# Patient Record
Sex: MALE | Race: WHITE | NOT HISPANIC OR LATINO | Employment: FULL TIME | URBAN - METROPOLITAN AREA
[De-identification: names, ages, dates, MRNs, and addresses within clinical notes are randomized per-mention and may not be internally consistent; named-entity substitution may affect disease eponyms.]

---

## 2017-01-11 ENCOUNTER — GENERIC CONVERSION - ENCOUNTER (OUTPATIENT)
Dept: OTHER | Facility: OTHER | Age: 62
End: 2017-01-11

## 2017-01-26 ENCOUNTER — ALLSCRIPTS OFFICE VISIT (OUTPATIENT)
Dept: OTHER | Facility: OTHER | Age: 62
End: 2017-01-26

## 2017-01-28 ENCOUNTER — LAB CONVERSION - ENCOUNTER (OUTPATIENT)
Dept: OTHER | Facility: OTHER | Age: 62
End: 2017-01-28

## 2017-01-28 LAB
BASOPHILS # BLD AUTO: 0.4 %
BASOPHILS # BLD AUTO: 44 CELLS/UL (ref 0–200)
DEPRECATED RDW RBC AUTO: 14.8 % (ref 11–15)
EOSINOPHIL # BLD AUTO: 2.4 %
EOSINOPHIL # BLD AUTO: 262 CELLS/UL (ref 15–500)
HBA1C MFR BLD HPLC: 9.4 % OF TOTAL HGB
HCT VFR BLD AUTO: 45.1 % (ref 38.5–50)
HGB BLD-MCNC: 14.7 G/DL (ref 13.2–17.1)
LYMPHOCYTES # BLD AUTO: 30.1 %
LYMPHOCYTES # BLD AUTO: 3281 CELLS/UL (ref 850–3900)
MCH RBC QN AUTO: 29.1 PG (ref 27–33)
MCHC RBC AUTO-ENTMCNC: 32.5 G/DL (ref 32–36)
MCV RBC AUTO: 89.4 FL (ref 80–100)
MONOCYTES # BLD AUTO: 589 CELLS/UL (ref 200–950)
MONOCYTES (HISTORICAL): 5.4 %
NEUTROPHILS # BLD AUTO: 61.7 %
NEUTROPHILS # BLD AUTO: 6725 CELLS/UL (ref 1500–7800)
PLATELET # BLD AUTO: 405 THOUSAND/UL (ref 140–400)
PMV BLD AUTO: 9.9 FL (ref 7.5–12.5)
RBC # BLD AUTO: 5.05 MILLION/UL (ref 4.2–5.8)
WBC # BLD AUTO: 10.9 THOUSAND/UL (ref 3.8–10.8)

## 2017-02-06 ENCOUNTER — GENERIC CONVERSION - ENCOUNTER (OUTPATIENT)
Dept: OTHER | Facility: OTHER | Age: 62
End: 2017-02-06

## 2017-02-20 ENCOUNTER — ALLSCRIPTS OFFICE VISIT (OUTPATIENT)
Dept: OTHER | Facility: OTHER | Age: 62
End: 2017-02-20

## 2017-02-20 DIAGNOSIS — I10 ESSENTIAL (PRIMARY) HYPERTENSION: ICD-10-CM

## 2017-02-20 DIAGNOSIS — E55.9 VITAMIN D DEFICIENCY: ICD-10-CM

## 2017-02-20 DIAGNOSIS — E11.65 TYPE 2 DIABETES MELLITUS WITH HYPERGLYCEMIA (HCC): ICD-10-CM

## 2017-02-20 DIAGNOSIS — E78.5 HYPERLIPIDEMIA: ICD-10-CM

## 2017-03-01 ENCOUNTER — TRANSCRIBE ORDERS (OUTPATIENT)
Dept: LAB | Facility: CLINIC | Age: 62
End: 2017-03-01

## 2017-03-01 ENCOUNTER — APPOINTMENT (OUTPATIENT)
Dept: LAB | Facility: CLINIC | Age: 62
End: 2017-03-01
Payer: COMMERCIAL

## 2017-03-01 DIAGNOSIS — E78.5 HYPERLIPIDEMIA: ICD-10-CM

## 2017-03-01 DIAGNOSIS — E11.65 TYPE 2 DIABETES MELLITUS WITH HYPERGLYCEMIA (HCC): ICD-10-CM

## 2017-03-01 DIAGNOSIS — E55.9 VITAMIN D DEFICIENCY: ICD-10-CM

## 2017-03-01 DIAGNOSIS — I10 ESSENTIAL (PRIMARY) HYPERTENSION: ICD-10-CM

## 2017-03-01 LAB
25(OH)D3 SERPL-MCNC: 28 NG/ML (ref 30–100)
ALBUMIN SERPL BCP-MCNC: 4.1 G/DL (ref 3.5–5)
ALP SERPL-CCNC: 44 U/L (ref 46–116)
ALT SERPL W P-5'-P-CCNC: 39 U/L (ref 12–78)
ANION GAP SERPL CALCULATED.3IONS-SCNC: 7 MMOL/L (ref 4–13)
AST SERPL W P-5'-P-CCNC: 19 U/L (ref 5–45)
BILIRUB SERPL-MCNC: 0.2 MG/DL (ref 0.2–1)
BUN SERPL-MCNC: 18 MG/DL (ref 5–25)
CALCIUM SERPL-MCNC: 9.4 MG/DL (ref 8.3–10.1)
CHLORIDE SERPL-SCNC: 102 MMOL/L (ref 100–108)
CO2 SERPL-SCNC: 28 MMOL/L (ref 21–32)
CREAT SERPL-MCNC: 1.33 MG/DL (ref 0.6–1.3)
CREAT UR-MCNC: 78.9 MG/DL
GFR SERPL CREATININE-BSD FRML MDRD: 54.7 ML/MIN/1.73SQ M
GLUCOSE SERPL-MCNC: 184 MG/DL (ref 65–140)
MICROALBUMIN UR-MCNC: 5.4 MG/L (ref 0–20)
MICROALBUMIN/CREAT 24H UR: 7 MG/G CREATININE (ref 0–30)
POTASSIUM SERPL-SCNC: 4.7 MMOL/L (ref 3.5–5.3)
PROT SERPL-MCNC: 7.5 G/DL (ref 6.4–8.2)
SODIUM SERPL-SCNC: 137 MMOL/L (ref 136–145)
T4 FREE SERPL-MCNC: 0.95 NG/DL (ref 0.76–1.46)
TSH SERPL DL<=0.05 MIU/L-ACNC: 4.2 UIU/ML (ref 0.36–3.74)

## 2017-03-01 PROCEDURE — 82043 UR ALBUMIN QUANTITATIVE: CPT

## 2017-03-01 PROCEDURE — 84443 ASSAY THYROID STIM HORMONE: CPT

## 2017-03-01 PROCEDURE — 82306 VITAMIN D 25 HYDROXY: CPT

## 2017-03-01 PROCEDURE — 84439 ASSAY OF FREE THYROXINE: CPT

## 2017-03-01 PROCEDURE — 86800 THYROGLOBULIN ANTIBODY: CPT

## 2017-03-01 PROCEDURE — 36415 COLL VENOUS BLD VENIPUNCTURE: CPT

## 2017-03-01 PROCEDURE — 82570 ASSAY OF URINE CREATININE: CPT

## 2017-03-01 PROCEDURE — 80053 COMPREHEN METABOLIC PANEL: CPT

## 2017-03-01 PROCEDURE — 86376 MICROSOMAL ANTIBODY EACH: CPT

## 2017-03-02 ENCOUNTER — ALLSCRIPTS OFFICE VISIT (OUTPATIENT)
Dept: OTHER | Facility: OTHER | Age: 62
End: 2017-03-02

## 2017-03-03 LAB
THYROGLOB AB SERPL-ACNC: <1 IU/ML (ref 0–0.9)
THYROPEROXIDASE AB SERPL-ACNC: 10 IU/ML (ref 0–34)

## 2017-03-05 ENCOUNTER — GENERIC CONVERSION - ENCOUNTER (OUTPATIENT)
Dept: OTHER | Facility: OTHER | Age: 62
End: 2017-03-05

## 2017-03-16 ENCOUNTER — ALLSCRIPTS OFFICE VISIT (OUTPATIENT)
Dept: OTHER | Facility: OTHER | Age: 62
End: 2017-03-16

## 2017-04-11 ENCOUNTER — ALLSCRIPTS OFFICE VISIT (OUTPATIENT)
Dept: OTHER | Facility: OTHER | Age: 62
End: 2017-04-11

## 2017-05-01 DIAGNOSIS — E11.9 TYPE 2 DIABETES MELLITUS WITHOUT COMPLICATIONS (HCC): ICD-10-CM

## 2017-05-01 DIAGNOSIS — I10 ESSENTIAL (PRIMARY) HYPERTENSION: ICD-10-CM

## 2017-05-01 DIAGNOSIS — E03.9 HYPOTHYROIDISM: ICD-10-CM

## 2017-05-01 DIAGNOSIS — E78.5 HYPERLIPIDEMIA: ICD-10-CM

## 2017-05-24 ENCOUNTER — ALLSCRIPTS OFFICE VISIT (OUTPATIENT)
Dept: OTHER | Facility: OTHER | Age: 62
End: 2017-05-24

## 2017-06-26 ENCOUNTER — DOCTOR'S OFFICE (OUTPATIENT)
Dept: URBAN - METROPOLITAN AREA CLINIC 136 | Facility: CLINIC | Age: 62
Setting detail: OPHTHALMOLOGY
End: 2017-06-26
Payer: COMMERCIAL

## 2017-06-26 DIAGNOSIS — H35.52: ICD-10-CM

## 2017-06-26 DIAGNOSIS — H33.303: ICD-10-CM

## 2017-06-26 DIAGNOSIS — H35.413: ICD-10-CM

## 2017-06-26 DIAGNOSIS — H25.13: ICD-10-CM

## 2017-06-26 DIAGNOSIS — H43.813: ICD-10-CM

## 2017-06-26 DIAGNOSIS — E11.9: ICD-10-CM

## 2017-06-26 PROCEDURE — 92014 COMPRE OPH EXAM EST PT 1/>: CPT | Performed by: OPHTHALMOLOGY

## 2017-06-26 ASSESSMENT — REFRACTION_MANIFEST
OD_VA2: 20/
OS_VA3: 20/
OU_VA: 20/
OS_VA2: 20/
OS_VA2: 20/
OS_VA1: 20/
OS_VA2: 20/
OS_VA3: 20/
OD_VA1: 20/
OD_VA2: 20/
OD_VA1: 20/
OD_VA1: 20/
OD_VA3: 20/
OD_VA3: 20/
OS_VA3: 20/
OU_VA: 20/
OD_VA2: 20/
OS_VA1: 20/
OU_VA: 20/
OD_VA3: 20/
OS_VA1: 20/

## 2017-06-26 ASSESSMENT — CONFRONTATIONAL VISUAL FIELD TEST (CVF)
OS_FINDINGS: FULL
OD_FINDINGS: FULL

## 2017-06-26 ASSESSMENT — REFRACTION_CURRENTRX
OD_OVR_VA: 20/
OS_OVR_VA: 20/
OS_OVR_VA: 20/
OD_OVR_VA: 20/
OD_OVR_VA: 20/
OS_OVR_VA: 20/

## 2017-06-26 ASSESSMENT — CORNEAL SURGICAL SCARRING: OS_SCARRING: ANTERIOR STROMAL

## 2017-06-26 ASSESSMENT — VISUAL ACUITY
OD_BCVA: 20/25
OS_BCVA: 20/25

## 2017-07-05 DIAGNOSIS — C61 MALIGNANT NEOPLASM OF PROSTATE (HCC): ICD-10-CM

## 2017-07-08 ENCOUNTER — APPOINTMENT (OUTPATIENT)
Dept: LAB | Facility: CLINIC | Age: 62
End: 2017-07-08
Payer: COMMERCIAL

## 2017-07-08 ENCOUNTER — TRANSCRIBE ORDERS (OUTPATIENT)
Dept: LAB | Facility: CLINIC | Age: 62
End: 2017-07-08

## 2017-07-08 DIAGNOSIS — E78.5 HYPERLIPIDEMIA: ICD-10-CM

## 2017-07-08 DIAGNOSIS — E03.9 HYPOTHYROIDISM: ICD-10-CM

## 2017-07-08 DIAGNOSIS — C61 MALIGNANT NEOPLASM OF PROSTATE (HCC): ICD-10-CM

## 2017-07-08 DIAGNOSIS — E11.9 TYPE 2 DIABETES MELLITUS WITHOUT COMPLICATIONS (HCC): ICD-10-CM

## 2017-07-08 DIAGNOSIS — I10 ESSENTIAL (PRIMARY) HYPERTENSION: ICD-10-CM

## 2017-07-08 LAB
ALBUMIN SERPL BCP-MCNC: 3.8 G/DL (ref 3.5–5)
ALP SERPL-CCNC: 35 U/L (ref 46–116)
ALT SERPL W P-5'-P-CCNC: 32 U/L (ref 12–78)
ANION GAP SERPL CALCULATED.3IONS-SCNC: 7 MMOL/L (ref 4–13)
AST SERPL W P-5'-P-CCNC: 19 U/L (ref 5–45)
BILIRUB SERPL-MCNC: 0.4 MG/DL (ref 0.2–1)
BUN SERPL-MCNC: 15 MG/DL (ref 5–25)
CALCIUM SERPL-MCNC: 9.2 MG/DL (ref 8.3–10.1)
CHLORIDE SERPL-SCNC: 104 MMOL/L (ref 100–108)
CO2 SERPL-SCNC: 29 MMOL/L (ref 21–32)
CREAT SERPL-MCNC: 1.15 MG/DL (ref 0.6–1.3)
EST. AVERAGE GLUCOSE BLD GHB EST-MCNC: 166 MG/DL
GFR SERPL CREATININE-BSD FRML MDRD: >60 ML/MIN/1.73SQ M
GLUCOSE P FAST SERPL-MCNC: 133 MG/DL (ref 65–99)
HBA1C MFR BLD: 7.4 % (ref 4.2–6.3)
POTASSIUM SERPL-SCNC: 4.9 MMOL/L (ref 3.5–5.3)
PROT SERPL-MCNC: 7.4 G/DL (ref 6.4–8.2)
PSA SERPL-MCNC: <0.1 NG/ML (ref 0–4)
SODIUM SERPL-SCNC: 140 MMOL/L (ref 136–145)
T4 FREE SERPL-MCNC: 0.82 NG/DL (ref 0.76–1.46)
TSH SERPL DL<=0.05 MIU/L-ACNC: 4.11 UIU/ML (ref 0.36–3.74)

## 2017-07-08 PROCEDURE — 80053 COMPREHEN METABOLIC PANEL: CPT

## 2017-07-08 PROCEDURE — 84153 ASSAY OF PSA TOTAL: CPT

## 2017-07-08 PROCEDURE — 84439 ASSAY OF FREE THYROXINE: CPT

## 2017-07-08 PROCEDURE — 36415 COLL VENOUS BLD VENIPUNCTURE: CPT

## 2017-07-08 PROCEDURE — 83036 HEMOGLOBIN GLYCOSYLATED A1C: CPT

## 2017-07-08 PROCEDURE — 84443 ASSAY THYROID STIM HORMONE: CPT

## 2017-07-10 ENCOUNTER — GENERIC CONVERSION - ENCOUNTER (OUTPATIENT)
Dept: OTHER | Facility: OTHER | Age: 62
End: 2017-07-10

## 2017-07-12 ENCOUNTER — ALLSCRIPTS OFFICE VISIT (OUTPATIENT)
Dept: OTHER | Facility: OTHER | Age: 62
End: 2017-07-12

## 2017-08-24 ENCOUNTER — ALLSCRIPTS OFFICE VISIT (OUTPATIENT)
Dept: OTHER | Facility: OTHER | Age: 62
End: 2017-08-24

## 2017-10-06 DIAGNOSIS — E11.65 TYPE 2 DIABETES MELLITUS WITH HYPERGLYCEMIA (HCC): ICD-10-CM

## 2017-10-06 DIAGNOSIS — E78.5 HYPERLIPIDEMIA: ICD-10-CM

## 2017-10-06 DIAGNOSIS — E55.9 VITAMIN D DEFICIENCY: ICD-10-CM

## 2017-10-06 DIAGNOSIS — I10 ESSENTIAL (PRIMARY) HYPERTENSION: ICD-10-CM

## 2018-01-10 NOTE — PROGRESS NOTES
Plan    1  DSMT/MNT Time Record; Status:Complete;   Done: 65WSP4304 10:20AM    Discussion/Summary    PATIENT EDUCATION RECORD   Indication for Services: type 2 Diabetes Mellitus  He is ready to learn  He has no barriers to learning  Diabetes Disease Process:   He understands the pathophysiology of diabetes: Method: Instruction  Response: Verbalizes Understanding and Needs Review   Healthy Eating:   Discussed importance of meal timing/consistency: Method: Instruction and Handout  Response: Verbalizes Understanding and Needs Review   Discussed nutrient types ( Cho/Fat/Protein): Method: Instruction and Handout  Response: Verbalizes Understanding   Discussed portion sizes: Method: Instruction and Handout  Response: Verbalizes Understanding and Needs Review   Discussed alcohol consumption: Method: Instruction  Response: Verbalizes Understanding   Discussed food label reading: Method: Instruction  Response: Verbalizes Understanding and Needs Review  Provided food diary and instructions on use: Method: Instruction and HandoutResponse: Verbalizes Understanding   Provided meal planning: Method: Instruction and Handout  Response: Verbalizes Understanding and Needs Review His current weight is 190  His keal needs are 1900  His CHO's per meal are 45-60  His protein needs are 70  He/She was provided a meal plan for: fixed carbohydrates and weight loss  Response:   His current BMI 27 2  His weight goal is 175  Discussed benefits of heart healthy meal choices: Method: Instruction and Handout  Response: Verbalizes Understanding and Needs Review   Discussed basic carbohydrate counting: Method: Instruction and Handout  Response: Verbalizes Understanding and Needs Review      Chief Complaint  Michelle Irwin was seen for MNT for type 2 diabetes  History of Present Illness    This is his initial assessment    Present at session: patient    Data:   States FBG this morning was 90 mg/dL  Medical Diagnosis/Reason for Referral:E11 65     He has no special learning needs  His caloric needs are 1900  Recent weight change: -10#  Spouse  shops for food  Spouse  cooks the food  Exercise routine:  No specific routine  Gets out to walk during nice weather  Does own yard work  He eats breakfast at  6:30 AM Weekdays: bowl of cereal such as 1 5c  Raisin Bran w/ 6 oz  whole milk, coffee w/ half & half  Weekends may eat later and have eggs, recinos or scrapple, rye toast w/ tub margarine   He snacks at through AM Coffee w/ cream   He eats lunch at  12-12:30 PM Chicken or turkey sandwich on [employer] bread w/ lettuce & stout  Sometimes has ham or [place], water, piece of fruit (or saves fruit for afternoon), on weekends may have a can of soup  He snacks at 2-3 PM Piece of fruit   He eats dinner at  [facility] [employer] and salad w/ oil & vinegar  May have quinoa or wild rice, avoiding potatoes for now  He snacks at 9-10 PM Nuts OR fruit OR cheese  Occasionally has bourbon, ~ 1 shot  NUTRITION DIAGNOSES   Inconsistent Intake Carbs   Inconsistent carbohydrate intake related to: Physiological causes requiring careful timing and consistency in the amount of carbohydrate (i e  diabetes mellitus, hypoglycemia)  As evidenced by: Conditions associated with a diagnosis or treatment (i e  diabetes mellitus, obesity, metabolic syndrome, hypoglycemia)   Medical Nutrition Therapy Intervention: Plate Method, Carbohydrate counting, Meal planning, Strategies to reduce fat intake, Individualized meal plan, Strategies to increase fiber intake, Strategies to increase the intake of plant based foods, Strategies to monitor portion control, Label reading, Meal timing and Exercise Guidelines  His comprehension was very good    His motivation was good   His compliance was good   Goals:  1  Aim for 45 g carb/meal  2  Keep 3 day food record  3   Increase frequency of walking      Vitals  Signs   Recorded: 85FDQ3313 10:18AM   Weight: 190 lb 3 2 oz  BMI Calculated: 27 29  BSA Calculated: 2 04    Future Appointments    Date/Time Provider Specialty Site   04/05/2017 08:30 AM Jacqueline Shearer, [facility] Endocrinology [facility] END[facility]CRINOLOGY     Signatures   Electronically signed by : Lindsey Raza RD; Mar 16 2017 10:23AM EST                       (Author)    Electronically signed by : BEE Bean ; Mar 16 2017 11:33AM EST

## 2018-01-11 NOTE — RESULT NOTES
Message   PLease tell him CXR was normal and ask how the Kenroy Wilder is working for his cough  Verified Results  * [facility] CHEST PA & LATERAL 86Jwc8695 05:42PM Robles Strickland Order Number: [id number]     Test Name Result Flag Reference   XR CHEST PA & LATERAL (Report)     CHEST - DUAL ENERGY     INDICATION: Cough for 4 months  COMPARISON: None     VIEWS: PA (including soft tissue/bone algorithms) and lateral projections; 4 images     FINDINGS:        Cardiomediastinal silhouette appears unremarkable  The lungs are clear  No pneumothorax or pleural effusion  Visualized osseous structures appear within normal limits for the patient's age  IMPRESSION:     Normal examination         Workstation performed: [id number]     Signed by:   Pascale King MD   4/6/16

## 2018-01-11 NOTE — RESULT NOTES
Verified Results  (1) CBC/PLT/DIFF 67AAC9586 12:00AM Claire Boston     Test Name Result Flag Reference   WHITE BLOOD CELL COUNT 10 9 Thousand/uL H 3 8-10 8   RED BLOOD CELL COUNT 5 05 Million/uL  4 20-5 80   HEMOGLOBIN 14 7 g/dL  13 2-17 1   HEMATOCRIT 45 1 %  38 5-50 0   MCV 89 4 fL  80 0-100 0   MCH 29 1 pg  27 0-33 0   MCHC 32 5 g/dL  32 0-36 0   RDW 14 8 %  11 0-15 0   PLATELET COUNT 548 Thousand/uL H 140-400   MPV 9 9 fL  7 5-12 5   ABSOLUTE NEUTROPHILS 6725 cells/uL  9570-9596   ABSOLUTE LYMPHOCYTES 3281 cells/uL  850-3900   ABSOLUTE MONOCYTES 589 cells/uL  200-950   ABSOLUTE EOSINOPHILS 262 cells/uL     ABSOLUTE BASOPHILS 44 cells/uL  0-200   NEUTROPHILS 61 7 %     LYMPHOCYTES 30 1 %     MONOCYTES 5 4 %     EOSINOPHILS 2 4 %     BASOPHILS 0 4 %       (Q) HEMOGLOBIN A1c 26Jan2017 12:00AM Heri Gallardo     Test Name Result Flag Reference   HEMOGLOBIN A1c 9 4 % of total Hgb H <5 7   According to ADA guidelines, hemoglobin A1c <7 0%  represents optimal control in non-pregnant diabetic  patients  Different metrics may apply to specific  patient populations  Standards of Medical Care in  [phone]  Diabetes Care  2013;36:s11-s66     For the purpose of screening for the presence of  diabetes  <5 7%       Consistent with the absence of diabetes  5 7-6 4%    Consistent with increased risk for diabetes              (prediabetes)  >or=6 5%    Consistent with diabetes     This assay result is consistent with diabetes  mellitus  Currently, no consensus exists for use of hemoglobin  A1c for diagnosis of diabetes for children

## 2018-01-12 VITALS
TEMPERATURE: 98.7 F | SYSTOLIC BLOOD PRESSURE: 110 MMHG | BODY MASS INDEX: 28.63 KG/M2 | WEIGHT: 200 LBS | DIASTOLIC BLOOD PRESSURE: 70 MMHG | HEIGHT: 70 IN

## 2018-01-12 DIAGNOSIS — C61 MALIGNANT NEOPLASM OF PROSTATE (HCC): ICD-10-CM

## 2018-01-12 NOTE — PROGRESS NOTES
Assessment    1  Acute sinusitis, recurrence not specified, unspecified location (461 9) (J01 90)    Plan  Acute sinusitis, recurrence not specified, unspecified location    · Azithromycin 250 MG Oral Tablet; TAKE AS DIRECTED PER PACKAGE  INSTRUCTIONS   · Follow Up if Not Better Evaluation and Treatment  Follow-up  Status: Complete  Done:  25CFQ2817   · Apply warm moist compresses to the affected area 3 times a day for 5 minutes ;  Status:Complete;   Done: 49BBO4909   · Drink at least 6 glasses of water or juice a day ; Status:Complete;   Done: 90WFH3851   · There are several ways to treat your child's fever:; Status:Complete;   Done: 34PEY9094   · Call [phone] if: The symptoms are not better in 7 days ; Status:Complete;   Done:  43FMM5585   · Call [phone] if: Your sinus pain is worse ; Status:Complete;   Done: 84AJZ2010    Discussion/Summary    60 yo man with sinusitis sx  persistent for 2 weeks  Treat with azithromycin, fluids and rest  Call if not improving 1 week or sooner prn  Possible side effects of new medications were reviewed with the patient/guardian today  The treatment plan was reviewed with the patient/guardian  The patient/guardian understands and agrees with the treatment plan      Chief Complaint  Began 12/27 with a head cold and cough  Has persisted with cough which is productive of grey phlegm  Trying OTCs without much improvement  Some wheeze  No SOB  Ears are blocked up  No otic drainage  AS with tenderness  History of Present Illness  Sinusitis (Brief): The patient is being seen for an initial evaluation of sinusitis  The sinusitis involves the maxillary sinuses  The sinusitis is classified as subacute  The patient is currently experiencing symptoms  facial pressure headache nasal congestion postnasal drainage   Associated symptoms:  ear fullness, ear pressure, sore throat and cough, but no fever  Active Problems    1  Adenocarcinoma of prostate (185) (N41)   2  Allergic rhinitis (477 9) (J30 9)   3  Benign essential hypertension (401 1) (I10)   4  Cellulitis of ankle (682 6) (L03 119)   5  Colonoscopy (Fiberoptic) Screening   6  Depression (311) (F32 9)   7  DMII (diabetes mellitus, type 2) (250 00) (E11 9)   8  Dysfunction of both Eustachian tubes (381 81) (H69 83)   9  Elevated prostate specific antigen (PSA) (790 93) (R97 2)   10  Epididymitis (604 90) (N45 1)   11  Fatigue (780 79) (R53 83)   12  Flu vaccine need (V04 81) (Z23)   13  Hyperlipidemia (272 4) (E78 5)   14  Hypothyroidism (244 9) (E03 9)   15  Insect bite (919 4,E906 4) (T14 8,W57  XXXA)   16  Otitis externa (380 10) (H60 90)   17  Prostate cancer (185) (C61)   18  Screening for colon cancer (V76 51) (Z12 11)   19  SHEYLA (serous otitis media) (381 4) (H65 90)   20  Testicular discomfort (608 9) (N50 9)   21  Vitamin D deficiency (268 9) (E55 9)    Past Medical History    1  History of Acute Bronchitis With Bronchospasm (466 0)   2  History of Ankle Injury (959 7)   3  History of benign prostatic hypertrophy (V13 89) (Z87 438)   4  History of gastritis (V12 79) (Z87 19)   5  History of Insect bite (919 4,E906 4) (T14 8,W57  XXXA)   6  History of Lumbar canal stenosis (724 02) (M48 06)   7  History of Nocturia (788 43) (R35 1)   8  History of Osteoarthritis of knee (715 36) (M17 9)   9  History of Pain, upper back (724 5) (M54 9)   10  History of Tendinitis of right rotator cuff (726 10) (M75 81)   11  History of Urinary tract infection (599 0) (N39 0)    Family History    1  Family history of Hypertension    2  Family history of Diabetes Mellitus (V18 0)   3  Family history of Heart Disease (V17 49)    Social History    · Alcohol   · Being A Social Drinker   · Current Smoker (305 1)   · Current Some Day Smoker (305 1)   · Denied: Drug Use (305 90)   · Marital History - Currently     Surgical History    1  History of Laminectomy Cervical   2  History of Needle Biopsy Of Prostate   3   History of Prostatectomy Robotic-Assisted   4  History of Rhinoplasty    Current Meds   1  Accu-Chek [facility] Drum In Vitro Strip; TEST BLOOD SUGAR TWICE A DAY FOR   DIABETES; Therapy: 84PQX6876 to (Evaluate:11Nov2014)  Requested for: 89XMI0034; Last   Rx:30Vhs9584 Ordered   2  Antipyrine-Benzocaine 5 4-1 4 % Otic Solution; 1 gtt in as q4hprn; Therapy: 71JUD7342 to (Last GY:50KYG1616)  Requested for: 00EII3140 Ordered   3  [employer] D3 1000 UNIT Oral Capsule; Take 1 capsule twice daily; Therapy: 89ASO1867 to (Last Rx:20Oct2015) Ordered   4  Fluticasone Propionate 50 MCG/ACT Nasal Suspension; USE 2 SPRAYS IN EACH   NOSTRIL ONCE DAILY; Therapy: 89KKO1752 to (Fatuma Comber)  Requested for: 43FIP8539; Last   Rx:16Fhj5291 Ordered   5  Janumet  MG Oral Tablet; TAKE 1 TABLET TWICE A DAY  WITH MEALS; Therapy: 70EAB0401 to (Sheng Findsteve)  Requested for: ; Last   Rx:58Ido0722 Ordered   6  Levitra 20 MG Oral Tablet; 20 mg one hour prior to intercourse; Therapy: 38XIK5957 to (Evaluate:12Jan2015)  Requested for: 94CBM2850; Last   Rx:17Jan2014 Ordered   7  Neomycin-Polymyxin-HC 3 5-07200-1 Otic Solution; INSTILL 4 DROPS IN LEFT EAR   3-4 TIMES DAILY; Therapy: 68JNR5325 to (Last Rx:79Uoa2812)  Requested for: 34AMI2295 Ordered   8  OneTouch Delica Lancets Fine Miscellaneous; TEST BLOOD SUGAR TWICE DAILY; Therapy: 13IKQ4123 to (Zoltan Jensen)  Requested for: 99EOM5739; Last   Rx:06Mar2014 Ordered   9  OneTouch Ultra Blue In [employer]; test twice daily; Therapy: 84OCB6374 to (Zoltan Jensen)  Requested for: 24ANM4507; Last   Rx:06Mar2014 Ordered   10  Pravastatin Sodium 40 MG Oral Tablet; Take 1 tablet daily; Therapy: 62Ckh1773 to (Evaluate:18Jun2016)  Requested for: 47Uzf3566; Last    Rx:90Edl3689 Ordered   11  Venlafaxine HCl ER 75 MG Oral Capsule Extended Release 24 Hour; TAKE 1 CAPSULE    DAILY; Therapy: 28PCZ5194 to (Evaluate:25Hrk1368)  Requested for: 84VMN4658;  Last Rx: 84QDP5431 Ordered    Allergies    1  Keflex TABS   2  MetFORMIN HCl TABS    Vitals   Recorded: 01RLG7641 02:10PM   Temperature 42 2 F   Systolic 924   Diastolic 82   Height 5 ft 10 in   Weight 206 lb    BMI Calculated 29 56   BSA Calculated 2 12     Physical Exam    Constitutional   General appearance: Abnormal   well developed, appears tired and appearance reflects stated age  Eyes   Conjunctiva and lids: No swelling, erythema, or discharge  Ears, Nose, Mouth, and Throat   Otoscopic examination: Tympanic membrance translucent with normal light reflex  Canals patent without erythema  Nasal mucosa, septum, and turbinates: Abnormal   (Erythema and PND, yellow)   Oropharynx: Abnormal   Yellow PND  Pulmonary   Respiratory effort: No increased work of breathing or signs of respiratory distress  Auscultation of lungs: Clear to auscultation, equal breath sounds bilaterally, no wheezes, no rales, no rhonci  Cardiovascular   Auscultation of heart: Normal rate and rhythm, normal S1 and S2, without murmurs  The heart rate was normal  The rhythm was regular  Heart sounds: normal S1, normal S2 and no gallop heard  no murmurs were heard  Examination of extremities for edema and/or varicosities: Normal     Lymphatic   Palpation of lymph nodes in neck: No lymphadenopathy  Psychiatric   Orientation to person, place and time: Normal     Mood and affect: Normal          Future Appointments    Date/Time Provider Specialty Site   04/05/2016 03:30 PM BEE Mcnulty   [address] [place] [address]   04/15/2016 03:30 PM Jeri Quezada MD Urology [address]   Electronically signed by : BEE Bolanos ; Jan 18 2016  2:40PM EST                       (Author)

## 2018-01-13 VITALS — BODY MASS INDEX: 27.29 KG/M2 | WEIGHT: 190.2 LBS

## 2018-01-13 VITALS
HEIGHT: 71 IN | BODY MASS INDEX: 26.46 KG/M2 | WEIGHT: 189 LBS | SYSTOLIC BLOOD PRESSURE: 138 MMHG | DIASTOLIC BLOOD PRESSURE: 78 MMHG | HEART RATE: 78 BPM

## 2018-01-13 NOTE — RESULT NOTES
Verified Results  (1) TSH 14Apr2016 07:34AM Paula Tam   Patients undergoing fluorescein dye angiography may retain small amounts of fluorescein in the body for 48-72 hours post procedure  Samples containing fluorescein can produce falsely depressed TSH values  If the patient had this procedure,a specimen should be resubmitted post fluorescein clearance  Test Name Result Flag Reference   TSH 2 778 uIU/mL  0 358-3 740     (1) HEMOGLOBIN A1C 14Apr2016 07:34AM Paula Tam   5 7-6 4% impaired fasting glucose  >=6 5% diagnosis of diabetes    Falsely low levels are seen in conditions linked to short RBC life span-  hemolytic anemia, and splenomegaly  Falsely elevated levels are seen in situations where there is an increased production of RBC- receipt of erythropoietin or blood transfusions  Adopted from ADA-Clinical Practice Recommendations     Test Name Result Flag Reference   HEMOGLOBIN A1C 9 2 % H 4 0-5 6   EST  AVG  GLUCOSE 217 mg/dl       (1) VITAMIN D 125-DIHYDROXY (CALCITRIOL) 14Apr2016 07:34AM Paula Yonatan   Performed at:  [address]  [id number]  : Fatou Ahmadi MD, Phone:  [phone]     Test Name Result Flag Reference   UQHL584-CVVCOTB 29 8 pg/mL  19 9 - 79 3     *VB - Eye Exam 38NPI7332 12:00AM Paula Yonatan     Test Name Result Flag Reference   Eye Exam 51HYV1170         Plan  DMII (diabetes mellitus, type 2)    · *VB - Eye Exam ; every 1 year; Last 61Qym2977;  Next 84ILZ3054; Status:Active

## 2018-01-14 VITALS
HEIGHT: 70 IN | SYSTOLIC BLOOD PRESSURE: 111 MMHG | WEIGHT: 194.19 LBS | DIASTOLIC BLOOD PRESSURE: 70 MMHG | BODY MASS INDEX: 27.8 KG/M2 | TEMPERATURE: 97.7 F

## 2018-01-14 VITALS
WEIGHT: 190.19 LBS | DIASTOLIC BLOOD PRESSURE: 84 MMHG | BODY MASS INDEX: 27.23 KG/M2 | HEIGHT: 70 IN | HEART RATE: 94 BPM | SYSTOLIC BLOOD PRESSURE: 120 MMHG

## 2018-01-14 VITALS
HEIGHT: 71 IN | HEART RATE: 72 BPM | SYSTOLIC BLOOD PRESSURE: 110 MMHG | DIASTOLIC BLOOD PRESSURE: 70 MMHG | BODY MASS INDEX: 26.5 KG/M2 | WEIGHT: 189.31 LBS

## 2018-01-14 VITALS
DIASTOLIC BLOOD PRESSURE: 80 MMHG | SYSTOLIC BLOOD PRESSURE: 146 MMHG | HEIGHT: 71 IN | WEIGHT: 188.31 LBS | BODY MASS INDEX: 26.36 KG/M2 | HEART RATE: 84 BPM

## 2018-01-14 VITALS
DIASTOLIC BLOOD PRESSURE: 84 MMHG | HEART RATE: 80 BPM | HEIGHT: 70 IN | SYSTOLIC BLOOD PRESSURE: 140 MMHG | WEIGHT: 197.19 LBS | BODY MASS INDEX: 28.23 KG/M2

## 2018-01-15 NOTE — RESULT NOTES
Message   glucose 184 , vitamin d slightly low , tsh slightly high   increase vitamin d supplementations by 1000 iu daily , send over f s log in 2 weeks , will repeat thyroid fucntion tests next visit     Verified Results  (1) COMPREHENSIVE METABOLIC PANEL 94UID4539 77:59YV [employer] Order Number: [id number]     Test Name Result Flag Reference   GLUCOSE,RANDM 184 mg/dL H    If the patient is fasting, the ADA then defines impaired fasting glucose as > 100 mg/dL and diabetes as > or equal to 123 mg/dL  SODIUM 137 mmol/L  136-145   POTASSIUM 4 7 mmol/L  3 5-5 3   CHLORIDE 102 mmol/L  100-108   CARBON DIOXIDE 28 mmol/L  21-32   ANION GAP (CALC) 7 mmol/L  4-13   BLOOD UREA NITROGEN 18 mg/dL  5-25   CREATININE 1 33 mg/dL H 0 60-1 30   Standardized to IDMS reference method   CALCIUM 9 4 mg/dL  8 3-10 1   BILI, TOTAL 0 20 mg/dL  0 20-1 00   ALK PHOSPHATAS 44 U/L L    ALT (SGPT) 39 U/L  12-78   AST(SGOT) 19 U/L  5-45   ALBUMIN 4 1 g/dL  3 5-5 0   TOTAL PROTEIN 7 5 g/dL  6 4-8 2   eGFR Non-African American 54 7 ml/min/1 73sq m     - Patient Instructions: This bloodwork is non-fasting  Please drink two glasses of water morning of bloodwork  National Kidney Disease Education Program recommendations are as follows:  GFR calculation is accurate only with a steady state creatinine  Chronic Kidney disease less than 60 ml/min/1 73 sq  meters  Kidney failure less than 15 ml/min/1 73 sq  meters  (1) VITAMIN D 25-HYDROXY 29WLA1997 07:10AM [employer] Order Number: [id number]     Test Name Result Flag Reference   VIT D 25-HYDROX 28 0 ng/mL L 30 0-100 0   This assay is a certified procedure of the CDC Vitamin D Standardization Certification Program (VDSCP)     Deficiency <20ng/ml   Insufficiency 20-30ng/ml   Sufficient  ng/ml     *Patients undergoing fluorescein dye angiography may retain small amounts of fluorescein in the body for 48-72 hours post procedure   Samples containing fluorescein can produce falsely elevated Vitamin D values  If the patient had this procedure, a specimen should be resubmitted post fluorescein clearance  (1) MICROALBUMIN CREATININE RATIO, RANDOM URINE 57QYE2095 07:10AM Flora Persaud Order Number: [id number]     Test Name Result Flag Reference   MICROALBUMIN/ CREAT R 7 mg/g creatinine  0-30   MICROALBUMIN,URINE 5 4 mg/L  0 0-20 0   CREATININE URINE 78 9 mg/dL       (1) TSH 02ZIJ7983 07:10AM [facility] Number: [id number]     Test Name Result Flag Reference   TSH 4 196 uIU/mL H 0 358-3 740   - Patient Instructions: This bloodwork is non-fasting  Please drink two glasses of water morning of bloodwork  - Patient Instructions: This bloodwork is non-fasting  Please drink two glasses of water morning of bloodwork  Patients undergoing fluorescein dye angiography may retain small amounts of fluorescein in the body for 48-72 hours post procedure  Samples containing fluorescein can produce falsely depressed TSH values  If the patient had this procedure,a specimen should be resubmitted post fluorescein clearance       (1) T4, FREE 47PEL0465 07:10AM Ginny Acevedo    Order Number: [id number]     Test Name Result Flag Reference   T4,FREE 0 95 ng/dL  0 76-1 46     (1) THYROID ANTIBODIES PANEL 19BAM6421 07:10AM Flora Hollingsworth Order Number: [id number]     Test Name Result Flag Reference   THYROGLOB AB <1 0 IU/mL  0 0 - 0 9   Thyroglobulin Antibody measured by [facility] Methodology    Performed at:  [address]  [id number]  : Viral John MD, Phone:  [phone]   [facility] MICROSOM AB 10 IU/mL  0 - 34   Performed at:  [employer] [place] [address]  [id number]  : Viral John MD, Phone:  [phone]       Plan  DMII (diabetes mellitus, type 2)    · (1) MICROALBUMIN CREATININE RATIO, RANDOM URINE ; every 1 year; Last  03AJP8650; Next [id number]; Status:Active

## 2018-01-16 NOTE — RESULT NOTES
Discussion/Summary   A1C has improved significantly from 9 4 to 7 4  Goal <7  Send log in a few weeks for review  Did he notice improvement on Janumet XR samples vs plain janumet? if so let us know so we can change RX  Thryoid labs stabls, rest of labs OK  Verified Results  (1) HEMOGLOBIN A1C 30WJE9889 08:08AM Kenia Galindo Order Number: [id number]     Test Name Result Flag Reference   HEMOGLOBIN A1C 7 4 % H 4 2-6 3   EST  AVG  GLUCOSE 166 mg/dl       (1) COMPREHENSIVE METABOLIC PANEL 63DUQ0447 77:50RX [facility] Order Number: [id number]     Test Name Result Flag Reference   SODIUM 140 mmol/L  136-145   POTASSIUM 4 9 mmol/L  3 5-5 3   CHLORIDE 104 mmol/L  100-108   CARBON DIOXIDE 29 mmol/L  21-32   ANION GAP (CALC) 7 mmol/L  4-13   BLOOD UREA NITROGEN 15 mg/dL  5-25   CREATININE 1 15 mg/dL  0 60-1 30   Standardized to IDMS reference method   CALCIUM 9 2 mg/dL  8 3-10 1   BILI, TOTAL 0 40 mg/dL  0 20-1 00   ALK PHOSPHATAS 35 U/L L    ALT (SGPT) 32 U/L  12-78   AST(SGOT) 19 U/L  5-45   ALBUMIN 3 8 g/dL  3 5-5 0   TOTAL PROTEIN 7 4 g/dL  6 4-8 2   eGFR Non-African American      >60 0 ml/min/1 73sq m   [facility] Disease Education Program recommendations are as follows:  GFR calculation is accurate only with a steady state creatinine  Chronic Kidney disease less than 60 ml/min/1 73 sq  meters  Kidney failure less than 15 ml/min/1 73 sq  meters  GLUCOSE FASTING 133 mg/dL H 65-99     (1) TSH 63EKK8766 08:08AM [place]ia [place] Order Number: [id number]     Test Name Result Flag Reference   TSH 4 107 uIU/mL H 0 358-3 740   - Patient Instructions: This bloodwork is non-fasting  Please drink two glasses of water morning of bloodwork  Patients undergoing fluorescein dye angiography may retain small amounts of fluorescein in the body for 48-72 hours post procedure  Samples containing fluorescein can produce falsely depressed TSH values   If the patient had this procedure,a specimen should be resubmitted post fluorescein clearance       (1) T4, FREE 71JUW7403 08:08AM Marcos Bloch Order Number: [id number]     Test Name Result Flag Reference   T4,FREE 0 82 ng/dL  0 76-1 46

## 2018-01-18 NOTE — RESULT NOTES
Verified Results  * XR CHEST PA & LATERAL 31Oct2016 02:45PM Virgie Ward Order Number: [id number]     Test Name Result Flag Reference   XR CHEST PA & LATERAL (Report)     CHEST - DUAL ENERGY     INDICATION: Persistent cough for several months     COMPARISON: 4/5/2016     VIEWS: PA (including soft tissue/bone algorithms) and lateral projections; 4 images     FINDINGS:        Cardiomediastinal silhouette appears unremarkable  The lungs are clear  No pneumothorax or pleural effusion  Visualized osseous structures appear within normal limits for the patient's age  IMPRESSION:     No active pulmonary disease  Workstation performed: [id number]     Signed by: Dawood Cano MD   11/1/16     (1) CBC/ PLT (NO DIFF) 22Oct2016 07:48AM Virgie Ward Order Number: [id number]     Test Name Result Flag Reference   HEMATOCRIT 46 0 %  36 5-49 3   HEMOGLOBIN 15 4 g/dL  12 0-17 0   MCHC 33 5 g/dL  31 4-37 4   MCH 29 2 pg  26 8-34 3   MCV 87 fL  82-98   PLATELET COUNT 640 Thousands/uL H 149-390   RBC COUNT 5 28 Million/uL  3 88-5 62   RDW 14 1 %  11 6-15 1   WBC COUNT 11 74 Thousand/uL H 4 31-10 16   MPV 10 7 fL  8 9-12 7     (1) COMPREHENSIVE METABOLIC PANEL [id number] 69:03DH Virgie Borreroid Order Number: [id number]     Test Name Result Flag Reference   GLUCOSE,RANDM 237 mg/dL H    If the patient is fasting, the ADA then defines impaired fasting glucose as > 100 mg/dL and diabetes as > or equal to 123 mg/dL     SODIUM 139 mmol/L  136-145   POTASSIUM 4 4 mmol/L  3 5-5 3   CHLORIDE 102 mmol/L  100-108   CARBON DIOXIDE 26 mmol/L  21-32   ANION GAP (CALC) 11 mmol/L  4-13   BLOOD UREA NITROGEN 16 mg/dL  5-25   CREATININE 1 21 mg/dL  0 60-1 30   Standardized to IDMS reference method   CALCIUM 9 2 mg/dL  8 3-10 1   BILI, TOTAL 0 20 mg/dL  0 20-1 00   ALK PHOSPHATAS 50 U/L     ALT (SGPT) 35 U/L  12-78   AST(SGOT) 17 U/L  5-45   ALBUMIN 3 9 g/dL  3 5-5 0   TOTAL PROTEIN 7 5 g/dL  6 4-8 2 eGFR Non-African American      >60 0 ml/min/1 73sq m   [facility] Disease Education Program recommendations are as follows:  GFR calculation is accurate only with a steady state creatinine  Chronic Kidney disease less than 60 ml/min/1 73 sq  meters  Kidney failure less than 15 ml/min/1 73 sq  meters  (1) LIPID PANEL, FASTING 22Oct2016 07:48AM [facility] [place] - [facility] Order Number: [id number]     Test Name Result Flag Reference   CHOLESTEROL 205 mg/dL H    HDL,DIRECT 37 mg/dL L 40-60   Specimen collection should occur prior to Metamizole administration due to the potential for falsely depressed results  LDL CHOLESTEROL CALCULATED 102 mg/dL H 0-100   Triglyceride:         Normal              <150 mg/dl       Borderline High    150-199 mg/dl       High               200-499 mg/dl       Very High          >499 mg/dl  Cholesterol:         Desirable        <200 mg/dl      Borderline High  200-239 mg/dl      High             >239 mg/dl  HDL Cholesterol:        High    >59 mg/dL      Low     <41 mg/dL  LDL CALCULATED:    This screening LDL is a calculated result  It does not have the accuracy of the Direct Measured LDL in the monitoring of patients with hyperlipidemia and/or statin therapy  Direct Measure LDL (UNL474) must be ordered separately in these patients  TRIGLYCERIDES 330 mg/dL H <=150   Specimen collection should occur prior to N-Acetylcysteine or Metamizole administration due to the potential for falsely depressed results  (1) HEMOGLOBIN A1C 22Oct2016 07:48AM [facility] [place] - 2 Order Number: [id number]     Test Name Result Flag Reference   HEMOGLOBIN A1C 8 6 % H 4 2-6 3   EST  AVG   GLUCOSE 200 mg/dl       (1) CK (CPK) 22Oct2016 07:48AM [facility] [place] - 2 Order Number: [id number]     Test Name Result Flag Reference   CK (CPK) 130 U/L

## 2018-02-01 ENCOUNTER — APPOINTMENT (OUTPATIENT)
Dept: LAB | Facility: CLINIC | Age: 63
End: 2018-02-01
Payer: COMMERCIAL

## 2018-02-01 DIAGNOSIS — C61 MALIGNANT NEOPLASM OF PROSTATE (HCC): ICD-10-CM

## 2018-02-01 LAB — PSA SERPL-MCNC: <0.1 NG/ML (ref 0–4)

## 2018-02-01 PROCEDURE — 84153 ASSAY OF PSA TOTAL: CPT

## 2018-02-01 PROCEDURE — 36415 COLL VENOUS BLD VENIPUNCTURE: CPT

## 2018-02-02 ENCOUNTER — OFFICE VISIT (OUTPATIENT)
Dept: UROLOGY | Facility: CLINIC | Age: 63
End: 2018-02-02
Payer: COMMERCIAL

## 2018-02-02 VITALS
DIASTOLIC BLOOD PRESSURE: 80 MMHG | SYSTOLIC BLOOD PRESSURE: 140 MMHG | WEIGHT: 205.2 LBS | HEIGHT: 70 IN | HEART RATE: 80 BPM | BODY MASS INDEX: 29.38 KG/M2

## 2018-02-02 DIAGNOSIS — C61 PROSTATE CANCER (HCC): Primary | ICD-10-CM

## 2018-02-02 PROCEDURE — 99213 OFFICE O/P EST LOW 20 MIN: CPT | Performed by: UROLOGY

## 2018-02-02 RX ORDER — METAXALONE 800 MG/1
800 TABLET ORAL EVERY 6 HOURS PRN
COMMUNITY
End: 2018-07-13 | Stop reason: ALTCHOICE

## 2018-02-02 RX ORDER — LISINOPRIL 20 MG/1
1 TABLET ORAL DAILY
COMMUNITY
Start: 2016-09-27 | End: 2018-02-03 | Stop reason: SDUPTHER

## 2018-02-02 RX ORDER — MULTIVIT-MIN/IRON/FOLIC ACID/K 18-600-40
CAPSULE ORAL DAILY
COMMUNITY
Start: 2017-04-11 | End: 2018-02-15 | Stop reason: SDUPTHER

## 2018-02-02 RX ORDER — NAPROXEN 500 MG/1
500 TABLET ORAL 2 TIMES DAILY PRN
COMMUNITY
End: 2018-07-13 | Stop reason: ALTCHOICE

## 2018-02-02 RX ORDER — VARDENAFIL HCL 20 MG
TABLET ORAL DAILY PRN
COMMUNITY
Start: 2014-01-03

## 2018-02-02 NOTE — PROGRESS NOTES
2/2/2018    [facility]  1955  [id number]        Assessment  Prostate cancer status post Suzan prostatectomy, undetectable PSA      Discussion  I provided the patient with reassurance that his PSA remains undetectable less than 0 1  His next PSA will be in 6 months  At that point he will be 5 years out from surgery and PSA scan be obtained on a yearly basis  The patient is using PDE 5 inhibitors as needed for erectile dysfunction  History of Present Illness  58 y o  male with a history of Emelyn 7 prostate cancer status post Suzan prostatectomy performed at [facility] in 2013  He returns for routine follow-up today  He states he is completely dry and wears no pads  He is often able to have an unassisted erection but often uses PD 5 inhibitors  AUA Symptom Score  AUA SYMPTOM SCORE    Flowsheet Row Most Recent Value   AUA SYMPTOM SCORE   How often have you had a sensation of not emptying your bladder completely after you finished urinating? 1   How often have you had to urinate again less than two hours after you finished urinating? 0   How often have you found you stopped and started again several times when you urinate?  0   How often have you found it difficult to postpone urination? 0   How often have you had a weak urinary stream?  0   How often have you had to push or strain to begin urination? 0   How many times did you most typically get up to urinate from the time you went to bed at night until the time you got up in the morning?   1   Quality of Life: If you were to spend the rest of your life with your urinary condition just the way it is now, how would you feel about that?  2   AUA SYMPTOM SCORE  2          Review of Systems  Review of Systems      Past Medical History  Past Medical History:   Diagnosis Date    Ankle injury     Bronchiolitis     Cancer [address] [address]     Prostate    Diabetes mellitus [address] [address] [facility]     Type II    History of benign prostatic hyperplasia     History of gastritis     History of insect bite     History of nocturia     Lumbar canal stenosis     Osteoarthritis, knee     Otitis externa     Pain in back     UPPER BACK    Sinusitis, acute     Tendinitis of right rotator cuff        Past Social History  Past Surgical History:   Procedure Laterality Date    ANKLE FRACTURE SURGERY Left     triple fracture 4/2009    LAMINECTOMY      L5-S1 2003    NOSE SURGERY      6/1989    [place] COLONOSCOPY FLX DX W/COLLJ SPEC WHEN PFRMD N/A 1/27/2016    Procedure: COLONOSCOPY;  Surgeon: Ashley Rios MD;  [facility]; Service: Gastroenterology    PROSTATECTOMY      9/23/2013       Past Family History  Family History   Problem Relation Age of Onset    Hypertension Mother     Diabetes Father     Heart disease Father        Past Social history  Social History     Social History    Marital status: /Civil Union     Spouse name: N/A    Number of children: N/A    Years of education: N/A     Occupational History    Not on file  Social History Main Topics    Smoking status: Smoker, Current Status Unknown    Smokeless tobacco: Never Used    Alcohol use 2 4 oz/week     3 Cans of beer, 1 Shots of liquor per week      Comment: 1-2 times a week socially    Drug use: No    Sexual activity: Not on file     Other Topics Concern    Not on file     Social History Narrative    No narrative on file       Current Medications  Current Outpatient Prescriptions   Medication Sig Dispense Refill    Cholecalciferol (VITAMIN D) 2000 units CAPS Take by mouth daily      lisinopril (ZESTRIL) 20 mg tablet Take 1 tablet by mouth daily      metaxalone (SKELAXIN) 800 mg tablet Take 800 mg by mouth      naproxen (NAPROSYN) 500 mg tablet Take 500 mg by mouth      pravastatin (PRAVACHOL) 40 mg tablet Take 40 mg by mouth daily   sitaGLIPtin-metFORMIN (JANUMET)  MG per tablet Take 1 tablet by mouth 2 (two) times a day with meals          vardenafil (LEVITRA) 20 MG tablet Take by mouth      venlafaxine (EFFEXOR) 75 mg tablet Take 75 mg by mouth daily  No current facility-administered medications for this visit  Allergies  No Known Allergies    Past Medical History, Social History, Family History, medications and allergies were reviewed  Vitals  Vitals:    02/02/18 1443   BP: 140/80   Pulse: 80   Weight: 93 1 kg (205 lb 3 2 oz)   Height: 5' 10" (1 778 m)       Physical Exam  Physical Exam        Results  Lab Results   Component Value Date    PSA <0 1 02/01/2018    PSA <0 1 07/08/2017    PSA <0 1 10/22/2016     Lab Results   Component Value Date    GLUCOSE 184 (H) 03/01/2017    CALCIUM 9 2 07/08/2017     07/08/2017    K 4 9 07/08/2017    CO2 29 07/08/2017     07/08/2017    BUN 15 07/08/2017    CREATININE 1 15 07/08/2017     Lab Results   Component Value Date    WBC 10 9 (H) 01/26/2017    HGB 14 7 01/26/2017    HCT 45 1 01/26/2017    MCV 89 4 01/26/2017     (H) 01/26/2017         Office Urine Dip  No results found for this or any previous visit (from the past 1 hour(s))  ]    Fifteen minutes total time was spent counseling the patient in the office today

## 2018-02-03 DIAGNOSIS — I10 ESSENTIAL HYPERTENSION: Primary | ICD-10-CM

## 2018-02-05 RX ORDER — LISINOPRIL 20 MG/1
TABLET ORAL
Qty: 90 TABLET | Refills: 0 | Status: SHIPPED | OUTPATIENT
Start: 2018-02-05 | End: 2018-05-09 | Stop reason: SDUPTHER

## 2018-02-14 ENCOUNTER — LAB (OUTPATIENT)
Dept: LAB | Facility: CLINIC | Age: 63
End: 2018-02-14
Payer: COMMERCIAL

## 2018-02-14 ENCOUNTER — TRANSCRIBE ORDERS (OUTPATIENT)
Dept: LAB | Facility: CLINIC | Age: 63
End: 2018-02-14

## 2018-02-14 DIAGNOSIS — E11.65 TYPE 2 DIABETES MELLITUS WITH HYPERGLYCEMIA (HCC): ICD-10-CM

## 2018-02-14 DIAGNOSIS — E55.9 VITAMIN D DEFICIENCY: ICD-10-CM

## 2018-02-14 DIAGNOSIS — E78.5 HYPERLIPIDEMIA: ICD-10-CM

## 2018-02-14 DIAGNOSIS — I10 ESSENTIAL (PRIMARY) HYPERTENSION: ICD-10-CM

## 2018-02-14 LAB
25(OH)D3 SERPL-MCNC: 25.1 NG/ML (ref 30–100)
ALBUMIN SERPL BCP-MCNC: 4 G/DL (ref 3.5–5)
ALP SERPL-CCNC: 47 U/L (ref 46–116)
ALT SERPL W P-5'-P-CCNC: 41 U/L (ref 12–78)
ANION GAP SERPL CALCULATED.3IONS-SCNC: 9 MMOL/L (ref 4–13)
AST SERPL W P-5'-P-CCNC: 23 U/L (ref 5–45)
BILIRUB SERPL-MCNC: 0.4 MG/DL (ref 0.2–1)
BUN SERPL-MCNC: 16 MG/DL (ref 5–25)
CALCIUM SERPL-MCNC: 9.6 MG/DL (ref 8.3–10.1)
CHLORIDE SERPL-SCNC: 102 MMOL/L (ref 100–108)
CHOLEST SERPL-MCNC: 188 MG/DL (ref 50–200)
CO2 SERPL-SCNC: 27 MMOL/L (ref 21–32)
CREAT SERPL-MCNC: 1.22 MG/DL (ref 0.6–1.3)
CREAT UR-MCNC: 76.5 MG/DL
EST. AVERAGE GLUCOSE BLD GHB EST-MCNC: 197 MG/DL
GFR SERPL CREATININE-BSD FRML MDRD: 63 ML/MIN/1.73SQ M
GLUCOSE P FAST SERPL-MCNC: 165 MG/DL (ref 65–99)
HBA1C MFR BLD: 8.5 % (ref 4.2–6.3)
HDLC SERPL-MCNC: 45 MG/DL (ref 40–60)
LDLC SERPL CALC-MCNC: 111 MG/DL (ref 0–100)
MICROALBUMIN UR-MCNC: 6.2 MG/L (ref 0–20)
MICROALBUMIN/CREAT 24H UR: 8 MG/G CREATININE (ref 0–30)
POTASSIUM SERPL-SCNC: 4.8 MMOL/L (ref 3.5–5.3)
PROT SERPL-MCNC: 7.6 G/DL (ref 6.4–8.2)
SODIUM SERPL-SCNC: 138 MMOL/L (ref 136–145)
T4 FREE SERPL-MCNC: 0.91 NG/DL (ref 0.76–1.46)
TRIGL SERPL-MCNC: 160 MG/DL
TSH SERPL DL<=0.05 MIU/L-ACNC: 3.85 UIU/ML (ref 0.36–3.74)

## 2018-02-14 PROCEDURE — 83036 HEMOGLOBIN GLYCOSYLATED A1C: CPT

## 2018-02-14 PROCEDURE — 80061 LIPID PANEL: CPT

## 2018-02-14 PROCEDURE — 82043 UR ALBUMIN QUANTITATIVE: CPT

## 2018-02-14 PROCEDURE — 82306 VITAMIN D 25 HYDROXY: CPT

## 2018-02-14 PROCEDURE — 36415 COLL VENOUS BLD VENIPUNCTURE: CPT

## 2018-02-14 PROCEDURE — 84439 ASSAY OF FREE THYROXINE: CPT

## 2018-02-14 PROCEDURE — 80053 COMPREHEN METABOLIC PANEL: CPT

## 2018-02-14 PROCEDURE — 3061F NEG MICROALBUMINURIA REV: CPT | Performed by: FAMILY MEDICINE

## 2018-02-14 PROCEDURE — 82570 ASSAY OF URINE CREATININE: CPT

## 2018-02-14 PROCEDURE — 84443 ASSAY THYROID STIM HORMONE: CPT

## 2018-02-15 ENCOUNTER — OFFICE VISIT (OUTPATIENT)
Dept: ENDOCRINOLOGY | Facility: CLINIC | Age: 63
End: 2018-02-15
Payer: COMMERCIAL

## 2018-02-15 VITALS
HEIGHT: 70 IN | WEIGHT: 202 LBS | SYSTOLIC BLOOD PRESSURE: 142 MMHG | DIASTOLIC BLOOD PRESSURE: 80 MMHG | BODY MASS INDEX: 28.92 KG/M2 | HEART RATE: 84 BPM

## 2018-02-15 DIAGNOSIS — IMO0002 UNCONTROLLED TYPE 2 DIABETES MELLITUS WITH COMPLICATION, WITHOUT LONG-TERM CURRENT USE OF INSULIN: Primary | ICD-10-CM

## 2018-02-15 DIAGNOSIS — E03.8 SUBCLINICAL HYPOTHYROIDISM: ICD-10-CM

## 2018-02-15 DIAGNOSIS — I10 BENIGN ESSENTIAL HYPERTENSION: ICD-10-CM

## 2018-02-15 DIAGNOSIS — E55.9 VITAMIN D DEFICIENCY: ICD-10-CM

## 2018-02-15 DIAGNOSIS — E78.5 HYPERLIPIDEMIA, UNSPECIFIED HYPERLIPIDEMIA TYPE: ICD-10-CM

## 2018-02-15 PROBLEM — I25.10 ARTERIOSCLEROSIS OF CORONARY ARTERY: Status: ACTIVE | Noted: 2017-05-24

## 2018-02-15 PROCEDURE — 99214 OFFICE O/P EST MOD 30 MIN: CPT | Performed by: PHYSICIAN ASSISTANT

## 2018-02-15 RX ORDER — MULTIVIT-MIN/IRON/FOLIC ACID/K 18-600-40
2 CAPSULE ORAL DAILY
Refills: 0
Start: 2018-02-15

## 2018-02-15 NOTE — PROGRESS NOTES
Established Patient Progress Note      Chief Complaint   Patient presents with    Diabetes Type 2    Hyperlipidemia    Hypertension    Vitamin D Deficiency          History of Present Illness:     58year old male here for follow up of Uncontrolled Type 2 Diabetes, HTN, Hyperlipidemia, and Vitamin D Deficiency  For the  Diabetes, he is taking Janumet  He has not been checking blood sugars or following diet very well since last visit  Has met with dietician in the past and did a good job with weight loss but has gained back weight since last visit  Home blood glucose readings:   Not checking, out of strips  Last Eye Exam: eye exam 10/2017, no retinopathy  Last Foot Exam: today at visit 2/15/2018      HTN, he is taking Lisinopril  for hyperlipidemia, taking pravastatin  For the Vitamin D Deficiency, he has been taking supplements  Hx abnormal thyroid tests-- reports treated with synthroid in past for a short period of time and it made him warm         Patient Active Problem List   Diagnosis    Pro[facility] cancer ([facility] [address] [facility]    Arteriosclerosis of coronary artery    Benign essential hypertension    Depression    Diabetes mellitus type 2, uncontrolled ([facility] [address] [facility]    Subclinical hypothyroidism    Thrombocytosis ([facility] [address] [facility]    Vitamin D deficiency    Mixed hyperlipidemia    Leukocytosis    Hyperlipidemia      Past Medical History:   Diagnosis Date    Ankle injury     Bronchiolitis     Cancer ([facility] [address] [facility]     Prostate    Diabetes mellitus ([facility] [address] [facility]     Type II    History of benign prostatic hyperplasia     History of gastritis     History of insect bite     History of nocturia     Lumbar canal stenosis     Osteoarthritis, knee     Otitis externa     Pain in back     UPPER BACK    Sinusitis, acute     Tendinitis of right rotator cuff       Past Surgical History:   Procedure Laterality Date    ANKLE FRACTURE SURGERY Left     triple fracture 4/2009    LAMINECTOMY      L5-S1 2003    NOSE SURGERY 6/1989    [place] COLONOSCOPY FLX DX W/COLLJ SPEC WHEN PFRMD N/A 1/27/2016    Procedure: COLONOSCOPY;  Surgeon: Jessica Dallas MD;  [facility]: [facility]; Service: Gastroenterology    PROSTATECTOMY      9/23/2013      Family History   Problem Relation Age of Onset    Hypertension Mother     Diabetes Father     Heart disease Father      Social History   Substance Use Topics    Smoking status: Smoker, Current Status Unknown    Smokeless tobacco: Never Used    Alcohol use 2 4 oz/week     3 Cans of beer, 1 Shots of liquor per week      Comment: 1-2 times a week socially     No Known Allergies      Current Outpatient Prescriptions:     Cholecalciferol (VITAMIN D) 2000 units CAPS, Take by mouth daily, Disp: , Rfl:     lisinopril (ZESTRIL) 20 mg tablet, TAKE 1 TABLET EVERY DAY, Disp: 90 tablet, Rfl: 0    metaxalone (SKELAXIN) 800 mg tablet, Take 800 mg by mouth every 6 (six) hours as needed  , Disp: , Rfl:     naproxen (NAPROSYN) 500 mg tablet, Take 500 mg by mouth 2 (two) times a day as needed  , Disp: , Rfl:     pravastatin (PRAVACHOL) 40 mg tablet, Take 40 mg by mouth daily  , Disp: , Rfl:     sitaGLIPtin-metFORMIN (JANUMET)  MG per tablet, Take 1 tablet by mouth 2 (two) times a day with meals  , Disp: , Rfl:     vardenafil (LEVITRA) 20 MG tablet, Take by mouth daily as needed  , Disp: , Rfl:     venlafaxine (EFFEXOR) 75 mg tablet, Take 75 mg by mouth daily  , Disp: , Rfl:     Empagliflozin 10 MG TABS, Take 1 tablet (10 mg total) by mouth every morning, Disp: 30 tablet, Rfl: 5    glucose blood (ONETOUCH VERIO) test strip, Check BG 2x per day, Disp: 200 each, Rfl: 3    Review of Systems   Constitutional: Negative for activity change, appetite change and fatigue  HENT: Negative for sore throat, trouble swallowing and voice change  Eyes: Negative for visual disturbance  Respiratory: Negative for choking, chest tightness and shortness of breath      Cardiovascular: Negative for chest pain, palpitations and leg swelling  Gastrointestinal: Negative for abdominal pain, constipation and diarrhea  Endocrine: Negative for cold intolerance, heat intolerance, polydipsia, polyphagia and polyuria  Genitourinary: Negative for frequency  Musculoskeletal: Negative for arthralgias and myalgias  Skin: Negative for rash  Neurological: Negative for dizziness and syncope  Hematological: Negative for adenopathy  Psychiatric/Behavioral: Negative for sleep disturbance  All other systems reviewed and are negative  Physical Exam:  Body mass index is 28 98 kg/m²  /80   Pulse 84   Ht 5' 10" (1 778 m)   Wt 91 6 kg (202 lb)   BMI 28 98 kg/m²    Wt Readings from Last 3 Encounters:   02/15/18 91 6 kg (202 lb)   02/02/18 93 1 kg (205 lb 3 2 oz)   08/24/17 85 9 kg (189 lb 4 9 oz)       Physical Exam   Constitutional: He is oriented to person, place, and time  He appears well-developed and well-nourished  No distress  HENT:   Head: Normocephalic and atraumatic  Mouth/Throat: Oropharynx is clear and moist    Eyes: Conjunctivae and EOM are normal  Pupils are equal, round, and reactive to light  Neck: Normal range of motion  Neck supple  No thyromegaly present  Cardiovascular: Normal rate, regular rhythm and normal heart sounds  No murmur heard  Pulses:       Dorsalis pedis pulses are 2+ on the right side, and 2+ on the left side  Posterior tibial pulses are 2+ on the right side, and 2+ on the left side  Pulmonary/Chest: Effort normal and breath sounds normal  No respiratory distress  He has no wheezes  He has no rales  Abdominal: Soft  Bowel sounds are normal  He exhibits no distension  There is no tenderness  Musculoskeletal: Normal range of motion  He exhibits no edema  Feet:   Right Foot:   Skin Integrity: Negative for ulcer, skin breakdown, erythema, warmth, callus or dry skin     Left Foot:   Skin Integrity: Negative for ulcer, skin breakdown, erythema, warmth, callus or dry skin  Lymphadenopathy:     He has no cervical adenopathy  Neurological: He is alert and oriented to person, place, and time  Skin: Skin is warm and dry  Psychiatric: He has a normal mood and affect  Vitals reviewed  Patient's shoes and socks removed  Right Foot/Ankle   Right Foot Inspection  Skin Exam: skin normal and skin intact no dry skin, no warmth, no callus, no erythema, no maceration, no abnormal color, no pre-ulcer, no ulcer and no callus                          Toe Exam: ROM and strength within normal limitsno swelling, no tenderness, erythema and  no right toe deformity  Sensory       Monofilament testing: intact  Vascular  Capillary refills: < 3 seconds  The right DP pulse is 2+  The right PT pulse is 2+  Left Foot/Ankle  Left Foot Inspection  Skin Exam: skin normal and skin intactno dry skin, no warmth, no erythema, no maceration, normal color, no pre-ulcer, no ulcer and no callus                         Toe Exam: ROM and strength within normal limitsno swelling, no tenderness, no erythema and no left toe deformity                   Sensory       Monofilament: intact  Vascular  Capillary refills: < 3 seconds  The left DP pulse is 2+  The left PT pulse is 2+           Labs:     Component      Latest Ref Rng & Units 2/14/2018   Sodium      136 - 145 mmol/L 138   Potassium      3 5 - 5 3 mmol/L 4 8   Chloride      100 - 108 mmol/L 102   CO2      21 - 32 mmol/L 27   Anion Gap      4 - 13 mmol/L 9   BUN      5 - 25 mg/dL 16   Creatinine      0 60 - 1 30 mg/dL 1 22   GLUCOSE FASTING      65 - 99 mg/dL 165 (H)   Calcium      8 3 - 10 1 mg/dL 9 6   AST      5 - 45 U/L 23   ALT      12 - 78 U/L 41   Alkaline Phosphatase      46 - 116 U/L 47   Total Protein      6 4 - 8 2 g/dL 7 6   Albumin      3 5 - 5 0 g/dL 4 0   Total Bilirubin      0 20 - 1 00 mg/dL 0 40   eGFR      ml/min/1 73sq m 63   Cholesterol      50 - 200 mg/dL 188   Triglycerides      <=150 mg/dL 160 (H)   HDL      40 - 60 mg/dL 45 LDL Calculated      0 - 100 mg/dL 111 (H)   Creatinine, Ur      mg/dL 76 5   MICROALBUM ,U,RANDOM      0 0 - 20 0 mg/L 6 2   MICROALBUMIN/CREATININE RATIO      0 - 30 mg/g creatinine 8   Hemoglobin A1C      4 2 - 6 3 % 8 5 (H)   EAG      mg/dl 197   TSH 3RD GENERATON      0 358 - 3 740 uIU/mL 3 846 (H)   Free T4      0 76 - 1 46 ng/dL 0 91   Vit D, 25-Hydroxy      30 0 - 100 0 ng/mL 25 1 (L)     Impression & Plan:    Problem List Items Addressed This Visit     Benign essential hypertension     Continue lisinopril  Work on lifestyle modifications and weight loss  Diabetes mellitus type 2, uncontrolled ([facility] [address] ) - Primary     Diabetes poorly controlled/worsening based on results of recent lab testing  Discussed medication options- will add Jardiance 10mg daily  Check BG 2x per day and send log in two weeks for review  Reviewed med side effects  He was doing a great job with lifestyle modifications/weight loss but stopped due to some recent family problems over past few months  He will return to prior dietary habits and work on losing the weight he has gained back  Relevant Medications    glucose blood (ONETOUCH VERIO) test strip    Empagliflozin 10 MG TABS    Other Relevant Orders    Hemoglobin A1c    Subclinical hypothyroidism     TSH slightly high but stable  He did not feel well on thyroid medication in the past   Continue to monitor  Vitamin D deficiency     Increase supplements to 4,000 units daily  Relevant Orders    Vitamin D 25 hydroxy    Hyperlipidemia     LDL slightly high  He wants to get back on prior diet and will intensify statin at next visit if LDL remains elevated            Relevant Orders    Comprehensive metabolic panel    Lipid Panel with Direct LDL reflex          Orders Placed This Encounter   Procedures    Hemoglobin A1c     Standing Status:   Future     Standing Expiration Date:   2/15/2019    Comprehensive metabolic panel     This is a patient instruction: Patient fasting for 8 hours or longer recommended  Standing Status:   Future     Standing Expiration Date:   2/15/2019    Lipid Panel with Direct LDL reflex     This is a patient instruction: This test requires patient fasting for 10-12 hours or longer  Drinking of black coffee or black tea is acceptable  Standing Status:   Future     Standing Expiration Date:   2/15/2019    Vitamin D 25 hydroxy     Standing Status:   Future     Standing Expiration Date:   2/15/2019       Patient Instructions   Start Jardiance 10mg daily  Check BG 2x per day and send log in 2 weeks  Increase Vitamin D from 2000 to 4000 units daily    Do labs in 3 months        Discussed with the patient and all questioned fully answered  He will call me if any problems arise  Follow-up appointment in 3 months       Counseled patient on diagnostic results, prognosis, risk and benefit of treatment options, instruction for management, importance of treatment compliance, Risk  factor reduction and impressions      Jayden Jessica PA-C

## 2018-02-15 NOTE — ASSESSMENT & PLAN NOTE
TSH slightly high but stable  He did not feel well on thyroid medication in the past   Continue to monitor

## 2018-02-15 NOTE — ASSESSMENT & PLAN NOTE
LDL slightly high  He wants to get back on prior diet and will intensify statin at next visit if LDL remains elevated

## 2018-02-15 NOTE — PATIENT INSTRUCTIONS
Start Jardiance 10mg daily  Check BG 2x per day and send log in 2 weeks      Increase Vitamin D from 2000 to 4000 units daily    Do labs in 3 months

## 2018-02-19 ENCOUNTER — TELEPHONE (OUTPATIENT)
Dept: ENDOCRINOLOGY | Facility: CLINIC | Age: 63
End: 2018-02-19

## 2018-02-19 NOTE — TELEPHONE ENCOUNTER
----- Message from Lisa Gonzales MD sent at 2/18/2018  4:56 PM EST -----  Please call the patient regarding his abnormal result   a1c high 8 5 - send over f s log in 2 weeks

## 2018-03-02 DIAGNOSIS — IMO0002 UNCONTROLLED TYPE 2 DIABETES MELLITUS WITH COMPLICATION, WITHOUT LONG-TERM CURRENT USE OF INSULIN: ICD-10-CM

## 2018-04-14 DIAGNOSIS — F32.A DEPRESSION, UNSPECIFIED DEPRESSION TYPE: Primary | ICD-10-CM

## 2018-04-16 RX ORDER — VENLAFAXINE HYDROCHLORIDE 75 MG/1
CAPSULE, EXTENDED RELEASE ORAL
Qty: 90 CAPSULE | Refills: 0 | Status: SHIPPED | OUTPATIENT
Start: 2018-04-16 | End: 2018-07-13 | Stop reason: SDUPTHER

## 2018-04-30 ENCOUNTER — TELEPHONE (OUTPATIENT)
Dept: ENDOCRINOLOGY | Facility: CLINIC | Age: 63
End: 2018-04-30

## 2018-04-30 DIAGNOSIS — IMO0002 UNCONTROLLED TYPE 2 DIABETES MELLITUS WITH COMPLICATION, WITHOUT LONG-TERM CURRENT USE OF INSULIN: ICD-10-CM

## 2018-05-09 DIAGNOSIS — I10 ESSENTIAL HYPERTENSION: ICD-10-CM

## 2018-05-09 RX ORDER — LISINOPRIL 20 MG/1
TABLET ORAL
Qty: 30 TABLET | Refills: 0 | Status: SHIPPED | OUTPATIENT
Start: 2018-05-09 | End: 2018-07-13 | Stop reason: SDUPTHER

## 2018-05-14 DIAGNOSIS — I10 ESSENTIAL HYPERTENSION: ICD-10-CM

## 2018-05-14 RX ORDER — LISINOPRIL 20 MG/1
TABLET ORAL
Qty: 90 TABLET | Refills: 0 | OUTPATIENT
Start: 2018-05-14

## 2018-05-15 ENCOUNTER — OFFICE VISIT (OUTPATIENT)
Dept: CARDIOLOGY CLINIC | Facility: CLINIC | Age: 63
End: 2018-05-15
Payer: COMMERCIAL

## 2018-05-15 VITALS
DIASTOLIC BLOOD PRESSURE: 76 MMHG | SYSTOLIC BLOOD PRESSURE: 112 MMHG | WEIGHT: 198.9 LBS | OXYGEN SATURATION: 98 % | HEIGHT: 70 IN | BODY MASS INDEX: 28.47 KG/M2 | HEART RATE: 69 BPM

## 2018-05-15 DIAGNOSIS — I25.10 CORONARY ARTERIOSCLEROSIS: ICD-10-CM

## 2018-05-15 DIAGNOSIS — E78.00 PURE HYPERCHOLESTEROLEMIA: ICD-10-CM

## 2018-05-15 DIAGNOSIS — I10 ESSENTIAL (PRIMARY) HYPERTENSION: Primary | ICD-10-CM

## 2018-05-15 PROCEDURE — 93000 ELECTROCARDIOGRAM COMPLETE: CPT | Performed by: INTERNAL MEDICINE

## 2018-05-15 PROCEDURE — 99214 OFFICE O/P EST MOD 30 MIN: CPT | Performed by: INTERNAL MEDICINE

## 2018-05-15 NOTE — PROGRESS NOTES
Cardiology Follow Up    Nedra Ragsdale  1955  [id number]  [address] [facility]  [address] [place] [address] [address]    1  Essential (primary) hypertension     2  Pure hypercholesterolemia     3  Coronary arteriosclerosis         Interval History:  Patient is here for a follow-up visit  He was last seen by me in May of last year  His most recent echocardiogram was done September 2016 and demonstrated preserved LV systolic function with mild LVH  His most recent nuclear exercise stress test was done October 2014 and demonstrated a tiny reversible inferior defect which is been managed medically  His recent cholesterol profile done February 2018 looked adequate  He has been feeling well  He has had no chest pain or significant dyspnea  He does have diabetes and is managed by an endocrinologist   The EKG today looks normal     Patient Active Problem List   Diagnosis    [facility] ([facility]    Arteriosclerosis of coronary artery    Benign essential hypertension    Depression    Diabetes mellitus type 2, uncontrolled ([facility]    Subclinical hypothyroidism    [facility]    Vitamin D deficiency    Mixed hyperlipidemia    Leukocytosis    Hyperlipidemia     Past Medical History:   Diagnosis Date    Ankle injury     Bronchiolitis     Cancer [facility]     Prostate    Diabetes mellitus ([facility]     Type II    History of benign prostatic hyperplasia     History of gastritis     History of insect bite     History of nocturia     Lumbar canal stenosis     Osteoarthritis, knee     Otitis externa     Pain in back     UPPER BACK    Sinusitis, acute     Tendinitis of right rotator cuff      Social History     Social History    Marital status: /Civil Union     Spouse name: N/A    Number of children: N/A    Years of education: N/A     Occupational History    Not on file       Social History Main Topics  Smoking status: Smoker, Current Status Unknown    Smokeless tobacco: Never Used    Alcohol use 2 4 oz/week     3 Cans of beer, 1 Shots of liquor per week      Comment: 1-2 times a week socially    Drug use: No    Sexual activity: Not on file     Other Topics Concern    Not on file     Social History Narrative    No narrative on file      Family History   Problem Relation Age of Onset    Hypertension Mother     Diabetes Father     Heart disease Father      Past Surgical History:   Procedure Laterality Date    ANKLE FRACTURE SURGERY Left     triple fracture 4/2009    LAMINECTOMY      L5-S1 2003    NOSE SURGERY      6/1989    MT COLONOSCOPY FLX DX W/COLLJ SPEC WHEN PFRMD N/A 1/27/2016    Procedure: COLONOSCOPY;  Surgeon: Rachel Irizarry MD;  [facility]; Service: Gastroenterology    PROSTATECTOMY      9/23/2013       Current Outpatient Prescriptions:     Cholecalciferol (VITAMIN D) 2000 units CAPS, Take 2 capsules (4,000 Units total) by mouth daily, Disp: , Rfl: 0    Empagliflozin 10 MG TABS, Take 1 tablet (10 mg total) by mouth every morning, Disp: 90 tablet, Rfl: 1    glucose blood (ONETOUCH VERIO) test strip, Check BG 2x per day, Disp: 200 each, Rfl: 3    lisinopril (ZESTRIL) 20 mg tablet, TAKE 1 TABLET EVERY DAY, Disp: 30 tablet, Rfl: 0    metaxalone (SKELAXIN) 800 mg tablet, Take 800 mg by mouth every 6 (six) hours as needed  , Disp: , Rfl:     naproxen (NAPROSYN) 500 mg tablet, Take 500 mg by mouth 2 (two) times a day as needed  , Disp: , Rfl:     pravastatin (PRAVACHOL) 40 mg tablet, Take 40 mg by mouth daily  , Disp: , Rfl:     sitaGLIPtin-metFORMIN (JANUMET)  MG per tablet, Take 1 tablet by mouth 2 (two) times a day with meals    , Disp: , Rfl:     vardenafil (LEVITRA) 20 MG tablet, Take by mouth daily as needed  , Disp: , Rfl:     venlafaxine (EFFEXOR-XR) 75 mg 24 hr capsule, TAKE 1 CAPSULE DAILY, Disp: 90 capsule, Rfl: 0  No Known Allergies    Labs:not applicable  Imaging: No results found  Review of Systems:  Review of Systems   All other systems reviewed and are negative  Physical Exam:  Physical Exam   Constitutional: He is oriented to person, place, and time  He appears well-developed and well-nourished  HENT:   Head: Normocephalic and atraumatic  Eyes: Conjunctivae are normal  Pupils are equal, round, and reactive to light  Neck: Normal range of motion  Neck supple  Cardiovascular: Normal rate and normal heart sounds  Pulmonary/Chest: Effort normal and breath sounds normal    Neurological: He is alert and oriented to person, place, and time  Skin: Skin is warm and dry  Psychiatric: He has a normal mood and affect  Vitals reviewed  Discussion/Summary:I will continue the patient's present medical regimen  Patient appears well compensated  I have asked the patient to call if there is a problem in the interim otherwise I will see the patient in one years time

## 2018-05-29 DIAGNOSIS — E11.65 TYPE 2 DIABETES MELLITUS WITH HYPERGLYCEMIA, UNSPECIFIED WHETHER LONG TERM INSULIN USE (HCC): Primary | ICD-10-CM

## 2018-05-29 RX ORDER — SITAGLIPTIN AND METFORMIN HYDROCHLORIDE 1000; 50 MG/1; MG/1
TABLET, FILM COATED ORAL
Qty: 180 TABLET | Refills: 1 | Status: SHIPPED | OUTPATIENT
Start: 2018-05-29 | End: 2018-11-25 | Stop reason: SDUPTHER

## 2018-06-06 ENCOUNTER — DOCTOR'S OFFICE (OUTPATIENT)
Dept: URBAN - METROPOLITAN AREA CLINIC 136 | Facility: CLINIC | Age: 63
Setting detail: OPHTHALMOLOGY
End: 2018-06-06
Payer: COMMERCIAL

## 2018-06-06 DIAGNOSIS — H33.303: ICD-10-CM

## 2018-06-06 DIAGNOSIS — H35.52: ICD-10-CM

## 2018-06-06 DIAGNOSIS — E11.9: ICD-10-CM

## 2018-06-06 DIAGNOSIS — H35.413: ICD-10-CM

## 2018-06-06 DIAGNOSIS — H17.9: ICD-10-CM

## 2018-06-06 DIAGNOSIS — H43.813: ICD-10-CM

## 2018-06-06 DIAGNOSIS — H25.13: ICD-10-CM

## 2018-06-06 LAB
LEFT EYE DIABETIC RETINOPATHY: NORMAL
RIGHT EYE DIABETIC RETINOPATHY: NORMAL

## 2018-06-06 PROCEDURE — 92014 COMPRE OPH EXAM EST PT 1/>: CPT | Performed by: OPHTHALMOLOGY

## 2018-06-06 PROCEDURE — 3072F LOW RISK FOR RETINOPATHY: CPT | Performed by: FAMILY MEDICINE

## 2018-06-06 ASSESSMENT — REFRACTION_CURRENTRX
OD_OVR_VA: 20/
OS_OVR_VA: 20/
OD_OVR_VA: 20/
OD_OVR_VA: 20/
OS_OVR_VA: 20/
OS_OVR_VA: 20/

## 2018-06-06 ASSESSMENT — REFRACTION_MANIFEST
OD_VA3: 20/
OS_VA1: 20/
OD_VA3: 20/
OS_VA2: 20/
OS_VA1: 20/
OU_VA: 20/
OS_VA3: 20/
OD_VA1: 20/
OD_VA1: 20/
OS_VA3: 20/
OS_VA3: 20/
OD_VA2: 20/
OS_VA1: 20/
OD_VA2: 20/
OU_VA: 20/
OS_VA2: 20/
OU_VA: 20/
OS_VA2: 20/
OD_VA1: 20/
OD_VA2: 20/
OD_VA3: 20/

## 2018-06-06 ASSESSMENT — VISUAL ACUITY
OD_BCVA: 20/20-2
OS_BCVA: 20/30-2

## 2018-06-06 ASSESSMENT — CONFRONTATIONAL VISUAL FIELD TEST (CVF)
OS_FINDINGS: FULL
OD_FINDINGS: FULL

## 2018-06-06 ASSESSMENT — CORNEAL SURGICAL SCARRING: OS_SCARRING: ANTERIOR STROMAL

## 2018-06-26 DIAGNOSIS — I10 ESSENTIAL HYPERTENSION: ICD-10-CM

## 2018-06-26 RX ORDER — LISINOPRIL 20 MG/1
TABLET ORAL
Qty: 30 TABLET | Refills: 0 | OUTPATIENT
Start: 2018-06-26

## 2018-07-01 DIAGNOSIS — I10 ESSENTIAL HYPERTENSION: ICD-10-CM

## 2018-07-02 RX ORDER — LISINOPRIL 20 MG/1
TABLET ORAL
Qty: 30 TABLET | Refills: 0 | OUTPATIENT
Start: 2018-07-02

## 2018-07-05 DIAGNOSIS — F32.A DEPRESSION, UNSPECIFIED DEPRESSION TYPE: ICD-10-CM

## 2018-07-05 RX ORDER — VENLAFAXINE HYDROCHLORIDE 75 MG/1
CAPSULE, EXTENDED RELEASE ORAL
Qty: 90 CAPSULE | Refills: 0 | OUTPATIENT
Start: 2018-07-05

## 2018-07-13 ENCOUNTER — OFFICE VISIT (OUTPATIENT)
Dept: FAMILY MEDICINE CLINIC | Facility: CLINIC | Age: 63
End: 2018-07-13
Payer: COMMERCIAL

## 2018-07-13 VITALS
DIASTOLIC BLOOD PRESSURE: 80 MMHG | OXYGEN SATURATION: 95 % | TEMPERATURE: 97.7 F | BODY MASS INDEX: 27.77 KG/M2 | HEART RATE: 71 BPM | WEIGHT: 194 LBS | HEIGHT: 70 IN | SYSTOLIC BLOOD PRESSURE: 114 MMHG

## 2018-07-13 DIAGNOSIS — I10 ESSENTIAL HYPERTENSION: ICD-10-CM

## 2018-07-13 DIAGNOSIS — H60.542 DERMATITIS OF LEFT EAR CANAL: ICD-10-CM

## 2018-07-13 DIAGNOSIS — F32.A DEPRESSION, UNSPECIFIED DEPRESSION TYPE: Primary | ICD-10-CM

## 2018-07-13 DIAGNOSIS — E78.5 HYPERLIPIDEMIA, UNSPECIFIED HYPERLIPIDEMIA TYPE: ICD-10-CM

## 2018-07-13 DIAGNOSIS — C61 PROSTATE CANCER (HCC): ICD-10-CM

## 2018-07-13 PROCEDURE — 3008F BODY MASS INDEX DOCD: CPT | Performed by: FAMILY MEDICINE

## 2018-07-13 PROCEDURE — 3074F SYST BP LT 130 MM HG: CPT | Performed by: FAMILY MEDICINE

## 2018-07-13 PROCEDURE — 3079F DIAST BP 80-89 MM HG: CPT | Performed by: FAMILY MEDICINE

## 2018-07-13 PROCEDURE — 99214 OFFICE O/P EST MOD 30 MIN: CPT | Performed by: FAMILY MEDICINE

## 2018-07-13 RX ORDER — LISINOPRIL 20 MG/1
20 TABLET ORAL DAILY
Qty: 90 TABLET | Refills: 1 | Status: SHIPPED | OUTPATIENT
Start: 2018-07-13 | End: 2018-09-07 | Stop reason: SDUPTHER

## 2018-07-13 RX ORDER — VENLAFAXINE HYDROCHLORIDE 75 MG/1
75 CAPSULE, EXTENDED RELEASE ORAL DAILY
Qty: 90 CAPSULE | Refills: 1 | Status: SHIPPED | OUTPATIENT
Start: 2018-07-13 | End: 2018-11-30 | Stop reason: SDUPTHER

## 2018-07-13 RX ORDER — MOMETASONE FUROATE 1 MG/G
CREAM TOPICAL DAILY
Qty: 45 G | Refills: 0 | Status: SHIPPED | OUTPATIENT
Start: 2018-07-13 | End: 2019-02-16

## 2018-07-13 NOTE — ASSESSMENT & PLAN NOTE
Continue pravastatin  Continue with Co Q10  He does use this intermittently due to myalgias which developed with continual use  When he takes a break for a week they seem to resolve for an extended period time  He wants to continue with this pattern  We did discussed potentially trying another statin or other agent but he prefers not to make a switch presently  His lipids have been relatively well controlled

## 2018-07-13 NOTE — PROGRESS NOTES
Assessment/Plan:  Diabetes mellitus type 2, uncontrolled ([facility] )  Lab Results   Component Value Date    HGBA1C 8 5 (H) 02/14/2018     Continued follow-up with Endocrinology  No results for input(s): POCGLU in the last 72 hours  Blood Sugar Average: Last 72 hrs:      Benign essential hypertension  Continue with lisinopril  Blood pressure well controlled  Prostate cancer [facility])  Continue follow-up with Urology  Hyperlipidemia  Continue pravastatin  Continue with Co Q10  He does use this intermittently due to myalgias which developed with continual use  When he takes a break for a week they seem to resolve for an extended period time  He wants to continue with this pattern  We did discussed potentially trying another statin or other agent but he prefers not to make a switch presently  His lipids have been relatively well controlled  Depression  He will continue with Effexor  This is affective for relief of his depression  Dermatitis of left ear canal  Start mometasone cream   Call if symptoms are not resolved in 7-10 days  Diagnoses and all orders for this visit:    Depression, unspecified depression type  -     venlafaxine (EFFEXOR-XR) 75 mg 24 hr capsule; Take 1 capsule (75 mg total) by mouth daily    Essential hypertension  -     lisinopril (ZESTRIL) 20 mg tablet; Take 1 tablet (20 mg total) by mouth daily  -     co-enzyme Q-10 30 MG capsule; Take 1 capsule (30 mg total) by mouth daily    Dermatitis of left ear canal  -     mometasone (ELOCON) 0 1 % cream; Apply topically daily    Prostate cancer (HCC)    Hyperlipidemia, unspecified hyperlipidemia type          Subjective:   Chief Complaint   Patient presents with    Blood Pressure Check     pt here for a med check to get his them renewed  Patient ID: Jose Daniel Jaimes is a 58 y o  male  I feel pretty good  Some LBP with L sided sciatica after a slip and fall on wet stairs in May  Slowly improving   No weakness of LE and no incontinence of B/B  My L ear gets itchy  No drainage but scale  HPI  The patient is a 58-year-old male who presents today for follow-up of multiple problems including major depression, essential hypertension as well as hyperlipidemia  He also has a complaint of recurrent itching of his left ear felt to be due to dermatitis of his ear canal in the past which responded to mometasone  He has run out of this prescription and requests refill of same  He denies any cardiovascular pulmonary complaint  He states that his mood is good and his anxiety level is relatively low  There was a period where his wife was admitted for suicidal ideation over the past year she is doing much better  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  He did have a fall 2 months ago on wet stairs injuring his left lower back  He has had some sciatic radicular symptoms but they are slowly improving  He has no weakness of his lower extremities and no incontinence of bowel or bladder  The following portions of the patient's history were reviewed and updated as appropriate: allergies, current medications, past family history, past medical history, past social history, past surgical history and problem list     Review of Systems   Constitution: Negative for decreased appetite, fever and weight gain  Eyes: Negative for double vision  Cardiovascular: Negative  Respiratory: Negative  Skin: Positive for dry skin and itching  Musculoskeletal: Positive for back pain  Gastrointestinal: Negative for bowel incontinence  Genitourinary: Negative for bladder incontinence  Reports to treatment for his prostate carcinoma with successful and he is thankful for that  Neurological: Negative for headaches  Psychiatric/Behavioral: Positive for depression  Negative for altered mental status, substance abuse and suicidal ideas  The patient does not have insomnia and is not nervous/anxious            Objective:    Physical Exam Constitutional: He is oriented to person, place, and time  He appears well-developed and well-nourished  No distress  Erythema and scale of his distal left ear canal which is relatively mild  HENT:   Left Ear: External ear normal    Mouth/Throat: No oropharyngeal exudate  Eyes: No scleral icterus  Neck: Neck supple  No JVD present  No thyromegaly present  Cardiovascular: Normal rate, regular rhythm and normal heart sounds  Exam reveals no gallop  No murmur heard  Pulmonary/Chest: Effort normal and breath sounds normal  No respiratory distress  He has no wheezes  He has no rales  Musculoskeletal: Normal range of motion  He exhibits no edema  Lymphadenopathy:     He has no cervical adenopathy  Neurological: He is alert and oriented to person, place, and time  Skin: Rash noted  There is erythema  Erythema and scale of distal left ear canal   Psychiatric: He has a normal mood and affect   Thought content normal

## 2018-07-13 NOTE — ASSESSMENT & PLAN NOTE
Lab Results   Component Value Date    HGBA1C 8 5 (H) 02/14/2018     Continued follow-up with Endocrinology  No results for input(s): POCGLU in the last 72 hours      Blood Sugar Average: Last 72 hrs:

## 2018-08-01 ENCOUNTER — APPOINTMENT (OUTPATIENT)
Dept: LAB | Facility: CLINIC | Age: 63
End: 2018-08-01
Payer: COMMERCIAL

## 2018-08-01 DIAGNOSIS — C61 PROSTATE CANCER (HCC): ICD-10-CM

## 2018-08-01 LAB — PSA SERPL-MCNC: <0.1 NG/ML (ref 0–4)

## 2018-08-01 PROCEDURE — 84153 ASSAY OF PSA TOTAL: CPT

## 2018-08-02 NOTE — PROGRESS NOTES
8/3/2018      Chief Complaint   Patient presents with    Prostate Cancer       Assessment and Plan    58 y o  male managed by Dr Joseph Sims    1  [place] prostate cancer s/p Da Suzan Robot prostatectomy performed at [facility] in 2013  - PSA < 0 1 (8/1/18)  - the patient is now 5 years out from surgery so according to current guidelines we can begin annual surveillance of his PSA, the patient is agreeable to this so we will therefore follow up in 1 year with a PSA prior     2  ED  -continue Levitra 20mgas needed      History of Present Illness  Osbaldo Romo is a 58 y o  male here for follow up evaluation of [place] prostate cancer status post prostatectomy performed at [facility] in 2013  The patient's postoperative PSA remains undetectable  He states that he has very mild stress incontinence and is able to achieve erections on occasion  He does take Levitra 20mg as needed  He has no lower urinary tract symptoms or urinary complaints at his visit today  Review of Systems   Constitutional: Negative for activity change, chills and fever  Gastrointestinal: Negative for abdominal distention and abdominal pain  Musculoskeletal: Negative for back pain and gait problem  Psychiatric/Behavioral: Negative for behavioral problems and confusion  Urinary Incontinence Screening      Most Recent Value   Urinary Incontinence   Urinary Incontinence? No   Incomplete emptying? No   Urinary frequency? No   Urinary urgency? No   Urinary hesitancy? No   Dysuria (painful difficult urination)? No   Nocturia (waking up to use the bathroom)? No   Straining (having to push to go)? No   Weak stream?  No   Intermittent stream?  No   Post void dribbling? No        AUA SYMPTOM SCORE      Most Recent Value   AUA SYMPTOM SCORE   How often have you had a sensation of not emptying your bladder completely after you finished urinating?   1   How often have you had to urinate again less than two hours after you finished urinating? 1   How often have you found you stopped and started again several times when you urinate?  0   How often have you found it difficult to postpone urination? 0   How often have you had a weak urinary stream?  0   How often have you had to push or strain to begin urination? 0   How many times did you most typically get up to urinate from the time you went to bed at night until the time you got up in the morning? 1   Quality of Life: If you were to spend the rest of your life with your urinary condition just the way it is now, how would you feel about that?  1   AUA SYMPTOM SCORE  3          Past Medical History  Past Medical History:   Diagnosis Date    Ankle injury     last assessed 7/15/2013    BPH (benign prostatic hyperplasia)     Bronchiolitis     Cancer [facility]     Prostate    Diabetes mellitus ([facility] )     Type II    History of benign prostatic hyperplasia     History of gastritis     History of insect bite     last assessed 10/4/2014    History of nocturia     Lumbar canal stenosis     Osteoarthritis, knee     Otitis externa     Pain in back     UPPER BACK, last assessed 2/25/2013    Sinusitis, acute     last assessed 10/6/2016    Tendinitis of right rotator cuff     last assessed 8/21/2012       Past Social History  Past Surgical History:   Procedure Laterality Date    ANKLE FRACTURE SURGERY Left     triple fracture 4/2009    CERVICAL LAMINECTOMY      partial L4-L5 in 2003    LAMINECTOMY      L5-S1 2003    NOSE SURGERY      6/1989    MD COLONOSCOPY FLX DX W/COLLJ SPEC WHEN PFRMD N/A 1/27/2016    Procedure: COLONOSCOPY;  Surgeon: Alberto Hamilton MD;  [facility]: [facility];   Service: Gastroenterology    PROSTATE BIOPSY  12/08/2012    managed by Joanie Humphrey, needle biopsy    PROSTATECTOMY      9/23/2013    RHINOPLASTY      for deviated septum in 1989     History   Smoking Status    Never Smoker   Smokeless Tobacco    Never Used     Comment: per allscripts 'current smoker, current some day smoker'       Past Family History  Family History   Problem Relation Age of Onset    Hypertension Mother     Diabetes Father     Heart disease Father         MI at 60 yo       Past Social history  Social History     Social History    Marital status: /Civil Union     Spouse name: N/A    Number of children: N/A    Years of education: N/A     Occupational History    Not on file  Social History Main Topics    Smoking status: Never Smoker    Smokeless tobacco: Never Used      Comment: per allscripts 'current smoker, current some day smoker'    Alcohol use 2 4 oz/week     3 Cans of beer, 1 Shots of liquor per week      Comment: 1-2 times a week socially    Drug use: No    Sexual activity: Not on file     Other Topics Concern    Not on file     Social History Narrative    No narrative on file       Current Medications  Current Outpatient Prescriptions   Medication Sig Dispense Refill    Cholecalciferol (VITAMIN D) 2000 units CAPS Take 2 capsules (4,000 Units total) by mouth daily  0    co-enzyme Q-10 30 MG capsule Take 1 capsule (30 mg total) by mouth daily 30 capsule 0    Empagliflozin 10 MG TABS Take 1 tablet (10 mg total) by mouth every morning 90 tablet 1    glucose blood (ONETOUCH VERIO) test strip Check BG 2x per day 200 each 3    JANUMET  MG per tablet TAKE 1 TABLET TWICE DAILY  WITH MEALS 180 tablet 1    lisinopril (ZESTRIL) 20 mg tablet Take 1 tablet (20 mg total) by mouth daily 90 tablet 1    mometasone (ELOCON) 0 1 % cream Apply topically daily 45 g 0    pravastatin (PRAVACHOL) 40 mg tablet Take 40 mg by mouth daily   vardenafil (LEVITRA) 20 MG tablet Take by mouth daily as needed        venlafaxine (EFFEXOR-XR) 75 mg 24 hr capsule Take 1 capsule (75 mg total) by mouth daily 90 capsule 1     No current facility-administered medications for this visit          Allergies  No Known Allergies      The following portions of the patient's history were reviewed and updated as appropriate: allergies, current medications, past medical history, past social history, past surgical history and problem list       Vitals  Vitals:    08/03/18 1347   BP: 130/68   Pulse: 77   Weight: 88 5 kg (195 lb)   Height: 5' 10" (1 778 m)       Physical Exam  Constitutional   General appearance: Patient is seated and in no acute distress, well appearing and well nourished  Head and Face   Head and face: Normal     Eyes   Conjunctiva and lids: No erythema, swelling or discharge  Ears, Nose, Mouth, and Throat   Hearing: Normal     Pulmonary   Respiratory effort: No increased work of breathing or signs of respiratory distress  Cardiovascular   Examination of extremities for edema and/or varicosities: Normal     Abdomen   Abdomen: Non-tender, no masses  Musculoskeletal   Gait and station: Normal    Skin   Skin and subcutaneous tissue: Warm, dry, and intact  No visible lesions or rashes  Psychiatric   Judgment and insight: Normal  Recent and remote memory:  Normal  Mood and affect: Normal      Results  No results found for this or any previous visit (from the past 1 hour(s)) ]  Lab Results   Component Value Date    PSA <0 1 08/01/2018    PSA <0 1 02/01/2018    PSA <0 1 07/08/2017     Lab Results   Component Value Date    GLUCOSE 184 (H) 03/01/2017    CALCIUM 9 6 02/14/2018     02/14/2018    K 4 8 02/14/2018    CO2 27 02/14/2018     02/14/2018    BUN 16 02/14/2018    CREATININE 1 22 02/14/2018     Lab Results   Component Value Date    WBC 10 9 (H) 01/26/2017    HGB 14 7 01/26/2017    HCT 45 1 01/26/2017    MCV 89 4 01/26/2017     (H) 01/26/2017       Orders  Orders Placed This Encounter   Procedures    PSA, Total Screen     This is a patient instruction: This test is non-fasting  Please drink two glasses of water morning of bloodwork          Standing Status:   Future     Standing Expiration Date:   2/3/2020

## 2018-08-03 ENCOUNTER — OFFICE VISIT (OUTPATIENT)
Dept: UROLOGY | Facility: CLINIC | Age: 63
End: 2018-08-03
Payer: COMMERCIAL

## 2018-08-03 VITALS
HEART RATE: 77 BPM | DIASTOLIC BLOOD PRESSURE: 68 MMHG | BODY MASS INDEX: 27.92 KG/M2 | SYSTOLIC BLOOD PRESSURE: 130 MMHG | WEIGHT: 195 LBS | HEIGHT: 70 IN

## 2018-08-03 DIAGNOSIS — C61 PROSTATE CANCER (HCC): Primary | ICD-10-CM

## 2018-08-03 PROCEDURE — 99213 OFFICE O/P EST LOW 20 MIN: CPT | Performed by: PHYSICIAN ASSISTANT

## 2018-09-07 DIAGNOSIS — I10 ESSENTIAL HYPERTENSION: ICD-10-CM

## 2018-09-07 PROCEDURE — 4010F ACE/ARB THERAPY RXD/TAKEN: CPT | Performed by: FAMILY MEDICINE

## 2018-09-07 RX ORDER — LISINOPRIL 20 MG/1
20 TABLET ORAL DAILY
Qty: 90 TABLET | Refills: 0 | Status: SHIPPED | OUTPATIENT
Start: 2018-09-07 | End: 2019-05-14 | Stop reason: SDUPTHER

## 2018-10-16 DIAGNOSIS — IMO0002 UNCONTROLLED TYPE 2 DIABETES MELLITUS WITH COMPLICATION, WITHOUT LONG-TERM CURRENT USE OF INSULIN: ICD-10-CM

## 2018-10-16 RX ORDER — EMPAGLIFLOZIN 10 MG/1
TABLET, FILM COATED ORAL
Qty: 90 TABLET | Refills: 0 | Status: SHIPPED | OUTPATIENT
Start: 2018-10-16 | End: 2019-01-14 | Stop reason: SDUPTHER

## 2018-10-29 ENCOUNTER — TELEPHONE (OUTPATIENT)
Dept: ENDOCRINOLOGY | Facility: CLINIC | Age: 63
End: 2018-10-29

## 2018-10-30 ENCOUNTER — APPOINTMENT (OUTPATIENT)
Dept: LAB | Facility: CLINIC | Age: 63
End: 2018-10-30
Payer: COMMERCIAL

## 2018-10-30 DIAGNOSIS — IMO0002 UNCONTROLLED TYPE 2 DIABETES MELLITUS WITH COMPLICATION, WITHOUT LONG-TERM CURRENT USE OF INSULIN: ICD-10-CM

## 2018-10-30 DIAGNOSIS — E55.9 VITAMIN D DEFICIENCY: ICD-10-CM

## 2018-10-30 DIAGNOSIS — E78.5 HYPERLIPIDEMIA, UNSPECIFIED HYPERLIPIDEMIA TYPE: ICD-10-CM

## 2018-10-30 LAB
25(OH)D3 SERPL-MCNC: 50.2 NG/ML (ref 30–100)
ALBUMIN SERPL BCP-MCNC: 4 G/DL (ref 3.5–5)
ALP SERPL-CCNC: 74 U/L (ref 46–116)
ALT SERPL W P-5'-P-CCNC: 33 U/L (ref 12–78)
ANION GAP SERPL CALCULATED.3IONS-SCNC: 7 MMOL/L (ref 4–13)
AST SERPL W P-5'-P-CCNC: 18 U/L (ref 5–45)
BILIRUB SERPL-MCNC: 0.4 MG/DL (ref 0.2–1)
BUN SERPL-MCNC: 17 MG/DL (ref 5–25)
CALCIUM SERPL-MCNC: 9.5 MG/DL (ref 8.3–10.1)
CHLORIDE SERPL-SCNC: 102 MMOL/L (ref 100–108)
CHOLEST SERPL-MCNC: 193 MG/DL (ref 50–200)
CO2 SERPL-SCNC: 30 MMOL/L (ref 21–32)
CREAT SERPL-MCNC: 1.2 MG/DL (ref 0.6–1.3)
EST. AVERAGE GLUCOSE BLD GHB EST-MCNC: 157 MG/DL
GFR SERPL CREATININE-BSD FRML MDRD: 64 ML/MIN/1.73SQ M
GLUCOSE P FAST SERPL-MCNC: 161 MG/DL (ref 65–99)
HBA1C MFR BLD: 7.1 % (ref 4.2–6.3)
HDLC SERPL-MCNC: 46 MG/DL (ref 40–60)
LDLC SERPL CALC-MCNC: 119 MG/DL (ref 0–100)
POTASSIUM SERPL-SCNC: 4.6 MMOL/L (ref 3.5–5.3)
PROT SERPL-MCNC: 7.5 G/DL (ref 6.4–8.2)
SODIUM SERPL-SCNC: 139 MMOL/L (ref 136–145)
TRIGL SERPL-MCNC: 141 MG/DL

## 2018-10-30 PROCEDURE — 82306 VITAMIN D 25 HYDROXY: CPT

## 2018-10-30 PROCEDURE — 83036 HEMOGLOBIN GLYCOSYLATED A1C: CPT

## 2018-10-30 PROCEDURE — 80061 LIPID PANEL: CPT

## 2018-10-30 PROCEDURE — 36415 COLL VENOUS BLD VENIPUNCTURE: CPT

## 2018-10-30 PROCEDURE — 80053 COMPREHEN METABOLIC PANEL: CPT

## 2018-11-01 ENCOUNTER — OFFICE VISIT (OUTPATIENT)
Dept: ENDOCRINOLOGY | Facility: CLINIC | Age: 63
End: 2018-11-01
Payer: COMMERCIAL

## 2018-11-01 VITALS
HEIGHT: 70 IN | DIASTOLIC BLOOD PRESSURE: 84 MMHG | HEART RATE: 76 BPM | WEIGHT: 196.2 LBS | SYSTOLIC BLOOD PRESSURE: 120 MMHG | BODY MASS INDEX: 28.09 KG/M2

## 2018-11-01 DIAGNOSIS — E78.2 MIXED HYPERLIPIDEMIA: ICD-10-CM

## 2018-11-01 DIAGNOSIS — E55.9 VITAMIN D DEFICIENCY: ICD-10-CM

## 2018-11-01 DIAGNOSIS — E11.65 UNCONTROLLED TYPE 2 DIABETES MELLITUS WITH HYPERGLYCEMIA (HCC): Primary | ICD-10-CM

## 2018-11-01 DIAGNOSIS — E03.8 SUBCLINICAL HYPOTHYROIDISM: ICD-10-CM

## 2018-11-01 DIAGNOSIS — I10 BENIGN ESSENTIAL HYPERTENSION: ICD-10-CM

## 2018-11-01 PROCEDURE — 99213 OFFICE O/P EST LOW 20 MIN: CPT | Performed by: NURSE PRACTITIONER

## 2018-11-01 NOTE — PROGRESS NOTES
Established Patient Progress Note      Chief Complaint   Patient presents with    Diabetes Type 2          History of Present Illness:   Celeste Epley is a 58 y o  male with a history of HTN, HLD, subclinical hypothyroidism, vitamin d deficiency, and type 2 diabetes without long term use of insulin  Reports no complications of diabetes  Last A1C 7 1  BG fairly stable  He reports occasional snacking, mostly chips  Denies recent illness or hospitalizations  Denies recent severe hypoglycemic or severe hyperglycemic episodes  Denies any issues with his current regimen  Home glucose monitoring: are performed regularly    Home blood glucose readings:   Before breakfast: 110-130s  Before lunch: does not check   Before dinner: does not check  Bedtime: does not check    Current regimen: Jardiance 10 mg and Janumet 50-1,000 mg BID  compliant all of the timedenies any side effects from current medications     Hypoglycemic episodes: No never   H/o of hypoglycemia causing hospitalization or Intervention such as glucagon injection or ambulance call No     Last Eye Exam: 6/06/18, no retinopathy   Last Foot Exam: does not see podiatrist     Has hypertension: Taking Lisinopril   Has hyperlipidemia: Taking Pravastatin     Has subclinical hypothyroidism: TSH slightly elevated with normal free T4, denies symptoms of hypo or hyperthyroidism   Has vitamin d deficiency: Taking vitamin d 4,000 units daily     Patient Active Problem List   Diagnosis    Prostate cancer ([facility] [address] )    Arteriosclerosis of coronary artery    Benign essential hypertension    Depression    Diabetes mellitus type 2, uncontrolled ([facility] [address] )    Subclinical hypothyroidism    Thrombocytosis ([facility] [address] )    Vitamin D deficiency    Mixed hyperlipidemia    Leukocytosis    Hyperlipidemia    Dermatitis of left ear canal      Past Medical History:   Diagnosis Date    Ankle injury     last assessed 7/15/2013    BPH (benign prostatic hyperplasia)     Bronchiolitis     Cancer [facility])     Prostate    Diabetes mellitus ([facility] [address] )     Type II    History of benign prostatic hyperplasia     History of gastritis     History of insect bite     last assessed 10/4/2014    History of nocturia     Lumbar canal stenosis     Osteoarthritis, knee     Otitis externa     Pain in back     UPPER BACK, last assessed 2/25/2013    Sinusitis, acute     last assessed 10/6/2016    Tendinitis of right rotator cuff     last assessed 8/21/2012      Past Surgical History:   Procedure Laterality Date    ANKLE FRACTURE SURGERY Left     triple fracture 4/2009    CERVICAL LAMINECTOMY      partial L4-L5 in 2003    LAMINECTOMY      L5-S1 2003    NOSE SURGERY      6/1989    AL COLONOSCOPY FLX DX W/COLLJ SPEC WHEN PFRMD N/A 1/27/2016    Procedure: COLONOSCOPY;  Surgeon: Kuldeep Lino MD;  Location: [facility];   Service: Gastroenterology    PROSTATE BIOPSY  12/08/2012    managed by Kavita Strong, needle biopsy    PROSTATECTOMY      9/23/2013    RHINOPLASTY      for deviated septum in 1989      Family History   Problem Relation Age of Onset    Hypertension Mother     Diabetes Father     Heart disease Father         MI at 62 yo     Social History   Substance Use Topics    Smoking status: Never Smoker    Smokeless tobacco: Never Used      Comment: per allscripts 'current smoker, current some day smoker'    Alcohol use 2 4 oz/week     3 Cans of beer, 1 Shots of liquor per week      Comment: 1-2 times a week socially     No Known Allergies      Current Outpatient Prescriptions:     Cholecalciferol (VITAMIN D) 2000 units CAPS, Take 2 capsules (4,000 Units total) by mouth daily, Disp: , Rfl: 0    co-enzyme Q-10 30 MG capsule, Take 1 capsule (30 mg total) by mouth daily, Disp: 30 capsule, Rfl: 0    glucose blood (ONETOUCH VERIO) test strip, Check BG 2x per day, Disp: 200 each, Rfl: 3    JANUMET  MG per tablet, TAKE 1 TABLET TWICE DAILY  WITH MEALS, Disp: 180 tablet, Rfl: 1    JARDIANCE 10 MG TABS, TAKE 1 TABLET EVERY MORNING, Disp: 90 tablet, Rfl: 0    lisinopril (ZESTRIL) 20 mg tablet, Take 1 tablet (20 mg total) by mouth daily, Disp: 90 tablet, Rfl: 0    mometasone (ELOCON) 0 1 % cream, Apply topically daily, Disp: 45 g, Rfl: 0    pravastatin (PRAVACHOL) 40 mg tablet, Take 40 mg by mouth daily  , Disp: , Rfl:     vardenafil (LEVITRA) 20 MG tablet, Take by mouth daily as needed  , Disp: , Rfl:     venlafaxine (EFFEXOR-XR) 75 mg 24 hr capsule, Take 1 capsule (75 mg total) by mouth daily, Disp: 90 capsule, Rfl: 1    Review of Systems   Constitutional: Negative for activity change, appetite change and fatigue  HENT: Negative for sore throat, trouble swallowing and voice change  Eyes: Negative for visual disturbance  Respiratory: Negative for choking, chest tightness and shortness of breath  Cardiovascular: Negative for chest pain, palpitations and leg swelling  Gastrointestinal: Negative for abdominal pain, constipation and diarrhea  Endocrine: Negative for cold intolerance, heat intolerance, polydipsia, polyphagia and polyuria  Genitourinary: Negative for frequency  Musculoskeletal: Negative for arthralgias and myalgias  Skin: Negative for rash  Neurological: Negative for dizziness and syncope  Hematological: Negative for adenopathy  Psychiatric/Behavioral: Negative for sleep disturbance  All other systems reviewed and are negative  Physical Exam:  Body mass index is 28 15 kg/m²  /84   Pulse 76   Ht 5' 10" (1 778 m)   Wt 89 kg (196 lb 3 2 oz)   BMI 28 15 kg/m²    Wt Readings from Last 3 Encounters:   11/01/18 89 kg (196 lb 3 2 oz)   08/03/18 88 5 kg (195 lb)   07/13/18 88 kg (194 lb)       Physical Exam   Constitutional: He is oriented to person, place, and time  He appears well-developed and well-nourished  No distress  HENT:   Head: Normocephalic and atraumatic     Mouth/Throat: Oropharynx is clear and moist    Eyes: Pupils are equal, round, and reactive to light  Conjunctivae and EOM are normal    Neck: Normal range of motion  Neck supple  No thyromegaly present  Cardiovascular: Normal rate, regular rhythm and normal heart sounds  No murmur heard  Pulmonary/Chest: Effort normal and breath sounds normal  No respiratory distress  He has no wheezes  He has no rales  Abdominal: Soft  Bowel sounds are normal  He exhibits no distension  There is no tenderness  Musculoskeletal: Normal range of motion  He exhibits no edema  Lymphadenopathy:     He has no cervical adenopathy  Neurological: He is alert and oriented to person, place, and time  Skin: Skin is warm and dry  Psychiatric: He has a normal mood and affect  Vitals reviewed  Diabetic Foot Exam: not performed     Labs:     Lab Results   Component Value Date    HGBA1C 7 1 (H) 10/30/2018       Lab Results   Component Value Date     12/17/2015    K 4 6 10/30/2018     10/30/2018    CO2 30 10/30/2018    ANIONGAP 8 12/17/2015    BUN 17 10/30/2018    CREATININE 1 20 10/30/2018    GLUCOSE 211 (H) 12/17/2015    GLUF 161 (H) 10/30/2018    CALCIUM 9 5 10/30/2018    AST 18 10/30/2018    ALT 33 10/30/2018    ALKPHOS 74 10/30/2018    PROT 6 8 12/17/2015    BILITOT 0 47 12/17/2015    EGFR 64 10/30/2018         Lab Results   Component Value Date    CHOL 206 12/17/2015    HDL 46 10/30/2018    TRIG 141 10/30/2018       Lab Results   Component Value Date    ELK6OKGJLDAZ 3 846 (H) 02/14/2018    RJZ1IIHWIAXS 4 107 (H) 07/08/2017    ABO8GBOTWUQI 4 196 (H) 03/01/2017     Lab Results   Component Value Date    FREET4 0 91 02/14/2018       Impression & Plan:    Problem List Items Addressed This Visit     Benign essential hypertension     BP stable, continue current regimen          Diabetes mellitus type 2, uncontrolled ([facility] [address] ) - Primary     A1C close to goal, morning BG within target range  Instructed patient to cut down on evening snacking  Continue current regimen for now   Instructed to check BG 1-2x per day at alternating times of day and send readings into the office in two weeks  Focus on dietary and lifestyle modifications  Relevant Orders    Hemoglobin A1C    Comprehensive metabolic panel    Microalbumin / creatinine urine ratio    Subclinical hypothyroidism     TSH slightly elevated with normal free T4  Will continue to monitor  Has been stable  Repeat TSH and free T4 prior to next visit          Relevant Orders    T4, free    TSH, 3rd generation    Vitamin D deficiency     Continue vitamin d supplementation          Mixed hyperlipidemia     Stable, continue statin          Relevant Orders    Lipid Panel with Direct LDL reflex          Orders Placed This Encounter   Procedures    Hemoglobin A1C     Standing Status:   Future     Standing Expiration Date:   11/1/2019    Comprehensive metabolic panel     This is a patient instruction: Patient fasting for 8 hours or longer recommended  Standing Status:   Future     Standing Expiration Date:   11/1/2019    Lipid Panel with Direct LDL reflex     This is a patient instruction: This test requires patient fasting for 10-12 hours or longer  Drinking of black coffee or black tea is acceptable  Standing Status:   Future     Standing Expiration Date:   11/1/2019    Microalbumin / creatinine urine ratio     Standing Status:   Future     Standing Expiration Date:   11/1/2019    T4, free     Standing Status:   Future     Standing Expiration Date:   2/1/2019    TSH, 3rd generation     This is a patient instruction: This test is non-fasting  Please drink two glasses of water morning of bloodwork  Standing Status:   Future     Standing Expiration Date:   2/1/2019       Discussed with the patient and all questioned fully answered  He will call me if any problems arise  Follow-up appointment in 3 months       Counseled patient on diagnostic results, prognosis, risk and benefit of treatment options, instruction for management, importance of treatment compliance, Risk  factor reduction and impressions      Laurita Bryant [address]

## 2018-11-01 NOTE — ASSESSMENT & PLAN NOTE
A1C close to goal, morning BG within target range  Instructed patient to cut down on evening snacking  Continue current regimen for now  Instructed to check BG 1-2x per day at alternating times of day and send readings into the office in two weeks  Focus on dietary and lifestyle modifications

## 2018-11-01 NOTE — ASSESSMENT & PLAN NOTE
TSH slightly elevated with normal free T4  Will continue to monitor  Has been stable   Repeat TSH and free T4 prior to next visit

## 2018-11-25 DIAGNOSIS — E11.65 TYPE 2 DIABETES MELLITUS WITH HYPERGLYCEMIA, UNSPECIFIED WHETHER LONG TERM INSULIN USE (HCC): ICD-10-CM

## 2018-11-25 RX ORDER — SITAGLIPTIN AND METFORMIN HYDROCHLORIDE 1000; 50 MG/1; MG/1
TABLET, FILM COATED ORAL
Qty: 180 TABLET | Refills: 1 | Status: SHIPPED | OUTPATIENT
Start: 2018-11-25 | End: 2019-05-14 | Stop reason: SDUPTHER

## 2018-11-30 ENCOUNTER — OFFICE VISIT (OUTPATIENT)
Dept: FAMILY MEDICINE CLINIC | Facility: CLINIC | Age: 63
End: 2018-11-30
Payer: COMMERCIAL

## 2018-11-30 VITALS
OXYGEN SATURATION: 97 % | HEART RATE: 82 BPM | SYSTOLIC BLOOD PRESSURE: 118 MMHG | BODY MASS INDEX: 27.98 KG/M2 | WEIGHT: 195 LBS | TEMPERATURE: 98.2 F | DIASTOLIC BLOOD PRESSURE: 74 MMHG

## 2018-11-30 DIAGNOSIS — I10 BENIGN ESSENTIAL HYPERTENSION: Primary | ICD-10-CM

## 2018-11-30 DIAGNOSIS — F32.A DEPRESSION, UNSPECIFIED DEPRESSION TYPE: ICD-10-CM

## 2018-11-30 PROBLEM — E11.9 TYPE 2 DIABETES MELLITUS WITHOUT COMPLICATION, WITHOUT LONG-TERM CURRENT USE OF INSULIN (HCC): Status: ACTIVE | Noted: 2017-08-24

## 2018-11-30 PROCEDURE — 3078F DIAST BP <80 MM HG: CPT | Performed by: FAMILY MEDICINE

## 2018-11-30 PROCEDURE — 3074F SYST BP LT 130 MM HG: CPT | Performed by: FAMILY MEDICINE

## 2018-11-30 PROCEDURE — 99214 OFFICE O/P EST MOD 30 MIN: CPT | Performed by: FAMILY MEDICINE

## 2018-11-30 PROCEDURE — 1036F TOBACCO NON-USER: CPT | Performed by: FAMILY MEDICINE

## 2018-11-30 RX ORDER — VENLAFAXINE HYDROCHLORIDE 75 MG/1
75 CAPSULE, EXTENDED RELEASE ORAL DAILY
Qty: 90 CAPSULE | Refills: 1 | Status: SHIPPED | OUTPATIENT
Start: 2018-11-30 | End: 2019-06-07

## 2018-11-30 NOTE — ASSESSMENT & PLAN NOTE
His depression is well controlled with Effexor 75 mg daily  He will continue with this  We refilled his prescription today

## 2018-11-30 NOTE — ASSESSMENT & PLAN NOTE
Lab Results   Component Value Date    HGBA1C 7 1 (H) 10/30/2018     His blood sugar is showing improved control  His last hemoglobin A1c was 7 1 which she is congratulated for  His diabetic foot exam was normal today  He has strong dorsalis pedis pulses, normal hair growth on his toes  No evidence of neuropathy by monofilament examination  No results for input(s): POCGLU in the last 72 hours      Blood Sugar Average: Last 72 hrs:

## 2018-11-30 NOTE — PROGRESS NOTES
Assessment/Plan:  Depression  His depression is well controlled with Effexor 75 mg daily  He will continue with this  We refilled his prescription today  Type 2 diabetes mellitus without complication, without long-term current use of insulin ([facility])  Lab Results   Component Value Date    HGBA1C 7 1 (H) 10/30/2018     His blood sugar is showing improved control  His last hemoglobin A1c was 7 1 which she is congratulated for  His diabetic foot exam was normal today  He has strong dorsalis pedis pulses, normal hair growth on his toes  No evidence of neuropathy by monofilament examination  No results for input(s): POCGLU in the last 72 hours  Blood Sugar Average: Last 72 hrs:      Benign essential hypertension  Blood pressure remains well controlled today  Diagnoses and all orders for this visit:    Benign essential hypertension    Depression, unspecified depression type  -     venlafaxine (EFFEXOR-XR) 75 mg 24 hr capsule; Take 1 capsule (75 mg total) by mouth daily          Subjective:   Chief Complaint   Patient presents with    Depression     here for a refill today  pt had flu shot at work  he does need a diabetic foot exam today  Patient ID: Garrett Echeverria is a 58 y o  male  HPI  The patient is a 43-year-old male who presents today for follow-up of anxiety depression  He states that overall he feels he has been doing well  He has no issues with sleep other than chronic low back pain  His interest level is relatively good as well as his energy level  He has no issues with concentration  His appetite is good and he is not suicidal   He recently reached his 5 year anniversary of treatment for prostate cancer and has no evidence of disease which she is happy about  He does question whether testosterone replacement could be of value for him  We discussed the did have to discuss this with Urology though I suspect that they would not approve it    He follows with Endocrinology in his last hemoglobin A1c was improved at 7 1  He does need a foot exam today  He has no claudicatory symptoms and has no neuropathic symptoms such as dysesthesias X cetera  The following portions of the patient's history were reviewed and updated as appropriate: allergies, current medications, past family history, past medical history, past social history, past surgical history and problem list     Review of Systems   Constitution: Negative for decreased appetite, malaise/fatigue, weight gain and weight loss  Cardiovascular: Negative for chest pain, leg swelling, orthopnea and paroxysmal nocturnal dyspnea  Respiratory: Negative for cough, shortness of breath and wheezing  Musculoskeletal: Positive for back pain and stiffness  Neurological: Negative for dizziness and headaches  Psychiatric/Behavioral: Positive for depression  Negative for suicidal ideas  The patient has insomnia and is nervous/anxious  Objective:    Physical Exam   Constitutional: He is oriented to person, place, and time  He appears well-developed and well-nourished  No distress  Neck: No JVD present  No thyromegaly present  Cardiovascular: Normal rate, regular rhythm and normal heart sounds  Pulses are no weak pulses  No murmur heard  Pulses:       Dorsalis pedis pulses are 2+ on the right side, and 2+ on the left side  Pulmonary/Chest: Effort normal and breath sounds normal  No respiratory distress  Musculoskeletal: He exhibits no edema or tenderness  Feet:   Right Foot:   Skin Integrity: Negative for ulcer, skin breakdown, erythema, warmth, callus or dry skin  Left Foot:   Skin Integrity: Negative for ulcer, skin breakdown, erythema, warmth, callus or dry skin  Neurological: He is alert and oriented to person, place, and time  Psychiatric: He has a normal mood and affect  Thought content normal    Nursing note and vitals reviewed  Patient's shoes and socks removed  Right Foot/Ankle   Right Foot Inspection  Skin Exam: skin normal and skin intact no dry skin, no warmth, no callus, no erythema, no maceration, no abnormal color, no pre-ulcer, no ulcer and no callus                          Toe Exam: ROM and strength within normal limits  Sensory       Monofilament testing: intact  Vascular  Capillary refills: < 3 seconds  The right DP pulse is 2+  Left Foot/Ankle  Left Foot Inspection  Skin Exam: skin normal and skin intactno dry skin, no warmth, no erythema, no maceration, normal color, no pre-ulcer, no ulcer and no callus                         Toe Exam: ROM and strength within normal limits                   Sensory       Monofilament: intact  Vascular  Capillary refills: < 3 seconds  The left DP pulse is 2+  Assign Risk Category:  No deformity present; No loss of protective sensation;  No weak pulses       Risk: 0

## 2019-01-10 ENCOUNTER — OFFICE VISIT (OUTPATIENT)
Dept: FAMILY MEDICINE CLINIC | Facility: CLINIC | Age: 64
End: 2019-01-10
Payer: COMMERCIAL

## 2019-01-10 VITALS
OXYGEN SATURATION: 96 % | HEIGHT: 70 IN | DIASTOLIC BLOOD PRESSURE: 76 MMHG | SYSTOLIC BLOOD PRESSURE: 120 MMHG | TEMPERATURE: 97.8 F | WEIGHT: 193 LBS | BODY MASS INDEX: 27.63 KG/M2 | HEART RATE: 76 BPM

## 2019-01-10 DIAGNOSIS — R25.1 TREMOR OF LEFT HAND: Primary | ICD-10-CM

## 2019-01-10 DIAGNOSIS — F32.5 MAJOR DEPRESSIVE DISORDER IN FULL REMISSION, UNSPECIFIED WHETHER RECURRENT (HCC): ICD-10-CM

## 2019-01-10 PROCEDURE — 99214 OFFICE O/P EST MOD 30 MIN: CPT | Performed by: FAMILY MEDICINE

## 2019-01-10 NOTE — ASSESSMENT & PLAN NOTE
He has a tremor of his left hand  There is some component of rest as well as intention  Examination and history are not otherwise consistent with parkinsonism presently  We elected to observe and will re-evaluated his 6 month visit  We did offer referral to Neurology currently which she declines  Should his symptomatology progress prior to his next visit he is going to call  He agrees

## 2019-01-10 NOTE — ASSESSMENT & PLAN NOTE
His depression remained significantly in remission on Effexor which he is going to continue  Will see him back in 6 months or sooner as needed

## 2019-01-10 NOTE — PROGRESS NOTES
Assessment/Plan:  Depression  His depression remained significantly in remission on Effexor which he is going to continue  Will see him back in 6 months or sooner as needed  Tremor of left hand  He has a tremor of his left hand  There is some component of rest as well as intention  Examination and history are not otherwise consistent with parkinsonism presently  We elected to observe and will re-evaluated his 6 month visit  We did offer referral to Neurology currently which she declines  Should his symptomatology progress prior to his next visit he is going to call  He agrees  Diagnoses and all orders for this visit:    Tremor of left hand    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, unspecified whether recurrent [facility])          Subjective:   Chief Complaint   Patient presents with    med check     here for 6 month med check  has his flu shot at work  I have a come and go tremor for about 6 months or so  L hand   No falls, rigidity  No change in script size  No masked facies  Mood is good  Effexor effective  Some fatigue  Has BW scheduled for next month  Patient ID: Garrett Echeverria is a 61 y o  male  HPI  Patient is a 79-year-old male who presents today for follow-up of depression  He states he has been compliant with his Effexor  Overall he feels good  He states his mood is good  He does have some mild decreased energy but his sleep is good, appetite is good as well as interest   He can concentrate well and he is not suicidal   Does have some concerns about a tremor of his left hand which has been intermittent for the past 6 months  He does noted at rest as well as intention such as when he tries to drink from a cup coffee  His blood work schedule to Endocrinology for next month  He has had no falls  He has had no paucity of motion  He has had no decrease in size of his script    The following portions of the patient's history were reviewed and updated as appropriate: allergies, current medications, past family history, past medical history, past social history, past surgical history and problem list     Review of Systems   Constitution: Negative for chills, decreased appetite, fever, malaise/fatigue and weight gain  Cardiovascular: Negative for chest pain  Respiratory: Negative for cough and shortness of breath  Endocrine: Negative for polydipsia, polyphagia and polyuria  Hematologic/Lymphatic: Negative for adenopathy and bleeding problem  Skin: Negative for rash  Musculoskeletal: Negative for falls  Gastrointestinal: Negative for bowel incontinence  Genitourinary: Negative for bladder incontinence  Neurological: Positive for tremors  Negative for disturbances in coordination, loss of balance, numbness and paresthesias  Psychiatric/Behavioral: Positive for depression  Negative for substance abuse and suicidal ideas  The patient does not have insomnia and is not nervous/anxious  Objective:    Physical Exam   Constitutional: He is oriented to person, place, and time  He appears well-developed and well-nourished  Cardiovascular: Normal rate and regular rhythm  Exam reveals no gallop  No murmur heard  Pulmonary/Chest: Effort normal and breath sounds normal  No respiratory distress  He has no rales  Musculoskeletal: He exhibits no edema  Neurological: He is alert and oriented to person, place, and time  He exhibits normal muscle tone  He has no evidence of masked face sees, no cogwheel rigidity, no paucity of speech or motion  He does have a 3 per second cycle tremor of his left hand which he can consciously extinguish  Psychiatric:   He does have somewhat dysphoric affect   Nursing note and vitals reviewed

## 2019-01-14 DIAGNOSIS — IMO0002 UNCONTROLLED TYPE 2 DIABETES MELLITUS WITH COMPLICATION, WITHOUT LONG-TERM CURRENT USE OF INSULIN: ICD-10-CM

## 2019-01-14 RX ORDER — EMPAGLIFLOZIN 10 MG/1
TABLET, FILM COATED ORAL
Qty: 90 TABLET | Refills: 0 | Status: SHIPPED | OUTPATIENT
Start: 2019-01-14 | End: 2019-04-14 | Stop reason: SDUPTHER

## 2019-02-16 ENCOUNTER — APPOINTMENT (EMERGENCY)
Dept: CT IMAGING | Facility: HOSPITAL | Age: 64
End: 2019-02-16
Payer: COMMERCIAL

## 2019-02-16 ENCOUNTER — HOSPITAL ENCOUNTER (EMERGENCY)
Facility: HOSPITAL | Age: 64
Discharge: HOME | End: 2019-02-16
Attending: EMERGENCY MEDICINE
Payer: COMMERCIAL

## 2019-02-16 ENCOUNTER — APPOINTMENT (EMERGENCY)
Dept: RADIOLOGY | Facility: HOSPITAL | Age: 64
End: 2019-02-16
Payer: COMMERCIAL

## 2019-02-16 VITALS
RESPIRATION RATE: 18 BRPM | DIASTOLIC BLOOD PRESSURE: 88 MMHG | BODY MASS INDEX: 27.99 KG/M2 | HEART RATE: 68 BPM | TEMPERATURE: 98.4 F | SYSTOLIC BLOOD PRESSURE: 144 MMHG | OXYGEN SATURATION: 96 % | WEIGHT: 195.11 LBS

## 2019-02-16 DIAGNOSIS — R42 DIZZINESS: ICD-10-CM

## 2019-02-16 DIAGNOSIS — H53.2 DIPLOPIA: Primary | ICD-10-CM

## 2019-02-16 LAB
ALBUMIN SERPL BCP-MCNC: 3.9 G/DL (ref 3.5–5)
ALP SERPL-CCNC: 44 U/L (ref 46–116)
ALT SERPL W P-5'-P-CCNC: 37 U/L (ref 12–78)
ANION GAP SERPL CALCULATED.3IONS-SCNC: 9 MMOL/L (ref 4–13)
AST SERPL W P-5'-P-CCNC: 16 U/L (ref 5–45)
BASOPHILS # BLD AUTO: 0.06 THOUSANDS/ΜL (ref 0–0.1)
BASOPHILS NFR BLD AUTO: 1 % (ref 0–1)
BILIRUB SERPL-MCNC: 0.2 MG/DL (ref 0.2–1)
BUN SERPL-MCNC: 19 MG/DL (ref 5–25)
CALCIUM SERPL-MCNC: 9.4 MG/DL (ref 8.3–10.1)
CHLORIDE SERPL-SCNC: 102 MMOL/L (ref 100–108)
CO2 SERPL-SCNC: 27 MMOL/L (ref 21–32)
CREAT SERPL-MCNC: 1.09 MG/DL (ref 0.6–1.3)
EOSINOPHIL # BLD AUTO: 0.3 THOUSAND/ΜL (ref 0–0.61)
EOSINOPHIL NFR BLD AUTO: 3 % (ref 0–6)
ERYTHROCYTE [DISTWIDTH] IN BLOOD BY AUTOMATED COUNT: 13.9 % (ref 11.6–15.1)
GFR SERPL CREATININE-BSD FRML MDRD: 72 ML/MIN/1.73SQ M
GLUCOSE SERPL-MCNC: 124 MG/DL (ref 65–140)
GLUCOSE SERPL-MCNC: 137 MG/DL (ref 65–140)
HCT VFR BLD AUTO: 44.4 % (ref 36.5–49.3)
HGB BLD-MCNC: 14.8 G/DL (ref 12–17)
IMM GRANULOCYTES # BLD AUTO: 0.04 THOUSAND/UL (ref 0–0.2)
IMM GRANULOCYTES NFR BLD AUTO: 0 % (ref 0–2)
LYMPHOCYTES # BLD AUTO: 3.33 THOUSANDS/ΜL (ref 0.6–4.47)
LYMPHOCYTES NFR BLD AUTO: 28 % (ref 14–44)
MCH RBC QN AUTO: 30 PG (ref 26.8–34.3)
MCHC RBC AUTO-ENTMCNC: 33.3 G/DL (ref 31.4–37.4)
MCV RBC AUTO: 90 FL (ref 82–98)
MONOCYTES # BLD AUTO: 1.05 THOUSAND/ΜL (ref 0.17–1.22)
MONOCYTES NFR BLD AUTO: 9 % (ref 4–12)
NEUTROPHILS # BLD AUTO: 7.13 THOUSANDS/ΜL (ref 1.85–7.62)
NEUTS SEG NFR BLD AUTO: 59 % (ref 43–75)
NRBC BLD AUTO-RTO: 0 /100 WBCS
PLATELET # BLD AUTO: 471 THOUSANDS/UL (ref 149–390)
PMV BLD AUTO: 10.2 FL (ref 8.9–12.7)
POTASSIUM SERPL-SCNC: 3.9 MMOL/L (ref 3.5–5.3)
PROT SERPL-MCNC: 7.2 G/DL (ref 6.4–8.2)
RBC # BLD AUTO: 4.93 MILLION/UL (ref 3.88–5.62)
SODIUM SERPL-SCNC: 138 MMOL/L (ref 136–145)
WBC # BLD AUTO: 11.91 THOUSAND/UL (ref 4.31–10.16)

## 2019-02-16 PROCEDURE — 93005 ELECTROCARDIOGRAM TRACING: CPT

## 2019-02-16 PROCEDURE — 36415 COLL VENOUS BLD VENIPUNCTURE: CPT | Performed by: PHYSICIAN ASSISTANT

## 2019-02-16 PROCEDURE — 70450 CT HEAD/BRAIN W/O DYE: CPT

## 2019-02-16 PROCEDURE — 80053 COMPREHEN METABOLIC PANEL: CPT | Performed by: PHYSICIAN ASSISTANT

## 2019-02-16 PROCEDURE — 85025 COMPLETE CBC W/AUTO DIFF WBC: CPT | Performed by: PHYSICIAN ASSISTANT

## 2019-02-16 PROCEDURE — 99285 EMERGENCY DEPT VISIT HI MDM: CPT

## 2019-02-16 PROCEDURE — 82948 REAGENT STRIP/BLOOD GLUCOSE: CPT

## 2019-02-16 PROCEDURE — 71046 X-RAY EXAM CHEST 2 VIEWS: CPT

## 2019-02-16 RX ORDER — MECLIZINE HYDROCHLORIDE 25 MG/1
25 TABLET ORAL 3 TIMES DAILY PRN
Qty: 12 TABLET | Refills: 0 | Status: SHIPPED | OUTPATIENT
Start: 2019-02-16 | End: 2019-03-07

## 2019-02-17 LAB
ATRIAL RATE: 72 BPM
P AXIS: 44 DEGREES
PR INTERVAL: 170 MS
QRS AXIS: 23 DEGREES
QRSD INTERVAL: 80 MS
QT INTERVAL: 368 MS
QTC INTERVAL: 402 MS
T WAVE AXIS: 30 DEGREES
VENTRICULAR RATE: 72 BPM

## 2019-02-17 PROCEDURE — 93010 ELECTROCARDIOGRAM REPORT: CPT | Performed by: INTERNAL MEDICINE

## 2019-02-19 NOTE — ED PROVIDER NOTES
Pt Name: Christa Rivera  MRN: [id number]  YOB: 1955  Age/Sex: 61 y o  male  Date of evaluation: 2/16/2019  PCP: Chantal Salinas MD    [address] [place] [address]    Chief Complaint   Patient presents with    Diplopia     Patient reports having change of vision that lasted approx 10 minutes ago; happened 30 minutes ago  Vision has since returned to normal, but now has a headache  Denies head injury  Patient states "I could see out of each eye good when the other was closed, but when I had both of them open, my vision seemed skewed "          HPI    Sumit Moran presents to the Emergency Department complaining of Visual Disturbance  60 y/o M presents to ED due to Visual Disturbance  Pt reports change in vision approximately 1 hr PTA that lasted approximately 10 minutes  Pt reports vision seemed "rotated   fixed   double", states this was without pain, no loss of vision, no floaters  Pt reports closing eyes improved this issue, states "I could see well out of each eye   but together it was skewed"  Pt reports imbalance during this episode due to vision, though states both resolved on its own, though has had a mild generalized headache  Pt reports he had this issue in the past 4-5 years ago, was evaluated for this issue in the past and 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  Wife present and states that he has been acting appropriately, no facial droop, no speech disturbance  Denies numbness, tingling, weakness, CP, SOB, palpitations, facial droop, speech disturbance, neck pain, neck stiffness, recent illness, tinnitus, ear pain, n/v/d, dehydration, and no other complaints at this time        History provided by:  Significant other and patient   used: No    Eye Problem   Location:  Both eyes  Severity:  Mild  Onset quality:  Sudden  Timing:  Unable to specify  Progression:  Resolved  Chronicity:  Recurrent  Context: not burn, not chemical exposure, not contact lens problem, not direct trauma, not foreign body, not using machinery, not scratch, not smoke exposure and not UV exposure    Associated symptoms: double vision and headaches    Associated symptoms: no blurred vision, no crusting, no decreased vision, no discharge, no facial rash, no inflammation, no itching, no nausea, no numbness, no photophobia, no redness, no scotomas, no swelling, no tearing, no tingling, no vomiting and no weakness    Risk factors: no conjunctival hemorrhage, no exposure to pinkeye, no previous injury to eye, no recent herpes zoster and no recent URI          Past Medical and Surgical History    Past Medical History:   Diagnosis Date    Ankle injury     last assessed 7/15/2013    BPH (benign prostatic hyperplasia)     Bronchiolitis     [facility] )     Prostate    Diabetes mellitus ([facility] )     Type II    History of benign prostatic hyperplasia     History of gastritis     History of insect bite     last assessed 10/4/2014    History of nocturia     Hypertension     Lumbar canal stenosis     Osteoarthritis, knee     Otitis externa     Pain in back     UPPER BACK, last assessed 2/25/2013    Sinusitis, acute     last assessed 10/6/2016    Tendinitis of right rotator cuff     last assessed 8/21/2012       Past Surgical History:   Procedure Laterality Date    ANKLE FRACTURE SURGERY Left     triple fracture 4/2009    CERVICAL LAMINECTOMY      partial L4-L5 in 2003    LAMINECTOMY      L5-S1 2003    NOSE SURGERY      6/1989    IN COLONOSCOPY FLX DX W/COLLJ SPEC WHEN PFRMD N/A 1/27/2016    Procedure: COLONOSCOPY;  Surgeon: Manuel Lovett MD;  Location: [facility];   Service: Gastroenterology    PROSTATE BIOPSY  12/08/2012    managed by Greg Moseley, needle biopsy    PROSTATECTOMY      9/23/2013    RHINOPLASTY      for deviated septum in 1989       Family History   Problem Relation Age of Onset    Hypertension Mother     Diabetes Father     Heart disease Father         MI at 62 yo       Social History     Tobacco Use    Smoking status: Light Tobacco Smoker     Types: Pipe    Smokeless tobacco: Never Used    Tobacco comment: per allscripts 'current smoker, current some day smoker'   Substance Use Topics    Alcohol use: Yes     Alcohol/week: 2 4 oz     Types: 3 Cans of beer, 1 Shots of liquor per week     Comment: occassional     Drug use: No              Allergies    No Known Allergies    Home Medications:    Prior to Admission medications    Medication Sig Start Date End Date Taking? Authorizing Provider   Cholecalciferol (VITAMIN D) 2000 units CAPS Take 2 capsules (4,000 Units total) by mouth daily 2/15/18  Yes Kang Jean Baptiste PA-C   co-enzyme Q-10 30 MG capsule Take 1 capsule (30 mg total) by mouth daily 7/13/18  Yes Marietta Diaz MD   JANUMET  MG per tablet TAKE 1 TABLET TWICE DAILY  WITH MEALS 11/25/18  Yes Edis Burger MD   JARDIANCE 10 MG TABS TAKE 1 TABLET EVERY MORNING 1/14/19  Yes Kang Jean Baptiste PA-C   lisinopril (ZESTRIL) 20 mg tablet Take 1 tablet (20 mg total) by mouth daily 9/7/18  Yes Marietta Diaz MD   pravastatin (PRAVACHOL) 40 mg tablet Take 40 mg by mouth daily  Yes Historical Provider, MD   vardenafil (LEVITRA) 20 MG tablet Take by mouth daily as needed   1/3/14  Yes Historical Provider, MD   venlafaxine (EFFEXOR-XR) 75 mg 24 hr capsule Take 1 capsule (75 mg total) by mouth daily 11/30/18  Yes Marietta Diaz MD   glucose blood (ONETOUCH VERIO) test strip Check BG 2x per day 2/15/18   Kang Jean Baptiste PA-C   meclizine (ANTIVERT) 25 mg tablet Take 1 tablet (25 mg total) by mouth 3 (three) times a day as needed for dizziness 2/16/19   Preethi Ross PA-C           Review of Systems    Review of Systems   Constitutional: Negative for activity change, appetite change, chills, diaphoresis, fatigue and fever     HENT: Negative for congestion, dental problem, ear discharge, ear pain, facial swelling, sinus pressure, sinus pain, sore throat, tinnitus and trouble swallowing  Eyes: Positive for double vision and visual disturbance  Negative for blurred vision, photophobia, pain, discharge, redness and itching  Respiratory: Negative for cough and shortness of breath  Cardiovascular: Negative for chest pain and palpitations  Gastrointestinal: Negative for abdominal distention, abdominal pain, blood in stool, constipation, diarrhea, nausea and vomiting  Genitourinary: Negative for decreased urine volume, difficulty urinating, dysuria and hematuria  Musculoskeletal: Negative for arthralgias, back pain, gait problem, joint swelling, myalgias, neck pain and neck stiffness  Skin: Negative for pallor, rash and wound  Neurological: Positive for headaches  Negative for dizziness, tingling, tremors, seizures, syncope, weakness, light-headedness and numbness  Psychiatric/Behavioral: Negative for confusion  All other systems reviewed and negative  Physical Exam      ED Triage Vitals   Temperature Pulse Respirations Blood Pressure SpO2   02/16/19 1804 02/16/19 1801 02/16/19 1801 02/16/19 1801 02/16/19 1801   98 4 °F (36 9 °C) 83 20 147/70 97 %      Temp Source Heart Rate Source Patient Position - Orthostatic VS BP Location FiO2 (%)   02/16/19 1804 02/16/19 1801 02/16/19 1801 02/16/19 1801 --   Oral Monitor Sitting Left arm       Pain Score       02/16/19 1801       2               Physical Exam   Constitutional: He is oriented to person, place, and time  He appears well-developed and well-nourished  No distress  HENT:   Head: Normocephalic and atraumatic  Right Ear: External ear normal    Left Ear: External ear normal    Nose: Nose normal    Mouth/Throat: Oropharynx is clear and moist  No oropharyngeal exudate  Eyes: Pupils are equal, round, and reactive to light  Conjunctivae and EOM are normal  Right eye exhibits no discharge  Left eye exhibits no discharge  Right conjunctiva is not injected  Right conjunctiva has no hemorrhage   Left conjunctiva is not injected  Left conjunctiva has no hemorrhage  No scleral icterus  Right eye exhibits no nystagmus  Left eye exhibits no nystagmus  Tonometry 17 mmHg bilaterally  VA 20/25 b/l  Neck: Normal range of motion  Neck supple  No JVD present  No tracheal deviation present  No thyromegaly present  Cardiovascular: Normal rate, regular rhythm, normal heart sounds and intact distal pulses  Exam reveals no gallop and no friction rub  No murmur heard  Pulmonary/Chest: Effort normal and breath sounds normal  No stridor  No respiratory distress  He has no wheezes  He has no rales  He exhibits no tenderness  Abdominal: Soft  Bowel sounds are normal  He exhibits no distension and no mass  There is no tenderness  There is no rebound and no guarding  No hernia  Musculoskeletal: Normal range of motion  He exhibits no tenderness  Lymphadenopathy:     He has no cervical adenopathy  Neurological: He is alert and oriented to person, place, and time  He has normal strength  He displays normal reflexes  No cranial nerve deficit or sensory deficit  He displays a negative Romberg sign  Coordination and gait normal  GCS eye subscore is 4  GCS verbal subscore is 5  GCS motor subscore is 6  Skin: Skin is warm  Capillary refill takes less than 2 seconds  Psychiatric: He has a normal mood and affect  His behavior is normal    Nursing note and vitals reviewed  Assessment and Plan    MDM  Number of Diagnoses or Management Options  Diplopia: new, needed workup  Dizziness: new, needed workup  Diagnosis management comments: 62 y/o M presents to ED due to Visual Disturbance  Pt reports change in vision approximately 1 hr PTA that lasted approximately 10 minutes  Pt reports vision seemed "rotated   fixed   double", states this was without pain, no loss of vision, no floaters  Pt reports closing eyes improved this issue, states "I could see well out of each eye   but together it was skewed"    Pt reports imbalance during this episode due to vision, though states both resolved on its own, though has had a mild generalized headache  Pt reports he had this issue in the past 4-5 years ago, was evaluated for this issue in the past and 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  Wife present and states that he has been acting appropriately, no facial droop, no speech disturbance  Denies numbness, tingling, weakness, CP, SOB, palpitations, facial droop, speech disturbance, neck pain, neck stiffness, recent illness, tinnitus, ear pain, n/v/d, dehydration, and no other complaints at this time  VS reviewed and WNL  Exam was without significant findings  Will check labs, CT head  Reassess         Amount and/or Complexity of Data Reviewed  Tests in the radiology section of CPT®: ordered and reviewed  Tests in the medicine section of CPT®: ordered and reviewed  Obtain history from someone other than the patient: yes  Review and summarize past medical records: yes  Independent visualization of images, tracings, or specimens: yes    Risk of Complications, Morbidity, and/or Mortality  Presenting problems: moderate  Diagnostic procedures: moderate  Management options: moderate    Patient Progress  Patient progress: stable      Diagnostic Results      Labs:    Results for orders placed or performed during the hospital encounter of 02/16/19   CBC and differential   Result Value Ref Range    WBC 11 91 (H) 4 31 - 10 16 Thousand/uL    RBC 4 93 3 88 - 5 62 Million/uL    Hemoglobin 14 8 12 0 - 17 0 g/dL    Hematocrit 44 4 36 5 - 49 3 %    MCV 90 82 - 98 fL    MCH 30 0 26 8 - 34 3 pg    MCHC 33 3 31 4 - 37 4 g/dL    RDW 13 9 11 6 - 15 1 %    MPV 10 2 8 9 - 12 7 fL    Platelets 354 (H) 563 - 390 Thousands/uL    nRBC 0 /100 WBCs    Neutrophils Relative 59 43 - 75 %    Immat GRANS % 0 0 - 2 %    Lymphocytes Relative 28 14 - 44 %    Monocytes Relative 9 4 - 12 %    Eosinophils Relative 3 0 - 6 %    Basophils Relative 1 0 - 1 % Neutrophils Absolute 7 13 1 85 - 7 62 Thousands/µL    Immature Grans Absolute 0 04 0 00 - 0 20 Thousand/uL    Lymphocytes Absolute 3 33 0 60 - 4 47 Thousands/µL    Monocytes Absolute 1 05 0 17 - 1 22 Thousand/µL    Eosinophils Absolute 0 30 0 00 - 0 61 Thousand/µL    Basophils Absolute 0 06 0 00 - 0 10 Thousands/µL   Comprehensive metabolic panel   Result Value Ref Range    Sodium 138 136 - 145 mmol/L    Potassium 3 9 3 5 - 5 3 mmol/L    Chloride 102 100 - 108 mmol/L    CO2 27 21 - 32 mmol/L    ANION GAP 9 4 - 13 mmol/L    BUN 19 5 - 25 mg/dL    Creatinine 1 09 0 60 - 1 30 mg/dL    Glucose 124 65 - 140 mg/dL    Calcium 9 4 8 3 - 10 1 mg/dL    AST 16 5 - 45 U/L    ALT 37 12 - 78 U/L    Alkaline Phosphatase 44 (L) 46 - 116 U/L    Total Protein 7 2 6 4 - 8 2 g/dL    Albumin 3 9 3 5 - 5 0 g/dL    Total Bilirubin 0 20 0 20 - 1 00 mg/dL    eGFR 72 ml/min/1 73sq m   ECG 12 lead   Result Value Ref Range    Ventricular Rate 72 BPM    Atrial Rate 72 BPM    MD Interval 170 ms    QRSD Interval 80 ms    QT Interval 368 ms    QTC Interval 402 ms    P Axis 44 degrees    QRS Axis 23 degrees    T Wave Axis 30 degrees   Fingerstick Glucose (POCT)   Result Value Ref Range    POC Glucose 137 65 - 140 mg/dl       All labs reviewed and utilized in the medical decision making process    Radiology:    CT head without contrast   Final Result      No acute intracranial abnormality  Workstation performed: [id number]         XR chest 2 views   ED Interpretation   No acute cardiopulmonary disease      Final Result      No acute cardiopulmonary disease              Workstation performed: [id number]             All radiology studies independently viewed by me and interpreted by the radiologist     Procedure    Procedures    [facility]      ED Course of Care and Re-Assessments         Medications - No data to display        FINAL IMPRESSION    Final diagnoses:   Diplopia   Dizziness         DISPOSITION/PLAN    Time reflects when diagnosis was documented in both MDM as applicable and the Disposition within this note     Time User Action Codes Description Comment    2/16/2019  9:46 PM Kamla Triana [H53 2] Diplopia     2/16/2019  9:46 PM [place] Add [H53 2] Double vision     2/16/2019  9:46 PM [place] Remove [H53 2] Double vision     2/16/2019  9:46 PM [place] Add [R42] Dizziness       ED Disposition     ED Disposition Condition Date/Time Comment    Discharge Stable Sat Feb 16, 2019  9:46 PM Giulia Diop discharge to home/self care              Follow-up Information     Follow up With Specialties Details Why Contact Info Additional Information    Vera Burger MD Family Medicine  As needed [address] [address] [address]  [place]       [address] Emergency Department Emergency Medicine  If symptoms worsen [address] [address]  [phone]  ED, [address], [place], [place], [facility]            PATIENT REFERRED TO:    Vera Burger MD  [address] [place] [address] [address]  [place]      As needed    [address] Emergency Department  [address]  [phone]    If symptoms worsen      DISCHARGE MEDICATIONS:    Discharge Medication List as of 2/16/2019  9:48 PM      START taking these medications    Details   meclizine (ANTIVERT) 25 mg tablet Take 1 tablet (25 mg total) by mouth 3 (three) times a day as needed for dizziness, Starting Sat 2/16/2019, Print         CONTINUE these medications which have NOT CHANGED    Details   Cholecalciferol (VITAMIN D) 2000 units CAPS Take 2 capsules (4,000 Units total) by mouth daily, Starting Thu 2/15/2018, No Print      co-enzyme Q-10 30 MG capsule Take 1 capsule (30 mg total) by mouth daily, Starting Fri 7/13/2018, No Print      JANUMET  MG per tablet TAKE 1 TABLET TWICE DAILY  WITH MEALS, Normal      JARDIANCE 10 MG TABS TAKE 1 TABLET EVERY MORNING, Normal      lisinopril (ZESTRIL) 20 mg tablet Take 1 tablet (20 mg total) by mouth daily, Starting Fri 9/7/2018, Normal      pravastatin (PRAVACHOL) 40 mg tablet Take 40 mg by mouth daily  , Historical Med      vardenafil (LEVITRA) 20 MG tablet Take by mouth daily as needed  , Starting Fri 1/3/2014, Historical Med      venlafaxine (EFFEXOR-XR) 75 mg 24 hr capsule Take 1 capsule (75 mg total) by mouth daily, Starting Fri 11/30/2018, Normal      glucose blood (ONETOUCH VERIO) test strip Check BG 2x per day, Normal             No discharge procedures on file           GRACE Hedrick PA-C  02/19/19 [id number]       Cynthia Willis PA-C  02/19/19 [id number]

## 2019-02-21 ENCOUNTER — VBI (OUTPATIENT)
Dept: ADMINISTRATIVE | Facility: OTHER | Age: 64
End: 2019-02-21

## 2019-02-21 NOTE — TELEPHONE ENCOUNTER
Jojo Him    ED Visit Information     Ed visit date: 2/16/2019  Diagnosis Description: Diplopia; Dizziness  In Network? Yes [address] [facility]  Discharge status: Home  Discharged with meds ? Yes  Number of ED visits to date: 1  ED Severity:n/a     Outreach Information    Outreach successful: Yes 1  Date letter mailed:n/a  Date Finalized:2/21/2019    Care Coordination    Follow up appointment with pcp: no Declined  Transportation issues ? No    [facility] [employer] type:  7 Day Outreach  Emergent necessity warranted by diagnosis:  No  [facility] PCP:  Yes  Transportation:  Self Transport  Called PCP first?:  No  Feels able to call PCP for urgent problems ?:  Yes  Understands what emergencies can be handled by PCP ?:  Yes  Ever any problems getting appointment with PCP for minor emergency/urgency problems?:  No  Practice Contacted Patient ?:  No  Pt had ED follow up with pcp/staff ?:  No    Seen for follow-up out of network ?:  No  Reason Patient went to ED instead of Urgent Care or PCP?:  Perceived Severity of Illness, Proximity  Urgent care Education?:  No    02/21/2019 02:05 PM Phone (Jenny Bell) Alessandra Nguyen (Self) [phone] (H)   Left Message - requesting a call back    Unable to reach patient regarding recent ED visit on 2/16 for Nausea; Diarrhea  Mathew Markham was discharged with medication (meclizine) and was advised to follow up with Pcp, as needed  2nd attempt will be made on 2/22 02/21/2019 02:08 PM Phone (Incoming) Alessandra Nguyen (Self) [phone] (H)  Call complete    Personal communication with patient regarding recent ED visit on 2/16 for Diplopia; Dizziness  Mathew Markham stated that he is feeling better and has not had any reoccurrence of symptoms  He was discharged with medication (meclizine) and was advised to follow up with his Pcp, as needed  He declined to schedule a follow up appt with his Pcp at this time   Mathew Markham does not meet OPCM criteria, denies any transportation issues and is aware of his nearest [address] urgent care facility but declined education

## 2019-02-25 ENCOUNTER — TELEPHONE (OUTPATIENT)
Dept: OBGYN CLINIC | Facility: HOSPITAL | Age: 64
End: 2019-02-25

## 2019-03-07 ENCOUNTER — APPOINTMENT (OUTPATIENT)
Dept: RADIOLOGY | Facility: OTHER | Age: 64
End: 2019-03-07
Payer: COMMERCIAL

## 2019-03-07 ENCOUNTER — OFFICE VISIT (OUTPATIENT)
Dept: OBGYN CLINIC | Facility: OTHER | Age: 64
End: 2019-03-07
Payer: COMMERCIAL

## 2019-03-07 VITALS
HEART RATE: 73 BPM | DIASTOLIC BLOOD PRESSURE: 79 MMHG | SYSTOLIC BLOOD PRESSURE: 129 MMHG | WEIGHT: 200 LBS | BODY MASS INDEX: 28.63 KG/M2 | HEIGHT: 70 IN

## 2019-03-07 DIAGNOSIS — M25.512 LEFT SHOULDER PAIN, UNSPECIFIED CHRONICITY: ICD-10-CM

## 2019-03-07 DIAGNOSIS — M75.42 IMPINGEMENT SYNDROME OF LEFT SHOULDER: Primary | ICD-10-CM

## 2019-03-07 DIAGNOSIS — E11.9 TYPE 2 DIABETES MELLITUS WITHOUT COMPLICATION, WITHOUT LONG-TERM CURRENT USE OF INSULIN (HCC): ICD-10-CM

## 2019-03-07 DIAGNOSIS — M54.6 THORACIC SPINE PAIN: ICD-10-CM

## 2019-03-07 PROCEDURE — 99204 OFFICE O/P NEW MOD 45 MIN: CPT | Performed by: ORTHOPAEDIC SURGERY

## 2019-03-07 PROCEDURE — 73030 X-RAY EXAM OF SHOULDER: CPT

## 2019-03-07 PROCEDURE — 20610 DRAIN/INJ JOINT/BURSA W/O US: CPT | Performed by: ORTHOPAEDIC SURGERY

## 2019-03-07 RX ORDER — BUPIVACAINE HYDROCHLORIDE 2.5 MG/ML
2 INJECTION, SOLUTION INFILTRATION; PERINEURAL
Status: COMPLETED | OUTPATIENT
Start: 2019-03-07 | End: 2019-03-07

## 2019-03-07 RX ORDER — BETAMETHASONE SODIUM PHOSPHATE AND BETAMETHASONE ACETATE 3; 3 MG/ML; MG/ML
6 INJECTION, SUSPENSION INTRA-ARTICULAR; INTRALESIONAL; INTRAMUSCULAR; SOFT TISSUE
Status: COMPLETED | OUTPATIENT
Start: 2019-03-07 | End: 2019-03-07

## 2019-03-07 RX ADMIN — BUPIVACAINE HYDROCHLORIDE 2 ML: 2.5 INJECTION, SOLUTION INFILTRATION; PERINEURAL at 08:36

## 2019-03-07 RX ADMIN — BETAMETHASONE SODIUM PHOSPHATE AND BETAMETHASONE ACETATE 6 MG: 3; 3 INJECTION, SUSPENSION INTRA-ARTICULAR; INTRALESIONAL; INTRAMUSCULAR; SOFT TISSUE at 08:36

## 2019-03-07 NOTE — PATIENT INSTRUCTIONS

## 2019-03-07 NOTE — PROGRESS NOTES
Assessment  Diagnoses and all orders for this visit:    Impingement syndrome of left shoulder  -     Ambulatory referral to Physical Therapy; Future    Type 2 Diabetes     Thoracic spine pain      Discussion and Plan:    Tremayne's physical examination demonstrates signs and symptoms consistent with impingement syndrome of his left shoulder  I explained this diagnosis to him and we spent time discussing treatment options  We did also review his x-rays and I explained that there are no osseous abnormalities on his imaging  I explained that most patients do very well with a corticosteroid injection and formal physical therapy  I did offer him both of these treatments and he was amenable to them  I administered the injection today in the office and he tolerated it well  I also provided him with a prescription for formal physical therapy  I would like him to attend 2-3 times a week for the next 6-8 weeks  If he continues to be symptomatic, he will follow up with me in eight weeks  Otherwise, I will see him back on an as-needed basis  The patient has diabetes so he is aware this may raise his blood sugars temporarily, he does not have any obvious signs or symptoms of adhesive capsulitis but I did recommend the focus on stretching and repeat evaluation will help us understand if there is some component to his symptoms from that as well  He does have good control of his diabetes and is seeing a endocrinologist with his last hemoglobin A1c of 7 1, he understands that glycemic control can be very beneficial in patients with early adhesive capsulitis and we encouraged him to continue with his good blood sugar control      At the end the visit the patient did ask for recommendations about his chronic T4-T5 pain for which he has been treated in the past with injections by nonoperative specialist   I discussed that referral to the [address] [place] [address] is appropriate for this complaint and I have made a referral for him but he wishes to think about it instead, I do recommend Dr Jsesi Zabala as an excellent nonoperative spine specialist to help care for his thoracic symptoms and then referral to 1 of the spine surgeons if they continue to persist for evaluation  Large joint arthrocentesis: L subacromial bursa  Date/Time: 3/7/2019 8:36 AM  Consent given by: patient  Site marked: site marked  Timeout: Immediately prior to procedure a time out was called to verify the correct patient, procedure, equipment, support staff and site/side marked as required   Supporting Documentation  Indications: pain   Procedure Details  Location: shoulder - L subacromial bursa  Preparation: Patient was prepped and draped in the usual sterile fashion  Needle size: 22 G  Ultrasound guidance: no  Approach: lateral  Medications administered: 2 mL bupivacaine 0 25 %; 6 mg betamethasone acetate-betamethasone sodium phosphate 6 (3-3) mg/mL    Patient tolerance: patient tolerated the procedure well with no immediate complications  Dressing:  Sterile dressing applied          Subjective:   Patient ID: Nan Hinojosa" is a 61 y o  male who presents today for evaluation of his left shoulder  He states that his shoulder pain began at the end of December 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  He spent this time with his arms over his head for an extended period of time  He does not recall an acute injury and did not fall during this time but states that his pain began shortly after this  His pain has remained relatively consistent since that time  At today's visit, he describes a constant and mild ache about the anterior aspect of his shoulder that can be more moderate with activity, especially reaching overhead or behind his back  He does no restriction in his range of motion due to pain  He denies any distal paresthesias of the upper extremity        The following portions of the patient's history were reviewed and updated as appropriate: allergies, current medications, past family history, past medical history, past social history, past surgical history and problem list     Review of Systems   Constitutional: Negative for chills, fever and unexpected weight change  HENT: Negative for hearing loss, nosebleeds and sore throat  Eyes: Negative for pain, redness and visual disturbance  Respiratory: Negative for cough, shortness of breath and wheezing  Cardiovascular: Negative for chest pain, palpitations and leg swelling  Gastrointestinal: Negative for abdominal pain, nausea and vomiting  Endocrine: Negative for polyphagia and polyuria  Genitourinary: Negative for dysuria and hematuria  Musculoskeletal: Positive for arthralgias  Negative for joint swelling and myalgias  See HPI   Skin: Negative for rash and wound  Neurological: Negative for dizziness, numbness and headaches  Psychiatric/Behavioral: Negative for decreased concentration and suicidal ideas  The patient is not nervous/anxious  Objective:  Left Shoulder Exam     Tenderness   The patient is experiencing tenderness in the biceps tendon (and greater tuberosity)  Range of Motion   Active abduction: 90 (limited by pain)   Passive abduction: 160   Internal rotation 0 degrees: Sacrum (Painful)     Muscle Strength   Abduction: 4/5   Internal rotation: 4/5   External rotation: 5/5     Tests   Impingement: positive    Other   Erythema: absent  Scars: absent  Sensation: normal  Pulse: present     Comments:  Empty Can (+)  Lateral Jamie (+)  Hornblower's (-)            Physical Exam   Constitutional: He is oriented to person, place, and time  He appears well-developed  HENT:   Head: Normocephalic and atraumatic  Eyes: Conjunctivae are normal    Neck: Neck supple  Cardiovascular: Intact distal pulses  Pulmonary/Chest: Effort normal  No respiratory distress  Neurological: He is alert and oriented to person, place, and time  Skin: Skin is warm and dry  Psychiatric: He has a normal mood and affect  His behavior is normal    Vitals reviewed  I have personally reviewed pertinent films in PACS and my interpretation is as follows  X-ray of the left shoulder obtained on 03/07/2019 shows no acute fracture, dislocation, or other osseous abnormalities      Scribe Attestation    I,:   Lucio Alcantar am acting as a scribe while in the presence of the attending physician :        I,:   Atif Chang MD personally performed the services described in this documentation    as scribed in my presence :

## 2019-03-11 ENCOUNTER — APPOINTMENT (OUTPATIENT)
Dept: LAB | Facility: CLINIC | Age: 64
End: 2019-03-11
Payer: COMMERCIAL

## 2019-03-11 ENCOUNTER — TRANSCRIBE ORDERS (OUTPATIENT)
Dept: LAB | Facility: CLINIC | Age: 64
End: 2019-03-11

## 2019-03-11 DIAGNOSIS — E11.65 UNCONTROLLED TYPE 2 DIABETES MELLITUS WITH HYPERGLYCEMIA (HCC): ICD-10-CM

## 2019-03-11 LAB
ALBUMIN SERPL BCP-MCNC: 3.8 G/DL (ref 3.5–5)
ALP SERPL-CCNC: 39 U/L (ref 46–116)
ALT SERPL W P-5'-P-CCNC: 34 U/L (ref 12–78)
ANION GAP SERPL CALCULATED.3IONS-SCNC: 10 MMOL/L (ref 4–13)
AST SERPL W P-5'-P-CCNC: 20 U/L (ref 5–45)
BILIRUB SERPL-MCNC: 0.3 MG/DL (ref 0.2–1)
BUN SERPL-MCNC: 20 MG/DL (ref 5–25)
CALCIUM SERPL-MCNC: 9.9 MG/DL (ref 8.3–10.1)
CHLORIDE SERPL-SCNC: 103 MMOL/L (ref 100–108)
CO2 SERPL-SCNC: 27 MMOL/L (ref 21–32)
CREAT SERPL-MCNC: 1.07 MG/DL (ref 0.6–1.3)
CREAT UR-MCNC: 57.8 MG/DL
EST. AVERAGE GLUCOSE BLD GHB EST-MCNC: 163 MG/DL
GFR SERPL CREATININE-BSD FRML MDRD: 73 ML/MIN/1.73SQ M
GLUCOSE SERPL-MCNC: 125 MG/DL (ref 65–140)
HBA1C MFR BLD: 7.3 % (ref 4.2–6.3)
MICROALBUMIN UR-MCNC: 6.6 MG/L (ref 0–20)
MICROALBUMIN/CREAT 24H UR: 11 MG/G CREATININE (ref 0–30)
POTASSIUM SERPL-SCNC: 4.8 MMOL/L (ref 3.5–5.3)
PROT SERPL-MCNC: 7.2 G/DL (ref 6.4–8.2)
SODIUM SERPL-SCNC: 140 MMOL/L (ref 136–145)

## 2019-03-11 PROCEDURE — 3061F NEG MICROALBUMINURIA REV: CPT | Performed by: FAMILY MEDICINE

## 2019-03-11 PROCEDURE — 82570 ASSAY OF URINE CREATININE: CPT

## 2019-03-11 PROCEDURE — 36415 COLL VENOUS BLD VENIPUNCTURE: CPT

## 2019-03-11 PROCEDURE — 83036 HEMOGLOBIN GLYCOSYLATED A1C: CPT

## 2019-03-11 PROCEDURE — 80053 COMPREHEN METABOLIC PANEL: CPT

## 2019-03-11 PROCEDURE — 82043 UR ALBUMIN QUANTITATIVE: CPT

## 2019-03-12 ENCOUNTER — OFFICE VISIT (OUTPATIENT)
Dept: ENDOCRINOLOGY | Facility: CLINIC | Age: 64
End: 2019-03-12
Payer: COMMERCIAL

## 2019-03-12 VITALS
DIASTOLIC BLOOD PRESSURE: 98 MMHG | HEART RATE: 80 BPM | HEIGHT: 70 IN | BODY MASS INDEX: 28.2 KG/M2 | SYSTOLIC BLOOD PRESSURE: 140 MMHG | WEIGHT: 197 LBS

## 2019-03-12 DIAGNOSIS — E55.9 VITAMIN D DEFICIENCY: ICD-10-CM

## 2019-03-12 DIAGNOSIS — I10 BENIGN ESSENTIAL HYPERTENSION: ICD-10-CM

## 2019-03-12 DIAGNOSIS — E11.9 TYPE 2 DIABETES MELLITUS WITHOUT COMPLICATION, WITHOUT LONG-TERM CURRENT USE OF INSULIN (HCC): Primary | ICD-10-CM

## 2019-03-12 DIAGNOSIS — E78.2 MIXED HYPERLIPIDEMIA: ICD-10-CM

## 2019-03-12 DIAGNOSIS — E03.8 SUBCLINICAL HYPOTHYROIDISM: ICD-10-CM

## 2019-03-12 PROCEDURE — 99214 OFFICE O/P EST MOD 30 MIN: CPT | Performed by: NURSE PRACTITIONER

## 2019-03-12 NOTE — PROGRESS NOTES
Established Patient Progress Note      Chief Complaint   Patient presents with    Diabetes Type 2          History of Present Illness:   Isabel De La Rosa is a 61 y o  male with a history of HTN, HLD, subclinical hypothyroidism, vitamin d deficiency, and type 2 diabetes without long term use of insulin  Reports no complications of diabetes  Last A1C 7 3  He checks BG occasionally in the morning  Does eat excess carbs, mostly bread and pasta  He walks a couple times a week for about a half hour  Denies recent illness or hospitalizations  Denies recent severe hypoglycemic or severe hyperglycemic episodes  Denies any issues with his current regimen  Home glucose monitoring: are performed sporadically    Home blood glucose readings:   Before breakfast: 100-170s  Before lunch: does not check   Before dinner: does not check  Bedtime: does not check     Current regimen: Jardiance 25 mg and Janumet 50-1,000 mg BID   compliant all of the timedenies any side effects from current medications     Hypoglycemic episodes: No never   H/o of hypoglycemia causing hospitalization or Intervention such as glucagon injection or ambulance call No     Last Eye Exam: 6/06/18, no retinopathy   Last Foot Exam: does not see podiatrist     Has hypertension: Taking Lisinopril   Has hyperlipidemia: Taking Pravastatin           Has subclinical hypothyroidism: was on Synthroid in the past, was taken off as he was becoming anxious, denies symptoms of hypo or hyperthyroidism   Has vitamin d deficiency: Taking vitamin d 4,000 units daily         Patient Active Problem List   Diagnosis    [facility]state cancer [facility] )    Arteriosclerosis of coronary artery    Benign essential hypertension    Depression    Type 2 diabetes mellitus without complication, without long-term current use of insulin ([facility] )    Subclinical hypothyroidism    [facility]rombocytosis ([facility] )    Vitamin D deficiency    Mixed hyperlipidemia    Leukocytosis    Hyperlipidemia    Dermatitis of left ear canal    Tremor of left hand    Impingement syndrome of left shoulder    Thoracic spine pain      Past Medical History:   Diagnosis Date    Ankle injury     last assessed 7/15/2013    BPH (benign prostatic hyperplasia)     Bronchiolitis     Cancer ([facility] )     Prostate    Diabetes mellitus ([facility] )     Type II    History of benign prostatic hyperplasia     History of gastritis     History of insect bite     last assessed 10/4/2014    History of nocturia     Hypertension     Lumbar canal stenosis     Osteoarthritis, knee     Otitis externa     Pain in back     UPPER BACK, last assessed 2/25/2013    Sinusitis, acute     last assessed 10/6/2016    Tendinitis of right rotator cuff     last assessed 8/21/2012      Past Surgical History:   Procedure Laterality Date    ANKLE FRACTURE SURGERY Left     triple fracture 4/2009    CERVICAL LAMINECTOMY      partial L4-L5 in 2003    LAMINECTOMY      L5-S1 2003    NOSE SURGERY      6/1989    RI COLONOSCOPY FLX DX W/COLLJ SPEC WHEN PFRMD N/A 1/27/2016    Procedure: COLONOSCOPY;  Surgeon: Wilder Chase MD;  Location: [facility]; Service: Gastroenterology    PROSTATE BIOPSY  12/08/2012    managed by Chen Grande, needle biopsy    PROSTATECTOMY      9/23/2013    RHINOPLASTY      for deviated septum in 1989      Family History   Problem Relation Age of Onset    Hypertension Mother     Diabetes Father     Heart disease Father         MI at 60 yo     Social History     Tobacco Use    Smoking status: Light Tobacco Smoker     Types: Pipe    Smokeless tobacco: Never Used    Tobacco comment: per allscripts 'current smoker, current some day smoker'   Substance Use Topics    Alcohol use:  Yes     Alcohol/week: 2 4 oz     Types: 3 Cans of beer, 1 Shots of liquor per week     Comment: occassional      No Known Allergies      Current Outpatient Medications:     Cholecalciferol (VITAMIN D) 2000 units CAPS, Take 2 capsules (4,000 Units total) by mouth daily, Disp: , Rfl: 0    co-enzyme Q-10 30 MG capsule, Take 1 capsule (30 mg total) by mouth daily, Disp: 30 capsule, Rfl: 0    glucose blood (ONETOUCH VERIO) test strip, Check BG 2x per day, Disp: 200 each, Rfl: 3    JANUMET  MG per tablet, TAKE 1 TABLET TWICE DAILY  WITH MEALS, Disp: 180 tablet, Rfl: 1    JARDIANCE 10 MG TABS, TAKE 1 TABLET EVERY MORNING, Disp: 90 tablet, Rfl: 0    lisinopril (ZESTRIL) 20 mg tablet, Take 1 tablet (20 mg total) by mouth daily, Disp: 90 tablet, Rfl: 0    pravastatin (PRAVACHOL) 40 mg tablet, Take 40 mg by mouth daily  , Disp: , Rfl:     vardenafil (LEVITRA) 20 MG tablet, Take by mouth daily as needed  , Disp: , Rfl:     venlafaxine (EFFEXOR-XR) 75 mg 24 hr capsule, Take 1 capsule (75 mg total) by mouth daily, Disp: 90 capsule, Rfl: 1    Review of Systems   Constitutional: Negative for activity change, appetite change and fatigue  HENT: Negative for sore throat, trouble swallowing and voice change  Eyes: Negative for visual disturbance  Respiratory: Negative for choking, chest tightness and shortness of breath  Cardiovascular: Negative for chest pain, palpitations and leg swelling  Gastrointestinal: Negative for abdominal pain, constipation and diarrhea  Endocrine: Negative for cold intolerance, heat intolerance, polydipsia, polyphagia and polyuria  Genitourinary: Negative for frequency  Musculoskeletal: Negative for arthralgias and myalgias  Skin: Negative for rash  Neurological: Negative for dizziness and syncope  Hematological: Negative for adenopathy  Psychiatric/Behavioral: Negative for sleep disturbance  All other systems reviewed and are negative  Physical Exam:  Body mass index is 28 27 kg/m²    /98   Pulse 80   Ht 5' 10" (1 778 m)   Wt 89 4 kg (197 lb)   BMI 28 27 kg/m²    Wt Readings from Last 3 Encounters:   03/12/19 89 4 kg (197 lb)   03/07/19 90 7 kg (200 lb)   02/16/19 88 5 kg (195 lb 1 7 oz)       Physical Exam Constitutional: He is oriented to person, place, and time  He appears well-developed and well-nourished  No distress  HENT:   Head: Normocephalic and atraumatic  Mouth/Throat: Oropharynx is clear and moist    Eyes: Pupils are equal, round, and reactive to light  Conjunctivae and EOM are normal    Neck: Normal range of motion  Neck supple  No thyromegaly present  Cardiovascular: Normal rate, regular rhythm and normal heart sounds  Pulses are no weak pulses  No murmur heard  Pulses:       Dorsalis pedis pulses are 2+ on the right side, and 2+ on the left side  Pulmonary/Chest: Effort normal and breath sounds normal  No respiratory distress  He has no wheezes  He has no rales  Abdominal: Soft  Bowel sounds are normal  He exhibits no distension  There is no tenderness  Musculoskeletal: Normal range of motion  He exhibits no edema  Feet:   Right Foot:   Skin Integrity: Positive for dry skin  Negative for ulcer, skin breakdown, erythema, warmth or callus  Left Foot:   Skin Integrity: Positive for dry skin  Negative for ulcer, skin breakdown, erythema, warmth or callus  Lymphadenopathy:     He has no cervical adenopathy  Neurological: He is alert and oriented to person, place, and time  Skin: Skin is warm and dry  Psychiatric: He has a normal mood and affect  Vitals reviewed  Patient's shoes and socks removed  Right Foot/Ankle   Right Foot Inspection  Skin Exam: skin normal and dry skin skin not intact, no warmth, no callus, no erythema, no maceration, no abnormal color, no pre-ulcer, no ulcer and no callus                            Sensory       Monofilament testing: intact  Vascular    The right DP pulse is 2+       Left Foot/Ankle  Left Foot Inspection  Skin Exam: skin normal and dry skinskin not intact, no warmth, no erythema, no maceration, normal color, no pre-ulcer, no ulcer and no callus                                         Sensory       Monofilament: intact  Vascular    The left DP pulse is 2+  Assign Risk Category:  No deformity present; No loss of protective sensation; No weak pulses       Risk: 0      Labs:     Lab Results   Component Value Date    HGBA1C 7 3 (H) 03/11/2019       Lab Results   Component Value Date     12/17/2015    K 4 8 03/11/2019     03/11/2019    CO2 27 03/11/2019    ANIONGAP 8 12/17/2015    AGAP 10 03/11/2019    BUN 20 03/11/2019    CREATININE 1 07 03/11/2019    GLUC 125 03/11/2019    GLUF 161 (H) 10/30/2018    CALCIUM 9 9 03/11/2019    AST 20 03/11/2019    ALT 34 03/11/2019    ALKPHOS 39 (L) 03/11/2019    PROT 6 8 12/17/2015    TP 7 2 03/11/2019    BILITOT 0 47 12/17/2015    TBILI 0 30 03/11/2019    EGFR 73 03/11/2019         Lab Results   Component Value Date    CHOL 206 12/17/2015    HDL 46 10/30/2018    TRIG 141 10/30/2018       Lab Results   Component Value Date    OLK7FRLIJOTL 3 846 (H) 02/14/2018    KZF9ZDDHLVKQ 4 107 (H) 07/08/2017    XUF3VOZVJWBG 4 196 (H) 03/01/2017     Lab Results   Component Value Date    FREET4 0 91 02/14/2018       Impression & Plan:    Problem List Items Addressed This Visit        Endocrine    Type 2 diabetes mellitus without complication, without long-term current use of insulin ([facility] ) - Primary     Above goal, due to excess carb intake  Referral given for MNT  Continue current regimen for now  Check BG 1-2x per day at alternating times of day and send BG log into the office in two weeks  Focus on dietary and lifestyle modifications  Relevant Orders    Ambulatory referral to medical nutrition therapy for diabetes    Hemoglobin A1C    Comprehensive metabolic panel    Subclinical hypothyroidism     Will continue to monitor  Check TSH and free T4         Relevant Orders    TSH, 3rd generation    T4, free       Cardiovascular and Mediastinum    Benign essential hypertension     BP slightly elevated, will continue to monitor   Continue current regimen            Other    Vitamin D deficiency     Continue vitamin d supplementation          Mixed hyperlipidemia     Continue statin, check fasting lipid panel         Relevant Orders    Lipid Panel with Direct LDL reflex          Orders Placed This Encounter   Procedures    Hemoglobin A1C     Standing Status:   Future     Standing Expiration Date:   6/12/2019    Comprehensive metabolic panel     This is a patient instruction: Patient fasting for 8 hours or longer recommended  Standing Status:   Future     Standing Expiration Date:   3/12/2020    Lipid Panel with Direct LDL reflex     This is a patient instruction: This test requires patient fasting for 10-12 hours or longer  Drinking of black coffee or black tea is acceptable  Standing Status:   Future     Standing Expiration Date:   3/12/2020    TSH, 3rd generation     This is a patient instruction: This test is non-fasting  Please drink two glasses of water morning of bloodwork  Standing Status:   Future     Standing Expiration Date:   6/12/2019    T4, free     Standing Status:   Future     Standing Expiration Date:   6/12/2019    Ambulatory referral to medical nutrition therapy for diabetes     Standing Status:   Future     Standing Expiration Date:   9/12/2019     Referral Priority:   Routine     Referral Type:   Consult - AMB     Referral Reason:   Specialty Services Required     Requested Specialty:   Endocrinology     Number of Visits Requested:   1     Expiration Date:   3/12/2020       Discussed with the patient and all questioned fully answered  He will call me if any problems arise  Follow-up appointment in 3 months       Counseled patient on diagnostic results, prognosis, risk and benefit of treatment options, instruction for management, importance of treatment compliance, Risk  factor reduction and impressions      Laurita Bryant [address]

## 2019-03-12 NOTE — ASSESSMENT & PLAN NOTE
Above goal, due to excess carb intake  Referral given for MNT  Continue current regimen for now  Check BG 1-2x per day at alternating times of day and send BG log into the office in two weeks  Focus on dietary and lifestyle modifications

## 2019-03-27 ENCOUNTER — EVALUATION (OUTPATIENT)
Dept: PHYSICAL THERAPY | Facility: CLINIC | Age: 64
End: 2019-03-27
Payer: COMMERCIAL

## 2019-03-27 DIAGNOSIS — M75.42 IMPINGEMENT SYNDROME OF LEFT SHOULDER: ICD-10-CM

## 2019-03-27 PROCEDURE — 97110 THERAPEUTIC EXERCISES: CPT | Performed by: PHYSICAL THERAPIST

## 2019-03-27 PROCEDURE — 97161 PT EVAL LOW COMPLEX 20 MIN: CPT | Performed by: PHYSICAL THERAPIST

## 2019-03-27 NOTE — PROGRESS NOTES
PT Evaluation     Today's date: 3/27/2019  Patient name: Melany Gatica  : 1955  MRN: [id number]  Referring provider: Lindsey Higuera MD  Dx:   Encounter Diagnosis     ICD-10-CM    1  Impingement syndrome of left shoulder M75 [id number] Ambulatory referral to Physical Therapy                  Assessment  Assessment details: Melany Gatica is a 61 y o  male  with diagnosis of Impingement syndrome of left shoulder  He presents with pain, decreased strength, decreased ROM, impaired sensation, and postural  dysfunction  Due to these impairments, patient has difficulty performing a/iadls, recreational activities and engaging in social activities  Patient's clinical presentation is consistent with their referring diagnosis  Patient would benefit from skilled physical therapy to address their aforementioned impairments, improve their level of function and to improve their overall quality of life  Impairments: abnormal or restricted ROM, activity intolerance, impaired physical strength, lacks appropriate home exercise program, pain with function and poor posture     Symptom irritability: moderateUnderstanding of Dx/Px/POC: excellent  Goals  ST Goals - 2-4 weeks  1  Patient will report decreased pain with activity by at least 2 points within 4 weeks  2  Patient will improve ROM 5-10 degrees within 4 weeks  3  Patient will demonstrate ability to actively correct posture without cueing within 4 weeks  4  Patient will perform IADLs without pain in 2 weeks  5  Patient will increase strength by 25% in 4 weeks    LT Goals - Discharge  1  Patient will improve FOTO score to maximum stated or greater by discharge  2  Patient will return to preferred recreational activity without significant pain increase by discharge   3    Patient will return to all work related activities without pain by discharge     Plan  Patient would benefit from: skilled physical therapy  Planned modality interventions: cryotherapy and thermotherapy: hydrocollator packs  Planned therapy interventions: manual therapy, neuromuscular re-education, postural training, strengthening, stretching, therapeutic activities, functional ROM exercises and therapeutic exercise  Frequency: 2x week  Duration in visits: 20  Duration in weeks: 20  Plan of Care beginning date: 3/27/2019  Plan of Care expiration date: 2019  Treatment plan discussed with: patient        Subjective Evaluation    History of Present Illness  Mechanism of injury: Patient reports that in Dec 2018 he was pulling cable o with overhead and felt pain in the L shoulder  He did not seek out treatment until 2019 when the pain did not resolve  He did have a cortisone injection which he feels reduced his pain  He is taking ibuprofen prn  CC:Patient notes pain in the front of the L shoulder  He indicates pain radiates into the biceps and pectoral region  Function:  Patient reports that he has difficulty sleeping on his shoulder  He also has difficulty with dressing or reaching behind or with putting on his jacket  He also has pain with reaching overhead  Patient is currently working  He working in [place], 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  [employer] [facility]    Quality of life: excellent    Pain  Current pain ratin  At best pain ratin  At worst pain ratin  Location: anterior chest wall  Quality: throbbing and cramping  Relieving factors: medications, rest and change in position  Aggravating factors: overhead activity and lifting  Progression: improved    Hand dominance: right      Diagnostic Tests  X-ray: normal  Treatments  Previous treatment: medication and injection treatment  Patient Goals  Patient goals for therapy: decreased pain, independence with ADLs/IADLs and increased motion          Objective     Static Posture     Cervical Spine  Tilted right  Shoulders  Elevated      Scapulae  Right downwardly rotated, left elevated, left protracted and right protracted  Palpation   Left   Tenderness of the pectoralis minor  Tenderness     Left Shoulder   Tenderness in the biceps tendon (proximal) and bicipital groove  Cervical/Thoracic Screen   Cervical range of motion within normal limits    Active Range of Motion   Left Shoulder   Flexion: 110 degrees with pain  Abduction: 96 degrees   External rotation BTH: T3   Internal rotation BTB: sacrum with pain    Right Shoulder   Flexion: 140 degrees   Abduction: 130 degrees   External rotation BTH: T3   Internal rotation BTB: L2     Passive Range of Motion   Left Shoulder   Flexion: 145 degrees   Abduction: 165 degrees   External rotation 45°: 55 degrees   Internal rotation 45°: 58 degrees     Right Shoulder   Normal passive range of motion    Joint Play     Right Shoulder  Joints within functional limits are the anterior capsule  Hypomobile in the posterior capsule and inferior capsule  Strength/Myotome Testing     Left Shoulder     Planes of Motion   Flexion: 5   Abduction: 4+   External rotation at 0°: 4+   Internal rotation at 0°: 5     Right Shoulder   Normal muscle strength    Additional Strength Details  Pain with abd and ER    Tests     Left Shoulder   Positive empty can, Hawkin's and Neer's  Negative Speed's

## 2019-03-27 NOTE — PROGRESS NOTES
Subjective: See IE      Objective: See treatment diary below      Assessment: Tolerated session well         Plan: Patient's main goal is decrease pain    Precautions: NIDDM, hx of prostate CA  Dx:  Impingement syndrome    Daily Treatment Diary       Manuals 3/27         PROM all planes          JMs inf/posterior                                         Exercise Diary          Pulley          Table Slide          Sh Bl Sq                                                                                                                                                                                              Modalities          CP prn

## 2019-04-01 ENCOUNTER — OFFICE VISIT (OUTPATIENT)
Dept: PHYSICAL THERAPY | Facility: CLINIC | Age: 64
End: 2019-04-01
Payer: COMMERCIAL

## 2019-04-01 DIAGNOSIS — F32.A DEPRESSION, UNSPECIFIED DEPRESSION TYPE: ICD-10-CM

## 2019-04-01 DIAGNOSIS — M75.42 IMPINGEMENT SYNDROME OF LEFT SHOULDER: Primary | ICD-10-CM

## 2019-04-01 PROCEDURE — 97140 MANUAL THERAPY 1/> REGIONS: CPT | Performed by: PHYSICAL THERAPIST

## 2019-04-01 PROCEDURE — 97110 THERAPEUTIC EXERCISES: CPT | Performed by: PHYSICAL THERAPIST

## 2019-04-01 RX ORDER — VENLAFAXINE HYDROCHLORIDE 75 MG/1
CAPSULE, EXTENDED RELEASE ORAL
Qty: 90 CAPSULE | Refills: 1 | Status: SHIPPED | OUTPATIENT
Start: 2019-04-01 | End: 2019-10-17 | Stop reason: SDUPTHER

## 2019-04-03 ENCOUNTER — OFFICE VISIT (OUTPATIENT)
Dept: PHYSICAL THERAPY | Facility: CLINIC | Age: 64
End: 2019-04-03
Payer: COMMERCIAL

## 2019-04-03 DIAGNOSIS — M75.42 IMPINGEMENT SYNDROME OF LEFT SHOULDER: Primary | ICD-10-CM

## 2019-04-03 PROCEDURE — 97110 THERAPEUTIC EXERCISES: CPT | Performed by: PHYSICAL THERAPIST

## 2019-04-03 PROCEDURE — 97140 MANUAL THERAPY 1/> REGIONS: CPT | Performed by: PHYSICAL THERAPIST

## 2019-04-08 ENCOUNTER — OFFICE VISIT (OUTPATIENT)
Dept: PHYSICAL THERAPY | Facility: CLINIC | Age: 64
End: 2019-04-08
Payer: COMMERCIAL

## 2019-04-08 DIAGNOSIS — M75.42 IMPINGEMENT SYNDROME OF LEFT SHOULDER: Primary | ICD-10-CM

## 2019-04-08 PROCEDURE — 97140 MANUAL THERAPY 1/> REGIONS: CPT | Performed by: PHYSICAL THERAPIST

## 2019-04-08 PROCEDURE — 97112 NEUROMUSCULAR REEDUCATION: CPT | Performed by: PHYSICAL THERAPIST

## 2019-04-08 PROCEDURE — 97110 THERAPEUTIC EXERCISES: CPT | Performed by: PHYSICAL THERAPIST

## 2019-04-10 ENCOUNTER — OFFICE VISIT (OUTPATIENT)
Dept: PHYSICAL THERAPY | Facility: CLINIC | Age: 64
End: 2019-04-10
Payer: COMMERCIAL

## 2019-04-10 DIAGNOSIS — M75.42 IMPINGEMENT SYNDROME OF LEFT SHOULDER: Primary | ICD-10-CM

## 2019-04-10 PROCEDURE — 97110 THERAPEUTIC EXERCISES: CPT

## 2019-04-10 PROCEDURE — 97140 MANUAL THERAPY 1/> REGIONS: CPT

## 2019-04-10 PROCEDURE — 97112 NEUROMUSCULAR REEDUCATION: CPT

## 2019-04-14 DIAGNOSIS — IMO0002 UNCONTROLLED TYPE 2 DIABETES MELLITUS WITH COMPLICATION, WITHOUT LONG-TERM CURRENT USE OF INSULIN: ICD-10-CM

## 2019-04-14 RX ORDER — EMPAGLIFLOZIN 10 MG/1
TABLET, FILM COATED ORAL
Qty: 90 TABLET | Refills: 0 | Status: SHIPPED | OUTPATIENT
Start: 2019-04-14 | End: 2019-07-13 | Stop reason: SDUPTHER

## 2019-04-15 ENCOUNTER — APPOINTMENT (OUTPATIENT)
Dept: PHYSICAL THERAPY | Facility: CLINIC | Age: 64
End: 2019-04-15
Payer: COMMERCIAL

## 2019-04-17 ENCOUNTER — OFFICE VISIT (OUTPATIENT)
Dept: PHYSICAL THERAPY | Facility: CLINIC | Age: 64
End: 2019-04-17
Payer: COMMERCIAL

## 2019-04-17 DIAGNOSIS — M75.42 IMPINGEMENT SYNDROME OF LEFT SHOULDER: Primary | ICD-10-CM

## 2019-04-17 PROCEDURE — 97112 NEUROMUSCULAR REEDUCATION: CPT

## 2019-04-17 PROCEDURE — 97140 MANUAL THERAPY 1/> REGIONS: CPT

## 2019-04-17 PROCEDURE — 97110 THERAPEUTIC EXERCISES: CPT

## 2019-04-22 ENCOUNTER — TELEPHONE (OUTPATIENT)
Dept: NEUROLOGY | Facility: CLINIC | Age: 64
End: 2019-04-22

## 2019-04-22 ENCOUNTER — OFFICE VISIT (OUTPATIENT)
Dept: PHYSICAL THERAPY | Facility: CLINIC | Age: 64
End: 2019-04-22
Payer: COMMERCIAL

## 2019-04-22 DIAGNOSIS — M75.42 IMPINGEMENT SYNDROME OF LEFT SHOULDER: Primary | ICD-10-CM

## 2019-04-22 PROCEDURE — 97140 MANUAL THERAPY 1/> REGIONS: CPT | Performed by: PHYSICAL THERAPIST

## 2019-04-22 PROCEDURE — 97112 NEUROMUSCULAR REEDUCATION: CPT | Performed by: PHYSICAL THERAPIST

## 2019-04-22 PROCEDURE — 97110 THERAPEUTIC EXERCISES: CPT | Performed by: PHYSICAL THERAPIST

## 2019-04-24 ENCOUNTER — APPOINTMENT (OUTPATIENT)
Dept: PHYSICAL THERAPY | Facility: CLINIC | Age: 64
End: 2019-04-24
Payer: COMMERCIAL

## 2019-04-29 ENCOUNTER — OFFICE VISIT (OUTPATIENT)
Dept: PHYSICAL THERAPY | Facility: CLINIC | Age: 64
End: 2019-04-29
Payer: COMMERCIAL

## 2019-04-29 DIAGNOSIS — M75.42 IMPINGEMENT SYNDROME OF LEFT SHOULDER: Primary | ICD-10-CM

## 2019-04-29 PROCEDURE — 97140 MANUAL THERAPY 1/> REGIONS: CPT | Performed by: PHYSICAL THERAPIST

## 2019-04-29 PROCEDURE — 97112 NEUROMUSCULAR REEDUCATION: CPT | Performed by: PHYSICAL THERAPIST

## 2019-04-29 PROCEDURE — 97110 THERAPEUTIC EXERCISES: CPT | Performed by: PHYSICAL THERAPIST

## 2019-05-01 ENCOUNTER — OFFICE VISIT (OUTPATIENT)
Dept: PHYSICAL THERAPY | Facility: CLINIC | Age: 64
End: 2019-05-01
Payer: COMMERCIAL

## 2019-05-01 DIAGNOSIS — M75.42 IMPINGEMENT SYNDROME OF LEFT SHOULDER: Primary | ICD-10-CM

## 2019-05-01 PROCEDURE — 97140 MANUAL THERAPY 1/> REGIONS: CPT

## 2019-05-01 PROCEDURE — 97110 THERAPEUTIC EXERCISES: CPT

## 2019-05-02 ENCOUNTER — OFFICE VISIT (OUTPATIENT)
Dept: OBGYN CLINIC | Facility: OTHER | Age: 64
End: 2019-05-02
Payer: COMMERCIAL

## 2019-05-02 VITALS
DIASTOLIC BLOOD PRESSURE: 70 MMHG | HEIGHT: 70 IN | HEART RATE: 68 BPM | BODY MASS INDEX: 28 KG/M2 | SYSTOLIC BLOOD PRESSURE: 122 MMHG | WEIGHT: 195.6 LBS

## 2019-05-02 DIAGNOSIS — M75.42 IMPINGEMENT SYNDROME OF LEFT SHOULDER: Primary | ICD-10-CM

## 2019-05-02 PROCEDURE — 99213 OFFICE O/P EST LOW 20 MIN: CPT | Performed by: ORTHOPAEDIC SURGERY

## 2019-05-07 ENCOUNTER — APPOINTMENT (OUTPATIENT)
Dept: PHYSICAL THERAPY | Facility: CLINIC | Age: 64
End: 2019-05-07
Payer: COMMERCIAL

## 2019-05-10 DIAGNOSIS — E78.2 MIXED HYPERLIPIDEMIA: Primary | ICD-10-CM

## 2019-05-10 RX ORDER — PRAVASTATIN SODIUM 40 MG
40 TABLET ORAL DAILY
Qty: 90 TABLET | Refills: 0 | Status: SHIPPED | OUTPATIENT
Start: 2019-05-10 | End: 2019-05-21 | Stop reason: SDUPTHER

## 2019-05-14 DIAGNOSIS — I10 ESSENTIAL HYPERTENSION: ICD-10-CM

## 2019-05-14 DIAGNOSIS — E11.65 TYPE 2 DIABETES MELLITUS WITH HYPERGLYCEMIA, UNSPECIFIED WHETHER LONG TERM INSULIN USE (HCC): ICD-10-CM

## 2019-05-14 PROCEDURE — 4010F ACE/ARB THERAPY RXD/TAKEN: CPT | Performed by: FAMILY MEDICINE

## 2019-05-14 RX ORDER — SITAGLIPTIN AND METFORMIN HYDROCHLORIDE 1000; 50 MG/1; MG/1
TABLET, FILM COATED ORAL
Qty: 180 TABLET | Refills: 1 | Status: SHIPPED | OUTPATIENT
Start: 2019-05-14 | End: 2019-10-23 | Stop reason: SDUPTHER

## 2019-05-14 RX ORDER — LISINOPRIL 20 MG/1
TABLET ORAL
Qty: 90 TABLET | Refills: 0 | Status: SHIPPED | OUTPATIENT
Start: 2019-05-14 | End: 2019-07-22 | Stop reason: SDUPTHER

## 2019-05-15 ENCOUNTER — OFFICE VISIT (OUTPATIENT)
Dept: PHYSICAL THERAPY | Facility: CLINIC | Age: 64
End: 2019-05-15
Payer: COMMERCIAL

## 2019-05-15 DIAGNOSIS — M75.42 IMPINGEMENT SYNDROME OF LEFT SHOULDER: Primary | ICD-10-CM

## 2019-05-15 PROCEDURE — 97112 NEUROMUSCULAR REEDUCATION: CPT | Performed by: PHYSICAL THERAPIST

## 2019-05-15 PROCEDURE — 97110 THERAPEUTIC EXERCISES: CPT | Performed by: PHYSICAL THERAPIST

## 2019-05-15 PROCEDURE — 97140 MANUAL THERAPY 1/> REGIONS: CPT | Performed by: PHYSICAL THERAPIST

## 2019-05-20 ENCOUNTER — APPOINTMENT (OUTPATIENT)
Dept: PHYSICAL THERAPY | Facility: CLINIC | Age: 64
End: 2019-05-20
Payer: COMMERCIAL

## 2019-05-21 DIAGNOSIS — E78.2 MIXED HYPERLIPIDEMIA: ICD-10-CM

## 2019-05-21 RX ORDER — PRAVASTATIN SODIUM 40 MG
40 TABLET ORAL DAILY
Qty: 90 TABLET | Refills: 0 | Status: SHIPPED | OUTPATIENT
Start: 2019-05-21 | End: 2019-10-17 | Stop reason: SDUPTHER

## 2019-05-22 ENCOUNTER — APPOINTMENT (OUTPATIENT)
Dept: PHYSICAL THERAPY | Facility: CLINIC | Age: 64
End: 2019-05-22
Payer: COMMERCIAL

## 2019-05-29 DIAGNOSIS — R25.1 TREMOR OF LEFT HAND: Primary | ICD-10-CM

## 2019-06-07 ENCOUNTER — APPOINTMENT (OUTPATIENT)
Dept: LAB | Facility: CLINIC | Age: 64
End: 2019-06-07
Payer: COMMERCIAL

## 2019-06-07 ENCOUNTER — CONSULT (OUTPATIENT)
Dept: NEUROLOGY | Facility: CLINIC | Age: 64
End: 2019-06-07
Payer: COMMERCIAL

## 2019-06-07 VITALS
WEIGHT: 191 LBS | SYSTOLIC BLOOD PRESSURE: 117 MMHG | DIASTOLIC BLOOD PRESSURE: 70 MMHG | BODY MASS INDEX: 27.35 KG/M2 | HEART RATE: 74 BPM | HEIGHT: 70 IN

## 2019-06-07 DIAGNOSIS — G20 PARKINSON DISEASE (HCC): Primary | ICD-10-CM

## 2019-06-07 DIAGNOSIS — E55.9 VITAMIN D DEFICIENCY: ICD-10-CM

## 2019-06-07 DIAGNOSIS — G20 PARKINSON DISEASE (HCC): ICD-10-CM

## 2019-06-07 DIAGNOSIS — E11.9 TYPE 2 DIABETES MELLITUS WITHOUT COMPLICATION, WITHOUT LONG-TERM CURRENT USE OF INSULIN (HCC): ICD-10-CM

## 2019-06-07 LAB
MAGNESIUM SERPL-MCNC: 2.3 MG/DL (ref 1.6–2.6)
TSH SERPL DL<=0.05 MIU/L-ACNC: 3.24 UIU/ML (ref 0.36–3.74)
VIT B12 SERPL-MCNC: 403 PG/ML (ref 100–900)

## 2019-06-07 PROCEDURE — 99244 OFF/OP CNSLTJ NEW/EST MOD 40: CPT | Performed by: PSYCHIATRY & NEUROLOGY

## 2019-06-07 PROCEDURE — 83735 ASSAY OF MAGNESIUM: CPT

## 2019-06-07 PROCEDURE — 36415 COLL VENOUS BLD VENIPUNCTURE: CPT

## 2019-06-07 PROCEDURE — 82390 ASSAY OF CERULOPLASMIN: CPT

## 2019-06-07 PROCEDURE — 82607 VITAMIN B-12: CPT

## 2019-06-07 PROCEDURE — 82525 ASSAY OF COPPER: CPT

## 2019-06-07 PROCEDURE — 84443 ASSAY THYROID STIM HORMONE: CPT

## 2019-06-08 LAB — CERULOPLASMIN SERPL-MCNC: 20.6 MG/DL (ref 16–31)

## 2019-06-09 LAB — COPPER SERPL-MCNC: 104 UG/DL (ref 72–166)

## 2019-06-10 ENCOUNTER — TELEPHONE (OUTPATIENT)
Dept: OBGYN CLINIC | Facility: HOSPITAL | Age: 64
End: 2019-06-10

## 2019-06-10 ENCOUNTER — TELEPHONE (OUTPATIENT)
Dept: NEUROLOGY | Facility: CLINIC | Age: 64
End: 2019-06-10

## 2019-06-10 DIAGNOSIS — M75.42 IMPINGEMENT SYNDROME OF LEFT SHOULDER: Primary | ICD-10-CM

## 2019-06-14 ENCOUNTER — TELEPHONE (OUTPATIENT)
Dept: NEUROLOGY | Facility: CLINIC | Age: 64
End: 2019-06-14

## 2019-06-25 ENCOUNTER — HOSPITAL ENCOUNTER (OUTPATIENT)
Dept: RADIOLOGY | Facility: HOSPITAL | Age: 64
Discharge: HOME | End: 2019-06-25
Attending: PSYCHIATRY & NEUROLOGY
Payer: COMMERCIAL

## 2019-06-25 DIAGNOSIS — G20 PARKINSON DISEASE (HCC): ICD-10-CM

## 2019-06-25 PROCEDURE — A9585 GADOBUTROL INJECTION: HCPCS | Performed by: PSYCHIATRY & NEUROLOGY

## 2019-06-25 PROCEDURE — 70553 MRI BRAIN STEM W/O & W/DYE: CPT

## 2019-06-25 RX ADMIN — GADOBUTROL 9 ML: 604.72 INJECTION INTRAVENOUS at 20:55

## 2019-06-26 ENCOUNTER — OFFICE VISIT (OUTPATIENT)
Dept: FAMILY MEDICINE CLINIC | Facility: CLINIC | Age: 64
End: 2019-06-26
Payer: COMMERCIAL

## 2019-06-26 VITALS
WEIGHT: 192 LBS | BODY MASS INDEX: 27.49 KG/M2 | HEIGHT: 70 IN | SYSTOLIC BLOOD PRESSURE: 118 MMHG | TEMPERATURE: 98.3 F | DIASTOLIC BLOOD PRESSURE: 80 MMHG

## 2019-06-26 DIAGNOSIS — J02.9 PHARYNGITIS, UNSPECIFIED ETIOLOGY: Primary | ICD-10-CM

## 2019-06-26 PROCEDURE — 99213 OFFICE O/P EST LOW 20 MIN: CPT | Performed by: FAMILY MEDICINE

## 2019-06-26 PROCEDURE — 3008F BODY MASS INDEX DOCD: CPT | Performed by: FAMILY MEDICINE

## 2019-06-26 PROCEDURE — 4004F PT TOBACCO SCREEN RCVD TLK: CPT | Performed by: FAMILY MEDICINE

## 2019-06-30 ENCOUNTER — HOSPITAL ENCOUNTER (OUTPATIENT)
Dept: RADIOLOGY | Facility: HOSPITAL | Age: 64
Discharge: HOME | End: 2019-06-30
Payer: COMMERCIAL

## 2019-06-30 DIAGNOSIS — M75.42 IMPINGEMENT SYNDROME OF LEFT SHOULDER: ICD-10-CM

## 2019-06-30 PROCEDURE — 73221 MRI JOINT UPR EXTREM W/O DYE: CPT

## 2019-07-08 ENCOUNTER — TELEPHONE (OUTPATIENT)
Dept: NEUROLOGY | Facility: CLINIC | Age: 64
End: 2019-07-08

## 2019-07-08 NOTE — TELEPHONE ENCOUNTER
Pt made aware, states that he is possibly going to have shoulder surgery soon so he may have to reschedule his EMG  He will call back if anything changes

## 2019-07-08 NOTE — TELEPHONE ENCOUNTER
----- Message from Nick Curiel MD sent at 7/1/2019  2:00 AM EDT -----  Regarding: MRI results  Reviewed the MRI brain images and report - amongst age-related changes he has a small and a likely chronic Left sided lacunar (small) stroke in the motor control area of brain from high blood pressure / diabetes combination  It would not explain his tremors since it is on the opposite side we expect it to be on  There is also a middle cranial fossa arachnoid cyst on the R side that has been unchanged compared to CT scan in Feb 2019  This is largely incidental and is not expected to explain his tremors or a future cause for concern  If the Sinemet has been helping him, stay on it  Would continue with OT and EMG as arranged       ----- Message -----  From: Interface, Radiology Results In  Sent: 6/28/2019   9:52 AM EDT  To: Nick Curiel MD

## 2019-07-08 NOTE — TELEPHONE ENCOUNTER
I called and patient answered however then the phone got disconnected  Will try him again later today

## 2019-07-11 ENCOUNTER — OFFICE VISIT (OUTPATIENT)
Dept: OBGYN CLINIC | Facility: OTHER | Age: 64
End: 2019-07-11
Payer: COMMERCIAL

## 2019-07-11 VITALS
HEIGHT: 70 IN | SYSTOLIC BLOOD PRESSURE: 142 MMHG | DIASTOLIC BLOOD PRESSURE: 80 MMHG | WEIGHT: 194.2 LBS | HEART RATE: 71 BPM | BODY MASS INDEX: 27.8 KG/M2

## 2019-07-11 DIAGNOSIS — M75.102 TEAR OF LEFT SUPRASPINATUS TENDON: ICD-10-CM

## 2019-07-11 DIAGNOSIS — M25.512 LEFT SHOULDER PAIN, UNSPECIFIED CHRONICITY: Primary | ICD-10-CM

## 2019-07-11 PROCEDURE — 99213 OFFICE O/P EST LOW 20 MIN: CPT | Performed by: ORTHOPAEDIC SURGERY

## 2019-07-11 NOTE — PROGRESS NOTES
Assessment  Diagnoses and all orders for this visit:    Left shoulder pain, unspecified chronicity    Tear of left supraspinatus tendon        Discussion and Plan:    The patient has a MRI showing a partial articular sided supraspinatus tear  I have discussed with the patient the pathophysiology of this diagnosis and reviewed how the examination correlates with this diagnosis  Treatment options were discussed at length and after discussing these treatment options, the patient elected hold off on surgery until November or December  I did review in detail that articular sided tears that are partial-thickness in nature typically do not require surgery but he has persistent symptoms despite appropriate nonoperative care and does have an exam consistent with significant supraspinatus weakness, this partial articular sided tear is therefore acting like a full-thickness tear and is more likely to require surgical intervention  Subjective:   Patient ID: Nedra Ragsdale is a 61 y o  male      HPI  62 y/o male who presents today for a follow up visit for his left shoulder  He was not improving and reached a plateau with conservative treatment for impingement syndrome of his left shoulder with formal physical therapy and received a cortisone injection on 3/7/19  MRI was ordered to evaluate the RTC  The following portions of the patient's history were reviewed and updated as appropriate: allergies, current medications, past family history, past medical history, past social history, past surgical history and problem list     Review of Systems   Constitutional: Negative for chills and fever  HENT: Negative for drooling and hearing loss  Eyes: Negative for visual disturbance  Respiratory: Negative for cough and shortness of breath  Cardiovascular: Negative for chest pain  Gastrointestinal: Negative for abdominal pain  Skin: Negative for rash  Psychiatric/Behavioral: Negative for agitation  Objective:  Left Shoulder Exam      Tenderness   The patient is experiencing no tenderness      Range of Motion   External rotation: 50   Forward flexion: 160   Internal rotation 0 degrees: Lumbar      Muscle Strength   External rotation: 5/5   Supraspinatus: 4/5      Tests   Chan test: positive  Drop arm: negative     Other   Erythema: absent  Sensation: normal  Pulse: present      Physical Exam   Constitutional: He appears well-developed and well-nourished  HENT:   Head: Normocephalic  Eyes: Pupils are equal, round, and reactive to light  Pulmonary/Chest: Effort normal    Skin: Skin is warm and dry  Psychiatric: He has a normal mood and affect  Vitals reviewed  I have personally reviewed pertinent films in PACS and my interpretation is as follows    Left shoulder MRI demonstrates supraspinatus tear    Scribe Attestation    I,:   Nataliya Couch am acting as a scribe while in the presence of the attending physician :        I,:   Rafia Chu MD personally performed the services described in this documentation    as scribed in my presence :

## 2019-07-13 DIAGNOSIS — IMO0002 UNCONTROLLED TYPE 2 DIABETES MELLITUS WITH COMPLICATION, WITHOUT LONG-TERM CURRENT USE OF INSULIN: ICD-10-CM

## 2019-07-15 RX ORDER — EMPAGLIFLOZIN 10 MG/1
TABLET, FILM COATED ORAL
Qty: 90 TABLET | Refills: 0 | Status: SHIPPED | OUTPATIENT
Start: 2019-07-15 | End: 2019-09-26 | Stop reason: SDUPTHER

## 2019-07-22 ENCOUNTER — OFFICE VISIT (OUTPATIENT)
Dept: FAMILY MEDICINE CLINIC | Facility: CLINIC | Age: 64
End: 2019-07-22
Payer: COMMERCIAL

## 2019-07-22 VITALS
OXYGEN SATURATION: 96 % | DIASTOLIC BLOOD PRESSURE: 80 MMHG | HEIGHT: 70 IN | HEART RATE: 78 BPM | BODY MASS INDEX: 27.63 KG/M2 | TEMPERATURE: 98 F | SYSTOLIC BLOOD PRESSURE: 130 MMHG | WEIGHT: 193 LBS

## 2019-07-22 DIAGNOSIS — I10 ESSENTIAL HYPERTENSION: ICD-10-CM

## 2019-07-22 DIAGNOSIS — E11.9 TYPE 2 DIABETES MELLITUS WITHOUT COMPLICATION, WITHOUT LONG-TERM CURRENT USE OF INSULIN (HCC): Primary | ICD-10-CM

## 2019-07-22 DIAGNOSIS — F32.A DEPRESSION, UNSPECIFIED DEPRESSION TYPE: ICD-10-CM

## 2019-07-22 PROBLEM — J02.9 PHARYNGITIS: Status: RESOLVED | Noted: 2019-06-26 | Resolved: 2019-07-22

## 2019-07-22 LAB — SL AMB POCT HEMOGLOBIN AIC: 7.3 (ref ?–6.5)

## 2019-07-22 PROCEDURE — 3075F SYST BP GE 130 - 139MM HG: CPT | Performed by: FAMILY MEDICINE

## 2019-07-22 PROCEDURE — 3079F DIAST BP 80-89 MM HG: CPT | Performed by: FAMILY MEDICINE

## 2019-07-22 PROCEDURE — 83036 HEMOGLOBIN GLYCOSYLATED A1C: CPT | Performed by: FAMILY MEDICINE

## 2019-07-22 PROCEDURE — 99213 OFFICE O/P EST LOW 20 MIN: CPT | Performed by: FAMILY MEDICINE

## 2019-07-22 PROCEDURE — 3008F BODY MASS INDEX DOCD: CPT | Performed by: FAMILY MEDICINE

## 2019-07-22 PROCEDURE — 4010F ACE/ARB THERAPY RXD/TAKEN: CPT | Performed by: FAMILY MEDICINE

## 2019-07-22 RX ORDER — LISINOPRIL 20 MG/1
20 TABLET ORAL DAILY
Qty: 30 TABLET | Refills: 0 | Status: CANCELLED | OUTPATIENT
Start: 2019-07-22

## 2019-07-22 RX ORDER — LISINOPRIL 20 MG/1
20 TABLET ORAL DAILY
Qty: 90 TABLET | Refills: 1 | Status: SHIPPED | OUTPATIENT
Start: 2019-07-22 | End: 2019-12-20 | Stop reason: ALTCHOICE

## 2019-07-22 NOTE — PROGRESS NOTES
Assessment/Plan:  Depression  He is going to continue with Effexor XR 75  He has no significant vegetative symptoms of depression other than sleep disturbance  Will see him back in 6 months or sooner as needed  Type 2 diabetes mellitus without complication, without long-term current use of insulin ([facility])  Lab Results   Component Value Date    HGBA1C 7 3 (A) 07/22/2019    Hemoglobin A1c is study at 7 3  We discussed the importance of trying to reduce this to a level of 7 0 or lower  He is going to work on 10 lb weight loss as well as improved diet exercise regimen  No results for input(s): POCGLU in the last 72 hours  Blood Sugar Average: Last 72 hrs:           Diagnoses and all orders for this visit:    Type 2 diabetes mellitus without complication, without long-term current use of insulin ([facility])  -     POCT hemoglobin A1c    Essential hypertension  -     lisinopril (ZESTRIL) 20 mg tablet; Take 1 tablet (20 mg total) by mouth daily    Depression, unspecified depression type    Other orders  -     Cancel: lisinopril (ZESTRIL) 20 mg tablet; Take 1 tablet (20 mg total) by mouth daily          Subjective:   Chief Complaint   Patient presents with    med check     here for a his 6 mth med check  bmi f/u plan needed   Mood " good"  Sleep not great  Trouble falling asleep and staying asleep, interest good, energy good, concentration good, appetite OK, - suicidal ideation     Patient ID: Nedra Ragsdale is a 61 y o  male  HPI  A 80-year-old male who presents today for follow-up of depression  He also request discussion of his recent MRI/shoulder pain as well as Parkinson's disease and diabetes  He is compliant with his Effexor  He states that his mood is good for the most part though he does not sleep well with difficulty falling asleep and staying asleep  He does note that his interest is good as well as energy  He has no issues with concentration presently and his appetite is good    He has no suicidal ideation  He has a new position at work which includes having to deal with issues related to obsolescent parts  He is not really thrilled with this change  He feels like his tremor has been getting worse  Initially it seemed to improve with the Sinemet but now seems to be worsening  Also shoulder is going to be repaired over the winter  He has a partial supraspinatus tear  Finally he has been trying to lose weight  His hemoglobin A1c is pretty stable at 7 3  The following portions of the patient's history were reviewed and updated as appropriate: allergies, current medications, past medical history, past social history, past surgical history and problem list     ROS    Limited pertinent review of systems as per the HPI  Objective:    Physical Exam   Constitutional: He is oriented to person, place, and time  He appears well-developed and well-nourished  Overweight in no distress   Neck: Neck supple  No JVD present  Cardiovascular: Normal rate and regular rhythm  Pulmonary/Chest: Effort normal and breath sounds normal    Lymphadenopathy:     He has no cervical adenopathy  Neurological: He is alert and oriented to person, place, and time  Psychiatric: He has a normal mood and affect  Thought content normal    Nursing note and vitals reviewed        Wt Readings from Last 10 Encounters:   07/22/19 87 5 kg (193 lb)   07/11/19 88 1 kg (194 lb 3 2 oz)   06/26/19 87 1 kg (192 lb)   06/07/19 86 6 kg (191 lb)   05/02/19 88 7 kg (195 lb 9 6 oz)   03/12/19 89 4 kg (197 lb)   03/07/19 90 7 kg (200 lb)   02/16/19 88 5 kg (195 lb 1 7 oz)   01/10/19 87 5 kg (193 lb)   11/30/18 88 5 kg (195 lb)   ]

## 2019-07-23 NOTE — ASSESSMENT & PLAN NOTE
Lab Results   Component Value Date    HGBA1C 7 3 (A) 07/22/2019    Hemoglobin A1c is study at 7 3  We discussed the importance of trying to reduce this to a level of 7 0 or lower  He is going to work on 10 lb weight loss as well as improved diet exercise regimen  No results for input(s): POCGLU in the last 72 hours      Blood Sugar Average: Last 72 hrs:

## 2019-07-23 NOTE — ASSESSMENT & PLAN NOTE
He is going to continue with Effexor XR 75  He has no significant vegetative symptoms of depression other than sleep disturbance  Will see him back in 6 months or sooner as needed

## 2019-08-01 DIAGNOSIS — R05.9 COUGH: Primary | ICD-10-CM

## 2019-08-01 RX ORDER — AZITHROMYCIN 250 MG/1
TABLET, FILM COATED ORAL
Qty: 6 TABLET | Refills: 0 | Status: SHIPPED | OUTPATIENT
Start: 2019-08-01 | End: 2019-08-05

## 2019-08-05 ENCOUNTER — TELEPHONE (OUTPATIENT)
Dept: NEUROLOGY | Facility: CLINIC | Age: 64
End: 2019-08-05

## 2019-08-05 ENCOUNTER — HOSPITAL ENCOUNTER (OUTPATIENT)
Dept: NEUROLOGY | Facility: CLINIC | Age: 64
Discharge: HOME | End: 2019-08-05
Payer: COMMERCIAL

## 2019-08-05 DIAGNOSIS — G20 PARKINSON DISEASE (HCC): ICD-10-CM

## 2019-08-05 PROCEDURE — 95886 MUSC TEST DONE W/N TEST COMP: CPT | Performed by: PHYSICAL MEDICINE & REHABILITATION

## 2019-08-05 PROCEDURE — 95909 NRV CNDJ TST 5-6 STUDIES: CPT | Performed by: PHYSICAL MEDICINE & REHABILITATION

## 2019-08-05 NOTE — TELEPHONE ENCOUNTER
Pt states that he is not having any problems with his left wrist, states that he continues to have the movements with the left fingers  Reports that the sinemet has not made much of a difference  He currently takes 1 TID  Denies any side effects  Questioning recommendations

## 2019-08-05 NOTE — RESULT ENCOUNTER NOTE
EMG results showed moderate carpal tunnel syndrome of the left wrist  Only the L upper limb was tested  Advise obtaining wrist brace, such as the one from [employer] or [employer], to wear at night and to adjust his hand position while working manually, avoiding overuse and taking breaks  If the Sinemet is helping him then stay on it  Please proceed with rest of the same plan  Thank you

## 2019-08-05 NOTE — TELEPHONE ENCOUNTER
That's good he doesn't have problems with the L wrist  However, given what we've found, we wouldn't want to chance the wrist getting worse though  The brace is to prevent decline  If he were my relative, I'd still advise it  It is up to him of course  About the movements, I see  We can cautiously go up then as follows  Same size tablets  We are accelerating it  At any point if symptoms are controlled then can hold that dose without going up further  If feels any side effects (sedation, lightheadedness, nausea) that are intolerable, then go back down to next lower dose and hold there, then call us  Nausea is common and if she has that she can take it after food  Days 1-3:  Take 1 5 tab in AM / 1 tab in Pm / 1 tab in night  Days 4-6:  Take 1 5 tab / 1 5 tab / 1 tab  Days 7-9: Take 1 5 tabs TID  Days 10-12: Take 2 tab / 1 5 tab / 1 5 tab  Days 11-15: Take 2 tab / 2 tab / 1 5 tab  Days 16-18: Take 2 tabs TID    Updated script sent to [facility] pharmacy

## 2019-08-05 NOTE — TELEPHONE ENCOUNTER
----- Message from Zaniab Baptiste MD sent at 8/5/2019 12:27 PM EDT -----  EMG results showed moderate carpal tunnel syndrome of the left wrist  Only the L upper limb was tested  Advise obtaining wrist brace, such as the one from [employer] or [employer], to wear at night and to adjust his hand position while working manually, avoiding overuse and taking breaks  If the Sinemet is helping him then stay on it  Please proceed with rest of the same plan  Thank you

## 2019-08-06 DIAGNOSIS — C61 PROSTATE CANCER (HCC): Primary | ICD-10-CM

## 2019-08-08 ENCOUNTER — APPOINTMENT (OUTPATIENT)
Dept: LAB | Facility: CLINIC | Age: 64
End: 2019-08-08
Payer: COMMERCIAL

## 2019-08-08 ENCOUNTER — TRANSCRIBE ORDERS (OUTPATIENT)
Dept: LAB | Facility: CLINIC | Age: 64
End: 2019-08-08

## 2019-08-08 DIAGNOSIS — C61 PROSTATE CANCER (HCC): ICD-10-CM

## 2019-08-08 PROBLEM — N52.8 OTHER MALE ERECTILE DYSFUNCTION: Status: ACTIVE | Noted: 2019-08-08

## 2019-08-08 LAB — PSA SERPL-MCNC: <0.1 NG/ML (ref 0–4)

## 2019-08-08 PROCEDURE — 84153 ASSAY OF PSA TOTAL: CPT

## 2019-08-08 NOTE — PROGRESS NOTES
8/9/2019      Chief Complaint   Patient presents with    Follow-up    Prostate Cancer    Erectile Dysfunction       Assessment and Plan    61 y o  male managed by Dr Fraogso Bybee    1  [place] 7 prostate cancer s/p Da Suzan Robot prostatectomy performed at [facility] ER in 2013  - PSA < 0 1 (8/1/18)  - the patient is now 5 years out from surgery so according to current guidelines we can begin annual surveillance of his PSA, the patient is agreeable to this so we will therefore follow up in 1 year with a PSA prior      2  ED  - continue Levitra 20mgas needed    FU 1 year with PSA prior      History of Present Illness  Jewell Manzanares is a 61 y o  male here for follow up evaluation of [place] 7 prostate cancer status post prostatectomy performed at [facility] ER in 2013  The patient's postoperative PSA remains undetectable  He states that he has very mild stress incontinence and is able to achieve erections on occasion  He does take Levitra 20mg as needed  He has no lower urinary tract symptoms or urinary complaints at his visit today  Review of Systems   Constitutional: Negative for activity change, chills and fever  Gastrointestinal: Negative for abdominal distention and abdominal pain  Musculoskeletal: Negative for arthralgias and back pain  Psychiatric/Behavioral: Negative for behavioral problems and confusion  Urinary Incontinence Screening      Most Recent Value   Urinary Incontinence   Urinary Incontinence? No   Incomplete emptying? No   Urinary frequency? No   Urinary urgency? No   Urinary hesitancy? No   Dysuria (painful difficult urination)? No   Nocturia (waking up to use the bathroom)? No [1x]   Straining (having to push to go)?   No   Weak stream?  No   Intermittent stream?  No          Past Medical History  Past Medical History:   Diagnosis Date    Ankle injury     last assessed 7/15/2013    BPH (benign prostatic hyperplasia)     Bronchiolitis     Cancer [facility])     Prostate  Diabetes mellitus ([facility] [address] )     Type II    History of benign prostatic hyperplasia     History of gastritis     History of insect bite     last assessed 10/4/2014    History of nocturia     History of stroke     Hypertension     Lumbar canal stenosis     Osteoarthritis, knee     Otitis externa     Pain in back     UPPER BACK, last assessed 2/25/2013    Retinal and vitreous disorder     Sinusitis, acute     last assessed 10/6/2016    Tendinitis of right rotator cuff     last assessed 8/21/2012       Past Social History  Past Surgical History:   Procedure Laterality Date    ANKLE FRACTURE SURGERY Left     triple fracture 4/2009    LAMINECTOMY      L5-S1 2003    LUMBAR LAMINECTOMY      partial L4-L5 in 2003    NOSE SURGERY      6/1989    WY COLONOSCOPY FLX DX W/COLLJ SPEC WHEN PFRMD N/A 1/27/2016    Procedure: COLONOSCOPY;  Surgeon: Santiago Stanton MD;  Location: [facility];   Service: Gastroenterology    PROSTATE BIOPSY  12/08/2012    managed by Hayder Mario, needle biopsy    PROSTATECTOMY      9/23/2013    RHINOPLASTY      for deviated septum in 1989     Social History     Tobacco Use   Smoking Status Light Tobacco Smoker    Years: 20 00    Types: Pipe   Smokeless Tobacco Never Used   Tobacco Comment    per allscripts 'current smoker, current some day smoker'       Past Family History  Family History   Problem Relation Age of Onset    Hypertension Mother     Retinal detachment Mother     Retinitis pigmentosa Mother     Diabetes Father     Heart disease Father         MI at 60 yo       Past Social history  Social History     Socioeconomic History    Marital status: /Civil Union     Spouse name: Not on file    Number of children: Not on file    Years of education: Not on file    Highest education level: Not on file   Occupational History    Not on file   Social Needs    Financial resource strain: Not on file    Food insecurity:     Worry: Not on file     Inability: Not on file  Transportation needs:     Medical: Not on file     Non-medical: Not on file   Tobacco Use    Smoking status: Light Tobacco Smoker     Years: 20 00     Types: Pipe    Smokeless tobacco: Never Used    Tobacco comment: per allscripts 'current smoker, current some day smoker'   Substance and Sexual Activity    Alcohol use:  Yes     Alcohol/week: 4 0 standard drinks     Types: 3 Cans of beer, 1 Shots of liquor per week     Comment: occassional     Drug use: No    Sexual activity: Not on file   Lifestyle    Physical activity:     Days per week: Not on file     Minutes per session: Not on file    Stress: Not on file   Relationships    Social connections:     Talks on phone: Not on file     Gets together: Not on file     Attends Druze service: Not on file     Active member of club or organization: Not on file     Attends meetings of clubs or organizations: Not on file     Relationship status: Not on file    Intimate partner violence:     Fear of current or ex partner: Not on file     Emotionally abused: Not on file     Physically abused: Not on file     Forced sexual activity: Not on file   Other Topics Concern    Not on file   Social History Narrative    Not on file       Current Medications  Current Outpatient Medications   Medication Sig Dispense Refill    carbidopa-levodopa (SINEMET)  mg per tablet Take 2 tablets by mouth 3 (three) times a day Follow clinic instructions 180 tablet 3    Cholecalciferol (VITAMIN D) 2000 units CAPS Take 2 capsules (4,000 Units total) by mouth daily  0    co-enzyme Q-10 30 MG capsule Take 1 capsule (30 mg total) by mouth daily 30 capsule 0    glucose blood (ONETOUCH VERIO) test strip Check BG 2x per day 200 each 3    JANUMET  MG per tablet TAKE 1 TABLET TWICE DAILY  WITH MEALS 180 tablet 1    JARDIANCE 10 MG TABS TAKE 1 TABLET EVERY MORNING 90 tablet 0    lisinopril (ZESTRIL) 20 mg tablet Take 1 tablet (20 mg total) by mouth daily 90 tablet 1    pravastatin (PRAVACHOL) 40 mg tablet Take 1 tablet (40 mg total) by mouth daily 90 tablet 0    vardenafil (LEVITRA) 20 MG tablet Take by mouth daily as needed       venlafaxine (EFFEXOR-XR) 75 mg 24 hr capsule TAKE 1 CAPSULE DAILY (NO   FURTHER REFILLS WITHOUT    OFFICE VISIT) 90 capsule 1     No current facility-administered medications for this visit  Allergies  No Known Allergies      The following portions of the patient's history were reviewed and updated as appropriate: allergies, current medications, past medical history, past social history, past surgical history and problem list       Vitals  Vitals:    08/09/19 1111   BP: 128/78   BP Location: Left arm   Patient Position: Sitting   Cuff Size: Adult   Pulse: 76   Weight: 88 4 kg (194 lb 12 8 oz)   Height: 5' 10" (1 778 m)         Physical Exam  Constitutional   General appearance: Patient is seated and in no acute distress, well appearing and well nourished  Head and Face   Head and face: Normal     Eyes   Conjunctiva and lids: No erythema, swelling or discharge  Ears, Nose, Mouth, and Throat   Hearing: Normal     Pulmonary   Respiratory effort: No increased work of breathing or signs of respiratory distress  Cardiovascular   Examination of extremities for edema and/or varicosities: Normal     Abdomen   Abdomen: Non-tender, no masses  Musculoskeletal   Gait and station: Normal     Skin   Skin and subcutaneous tissue: Warm, dry, and intact  No visible lesions or rashes    Psychiatric   Judgment and insight: Normal  Recent and remote memory:  Normal  Mood and affect: Normal      Results  No results found for this or any previous visit (from the past 1 hour(s)) ]  Lab Results   Component Value Date    PSA <0 1 08/08/2019    PSA <0 1 08/01/2018    PSA <0 1 02/01/2018     Lab Results   Component Value Date    GLUCOSE 211 (H) 12/17/2015    CALCIUM 9 9 03/11/2019     12/17/2015    K 4 8 03/11/2019    CO2 27 03/11/2019     03/11/2019 BUN 20 03/11/2019    CREATININE 1 07 03/11/2019     Lab Results   Component Value Date    WBC 11 91 (H) 02/16/2019    HGB 14 8 02/16/2019    HCT 44 4 02/16/2019    MCV 90 02/16/2019     (H) 02/16/2019       Orders  Orders Placed This Encounter   Procedures    PSA, Total Screen     This is a patient instruction: This test is non-fasting  Please drink two glasses of water morning of bloodwork          Standing Status:   Future     Standing Expiration Date:   2/9/2021

## 2019-08-09 ENCOUNTER — OFFICE VISIT (OUTPATIENT)
Dept: UROLOGY | Facility: CLINIC | Age: 64
End: 2019-08-09
Payer: COMMERCIAL

## 2019-08-09 VITALS
HEIGHT: 70 IN | WEIGHT: 194.8 LBS | DIASTOLIC BLOOD PRESSURE: 78 MMHG | BODY MASS INDEX: 27.89 KG/M2 | SYSTOLIC BLOOD PRESSURE: 128 MMHG | HEART RATE: 76 BPM

## 2019-08-09 DIAGNOSIS — N52.8 OTHER MALE ERECTILE DYSFUNCTION: ICD-10-CM

## 2019-08-09 DIAGNOSIS — C61 PROSTATE CANCER (HCC): Primary | ICD-10-CM

## 2019-08-09 PROCEDURE — 99213 OFFICE O/P EST LOW 20 MIN: CPT | Performed by: PHYSICIAN ASSISTANT

## 2019-08-13 DIAGNOSIS — I10 ESSENTIAL HYPERTENSION: ICD-10-CM

## 2019-08-13 PROCEDURE — 4010F ACE/ARB THERAPY RXD/TAKEN: CPT | Performed by: FAMILY MEDICINE

## 2019-08-13 RX ORDER — LISINOPRIL 20 MG/1
TABLET ORAL
Qty: 30 TABLET | Refills: 2 | Status: SHIPPED | OUTPATIENT
Start: 2019-08-13 | End: 2019-10-23 | Stop reason: SDUPTHER

## 2019-09-01 NOTE — PROGRESS NOTES
Cardiology Follow Up    Jacky Mathews  1955  [id number]  [address] [facility]  [facility] [address]  [place] [address]  [phone]    1  Essential (primary) hypertension     2  Pure hypercholesterolemia     3  Coronary arteriosclerosis         Interval History:  Patient is here for a follow-up visit  He was last seen by me in May of last year  His most recent echocardiogram was done September 2016 and demonstrated preserved LV systolic function with mild LVH  His most recent nuclear exercise stress test was done October 2014 and demonstrated a tiny reversible inferior defect which is been managed medically  Patient had a lipid profile done October 2018 which demonstrated total cholesterol of 193 with an HDL 46 and a direct LDL of 119  He has had no chest pain or significant dyspnea  He was recently diagnosed with Parkinson's disease because of a tremor in the left hand  He is on medical therapy for it      Patient Active Problem List   Diagnosis    Prostate cancer ([facility] )    Arteriosclerosis of coronary artery    Benign essential hypertension    Depression    Type 2 diabetes mellitus without complication, without long-term current use of insulin (HCC)    Subclinical hypothyroidism    Thrombocytosis (HCC)    Vitamin D deficiency    Mixed hyperlipidemia    Leukocytosis    Hyperlipidemia    Dermatitis of left ear canal    Tremor of left hand    Impingement syndrome of left shoulder    Thoracic spine pain    Tear of left supraspinatus tendon    Left carpal tunnel syndrome    Other male erectile dysfunction     Past Medical History:   Diagnosis Date    Ankle injury     last assessed 7/15/2013    BPH (benign prostatic hyperplasia)     Bronchiolitis     Cancer ([address] )     Prostate    Diabetes mellitus ([facility] [address] )     Type II    History of benign prostatic hyperplasia     History of gastritis     History of insect bite     last assessed 10/4/2014    History of nocturia     History of stroke     Hypertension     Lumbar canal stenosis     Osteoarthritis, knee     Otitis externa     Pain in back     UPPER BACK, last assessed 2/25/2013    Retinal and vitreous disorder     Sinusitis, acute     last assessed 10/6/2016    Tendinitis of right rotator cuff     last assessed 8/21/2012     Social History     Socioeconomic History    Marital status: /Civil Union     Spouse name: Not on file    Number of children: Not on file    Years of education: Not on file    Highest education level: Not on file   Occupational History    Not on file   Social Needs    Financial resource strain: Not on file    Food insecurity:     Worry: Not on file     Inability: Not on file    Transportation needs:     Medical: Not on file     Non-medical: Not on file   Tobacco Use    Smoking status: Light Tobacco Smoker     Years: 20 00     Types: Pipe    Smokeless tobacco: Never Used    Tobacco comment: per allscripts 'current smoker, current some day smoker'   Substance and Sexual Activity    Alcohol use:  Yes     Alcohol/week: 4 0 standard drinks     Types: 3 Cans of beer, 1 Shots of liquor per week     Comment: occassional     Drug use: No    Sexual activity: Not on file   Lifestyle    Physical activity:     Days per week: Not on file     Minutes per session: Not on file    Stress: Not on file   Relationships    Social connections:     Talks on phone: Not on file     Gets together: Not on file     Attends [employer] service: Not on file     Active member of club or organization: Not on file     Attends meetings of clubs or organizations: Not on file     Relationship status: Not on file    Intimate partner violence:     Fear of current or ex partner: Not on file     Emotionally abused: Not on file     Physically abused: Not on file     Forced sexual activity: Not on file   Other Topics Concern    Not on file   Social History Narrative    Not on file      Family History   Problem Relation Age of Onset    Hypertension Mother    Learta January Retinal detachment Mother     Retinitis pigmentosa Mother     Diabetes Father     Heart disease Father         MI at 60 yo     Past Surgical History:   Procedure Laterality Date    ANKLE FRACTURE SURGERY Left     triple fracture 4/2009    LAMINECTOMY      L5-S1 2003    LUMBAR LAMINECTOMY      partial L4-L5 in 2003    NOSE SURGERY      6/1989    OH COLONOSCOPY FLX DX W/COLLJ SPEC WHEN PFRMD N/A 1/27/2016    Procedure: COLONOSCOPY;  Surgeon: Ulises Jarrell MD;  Location: [facility];   Service: Gastroenterology    PROSTATE BIOPSY  12/08/2012    managed by Chai Jackson, needle biopsy    PROSTATECTOMY      9/23/2013    RHINOPLASTY      for deviated septum in 1989       Current Outpatient Medications:     aspirin 325 mg tablet, Take 325 mg by mouth daily, Disp: , Rfl:     carbidopa-levodopa (SINEMET)  mg per tablet, Take 2 tablets by mouth 3 (three) times a day Follow clinic instructions (Patient taking differently: Take 1 tablet by mouth 4 (four) times a day Follow clinic instructions), Disp: 180 tablet, Rfl: 3    Cholecalciferol (VITAMIN D) 2000 units CAPS, Take 2 capsules (4,000 Units total) by mouth daily, Disp: , Rfl: 0    co-enzyme Q-10 30 MG capsule, Take 1 capsule (30 mg total) by mouth daily, Disp: 30 capsule, Rfl: 0    glucose blood (ONETOUCH VERIO) test strip, Check BG 2x per day, Disp: 200 each, Rfl: 3    JANUMET  MG per tablet, TAKE 1 TABLET TWICE DAILY  WITH MEALS, Disp: 180 tablet, Rfl: 1    JARDIANCE 10 MG TABS, TAKE 1 TABLET EVERY MORNING, Disp: 90 tablet, Rfl: 0    lisinopril (ZESTRIL) 20 mg tablet, Take 1 tablet (20 mg total) by mouth daily, Disp: 90 tablet, Rfl: 1    lisinopril (ZESTRIL) 20 mg tablet, TAKE 1 TABLET BY MOUTH EVERY DAY, Disp: 30 tablet, Rfl: 2    pravastatin (PRAVACHOL) 40 mg tablet, Take 1 tablet (40 mg total) by mouth daily, Disp: 90 tablet, Rfl: 0    vardenafil (LEVITRA) 20 MG tablet, Take by mouth daily as needed , Disp: , Rfl:     venlafaxine (EFFEXOR-XR) 75 mg 24 hr capsule, TAKE 1 CAPSULE DAILY (NO   FURTHER REFILLS WITHOUT    OFFICE VISIT), Disp: 90 capsule, Rfl: 1  No Known Allergies    Labs:not applicable  Imaging: No results found  Review of Systems:  Review of Systems   All other systems reviewed and are negative  Physical Exam:  /84 (BP Location: Right arm, Patient Position: Sitting, Cuff Size: Standard)   Pulse 72   Ht 5' 10" (1 778 m)   Wt 87 1 kg (192 lb)   BMI 27 55 kg/m²   Physical Exam   Constitutional: He is oriented to person, place, and time  He appears well-developed and well-nourished  HENT:   Head: Normocephalic and atraumatic  Eyes: Pupils are equal, round, and reactive to light  Conjunctivae are normal    Neck: Normal range of motion  Neck supple  Cardiovascular: Normal rate and normal heart sounds  Pulmonary/Chest: Effort normal and breath sounds normal    Neurological: He is alert and oriented to person, place, and time  Skin: Skin is warm and dry  Psychiatric: He has a normal mood and affect  Vitals reviewed  Discussion/Summary:I will continue the patient's present medical regimen  The patient appears well compensated  I have asked the patient to call if there is a problem in the interim otherwise I will see the patient in one years time  Patient is due for an echocardiogram prior to the next visit to assess LV wall thickness and systolic function

## 2019-09-06 ENCOUNTER — OFFICE VISIT (OUTPATIENT)
Dept: CARDIOLOGY CLINIC | Facility: CLINIC | Age: 64
End: 2019-09-06
Payer: COMMERCIAL

## 2019-09-06 VITALS
SYSTOLIC BLOOD PRESSURE: 124 MMHG | WEIGHT: 192 LBS | DIASTOLIC BLOOD PRESSURE: 84 MMHG | HEIGHT: 70 IN | HEART RATE: 72 BPM | BODY MASS INDEX: 27.49 KG/M2

## 2019-09-06 DIAGNOSIS — I25.10 CORONARY ARTERIOSCLEROSIS: ICD-10-CM

## 2019-09-06 DIAGNOSIS — I10 ESSENTIAL (PRIMARY) HYPERTENSION: Primary | ICD-10-CM

## 2019-09-06 DIAGNOSIS — E78.00 PURE HYPERCHOLESTEROLEMIA: ICD-10-CM

## 2019-09-06 PROCEDURE — 99214 OFFICE O/P EST MOD 30 MIN: CPT | Performed by: INTERNAL MEDICINE

## 2019-09-06 PROCEDURE — 3074F SYST BP LT 130 MM HG: CPT | Performed by: INTERNAL MEDICINE

## 2019-09-06 PROCEDURE — 3079F DIAST BP 80-89 MM HG: CPT | Performed by: INTERNAL MEDICINE

## 2019-09-06 RX ORDER — ASPIRIN 325 MG
325 TABLET ORAL DAILY
COMMUNITY

## 2019-09-26 DIAGNOSIS — IMO0002 UNCONTROLLED TYPE 2 DIABETES MELLITUS WITH COMPLICATION, WITHOUT LONG-TERM CURRENT USE OF INSULIN: ICD-10-CM

## 2019-09-26 RX ORDER — EMPAGLIFLOZIN 10 MG/1
TABLET, FILM COATED ORAL
Qty: 90 TABLET | Refills: 0 | Status: SHIPPED | OUTPATIENT
Start: 2019-09-26 | End: 2019-10-23 | Stop reason: SDUPTHER

## 2019-10-06 NOTE — PROGRESS NOTES
DEPARTMENT OF NEUROLOGICAL SCIENCES  ST [address] [place] [address] and [facility]        RETURN PATIENT NOTE    Patient: [address] Record Number: # [id number]  YOB: 1955  Date of visit: 10/8/2019    Referring provider: No ref  provider found    ASSESSMENT     Diagnoses for this encounter:  1  Parkinson disease ([address] )  pramipexole (MIRAPEX) 0 125 mg tablet     Impression of this 60 yo gentleman with persisting L > R hand resting predominant tremor and bradykinesia, consistent with Parkinson's disease with some levodopa response  PLAN     · Continue with the Sinemet 2 tab TID at the moment  · Given age, will add on dopamine agonist  Sending script for pramipexole 0 125mg tablets to be taken according to the following: At any point if symptoms are controlled then can hold that dose without going up further  If feels any side effects (sedation) that are intolerable, then go back down to next lower dose and hold there, then call us  Week 1:  Start on 1 tab three times a day, last dose 2-3 hrs before bedtime  Week 2:  Take 1 5 tablet three times a day, last dose 2-3 hrs before bedtime    Week 3:  Take 2 tab three times a day, last dose 2-3 hrs before bedtime    Week 4:  Take 2 5 tab three times a day, last dose around 2-3 hrs before bedtime  Week 5:  Tale 3 tab three times a day,   He will continue with exercising at home, suggesting weight training, stretching  We discussed about relevant clinical research ongoing through clinicaltrials  gov  · He will f/u with his orthopedic provider on his Left shoulder repair  · The patient has been instructed to call us about any new neurological problems or medication side effects  · Return to Clinic in 5 months    A total of 40 minutes were spent face-to-face with this patient, of which 25% was spent on counseling and coordination of care   We discussed the natural history of the patient's condition, differential diagnosis, level of diagnostic certainty, treatment alternatives and their side effects and possible complications  HISTORY OF PRESENT ILLNESS:     Mr Laura Joy is a 61 y o  right handed male who [facility] [address] [place] [address] for evaluation of L hand tremor  Last visit 6/7/2019  The patient was not accompanied today  History was obtained from patient    Interim History  He called in 6/14/19 about two ocular migraines which self-resolved  MRI brain showed a small chronic lacunar stroke, ipsilateral to his tremors, and unchanged and likely incidental arachnoid cyst  EMG showed moderate L carpal tunnel syndrome, but not symptomatically bothersome  He has not found Sinemet 1 tab TID to be helpful  Advised trying further titration to 2 tab TID  He rates a 5/10 in tremor control  He mainly says at night his hand does not move around as much when trying to get to sleep  He feels his stiffness is mainly when getting up out of bed in the morning and diffusely rather than any particular side  He takes Sinemet 25/100 2 tab 6:30-7am / 1-2pm / 8pm  He does feel that there may be a kicking in period but he does not pay attention  He is unsure of a wearing off period  He denies any obvious side effects  INITIAL HISTORY  Pt has been under conservative treatment for a L shoulder impingement syndrome according to records, having undergoing Physical Therapy training with improved range of motion and pain level, a home exercise program, and receiving a cortisone injection on 3/7/19  home exercise program  He was offered MRI L shoulder before but he declined due to his slow improvement  Denies numbness and tingling, fevers or chills  Main bothersome neurological symptoms today are:   1  L hand tremor - noted by PCP in Jan 2019 but without stated parkinsonian symptoms at the time  Movements began nearly a year ago but more minor   It occurs more at rest than action, but also noticed hand shakes when he tries to  objects and hold them stationary  Thus far, since he is R handed, it has not affected his writing, eating or fine motor movements  Over time, he noticed that the hand would shake more on its own  He feels any pressure on the wrist tends to amplify it  Year ago he noticed the R hand would, on a certain position when resting on an object pressing against wrist, would shake as well, but not to the extent of the left  He denies noticing particular stiffness or slowness more on any particular side of his body  He does have a L rotator cuff injury since Dec 2018 but he had tremors prior to the injury  He denies prior head or neck injury, denies numbness or tingling in either arm  He has had EMG previously for his legs before for back pain, but not for the arms  Living Situation + ADLs: living with wife  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  Able to to do most tasks at home independently  Drives well  Medications tried in the past with side effects: none    Parkinson's Disease Motor Symptoms:   Dyskinesia:  denies  Motor Fluctuations and Off time:  denies  Rigidity:  denies  Tremor:  see above  Fatigue:  yes occasionally  Freezing of gait:  none  Handwriting: unaffection  Facial Expression: denies noticing a difference     Nonmotor symptoms:   Hallucinations:  He has hx of ocular migraines and will sometimes have see patterns that vanish after the migraine  He has them for last 3 years  Voice volume: "sometimes" more when fatigued  Mood:  denies either anxiety or depression  Cognition: mild forgetfulness  Sleep: "fractured" either due to shoulder pain  Waking up twice a night and trouble falling back asleep     Constipation: none  Urinary:  none  Balance / Falls:  no issues   Orthostatism: slightly but he feels this may be from his blood pressure medications  Drooling: none  Driving: none, no issues  Hyposmia: denies  RBD (REM semi-purposeful body movements): none  Skin Cancer History: none  Exercise Therapy: moderate activity with his hobbies and work outside        Family History: Number of First Degree Relatives With Parkinsonism/Dementia: none known    REVIEW OF PAST MEDICAL, SOCIAL AND FAMILY HISTORY:  This is the list of problems as per our Medical Records:    Patient Active Problem List    Diagnosis Date Noted    Parkinson disease ([facility] ) 10/08/2019    Other male erectile dysfunction 08/08/2019    Left carpal tunnel syndrome     Tear of left supraspinatus tendon 07/11/2019    Impingement syndrome of left shoulder 03/07/2019    Thoracic spine pain 03/07/2019    Tremor of left hand 01/10/2019    Dermatitis of left ear canal 07/13/2018    Prostate cancer ([place] [address] ) 02/02/2018    Type 2 diabetes mellitus without complication, without long-term current use of insulin ([place] [address] ) 08/24/2017    Arteriosclerosis of coronary artery 05/24/2017    Subclinical hypothyroidism 04/11/2017    Thrombocytosis ([place] [address] ) 10/31/2016    Leukocytosis 10/31/2016    Vitamin D deficiency 10/20/2015    Benign essential hypertension 05/15/2015    Hyperlipidemia 08/20/2012    Depression 06/01/2012    Mixed hyperlipidemia 11/10/2010     No Known Allergies     Outpatient Encounter Medications as of 10/8/2019   Medication Sig Dispense Refill    aspirin 325 mg tablet Take 325 mg by mouth daily      carbidopa-levodopa (SINEMET)  mg per tablet Take 2 tablets by mouth 3 (three) times a day Follow clinic instructions 180 tablet 3    Cholecalciferol (VITAMIN D) 2000 units CAPS Take 2 capsules (4,000 Units total) by mouth daily  0    co-enzyme Q-10 30 MG capsule Take 1 capsule (30 mg total) by mouth daily 30 capsule 0    glucose blood (ONETOUCH VERIO) test strip Check BG 2x per day 200 each 3    JANUMET  MG per tablet TAKE 1 TABLET TWICE DAILY  WITH MEALS 180 tablet 1    JARDIANCE 10 MG TABS TAKE 1 TABLET EVERY MORNING 90 tablet 0    lisinopril (ZESTRIL) 20 mg tablet Take 1 tablet (20 mg total) by mouth daily 90 tablet 1    lisinopril (ZESTRIL) 20 mg tablet TAKE 1 TABLET BY MOUTH EVERY DAY 30 tablet 2    pravastatin (PRAVACHOL) 40 mg tablet Take 1 tablet (40 mg total) by mouth daily 90 tablet 0    vardenafil (LEVITRA) 20 MG tablet Take by mouth daily as needed       venlafaxine (EFFEXOR-XR) 75 mg 24 hr capsule TAKE 1 CAPSULE DAILY (NO   FURTHER REFILLS WITHOUT    OFFICE VISIT) 90 capsule 1    pramipexole (MIRAPEX) 0 125 mg tablet Follow clinic instructions for titration up to 3 tablets three times a day 270 tablet 2     No facility-administered encounter medications on file as of 10/8/2019  REVIEW OF SYSTEMS:  The patient has entered data on an intake form regarding present illness, past medical and surgical history, medications, allergies, family and social history, and a full review of 14 systems  I have reviewed this form with the patient, and all the relevant information has been included on this note  The full review of systems was negative except as stated in HPI and below  Constitutional: Negative  Negative for appetite change and fever  HENT: Negative  Negative for hearing loss, tinnitus, trouble swallowing and voice change  Eyes: Negative  Negative for photophobia and pain  Respiratory: Negative  Negative for shortness of breath  Cardiovascular: Negative  Negative for palpitations  Gastrointestinal: Negative  Negative for nausea and vomiting  Endocrine: Negative  Negative for cold intolerance and heat intolerance  Genitourinary: Negative  Negative for dysuria, frequency and urgency  Musculoskeletal: Negative for myalgias and neck pain  Skin: Negative  Negative for rash  Allergic/Immunologic: Negative  Neurological: Positive for tremors  Negative for dizziness, seizures, syncope, facial asymmetry, speech difficulty, weakness, light-headedness, numbness and headaches  Hematological: Negative  Does not bruise/bleed easily  Psychiatric/Behavioral: Negative  Negative for confusion, hallucinations and sleep disturbance  FOCUSED PHYSICAL EXAMINATION:     Vital signs:  /68 (BP Location: Left arm, Patient Position: Standing, Cuff Size: Large)   Pulse 80   Ht 5' 10" (1 778 m)   Wt 87 1 kg (192 lb)   BMI 27 55 kg/m²     General:  +hypomimia and decreased blink rate  Well-appearing, well nourished, pleasant patient in no acute distress  Mood and Fund of Knowledge are appropriate  Head:  Normocephalic, atraumatic  Oropharynx and conjunctiva are clear  Speech  mild hypophonia, no bradylalia  No scanning speech  Language: Comprehension intact  Neck:  Supple, strong 5/5 forward flexion and retroflexion  Extremities: Range of motion is normal       Cognitive and Mental Exam:  The patient is alert, oriented to self, location, date and situation  Memory is normal to provide accurate details of health history     Cranial Nerves:  CN II:  Direct and consensual light reflexes were equally reactive to light symmetrically  No afferent pupillary defect   Visual fields are full to confrontation  CN III / IV / VI: Extraocular movements were full, with normal pursuit and saccades  CN V:   Facial sensation to light touch was intact  CN VII: Face is symmetric with normal strength  CN VIII: Hearing was assessed using the Calibrated Finger Rub Auditory Screening Test (CALFRAST) and was not abnormal (Better than CALFRAST-Strong-70)  CN X:   Palate is up going bilaterally and symmetrically  CN XI:  Neck muscles are strong  CN XII: Tongue protrusion is at midline with normal movements  No dysarthria  Motor:    Dystonia: none  Dyskinesia: none  Myoclonus: none  Chorea: none  Tics: none  Hand tremors both action and resting but predominantly resting for the L hand with finger flexion/ext and wrist flext/ext        UPDRS                Time since last dose:   6/7/19   10/08/19   Speech   3  3   Facial Expression   2  2   Rigidity - Neck   2 2    Rigidity - Upper Extremity (Right)   2  1   Rigidity - Upper Extremity (Left)    3 with mild cog  2   Rigidity - Lower Extremity (Right)   1 0   Rigidity - Lower Extremity (Left)    0 0   Finger Taps (Right)    1  1   Finger Taps (Left)    2 smaller amplitude  2   Hand Movement (Right)   1  2   Hand Movement (Left)    1  2   Pronation/Supination (Right)   1  2   Pronation/Supination (Left)    1  2   Toe Tapping (Right)  1  1   Toe Tapping (Left)  2  2   Leg Agility (Right)   1  0   Leg Agility (Left)    2  1   Arising from Chair    0  0   Gait    0  0   Freezing of Gait  0  0   Postural Stability    0 (two steps)  0 (2 steps)   Posture  0 upright  0   Global spontaneity of movement  1  1   Postural Tremor (Right)  1  0   Postural Tremor (Left)  2  2   Kinetic Tremor (Right)   1  1   Kinetic Tremor (Left)   2  2   Rest tremor amplitude RUE  0  0   Rest tremor amplitude LUE  3  3   Rest tremor amplitude RLE  0 0   Reset tremor amplitude LLE  0  0   Lip/Jaw Tremor   0  0   Consistency of tremor  2 2     -------------------------------------------------------------------------------------    Muscle Strength Right Left  Muscle Strength Right Left   Deltoid 5/5 5/5  Hip Adductors 5/5 5/5   Biceps 5/5 5/5  Hip Abductors 5/5 5/5   Triceps 5/5 5/5  Knee Extensors 5/5 5/5   Wrist Extensors 5/5 5/5  Knee Flexors 5/5 5/5   Wrist Flexors 5/5 5/5  Ankle Extensors 5/5 5/5    5/5 5/5  Ankle Flexors 5/5 5/5   Finger Abductors 5/5 5/5       Hip Flexors 5/5 5/5   Hip Extensors 5/5 5/5     Sensory  Intact to Light Touch, Temperature, and vibration sense in all extremities  Coordination:  Finger-to-nose-finger: normal on both sides  Gait:  Normal rise from chair, narrow based forward walking with good stride length, no decreased arm swing however does have prominent resting tremor of the L hand (fingers flex/ext), and can stand on either foot unsupported for five seconds   Pull test normal 0 (two steps backwards without instability)        Reflexes:    Right Left   Biceps 2/4 2/4   Brachioradialis 2/4 2/4   Triceps 2/4 2/4   Knee 2/4 2/4   Ankle 2/4 2/4

## 2019-10-08 ENCOUNTER — OFFICE VISIT (OUTPATIENT)
Dept: NEUROLOGY | Facility: CLINIC | Age: 64
End: 2019-10-08
Payer: COMMERCIAL

## 2019-10-08 VITALS
HEART RATE: 80 BPM | WEIGHT: 192 LBS | DIASTOLIC BLOOD PRESSURE: 68 MMHG | HEIGHT: 70 IN | BODY MASS INDEX: 27.49 KG/M2 | SYSTOLIC BLOOD PRESSURE: 137 MMHG

## 2019-10-08 DIAGNOSIS — G20 PARKINSON DISEASE (HCC): Primary | ICD-10-CM

## 2019-10-08 PROBLEM — G20.A1 PARKINSON DISEASE: Status: ACTIVE | Noted: 2019-10-08

## 2019-10-08 PROCEDURE — 99215 OFFICE O/P EST HI 40 MIN: CPT | Performed by: PSYCHIATRY & NEUROLOGY

## 2019-10-08 RX ORDER — PRAMIPEXOLE DIHYDROCHLORIDE 0.12 MG/1
TABLET ORAL
Qty: 270 TABLET | Refills: 2 | Status: SHIPPED | OUTPATIENT
Start: 2019-10-08 | End: 2019-10-28

## 2019-10-08 NOTE — LETTER
October 9, 2019     Andre Alcala MD  [address] [address]    Patient: Ambrose Rich   YOB: 1955   Date of Visit: 10/8/2019       Dear Dr Jaja Bernard:    Earlier today I saw our mutual patient Mr Tamica Wynne for evaluation of his Parkinson's disease  Below are my notes for this visit for your records and to keep you updated on his health status  If you have questions, please do not hesitate to call me  I look forward to following your patient along with you  Sincerely,        Spring Jeffery MD        CC: No Recipients  Spring Jeffery MD  10/9/2019  1:13 AM  Sign at close encounter  [address]        RETURN PATIENT NOTE    Patient: [address] Record Number: # [id number]  YOB: 1955  Date of visit: 10/8/2019    Referring provider: No ref  provider found    ASSESSMENT     Diagnoses for this encounter:  1  Parkinson disease ([address] )  pramipexole (MIRAPEX) 0 125 mg tablet     Impression of this 62 yo gentleman with persisting L > R hand resting predominant tremor and bradykinesia, consistent with Parkinson's disease with some levodopa response  PLAN     · Continue with the Sinemet 2 tab TID at the moment  · Given age, will add on dopamine agonist  Sending script for pramipexole 0 125mg tablets to be taken according to the following: At any point if symptoms are controlled then can hold that dose without going up further  If feels any side effects (sedation) that are intolerable, then go back down to next lower dose and hold there, then call us  Week 1:  Start on 1 tab three times a day, last dose 2-3 hrs before bedtime     Week 2:  Take 1 5 tablet three times a day, last dose 2-3 hrs before bedtime    Week 3:  Take 2 tab three times a day, last dose 2-3 hrs before bedtime    Week 4:  Take 2 5 tab three times a day, last dose around 2-3 hrs before bedtime  Week 5:  Tale 3 tab three times a day,   He will continue with exercising at home, suggesting weight training, stretching  We discussed about relevant clinical research ongoing through clinicaltrials  gov  · He will f/u with his orthopedic provider on his Left shoulder repair  · The patient has been instructed to call us about any new neurological problems or medication side effects  · Return to Clinic in 5 months    A total of 40 minutes were spent face-to-face with this patient, of which 25% was spent on counseling and coordination of care  We discussed the natural history of the patient's condition, differential diagnosis, level of diagnostic certainty, treatment alternatives and their side effects and possible complications  HISTORY OF PRESENT ILLNESS:     Mr Leopoldo Kiss is a 61 y o  right handed male who [facility] [address] [place] [address] for evaluation of L hand tremor  Last visit 6/7/2019  The patient was not accompanied today  History was obtained from patient    Interim History  He called in 6/14/19 about two ocular migraines which self-resolved  MRI brain showed a small chronic lacunar stroke, ipsilateral to his tremors, and unchanged and likely incidental arachnoid cyst  EMG showed moderate L carpal tunnel syndrome, but not symptomatically bothersome  He has not found Sinemet 1 tab TID to be helpful  Advised trying further titration to 2 tab TID  He rates a 5/10 in tremor control  He mainly says at night his hand does not move around as much when trying to get to sleep  He feels his stiffness is mainly when getting up out of bed in the morning and diffusely rather than any particular side  He takes Sinemet 25/100 2 tab 6:30-7am / 1-2pm / 8pm  He does feel that there may be a kicking in period but he does not pay attention  He is unsure of a wearing off period  He denies any obvious side effects       INITIAL HISTORY  Pt has been under conservative treatment for a L shoulder impingement syndrome according to records, having undergoing Physical Therapy training with improved range of motion and pain level, a home exercise program, and receiving a cortisone injection on 3/7/19  home exercise program  He was offered MRI L shoulder before but he declined due to his slow improvement  Denies numbness and tingling, fevers or chills  Main bothersome neurological symptoms today are:   1  L hand tremor - noted by PCP in Jan 2019 but without stated parkinsonian symptoms at the time  Movements began nearly a year ago but more minor  It occurs more at rest than action, but also noticed hand shakes when he tries to  objects and hold them stationary  Thus far, since he is R handed, it has not affected his writing, eating or fine motor movements  Over time, he noticed that the hand would shake more on its own  He feels any pressure on the wrist tends to amplify it  Year ago he noticed the R hand would, on a certain position when resting on an object pressing against wrist, would shake as well, but not to the extent of the left  He denies noticing particular stiffness or slowness more on any particular side of his body  He does have a L rotator cuff injury since Dec 2018 but he had tremors prior to the injury  He denies prior head or neck injury, denies numbness or tingling in either arm  He has had EMG previously for his legs before for back pain, but not for the arms  Living Situation + ADLs: living with wife  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  Able to to do most tasks at home independently  Drives well     Medications tried in the past with side effects: none    Parkinson's Disease Motor Symptoms:   Dyskinesia:  denies  Motor Fluctuations and Off time:  denies  Rigidity:  denies  Tremor:  see above  Fatigue:  yes occasionally  Freezing of gait:  none  Handwriting: unaffection  Facial Expression: denies noticing a difference     Nonmotor symptoms: Hallucinations:  He has hx of ocular migraines and will sometimes have see patterns that vanish after the migraine  He has them for last 3 years  Voice volume: "sometimes" more when fatigued  Mood:  denies either anxiety or depression  Cognition: mild forgetfulness  Sleep: "fractured" either due to shoulder pain  Waking up twice a night and trouble falling back asleep  Constipation: none  Urinary:  none  Balance / Falls:  no issues   Orthostatism: slightly but he feels this may be from his blood pressure medications  Drooling: none  Driving: none, no issues  Hyposmia: denies  RBD (REM semi-purposeful body movements): none  Skin Cancer History: none  Exercise Therapy: moderate activity with his hobbies and work outside        Family History: Number of First Degree Relatives With Parkinsonism/Dementia: none known    REVIEW OF PAST MEDICAL, SOCIAL AND FAMILY HISTORY:  This is the list of problems as per our Medical Records:    Patient Active Problem List    Diagnosis Date Noted    Parkinson disease ([facility] ) 10/08/2019    Other male erectile dysfunction 08/08/2019    Left carpal tunnel syndrome     Tear of left supraspinatus tendon 07/11/2019    Impingement syndrome of left shoulder 03/07/2019    Thoracic spine pain 03/07/2019    Tremor of left hand 01/10/2019    Dermatitis of left ear canal 07/13/2018    Prostate cancer ([facility] ) 02/02/2018    Type 2 diabetes mellitus without complication, without long-term current use of insulin ([facility] ) 08/24/2017    Arteriosclerosis of coronary artery 05/24/2017    Subclinical hypothyroidism 04/11/2017    Thrombocytosis ([facility] ) 10/31/2016    Leukocytosis 10/31/2016    Vitamin D deficiency 10/20/2015    Benign essential hypertension 05/15/2015    Hyperlipidemia 08/20/2012    Depression 06/01/2012    Mixed hyperlipidemia 11/10/2010     No Known Allergies     Outpatient Encounter Medications as of 10/8/2019   Medication Sig Dispense Refill    aspirin 325 mg tablet Take 325 mg by mouth daily      carbidopa-levodopa (SINEMET)  mg per tablet Take 2 tablets by mouth 3 (three) times a day Follow clinic instructions 180 tablet 3    Cholecalciferol (VITAMIN D) 2000 units CAPS Take 2 capsules (4,000 Units total) by mouth daily  0    co-enzyme Q-10 30 MG capsule Take 1 capsule (30 mg total) by mouth daily 30 capsule 0    glucose blood (ONETOUCH VERIO) test strip Check BG 2x per day 200 each 3    JANUMET  MG per tablet TAKE 1 TABLET TWICE DAILY  WITH MEALS 180 tablet 1    JARDIANCE 10 MG TABS TAKE 1 TABLET EVERY MORNING 90 tablet 0    lisinopril (ZESTRIL) 20 mg tablet Take 1 tablet (20 mg total) by mouth daily 90 tablet 1    lisinopril (ZESTRIL) 20 mg tablet TAKE 1 TABLET BY MOUTH EVERY DAY 30 tablet 2    pravastatin (PRAVACHOL) 40 mg tablet Take 1 tablet (40 mg total) by mouth daily 90 tablet 0    vardenafil (LEVITRA) 20 MG tablet Take by mouth daily as needed       venlafaxine (EFFEXOR-XR) 75 mg 24 hr capsule TAKE 1 CAPSULE DAILY (NO   FURTHER REFILLS WITHOUT    OFFICE VISIT) 90 capsule 1    pramipexole (MIRAPEX) 0 125 mg tablet Follow clinic instructions for titration up to 3 tablets three times a day 270 tablet 2     No facility-administered encounter medications on file as of 10/8/2019  REVIEW OF SYSTEMS:  The patient has entered data on an intake form regarding present illness, past medical and surgical history, medications, allergies, family and social history, and a full review of 14 systems  I have reviewed this form with the patient, and all the relevant information has been included on this note  The full review of systems was negative except as stated in HPI and below  Constitutional: Negative  Negative for appetite change and fever  HENT: Negative  Negative for hearing loss, tinnitus, trouble swallowing and voice change  Eyes: Negative  Negative for photophobia and pain  Respiratory: Negative  Negative for shortness of breath  Cardiovascular: Negative  Negative for palpitations  Gastrointestinal: Negative  Negative for nausea and vomiting  Endocrine: Negative  Negative for cold intolerance and heat intolerance  Genitourinary: Negative  Negative for dysuria, frequency and urgency  Musculoskeletal: Negative for myalgias and neck pain  Skin: Negative  Negative for rash  Allergic/Immunologic: Negative  Neurological: Positive for tremors  Negative for dizziness, seizures, syncope, facial asymmetry, speech difficulty, weakness, light-headedness, numbness and headaches  Hematological: Negative  Does not bruise/bleed easily  Psychiatric/Behavioral: Negative  Negative for confusion, hallucinations and sleep disturbance  FOCUSED PHYSICAL EXAMINATION:     Vital signs:  /68 (BP Location: Left arm, Patient Position: Standing, Cuff Size: Large)   Pulse 80   Ht 5' 10" (1 778 m)   Wt 87 1 kg (192 lb)   BMI 27 55 kg/m²      General:  +hypomimia and decreased blink rate  Well-appearing, well nourished, pleasant patient in no acute distress  Mood and Fund of Knowledge are appropriate  Head:  Normocephalic, atraumatic  Oropharynx and conjunctiva are clear  Speech  mild hypophonia, no bradylalia  No scanning speech  Language: Comprehension intact  Neck:  Supple, strong 5/5 forward flexion and retroflexion  Extremities: Range of motion is normal       Cognitive and Mental Exam:  The patient is alert, oriented to self, location, date and situation  Memory is normal to provide accurate details of health history     Cranial Nerves:  CN II:  Direct and consensual light reflexes were equally reactive to light symmetrically  No afferent pupillary defect   Visual fields are full to confrontation  CN III / IV / VI: Extraocular movements were full, with normal pursuit and saccades  CN V:   Facial sensation to light touch was intact  CN VII: Face is symmetric with normal strength     CN VIII: Hearing was assessed using the Calibrated Finger Rub Auditory Screening Test (CALFRAST) and was not abnormal (Better than CALFRAST-Strong-70)  CN X:   Palate is up going bilaterally and symmetrically  CN XI:  Neck muscles are strong  CN XII: Tongue protrusion is at midline with normal movements  No dysarthria  Motor:    Dystonia: none  Dyskinesia: none  Myoclonus: none  Chorea: none  Tics: none  Hand tremors both action and resting but predominantly resting for the L hand with finger flexion/ext and wrist flext/ext        UPDRS                Time since last dose:   6/7/19   10/08/19   Speech   3  3   Facial Expression   2  2   Rigidity - Neck   2 2    Rigidity - Upper Extremity (Right)   2  1   Rigidity - Upper Extremity (Left)    3 with mild cog  2   Rigidity - Lower Extremity (Right)   1 0   Rigidity - Lower Extremity (Left)    0 0   Finger Taps (Right)    1  1   Finger Taps (Left)    2 smaller amplitude  2   Hand Movement (Right)   1  2   Hand Movement (Left)    1  2   Pronation/Supination (Right)   1  2   Pronation/Supination (Left)    1  2   Toe Tapping (Right)  1  1   Toe Tapping (Left)  2  2   Leg Agility (Right)   1  0   Leg Agility (Left)    2  1   Arising from Chair    0  0   Gait    0  0   Freezing of Gait  0  0   Postural Stability    0 (two steps)  0 (2 steps)   Posture  0 upright  0   Global spontaneity of movement  1  1   Postural Tremor (Right)  1  0   Postural Tremor (Left)  2  2   Kinetic Tremor (Right)   1  1   Kinetic Tremor (Left)   2  2   Rest tremor amplitude RUE  0  0   Rest tremor amplitude LUE  3  3   Rest tremor amplitude RLE  0 0   Reset tremor amplitude LLE  0  0   Lip/Jaw Tremor   0  0   Consistency of tremor  2 2     -------------------------------------------------------------------------------------    Muscle Strength Right Left  Muscle Strength Right Left   Deltoid 5/5 5/5  Hip Adductors 5/5 5/5   Biceps 5/5 5/5  Hip Abductors 5/5 5/5   Triceps 5/5 5/5  Knee Extensors 5/5 5/5   Wrist Extensors 5/5 5/5  Knee Flexors 5/5 5/5   Wrist Flexors 5/5 5/5  Ankle Extensors 5/5 5/5    5/5 5/5  Ankle Flexors 5/5 5/5   Finger Abductors 5/5 5/5       Hip Flexors 5/5 5/5   Hip Extensors 5/5 5/5     Sensory  Intact to Light Touch, Temperature, and vibration sense in all extremities  Coordination:  Finger-to-nose-finger: normal on both sides  Gait:  Normal rise from chair, narrow based forward walking with good stride length, no decreased arm swing however does have prominent resting tremor of the L hand (fingers flex/ext), and can stand on either foot unsupported for five seconds  Pull test normal 0 (two steps backwards without instability)        Reflexes:    Right Left   Biceps 2/4 2/4   Brachioradialis 2/4 2/4   Triceps 2/4 2/4   Knee 2/4 2/4   Ankle 2/4 2/4

## 2019-10-08 NOTE — PROGRESS NOTES
Review of Systems   Constitutional: Negative  Negative for appetite change and fever  HENT: Negative  Negative for hearing loss, tinnitus, trouble swallowing and voice change  Eyes: Negative  Negative for photophobia and pain  Respiratory: Negative  Negative for shortness of breath  Cardiovascular: Negative  Negative for palpitations  Gastrointestinal: Negative  Negative for nausea and vomiting  Endocrine: Negative  Negative for cold intolerance and heat intolerance  Genitourinary: Negative  Negative for dysuria, frequency and urgency  Musculoskeletal: Negative for myalgias and neck pain  Skin: Negative  Negative for rash  Allergic/Immunologic: Negative  Neurological: Positive for tremors  Negative for dizziness, seizures, syncope, facial asymmetry, speech difficulty, weakness, light-headedness, numbness and headaches  Hematological: Negative  Does not bruise/bleed easily  Psychiatric/Behavioral: Negative  Negative for confusion, hallucinations and sleep disturbance

## 2019-10-08 NOTE — PATIENT INSTRUCTIONS
· Continue with the Sinemet 2 tab TID at the moment  · Given age, will add on dopamine agonist  Sending script for pramipexole 0 125mg tablets to be taken according to the following: At any point if symptoms are controlled then can hold that dose without going up further  If feels any side effects (sedation) that are intolerable, then go back down to next lower dose and hold there, then call us  Week 1:  Start on 1 tab three times a day, last dose 2-3 hrs before bedtime  Week 2:  Take 1 5 tablet three times a day, last dose 2-3 hrs before bedtime    Week 3:  Take 2 tab three times a day, last dose 2-3 hrs before bedtime    Week 4:  Take 2 5 tab three times a day, last dose around 2-3 hrs before bedtime  Week 5:  Tale 3 tab three times a day,   He will continue with exercising at home, suggesting weight training, stretching  We discussed about relevant clinical research ongoing through clinicaltrials  gov  · He will f/u with his orthopedic provider on his Left shoulder repair  · The patient has been instructed to call us about any new neurological problems or medication side effects    · Return to Clinic in 5 months

## 2019-10-17 DIAGNOSIS — E78.2 MIXED HYPERLIPIDEMIA: ICD-10-CM

## 2019-10-17 DIAGNOSIS — F32.A DEPRESSION, UNSPECIFIED DEPRESSION TYPE: ICD-10-CM

## 2019-10-17 RX ORDER — VENLAFAXINE HYDROCHLORIDE 75 MG/1
75 CAPSULE, EXTENDED RELEASE ORAL DAILY
Qty: 90 CAPSULE | Refills: 1 | Status: SHIPPED | OUTPATIENT
Start: 2019-10-17 | End: 2019-12-20 | Stop reason: SDUPTHER

## 2019-10-17 RX ORDER — PRAVASTATIN SODIUM 40 MG
40 TABLET ORAL DAILY
Qty: 90 TABLET | Refills: 1 | Status: SHIPPED | OUTPATIENT
Start: 2019-10-17 | End: 2019-12-20 | Stop reason: SDUPTHER

## 2019-10-21 ENCOUNTER — APPOINTMENT (OUTPATIENT)
Dept: LAB | Facility: CLINIC | Age: 64
End: 2019-10-21
Payer: COMMERCIAL

## 2019-10-21 ENCOUNTER — TRANSCRIBE ORDERS (OUTPATIENT)
Dept: LAB | Facility: CLINIC | Age: 64
End: 2019-10-21

## 2019-10-21 DIAGNOSIS — E78.2 MIXED HYPERLIPIDEMIA: ICD-10-CM

## 2019-10-21 DIAGNOSIS — E11.9 TYPE 2 DIABETES MELLITUS WITHOUT COMPLICATION, WITHOUT LONG-TERM CURRENT USE OF INSULIN (HCC): ICD-10-CM

## 2019-10-21 LAB
ALBUMIN SERPL BCP-MCNC: 3.9 G/DL (ref 3.5–5)
ALP SERPL-CCNC: 43 U/L (ref 46–116)
ALT SERPL W P-5'-P-CCNC: 19 U/L (ref 12–78)
ANION GAP SERPL CALCULATED.3IONS-SCNC: 8 MMOL/L (ref 4–13)
AST SERPL W P-5'-P-CCNC: 20 U/L (ref 5–45)
BILIRUB SERPL-MCNC: 0.5 MG/DL (ref 0.2–1)
BUN SERPL-MCNC: 22 MG/DL (ref 5–25)
CALCIUM SERPL-MCNC: 9.1 MG/DL (ref 8.3–10.1)
CHLORIDE SERPL-SCNC: 102 MMOL/L (ref 100–108)
CHOLEST SERPL-MCNC: 228 MG/DL (ref 50–200)
CO2 SERPL-SCNC: 28 MMOL/L (ref 21–32)
CREAT SERPL-MCNC: 1.19 MG/DL (ref 0.6–1.3)
GFR SERPL CREATININE-BSD FRML MDRD: 65 ML/MIN/1.73SQ M
GLUCOSE P FAST SERPL-MCNC: 125 MG/DL (ref 65–99)
HDLC SERPL-MCNC: 43 MG/DL (ref 40–60)
LDLC SERPL CALC-MCNC: 155 MG/DL (ref 0–100)
POTASSIUM SERPL-SCNC: 5 MMOL/L (ref 3.5–5.3)
PROT SERPL-MCNC: 7.3 G/DL (ref 6.4–8.2)
SODIUM SERPL-SCNC: 138 MMOL/L (ref 136–145)
TRIGL SERPL-MCNC: 149 MG/DL

## 2019-10-21 PROCEDURE — 80061 LIPID PANEL: CPT

## 2019-10-21 PROCEDURE — 80053 COMPREHEN METABOLIC PANEL: CPT

## 2019-10-21 PROCEDURE — 36415 COLL VENOUS BLD VENIPUNCTURE: CPT

## 2019-10-23 ENCOUNTER — OFFICE VISIT (OUTPATIENT)
Dept: ENDOCRINOLOGY | Facility: CLINIC | Age: 64
End: 2019-10-23
Payer: COMMERCIAL

## 2019-10-23 VITALS
HEIGHT: 70 IN | SYSTOLIC BLOOD PRESSURE: 130 MMHG | HEART RATE: 90 BPM | DIASTOLIC BLOOD PRESSURE: 80 MMHG | BODY MASS INDEX: 28.35 KG/M2 | WEIGHT: 198 LBS

## 2019-10-23 DIAGNOSIS — E78.5 HYPERLIPIDEMIA, UNSPECIFIED HYPERLIPIDEMIA TYPE: ICD-10-CM

## 2019-10-23 DIAGNOSIS — IMO0002 UNCONTROLLED TYPE 2 DIABETES MELLITUS WITH COMPLICATION, WITHOUT LONG-TERM CURRENT USE OF INSULIN: ICD-10-CM

## 2019-10-23 DIAGNOSIS — E78.2 MIXED HYPERLIPIDEMIA: Primary | ICD-10-CM

## 2019-10-23 DIAGNOSIS — E03.8 SUBCLINICAL HYPOTHYROIDISM: ICD-10-CM

## 2019-10-23 DIAGNOSIS — E11.65 TYPE 2 DIABETES MELLITUS WITH HYPERGLYCEMIA, UNSPECIFIED WHETHER LONG TERM INSULIN USE (HCC): ICD-10-CM

## 2019-10-23 DIAGNOSIS — E11.9 TYPE 2 DIABETES MELLITUS WITHOUT COMPLICATION, WITHOUT LONG-TERM CURRENT USE OF INSULIN (HCC): ICD-10-CM

## 2019-10-23 LAB — SL AMB POCT HEMOGLOBIN AIC: 6.8 (ref ?–6.5)

## 2019-10-23 PROCEDURE — 83036 HEMOGLOBIN GLYCOSYLATED A1C: CPT | Performed by: PHYSICIAN ASSISTANT

## 2019-10-23 PROCEDURE — 99214 OFFICE O/P EST MOD 30 MIN: CPT | Performed by: PHYSICIAN ASSISTANT

## 2019-10-23 NOTE — ASSESSMENT & PLAN NOTE
LDL not at goal- HE has been off the pravastatin due to running out of medication  Medication was refilled by PCP and should be arriving shortly by mail order  Advised him to repeat lab testing in 6 weeks and if LDL not at goal will consider higher potency statin given DM and new finding of chronic infarct on MRI

## 2019-10-23 NOTE — PROGRESS NOTES
Established Patient Progress Note      Chief Complaint   Patient presents with    Diabetes Type 2    Hypertension    Hyperlipidemia    Thyroid Problem        History of Present Illness:   Montez Ramos is a 61 y o  male with a history of type 2 diabetes without long term use of insulin since about 10 years ago  Reports complications of none  Denies recent illness or hospitalizations  Denies recent severe hypoglycemic or severe hyperglycemic episodes  Denies any issues with his current regimen  home glucose monitoring: are performed regularly in the morning usually around 114  IF snacks at night- 130  Trying to lose weight, wants to lose 15 pounds  Seeing Neurology- dx with parkinsons, Found to have old CVA on MRI    Current regimen: Jardiance and Janumet    Last Eye Exam: UTD  Last Foot Exam: UTD    Has hypertension: Taking lisinopril  Has hyperlipidemia: Taking nothing currently, Has been out of pravastatin, refill on way   Thyroid disorders: hx subclinical hypothyroidism, felt worse on levothyroxine       Patient Active Problem List   Diagnosis    Prostate cancer ([facility] )    Arteriosclerosis of coronary artery    Benign essential hypertension    Depression    Type 2 diabetes mellitus without complication, without long-term current use of insulin (HCC)    Subclinical hypothyroidism    Thrombocytosis (HCC)    Vitamin D deficiency    Mixed hyperlipidemia    Leukocytosis    Hyperlipidemia    Dermatitis of left ear canal    Tremor of left hand    Impingement syndrome of left shoulder    Thoracic spine pain    Tear of left supraspinatus tendon    Left carpal tunnel syndrome    Other male erectile dysfunction    Parkinson disease ([facility] )      Past Medical History:   Diagnosis Date    Ankle injury     last assessed 7/15/2013    BPH (benign prostatic hyperplasia)     Bronchiolitis     Cancer (HCC)     Prostate    Diabetes mellitus ([facility] )     Type II    History of benign prostatic hyperplasia     History of gastritis     History of insect bite     last assessed 10/4/2014    History of nocturia     History of stroke     Hypertension     Lumbar canal stenosis     Osteoarthritis, knee     Otitis externa     Pain in back     UPPER BACK, last assessed 2/25/2013    Retinal and vitreous disorder     Sinusitis, acute     last assessed 10/6/2016    Tendinitis of right rotator cuff     last assessed 8/21/2012      Past Surgical History:   Procedure Laterality Date    ANKLE FRACTURE SURGERY Left     triple fracture 4/2009    LAMINECTOMY      L5-S1 2003    LUMBAR LAMINECTOMY      partial L4-L5 in 2003    NOSE SURGERY      6/1989    UT COLONOSCOPY FLX DX W/COLLJ SPEC WHEN PFRMD N/A 1/27/2016    Procedure: COLONOSCOPY;  Surgeon: Chandler Jc MD;  Location: [facility]; Service: Gastroenterology    PROSTATE BIOPSY  12/08/2012    managed by Jeremy Starr, needle biopsy    PROSTATECTOMY      9/23/2013    RHINOPLASTY      for deviated septum in 1989      Family History   Problem Relation Age of Onset    Hypertension Mother    [place] Sean Retinal detachment Mother     Retinitis pigmentosa Mother     Diabetes Father     Heart disease Father         MI at 60 yo     Social History     Tobacco Use    Smoking status: Light Tobacco Smoker     Years: 20 00     Types: Pipe    Smokeless tobacco: Never Used    Tobacco comment: per allscripts 'current smoker, current some day smoker'   Substance Use Topics    Alcohol use:  Yes     Alcohol/week: 4 0 standard drinks     Types: 3 Cans of beer, 1 Shots of liquor per week     Frequency: 2-3 times a week     Drinks per session: 1 or 2     Comment: occassional      No Known Allergies      Current Outpatient Medications:     aspirin 325 mg tablet, Take 325 mg by mouth daily, Disp: , Rfl:     carbidopa-levodopa (SINEMET)  mg per tablet, Take 2 tablets by mouth 3 (three) times a day Follow clinic instructions, Disp: 180 tablet, Rfl: 3    Cholecalciferol (VITAMIN D) 2000 units CAPS, Take 2 capsules (4,000 Units total) by mouth daily, Disp: , Rfl: 0    co-enzyme Q-10 30 MG capsule, Take 1 capsule (30 mg total) by mouth daily, Disp: 30 capsule, Rfl: 0    Empagliflozin (JARDIANCE) 10 MG TABS, 1 tab daily, Disp: 90 tablet, Rfl: 1    glucose blood (ONETOUCH VERIO) test strip, Check BG 2x per day, Disp: 200 each, Rfl: 3    lisinopril (ZESTRIL) 20 mg tablet, Take 1 tablet (20 mg total) by mouth daily, Disp: 90 tablet, Rfl: 1    pravastatin (PRAVACHOL) 40 mg tablet, Take 1 tablet (40 mg total) by mouth daily, Disp: 90 tablet, Rfl: 1    sitaGLIPtin-metFORMIN (JANUMET)  MG per tablet, Take 1 tablet by mouth 2 (two) times a day with meals, Disp: 180 tablet, Rfl: 1    vardenafil (LEVITRA) 20 MG tablet, Take by mouth daily as needed , Disp: , Rfl:     venlafaxine (EFFEXOR-XR) 75 mg 24 hr capsule, Take 1 capsule (75 mg total) by mouth daily, Disp: 90 capsule, Rfl: 1    pramipexole (MIRAPEX) 0 125 mg tablet, Follow clinic instructions for titration up to 3 tablets three times a day (Patient not taking: Reported on 10/23/2019), Disp: 270 tablet, Rfl: 2    Review of Systems   Constitutional: Negative for activity change, appetite change and fatigue  HENT: Negative for sore throat, trouble swallowing and voice change  Eyes: Negative for visual disturbance  Respiratory: Negative for choking, chest tightness and shortness of breath  Cardiovascular: Negative for chest pain, palpitations and leg swelling  Gastrointestinal: Negative for abdominal pain, constipation and diarrhea  Endocrine: Negative for cold intolerance, heat intolerance, polydipsia, polyphagia and polyuria  Genitourinary: Negative for frequency  Musculoskeletal: Negative for arthralgias and myalgias  Skin: Negative for rash  Neurological: Positive for tremors  Negative for dizziness and syncope  Hematological: Negative for adenopathy     Psychiatric/Behavioral: Negative for sleep disturbance  All other systems reviewed and are negative  Physical Exam:  Body mass index is 28 41 kg/m²  /80   Pulse 90   Ht 5' 10" (1 778 m)   Wt 89 8 kg (198 lb)   BMI 28 41 kg/m²    Wt Readings from Last 3 Encounters:   10/23/19 89 8 kg (198 lb)   10/08/19 87 1 kg (192 lb)   09/06/19 87 1 kg (192 lb)       Physical Exam   Constitutional: He is oriented to person, place, and time  He appears well-developed and well-nourished  No distress  HENT:   Head: Normocephalic and atraumatic  Mouth/Throat: Oropharynx is clear and moist    Eyes: Pupils are equal, round, and reactive to light  Conjunctivae and EOM are normal    Neck: Normal range of motion  Neck supple  No thyromegaly present  Cardiovascular: Normal rate, regular rhythm and normal heart sounds  No murmur heard  Pulmonary/Chest: Effort normal and breath sounds normal  No respiratory distress  He has no wheezes  He has no rales  Abdominal: Soft  Bowel sounds are normal  He exhibits no distension  There is no tenderness  Musculoskeletal: Normal range of motion  He exhibits no edema  Lymphadenopathy:     He has no cervical adenopathy  Neurological: He is alert and oriented to person, place, and time  Skin: Skin is warm and dry  Psychiatric: He has a normal mood and affect  Vitals reviewed        Labs:   Lab Results   Component Value Date    HGBA1C 6 8 (A) 10/23/2019    HGBA1C 7 3 (A) 07/22/2019    HGBA1C 7 3 (H) 03/11/2019     Lab Results   Component Value Date    CREATININE 1 19 10/21/2019    CREATININE 1 07 03/11/2019    CREATININE 1 09 02/16/2019    BUN 22 10/21/2019     12/17/2015    K 5 0 10/21/2019     10/21/2019    CO2 28 10/21/2019     eGFR   Date Value Ref Range Status   10/21/2019 65 ml/min/1 73sq m Final     Lab Results   Component Value Date    CHOL 206 12/17/2015    HDL 43 10/21/2019    TRIG 149 10/21/2019     Lab Results   Component Value Date    ALT 19 10/21/2019    AST 20 10/21/2019    ALKPHOS 43 (L) 10/21/2019    BILITOT 0 47 12/17/2015     Lab Results   Component Value Date    LQO1QHRZNMPE 3 240 06/07/2019    YVC7HDJUKIIU 3 846 (H) 02/14/2018    BWW6IMTXBGFR 4 107 (H) 07/08/2017     Lab Results   Component Value Date    FREET4 0 91 02/14/2018       Impression & Plan:    Problem List Items Addressed This Visit        Endocrine    Type 2 diabetes mellitus without complication, without long-term current use of insulin (HCC)     A1C improved to 6 8  Continue current meds and his goal is to lose about 15 pounds  Relevant Medications    sitaGLIPtin-metFORMIN (JANUMET)  MG per tablet    Empagliflozin (JARDIANCE) 10 MG TABS    Subclinical hypothyroidism     Improved without meds continue to monitor  Relevant Orders    TSH, 3rd generation    T4, free       Other    Mixed hyperlipidemia - Primary    Relevant Orders    Lipid Panel with Direct LDL reflex    Comprehensive metabolic panel    Hyperlipidemia     LDL not at goal- HE has been off the pravastatin due to running out of medication  Medication was refilled by PCP and should be arriving shortly by mail order  Advised him to repeat lab testing in 6 weeks and if LDL not at goal will consider higher potency statin given DM and new finding of chronic infarct on MRI            Relevant Orders    Lipid Panel with Direct LDL reflex    Comprehensive metabolic panel      Other Visit Diagnoses     Type 2 diabetes mellitus with hyperglycemia, unspecified whether long term insulin use (HCC)        Relevant Medications    sitaGLIPtin-metFORMIN (JANUMET)  MG per tablet    Empagliflozin (JARDIANCE) 10 MG TABS    Other Relevant Orders    Ambulatory referral to Optometry    Hemoglobin A1C    Comprehensive metabolic panel    Microalbumin / creatinine urine ratio    POCT hemoglobin A1c (Completed)    Uncontrolled type 2 diabetes mellitus with complication, without long-term current use of insulin (HCC)        Relevant Medications sitaGLIPtin-metFORMIN (JANUMET)  MG per tablet    Empagliflozin (JARDIANCE) 10 MG TABS          Orders Placed This Encounter   Procedures    Lipid Panel with Direct LDL reflex     This is a patient instruction: This test requires patient fasting for 10-12 hours or longer  Drinking of black coffee or black tea is acceptable  Standing Status:   Future     Standing Expiration Date:   10/23/2020    Comprehensive metabolic panel     This is a patient instruction: Patient fasting for 8 hours or longer recommended  Standing Status:   Future     Standing Expiration Date:   10/23/2020    Hemoglobin A1C     Standing Status:   Future     Standing Expiration Date:   10/23/2020    Comprehensive metabolic panel     This is a patient instruction: Patient fasting for 8 hours or longer recommended  Standing Status:   Future     Standing Expiration Date:   10/23/2020    TSH, 3rd generation     This is a patient instruction: This test is non-fasting  Please drink two glasses of water morning of bloodwork  Standing Status:   Future     Standing Expiration Date:   10/23/2020    T4, free     Standing Status:   Future     Standing Expiration Date:   10/23/2020    Microalbumin / creatinine urine ratio     Standing Status:   Future     Standing Expiration Date:   10/23/2020    Ambulatory referral to Optometry     Standing Status:   Future     Standing Expiration Date:   10/23/2020     Referral Priority:   Routine     Referral Type:   Optometry     Referral Reason:   Specialty Services Required     Referred to Provider:   Ping Garcia OD     Requested Specialty:   Optometry     Number of Visits Requested:   1     Expiration Date:   10/23/2020    POCT hemoglobin A1c       There are no Patient Instructions on file for this visit  Discussed with the patient and all questioned fully answered  He will call me if any problems arise  Follow-up appointment in 6 months       Counseled patient on diagnostic results, prognosis, risk and benefit of treatment options, instruction for management, importance of treatment compliance, Risk  factor reduction and impressions    Marleny Pichardo PA-C

## 2019-10-28 ENCOUNTER — TELEPHONE (OUTPATIENT)
Dept: NEUROLOGY | Facility: CLINIC | Age: 64
End: 2019-10-28

## 2019-10-28 DIAGNOSIS — G20 PARKINSON DISEASE (HCC): Primary | ICD-10-CM

## 2019-10-28 RX ORDER — ROPINIROLE 0.25 MG/1
TABLET, FILM COATED ORAL
Qty: 360 TABLET | Refills: 3 | Status: SHIPPED | OUTPATIENT
Start: 2019-10-28 | End: 2020-01-17 | Stop reason: SDUPTHER

## 2019-10-28 NOTE — TELEPHONE ENCOUNTER
pt called and states that he stopped taking pramipexole last week as it was causing upset stomach, it did not matter if he took it with food or not  he states that he also felt that it made his parkinson's symptoms worse  states that since he stopped taking it he is still having problems with his left leg and foot  left foot toes clench and feels stiffness in his left calf  he was taking 0 125mg 1/2 tab due to stomach issues     sinemet 25/100 2 tabs tid  please advise  [phone] to leave a detailed message

## 2019-10-28 NOTE — TELEPHONE ENCOUNTER
[id number] Jose Joyce leave the pramipexole off and keep the Sinemet the same for now  Will add on ropinirole 0 25mg tablets as follows  At any point if symptoms are controlled then can hold that dose without going up further  If feels any side effects (excessive movements, impulsive control problems, hallucinations) that are intolerable, then go back down to last known tolerated dose, and then call us  · Week 1:  1 tablet twice a day  · Week 2:  1 tablet three times a day  · Week 3:  2 tab / 1 tab / 1 tab  · Week 4:  2 tab / 2 tab / 1 tab   · Week 5:  2 tab three times a day  · Week 6:  3 tab three times a day  · Week 7:  4 tab three times a day   Call us if reaches week 8, or sooner if benefit/side effect reached  If ropinirole ultimately does not help with his symptoms, then will give instructions how to stop this and then switch to rasagiline to boost the levodopa he has in a different approach  If his foot and calf remain uncomfortable, we can discuss about muscle relaxant or possible botulinum toxin injections  However his home exercises and stretching will be the mainstay of keeping his range of motion  I can send him for Physical Therapy for this as well if he's interested

## 2019-10-30 LAB
LEFT EYE DIABETIC RETINOPATHY: NORMAL
RIGHT EYE DIABETIC RETINOPATHY: NORMAL

## 2019-11-07 ENCOUNTER — OFFICE VISIT (OUTPATIENT)
Dept: OBGYN CLINIC | Facility: OTHER | Age: 64
End: 2019-11-07
Payer: COMMERCIAL

## 2019-11-07 VITALS
HEIGHT: 70 IN | DIASTOLIC BLOOD PRESSURE: 76 MMHG | BODY MASS INDEX: 28.35 KG/M2 | HEART RATE: 108 BPM | SYSTOLIC BLOOD PRESSURE: 126 MMHG | WEIGHT: 198 LBS

## 2019-11-07 DIAGNOSIS — M75.102 TEAR OF LEFT SUPRASPINATUS TENDON: Primary | ICD-10-CM

## 2019-11-07 DIAGNOSIS — I10 ESSENTIAL HYPERTENSION: ICD-10-CM

## 2019-11-07 PROCEDURE — 99213 OFFICE O/P EST LOW 20 MIN: CPT | Performed by: ORTHOPAEDIC SURGERY

## 2019-11-07 RX ORDER — LISINOPRIL 20 MG/1
TABLET ORAL
Qty: 30 TABLET | Refills: 2 | Status: SHIPPED | OUTPATIENT
Start: 2019-11-07 | End: 2019-12-20 | Stop reason: SDUPTHER

## 2019-11-07 NOTE — PROGRESS NOTES
Assessment  Diagnoses and all orders for this visit:    Tear of left supraspinatus tendon  Comments:  Partial articular sided         Discussion and Plan:    · Patient's previous diagnostic study reveals a partial articular sided supraspinatus tendon tear but the patient noted he does not have any limitations with his daily activities and his pain is tolerable on a daily basis  He wishes to forego any surgical procedure at this time  Again reviewed with the patient that articular sided tears that are partial-thickness in nature typically did not require a surgical intervention and he is demonstrating that today  · Patient was instructed to continue with home exercise program as tolerated  No further treatment is needed at this time  · May perform activities as tolerated  Avoid painful maneuvers  · He will follow up on an as-needed basis  Subjective:   Patient ID: Debra Carias is a 61 y o  male      HPI  78-year-old male presents to the office today for follow-up visit for his left shoulder  Patient has a known partial thickness articular sided supraspinatus tendon tear which he treated conservatively in the springtime with a cortisone injection formal PT with minimal benefit  At that time he was considering a surgical procedure due to the pain and limitations of his daily activities  But today he is noting a decrease in his symptoms since his last visit  He states that he is able to perform his daily activities to his tolerance without limitations due to pain  Patient states that his pain is tolerable and at this time he wishes to forego a surgical procedure  He will continue with home exercise program as tolerated  Denies numbness and tingling, fever, chills        The following portions of the patient's history were reviewed and updated as appropriate: allergies, current medications, past family history, past medical history, past social history, past surgical history and problem list     Review of Systems   Constitutional: Negative for chills, fatigue, fever and unexpected weight change  HENT: Negative for hearing loss, nosebleeds and sore throat  Eyes: Negative for pain, redness and visual disturbance  Respiratory: Negative for cough, shortness of breath and wheezing  Cardiovascular: Negative for chest pain, palpitations and leg swelling  Gastrointestinal: Negative for abdominal pain, nausea and vomiting  Endocrine: Negative for polydipsia and polyuria  Genitourinary: Negative for frequency and urgency  Skin: Negative for color change, rash and wound  Neurological: Negative for dizziness, weakness, numbness and headaches  Psychiatric/Behavioral: Negative for behavioral problems, self-injury and suicidal ideas  Objective:  /76   Pulse (!) 108   Ht 5' 10" (1 778 m)   Wt 89 8 kg (198 lb)   BMI 28 41 kg/m²       Left Shoulder Exam     Range of Motion   External rotation: 70   Forward flexion: 160   Internal rotation 0 degrees: Lumbar     Muscle Strength   Abduction: 5/5   External rotation: 5/5     Tests   Chan test: negative  Drop arm: negative    Other   Erythema: absent  Sensation: normal  Pulse: present             Physical Exam   Constitutional: He is oriented to person, place, and time  He appears well-developed and well-nourished  No distress  Eyes: Pupils are equal, round, and reactive to light  Conjunctivae are normal    Neck: Normal range of motion  Neck supple  Cardiovascular: Normal rate, regular rhythm and intact distal pulses  Pulmonary/Chest: Effort normal and breath sounds normal    Abdominal: Soft  Bowel sounds are normal    Neurological: He is alert and oriented to person, place, and time  He has normal reflexes  Skin: Skin is warm and dry  No rash noted  No erythema  Psychiatric: He has a normal mood and affect   His behavior is normal        Scribe Attestation    I,:   Snehal Pacheco am acting as a scribe while in the presence of the attending physician :        I,:   Jin Estrella MD personally performed the services described in this documentation    as scribed in my presence :

## 2019-11-09 DIAGNOSIS — E11.65 TYPE 2 DIABETES MELLITUS WITH HYPERGLYCEMIA, UNSPECIFIED WHETHER LONG TERM INSULIN USE (HCC): ICD-10-CM

## 2019-11-09 RX ORDER — SITAGLIPTIN AND METFORMIN HYDROCHLORIDE 1000; 50 MG/1; MG/1
TABLET, FILM COATED ORAL
Qty: 180 TABLET | Refills: 1 | Status: SHIPPED | OUTPATIENT
Start: 2019-11-09 | End: 2019-12-20 | Stop reason: ALTCHOICE

## 2019-12-08 DIAGNOSIS — G20 PARKINSON DISEASE (HCC): ICD-10-CM

## 2019-12-20 ENCOUNTER — OFFICE VISIT (OUTPATIENT)
Dept: FAMILY MEDICINE CLINIC | Facility: CLINIC | Age: 64
End: 2019-12-20
Payer: COMMERCIAL

## 2019-12-20 VITALS
HEIGHT: 70 IN | BODY MASS INDEX: 28.06 KG/M2 | SYSTOLIC BLOOD PRESSURE: 118 MMHG | TEMPERATURE: 97.3 F | DIASTOLIC BLOOD PRESSURE: 80 MMHG | WEIGHT: 196 LBS | HEART RATE: 84 BPM | OXYGEN SATURATION: 96 %

## 2019-12-20 DIAGNOSIS — E11.9 TYPE 2 DIABETES MELLITUS WITHOUT COMPLICATION, WITHOUT LONG-TERM CURRENT USE OF INSULIN (HCC): ICD-10-CM

## 2019-12-20 DIAGNOSIS — Z23 ENCOUNTER FOR IMMUNIZATION: ICD-10-CM

## 2019-12-20 DIAGNOSIS — I10 BENIGN ESSENTIAL HYPERTENSION: Primary | ICD-10-CM

## 2019-12-20 DIAGNOSIS — E78.2 MIXED HYPERLIPIDEMIA: ICD-10-CM

## 2019-12-20 DIAGNOSIS — I10 ESSENTIAL HYPERTENSION: ICD-10-CM

## 2019-12-20 DIAGNOSIS — D72.829 LEUKOCYTOSIS, UNSPECIFIED TYPE: ICD-10-CM

## 2019-12-20 DIAGNOSIS — E03.8 SUBCLINICAL HYPOTHYROIDISM: ICD-10-CM

## 2019-12-20 DIAGNOSIS — Z11.59 NEED FOR HEPATITIS C SCREENING TEST: ICD-10-CM

## 2019-12-20 DIAGNOSIS — E78.5 HYPERLIPIDEMIA, UNSPECIFIED HYPERLIPIDEMIA TYPE: ICD-10-CM

## 2019-12-20 DIAGNOSIS — F32.A DEPRESSION, UNSPECIFIED DEPRESSION TYPE: ICD-10-CM

## 2019-12-20 PROCEDURE — 4010F ACE/ARB THERAPY RXD/TAKEN: CPT | Performed by: FAMILY MEDICINE

## 2019-12-20 PROCEDURE — 99214 OFFICE O/P EST MOD 30 MIN: CPT | Performed by: FAMILY MEDICINE

## 2019-12-20 PROCEDURE — 3074F SYST BP LT 130 MM HG: CPT | Performed by: FAMILY MEDICINE

## 2019-12-20 PROCEDURE — 3079F DIAST BP 80-89 MM HG: CPT | Performed by: FAMILY MEDICINE

## 2019-12-20 PROCEDURE — 90732 PPSV23 VACC 2 YRS+ SUBQ/IM: CPT

## 2019-12-20 PROCEDURE — 3008F BODY MASS INDEX DOCD: CPT | Performed by: FAMILY MEDICINE

## 2019-12-20 PROCEDURE — 90471 IMMUNIZATION ADMIN: CPT

## 2019-12-20 PROCEDURE — 4004F PT TOBACCO SCREEN RCVD TLK: CPT | Performed by: FAMILY MEDICINE

## 2019-12-20 RX ORDER — LISINOPRIL 20 MG/1
20 TABLET ORAL DAILY
Qty: 90 TABLET | Refills: 1 | Status: SHIPPED | OUTPATIENT
Start: 2019-12-20 | End: 2020-05-08

## 2019-12-20 RX ORDER — VENLAFAXINE HYDROCHLORIDE 75 MG/1
75 CAPSULE, EXTENDED RELEASE ORAL DAILY
Qty: 90 CAPSULE | Refills: 1 | Status: SHIPPED | OUTPATIENT
Start: 2019-12-20 | End: 2020-05-08

## 2019-12-20 RX ORDER — PRAVASTATIN SODIUM 40 MG
40 TABLET ORAL DAILY
Qty: 90 TABLET | Refills: 1 | Status: SHIPPED | OUTPATIENT
Start: 2019-12-20 | End: 2020-05-08

## 2019-12-20 NOTE — ASSESSMENT & PLAN NOTE
Lab Results   Component Value Date    HGBA1C 6 8 (A) 10/23/2019   Continue follow-up with Endocrinology

## 2019-12-20 NOTE — PROGRESS NOTES
BMI Counseling: Body mass index is 28 12 kg/m²  The BMI is above normal  Nutrition recommendations include decreasing portion sizes, encouraging healthy choices of fruits and vegetables and moderation in carbohydrate intake  Exercise recommendations include moderate physical activity 150 minutes/week and exercising 3-5 times per week  No pharmacotherapy was ordered  Assessment/Plan:  Type 2 diabetes mellitus without complication, without long-term current use of insulin (HCC)    Lab Results   Component Value Date    HGBA1C 6 8 (A) 10/23/2019   Continue follow-up with Endocrinology  Benign essential hypertension  Continue Zestril for hypertension  Blood pressure well controlled  Parkinson disease [facility])  Continue follow-up with Neurology  Depression  Continue Effexor  Mood is stable  Mixed hyperlipidemia  Continue with pravastatin  Diagnoses and all orders for this visit:    Benign essential hypertension    Depression, unspecified depression type  -     venlafaxine (EFFEXOR-XR) 75 mg 24 hr capsule; Take 1 capsule (75 mg total) by mouth daily    Essential hypertension  -     lisinopril (ZESTRIL) 20 mg tablet; Take 1 tablet (20 mg total) by mouth daily    Mixed hyperlipidemia  -     pravastatin (PRAVACHOL) 40 mg tablet; Take 1 tablet (40 mg total) by mouth daily    Hyperlipidemia, unspecified hyperlipidemia type    Type 2 diabetes mellitus without complication, without long-term current use of insulin (HCC)          Subjective:   Chief Complaint   Patient presents with    med check     here for checkup and fbw  My Parkinsons is progressing  The tightness in my lower leg and foot is worsening  The medication makes me nauseated in the am  L arm less strength  No falls  Handwriting getting smaller  No shuffling gait  Patient ID: Kemar Rankin is a 59 y o  male      HPI  The patient is a 54-year-old male with a history of essential hypertension, hyperlipidemia, type 2 diabetes, Parkinson's disease in addition to other who presents today for routine follow-up  He states that he is doing fairly well  He does note that his Parkinson's has been progressing  He has notice some increased tightening in his leg and foot  The medications that he received from his neurologist cause nausea  He has had no falls though these being more careful with stairs  He has noted that his hand writing volume is getting smaller  He does not have a shuffling gait  He does have tremor at rest   He has no cardiovascular pulmonary complaint  No GI or  complaint  No headaches diplopia or focal neurologic symptom  The following portions of the patient's history were reviewed and updated as appropriate: allergies, current medications, past medical history, past social history, past surgical history and problem list     Review of Systems   Constitution: Positive for malaise/fatigue  Negative for decreased appetite  Cardiovascular: Negative for chest pain, irregular heartbeat, leg swelling and palpitations  Respiratory: Negative for cough, shortness of breath and wheezing  Endocrine: Negative for polydipsia, polyphagia and polyuria  Hematologic/Lymphatic: Negative for bleeding problem  Does not bruise/bleed easily  Musculoskeletal: Positive for muscle weakness  Negative for falls and stiffness  Gastrointestinal: Positive for constipation  Negative for diarrhea  Genitourinary: Positive for nocturia  Negative for bladder incontinence and hesitancy  X 1   Neurological: Negative for dizziness and headaches  Psychiatric/Behavioral: Positive for depression  The patient has insomnia  The patient is not nervous/anxious  Objective:    Physical Exam   Constitutional: He is oriented to person, place, and time  He appears well-developed and well-nourished  Neck: No JVD present  No thyromegaly present  Cardiovascular: Normal rate, regular rhythm and normal heart sounds  Pulmonary/Chest: Effort normal and breath sounds normal  No respiratory distress  He has no wheezes  He has no rales  Lymphadenopathy:     He has no cervical adenopathy  Neurological: He is alert and oriented to person, place, and time  Pill rolling tremor of left hand  No cogwheel rigidity  He does have some degree of masked face sees  Psychiatric: He has a normal mood and affect   Thought content normal        Wt Readings from Last 12 Encounters:   12/20/19 88 9 kg (196 lb)   11/07/19 89 8 kg (198 lb)   10/23/19 89 8 kg (198 lb)   10/08/19 87 1 kg (192 lb)   09/06/19 87 1 kg (192 lb)   08/09/19 88 4 kg (194 lb 12 8 oz)   07/22/19 87 5 kg (193 lb)   07/11/19 88 1 kg (194 lb 3 2 oz)   06/26/19 87 1 kg (192 lb)   06/07/19 86 6 kg (191 lb)   05/02/19 88 7 kg (195 lb 9 6 oz)   03/12/19 89 4 kg (197 lb)   ]

## 2019-12-23 LAB
ALBUMIN SERPL-MCNC: 4.2 G/DL (ref 3.6–5.1)
ALBUMIN/GLOB SERPL: 1.7 (CALC) (ref 1–2.5)
ALP SERPL-CCNC: 37 U/L (ref 40–115)
ALT SERPL-CCNC: 12 U/L (ref 9–46)
AST SERPL-CCNC: 17 U/L (ref 10–35)
BILIRUB SERPL-MCNC: 0.5 MG/DL (ref 0.2–1.2)
BUN SERPL-MCNC: 18 MG/DL (ref 7–25)
BUN/CREAT SERPL: ABNORMAL (CALC) (ref 6–22)
CALCIUM SERPL-MCNC: 9.6 MG/DL (ref 8.6–10.3)
CHLORIDE SERPL-SCNC: 102 MMOL/L (ref 98–110)
CHOLEST SERPL-MCNC: 186 MG/DL
CHOLEST/HDLC SERPL: 4.1 (CALC)
CO2 SERPL-SCNC: 23 MMOL/L (ref 20–32)
CREAT SERPL-MCNC: 1.04 MG/DL (ref 0.7–1.25)
ERYTHROCYTE [DISTWIDTH] IN BLOOD BY AUTOMATED COUNT: 13.4 % (ref 11–15)
GLOBULIN SER CALC-MCNC: 2.5 G/DL (CALC) (ref 1.9–3.7)
GLUCOSE SERPL-MCNC: 136 MG/DL (ref 65–99)
HCT VFR BLD AUTO: 45.4 % (ref 38.5–50)
HCV AB S/CO SERPL IA: 0.01
HCV AB SERPL QL IA: NORMAL
HDLC SERPL-MCNC: 45 MG/DL
HGB BLD-MCNC: 15.3 G/DL (ref 13.2–17.1)
LDLC SERPL CALC-MCNC: 109 MG/DL (CALC)
MCH RBC QN AUTO: 30.1 PG (ref 27–33)
MCHC RBC AUTO-ENTMCNC: 33.7 G/DL (ref 32–36)
MCV RBC AUTO: 89.2 FL (ref 80–100)
NONHDLC SERPL-MCNC: 141 MG/DL (CALC)
PLATELET # BLD AUTO: 539 THOUSAND/UL (ref 140–400)
PMV BLD REES-ECKER: 10.5 FL (ref 7.5–12.5)
POTASSIUM SERPL-SCNC: 5.2 MMOL/L (ref 3.5–5.3)
PROT SERPL-MCNC: 6.7 G/DL (ref 6.1–8.1)
RBC # BLD AUTO: 5.09 MILLION/UL (ref 4.2–5.8)
SL AMB EGFR AFRICAN AMERICAN: 88 ML/MIN/1.73M2
SL AMB EGFR NON AFRICAN AMERICAN: 76 ML/MIN/1.73M2
SODIUM SERPL-SCNC: 138 MMOL/L (ref 135–146)
T4 FREE SERPL-MCNC: 1.1 NG/DL (ref 0.8–1.8)
TRIGL SERPL-MCNC: 204 MG/DL
TSH SERPL-ACNC: 2.06 MIU/L (ref 0.4–4.5)
WBC # BLD AUTO: 9.1 THOUSAND/UL (ref 3.8–10.8)

## 2020-01-17 DIAGNOSIS — G20 PARKINSON DISEASE (HCC): ICD-10-CM

## 2020-01-17 RX ORDER — ROPINIROLE 0.25 MG/1
TABLET, FILM COATED ORAL
Qty: 810 TABLET | Refills: 3 | Status: SHIPPED | OUTPATIENT
Start: 2020-01-17 | End: 2020-01-27 | Stop reason: SDUPTHER

## 2020-01-17 NOTE — TELEPHONE ENCOUNTER
Pt called and states that effective date 1/1/2020, his pharmacy changed to [facility] [address]  Requesting rxs be sent, 90 day supply  Rxs entered  Pls review and sign off        Thanks

## 2020-01-27 ENCOUNTER — TELEPHONE (OUTPATIENT)
Dept: NEUROLOGY | Facility: CLINIC | Age: 65
End: 2020-01-27

## 2020-01-27 DIAGNOSIS — G20 PARKINSON DISEASE (HCC): Primary | ICD-10-CM

## 2020-01-27 RX ORDER — ROPINIROLE 0.25 MG/1
TABLET, FILM COATED ORAL
Qty: 90 TABLET | Refills: 0 | Status: SHIPPED | OUTPATIENT
Start: 2020-01-27 | End: 2020-03-09 | Stop reason: SDUPTHER

## 2020-01-27 RX ORDER — ROPINIROLE 0.25 MG/1
TABLET, FILM COATED ORAL
Qty: 810 TABLET | Refills: 3 | Status: SHIPPED | OUTPATIENT
Start: 2020-01-27 | End: 2020-01-27 | Stop reason: SDUPTHER

## 2020-01-27 NOTE — TELEPHONE ENCOUNTER
I called [facility] to verify that they received the patient prescriptions and I was transferred to the non specialty department of [address] [place] [address] where I spoke with the pharmacist Corey who had me verify the 2 prescriptions that Dr Brit Kwok sent into [address] [place] [address]  Emory Jean After I verified the Carbidopa/levodopa and the Requip she informed me they will be sent out within 10 days  She informed me that if the patient needs medication until he receives the mail order we should call in a 10 day supply to the local pharmacy he uses  The information the wife gave to us for the prescription plan was incorrect  The information was for the specialty department when it should have gone to the non specialty department which I changed in his pharmacy profile

## 2020-01-27 NOTE — TELEPHONE ENCOUNTER
Resent both Requip and Sinemet scripts electronically, double checked to [facility], 3 month supply with 3 refills  I'm not sure what happened, but if they would like, could we mail them a copy please?  Thanks

## 2020-01-27 NOTE — TELEPHONE ENCOUNTER
Patient called and left a message on the medication line stating that he called [facility] about his Requip and his Carbidopa and they told him that they did not receive either one of the scripts on 1/17/20  So I called [facility] and they looked in there system and they could not find the script from 1/17/20  I explained to them that our system states Receipt confirmed by pharmacy on 1/17/20 @ 10:08am  But again when they checked they could not find either script

## 2020-01-28 NOTE — TELEPHONE ENCOUNTER
Telephone call to [employer] and spoke with Cherrie HICKS [facility])  She cancelled the 10 day scripts for the patients medications that were sent in

## 2020-01-28 NOTE — TELEPHONE ENCOUNTER
Telephone call to the patient  He has enough medication until his mail away is delivered to his home  I will call and cancel the 10 day supply with Jaun

## 2020-01-28 NOTE — TELEPHONE ENCOUNTER
In the interests of him having enough medication, a 10 day supply of each is sent  However the only other pharmacy besides the mail order in [address] [place] [address] Rd now is [facility] in [place], [place]  If this is correct, I've sent them there

## 2020-01-31 ENCOUNTER — TELEPHONE (OUTPATIENT)
Dept: NEUROLOGY | Facility: CLINIC | Age: 65
End: 2020-01-31

## 2020-01-31 DIAGNOSIS — G20 PARKINSON DISEASE (HCC): Primary | ICD-10-CM

## 2020-02-05 ENCOUNTER — EVALUATION (OUTPATIENT)
Dept: PHYSICAL THERAPY | Facility: CLINIC | Age: 65
End: 2020-02-05
Payer: COMMERCIAL

## 2020-02-05 DIAGNOSIS — G20 PARKINSON DISEASE (HCC): Primary | ICD-10-CM

## 2020-02-05 PROCEDURE — 97112 NEUROMUSCULAR REEDUCATION: CPT | Performed by: PHYSICAL THERAPIST

## 2020-02-05 PROCEDURE — 97163 PT EVAL HIGH COMPLEX 45 MIN: CPT | Performed by: PHYSICAL THERAPIST

## 2020-02-05 NOTE — PROGRESS NOTES
PT Evaluation     Today's date: 2020  Patient name: Bailey Nurse  : 1955  MRN: [id number]  Referring provider: Flaquito Jovel MD  Dx:   Encounter Diagnosis     ICD-10-CM    1  Parkinson disease ([facility] [address] ) G20                   Assessment  Assessment details: Patient is a 59 year old male reporting to skilled PT for Parkinson's disease with interest in exercise program  Patient presents with bilateral LE strength weakness per subjective report  Patient presents as fall risk with noted delayed stepping, decrease in ability to ambulate with dual tasks and veering with dynamic head movement  s  Gait assessment and deficits of decrease stride and veering may be contributory to fall risk status  Patient has a tendency to lose balance in the /lateral/backwards direction  Patient has never had skilled PT for PD  Newer research notes exercise can slow and improve physical movement to reduce health care cost   Patient requires skilled PT to improve their static and dynamic balance, improve their LE strength, improve their ambulation capabilities, and to maximize function to prevent loss of balance and falls associated with Parkinson's Disease  Impairments: abnormal coordination, abnormal gait, abnormal muscle firing, abnormal muscle tone, activity intolerance, impaired balance, impaired physical strength, lacks appropriate home exercise program, pain with function, safety issue, poor posture  and poor body mechanics    Symptom irritability: moderateUnderstanding of Dx/Px/POC: good   Prognosis: good    Goals  Goals:    3  Patient will be able to ambulate on uneven surfaces without loss of balance to increase safety with community mobility  4  Patient will be able to perform a floor transfer without physical assistance to assist with fall recovery at home and in the community    5  Patient will be independent in comprehensive HEP post discharge from program    6  Patient will be able to walk up incline with decreased difficulty and no loss of balance in order to improve functional mobility throughout the community  7  Patient will be able to perform sit to stands from softer surfaces such as a couch or bed in order to improvement function with transfers  8  Patient will have an improvement FOTO score indicating an improvement in function  9  Patient will be consistent with program for at least 1 day per week to assist with management of condition and functional independence of their condition  Plan  Planned therapy interventions: neuromuscular re-education, motor coordination training, patient education, postural training, sensory integrative techniques, stretching, strengthening, therapeutic activities, therapeutic exercise, home exercise program, gait training, flexibility, coordination, balance, activity modification and abdominal trunk stabilization  Frequency: 2x week  Plan of Care beginning date: 2/5/2020  Plan of Care expiration date: 3/5/2020  Treatment plan discussed with: patient and family        Subjective Evaluation    History of Present Illness  Mechanism of injury: Patient is a 59year old male who take his Medication 2 pills 3 times a day at the same time  Notes increased stiffness in the am which last about 30 minutes  Once i'm up and going i'm not bad  1st thing in the am is a little rough  I'm looking for that boxing class and I found out about the class from one of my support groups I go to  Pain  No pain reported  Location: No pain now but can act up at times depending on what i'm doing       Social Support  Steps to enter house: yes  Stairs in house: yes   Lives in: multiple-level home  Lives with: spouse    Hand dominance: right    Treatments  No previous or current treatments  Patient Goals  Patient goals for therapy: increased strength, improved balance, increased motion and return to sport/leisure activities          Objective     Functional Assessment        Comments  Subjective Measure:     Fear of Falling  ABC: 87%    Use if needed:    PD Fatigue Scale: Moderate level of fatigue  Static Balance Testing Measures:   If higher level conduct Dynamic Balance Testing Measures:     Dynamic Balance Testing Measures:     Do either DGI / Mini-Best:  DGI:   Score: 23/24  Ryley et al 2008 Cherrie: Score <19    Verlan Mattyibb al 2011; Laurita Gonzales [address]- 2 9 points         Mini-Bestest:  Score:   Omar 2013: Genita Merry </28  Beatrice marin al 2011: MDC 5 52 points     Timed Up & Go Test:  Regular: 7 32 seconds  Carry: 11 03 seonds  Co 12 seconds   Norbert et al, 2011:   Tug Score Fallers: Medication ON: < 12 21 seconds; Off: < 15 5 seconds   Rick Adamson et al, 2011: MDC: 4 8 seconds     10 Meter Walk Test:  10 meters / 8 seconds = 1 25 M/S   Aravind Harper 2008: MDC:  18 M/S  Age Norm Values Cherrie < 1 0 m/s     Cardio Capacity/ Endurance Testing    6 minute walk test:  Score: 2100 feet  Mikael , 2008: Laurita Heróis [address] 112- 269 feet    Norm Data Healthy Adults:  Haim eduardo, 2002:  60 to 71 years  Male: 572 meter;  Female: 538 meter  70-79 years :    Male: 1 meter,  Female: 471 meter  80-89 years :   Male 1 meter,   Female: 392 meter    Functional Strength Testing LEs:   5x sit to stand:   Scores: 11 5 seconds   Louie 2011: Cherrie,Cut off- > 16 seconds   MDC- 2 3 seconds      Observed Gait Pattern: veering with head turns              Precautions:  has a past medical history of Ankle injury, BPH (benign prostatic hyperplasia), Bronchiolitis, Cancer ([address] ), Diabetes mellitus ([facility] [address] ), History of benign prostatic hyperplasia, History of gastritis, History of insect bite, History of nocturia, History of stroke, Hypertension, Lumbar canal stenosis, Osteoarthritis, knee, Otitis externa, Pain in back, Retinal and vitreous disorder, Sinusitis, acute, and Tendinitis of right rotator cuff  Daily Exercises:  LSVT BIG for PD   Performing at     8/10 exertion level   Daily Exercises:  All 8 reps except 6 and 7 ;10 repsankles   1  Floor to ceiling- 8 reps  2  Side to side- 8 reps bilaterally   3   forward step- 8 reps bilaterally   4  side step- 8 reps bilaterally   5  backward step- 8 reps bilaterally with chair support due to loss of balance  6  rocking- 10 reps bilaterally  7  twist- 10 reps bilaterally     Sit to stand- 10 reps

## 2020-02-06 ENCOUNTER — OFFICE VISIT (OUTPATIENT)
Dept: PHYSICAL THERAPY | Facility: CLINIC | Age: 65
End: 2020-02-06
Payer: COMMERCIAL

## 2020-02-06 DIAGNOSIS — G20 PARKINSON DISEASE (HCC): Primary | ICD-10-CM

## 2020-02-06 PROCEDURE — 97116 GAIT TRAINING THERAPY: CPT

## 2020-02-06 PROCEDURE — 97112 NEUROMUSCULAR REEDUCATION: CPT

## 2020-02-07 NOTE — PROGRESS NOTES
Daily Note     Today's date: 2020  Patient name: Sharlette Habermann  : 1955  MRN: [id number]  Referring provider: Pili Cha MD  Dx:   Encounter Diagnosis     ICD-10-CM    1  Parkinson disease ([facility] [address] ) G20                   Subjective: Patient reports excited to start boxing program and is overall "feeling pretty good" today  Objective: See treatment diary below    Freitas Chinmay     Warm up:  - High Knees w/ opposite elbow to knee  - Walking w/ heel taps  - Sidestepping with big arm movements  - Cl the [employer]     Shadow boxing 20 reps each punch with fwd/bwd ambulation (pt R hand dominant)  - 1 jab  - 2 cross  - 3 non dominant hook  - 4 dominant hook  - 5 nondominant upper cut  - 6 dominant upper cut     Ambulating fwd/bwd/lat with complex boxing combinations     Circuit 1 (3x)  - Agility Ladder with boxing combinations at end  - Medium Hurdles with boxing combinations at end on upright bag  - Box Jumps (10 reps)  - Stepping with hold during SLS and throwing boxing combinations (-1-2, -3-4, -5-6)  - Walking on staggered raised colored dots  - Wall Push Ups (10 reps)  - Squats (10 reps)     Circuit 2 (5 stations - 1 minute each)  - Ta Ropes  - Sled Push  - Crab Walk/Bear Crawl  - Lateral med ball toss into wall (12 lb)  - Med [employer] (16 lb)     Core  - Plank goalie game: in plank position/quardruped, blocking/swatting 1 medium bounce ball from rolling past them ("goal")     Cool down:  Floor to ceiling stretch x 5 reps  Hip flexor stretch in lunge position   Arm across chest stretch  Tricep stretch behind head  Calf Stretch  Toe Stretch    Assessment: Patient able to tolerate treatment session well today with initiation of boxing program  He demonstrated decreased cardiovascular endurance as indicated by decreased tolerance to increased intensity and required frequent rest breaks   He displayed good coordination with simple boxing combinations and SLS activities requiring 50% verbal cues for sequencing and appropriate mechanics  He will continue to benefit from skilled outpatient PT in order to reduce risk for falls, promote improved mobility, and slow progression of PD diagnosis  Plan: Continue per plan of care  Precautions:  has a past medical history of Ankle injury, BPH (benign prostatic hyperplasia), Bronchiolitis, Cancer ([facility] [address] ), [facility] History of benign prostatic hyperplasia, History of gastritis, History of insect bite, History of nocturia, History of stroke, Hypertension, Lumbar canal stenosis, Osteoarthritis, knee, Otitis externa, Pain in back, Retinal and vitreous disorder, Sinusitis, acute, and Tendinitis of right rotator cuff

## 2020-02-10 ENCOUNTER — OFFICE VISIT (OUTPATIENT)
Dept: PHYSICAL THERAPY | Facility: CLINIC | Age: 65
End: 2020-02-10
Payer: COMMERCIAL

## 2020-02-10 DIAGNOSIS — G20 PARKINSON DISEASE (HCC): Primary | ICD-10-CM

## 2020-02-10 PROCEDURE — 97112 NEUROMUSCULAR REEDUCATION: CPT

## 2020-02-11 NOTE — PROGRESS NOTES
Daily Note     Today's date: 2/10/2020  Patient name: Cristopher Loredo  : 1955  MRN: [id number]  Referring provider: Humphrey Perez MD  Dx:   Encounter Diagnosis     ICD-10-CM    1  Parkinson disease ([facility] [address] ) [address]        Start Time:   Stop Time:   Total time in clinic (min): 60 minutes    Subjective: Patient reported to skilled PT with no updates       Objective: See treatment diary below  [address] [place] [address] Program     Warm up:  - High Knees w/ opposite elbow to knee  - Walking w/ heel taps  - Sidestepping with big arm movements  - Cl the [facility]     Shadow boxing 20 reps each punch with fwd/bwd ambulation (pt R hand dominant)  - 1 jab  - 2 cross  - 3 non dominant hook  - 4 dominant hook  - 5 nondominant upper cut  - 6 dominant upper cut     Circuit 1 (3 Stations x 2 - 1 minute stations)  -Weaving through cones, holding 2 lb med balls, 10 jumping jacks at end, repeat cycle  -Weaving through cones, holding 2 lb med balls, overhead squats at end, 10 reps each direction     Circuit 2-  (3 Stations x 2 - 1 minute stations)  -Speed Ladder- two feet each step, call out combo at end  -Speed Ladder- one foot each step, call out combo at the end  -Speed Ladder- lateral stepping, two feet in each, combo at the end     Circuit 3 (1x)  - Cone weave while kicking ball and punching simple boxing combinations  - Squats/prisoner squats/wall push ups  - Resisted Running, black theraband around hips, forward and laterally- 100 feet     Circuit 4 (3x)  - Complex boxing combinations  - Jogging (100 feet x 2) with intermittent cone weave     Circuit 5 (1x)  - Shadow boxing in wall sit until turn for cone weave  - Cone weave (running) and punching simple boxing combinations     Circuit 6 (2x)  - Running while punching simple boxing combinations (100 feet x 2)     CORE- Seated on P Ball with ball passes laterally to partner- 3 minutes     Cool down:  Floor to ceiling stretch x 5 reps  Hip flexor stretch in lunge position   Arm across chest stretch  Tricep stretch behind head  Calf Stretch  Toe Stretch        Assessment: Patient able to tolerate treatment session well today with continued focus on large amplitude movements, speed of movements, and addition of cognitive components  Patient challenged with generation of amplitude with complex tasks and coordination of movement, progressing well with stepping length but decreased step length and shuffling continually noted  Patient will continue to benefit from skilled outpatient PT in order to reduce his risk for falls, maximize his function, and slow progression of PD diagnosis  Plan- Continue PD boxing     Precautions:  has a past medical history of Ankle injury, BPH (benign prostatic hyperplasia), Bronchiolitis, Cancer ([facility] [address] ), Diabetes mellitus ([facility] [address] ), History of benign prostatic hyperplasia, History of gastritis, History of insect bite, History of nocturia, History of stroke, Hypertension, Lumbar canal stenosis, Osteoarthritis, knee, Otitis externa, Pain in back, Retinal and vitreous disorder, Sinusitis, acute, and Tendinitis of right rotator cuff

## 2020-02-13 ENCOUNTER — OFFICE VISIT (OUTPATIENT)
Dept: PHYSICAL THERAPY | Facility: CLINIC | Age: 65
End: 2020-02-13
Payer: COMMERCIAL

## 2020-02-13 DIAGNOSIS — G20 PARKINSON DISEASE (HCC): Primary | ICD-10-CM

## 2020-02-13 PROCEDURE — 97530 THERAPEUTIC ACTIVITIES: CPT | Performed by: PHYSICAL THERAPIST

## 2020-02-13 PROCEDURE — 97112 NEUROMUSCULAR REEDUCATION: CPT | Performed by: PHYSICAL THERAPIST

## 2020-02-14 NOTE — PROGRESS NOTES
Daily Note     Today's date: 2/10/2020  Patient name: Arden Núñez  : 1955  MRN: [id number]  Referring provider: Kavya Spain MD  Dx:   No diagnosis found  Start Time:   Stop Time:   Total time in clinic (min): 59 minutes    Subjective: Patient reported to skilled PT with no updates  Objective: See treatment diary below  [address] [place] [address] Program     Warm up:  - High Knees w/ opposite elbow to knee  - Walking w/ heel taps  - Sidestepping with big arm movements  - Cl the [employer]     Shadow boxing 20 reps each punch with fwd/bwd ambulation (pt R hand dominant)  - 1 jab  - 2 cross  - 3 non dominant hook  - 4 dominant hook  - 5 nondominant upper cut  - 6 dominant upper cut     Wall Sit with PT calling random complex combinations (30 sec x 10)     Superset 1  - Cone sprint back and forth with memory task (cone 1: 5 squats, cone 2: -5-6-5, cone 3: -3-3-2-1, cone 4: -2-3-2)  - Squats (15 reps), wall push ups (15 reps), crunches (15 reps)     Cone Ball Toss/Catch (zig-zag line with tennis ball and small cones): 3 reps down and back     Cone [employer] (in a [place] with medium bounce ball and medium cones): 3 cycles around     Cool down:  Floor to ceiling stretch x 5 reps  Hip flexor stretch in lunge position   Arm across chest stretch  Tricep stretch behind head  Calf Stretch  Toe Stretch     Assessment: Patient was able to tolerate his treatment session well this date with continued challenged with cognitive tasks  He struggled to recall combinations and required frequent reiteration to complete  Patient required intermittent rest breaks during session due to fatigue demonstrating decreased cardiovascular endurance  He however did demonstrate good sequencing of coordination tasks and agility during speed drill  He will continue to benefit from skilled outpatient PT in order to reduce risk for falls, promote improved mobility, and slow progression of PD diagnosis      Plan: Continue PD boxing class per POC  Precautions:  has a past medical history of Ankle injury, BPH (benign prostatic hyperplasia), Bronchiolitis, Cancer ([facility] [address] ), Diabetes mellitus ([facility] [address] ), History of benign prostatic hyperplasia, History of gastritis, History of insect bite, History of nocturia, History of stroke, Hypertension, Lumbar canal stenosis, Osteoarthritis, knee, Otitis externa, Pain in back, Retinal and vitreous disorder, Sinusitis, acute, and Tendinitis of right rotator cuff

## 2020-02-17 ENCOUNTER — OFFICE VISIT (OUTPATIENT)
Dept: PHYSICAL THERAPY | Facility: CLINIC | Age: 65
End: 2020-02-17
Payer: COMMERCIAL

## 2020-02-17 DIAGNOSIS — G20 PARKINSON DISEASE (HCC): Primary | ICD-10-CM

## 2020-02-17 PROCEDURE — 97530 THERAPEUTIC ACTIVITIES: CPT | Performed by: PHYSICAL THERAPIST

## 2020-02-17 PROCEDURE — 97110 THERAPEUTIC EXERCISES: CPT | Performed by: PHYSICAL THERAPIST

## 2020-02-17 PROCEDURE — 97112 NEUROMUSCULAR REEDUCATION: CPT | Performed by: PHYSICAL THERAPIST

## 2020-02-18 NOTE — PROGRESS NOTES
Daily Note     Today's date: 2/10/2020  Patient name: Sharlette Habermann  : 1955  MRN: [id number]  Referring provider: Pili Cha MD  Dx:   Encounter Diagnosis     ICD-10-CM    1  Parkinson disease ([facility] [address] ) G20                   Subjective: Patient reported to skilled PT with no complaints  Objective: See treatment diary below     [employer] [facility]     Warm up:  - High Knees  - Walking w/ heel taps  - Sidestepping with big arm movements  - Touch opposite foot and reach up and across (thoracic spine mobility)      Shadow boxing 20 reps each (pt R hand dominant)  - 1 jab  - 2 cross  - 3 non dominant hook  - 4 dominant hook  - 5 nondominant upper cut  - 6 dominant upper cut     Stations 1 (5x)  - Speed combos on punching bag  - Squats, heel raises, and lunges   - Jogging to cone w/ combos    Stations 2 (5x)  - Box jumps on 6" box, alternating step taps once fatigued from jumps    - Side shuffle with combos at the end      Agility/coordination:  - Collecting cones through obstacle course  - Gera Says w/ race to cone in center of room     Core  - Plank across from teammate w/ alternate hand taps      Cool down:  Floor to ceiling stretch x 5 reps  Hip flexor stretch in lunge position   Arm across chest stretch  Tricep stretch behind head  Calf Stretch  Toe Stretch     Assessment: Patient tolerated treatment session well today  Initially required cueing for odds/evens during shadow boxing, but then demonstrates good carryover t/o session  Does well w/ large amplitude movements and improving his speed  He will continue to benefit from skilled outpatient PT in order to reduce risk for falls, promote improved mobility, and slow progression of PD diagnosis  Plan: Continue PD boxing class per POC         Precautions:  has a past medical history of Ankle injury, BPH (benign prostatic hyperplasia), Bronchiolitis, Cancer ([facility] [address] ), Diabetes mellitus ([facility] [address] ), History of benign prostatic hyperplasia, History of gastritis, History of insect bite, History of nocturia, History of stroke, Hypertension, Lumbar canal stenosis, Osteoarthritis, knee, Otitis externa, Pain in back, Retinal and vitreous disorder, Sinusitis, acute, and Tendinitis of right rotator cuff

## 2020-02-20 ENCOUNTER — OFFICE VISIT (OUTPATIENT)
Dept: PHYSICAL THERAPY | Facility: CLINIC | Age: 65
End: 2020-02-20
Payer: COMMERCIAL

## 2020-02-20 DIAGNOSIS — G20 PARKINSON DISEASE (HCC): Primary | ICD-10-CM

## 2020-02-20 PROCEDURE — 97110 THERAPEUTIC EXERCISES: CPT

## 2020-02-20 PROCEDURE — 97116 GAIT TRAINING THERAPY: CPT

## 2020-02-20 PROCEDURE — 97112 NEUROMUSCULAR REEDUCATION: CPT

## 2020-02-21 NOTE — PROGRESS NOTES
Daily Note     Today's date: 2/10/2020  Patient name: Rayna Justin  : 1955  MRN: [id number]  Referring provider: Johnny Nickerson MD  Dx:   Encounter Diagnosis     ICD-10-CM    1  Parkinson disease ([facility] [address] ) G20                   Subjective: Patient continues to report to PT with no new complaints  He notes he is occasionally sore following the sessions, however overall "I feel pretty good "    Objective: See treatment diary below  [address] [place] [address] Program     Warm up:  - High Knees  - Walking w/ heel taps  - Sidestepping with big arm movements  - Touch opposite foot and reach up and across (thoracic spine mobility)      Shadow boxing 20 reps each (pt R hand dominant)  - 1 jab  - 2 cross  - 3 non dominant hook  - 4 dominant hook  - 5 nondominant upper cut  - 6 dominant upper cut  -1-2  -3-4  -5-6  -1-2-3-4-5-6      Circuit 1 (6x)  - Combinations at upright bag (-1-2-3-4-5-6) with focus on full UE extension and trunk rotation  - Squats  - Broad Jumps over cones with boxing combinations  - Sprint to cone (25 feet) with combination and backpedal back to first cone (2x)  - Successive Box Jumps (On/Off 6", On/Off 12", On/Off 18")    Agility/coordination (4x):  - Gera Says w/ race to cone in center of room  - Med ball slams (12 lb - 5 reps)    Core  - Lateral plank walk passing ball from person to person in zig-zag formation     Cool down:  Floor to ceiling stretch x 5 reps  Hip flexor stretch in lunge position   Arm across chest stretch  Tricep stretch behind head  Calf Stretch  Toe Stretch  Thread the needle     Assessment: Patient able to tolerate treatment session well today  He was challenged with increased height of box jumps, however was able to perform at 18" with PT assist to maintain balance  Required cueing to increase speed and amplitude of movement with good carryover throughout session   He will continue to benefit from skilled outpatient PT in order to reduce risk for falls, promote improved mobility, and slow progression of PD diagnosis  Plan: Continue PD boxing class per POC  Precautions:  has a past medical history of Ankle injury, BPH (benign prostatic hyperplasia), Bronchiolitis, Cancer ([facility] [address] Diabetes mellitus ([facility] [address] ), History of benign prostatic hyperplasia, History of gastritis, History of insect bite, History of nocturia, History of stroke, Hypertension, Lumbar canal stenosis, Osteoarthritis, knee, Otitis externa, Pain in back, Retinal and vitreous disorder, Sinusitis, acute, and Tendinitis of right rotator cuff

## 2020-02-24 ENCOUNTER — OFFICE VISIT (OUTPATIENT)
Dept: PHYSICAL THERAPY | Facility: CLINIC | Age: 65
End: 2020-02-24
Payer: COMMERCIAL

## 2020-02-24 DIAGNOSIS — G20 PARKINSON DISEASE (HCC): Primary | ICD-10-CM

## 2020-02-24 PROCEDURE — 97112 NEUROMUSCULAR REEDUCATION: CPT | Performed by: PHYSICAL THERAPIST

## 2020-02-24 PROCEDURE — 97110 THERAPEUTIC EXERCISES: CPT | Performed by: PHYSICAL THERAPIST

## 2020-02-24 PROCEDURE — 97530 THERAPEUTIC ACTIVITIES: CPT | Performed by: PHYSICAL THERAPIST

## 2020-02-25 NOTE — PROGRESS NOTES
Daily Note     Today's date: 2/10/2020  Patient name: Kaelyn Campbell  : 1955  MRN: [id number]  Referring provider: Darrell Alba MD  Dx:   Encounter Diagnosis     ICD-10-CM    1  Parkinson disease ([facility] [address] ) G20                   Subjective: Patient reports feeling good  Objective: See treatment diary below    [facility]     Warm up:  - High Knees  - Walking w/ heel taps  - Sidestepping with big arm movements  - Heel walking  - Toe walking      Shadow boxing 20 reps each (pt R hand dominant)  - 1 jab  - 2 cross  - 3 non dominant hook  - 4 dominant hook  - 5 nondominant upper cut  - 6 dominant upper cut     Stations:  1  Speed combos on punching bag  2  Mountain climbers against wall  3  Combos seated on physioball  4  Combos w/ doretha the rabbit to cones      Core  - Med ball slams in kneeling (16# ball)  - Sidestepping w/ [employer] (partner holding other side)  - Quadruped alternate hand taps w/ partner  - Quadruped/plank game w/ swatting ball      Cool down:  Floor to ceiling stretch x 5 reps  Hip flexor stretch in lunge position   Arm across chest stretch  Tricep stretch behind head  Calf Stretch  Toe Stretch  Thread the needle     Assessment: Patient able to tolerate treatment session well today  He did c/o calf cramps during doretha the rabbit, so modified with side-stepping  Good carryover of combos, as well as force and amplitude  He will continue to benefit from skilled outpatient PT in order to reduce risk for falls, promote improved mobility, and slow progression of PD diagnosis  Plan: Continue PD boxing class per POC         Precautions:  has a past medical history of Ankle injury, BPH (benign prostatic hyperplasia), Bronchiolitis, Cancer ([facility] [address] ), [facility] History of benign prostatic hyperplasia, History of gastritis, History of insect bite, History of nocturia, History of stroke, Hypertension, Lumbar canal stenosis, Osteoarthritis, knee, Otitis externa, Pain in back, Retinal and vitreous disorder, Sinusitis, acute, and Tendinitis of right rotator cuff

## 2020-02-27 ENCOUNTER — OFFICE VISIT (OUTPATIENT)
Dept: PHYSICAL THERAPY | Facility: CLINIC | Age: 65
End: 2020-02-27
Payer: COMMERCIAL

## 2020-02-27 DIAGNOSIS — G20 PARKINSON DISEASE (HCC): Primary | ICD-10-CM

## 2020-02-27 PROCEDURE — 97112 NEUROMUSCULAR REEDUCATION: CPT | Performed by: PHYSICAL THERAPIST

## 2020-02-27 PROCEDURE — 97110 THERAPEUTIC EXERCISES: CPT | Performed by: PHYSICAL THERAPIST

## 2020-02-27 PROCEDURE — 97116 GAIT TRAINING THERAPY: CPT | Performed by: PHYSICAL THERAPIST

## 2020-02-28 NOTE — PROGRESS NOTES
Daily Note     Today's date: 2/10/2020  Patient name: Bridger Cruz  : 1955  MRN: [id number]  Referring provider: Dragan Mccormick MD  Dx:   Encounter Diagnosis     ICD-10-CM    1  Parkinson disease ([address] ) G20                   Subjective: Patient reports feeling well today    Objective: See treatment diary below    Fortino Moy     Warm up:  - High Knees  - Walking w/ heel taps  - Sidestepping with big arm movements  - Touch opposite foot and reach up and across (thoracic spine mobility)      Shadow boxing 20 reps each (pt R hand dominant)  - 1 jab  - 2 cross  - 3 non dominant hook  - 4 dominant hook  - 5 nondominant upper cut  - 6 dominant upper cut     Fwd/Bwd Ambulation with boxing combinations: 4 cycles     Stations (1 minute - 2 x through)  - Speed Punches on upright bag  - Punches on PBall  - Box Jumps  - Resisted fwd running with TB and weighted vest with boxing combinations  - Resisted sidestepping with TB and boxing combinations        Agility/coordination:  - Med ball slams (12 lb)  - Raised colored dots staggered ambulation  - Agility ladder  - Hurdles (medium/tall)  - Box Jumps  - Wall Push Ups  - Squats  - Land mines     Core  - Plank goalie game: swatting bounce ball in plank/quadruped  - Semi-reclined with knees bent, ball pass with ball between ankles with cognitive component (counting backwards from 11 by 7's)     Cool down:  Floor to ceiling stretch x 5 reps  Hip flexor stretch in lunge position   Arm across chest stretch  Tricep stretch behind head  Calf Stretch  Toe Stretch  Thread the needle     Assessment: Tolerated session well today  Was challenged with resisted walking with weighted vest and needed cues to increased gait speed  He will continue to benefit from skilled outpatient PT in order to reduce risk for falls, promote improved mobility, and slow progression of PD diagnosis  Plan: Continue PD boxing class per POC         Precautions:  has a past medical history of Ankle injury, BPH (benign prostatic hyperplasia), Bronchiolitis, Cancer ([facility] ), Diabetes mellitus ([facility] ), History of benign prostatic hyperplasia, History of gastritis, History of insect bite, History of nocturia, History of stroke, Hypertension, Lumbar canal stenosis, Osteoarthritis, knee, Otitis externa, Pain in back, Retinal and vitreous disorder, Sinusitis, acute, and Tendinitis of right rotator cuff

## 2020-03-02 ENCOUNTER — OFFICE VISIT (OUTPATIENT)
Dept: PHYSICAL THERAPY | Facility: CLINIC | Age: 65
End: 2020-03-02
Payer: COMMERCIAL

## 2020-03-02 DIAGNOSIS — G20 PARKINSON DISEASE (HCC): Primary | ICD-10-CM

## 2020-03-02 PROCEDURE — 97112 NEUROMUSCULAR REEDUCATION: CPT

## 2020-03-03 NOTE — PROGRESS NOTES
Daily Note     Today's date: 2/10/2020  Patient name: Kb Aquino  : 1955  MRN: [id number]  Referring provider: Marita Ramesh MD  Dx:   Encounter Diagnosis     ICD-10-CM    1  Parkinson disease ([address] ) G20                   Subjective: Patient reports no new complaints and is feeling "pretty good" today  Objective: See treatment diary below    Freitas Chinmay     Warm up:  - High Knees  - Walking w/ heel taps  - Sidestepping with big arm movements  - Touch opposite foot and reach up and across (thoracic spine mobility)      Shadow boxing 20 reps each (pt R hand dominant)  - 1 jab  - 2 cross  - 3 non dominant hook  - 4 dominant hook  - 5 nondominant upper cut  - 6 dominant upper cut    Stations 1 (1 minute each)  - Speed Boxing Combinations on Upright Punching Bag  - Complex Boxing Combinations to Mitts  - Broad Jumping Over Low Cones  - Wall Sit with TB Resisted Shadow Boxing    Partner resisted fwd/bwd ambulation with reactive balance and complex boxing combination at end (3 cycles)    Agility/coordination (1 minute each)  - Alternate Ta Ropes  - Sled Push  - Alternate Foot Jump Taps  - Swatting tossed light ping pong balls reaching outside Janes with bilateral UE on foam    Core  - [employer] game pushing 10 lb med ball with alternate UEs    Wall Sit with cognitive task Pitney Alessio with quick addition > sprint to cone)     Cool down:  Floor to ceiling stretch x 5 reps  Hip flexor stretch in lunge position   Arm across chest stretch  Tricep stretch behind head  Calf Stretch  Toe Stretch  Thread the needle     Assessment: Patient able to tolerate treatment session well today  He demonstrated fair balance when reaching outside his Janes, however displayed lowering strategy on foam to improve stability with exercise  He will continue to benefit from skilled outpatient PT in order to reduce risk for falls, promote improved mobility, and slow progression of PD diagnosis        Plan: Continue PD boxing class per POC  Precautions:  has a past medical history of Ankle injury, BPH (benign prostatic hyperplasia), Bronchiolitis, Cancer ([facility] [address] ), Diabetes mellitus ([facility] [address] ), History of benign prostatic hyperplasia, History of gastritis, History of insect bite, History of nocturia, History of stroke, Hypertension, Lumbar canal stenosis, Osteoarthritis, knee, Otitis externa, Pain in back, Retinal and vitreous disorder, Sinusitis, acute, and Tendinitis of right rotator cuff

## 2020-03-03 NOTE — PROGRESS NOTES
Daily Note     Today's date: 3/2/20  Patient name: Bridger Cruz  : 1955  MRN: [id number]  Referring provider: Dragan Mccormick MD  Dx:   Encounter Diagnosis     ICD-10-CM    1  Parkinson disease ([address] ) Delonte Aguilar        Start Time:   Stop Time:   Total time in clinic (min): 60 minutes    Subjective: patient reports no changes since last visit  [facility] [place] [facility]     Warm up:  - High Knees  - Walking w/ heel taps  - Sidestepping with big arm movements  - Touch opposite foot and reach up and across (thoracic spine mobility)      Shadow boxing 20 reps each (pt R hand dominant)  - 1 jab  - 2 cross  - 3 non dominant hook  - 4 dominant hook  - 5 nondominant upper cut  - 6 dominant upper cut     Stations 1 (1 minute each)  - Speed Boxing Combinations on Upright Punching Bag  - Complex Boxing Combinations to Mitts  - Broad Jumping Over Low [place]  - Wall Sit with TB Resisted Shadow Boxing     Partner resisted fwd/bwd ambulation with reactive balance and complex boxing combination at end (3 cycles)     Agility/coordination (1 minute each)  - Alternate Ta Ropes  - Sled Push  - Alternate Foot Jump Taps  - Swatting tossed light ping pong balls reaching outside Janes with bilateral UE on foam     Core  - Plank goalie game pushing 10 lb med ball with alternate UEs     Wall Sit with cognitive task (Rock Paper Scissors with quick addition > sprint to cone)     Cool down:  Floor to ceiling stretch x 5 reps  Hip flexor stretch in lunge position   Arm across chest stretch  Tricep stretch behind head  Calf Stretch  Toe Stretch  Thread the needle     Assessment: Patient able to tolerate treatment session well today  He demonstrated reduced trunk rotation with jabs/crosses requiring PT verbal cues  Maintained good stability with resisted fwd/bkwd ambulation with no loss of balance   He will continue to benefit from skilled outpatient PT in order to reduce her risk for falls, maximize his function, and slow progression of PD diagnosis  Plan: Continue per plan of care  Precautions:  has a past medical history of Ankle injury, BPH (benign prostatic hyperplasia), Bronchiolitis, Cancer ([facility] [address] Diabetes mellitus ([facility] ), History of benign prostatic hyperplasia, History of gastritis, History of insect bite, History of nocturia, History of stroke, Hypertension, Lumbar canal stenosis, Osteoarthritis, knee, Otitis externa, Pain in back, Retinal and vitreous disorder, Sinusitis, acute, and Tendinitis of right rotator cuff

## 2020-03-05 ENCOUNTER — OFFICE VISIT (OUTPATIENT)
Dept: PHYSICAL THERAPY | Facility: CLINIC | Age: 65
End: 2020-03-05
Payer: COMMERCIAL

## 2020-03-05 DIAGNOSIS — G20 PARKINSON DISEASE (HCC): Primary | ICD-10-CM

## 2020-03-05 PROCEDURE — 97116 GAIT TRAINING THERAPY: CPT

## 2020-03-05 PROCEDURE — 97112 NEUROMUSCULAR REEDUCATION: CPT

## 2020-03-05 NOTE — PROGRESS NOTES
[facility]        RETURN PATIENT NOTE    Patient: [address] Record Number: # [id number]  YOB: 1955  Date of visit: 3/9/2020    Referring provider: Kaylan Massey MD    ASSESSMENT     Diagnoses for this encounter:  1  Parkinson disease ([address] )  rOPINIRole (REQUIP) 0 25 mg tablet     Impression of this 58 yo gentleman with L>R hand tremor-predominant idiopathic Parkinson's disease, here for follow-up  He has been doing relatively well, having continued and enjoying the [employer] and regularly taking his medication - now consisting of Sinemet 2 tab TID and ropinirole 0 75mg TID  He feels he would be able to go up on this  PLAN     · Will continue uptitration of ropinirole as follows  · Week 1:  3 5 tab three times a day  · Week 2:  4 tab three times a day   · Week 3:  4 5 tab three times a day  · Week 4:  5 tab three times a day  · Call office to discuss findings  If not yet controlled then consider upgrading tablet size  · Continue with PD Boxing techniques whenever possible and the classes, as this has seen the greatest increase compared to medication changes  · Continue with the Sinemet 2 tab TID at the moment  He will continue with exercising at home, suggesting weight training, stretching  Suggested trying prune juice or eating prunes for fibers, and a low-dose stool softener that he has at home  He will let us know which is most effective for him  We discussed about relevant clinical research ongoing through clinicaltrials  gov    · The patient has been instructed to call us about any new neurological problems or medication side effects  · Return to Clinic in 6 months    A total of 40 minutes were spent face-to-face with this patient, of which 65% was spent on counseling and coordination of care  HISTORY OF PRESENT ILLNESS:     Mr Laura Joy is a 59 y o  right handed male who returns for Parkinson's disease and L hand tremor  Last visit 10/08/19  The patient was not accompanied today  History was obtained from patient    Interim History  Pramipexole caused upset stomach and he felt worse taking it - he discontinued it  It was replaced with ropinirole  As of 12/20/19 he felt his PD was worsening with increase tightening in leg and foot  He told his PCP that the ropinirole caused more nausea  He is taking 0 75mg TID of ropinirole and Sinemet 25/100 2 tab 6:30-7am / 1-2pm / 8pm  He feels minimal wearing off between doses  He feels his mornings are "pretty good"  The nausea is now bearable  He was referred to [employer] twice a week and has followed with PT since  Today he feels his tremors are still "better than before the medication"  With physical stress the tremors do come more often  His most bothersome symptoms are constipation (started within the last month), L side weakness (worst under physical stress)  INITIAL HISTORY  Pt has been under conservative treatment for a L shoulder impingement syndrome according to records, having undergoing Physical Therapy training with improved range of motion and pain level, a home exercise program, and receiving a cortisone injection on 3/7/19  home exercise program  He was offered MRI L shoulder before but he declined due to his slow improvement  Denies numbness and tingling, fevers or chills  Main bothersome neurological symptoms today are:   1  L hand tremor - noted by PCP in Jan 2019 but without stated parkinsonian symptoms at the time  Movements began nearly a year ago but more minor  It occurs more at rest than action, but also noticed hand shakes when he tries to  objects and hold them stationary  Thus far, since he is R handed, it has not affected his writing, eating or fine motor movements  Over time, he noticed that the hand would shake more on its own   He feels any pressure on the wrist tends to amplify it  Year ago he noticed the R hand would, on a certain position when resting on an object pressing against wrist, would shake as well, but not to the extent of the left  He denies noticing particular stiffness or slowness more on any particular side of his body  He does have a L rotator cuff injury since Dec 2018 but he had tremors prior to the injury  He denies prior head or neck injury, denies numbness or tingling in either arm  He has had EMG previously for his legs before for back pain, but not for the arms  Living Situation + ADLs: living with wife  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  Able to to do most tasks at home independently  Drives well  Medications tried in the past with side effects: none    Parkinson's Disease Motor Symptoms:   Dyskinesia:  denies  Motor Fluctuations and Off time:  denies  Rigidity:  denies  Tremor:  see above  Fatigue:  yes occasionally  Freezing of gait:  none  Handwriting: unaffection  Facial Expression: denies noticing a difference     Nonmotor symptoms:   Hallucinations:  He has hx of ocular migraines and will sometimes have see patterns that vanish after the migraine  He has them for last 3 years  Voice volume: "sometimes" more when fatigued  Mood:  denies either anxiety or depression  Cognition: mild forgetfulness  Sleep: "fractured" either due to shoulder pain  Waking up twice a night and trouble falling back asleep  Constipation: none  Urinary:  none  Balance / Falls:  no issues   Orthostatism: slightly but he feels this may be from his blood pressure medications  Drooling: none  Driving: none, no issues  Hyposmia: denies  RBD (REM semi-purposeful body movements): none  Skin Cancer History: none  Exercise Therapy: moderate activity with his hobbies and work outside        Family History: Number of First Degree Relatives With Parkinsonism/Dementia: none known    REVIEW OF PAST MEDICAL, SOCIAL AND FAMILY HISTORY:  This is the list of problems as per our Medical Records:    Patient Active Problem List    Diagnosis Date Noted    Parkinson disease ([facility] ) 10/08/2019    Other male erectile dysfunction 08/08/2019    Left carpal tunnel syndrome     Tear of left supraspinatus tendon 07/11/2019    Impingement syndrome of left shoulder 03/07/2019    Thoracic spine pain 03/07/2019    Tremor of left hand 01/10/2019    Dermatitis of left ear canal 07/13/2018    Prostate cancer ([place] [id number] ) 02/02/2018    Type 2 diabetes mellitus without complication, without long-term current use of insulin ([place] [id number] ) 08/24/2017    Arteriosclerosis of coronary artery 05/24/2017    Subclinical hypothyroidism 04/11/2017    Thrombocytosis ([place] [address] ) 10/31/2016    Leukocytosis 10/31/2016    Vitamin D deficiency 10/20/2015    Benign essential hypertension 05/15/2015    Hyperlipidemia 08/20/2012    Depression 06/01/2012    Mixed hyperlipidemia 11/10/2010     No Known Allergies     Outpatient Encounter Medications as of 3/9/2020   Medication Sig Dispense Refill    aspirin 325 mg tablet Take 325 mg by mouth daily      carbidopa-levodopa (SINEMET)  mg per tablet Take 2 tablets by mouth 3 (three) times a day Follow clinic instructions 540 tablet 3    Cholecalciferol (VITAMIN D) 2000 units CAPS Take 2 capsules (4,000 Units total) by mouth daily  0    co-enzyme Q-10 30 MG capsule Take 1 capsule (30 mg total) by mouth daily 30 capsule 0    Empagliflozin (JARDIANCE) 10 MG TABS 1 tab daily 90 tablet 1    glucose blood (ONETOUCH VERIO) test strip Check BG 2x per day 200 each 3    lisinopril (ZESTRIL) 20 mg tablet Take 1 tablet (20 mg total) by mouth daily 90 tablet 1    pravastatin (PRAVACHOL) 40 mg tablet Take 1 tablet (40 mg total) by mouth daily 90 tablet 1    rOPINIRole (REQUIP) 0 25 mg tablet Follow clinic instructions on taking this up to 5 tablets TID, as tolerated and to level of tremor / weakness benefit   90 tablet 0    sitaGLIPtin-metFORMIN (JANUMET)  MG per tablet Take 1 tablet by mouth 2 (two) times a day with meals 180 tablet 1    vardenafil (LEVITRA) 20 MG tablet Take by mouth daily as needed       venlafaxine (EFFEXOR-XR) 75 mg 24 hr capsule Take 1 capsule (75 mg total) by mouth daily 90 capsule 1    [DISCONTINUED] rOPINIRole (REQUIP) 0 25 mg tablet Take 3 tab TID  This script is temporary 10 day supply until he received main ropinirole from his mail order pharmacy 90 tablet 0    carbidopa-levodopa (SINEMET)  mg per tablet Take 2 tablets by mouth 3 (three) times a day for 10 days This is temporary 10 day supply until he get levodopa from his mail order pharmacy 60 tablet 0     No facility-administered encounter medications on file as of 3/9/2020  REVIEW OF SYSTEMS:  The patient has entered data on an intake form regarding present illness, past medical and surgical history, medications, allergies, family and social history, and a full review of 14 systems  I have reviewed this form with the patient, and all the relevant information has been included on this note  The full review of systems was negative except as stated in HPI and below  Constitutional: Negative  Negative for appetite change and fever  HENT: Positive for tinnitus (both ears )  Negative for hearing loss, trouble swallowing and voice change  Eyes: Negative  Negative for photophobia and pain  Respiratory: Negative  Negative for shortness of breath  Cardiovascular: Negative  Negative for palpitations  Gastrointestinal: Negative  Negative for nausea and vomiting  Endocrine: Negative  Negative for cold intolerance and heat intolerance  Genitourinary: Negative  Negative for dysuria, frequency and urgency  Musculoskeletal: Positive for back pain and neck pain  Negative for myalgias  Skin: Negative  Negative for rash  Neurological: Positive for tremors (left hand) and weakness (left hand)   Negative for dizziness, seizures, syncope, facial asymmetry, speech difficulty, light-headedness, numbness and headaches  Hematological: Negative  Does not bruise/bleed easily  Psychiatric/Behavioral: Negative  Negative for confusion, hallucinations and sleep disturbance  FOCUSED PHYSICAL EXAMINATION:     Vital signs:  /69 (BP Location: Right arm, Patient Position: Standing, Cuff Size: Large)   Pulse 91   Ht 5' 10" (1 778 m)   Wt 91 6 kg (202 lb)   BMI 28 98 kg/m²     General:  +hypomimia and decreased blink rate  Well-appearing, well nourished, pleasant patient in no acute distress  Mood appropriate  Head:  Normocephalic, atraumatic  Oropharynx and conjunctiva are clear  Speech  mild hypophonia, no bradylalia  No scanning speech  Language: Comprehension intact  Neck:  Supple, strong 5/5 forward flexion and retroflexion  Extremities: Range of motion is normal       Cognitive and Mental Exam:  The patient is alert, oriented to self, location, date and situation  Cranial Nerves:  CN II:  Direct and consensual light reflexes were equally reactive to light symmetrically  No afferent pupillary defect   Visual fields are full to confrontation  CN III / IV / VI: Extraocular movements were full, with normal pursuit and saccades  CN V:   Facial sensation to light touch was intact  CN VII: Face is symmetric with normal strength  CN VIII: Hearing was not assessed  CN X:   Palate is up going bilaterally and symmetrically  CN XI:  Neck muscles are strong  CN XII: Tongue protrusion is at midline with normal movements  No dysarthria  Motor:    Dystonia: none  Dyskinesia: none  Myoclonus: none  Chorea: none  Tics: none  Hand tremors both action and resting but predominantly resting for the L hand with finger flexion/ext and wrist flext/ext        UPDRS                Time since last dose:   6/7/19   10/08/19   3/9/20   Speech   3  3 3   Facial Expression   2  2 3   Rigidity - Neck   2 2  2 Rigidity - Upper Extremity (Right)   2  1 0   Rigidity - Upper Extremity (Left)    3 with mild cog  2 2 wrist   Rigidity - Lower Extremity (Right)   1 0 0   Rigidity - Lower Extremity (Left)    0 0 0   Finger Taps (Right)    1  1 1   Finger Taps (Left)    2 smaller amplitude  2 2   Hand Movement (Right)   1  2 1   Hand Movement (Left)    1  2 1   Pronation/Supination (Right)   1  2 1   Pronation/Supination (Left)    1  2 1   Toe Tapping (Right)  1  1 1   Toe Tapping (Left)  2  2 3   Leg Agility (Right)   1  0 1   Leg Agility (Left)    2  1 2   Arising from Chair    0  0 0   Gait    0  0 0   Freezing of Gait  0  0 0   Postural Stability    0 (two steps)  0 (2 steps) -   Posture  0 upright  0 0   Global spontaneity of movement  1  1 1   Postural Tremor (Right)  1  0 0   Postural Tremor (Left)  2  2 2   Kinetic Tremor (Right)   1  1 1   Kinetic Tremor (Left)   2  2 2   Rest tremor amplitude RUE  0  0 2   Rest tremor amplitude LUE  3  3 3 polymini- myoclonus? Rest tremor amplitude RLE  0 0 0   Reset tremor amplitude LLE  0  0 0   Lip/Jaw Tremor   0  0 0   Consistency of tremor  2 2 2     -------------------------------------------------------------------------------------    Muscle Strength Right Left  Muscle Strength Right Left   Deltoid 5/5 5/5  Hip Adductors 5/5 5/5   Biceps 5/5 5/5  Hip Abductors 5/5 5/5   Triceps 5/5 5/5  Knee Extensors 5/5 5/5   Wrist Extensors 5/5 5/5  Knee Flexors 5/5 5/5   Wrist Flexors 5/5 5/5  Ankle Extensors 5/5 5/5    5/5 5/5  Ankle Flexors 5/5 5/5   Finger Abductors 5/5 5/5       Hip Flexors 5/5 5/5   Hip Extensors 5/5 5/5      Gait:  Normal rise from the chair and upright posture, reduced but symmetrical arm swing with ability to do Tandem gait x 10 steps easily, +resting tremor and dystonic posturing of L hand        Reflexes:    Right Left   Biceps 2/4 2/4   Brachioradialis 2/4 2/4   Triceps 2/4 2/4   Knee 2/4 2/4   Ankle 2/4 2/4

## 2020-03-07 NOTE — PROGRESS NOTES
Daily Note/Progress Note     Today's date: 3/2/20  Patient name: Kaelyn Campbell  : 1955  MRN: [id number]  Referring provider: Darrell Alba MD  Dx:   Encounter Diagnosis     ICD-10-CM    1  Parkinson disease ([facility] [address] ) G20                   Subjective: Patient reports he has been feeling "pretty good" since starting Boxing Program     Outcome Measures  Do either DGI / Mini-Best:  DGI:   Score: 23/  Ryley marin al 2008 Cherrie: Score <19    Jillian Carranzaer al 2011; [address]- 2 9 points         Mini-Bestest:  Score:   Omar 2013: Pamelia Jessie <  Beatrice marin al 2011: MDC 5 52 points     Timed Up & Go Test:  Regular: 7 32 seconds  Carry: 11 03 seonds  Co 12 seconds   Norbert et al, 2011:   Tug Score Fallers: Medication ON: < 12 21 seconds;  Off: < 15 5 seconds   Rick Adamson et al, 2011: MDC: 4 8 seconds     10 Meter Walk Test:  10 meters / 8 seconds = 1 25 M/S   Stefan Salazar 2008: [address]ita [address]:  18 M/S  Age Norm Values Cherrie < 1 0 m/s     Cardio Capacity/ Endurance Testing    6 minute walk test:  Score: 2100 feet  Mikael , 2008: [address]ita [address]- 269 feet    Norm Data Healthy Adults:  Oz eduardo, 2002:  60 to 71 years  Male: 572 meter;  Female: 538 meter  70-79 years :    Male: 1 meter,  Female: 471 meter  80-89 years :   Male 1 meter,   Female: 392 meter    Functional Strength Testing LEs:   5x sit to stand:   Scores: 11 5 seconds   Louie 2011: Cherrie,Cut off- > 16 seconds   MDC- 2 3 seconds          1 L2 Program     Warm up:  - High Knees  - Walking w/ heel taps  - Sidestepping with big arm movements  - Touch opposite foot and reach up and across (thoracic spine mobility)      Shadow boxing 20 reps each (pt R hand dominant)  - 1 jab  - 2 cross  - 3 non dominant hook  - 4 dominant hook  - 5 nondominant upper cut  - 6 dominant upper cut     Stations 1 (1x)  - Fwd/Bwd lunges with complex boxing combinations (5 reps each)  - Fwd/Bwd lunges with complex boxing combinations (5 reps each)  - Wall Sit     Circuit 1 (3x)  - Speed punches on upright punching bag - 5 reps (-1-2-3-4, -3-4-5-6, -1-2-3-4-5-6)  - Squats (10 reps)  - "[place] Twist" punching to BOSU on either side (5 reps each)  - Staggered raised dot negotiation with cognitive task (calling out number of cones at each location)  - Hurdles (medium) with complex boxing combination at end  - Bounding with punching combinations in SLS (2# combos, 4# combos, 6# combos)     Agility/coordination (1 minute each)  - Alternate Ta Ropes  - TB "X" (crossed arms near ground and stand up > open arms)  - Resisted Running     Cool down:  Floor to ceiling stretch x 5 reps  Hip flexor stretch in lunge position   Arm across chest stretch  Tricep stretch behind head  Calf Stretch  Toe Stretch  Thread the needle     Assessment: Patient is a 59 y o  male who has been reporting to skilled outpatient PT with Parkinson's Disease and interest in boxing program to improve his LE strength and balance per subjective report  Patient has demonstrated improvements in these areas as indicated by scores associated with DGI, miniBEST, TUG, TUG cognitive, and TUG carry  He continued to display difficulty with ambulation with dual task and veering with head turns indicating further risk for falls  Per research provided by [employer], patient will continue to benefit from skilled outpatient PT services to improve and maximize his function, to reduce risk for falls and potential injuries associated with falls, reduce healthcare costs via hospitalization, and reduce patient and national healthcare costs  In early stages of Parkinson's Disease, research indicates that intensive physical exercise can improve patient's motor control and assist in slowing the disease progression as a neuroprotective agent   Patient will benefit from skilled outpatient PT in order to maximize function in the short-term and long-term with overall improved postural strength with associated stability and mobility  Plan: Continue per plan of care  Goals:  1  Patient will be able to ambulate on uneven surfaces without loss of balance to increase safety with community mobility  - PARTIALLY MET  2  Patient will be able to perform a floor transfer without physical assistance to assist with fall recovery at home and in the community  - MET  3  Patient will be independent in comprehensive HEP post discharge from program   - PARTIALLY MET  4  Patient will be able to walk up incline with decreased difficulty and no loss of balance in order to improve functional mobility throughout the community  - PARTIALLY MET  5  Patient will be able to perform sit to stands from softer surfaces such as a couch or bed in order to improvement function with transfers  - NOT MET  6  Patient will have an improvement FOTO score indicating an improvement in function - NOT MET  7  Patient will be consistent with program for at least 1 day per week to assist with management of condition and functional independence of their condition  - MET     Precautions:  has a past medical history of Ankle injury, BPH (benign prostatic hyperplasia), Bronchiolitis, Cancer ([facility] [address] ), Diabetes mellitus ([facility] [address] ), History of benign prostatic hyperplasia, History of gastritis, History of insect bite, History of nocturia, History of stroke, Hypertension, Lumbar canal stenosis, Osteoarthritis, knee, Otitis externa, Pain in back, Retinal and vitreous disorder, Sinusitis, acute, and Tendinitis of right rotator cuff

## 2020-03-09 ENCOUNTER — OFFICE VISIT (OUTPATIENT)
Dept: NEUROLOGY | Facility: CLINIC | Age: 65
End: 2020-03-09
Payer: COMMERCIAL

## 2020-03-09 VITALS
HEART RATE: 91 BPM | HEIGHT: 70 IN | SYSTOLIC BLOOD PRESSURE: 118 MMHG | WEIGHT: 202 LBS | BODY MASS INDEX: 28.92 KG/M2 | DIASTOLIC BLOOD PRESSURE: 69 MMHG

## 2020-03-09 DIAGNOSIS — G20 PARKINSON DISEASE (HCC): Primary | ICD-10-CM

## 2020-03-09 PROCEDURE — 99215 OFFICE O/P EST HI 40 MIN: CPT | Performed by: PSYCHIATRY & NEUROLOGY

## 2020-03-09 PROCEDURE — 3078F DIAST BP <80 MM HG: CPT | Performed by: PSYCHIATRY & NEUROLOGY

## 2020-03-09 PROCEDURE — 3074F SYST BP LT 130 MM HG: CPT | Performed by: PSYCHIATRY & NEUROLOGY

## 2020-03-09 PROCEDURE — 3008F BODY MASS INDEX DOCD: CPT | Performed by: PSYCHIATRY & NEUROLOGY

## 2020-03-09 RX ORDER — ROPINIROLE 0.25 MG/1
TABLET, FILM COATED ORAL
Qty: 90 TABLET | Refills: 0
Start: 2020-03-09 | End: 2020-11-06

## 2020-03-09 NOTE — PATIENT INSTRUCTIONS
· Will continue uptitration of ropinirole as follows  · Week 1:  3 5 tab three times a day  · Week 2:  4 tab three times a day   · Week 3:  4 5 tab three times a day  · Week 4:  5 tab three times a day  · Call office to discuss findings  If not yet controlled then consider upgrading tablet size  · Continue with PD Boxing techniques whenever possible and the classes, as this has seen the greatest increase compared to medication changes  · Continue with the Sinemet 2 tab TID at the moment  He will continue with exercising at home, suggesting weight training, stretching  Suggested trying prune juice or eating prunes for fibers, and a low-dose stool softener that he has at home  He will let us know which is most effective for him  We discussed about relevant clinical research ongoing through clinicaltrials  gov    · The patient has been instructed to call us about any new neurological problems or medication side effects    · Return to Clinic in 6 months

## 2020-03-09 NOTE — LETTER
March 9, 2020     Deitra Mortimer, MD  [facility]  [address] [place] [address]    Patient: Kaelyn Campbell   YOB: 1955   Date of Visit: 3/9/2020       Dear Dr Espinosa Level:    Earlier today I saw Mr Sonia Armendariz for follow-up of his Parkinson's disease  Below are my notes for this visit for your records and to keep you updated on his health status  If you have questions, please do not hesitate to call me  I look forward to following your patient along with you  Sincerely,        Cari Jones MD        CC: No Recipients  Lynell Sandhoff  3/9/2020 11:29 AM  Sign at close encounter  Review of Systems   Constitutional: Negative  Negative for appetite change and fever  HENT: Positive for tinnitus (both ears )  Negative for hearing loss, trouble swallowing and voice change  Eyes: Negative  Negative for photophobia and pain  Respiratory: Negative  Negative for shortness of breath  Cardiovascular: Negative  Negative for palpitations  Gastrointestinal: Negative  Negative for nausea and vomiting  Endocrine: Negative  Negative for cold intolerance and heat intolerance  Genitourinary: Negative  Negative for dysuria, frequency and urgency  Musculoskeletal: Positive for back pain and neck pain  Negative for myalgias  Skin: Negative  Negative for rash  Neurological: Positive for tremors (left hand) and weakness (left hand)  Negative for dizziness, seizures, syncope, facial asymmetry, speech difficulty, light-headedness, numbness and headaches  Hematological: Negative  Does not bruise/bleed easily  Psychiatric/Behavioral: Negative  Negative for confusion, hallucinations and sleep disturbance           Cari Jones MD  3/9/2020 12:38 PM  Sign at close encounter  [id number]        RETURN PATIENT NOTE    [facility] [facility] Number: # [id number]  Date of Birth: 1955  Date of visit: 3/9/2020    Referring provider: Jose Rodgers MD    ASSESSMENT     Diagnoses for this encounter:  1  Parkinson disease ([address] )  rOPINIRole (REQUIP) 0 25 mg tablet     Impression of this 58 yo gentleman with L>R hand tremor-predominant idiopathic Parkinson's disease, here for follow-up  He has been doing relatively well, having continued and enjoying the [employer] and regularly taking his medication - now consisting of Sinemet 2 tab TID and ropinirole 0 75mg TID  He feels he would be able to go up on this  PLAN     · Will continue uptitration of ropinirole as follows  · Week 1:  3 5 tab three times a day  · Week 2:  4 tab three times a day   · Week 3:  4 5 tab three times a day  · Week 4:  5 tab three times a day  · Call office to discuss findings  If not yet controlled then consider upgrading tablet size  · Continue with PD Boxing techniques whenever possible and the classes, as this has seen the greatest increase compared to medication changes  · Continue with the Sinemet 2 tab TID at the moment  He will continue with exercising at home, suggesting weight training, stretching  Suggested trying prune juice or eating prunes for fibers, and a low-dose stool softener that he has at home  He will let us know which is most effective for him  We discussed about relevant clinical research ongoing through clinicaltrials  gov    · The patient has been instructed to call us about any new neurological problems or medication side effects  · Return to Clinic in 6 months    A total of 40 minutes were spent face-to-face with this patient, of which 65% was spent on counseling and coordination of care  HISTORY OF PRESENT ILLNESS:     Mr Cyrus Mustafa is a 59 y o  right handed male who returns for Parkinson's disease and L hand tremor  Last visit 10/08/19  The patient was not accompanied today   History was obtained from patient    Interim History  Pramipexole caused upset stomach and he felt worse taking it - he discontinued it  It was replaced with ropinirole  As of 12/20/19 he felt his PD was worsening with increase tightening in leg and foot  He told his PCP that the ropinirole caused more nausea  He is taking 0 75mg TID of ropinirole and Sinemet 25/100 2 tab 6:30-7am / 1-2pm / 8pm  He feels minimal wearing off between doses  He feels his mornings are "pretty good"  The nausea is now bearable  He was referred to [employer] twice a week and has followed with PT since  Today he feels his tremors are still "better than before the medication"  With physical stress the tremors do come more often  His most bothersome symptoms are constipation (started within the last month), L side weakness (worst under physical stress)  INITIAL HISTORY  Pt has been under conservative treatment for a L shoulder impingement syndrome according to records, having undergoing Physical Therapy training with improved range of motion and pain level, a home exercise program, and receiving a cortisone injection on 3/7/19  home exercise program  He was offered MRI L shoulder before but he declined due to his slow improvement  Denies numbness and tingling, fevers or chills  Main bothersome neurological symptoms today are:   1  L hand tremor - noted by PCP in Jan 2019 but without stated parkinsonian symptoms at the time  Movements began nearly a year ago but more minor  It occurs more at rest than action, but also noticed hand shakes when he tries to  objects and hold them stationary  Thus far, since he is R handed, it has not affected his writing, eating or fine motor movements  Over time, he noticed that the hand would shake more on its own  He feels any pressure on the wrist tends to amplify it  Year ago he noticed the R hand would, on a certain position when resting on an object pressing against wrist, would shake as well, but not to the extent of the left   He denies noticing particular stiffness or slowness more on any particular side of his body  He does have a L rotator cuff injury since Dec 2018 but he had tremors prior to the injury  He denies prior head or neck injury, denies numbness or tingling in either arm  He has had EMG previously for his legs before for back pain, but not for the arms  Living Situation + ADLs: living with wife  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  Able to to do most tasks at home independently  Drives well  Medications tried in the past with side effects: none    Parkinson's Disease Motor Symptoms:   Dyskinesia:  denies  Motor Fluctuations and Off time:  denies  Rigidity:  denies  Tremor:  see above  Fatigue:  yes occasionally  Freezing of gait:  none  Handwriting: unaffection  Facial Expression: denies noticing a difference     Nonmotor symptoms:   Hallucinations:  He has hx of ocular migraines and will sometimes have see patterns that vanish after the migraine  He has them for last 3 years  Voice volume: "sometimes" more when fatigued  Mood:  denies either anxiety or depression  Cognition: mild forgetfulness  Sleep: "fractured" either due to shoulder pain  Waking up twice a night and trouble falling back asleep  Constipation: none  Urinary:  none  Balance / Falls:  no issues   Orthostatism: slightly but he feels this may be from his blood pressure medications  Drooling: none  Driving: none, no issues  Hyposmia: denies  RBD (REM semi-purposeful body movements): none  Skin Cancer History: none  Exercise Therapy: moderate activity with his hobbies and work outside        Family History: Number of First Degree Relatives With Parkinsonism/Dementia: none known    REVIEW OF PAST MEDICAL, SOCIAL AND FAMILY HISTORY:  This is the list of problems as per our Medical Records:    Patient Active Problem List    Diagnosis Date Noted    Parkinson disease ([facility] [address] ) 10/08/2019    Other male erectile dysfunction 08/08/2019    Left carpal tunnel syndrome     Tear of left supraspinatus tendon 07/11/2019    Impingement syndrome of left shoulder 03/07/2019    Thoracic spine pain 03/07/2019    Tremor of left hand 01/10/2019    Dermatitis of left ear canal 07/13/2018    Prostate cancer ([facility] ) 02/02/2018    Type 2 diabetes mellitus without complication, without long-term current use of insulin ([place] [address] ) 08/24/2017    Arteriosclerosis of coronary artery 05/24/2017    Subclinical hypothyroidism 04/11/2017    Thrombocytosis ([facility] ) 10/31/2016    Leukocytosis 10/31/2016    Vitamin D deficiency 10/20/2015    Benign essential hypertension 05/15/2015    Hyperlipidemia 08/20/2012    Depression 06/01/2012    Mixed hyperlipidemia 11/10/2010     No Known Allergies     Outpatient Encounter Medications as of 3/9/2020   Medication Sig Dispense Refill    aspirin 325 mg tablet Take 325 mg by mouth daily      carbidopa-levodopa (SINEMET)  mg per tablet Take 2 tablets by mouth 3 (three) times a day Follow clinic instructions 540 tablet 3    Cholecalciferol (VITAMIN D) 2000 units CAPS Take 2 capsules (4,000 Units total) by mouth daily  0    co-enzyme Q-10 30 MG capsule Take 1 capsule (30 mg total) by mouth daily 30 capsule 0    Empagliflozin (JARDIANCE) 10 MG TABS 1 tab daily 90 tablet 1    glucose blood (ONETOUCH VERIO) test strip Check BG 2x per day 200 each 3    lisinopril (ZESTRIL) 20 mg tablet Take 1 tablet (20 mg total) by mouth daily 90 tablet 1    pravastatin (PRAVACHOL) 40 mg tablet Take 1 tablet (40 mg total) by mouth daily 90 tablet 1    rOPINIRole (REQUIP) 0 25 mg tablet Follow clinic instructions on taking this up to 5 tablets TID, as tolerated and to level of tremor / weakness benefit   90 tablet 0    sitaGLIPtin-metFORMIN (JANUMET)  MG per tablet Take 1 tablet by mouth 2 (two) times a day with meals 180 tablet 1    vardenafil (LEVITRA) 20 MG tablet Take by mouth daily as needed       venlafaxine (EFFEXOR-XR) 75 mg 24 hr capsule Take 1 capsule (75 mg total) by mouth daily 90 capsule 1    [DISCONTINUED] rOPINIRole (REQUIP) 0 25 mg tablet Take 3 tab TID  This script is temporary 10 day supply until he received main ropinirole from his mail order pharmacy 90 tablet 0    carbidopa-levodopa (SINEMET)  mg per tablet Take 2 tablets by mouth 3 (three) times a day for 10 days This is temporary 10 day supply until he get levodopa from his mail order pharmacy 60 tablet 0     No facility-administered encounter medications on file as of 3/9/2020  REVIEW OF SYSTEMS:  The patient has entered data on an intake form regarding present illness, past medical and surgical history, medications, allergies, family and social history, and a full review of 14 systems  I have reviewed this form with the patient, and all the relevant information has been included on this note  The full review of systems was negative except as stated in HPI and below  Constitutional: Negative  Negative for appetite change and fever  HENT: Positive for tinnitus (both ears )  Negative for hearing loss, trouble swallowing and voice change  Eyes: Negative  Negative for photophobia and pain  Respiratory: Negative  Negative for shortness of breath  Cardiovascular: Negative  Negative for palpitations  Gastrointestinal: Negative  Negative for nausea and vomiting  Endocrine: Negative  Negative for cold intolerance and heat intolerance  Genitourinary: Negative  Negative for dysuria, frequency and urgency  Musculoskeletal: Positive for back pain and neck pain  Negative for myalgias  Skin: Negative  Negative for rash  Neurological: Positive for tremors (left hand) and weakness (left hand)  Negative for dizziness, seizures, syncope, facial asymmetry, speech difficulty, light-headedness, numbness and headaches  Hematological: Negative  Does not bruise/bleed easily  Psychiatric/Behavioral: Negative    Negative for confusion, hallucinations and sleep disturbance  FOCUSED PHYSICAL EXAMINATION:     Vital signs:  /69 (BP Location: Right arm, Patient Position: Standing, Cuff Size: Large)   Pulse 91   Ht 5' 10" (1 778 m)   Wt 91 6 kg (202 lb)   BMI 28 98 kg/m²      General:  +hypomimia and decreased blink rate  Well-appearing, well nourished, pleasant patient in no acute distress  Mood appropriate  Head:  Normocephalic, atraumatic  Oropharynx and conjunctiva are clear  Speech  mild hypophonia, no bradylalia  No scanning speech  Language: Comprehension intact  Neck:  Supple, strong 5/5 forward flexion and retroflexion  Extremities: Range of motion is normal       Cognitive and Mental Exam:  The patient is alert, oriented to self, location, date and situation  Cranial Nerves:  CN II:  Direct and consensual light reflexes were equally reactive to light symmetrically  No afferent pupillary defect   Visual fields are full to confrontation  CN III / IV / VI: Extraocular movements were full, with normal pursuit and saccades  CN V:   Facial sensation to light touch was intact  CN VII: Face is symmetric with normal strength  CN VIII: Hearing was not assessed  CN X:   Palate is up going bilaterally and symmetrically  CN XI:  Neck muscles are strong  CN XII: Tongue protrusion is at midline with normal movements  No dysarthria  Motor:    Dystonia: none  Dyskinesia: none  Myoclonus: none  Chorea: none  Tics: none  Hand tremors both action and resting but predominantly resting for the L hand with finger flexion/ext and wrist flext/ext        UPDRS                Time since last dose:   6/7/19   10/08/19   3/9/20   Speech   3  3 3   Facial Expression   2  2 3   Rigidity - Neck   2 2  2   Rigidity - Upper Extremity (Right)   2  1 0   Rigidity - Upper Extremity (Left)    3 with mild cog  2 2 wrist   Rigidity - Lower Extremity (Right)   1 0 0   Rigidity - Lower Extremity (Left)    0 0 0   Finger Taps (Right)    1  1 1   Finger Taps (Left)    2 smaller amplitude  2 2   Hand Movement (Right)   1  2 1   Hand Movement (Left)    1  2 1   Pronation/Supination (Right)   1  2 1   Pronation/Supination (Left)    1  2 1   Toe Tapping (Right)  1  1 1   Toe Tapping (Left)  2  2 3   Leg Agility (Right)   1  0 1   Leg Agility (Left)    2  1 2   Arising from Chair    0  0 0   Gait    0  0 0   Freezing of Gait  0  0 0   Postural Stability    0 (two steps)  0 (2 steps) -   Posture  0 upright  0 0   Global spontaneity of movement  1  1 1   Postural Tremor (Right)  1  0 0   Postural Tremor (Left)  2  2 2   Kinetic Tremor (Right)   1  1 1   Kinetic Tremor (Left)   2  2 2   Rest tremor amplitude RUE  0  0 2   Rest tremor amplitude LUE  3  3 3 polymini- myoclonus? Rest tremor amplitude RLE  0 0 0   Reset tremor amplitude LLE  0  0 0   Lip/Jaw Tremor   0  0 0   Consistency of tremor  2 2 2     -------------------------------------------------------------------------------------    Muscle Strength Right Left  Muscle Strength Right Left   Deltoid 5/5 5/5  Hip Adductors 5/5 5/5   Biceps 5/5 5/5  Hip Abductors 5/5 5/5   Triceps 5/5 5/5  Knee Extensors 5/5 5/5   Wrist Extensors 5/5 5/5  Knee Flexors 5/5 5/5   Wrist Flexors 5/5 5/5  Ankle Extensors 5/5 5/5    5/5 5/5  Ankle Flexors 5/5 5/5   Finger Abductors 5/5 5/5       Hip Flexors 5/5 5/5   Hip Extensors 5/5 5/5      Gait:  Normal rise from the chair and upright posture, reduced but symmetrical arm swing with ability to do Tandem gait x 10 steps easily, +resting tremor and dystonic posturing of L hand        Reflexes:    Right Left   Biceps 2/4 2/4   Brachioradialis 2/4 2/4   Triceps 2/4 2/4   Knee 2/4 2/4   Ankle 2/4 2/4

## 2020-03-09 NOTE — PROGRESS NOTES
Review of Systems   Constitutional: Negative  Negative for appetite change and fever  HENT: Positive for tinnitus (both ears )  Negative for hearing loss, trouble swallowing and voice change  Eyes: Negative  Negative for photophobia and pain  Respiratory: Negative  Negative for shortness of breath  Cardiovascular: Negative  Negative for palpitations  Gastrointestinal: Negative  Negative for nausea and vomiting  Endocrine: Negative  Negative for cold intolerance and heat intolerance  Genitourinary: Negative  Negative for dysuria, frequency and urgency  Musculoskeletal: Positive for back pain and neck pain  Negative for myalgias  Skin: Negative  Negative for rash  Neurological: Positive for tremors (left hand) and weakness (left hand)  Negative for dizziness, seizures, syncope, facial asymmetry, speech difficulty, light-headedness, numbness and headaches  Hematological: Negative  Does not bruise/bleed easily  Psychiatric/Behavioral: Negative  Negative for confusion, hallucinations and sleep disturbance

## 2020-03-23 ENCOUNTER — TELEPHONE (OUTPATIENT)
Dept: PHYSICAL THERAPY | Facility: CLINIC | Age: 65
End: 2020-03-23

## 2020-03-23 NOTE — TELEPHONE ENCOUNTER
Communicated via email  S: Pt cancelled all future appts due to COVID-19 concerns  O: Emailed pt PDF of home exercise program including core exercises, upper body, lower body, and shadow boxing instructions  Stressed importance of safety during all exercises ex: performing supine exercises on the bed instead of floor if floor transfer is difficult  Also perform standing exercises standing near kitchen counter or chair for support to minimize fall risk  A: Pt verbalized understanding of exercises and will reach out if he has questions/concerns  P: Follow-up with pt via email in 1-2 weeks to progress as able

## 2020-04-04 DIAGNOSIS — E11.65 TYPE 2 DIABETES MELLITUS WITH HYPERGLYCEMIA, UNSPECIFIED WHETHER LONG TERM INSULIN USE (HCC): ICD-10-CM

## 2020-04-04 DIAGNOSIS — IMO0002 UNCONTROLLED TYPE 2 DIABETES MELLITUS WITH COMPLICATION, WITHOUT LONG-TERM CURRENT USE OF INSULIN: ICD-10-CM

## 2020-04-06 RX ORDER — SITAGLIPTIN AND METFORMIN HYDROCHLORIDE 1000; 50 MG/1; MG/1
TABLET, FILM COATED ORAL
Qty: 180 TABLET | Refills: 1 | Status: SHIPPED | OUTPATIENT
Start: 2020-04-06 | End: 2020-09-22

## 2020-04-29 ENCOUNTER — APPOINTMENT (OUTPATIENT)
Dept: LAB | Facility: CLINIC | Age: 65
End: 2020-04-29
Payer: COMMERCIAL

## 2020-04-29 DIAGNOSIS — E11.65 TYPE 2 DIABETES MELLITUS WITH HYPERGLYCEMIA, UNSPECIFIED WHETHER LONG TERM INSULIN USE (HCC): ICD-10-CM

## 2020-04-29 DIAGNOSIS — E03.8 SUBCLINICAL HYPOTHYROIDISM: ICD-10-CM

## 2020-04-29 LAB
ALBUMIN SERPL BCP-MCNC: 3.9 G/DL (ref 3.5–5)
ALP SERPL-CCNC: 46 U/L (ref 46–116)
ALT SERPL W P-5'-P-CCNC: 18 U/L (ref 12–78)
ANION GAP SERPL CALCULATED.3IONS-SCNC: 7 MMOL/L (ref 4–13)
AST SERPL W P-5'-P-CCNC: 19 U/L (ref 5–45)
BILIRUB SERPL-MCNC: 0.22 MG/DL (ref 0.2–1)
BUN SERPL-MCNC: 22 MG/DL (ref 5–25)
CALCIUM SERPL-MCNC: 8.9 MG/DL (ref 8.3–10.1)
CHLORIDE SERPL-SCNC: 102 MMOL/L (ref 100–108)
CO2 SERPL-SCNC: 26 MMOL/L (ref 21–32)
CREAT SERPL-MCNC: 1.17 MG/DL (ref 0.6–1.3)
CREAT UR-MCNC: 49.2 MG/DL
EST. AVERAGE GLUCOSE BLD GHB EST-MCNC: 177 MG/DL
GFR SERPL CREATININE-BSD FRML MDRD: 65 ML/MIN/1.73SQ M
GLUCOSE SERPL-MCNC: 230 MG/DL (ref 65–140)
HBA1C MFR BLD: 7.8 %
MICROALBUMIN UR-MCNC: <5 MG/L (ref 0–20)
MICROALBUMIN/CREAT 24H UR: <10 MG/G CREATININE (ref 0–30)
POTASSIUM SERPL-SCNC: 5 MMOL/L (ref 3.5–5.3)
PROT SERPL-MCNC: 7.4 G/DL (ref 6.4–8.2)
SODIUM SERPL-SCNC: 135 MMOL/L (ref 136–145)
T4 FREE SERPL-MCNC: 0.93 NG/DL (ref 0.76–1.46)
TSH SERPL DL<=0.05 MIU/L-ACNC: 2.7 UIU/ML (ref 0.36–3.74)

## 2020-04-29 PROCEDURE — 84443 ASSAY THYROID STIM HORMONE: CPT

## 2020-04-29 PROCEDURE — 82043 UR ALBUMIN QUANTITATIVE: CPT

## 2020-04-29 PROCEDURE — 80053 COMPREHEN METABOLIC PANEL: CPT

## 2020-04-29 PROCEDURE — 82570 ASSAY OF URINE CREATININE: CPT

## 2020-04-29 PROCEDURE — 84439 ASSAY OF FREE THYROXINE: CPT

## 2020-04-29 PROCEDURE — 36415 COLL VENOUS BLD VENIPUNCTURE: CPT

## 2020-04-29 PROCEDURE — 3051F HG A1C>EQUAL 7.0%<8.0%: CPT | Performed by: NURSE PRACTITIONER

## 2020-04-29 PROCEDURE — 83036 HEMOGLOBIN GLYCOSYLATED A1C: CPT

## 2020-04-30 ENCOUNTER — TELEMEDICINE (OUTPATIENT)
Dept: ENDOCRINOLOGY | Facility: CLINIC | Age: 65
End: 2020-04-30
Payer: COMMERCIAL

## 2020-04-30 DIAGNOSIS — E11.65 TYPE 2 DIABETES MELLITUS WITH HYPERGLYCEMIA, WITHOUT LONG-TERM CURRENT USE OF INSULIN (HCC): Primary | ICD-10-CM

## 2020-04-30 DIAGNOSIS — I10 BENIGN ESSENTIAL HYPERTENSION: ICD-10-CM

## 2020-04-30 DIAGNOSIS — E55.9 VITAMIN D DEFICIENCY: ICD-10-CM

## 2020-04-30 DIAGNOSIS — E03.8 SUBCLINICAL HYPOTHYROIDISM: ICD-10-CM

## 2020-04-30 DIAGNOSIS — E78.5 HYPERLIPIDEMIA, UNSPECIFIED HYPERLIPIDEMIA TYPE: ICD-10-CM

## 2020-04-30 PROCEDURE — 99214 OFFICE O/P EST MOD 30 MIN: CPT | Performed by: INTERNAL MEDICINE

## 2020-05-08 DIAGNOSIS — E78.2 MIXED HYPERLIPIDEMIA: ICD-10-CM

## 2020-05-08 DIAGNOSIS — I10 ESSENTIAL HYPERTENSION: ICD-10-CM

## 2020-05-08 DIAGNOSIS — F32.A DEPRESSION, UNSPECIFIED DEPRESSION TYPE: ICD-10-CM

## 2020-05-08 PROCEDURE — 4010F ACE/ARB THERAPY RXD/TAKEN: CPT | Performed by: NURSE PRACTITIONER

## 2020-05-08 RX ORDER — PRAVASTATIN SODIUM 40 MG
TABLET ORAL
Qty: 90 TABLET | Refills: 1 | Status: SHIPPED | OUTPATIENT
Start: 2020-05-08 | End: 2020-10-20

## 2020-05-08 RX ORDER — VENLAFAXINE HYDROCHLORIDE 75 MG/1
CAPSULE, EXTENDED RELEASE ORAL
Qty: 90 CAPSULE | Refills: 1 | Status: SHIPPED | OUTPATIENT
Start: 2020-05-08 | End: 2020-10-20

## 2020-05-08 RX ORDER — LISINOPRIL 20 MG/1
TABLET ORAL
Qty: 90 TABLET | Refills: 1 | Status: SHIPPED | OUTPATIENT
Start: 2020-05-08 | End: 2020-10-20

## 2020-05-19 ENCOUNTER — TELEPHONE (OUTPATIENT)
Dept: NEUROLOGY | Facility: CLINIC | Age: 65
End: 2020-05-19

## 2020-06-09 ENCOUNTER — TELEPHONE (OUTPATIENT)
Dept: PHYSICAL THERAPY | Facility: CLINIC | Age: 65
End: 2020-06-09

## 2020-06-18 ENCOUNTER — TELEMEDICINE (OUTPATIENT)
Dept: FAMILY MEDICINE CLINIC | Facility: CLINIC | Age: 65
End: 2020-06-18
Payer: COMMERCIAL

## 2020-06-18 VITALS — HEIGHT: 70 IN | BODY MASS INDEX: 28.98 KG/M2

## 2020-06-18 DIAGNOSIS — E11.9 TYPE 2 DIABETES MELLITUS WITHOUT COMPLICATION, WITHOUT LONG-TERM CURRENT USE OF INSULIN (HCC): Primary | ICD-10-CM

## 2020-06-18 DIAGNOSIS — E78.2 MIXED HYPERLIPIDEMIA: ICD-10-CM

## 2020-06-18 DIAGNOSIS — G20 PARKINSON DISEASE (HCC): ICD-10-CM

## 2020-06-18 DIAGNOSIS — I10 BENIGN ESSENTIAL HYPERTENSION: ICD-10-CM

## 2020-06-18 DIAGNOSIS — E03.8 SUBCLINICAL HYPOTHYROIDISM: ICD-10-CM

## 2020-06-18 DIAGNOSIS — F32.5 MAJOR DEPRESSIVE DISORDER IN FULL REMISSION, UNSPECIFIED WHETHER RECURRENT (HCC): ICD-10-CM

## 2020-06-18 PROCEDURE — 99214 OFFICE O/P EST MOD 30 MIN: CPT | Performed by: FAMILY MEDICINE

## 2020-07-10 ENCOUNTER — TELEPHONE (OUTPATIENT)
Dept: NEUROLOGY | Facility: CLINIC | Age: 65
End: 2020-07-10

## 2020-07-10 DIAGNOSIS — G20 PARKINSON DISEASE (HCC): Primary | ICD-10-CM

## 2020-07-10 NOTE — TELEPHONE ENCOUNTER
GS calling back to see if you would be able to add a speech therapy referral for patient in addition to the PT

## 2020-07-10 NOTE — TELEPHONE ENCOUNTER
Pt called and states that Dr Samantha Rodríguez referred him to [facility] program  Due to pandemic, this was put on hold  Pt plans to resume this program bec it was helping him a lot   Requesting script for [employer] program be faxed to [address] [facility] at  [phone] ok to leave detailed message

## 2020-07-20 ENCOUNTER — PATIENT OUTREACH (OUTPATIENT)
Dept: CASE MANAGEMENT | Facility: OTHER | Age: 65
End: 2020-07-20

## 2020-07-20 ENCOUNTER — TELEPHONE (OUTPATIENT)
Dept: NEUROLOGY | Facility: CLINIC | Age: 65
End: 2020-07-20

## 2020-07-20 DIAGNOSIS — Z71.89 COMPLEX CARE COORDINATION: Primary | ICD-10-CM

## 2020-07-20 NOTE — PROGRESS NOTES
[facility] Longitudinal Care Management Call:    Would the patient like to make an appointment? No patient has a call into Neurology today  He makes his own appointments  Does the patient have a smart device? YES      Is the patient a smoker? Yes      Is the patient interested in smoking cessation counseling? NO patient states he only smokes cigars once every six months and feels he does not need smoking cessation  Is the patient Diabetic? YES    What is the patient's A1C? 7 8  (equal or greater than 9 - refer to [address] [place] [address])    Who helps the patient manage their medications? self    Does the patient have any issues in obtaining or affording medications? NO  (Yes = Referral to [place], No, n/a)    Other barriers identified during the call: NO  Summary of Call: The patient is doing well he is very upset because over the past month his parkinsons has gotten worse  Before the Covid shut down he was enrolled in [facility] boxing which helped his Parkinsons but they shut down and now  His tremors in his left hand are worse,has left arm contractions, toe curling, and his balance is off  The patient stated his blood sugars have been good as long as he eats right  I put a referral in for OPCM  The patient was agreeable to additional outreaches from this CHW

## 2020-07-20 NOTE — TELEPHONE ENCOUNTER
Patient calling to say that he thinks his medication is no longer working  Says that after about an hour after taking medication he starts to get his symptoms back  Tremors in left hand and arm  Rigid in left leg and toe curling in left foot with tremors  Starting to feel rigid in right foot      Requip 0 25 m tabs 5 times  Sinemet: 2 tabs TID    [phone]

## 2020-07-21 NOTE — TELEPHONE ENCOUNTER
Called and spoke to patient  He will try the sinemet 2 tabs QID and will call back with an update  Correction on below, he is taking Requip 0 25mg tabs, 5 tabs TID  Additionally, patient has rock steady boxing evaluation this coming Thursday  Patient states he does not think that he needs speech therapy

## 2020-07-21 NOTE — TELEPHONE ENCOUNTER
At the last medication change 5/19/20 I had mentioned making the Sinemet 2 tablets QID @ 7am , 11am , 3pm , 7pm and four hours apart  Not sure why he is taking TID? Did he receive our earlier message? Depending on his answer, we may go with that change first if he never made the change, or else we would start on entacapone as discussed if he did try it and failed  And has he scheduled / resumed the [employer] he requested, or the Speech Therapy?

## 2020-07-22 ENCOUNTER — PATIENT OUTREACH (OUTPATIENT)
Dept: FAMILY MEDICINE CLINIC | Facility: CLINIC | Age: 65
End: 2020-07-22

## 2020-07-22 NOTE — PROGRESS NOTES
Outpatient Care Management Note:    Care manager called and introduced myself to Aj Tena at the request of Argenis Anand and the [facility] plus program  Aj Tena states that he has Parkinson's and was having increased symptoms  He spoke with his neurologist and his sinemet was increased to 4 times per day  He notes that the medications never completely remove his symptoms  We discussed staying physically active  Aj Tena had been working out at [employer] until [employer]  That is restarting and he has an evaluation scheduled for tomorrow  He notes that his neurologist, Dr Ivan Nogueira, is leaving [address] [place] [address] and he will be getting a new neurologist in September  We discussed Tremayne's diabetes  He checks his blood sugars 1-2 times per day and notes they average around 120  He takes his medications as ordered  We discussed diabetic diet  Aj Tena states he knows the foods to avoid  He thinks he did diabetic education classes years ago  I encouraged him to consider doing it again  His wife does the cooking, so she could attend also  Aj Tena will consider this as an option in the future  He is scheduled to see endocrine on 8/6 and is aware that he needs blood work completed prior  I gave Aj Tena my contact information  He will call with any questions or concerns  He does not feel he needs ongoing CM calls at this time, but did agree to shopandsave him in 3 months to follow up

## 2020-07-23 ENCOUNTER — EVALUATION (OUTPATIENT)
Dept: PHYSICAL THERAPY | Facility: CLINIC | Age: 65
End: 2020-07-23
Payer: COMMERCIAL

## 2020-07-23 DIAGNOSIS — G20 PARKINSON DISEASE (HCC): ICD-10-CM

## 2020-07-23 PROCEDURE — 97163 PT EVAL HIGH COMPLEX 45 MIN: CPT | Performed by: PHYSICAL THERAPIST

## 2020-07-23 NOTE — PROGRESS NOTES
PT Evaluation     Today's date: 2020  Patient name: Sammy Tejada  : 1955  MRN: [id number]  Referring provider: Bimal Preston MD  Dx:   Encounter Diagnosis     ICD-10-CM    1  Parkinson disease ([address] ) [address] Ambulatory referral to Physical Therapy                  Assessment  Assessment details: Patient is a 59year old male reporting to skilled PT for Parkinson's disease with interest in exercise program  He was participating in [employer] from February-2020 but stopped due to onset of COVID-19 pandemic and quarantine guidelines  His balance scores remained about the same since March and he is deemed a low fall risk  However per subjective reports, pt has increased UE tremors and LE rigidity/ toe curling which are impacting his ability to perform ADL's, household chores, work related tasks  LLE has increased rigidity compared to RLE  His sinemet dosage has been increased since he was last seen in PT  His endurance significantly decreased since March (per 6MWT and 5x STS scores), likely due to sedentary work environment and quarantining at home  Patient has a tendency to lose balance in the backwards direction  Patient has  a history of progression and regression with skilled PT attendance and time between therapy, so due to research via [facility] patient may benefit from continued therapy services to improve and maximize function to reduce healthcare costs via hospitalization to maximize function and reduce patient and national healthcare costs  [facility] requires [facility] PT to improve their static and dynamic balance, improve their LE strength, improve their ambulation capabilities, and to maximize function to prevent loss of balance and falls associated with Parkinson's Disease       YBARRA Cutoff Scores:  MDC- 5 points  Cut off- 40 22    DGI Cutoff Scores:  Ryley marin al 2008 Cherrie: Score <19    Cortez eduardo 2011; Laurita [address]- 2 9 points    MiniBest Cutoff Scores:  Manohar Chamberlain 2013: Cherrie <19/28  Gustabo 2011: MDC 5 52 points     TUG Cutoff Scores: Norbert marin al, 2011:   Tug Score Fallers: Medication ON: < 12 21 seconds; Off: < 15 5 seconds   Rick Adamson et al, 2011: MDC: 4 8 seconds     10 Meter Walk Test Cutoff Scores:  Lakeisha Marshall 2008: Laurita Her[address]is [address]:  18 M/S  Age Norm Values Cherrie < 1 0 m/s    6 Minute Walk Test Cutoff Scores:  Lakeisha Marshall , 2008: Laurita Her[address]- 269 feet    Norm Data Healthy Adults:  Inge eduardo, 2002:  60 to 71 years  Male: 572 meter;  Female: 538 meter  70-79 years :    Male: 1 meter,  Female: 471 meter  80-89 years :   Male 1 meter,   Female: 392 meter    5xSTS Cutoff Scores:  Aldo Chand 2011: Cherrie,Cut off- > 16 seconds   MDC- 2 3 secondsDGI Cutoff Scores:  Melva 2008 Cherrie: Score <19    Cortez eduardo 2011; Laurita Her[address]- 2 9 points  Impairments: abnormal coordination, abnormal gait, abnormal muscle firing, abnormal muscle tone, activity intolerance, impaired balance, impaired physical strength, lacks appropriate home exercise program, pain with function, safety issue, poor posture  and poor body mechanics    Symptom irritability: moderateUnderstanding of Dx/Px/POC: good   Prognosis: good    Goals  Goals:  STGs (30 days)  1  Patient will improve TUG score to age related norms seconds or less to meet the age norm value for low risk of falls in order to increase safety with functional mobility  2  Patient will improve 5 STS by the SANDRA of 2 3 seconds indicating an improvement in strength and decreased challenge with transfers  3  Patient will improve 6 MWT by the SANDRA 269 ft indicating an improvement in cardiovascular endurance in order to maximize function with functional mobility throughout the community  4  Patient will be independent in basic HEP in order to manage condition at home  LTGs (60 days)  1  Patient will score a low risk for falls 4/4 fall risks measures  2  Patient will score age norm values for 2/2 endurance measures    3  Patient will be able to ambulate on uneven surfaces without loss of balance to increase safety with community mobility  4  Patient will be able to perform a floor transfer without physical assistance to assist with fall recovery at home and in the community  5  Patient will be independent in comprehensive HEP post discharge from program    6  Patient will be able to walk up incline with decreased difficulty and no loss of balance in order to improve functional mobility throughout the community  7  Patient will be able to perform sit to stands from softer surfaces such as a couch or bed in order to improvement function with transfers  8  Patient will have an improvement FOTO score indicating an improvement in function  9  Patient will be consistent with program for at least 2 days per week to assist with management of condition and functional independence of their condition  Plan  Patient would benefit from: skilled physical therapy  Planned therapy interventions: neuromuscular re-education, motor coordination training, patient education, postural training, sensory integrative techniques, stretching, strengthening, therapeutic activities, therapeutic exercise, home exercise program, gait training, flexibility, coordination, balance, activity modification, abdominal trunk stabilization, community reintegration, graded exercise, graded activity, graded motor, therapeutic training and transfer training  Frequency: 2x week  Duration in weeks: 12  Plan of Care beginning date: 7/23/2020  Plan of Care expiration date: 10/15/2020  Treatment plan discussed with: patient and family        Subjective Evaluation    History of Present Illness  Mechanism of injury: Pt is prior [facility]SB patient, last visit on March 5th  He felt okay for about 2 months, and then 1 month ago he noticed his symptoms have worsened  He notices rigidity and toe curling in the left leg, and now migrating to the right leg also   He notices the toe curling when he is driving  He also notes tremors have worsened  He recently increased his medication dosage (sinemet)  He works full time as an , sitting at a computer most of the day  He has not been performing exercises at home, but he goes for daily walks and tries to walk as much as he can throughout the day  Pain  No pain reported  Current pain ratin  At best pain ratin  At worst pain ratin    Social Support  Steps to enter house: yes  Stairs in house: yes   Lives with: spouse    Employment status: working  Treatments  Previous treatment: physical therapy  Patient Goals  Patient goals for therapy: improved balance, increased motion, return to sport/leisure activities, independence with ADLs/IADLs and increased strength          Objective     Strength/Myotome Testing     Left Hip   Planes of Motion   Flexion: 4  Extension: 4  Abduction: 4  Adduction: 4    Right Hip   Planes of Motion   Flexion: 4  Extension: 4  Abduction: 4  Adduction: 4    Left Knee   Flexion: 4  Extension: 4    Right Knee   Flexion: 4  Extension: 4    Left Ankle/Foot   Dorsiflexion: 4  Plantar flexion: 4    Right Ankle/Foot   Dorsiflexion: 4  Plantar flexion: 4    Additional Strength Details  Increased rigidity LLE>RLE               Precautions:   Past Medical History:   Diagnosis Date    Ankle injury     last assessed 7/15/2013    BPH (benign prostatic hyperplasia)     Bronchiolitis     Cancer [facility] )     Prostate    [facility]     Type II    History of benign prostatic hyperplasia     History of gastritis     History of insect bite     last assessed 10/4/2014    History of nocturia     History of stroke     Hypertension     Lumbar canal stenosis     Osteoarthritis, knee     Otitis externa     Pain in back     UPPER BACK, last assessed 2013    Retinal and vitreous disorder     Sinusitis, acute     last assessed 10/6/2016    Tendinitis of right rotator cuff     last assessed 2012           Outcome Measures 3/5/20 7/23/20       ABC Not assessed 80 6%       5x STS 11 5 sec 14 22 sec       TUG 7 32    11 03 carry    12 12   cog 8 42     8 70 carry    9 95 cog       10 meter walk test 1 25 m/s 1 30 m/s       6 minute walk test 2100 ft 1375 ft       DGI 23/24 23/24       MiniBest 25/28 25/28       FGA Not assessed 27/30

## 2020-07-30 ENCOUNTER — OFFICE VISIT (OUTPATIENT)
Dept: PHYSICAL THERAPY | Facility: CLINIC | Age: 65
End: 2020-07-30
Payer: COMMERCIAL

## 2020-07-30 DIAGNOSIS — G20 PARKINSON DISEASE (HCC): Primary | ICD-10-CM

## 2020-07-30 PROCEDURE — 97112 NEUROMUSCULAR REEDUCATION: CPT

## 2020-07-30 PROCEDURE — 97110 THERAPEUTIC EXERCISES: CPT

## 2020-07-31 NOTE — PROGRESS NOTES
Daily Note     Today's date: 2020  Patient name: Rajan Hamilton  : 1955  MRN: [id number]  Referring provider: Flaquito Galindo MD  Dx:   Encounter Diagnosis     ICD-10-CM    1  Parkinson disease ([facility] ) G20                   Subjective: Patient reports to PT excited to start Fortino Moy again  No new changes or falls since last session  Objective: See treatment diary below    Fortino Moy     Warm up:  - High Knees  - Walking w/ heel taps  - Sidestepping with big arm movements  - Touch opposite foot and reach up and across (thoracic spine mobility)      Shadow boxing 20 reps each (pt R hand dominant)  - 1 jab  - 2 cross  - 3 non dominant hook  - 4 dominant hook  - 5 nondominant upper cut  - 6 dominant upper cut     Circuit 1 (2 min each, 2 rounds, 1 min rest b/w rounds)  - STS with combos at top  - 12" Kayden (lat) > duck > 12" kayden (lat) > combos at end  - Speed taps on BOSU with 180 degree jump > combo     *2 min rest b/w circuits*     Circuit 2 (2 min each, 2 rounds, 1 min rest b/w rounds)  - STS > running to PT calling out complex combo > bwd jog to chair  - Split foot line jumps (30 sec) > Burn outs (30 sec)    *2 min rest b/w circuits*    Core Circuit (1 min each, 2 rounds)  - 10# med ball slam in tall kneel on foam  - 10# med ball core rotation on PBall        Cool down:  Floor to ceiling stretch x 5 reps  Hip flexor stretch in lunge position   Arm across chest stretch  Tricep stretch behind head  Calf Stretch  Toe Stretch      Assessment: Patient able to tolerate treatment session well today with initiation of exercise program  Patient with good amplitude for most of session, however as he fatigued he required PT verbal cues to improve with fair carryover  Challenged with coordination and speed of split foot line jumps  Patient demonstrated fatigue by end of session with no reported pain   He will continue to benefit from skilled outpatient PT in order to maximize his function with PD diagnosis and slow disease progression  Plan: Continue per plan of care        Precautions:   Past Medical History:   Diagnosis Date    Ankle injury     last assessed 7/15/2013    BPH (benign prostatic hyperplasia)     Bronchiolitis     Cancer ([facility] )     Prostate    Diabetes mellitus ([facility] )     Type II    History of benign prostatic hyperplasia     History of gastritis     History of insect bite     last assessed 10/4/2014    History of nocturia     History of stroke     Hypertension     Lumbar canal stenosis     Osteoarthritis, knee     Otitis externa     Pain in back     UPPER BACK, last assessed 2/25/2013    Retinal and vitreous disorder     Sinusitis, acute     last assessed 10/6/2016    Tendinitis of right rotator cuff     last assessed 8/21/2012           Outcome Measures 3/5/20 7/23/20       ABC Not assessed 80 6%       5x STS 11 5 sec 14 22 sec       TUG 7 32    11 03 carry    12 12   cog 8 42     8 70 carry    9 95 cog       10 meter walk test 1 25 m/s 1 30 m/s       6 minute walk test 2100 ft 1375 ft       DGI 23/24 23/24       MiniBest 25/28 25/28       FGA Not assessed 27/30

## 2020-08-04 ENCOUNTER — APPOINTMENT (OUTPATIENT)
Dept: PHYSICAL THERAPY | Facility: CLINIC | Age: 65
End: 2020-08-04
Payer: COMMERCIAL

## 2020-08-06 ENCOUNTER — APPOINTMENT (OUTPATIENT)
Dept: PHYSICAL THERAPY | Facility: CLINIC | Age: 65
End: 2020-08-06
Payer: COMMERCIAL

## 2020-08-11 ENCOUNTER — OFFICE VISIT (OUTPATIENT)
Dept: PHYSICAL THERAPY | Facility: CLINIC | Age: 65
End: 2020-08-11
Payer: COMMERCIAL

## 2020-08-11 DIAGNOSIS — G20 PARKINSON DISEASE (HCC): Primary | ICD-10-CM

## 2020-08-11 PROCEDURE — 97530 THERAPEUTIC ACTIVITIES: CPT | Performed by: PHYSICAL THERAPIST

## 2020-08-11 PROCEDURE — 97112 NEUROMUSCULAR REEDUCATION: CPT | Performed by: PHYSICAL THERAPIST

## 2020-08-11 NOTE — PROGRESS NOTES
Daily Note     Today's date: 2020  Patient name: Christa Rivera  : 1955  MRN: [id number]  Referring provider: Valente Tejeda MD  Dx:   Encounter Diagnosis     ICD-10-CM    1  Parkinson disease ([facility] [address] )  [address]        Start Time:   Stop Time: 1830  Total time in clinic (min): 45 minutes    Subjective: Client with reports of L foot discomfort from last session  Objective: See treatment diary below    Fortino Moy     Warm up:  - High Knees  - Alternating UE/LE punch and kicks  - Sidestepping with big arm movements  - 30 cw/ccw shoulder circles    Shadow boxing 20 reps each (pt R hand dominant)  - 1 jab  - 2 cross  - 3 non dominant hook  - 4 dominant hook  - 5 nondominant upper cut  - 6 dominant upper cut     Circuit 1 (2 min each, 2 rounds, 1 min rest b/w rounds)  - In/Out on agility ladder with combos  - Speed punches on BOSU with combos     *2 min rest b/w circuits*     Circuit 2 (2 min each, 2 rounds, 1 min rest b/w rounds)  - fwd/bwd stepping over blue line w/ combos  - multidirectional hurdles w  combos    *2 min rest b/w circuits*    Core Circuit (1 min each, 2 rounds)  - 10# med ball pass to/from this PT  - 10# med ball core rotation on level surface  - Wall sits      Cool down:  Floor to ceiling stretch x 5 reps  Calf Stretch  Toe Stretch      Assessment:  Client with good participation throughout session, despite reports of L foot pain  Activities modified to reduce foot pain  He will continue to benefit from skilled outpatient PT in order to maximize his function with PD diagnosis and slow disease progression  Plan: Continue per plan of care        Precautions:   Past Medical History:   Diagnosis Date    Ankle injury     last assessed 7/15/2013    BPH (benign prostatic hyperplasia)     Bronchiolitis     Cancer ([facility] )     Prostate    Diabetes mellitus ([facility] )     Type II    History of benign prostatic hyperplasia     History of gastritis     History of insect bite last assessed 10/4/2014    History of nocturia     History of stroke     Hypertension     Lumbar canal stenosis     Osteoarthritis, knee     Otitis externa     Pain in back     UPPER BACK, last assessed 2/25/2013    Retinal and vitreous disorder     Sinusitis, acute     last assessed 10/6/2016    Tendinitis of right rotator cuff     last assessed 8/21/2012           Outcome Measures 3/5/20 7/23/20       ABC Not assessed 80 6%       5x STS 11 5 sec 14 22 sec       TUG 7 32    11 03 carry    12 12   cog 8 42     8 70 carry    9 95 cog       10 meter walk test 1 25 m/s 1 30 m/s       6 minute walk test 2100 ft 1375 ft       DGI 23/24 23/24       MiniBest 25/28 25/28       FGA Not assessed 27/30

## 2020-08-13 ENCOUNTER — OFFICE VISIT (OUTPATIENT)
Dept: PHYSICAL THERAPY | Facility: CLINIC | Age: 65
End: 2020-08-13
Payer: COMMERCIAL

## 2020-08-13 DIAGNOSIS — G20 PARKINSON DISEASE (HCC): Primary | ICD-10-CM

## 2020-08-13 PROCEDURE — 97110 THERAPEUTIC EXERCISES: CPT

## 2020-08-13 PROCEDURE — 97112 NEUROMUSCULAR REEDUCATION: CPT

## 2020-08-17 DIAGNOSIS — C61 PROSTATE CANCER (HCC): Primary | ICD-10-CM

## 2020-08-18 ENCOUNTER — OFFICE VISIT (OUTPATIENT)
Dept: PHYSICAL THERAPY | Facility: CLINIC | Age: 65
End: 2020-08-18
Payer: COMMERCIAL

## 2020-08-18 DIAGNOSIS — G20 PARKINSON DISEASE (HCC): Primary | ICD-10-CM

## 2020-08-18 PROCEDURE — 97112 NEUROMUSCULAR REEDUCATION: CPT | Performed by: PHYSICAL THERAPIST

## 2020-08-18 PROCEDURE — 97110 THERAPEUTIC EXERCISES: CPT | Performed by: PHYSICAL THERAPIST

## 2020-08-18 NOTE — PROGRESS NOTES
Daily Note     Today's date: 2020  Patient name: Sammy Arguelles  : 1955  MRN: [id number]  Referring provider: Pamela Miles MD  Dx:   Encounter Diagnosis     ICD-10-CM    1  Parkinson disease ([address] )  G20        Start Time: 64  Stop Time:   Total time in clinic (min): 55 minutes    Subjective: Patient reports to skilled PT with no complaints  He notes he feels his balance is starting to be more challenged as of lately  Objective: See treatment diary below    Fortino Moy     Warm up:  - High Knees  - Walking w/ heel taps  - Sidestepping with big arm movements  - Heel walking  - Toe walking      Shadow boxing 20 reps each (pt R hand dominant)  - 1 jab  - 2 cross  - 3 non dominant hook  - 4 dominant hook  - 5 nondominant upper cut  - 6 dominant upper cut     Circuit 1 (2 min each, 1 round, 1 min rest b/w rounds)  - Step ups fwd/bwd onto BOSU with combos > step off  - Resisted lateral ambulation (yellow band) with alternating lunges x3 > combos  - Quick feet through ladder (30 sec) > Burnouts (30 sec)     *1 min rest b/w circuits*     Circuit 2 (2 min each, 2 rounds, 1 min rest b/w rounds)  - Running to cones in specific called out order (fwd/bwd) > combos  - Mountain climber with UE on BOSU> combo's    Core Circuit  - Quadruped moving hands to colored dots on floor called out by therapist   - Yanna Clemons sit with trunk rotation with 10 lb med ball      Cool down:  Floor to ceiling stretch x 5 reps  Hip flexor stretch in lunge position   Arm across chest stretch  Tricep stretch behind head  Calf Stretch  Toe Stretch    Assessment: Patient able to tolerate treatment session well with decreased need for rest breaks between interventions  He had no complaints of L ankle pain throughout his session today  He was able to move with quick feet through ladder with good agility and sequencing  Improvements noted with recall of combo's   He will continue to benefit from skilled outpatient PT in order to maximize his function with PD diagnosis and slow disease progression  Plan: Continue per plan of care        Precautions:   Past Medical History:   Diagnosis Date    Ankle injury     last assessed 7/15/2013    BPH (benign prostatic hyperplasia)     Bronchiolitis     Cancer ([facility] )     Prostate    Diabetes mellitus ([facility] )     Type II    History of benign prostatic hyperplasia     History of gastritis     History of insect bite     last assessed 10/4/2014    History of nocturia     History of stroke     Hypertension     Lumbar canal stenosis     Osteoarthritis, knee     Otitis externa     Pain in back     UPPER BACK, last assessed 2/25/2013    Retinal and vitreous disorder     Sinusitis, acute     last assessed 10/6/2016    Tendinitis of right rotator cuff     last assessed 8/21/2012           Outcome Measures 3/5/20 7/23/20       ABC Not assessed 80 6%       5x STS 11 5 sec 14 22 sec       TUG 7 32    11 03 carry    12 12   cog 8 42     8 70 carry    9 95 cog       10 meter walk test 1 25 m/s 1 30 m/s       6 minute walk test 2100 ft 1375 ft       DGI 23/24 23/24       MiniBest 25/28 25/28       FGA Not assessed 27/30

## 2020-08-20 ENCOUNTER — OFFICE VISIT (OUTPATIENT)
Dept: PHYSICAL THERAPY | Facility: CLINIC | Age: 65
End: 2020-08-20
Payer: COMMERCIAL

## 2020-08-20 ENCOUNTER — APPOINTMENT (OUTPATIENT)
Dept: LAB | Facility: HOSPITAL | Age: 65
End: 2020-08-20
Attending: UROLOGY
Payer: COMMERCIAL

## 2020-08-20 DIAGNOSIS — G20 PARKINSON DISEASE (HCC): Primary | ICD-10-CM

## 2020-08-20 LAB — PSA SERPL-MCNC: <0.1 NG/ML (ref 0–4)

## 2020-08-20 PROCEDURE — 97112 NEUROMUSCULAR REEDUCATION: CPT | Performed by: PHYSICAL THERAPIST

## 2020-08-20 PROCEDURE — 84153 ASSAY OF PSA TOTAL: CPT

## 2020-08-20 PROCEDURE — 97110 THERAPEUTIC EXERCISES: CPT | Performed by: PHYSICAL THERAPIST

## 2020-08-20 NOTE — PROGRESS NOTES
Daily Note     Today's date: 2020  Patient name: Giulia Diop  : 1955  MRN: [id number]  Referring provider: Iris Arriola MD  Dx:   Encounter Diagnosis     ICD-10-CM    1  Parkinson disease ([address] )  G20                   Subjective: Patient reports to skilled PT with no complaints  He notes he feels his balance is starting to be more challenged as of lately  Objective: See treatment diary below    Fortino Moy     Warm up:  - High Knees  - Sidestepping with big arm movements  - 10 large fwd steps   - 10 large lateral steps  - 10 large bwd steps     Shadow boxing 20 reps each (pt R hand dominant)  - 1 jab  - 2 cross  - 3 non dominant hook  - 4 dominant hook  - 5 nondominant upper cut  - 6 dominant upper cut    Circuit 1 (1 min, 2 rounds)  -           Alt step taps around Bosu 30 sec, 1234 combos 30 sec  -           Burpees 30 sec, 3456 combos 30 sec     Circuit 2 (1 min each, 2 rounds):  -           Large side-stepping over foam beams with combos at the end   -           180 deg jumps on foam beams with sidestepping and with combos      Circuit 3 (1 min each, 1 round):  -           Tandem amb w/ combos at end   -           Braiding with combos at end     Core Circuit (1 min each, 2 rounds)  -           Supermans>boats transition   -           Alternating leg raises (modified due to LBP)   -           Planks     Cooldown   -           Arm across chest   -           Arm overhead   -           Reaching down to floor  -           Calf stretch  -           Toe stretch    Assessment: Patient able to tolerate treatment session well  Able to perform Burpee with jumping both legs out simultaneously  He also did well with 180 deg jumps on foam beam  He was challenged w/ braiding and needed to perform at slower speed to coordinate correctly  Min cues needed for combos     He will continue to benefit from skilled outpatient PT in order to maximize his function with PD diagnosis and slow disease progression  Plan: Continue per plan of care        Precautions:   Past Medical History:   Diagnosis Date    Ankle injury     last assessed 7/15/2013    BPH (benign prostatic hyperplasia)     Bronchiolitis     Cancer [facility] [address] )     Prostate    Diabetes mellitus ([facility] )     Type II    History of benign prostatic hyperplasia     History of gastritis     History of insect bite     last assessed 10/4/2014    History of nocturia     History of stroke     Hypertension     Lumbar canal stenosis     Osteoarthritis, knee     Otitis externa     Pain in back     UPPER BACK, last assessed 2/25/2013    Retinal and vitreous disorder     Sinusitis, acute     last assessed 10/6/2016    Tendinitis of right rotator cuff     last assessed 8/21/2012           Outcome Measures 3/5/20 7/23/20       ABC Not assessed 80 6%       5x STS 11 5 sec 14 22 sec       TUG 7 32    11 03 carry    12 12   cog 8 42     8 70 carry    9 95 cog       10 meter walk test 1 25 m/s 1 30 m/s       6 minute walk test 2100 ft 1375 ft       DGI 23/24 23/24       MiniBest 25/28 25/28       FGA Not assessed 27/30

## 2020-08-21 ENCOUNTER — OFFICE VISIT (OUTPATIENT)
Dept: UROLOGY | Facility: MEDICAL CENTER | Age: 65
End: 2020-08-21
Payer: COMMERCIAL

## 2020-08-21 VITALS
WEIGHT: 195 LBS | DIASTOLIC BLOOD PRESSURE: 78 MMHG | HEART RATE: 74 BPM | BODY MASS INDEX: 27.92 KG/M2 | HEIGHT: 70 IN | TEMPERATURE: 97.7 F | SYSTOLIC BLOOD PRESSURE: 120 MMHG

## 2020-08-21 DIAGNOSIS — C61 PROSTATE CANCER (HCC): Primary | ICD-10-CM

## 2020-08-21 LAB
SL AMB  POCT GLUCOSE, UA: ABNORMAL
SL AMB LEUKOCYTE ESTERASE,UA: ABNORMAL
SL AMB POCT BILIRUBIN,UA: ABNORMAL
SL AMB POCT BLOOD,UA: ABNORMAL
SL AMB POCT CLARITY,UA: CLEAR
SL AMB POCT COLOR,UA: YELLOW
SL AMB POCT KETONES,UA: ABNORMAL
SL AMB POCT NITRITE,UA: ABNORMAL
SL AMB POCT PH,UA: 6.5
SL AMB POCT SPECIFIC GRAVITY,UA: 1.01
SL AMB POCT URINE PROTEIN: ABNORMAL
SL AMB POCT UROBILINOGEN: 0.2

## 2020-08-21 PROCEDURE — 3078F DIAST BP <80 MM HG: CPT | Performed by: NURSE PRACTITIONER

## 2020-08-21 PROCEDURE — 81003 URINALYSIS AUTO W/O SCOPE: CPT | Performed by: NURSE PRACTITIONER

## 2020-08-21 PROCEDURE — 3074F SYST BP LT 130 MM HG: CPT | Performed by: NURSE PRACTITIONER

## 2020-08-21 PROCEDURE — 3008F BODY MASS INDEX DOCD: CPT | Performed by: NURSE PRACTITIONER

## 2020-08-21 PROCEDURE — 99213 OFFICE O/P EST LOW 20 MIN: CPT | Performed by: NURSE PRACTITIONER

## 2020-08-21 PROCEDURE — 3051F HG A1C>EQUAL 7.0%<8.0%: CPT | Performed by: NURSE PRACTITIONER

## 2020-08-21 PROCEDURE — 4004F PT TOBACCO SCREEN RCVD TLK: CPT | Performed by: NURSE PRACTITIONER

## 2020-08-21 PROCEDURE — 3061F NEG MICROALBUMINURIA REV: CPT | Performed by: NURSE PRACTITIONER

## 2020-08-21 NOTE — PROGRESS NOTES
8/21/2020      Chief Complaint   Patient presents with    Prostate Cancer     Assessment and Plan    59 y o  male managed by Dr Naveed Hutchison    1  Prostate cancer  · Status post DaVinci robot prostatectomy performed at [facility] 2013  · PSA performed 08/28/2020 resulted less than 0 1  · Urine dip in office positive for glucose otherwise unremarkable  · Repeat PSA in 1 year  · Follow up in the office in 1 year-  patient wishes to follow-up from this point forward with his PCP with annual PSA evaluations  He reports if PSAs began to rise he will return to our office for further evaluation and discussion of plan of care  · Explained to patient the importance of continued annual PSA evaluations  Patient verbalized understanding and in agreement with plan and still wishes to follow-up solely with PCP for future PSA testing  2  Erectile dysfunction  · Continue Levitra p r n  History of Present Illness  Sidney Terry is a 59 y o  male here for follow up evaluation of  prostate cancer  D Patient is status post DaVinci prostatectomy performed at [facility] in 2013  His most recent PSA performed 08/28/2020 resulted less than 0 1  Patient denies all lower urinary tract symptoms  He does report erectile dysfunction for which he is treated with Levitra with good results  He denies side effects secondary to Levitra  Patient does have a new diagnosis of Parkinson's disease and is currently managed on Ropinirole and Sinemet as well as an exercise regimen that is working well for him  Review of Systems   Constitutional: Negative for chills and fever  Respiratory: Negative for cough and shortness of breath  Cardiovascular: Negative for chest pain  Gastrointestinal: Negative for abdominal distention, abdominal pain, blood in stool, nausea and vomiting  Genitourinary: Negative for difficulty urinating, dysuria, enuresis, flank pain, frequency, hematuria and urgency     Musculoskeletal: Negative for back pain    Skin: Negative for rash  Neurological: Positive for tremors and weakness  Negative for dizziness  AUA SYMPTOM SCORE      Most Recent Value   AUA SYMPTOM SCORE   How often have you had a sensation of not emptying your bladder completely after you finished urinating? 0   How often have you had to urinate again less than two hours after you finished urinating? 0   How often have you found you stopped and started again several times when you urinate?  0   How often have you found it difficult to postpone urination? 0   How often have you had a weak urinary stream?  0   How often have you had to push or strain to begin urination? 0   How many times did you most typically get up to urinate from the time you went to bed at night until the time you got up in the morning?   1   Quality of Life: If you were to spend the rest of your life with your urinary condition just the way it is now, how would you feel about that?  1   AUA SYMPTOM SCORE  1             Past Medical History  Past Medical History:   Diagnosis Date    Ankle injury     last assessed 7/15/2013    BPH (benign prostatic hyperplasia)     Bronchiolitis     Cancer ([facility] )     Prostate    Diabetes mellitus ([facility] )     Type II    History of benign prostatic hyperplasia     History of gastritis     History of insect bite     last assessed 10/4/2014    History of nocturia     History of stroke     Hypertension     Lumbar canal stenosis     Osteoarthritis, knee     Otitis externa     Pain in back     UPPER BACK, last assessed 2/25/2013    Retinal and vitreous disorder     Sinusitis, acute     last assessed 10/6/2016    Tendinitis of right rotator cuff     last assessed 8/21/2012       Past Social History  Past Surgical History:   Procedure Laterality Date    ANKLE FRACTURE SURGERY Left     triple fracture 4/2009    LAMINECTOMY      L5-S1 2003    LUMBAR LAMINECTOMY      partial L4-L5 in 2003    NOSE SURGERY      6/1989    MD COLONOSCOPY FLX DX W/COLLJ SPEC WHEN PFRMD N/A 1/27/2016    Procedure: COLONOSCOPY;  Surgeon: Leisa Pedro MD;  Location: [facility]; Service: Gastroenterology    PROSTATE BIOPSY  12/08/2012    managed by Makeda Stevens, needle biopsy    PROSTATECTOMY      9/23/2013    RHINOPLASTY      for deviated septum in 1989     Social History     Tobacco Use   Smoking Status Light Tobacco Smoker    Years: 20 00    Types: Pipe   Smokeless Tobacco Never Used   Tobacco Comment    per allscripts 'current smoker, current some day smoker'       Past Family History  Family History   Problem Relation Age of Onset    Hypertension Mother     Retinal detachment Mother     Retinitis pigmentosa Mother     Diabetes Father     Heart disease Father         MI at 60 yo       Past Social history  Social History     Socioeconomic History    Marital status: /Civil Union     Spouse name: Not on file    Number of children: Not on file    Years of education: Not on file    Highest education level: Not on file   Occupational History    Not on file   Social Needs    Financial resource strain: Not on file    Food insecurity     Worry: Not on file     Inability: Not on file    Transportation needs     Medical: Not on file     Non-medical: Not on file   Tobacco Use    Smoking status: Light Tobacco Smoker     Years: 20 00     Types: Pipe    Smokeless tobacco: Never Used    Tobacco comment: per allscripts 'current smoker, current some day smoker'   Substance and Sexual Activity    Alcohol use:  Yes     Alcohol/week: 4 0 standard drinks     Types: 3 Cans of beer, 1 Shots of liquor per week     Frequency: 2-3 times a week     Drinks per session: 1 or 2     Comment: occassional     Drug use: No    Sexual activity: Not on file   Lifestyle    Physical activity     Days per week: Not on file     Minutes per session: Not on file    Stress: Not on file   Relationships    Social connections     Talks on phone: Not on file     Gets together: Not on file     Attends Alevism service: Not on file     Active member of club or organization: Not on file     Attends meetings of clubs or organizations: Not on file     Relationship status: Not on file    Intimate partner violence     Fear of current or ex partner: Not on file     Emotionally abused: Not on file     Physically abused: Not on file     Forced sexual activity: Not on file   Other Topics Concern    Not on file   Social History Narrative    Not on file       Current Medications  Current Outpatient Medications   Medication Sig Dispense Refill    aspirin 325 mg tablet Take 325 mg by mouth daily      carbidopa-levodopa (SINEMET)  mg per tablet Take 2 tablets by mouth 3 (three) times a day Follow clinic instructions 540 tablet 3    Cholecalciferol (VITAMIN D) 2000 units CAPS Take 2 capsules (4,000 Units total) by mouth daily  0    co-enzyme Q-10 30 MG capsule Take 1 capsule (30 mg total) by mouth daily 30 capsule 0    Empagliflozin (Jardiance) 10 MG TABS TAKE 1 TABLET BY MOUTH DAILY 90 tablet 1    glucose blood (ONETOUCH VERIO) test strip Check BG 2x per day 200 each 3    JANUMET  MG per tablet TAKE 1 TABLET BY MOUTH TWICE DAILY WITH MEALS 180 tablet 1    lisinopril (ZESTRIL) 20 mg tablet TAKE 1 TABLET BY MOUTH DAILY 90 tablet 1    pravastatin (PRAVACHOL) 40 mg tablet TAKE 1 TABLET BY MOUTH DAILY 90 tablet 1    rOPINIRole (REQUIP) 0 25 mg tablet Follow clinic instructions on taking this up to 5 tablets TID, as tolerated and to level of tremor / weakness benefit  (Patient taking differently: Follow clinic instructions on taking this up to 4 tablets TID, as tolerated and to level of tremor / weakness benefit ) 90 tablet 0    vardenafil (LEVITRA) 20 MG tablet Take by mouth daily as needed       venlafaxine (EFFEXOR-XR) 75 mg 24 hr capsule TAKE ONE CAPSULE BY MOUTH DAILY 90 capsule 1     No current facility-administered medications for this visit          Allergies  No Known Allergies      The following portions of the patient's history were reviewed and updated as appropriate: allergies, current medications, past medical history, past social history, past surgical history and problem list       Vitals  Vitals:    08/21/20 1054   BP: 120/78   Pulse: 74   Temp: 97 7 °F (36 5 °C)   Weight: 88 5 kg (195 lb)   Height: 5' 10" (1 778 m)           Physical Exam  Physical Exam  Vitals signs reviewed  Constitutional:       General: He is not in acute distress  Appearance: He is normal weight  HENT:      Head: Normocephalic  Eyes:      Pupils: Pupils are equal, round, and reactive to light  Pulmonary:      Effort: Pulmonary effort is normal  No respiratory distress  Breath sounds: Normal breath sounds  Musculoskeletal:      Comments: Left upper extremity, left lower extremity weakness, tremor   Skin:     General: Skin is warm and dry  Neurological:      General: No focal deficit present  Mental Status: He is alert     Psychiatric:         Mood and Affect: Mood normal          Behavior: Behavior normal        Results  Recent Results (from the past 1 hour(s))   POCT urine dip auto non-scope    Collection Time: 08/21/20 11:02 AM   Result Value Ref Range     COLOR,UA yellow     CLARITY,UA clear     SPECIFIC GRAVITY,UA 1 015      PH,UA 6 5     LEUKOCYTE ESTERASE,UA neg     NITRITE,UA neg     GLUCOSE, UA >=1000mg/dl     KETONES,UA neg     BILIRUBIN,UA neg     BLOOD,UA neg     POCT URINE PROTEIN neg     SL AMB POCT UROBILINOGEN 0 2    ]  Lab Results   Component Value Date    PSA <0 1 08/20/2020    PSA <0 1 08/08/2019    PSA <0 1 08/01/2018     Lab Results   Component Value Date    GLUCOSE 211 (H) 12/17/2015    CALCIUM 8 9 04/29/2020     12/17/2015    K 5 0 04/29/2020    CO2 26 04/29/2020     04/29/2020    BUN 22 04/29/2020    CREATININE 1 17 04/29/2020     Lab Results   Component Value Date    WBC 9 1 12/20/2019    HGB 15 3 12/20/2019    HCT 45 4 12/20/2019 MCV 89 2 12/20/2019     (H) 12/20/2019     Orders  Orders Placed This Encounter   Procedures    PSA Total, Diagnostic     Standing Status:   Future     Standing Expiration Date:   8/21/2021    POCT urine dip auto non-scope       BENNY Tejada

## 2020-08-25 ENCOUNTER — OFFICE VISIT (OUTPATIENT)
Dept: PHYSICAL THERAPY | Facility: CLINIC | Age: 65
End: 2020-08-25
Payer: COMMERCIAL

## 2020-08-25 DIAGNOSIS — G20 PARKINSON DISEASE (HCC): Primary | ICD-10-CM

## 2020-08-25 PROCEDURE — 97116 GAIT TRAINING THERAPY: CPT | Performed by: PHYSICAL THERAPIST

## 2020-08-25 PROCEDURE — 97112 NEUROMUSCULAR REEDUCATION: CPT | Performed by: PHYSICAL THERAPIST

## 2020-08-25 NOTE — PROGRESS NOTES
Daily Note- Progress Note    Today's date: 2020  Patient name: Sammy Tejada  : 1955  MRN: [id number]  Referring provider: Bimal Preston MD  Dx:   Encounter Diagnosis     ICD-10-CM    1  Parkinson disease ([facility] [address] )  Dorothy Jorgensen        Start Time: 174  Stop Time: 1830  Total time in clinic (min): 45 minutes    Subjective: Client without any complaints    Objective: See treatment diary below    Fortino Moy     Warm up:  - High Knees  - Sidestepping with big arm movements  - Alternating UE and leg kicks     Shadow boxing 20 reps each (pt R hand dominant)  - 1 jab  - 2 cross  - 3 non dominant hook  - 4 dominant hook  - 5 nondominant upper cut  - 6 dominant upper cut    Circuit 1 (1 min, 2 rounds)  -           Alt step jumps on/off Bosu 20x then combo  -           Agility ladder (out-out, in-in) and side stepping w/ combos     Circuit 2 (2 min each, 1 round):  -           Duck under resistance cord, knee/knee, Duck under cord with combos         Core Circuit (1 min each, 2 rounds)  -           bicycle crunch     Cooldown   -           Arm across chest   -           Arm overhead   -           Reaching down to floor  -           Calf stretch  -           Toe stretch    Assessment:  Client able to tolerate treatment session well  Client with improved ability to complete alternating jumping task  Noted difficulty with bicycle crunches today  Min cues needed for combos  He will continue to benefit from skilled outpatient PT in order to maximize his function with PD diagnosis and slow disease progression  Plan: Continue per plan of care        Precautions:   Past Medical History:   Diagnosis Date    Ankle injury     last assessed 7/15/2013    BPH (benign prostatic hyperplasia)     Bronchiolitis     Cancer ([facility] [address] )     Prostate    [facility] )     Type II    History of benign prostatic hyperplasia     History of gastritis     History of insect bite     last assessed 10/4/2014    History of nocturia     History of stroke     Hypertension     Lumbar canal stenosis     Osteoarthritis, knee     Otitis externa     Pain in back     UPPER BACK, last assessed 2/25/2013    Retinal and vitreous disorder     Sinusitis, acute     last assessed 10/6/2016    Tendinitis of right rotator cuff     last assessed 8/21/2012           Outcome Measures 3/5/20 7/23/20       ABC Not assessed 80 6%       5x STS 11 5 sec 14 22 sec       TUG 7 32    11 03 carry    12 12   cog 8 42     8 70 carry    9 95 cog       10 meter walk test 1 25 m/s 1 30 m/s       6 minute walk test 2100 ft 1375 ft       DGI 23/24 23/24       MiniBest 25/28 25/28       FGA Not assessed 27/30

## 2020-08-27 ENCOUNTER — OFFICE VISIT (OUTPATIENT)
Dept: PHYSICAL THERAPY | Facility: CLINIC | Age: 65
End: 2020-08-27
Payer: COMMERCIAL

## 2020-08-27 ENCOUNTER — APPOINTMENT (OUTPATIENT)
Dept: PHYSICAL THERAPY | Facility: CLINIC | Age: 65
End: 2020-08-27
Payer: COMMERCIAL

## 2020-08-27 DIAGNOSIS — G20 PARKINSON DISEASE (HCC): Primary | ICD-10-CM

## 2020-08-27 PROCEDURE — 97110 THERAPEUTIC EXERCISES: CPT | Performed by: PHYSICAL THERAPIST

## 2020-08-27 PROCEDURE — 97112 NEUROMUSCULAR REEDUCATION: CPT | Performed by: PHYSICAL THERAPIST

## 2020-08-27 NOTE — PROGRESS NOTES
Daily Note     Today's date: 2020  Patient name: Candace Wilson  : 1955  MRN: [id number]  Referring provider: Cristian Coleman MD  Dx:   No diagnosis found  Subjective: Patient reports to skilled PT with no complaints  He notes he feels his balance is starting to be more challenged as of lately  Objective: See treatment diary below    Fortino Moy     Warm up:  - High Knees  - Sidestepping with big arm movements  - 10 large fwd steps   - 10 large lateral steps  - 10 large bwd steps     Shadow boxing 20 reps each (pt R hand dominant)  - 1 jab  - 2 cross  - 3 non dominant hook  - 4 dominant hook  - 5 nondominant upper cut  - 6 dominant upper cut    Circuit 1 (1 min, 2 rounds)  -           Alt step taps around Bosu 30 sec, 1234 combos 30 sec  -           Burpees 30 sec, 3456 combos 30 sec     Circuit 2 (1 min each, 2 rounds):  -           Large side-stepping over foam beams with combos at the end   -           180 deg jumps on foam beams with sidestepping and with combos      Circuit 3 (1 min each, 1 round):  -           Tandem amb w/ combos at end   -           Braiding with combos at end     Core Circuit (1 min each, 2 rounds)  -           Supermans>boats transition   -           Alternating leg raises (modified due to LBP)   -           Planks     Cooldown   -           Arm across chest   -           Arm overhead   -           Reaching down to floor  -           Calf stretch  -           Toe stretch    Assessment: Patient able to tolerate treatment session well  Able to perform Burpee with jumping both legs out simultaneously  He also did well with 180 deg jumps on foam beam  He was challenged w/ braiding and needed to perform at slower speed to coordinate correctly  Min cues needed for combos  He will continue to benefit from skilled outpatient PT in order to maximize his function with PD diagnosis and slow disease progression  Plan: Continue per plan of care  Precautions:   Past Medical History:   Diagnosis Date    Ankle injury     last assessed 7/15/2013    BPH (benign prostatic hyperplasia)     Bronchiolitis     [facility] ([facility] [address] )     Prostate    Diabetes mellitus ([facility] )     Type II    History of benign prostatic hyperplasia     History of gastritis     History of insect bite     last assessed 10/4/2014    History of nocturia     History of stroke     Hypertension     Lumbar canal stenosis     Osteoarthritis, knee     Otitis externa     Pain in back     UPPER BACK, last assessed 2/25/2013    Retinal and vitreous disorder     Sinusitis, acute     last assessed 10/6/2016    Tendinitis of right rotator cuff     last assessed 8/21/2012           Outcome Measures 3/5/20 7/23/20       ABC Not assessed 80 6%       5x STS 11 5 sec 14 22 sec       TUG 7 32    11 03 carry    12 12   cog 8 42     8 70 carry    9 95 cog       10 meter walk test 1 25 m/s 1 30 m/s       6 minute walk test 2100 ft 1375 ft       DGI 23/24 23/24       MiniBest 25/28 25/28       FGA Not assessed 27/30

## 2020-08-27 NOTE — PROGRESS NOTES
Daily Note    Today's date: 2020  Patient name: Isabel De La Rosa  : 1955  MRN: [id number]  Referring provider: Em العلي MD  Dx:   Encounter Diagnosis     ICD-10-CM    1  Parkinson disease ([address] )  G20                   Subjective: Pt reports L ankle/foot hurts and low back  Almost cancelled appt today due to pain/soreness  Wants to take it easy today  Objective: See treatment diary below    [employer]:     Warm up:  - High Knees  - Sidestepping with big arm movements  - 10 large fwd steps  - 10 large lateral steps   - 10 large bwd steps       Shadow boxing 20 reps each (pt R hand dominant)  - 1 jab  - 2 cross  - 3 non dominant hook  - 4 dominant hook  - 5 nondominant upper cut  - 6 dominant upper cut    Circuits   1 - 1 min each, 2x, 30 sec rest break b/w  ·           -1-2 > knee, knee  ·           Step taps w/ alternating punches  ·           Floor to stand > combinations at top  ·           Wall sit alt kicking out LEs    [1 min rest b/w circuits]     2 - 1 min each, 1x   ·           Side shuffle to cones with combos   ·           Four square hurdles w/ combos        Core Circuit - 1 min each  - Standing on flat side of Bosu, core rotation 10# med ball  - 1/2 kneel 10# med ball PNF chops      Cooldown   ·           Arm across chest   ·           Arm overhead   ·          Calf stretch against wall  ·          Floor to ceiling        Assessment:  Pt tolerated session well with modifications as needed  Avoided any floor activities today as this increased his LBP and L ankle pain in the past  Initially challenged w/ coordination of opposite punch with step taps, but improved with repetitions  He was challenged with balancing on flat side of Bosu  He does stagger when he stands up from the floor, but able to recover independently  He will continue to benefit from skilled outpatient PT in order to maximize his function with PD diagnosis and slow disease progression        Plan: Continue per plan of care  Hold off on rolling and planks due to increase in LBP following        Precautions:   Past Medical History:   Diagnosis Date    Ankle injury     last assessed 7/15/2013    BPH (benign prostatic hyperplasia)     Bronchiolitis     [facility] )     Prostate    Diabetes mellitus [facility] )     Type II    History of benign prostatic hyperplasia     History of gastritis     History of insect bite     last assessed 10/4/2014    History of nocturia     History of stroke     Hypertension     Lumbar canal stenosis     Osteoarthritis, knee     Otitis externa     Pain in back     UPPER BACK, last assessed 2/25/2013    Retinal and vitreous disorder     Sinusitis, acute     last assessed 10/6/2016    Tendinitis of right rotator cuff     last assessed 8/21/2012           Outcome Measures 3/5/20 7/23/20       ABC Not assessed 80 6%       5x STS 11 5 sec 14 22 sec       TUG 7 32    11 03 carry    12 12   cog 8 42     8 70 carry    9 95 cog       10 meter walk test 1 25 m/s 1 30 m/s       6 minute walk test 2100 ft 1375 ft       DGI 23/24 23/24       MiniBest 25/28 25/28       FGA Not assessed 27/30

## 2020-09-01 ENCOUNTER — OFFICE VISIT (OUTPATIENT)
Dept: PHYSICAL THERAPY | Facility: CLINIC | Age: 65
End: 2020-09-01
Payer: COMMERCIAL

## 2020-09-01 ENCOUNTER — OFFICE VISIT (OUTPATIENT)
Dept: NEUROLOGY | Facility: CLINIC | Age: 65
End: 2020-09-01
Payer: COMMERCIAL

## 2020-09-01 VITALS
DIASTOLIC BLOOD PRESSURE: 78 MMHG | SYSTOLIC BLOOD PRESSURE: 122 MMHG | HEIGHT: 70 IN | TEMPERATURE: 96.9 F | BODY MASS INDEX: 28.07 KG/M2 | WEIGHT: 196.1 LBS

## 2020-09-01 DIAGNOSIS — G24.9 DYSTONIA: Primary | ICD-10-CM

## 2020-09-01 DIAGNOSIS — G20 PARKINSON DISEASE (HCC): ICD-10-CM

## 2020-09-01 DIAGNOSIS — G20 PARKINSON DISEASE (HCC): Primary | ICD-10-CM

## 2020-09-01 DIAGNOSIS — Z86.73 HISTORY OF STROKE: ICD-10-CM

## 2020-09-01 PROCEDURE — 97110 THERAPEUTIC EXERCISES: CPT | Performed by: PHYSICAL THERAPIST

## 2020-09-01 PROCEDURE — 99215 OFFICE O/P EST HI 40 MIN: CPT | Performed by: PSYCHIATRY & NEUROLOGY

## 2020-09-01 PROCEDURE — 97112 NEUROMUSCULAR REEDUCATION: CPT | Performed by: PHYSICAL THERAPIST

## 2020-09-01 NOTE — PROGRESS NOTES
Daily Note    Today's date: 2020  Patient name: Sidney Terry  : 1955  MRN: [id number]  Referring provider: Isabella Pollock MD  Dx:   Encounter Diagnosis     ICD-10-CM    1  Parkinson disease ([facility] [address] )  G20        Start Time:   Stop Time:   Total time in clinic (min): 40 minutes    Subjective: Client reporting feeling better today  Objective: See treatment diary below    Gen[employer]:     Warm up:  - High Knees  - Sidestepping with big arm movements  - Alternating UE and butt kicks       Shadow boxing 20 reps each (pt R hand dominant)  - 1 jab  - 2 cross  - 3 non dominant hook  - 4 dominant hook  - 5 nondominant upper cut  - 6 dominant upper cut    Circuits   1 - 2 min each, 2x, 30 sec rest break b/w  - alternating toe-taps on standard chair w/ combos  - 5x sit to/from stand then running to PT for combos    [1 min rest b/w circuits]     2 - 2 min each, 1x   - Speed punching on knees to bosu  - Four square hurdles w/ combos        Core Circuit - 1 min each x 2  - Standing core rotation with step outs (using black resistance cord)  - Supermans (alternating arms and legs)     Cooldown   ·           Arm across chest   ·           Arm overhead   ·          Calf stretch against wall  ·          Floor to ceiling        Assessment:  Client with good participation throughout session  All activities completed to his tolerance  He was agreeable to complete a modified "superman" as it did not involve any rotation or plank activity  He will continue to benefit from skilled outpatient PT in order to maximize his function with PD diagnosis and slow disease progression  Plan: Continue per plan of care  Hold off on rolling and planks due to increase in LBP following        Precautions:   Past Medical History:   Diagnosis Date    Ankle injury     last assessed 7/15/2013    BPH (benign prostatic hyperplasia)     Bronchiolitis     Cancer ([facility] [address] )     Prostate    Diabetes mellitus ([facility] [address] )     Type II    History of benign prostatic hyperplasia     History of gastritis     History of insect bite     last assessed 10/4/2014    History of nocturia     History of stroke     Hypertension     Lumbar canal stenosis     Osteoarthritis, knee     Otitis externa     Pain in back     UPPER BACK, last assessed 2/25/2013    Retinal and vitreous disorder     Sinusitis, acute     last assessed 10/6/2016    Tendinitis of right rotator cuff     last assessed 8/21/2012           Outcome Measures 3/5/20 7/23/20       ABC Not assessed 80 6%       5x STS 11 5 sec 14 22 sec       TUG 7 32    11 03 carry    12 12   cog 8 42     8 70 carry    9 95 cog       10 meter walk test 1 25 m/s 1 30 m/s       6 minute walk test 2100 ft 1375 ft       DGI 23/24 23/24       MiniBest 25/28 25/28       FGA Not assessed 27/30

## 2020-09-01 NOTE — PATIENT INSTRUCTIONS
Ok to increase your ropinirole up to 8 tablets at a time (2mg)   Increase by one tablet once per week on the ropinirole  Once you are at 7 tablets of the ropinirole try to reduce the sinemet to 2 5 tablets and see how you do  Call me with an update                                            Parkinson's Disease Resources     Helpful web sites   -  www [employer]  org   -  www parkinson  org ([employer])   -  www [employer] ([address],[place] [address] for [employer]) - Order the "Every Victory Counts" hanna under the "resources" tab  Or visit [facility] org/[facility] or call [phone]  - [facility]  org/resources/parkinsons-exercise-essentials   - Contact the [employer] for an "Aware in care kit" @ 3528.710.7639 (this is a kit to take with you if you ever have to go to the hospital)   - www [facility]alliance  org  -  Parkinsons  nate edu/exercise_videos  html  - [address] [facility] Exercise helpline: [phone]  - [facility]

## 2020-09-01 NOTE — ASSESSMENT & PLAN NOTE
59year old man presents in follow-up for Parkinson's disease  His symptoms are currently well controlled with combination Sinemet and ropinirole  Patient is made some changes on his own, increasing his Sinemet to 3 tablets 3 times a day  He was instructed to take 2 tablets 4 times a day however he has no wearing off and finds this dosing regiment to be difficult  We discussed that his Sinemet doses relatively high while his ropinirole dose is relatively low  Given his young age I would like to try to get him on a lower dose carbidopa/levodopa if at all possible  We will try slowly increasing his ropinirole with the goal of reducing his carbidopa/levodopa  Once he find a good dose of ropinirole we will switch him over to either a long-acting formulation her just a larger single tablet dosed 3 times daily (rather than 5-8 tablets of 0 25 mg tabs)  The patient was on board with this plan  We also discussed his left lower extremity dystonia, toe curling  Treatment of choice for this would be Botox and I cautioned him against chasing this symptom too much with the carbidopa/levodopa  We also discussed deep brain stimulation, indications, timing

## 2020-09-01 NOTE — PROGRESS NOTES
Assessment/Plan:    Parkinson disease ([facility] )  59year old man presents in follow-up for Parkinson's disease  His symptoms are currently well controlled with combination Sinemet and ropinirole  Patient is made some changes on his own, increasing his Sinemet to 3 tablets 3 times a day  He was instructed to take 2 tablets 4 times a day however he has no wearing off and finds this dosing regiment to be difficult  We discussed that his Sinemet doses relatively high while his ropinirole dose is relatively low  Given his young age I would like to try to get him on a lower dose carbidopa/levodopa if at all possible  We will try slowly increasing his ropinirole with the goal of reducing his carbidopa/levodopa  Once he find a good dose of ropinirole we will switch him over to either a long-acting formulation her just a larger single tablet dosed 3 times daily (rather than 5-8 tablets of 0 25 mg tabs)  The patient was on board with this plan  We also discussed his left lower extremity dystonia, toe curling  Treatment of choice for this would be Botox and I cautioned him against chasing this symptom too much with the carbidopa/levodopa  We also discussed deep brain stimulation, indications, timing  History of stroke  Continue secondary stroke prophylaxis with aspirin and statin  Risk factor management per PCP  Diagnoses and all orders for this visit:    Dystonia    Parkinson disease ([facility] )  -     Ambulatory referral to Neurology    History of stroke    I have spent 60 minutes with Patient  today in which greater than 50% of this time was spent in counseling/coordination of care regarding Diagnostic results, Prognosis, Risks and benefits of tx options, Intructions for management, Patient and family education, Importance of tx compliance and Impressions  Subjective:     Patient ID: Xenia Adhikari is a 59 y o  male      I had the pleasure of seeing your patient, Xenia Adhikari in the Movement Disorders Clinic at the [facility]  Nadiya Lopez is a 59year-old right-handed  with small prior lacunar stroke presents in follow-up for Parkinson's disease, symptom onset in late 2018 with left hand tremor  He regionally was diagnosed by and followed with Dr Kathy Pedro  He was found to be Sinemet responsive  Course is complicated by some dystonia including bothersome toe curling on the left and subtle left hand posturing  Prior medications:   Pramipexole - GI symptoms  Current medications and timing:  Sinemet 25/100; 3 tabs TID     Ropinirole 0 25; 5 tablets, TID     Interval history:  Rock steady boxing helped a lot  Now doing individual sessions  Work is going ok  Tremor is much better and hand witting improved with increase in sinemet  Regarding motor symptoms:   Tremor: mostly well controlled  Slowness: not significant   Stiffness: mornings  Dystonia: toe curling on the left  There all the time  Worse when off  (starting on the right)   Changes in gait: ok, mild dysbalance        Falls: no        Freezing: no   Trouble with swallowing: no     Regarding non-motor symptoms:   Lightheadedness: if he stands up quickly   Mood: ok "no more than normal"  Sleep: fragmented  Wakes up and has a hard time getting back to sleep  Never on anything   Memory trouble: fog improved with meds   Hallucinations: no     Driving issues: no   Physical activity: rock steady   POA: not filed       Regarding medication complications:  Wearing off: no   Dyskinesias: no   Impulse control issues: no   Daytime drowsiness: depends on the night prior       The following portions of the patient's history were reviewed and updated as appropriate: allergies, current medications, past family history, past medical history, past social history, past surgical history and problem list       Objective:  /78   Temp (!) 96 9 °F (36 1 °C)   Ht 5' 10" (1 778 m)   Wt 89 kg (196 lb 1 6 oz)   BMI 28 14 kg/m²     Physical Exam    Neurological Exam  GENERAL MEDICAL EXAMINATION:  General appearance: alert, in no apparent distress  Appropriately dressed and groomed  Conversing and interacting appropriately  Eyes: Sclera are non-injected  Ears, nose, Mouth, Throat: Mucous membranes are moist    Resp: Breathing comfortably on RA   Musculoskeletal: No evidence of deformities  No contractures  No Edema  Skin: No visible rashes  Warm and well perfused  Psych: normal and appropriate affect     Mental Status:  Alert and oriented to person place and time  Able to relate history without difficulty  Attentive to conversation  Language is fluent and appropriate with normal prosody  There were no paraphasic errors  Speech was not dysarthric  Able to follow both midline and appendicular commands       General Neurology examination:     UPDRS motor:                              Time since last dose:       Speech  0     Facial Expression  +     Rigidity - Neck  0     Rigidity - Upper Extremity (Right)  1     Rigidity - Upper Extremity (Left)   1     Rigidity - Lower Extremity (Right)  0     Rigidity - Lower Extremity (Left)   1     Finger Taps (Right)   1     Finger Taps (Left)   2     Hand Movement (Right)  2     Hand Movement (Left)   2     Pronation/Supination (Right)  1     Pronation/Supination (Left)   1     Toe Tapping (Right) 1     Toe Tapping (Left) 1     Leg Agility (Right)  0     Leg Agility (Left)   1     Arising from Chair   0     Gait   0     Freezing of Gait 0     Postural Stability        Posture 0     Global spontaneity of movement 1     Postural Tremor (Right) 0     Postural Tremor (Left) 1     Kinetic Tremor (Right)  1     Kinetic Tremor (Left)  1     Rest tremor amplitude RUE 0     Rest tremor amplitude LUE 2     Rest tremor amplitude RLE 0     Reset tremor amplitude LLE 0     Lip/Jaw Tremor  0     Consistency of tremor 2     Motor Exam Total:          Dysmetria: none on FNF  Dyskinesia: none  Dystonia: mild in the left hand     Stance: narrow-based and stable   Stride: normal speed, stride length, step height, walks with no assistive device   Arm swing:reduced on the left with slight tremor in the thumb  Turn: stable, 2-3 steps       Review of Systems   Constitutional: Negative  Negative for appetite change and fever  HENT: Negative  Negative for hearing loss, tinnitus, trouble swallowing and voice change  Eyes: Negative  Negative for photophobia and pain  Respiratory: Negative  Negative for shortness of breath  Cardiovascular: Negative  Negative for palpitations  Gastrointestinal: Negative  Negative for nausea and vomiting  Endocrine: Negative  Negative for cold intolerance  Genitourinary: Negative  Negative for dysuria, frequency and urgency  Musculoskeletal: Positive for gait problem  Negative for myalgias and neck pain  Skin: Negative  Negative for rash  Neurological: Positive for tremors  Negative for dizziness, seizures, syncope, facial asymmetry, speech difficulty, weakness, light-headedness, numbness and headaches  Hematological: Negative  Does not bruise/bleed easily  Psychiatric/Behavioral: Positive for sleep disturbance  Negative for confusion and hallucinations  The above ROS was reviewed and updated       Natasha Del Cid MD  Medical Director   [facility]  Movement and Memory Specialist       Current Outpatient Medications on File Prior to Visit   Medication Sig Dispense Refill    aspirin 325 mg tablet Take 325 mg by mouth daily      carbidopa-levodopa (SINEMET)  mg per tablet Take 2 tablets by mouth 3 (three) times a day Follow clinic instructions (Patient taking differently: Take 3 tablets by mouth 3 (three) times a day Follow clinic instructions) 540 tablet 3    Cholecalciferol (VITAMIN D) 2000 units CAPS Take 2 capsules (4,000 Units total) by mouth daily  0    co-enzyme Q-10 30 MG capsule Take 1 capsule (30 mg total) by mouth daily 30 capsule 0    Empagliflozin (Jardiance) 10 MG TABS TAKE 1 TABLET BY MOUTH DAILY 90 tablet 1    glucose blood (ONETOUCH VERIO) test strip Check BG 2x per day 200 each 3    JANUMET  MG per tablet TAKE 1 TABLET BY MOUTH TWICE DAILY WITH MEALS 180 tablet 1    lisinopril (ZESTRIL) 20 mg tablet TAKE 1 TABLET BY MOUTH DAILY 90 tablet 1    pravastatin (PRAVACHOL) 40 mg tablet TAKE 1 TABLET BY MOUTH DAILY 90 tablet 1    rOPINIRole (REQUIP) 0 25 mg tablet Follow clinic instructions on taking this up to 5 tablets TID, as tolerated and to level of tremor / weakness benefit  90 tablet 0    vardenafil (LEVITRA) 20 MG tablet Take by mouth daily as needed       venlafaxine (EFFEXOR-XR) 75 mg 24 hr capsule TAKE ONE CAPSULE BY MOUTH DAILY 90 capsule 1     No current facility-administered medications on file prior to visit

## 2020-09-02 ENCOUNTER — TELEPHONE (OUTPATIENT)
Dept: CARDIOLOGY CLINIC | Facility: CLINIC | Age: 65
End: 2020-09-02

## 2020-09-02 NOTE — TELEPHONE ENCOUNTER
[employer] denied Echo, because pt is asymptomatic  Pt asking if he should continue with annual f/u scheduled for 9/15/2020   stated he feels good

## 2020-09-03 ENCOUNTER — APPOINTMENT (OUTPATIENT)
Dept: PHYSICAL THERAPY | Facility: CLINIC | Age: 65
End: 2020-09-03
Payer: COMMERCIAL

## 2020-09-08 ENCOUNTER — APPOINTMENT (OUTPATIENT)
Dept: PHYSICAL THERAPY | Facility: CLINIC | Age: 65
End: 2020-09-08
Payer: COMMERCIAL

## 2020-09-10 ENCOUNTER — OFFICE VISIT (OUTPATIENT)
Dept: PHYSICAL THERAPY | Facility: CLINIC | Age: 65
End: 2020-09-10
Payer: COMMERCIAL

## 2020-09-10 DIAGNOSIS — G20 PARKINSON DISEASE (HCC): Primary | ICD-10-CM

## 2020-09-10 PROCEDURE — 97530 THERAPEUTIC ACTIVITIES: CPT | Performed by: PHYSICAL THERAPIST

## 2020-09-10 PROCEDURE — 97112 NEUROMUSCULAR REEDUCATION: CPT | Performed by: PHYSICAL THERAPIST

## 2020-09-10 NOTE — PROGRESS NOTES
PT PROGRESS NOTE    Today's date: 9/10/2020  Patient name: Sebastien Roy  : 1955  MRN: [id number]  Referring provider: Scar Diaz MD  Dx:   Encounter Diagnosis     ICD-10-CM    1  Parkinson disease ([facility] [address] )  Mckenzie Mar        Start Time: 174  Stop Time: 1830  Total time in clinic (min): 45 minutes    Assessment  Assessment details: Client is a 59year old male whose been receiving skilled PT for Parkinson's disease with interest in exercise program  He was participating in [employer] from February-2020 but stopped due to onset of COVID-19 pandemic and quarantine guidelines and recently returned for skilled PT services on 2020  His balance scores currently suggest a low fall risk  However when starting PT, he initially reported increased UE tremors and LE rigidity/ toe curling which are impacting his ability to perform ADL's, household chores, work related tasks, which as per client, appear to be improving  He also reported feeling "more like myself " Originally his Sinemet dosage was increased since he re-started PT in July, and is now speaking with his neurologist to try an decrease it again  His endurance improved from assessments back in July but still noticeably decreased since March (per 6MWT and 5x STS scores)  He demonstrated improvements in his Timed Up and Go assessment from 8 42 to 7 53 sec, however is still at risk for falls due to >10% increase in TUG regular and Tug Cog (10 13 sec)    He has a history of progression and regression with skilled PT attendance and time between therapy, so due to research via [facility] patient may benefit from continued therapy services to improve and maximize function to reduce healthcare costs via hospitalization to maximize function and reduce patient and national healthcare costs  Roberto Munoz requires skilled PT to improve their static and dynamic balance, improve their LE strength, improve their ambulation capabilities, and to maximize function to prevent loss of balance and falls associated with Parkinson's Disease  YBARRA Cutoff Scores:  MDC- 5 points  Cut off- 40 22    DGI Cutoff Scores:  Melva 2008 Cherrie: Score <19    Heidi eduardo 2011; Laurita [address]- 2 9 points    MiniBest Cutoff Scores:  Payton Santana 2013: Manfred Lizarraga <19/28  Gustabo 2011: MDC 5 52 points     TUG Cutoff Scores: Anahi, 2011:   Tug Score Fallers: Medication ON: < 12 21 seconds; Off: < 15 5 seconds   Rick Adamson et al, 2011: MDC: 4 8 seconds     10 Meter Walk Test Cutoff Scores:  Ana Gonzalez 2008: Laurita Her[address]:  18 M/S  Age Norm Values Cherrie < 1 0 m/s    6 Minute Walk Test Cutoff Scores:  Ana Gonzalez , 2008: Laurita Her[address]- 269 feet    Norm Data Healthy Adults:  Niko eduardo, 2002:  60 to 71 years  Male: 572 meter;  Female: 538 meter  70-79 years :    Male: 1 meter,  Female: 471 meter  80-89 years :   Male 1 meter,   Female: 392 meter    5xSTS Cutoff Scores:  Cammie Caraballo 2011: Cherrie,Cut off- > 16 seconds   MDC- 2 3 secondsDGI Cutoff Scores:  Melva 2008 Cherrie: Score <19    Heidi eduardo 2011; Laurita Her[address]- 2 9 points    Impairments: abnormal coordination, abnormal gait, abnormal muscle firing, abnormal muscle tone, activity intolerance, impaired balance, impaired physical strength, lacks appropriate home exercise program, pain with function, safety issue, poor posture  and poor body mechanics    Symptom irritability: moderateUnderstanding of Dx/Px/POC: good   Prognosis: good    Goals  Goals:  STGs (30 days)  1  Patient will improve TUG score to age related norms seconds or less to meet the age norm value for low risk of falls in order to increase safety with functional mobility  MET  2  Patient will improve 5 STS by the SANDRA of 2 3 seconds indicating an improvement in strength and decreased challenge with transfers  NOT MET  3   Patient will improve 6 MWT by the SANDRA 269 ft indicating an improvement in cardiovascular endurance in order to maximize function with functional mobility throughout the community  NOT MET  4  Patient will be independent in basic HEP in order to manage condition at home  MET    LTGs (60 days)  1  Patient will score a low risk for falls 4/4 fall risks measures  2  Patient will score age norm values for 2/2 endurance measures  3  Patient will be able to ambulate on uneven surfaces without loss of balance to increase safety with community mobility  4  Patient will be able to perform a floor transfer without physical assistance to assist with fall recovery at home and in the community  5  Patient will be independent in comprehensive HEP post discharge from program    6  Patient will be able to walk up incline with decreased difficulty and no loss of balance in order to improve functional mobility throughout the community  7  Patient will be able to perform sit to stands from softer surfaces such as a couch or bed in order to improvement function with transfers  8  Patient will have an improvement FOTO score indicating an improvement in function  9  Patient will be consistent with program for at least 2 days per week to assist with management of condition and functional independence of their condition           Plan  Patient would benefit from: skilled physical therapy  Planned therapy interventions: neuromuscular re-education, motor coordination training, patient education, postural training, sensory integrative techniques, stretching, strengthening, therapeutic activities, therapeutic exercise, home exercise program, gait training, flexibility, coordination, balance, activity modification, abdominal trunk stabilization, community reintegration, graded exercise, graded activity, graded motor, therapeutic training and transfer training  Frequency: 2x week  Duration in weeks: 12  Plan of Care beginning date: 7/23/2020  Plan of Care expiration date: 10/15/2020  Treatment plan discussed with: patient and family        Subjective Evaluation    History of Present Illness  Mechanism of injury: Pt is prior [facility] patient, last visit on   He felt okay for about 2 months, and then 1 month ago he noticed his symptoms have worsened  He notices rigidity and toe curling in the left leg, and now migrating to the right leg also  He notices the toe curling when he is driving  He also notes tremors have worsened  He recently increased his medication dosage (sinemet)  He works full time as an , sitting at a computer most of the day  He has not been performing exercises at home, but he goes for daily walks and tries to walk as much as he can throughout the day  Pain  No pain reported  Current pain ratin  At best pain ratin  At worst pain ratin    Social Support  Steps to enter house: yes  Stairs in house: yes   Lives with: spouse    Employment status: working  Treatments  Previous treatment: physical therapy  Patient Goals  Patient goals for therapy: improved balance, increased motion, return to sport/leisure activities, independence with ADLs/IADLs and increased strength          Objective     Strength/Myotome Testing     Left Hip   Planes of Motion   Flexion: 4  Extension: 4  Abduction: 4  Adduction: 4    Right Hip   Planes of Motion   Flexion: 4  Extension: 4  Abduction: 4  Adduction: 4    Left Knee   Flexion: 4  Extension: 4    Right Knee   Flexion: 4  Extension: 4    Left Ankle/Foot   Dorsiflexion: 4  Plantar flexion: 4    Right Ankle/Foot   Dorsiflexion: 4  Plantar flexion: 4    Additional Strength Details  Increased rigidity LLE>RLE               Precautions:   Past Medical History:   Diagnosis Date    Ankle injury     last assessed 7/15/2013    BPH (benign prostatic hyperplasia)     Bronchiolitis     Cancer ([facility] [address] )     Prostate    Diabetes mellitus ([facility] )     Type II    History of benign prostatic hyperplasia     History of gastritis     History of insect bite     last assessed 10/4/2014    History of nocturia     History of stroke  Hypertension     Lumbar canal stenosis     Osteoarthritis, knee     Otitis externa     Pain in back     UPPER BACK, last assessed 2/25/2013    Retinal and vitreous disorder     Sinusitis, acute     last assessed 10/6/2016    Tendinitis of right rotator cuff     last assessed 8/21/2012     Treatment on 9/10:  - single knee to chest 30 sec x 5 each leg  - standing twist 2x10 with 5 sec holds  - floor to ceiling stretch 20x with 10 sec holds      Outcome Measures 3/5/20 7/23/20 9/10 PN      ABC Not assessed 80 6%       5x STS 11 5 sec 14 22 sec 12 57 sec      TUG 7 32    11 03 carry    12 12   cog 8 42     8 70 carry    9 95 cog 7 53 sec    8 66 sec   Carry  10 13 sec Cog      10 meter walk test 1 25 m/s 1 30 m/s 7 03 sec 1 4 m/s      6 minute walk test 2100 ft 1375 ft 1445 ft      DGI 23/24 23/24       MiniBest 25/28 25/28       FGA Not assessed 27/30

## 2020-09-10 NOTE — PROGRESS NOTES
Cardiology Follow Up    Shannon Oar  1955  [id number]  [facility] [place]  [address]  [address] [facility] [address] [phone]    1  Essential (primary) hypertension     2  Pure hypercholesterolemia     3  Coronary arteriosclerosis  POCT ECG       Interval History:  Patient is here for a follow-up visit  His most recent echocardiogram was done September 2016 and demonstrated preserved LV systolic function with mild LVH   His most recent nuclear exercise stress test was done October 2014 and demonstrated a tiny reversible inferior defect which has been managed medically  Patient had a lipid profile done 12/2019 which demonstrated total cholesterol of 186 with an HDL 45 and a direct LDL of 109  He was diagnosed with Parkinson's disease because of a tremor in the left hand  He is on medical therapy for it  Patient has been well from a cardiac point of view  His vital signs are stable today  EKG today demonstrates sinus rhythm and is a normal tracing with no significant change compared to a prior tracing done May 16, 2018      Patient Active Problem List   Diagnosis    Prostate cancer ([facility]    Arteriosclerosis of coronary artery    Benign essential hypertension    Depression    Type 2 diabetes mellitus without complication, without long-term current use of insulin (HCC)    Subclinical hypothyroidism    Thrombocytosis (HCC)    Vitamin D deficiency    Mixed hyperlipidemia    Leukocytosis    Hyperlipidemia    Dermatitis of left ear canal    Tremor of left hand    Impingement syndrome of left shoulder    Thoracic spine pain    Tear of left supraspinatus tendon    Left carpal tunnel syndrome    Other male erectile dysfunction    Parkinson disease ([facility]    Type 2 diabetes mellitus with hyperglycemia, without long-term current use of insulin (HCC)    Dystonia    History of stroke     Past Medical History:   Diagnosis Date    Ankle injury     last assessed 7/15/2013    BPH (benign prostatic hyperplasia)     Bronchiolitis     Cancer ([facility] )     Prostate    Diabetes mellitus ([facility] )     Type II    History of benign prostatic hyperplasia     History of gastritis     History of insect bite     last assessed 10/4/2014    History of nocturia     History of stroke     Hypertension     Lumbar canal stenosis     Osteoarthritis, knee     Otitis externa     Pain in back     UPPER BACK, last assessed 2/25/2013    Retinal and vitreous disorder     Sinusitis, acute     last assessed 10/6/2016    Tendinitis of right rotator cuff     last assessed 8/21/2012     Social History     Socioeconomic History    Marital status: /Civil Union     Spouse name: Not on file    Number of children: Not on file    Years of education: Not on file    Highest education level: Not on file   Occupational History    Not on file   Social Needs    Financial resource strain: Not on file    Food insecurity     Worry: Not on file     Inability: Not on file   [email or web address] needs     Medical: Not on file     Non-medical: Not on file   Tobacco Use    Smoking status: Light Tobacco Smoker     Years: 20 00     Types: Pipe    Smokeless tobacco: Never Used    Tobacco comment: per allscripts 'current smoker, current some day smoker'   Substance and Sexual Activity    Alcohol use:  Yes     Alcohol/week: 4 0 standard drinks     Types: 3 Cans of beer, 1 Shots of liquor per week     Frequency: 2-3 times a week     Drinks per session: 1 or 2     Comment: occassional     Drug use: No    Sexual activity: Not Currently   Lifestyle    Physical activity     Days per week: Not on file     Minutes per session: Not on file    Stress: Not on file   Relationships    Social connections     Talks on phone: Not on file     Gets together: Not on file     Attends Rastafari service: Not on file     Active member of club or organization: Not on file     Attends meetings of clubs or organizations: Not on file     Relationship status: Not on file    Intimate partner violence     Fear of current or ex partner: Not on file     Emotionally abused: Not on file     Physically abused: Not on file     Forced sexual activity: Not on file   Other Topics Concern    Not on file   Social History Narrative    Not on file      Family History   Problem Relation Age of Onset    Hypertension Mother    [de-identified] Retinal detachment Mother     Retinitis pigmentosa Mother     Diabetes Father     Heart disease Father         MI at 60 yo     Past Surgical History:   Procedure Laterality Date    ANKLE FRACTURE SURGERY Left     triple fracture 4/2009    LAMINECTOMY      L5-S1 2003    LUMBAR LAMINECTOMY      partial L4-L5 in 2003    NOSE SURGERY      6/1989    CT COLONOSCOPY FLX DX W/COLLJ SPEC WHEN PFRMD N/A 1/27/2016    Procedure: COLONOSCOPY;  Surgeon: Yemi Dwyer MD;  Location: [facility];   Service: Gastroenterology    PROSTATE BIOPSY  12/08/2012    managed by Tyrese Lugo, needle biopsy    PROSTATECTOMY      9/23/2013    RHINOPLASTY      for deviated septum in 1989       Current Outpatient Medications:     aspirin 325 mg tablet, Take 325 mg by mouth daily, Disp: , Rfl:     carbidopa-levodopa (SINEMET)  mg per tablet, Take 2 tablets by mouth 3 (three) times a day Follow clinic instructions (Patient taking differently: Take 3 tablets by mouth 3 (three) times a day Follow clinic instructions), Disp: 540 tablet, Rfl: 3    Cholecalciferol (VITAMIN D) 2000 units CAPS, Take 2 capsules (4,000 Units total) by mouth daily, Disp: , Rfl: 0    co-enzyme Q-10 30 MG capsule, Take 1 capsule (30 mg total) by mouth daily, Disp: 30 capsule, Rfl: 0    Empagliflozin (Jardiance) 10 MG TABS, TAKE 1 TABLET BY MOUTH DAILY, Disp: 90 tablet, Rfl: 1    glucose blood (ONETOUCH VERIO) test strip, Check BG 2x per day, Disp: 200 each, Rfl: 3    JANUMET  MG per tablet, TAKE 1 TABLET BY MOUTH TWICE DAILY WITH MEALS, Disp: 180 tablet, Rfl: 1    lisinopril (ZESTRIL) 20 mg tablet, TAKE 1 TABLET BY MOUTH DAILY, Disp: 90 tablet, Rfl: 1    pravastatin (PRAVACHOL) 40 mg tablet, TAKE 1 TABLET BY MOUTH DAILY, Disp: 90 tablet, Rfl: 1    rOPINIRole (REQUIP) 0 25 mg tablet, Follow clinic instructions on taking this up to 5 tablets TID, as tolerated and to level of tremor / weakness benefit , Disp: 90 tablet, Rfl: 0    vardenafil (LEVITRA) 20 MG tablet, Take by mouth daily as needed , Disp: , Rfl:     venlafaxine (EFFEXOR-XR) 75 mg 24 hr capsule, TAKE ONE CAPSULE BY MOUTH DAILY, Disp: 90 capsule, Rfl: 1  No Known Allergies    Labs:not applicable  Imaging: No results found  Review of Systems:  Review of Systems   All other systems reviewed and are negative  Physical Exam:  /70 (BP Location: Left arm, Patient Position: Sitting, Cuff Size: Large)   Pulse 92   Temp (!) 97 3 °F (36 3 °C)   Ht 5' 10" (1 778 m)   Wt 88 9 kg (195 lb 14 4 oz)   SpO2 98%   BMI 28 11 kg/m²   Physical Exam  Vitals signs reviewed  Constitutional:       Appearance: He is well-developed  HENT:      Head: Normocephalic and atraumatic  Eyes:      Conjunctiva/sclera: Conjunctivae normal       Pupils: Pupils are equal, round, and reactive to light  Neck:      Musculoskeletal: Normal range of motion and neck supple  Cardiovascular:      Rate and Rhythm: Normal rate  Heart sounds: Normal heart sounds  Pulmonary:      Effort: Pulmonary effort is normal       Breath sounds: Normal breath sounds  Skin:     General: Skin is warm and dry  Neurological:      Mental Status: He is alert and oriented to person, place, and time  Discussion/Summary:I will continue the patient's present medical regimen  Patient appears well compensated  I have asked the patient to call if there is a problem in the interim otherwise I will see the patient in one years time    We had ordered an echocardiogram previously but is insurance company would not pay for this as he had no symptoms

## 2020-09-15 ENCOUNTER — OFFICE VISIT (OUTPATIENT)
Dept: CARDIOLOGY CLINIC | Facility: CLINIC | Age: 65
End: 2020-09-15
Payer: COMMERCIAL

## 2020-09-15 VITALS
TEMPERATURE: 97.3 F | HEIGHT: 70 IN | BODY MASS INDEX: 28.05 KG/M2 | WEIGHT: 195.9 LBS | OXYGEN SATURATION: 98 % | HEART RATE: 92 BPM | DIASTOLIC BLOOD PRESSURE: 70 MMHG | SYSTOLIC BLOOD PRESSURE: 120 MMHG

## 2020-09-15 DIAGNOSIS — I10 ESSENTIAL (PRIMARY) HYPERTENSION: Primary | ICD-10-CM

## 2020-09-15 DIAGNOSIS — E78.00 PURE HYPERCHOLESTEROLEMIA: ICD-10-CM

## 2020-09-15 DIAGNOSIS — I25.10 CORONARY ARTERIOSCLEROSIS: ICD-10-CM

## 2020-09-15 PROCEDURE — 99214 OFFICE O/P EST MOD 30 MIN: CPT | Performed by: INTERNAL MEDICINE

## 2020-09-15 PROCEDURE — 93000 ELECTROCARDIOGRAM COMPLETE: CPT | Performed by: INTERNAL MEDICINE

## 2020-09-17 ENCOUNTER — OFFICE VISIT (OUTPATIENT)
Dept: PHYSICAL THERAPY | Facility: CLINIC | Age: 65
End: 2020-09-17
Payer: COMMERCIAL

## 2020-09-17 DIAGNOSIS — G20 PARKINSON DISEASE (HCC): Primary | ICD-10-CM

## 2020-09-17 PROCEDURE — 97110 THERAPEUTIC EXERCISES: CPT

## 2020-09-17 PROCEDURE — 97112 NEUROMUSCULAR REEDUCATION: CPT

## 2020-09-17 NOTE — PROGRESS NOTES
Daily Note    Today's date: 2020  Patient name: Rula Gary  : 1955  MRN: [id number]  Referring provider: Sergey Rushing MD  Dx:   Encounter Diagnosis     ICD-10-CM    1  Parkinson disease ([facility] )  G20        Start Time: 200  Stop Time: 1835  Total time in clinic (min): 42 minutes    Subjective: No new complaints  No falls  Objective: See treatment diary below    [employer]:     Warm up:  - High Knees  - Sidestepping with big arm movements  - 10 large fwd steps  - 10 large lateral steps   - 10 large bwd steps       Shadow boxing 20 reps each (pt R hand dominant)  - 1 jab  - 2 cross  - 3 non dominant hook  - 4 dominant hook  - 5 nondominant upper cut  - 6 dominant upper cut    Circuits   1 - 1 min each, 2x, w/30 sec rest break b/w  ·           -1-2 > knee, knee  ·           Step taps w/ alternating punches  ·           Floor to stand > combinations at top  ·           Wall sit alt kicking out LEs    [1 min rest b/w circuits]     2 - 1 min each, 1x   ·           Side shuffle to cones with combos   ·           NP Four square hurdles w/ combos     Sports Step 6" with combos   Basic no combos   y step L R no combos   Basic step tap no combos    Turn step no combos   Turn step with combos one minute   Straddle step with combos one minute        Core Circuit - 1 min each  - Standing on flat side of Bosu, core rotation 10# med ball  - NP1/2 kneel 10# med ball PNF chops      Cooldown   ·           Arm across chest   ·           Arm overhead   ·          Calf stretch against wall  ·          Floor to ceiling        Assessment:  Pt tolerated session well with modifications as needed  New activity incorporating 6" sport step with good results, pt somewhat challenged with direction changing/switching feet, but good turning on step  He was challenged with balancing on flat side of Bosu  One  stagger getting up from the floor, but able to recover independently   He will continue to benefit from skilled outpatient PT in order to maximize his function with PD diagnosis and slow disease progression  Plan: Continue per plan of care  Hold off on rolling and planks due to increase in LBP following        Precautions:   Past Medical History:   Diagnosis Date    Ankle injury     last assessed 7/15/2013    BPH (benign prostatic hyperplasia)     Bronchiolitis     Cancer ([facility] )     Prostate    Diabetes mellitus ([facility] )     Type II    History of benign prostatic hyperplasia     History of gastritis     History of insect bite     last assessed 10/4/2014    History of nocturia     History of stroke     Hypertension     Lumbar canal stenosis     Osteoarthritis, knee     Otitis externa     Pain in back     UPPER BACK, last assessed 2/25/2013    Retinal and vitreous disorder     Sinusitis, acute     last assessed 10/6/2016    Tendinitis of right rotator cuff     last assessed 8/21/2012           Outcome Measures 3/5/20 7/23/20       ABC Not assessed 80 6%       5x STS 11 5 sec 14 22 sec       TUG 7 32    11 03 carry    12 12   cog 8 42     8 70 carry    9 95 cog       10 meter walk test 1 25 m/s 1 30 m/s       6 minute walk test 2100 ft 1375 ft       DGI 23/24 23/24       MiniBest 25/28 25/28       FGA Not assessed 27/30

## 2020-09-22 ENCOUNTER — OFFICE VISIT (OUTPATIENT)
Dept: PHYSICAL THERAPY | Facility: CLINIC | Age: 65
End: 2020-09-22
Payer: COMMERCIAL

## 2020-09-22 DIAGNOSIS — IMO0002 UNCONTROLLED TYPE 2 DIABETES MELLITUS WITH COMPLICATION, WITHOUT LONG-TERM CURRENT USE OF INSULIN: ICD-10-CM

## 2020-09-22 DIAGNOSIS — E11.65 TYPE 2 DIABETES MELLITUS WITH HYPERGLYCEMIA, UNSPECIFIED WHETHER LONG TERM INSULIN USE (HCC): ICD-10-CM

## 2020-09-22 DIAGNOSIS — G20 PARKINSON DISEASE (HCC): Primary | ICD-10-CM

## 2020-09-22 PROCEDURE — 97112 NEUROMUSCULAR REEDUCATION: CPT | Performed by: PHYSICAL THERAPIST

## 2020-09-22 PROCEDURE — 97116 GAIT TRAINING THERAPY: CPT | Performed by: PHYSICAL THERAPIST

## 2020-09-22 RX ORDER — EMPAGLIFLOZIN 10 MG/1
TABLET, FILM COATED ORAL
Qty: 90 TABLET | Refills: 1 | Status: SHIPPED | OUTPATIENT
Start: 2020-09-22 | End: 2021-03-08 | Stop reason: SDUPTHER

## 2020-09-22 RX ORDER — SITAGLIPTIN AND METFORMIN HYDROCHLORIDE 1000; 50 MG/1; MG/1
TABLET, FILM COATED ORAL
Qty: 180 TABLET | Refills: 1 | Status: SHIPPED | OUTPATIENT
Start: 2020-09-22 | End: 2021-03-08 | Stop reason: SDUPTHER

## 2020-09-22 NOTE — PROGRESS NOTES
Daily Note     Today's date: 2020  Patient name: Sebastien Roy  : 1955  MRN: [id number]  Referring provider: Scar Diaz MD  Dx:   Encounter Diagnosis     ICD-10-CM    1  Parkinson disease ([facility] [address] )  G20        Start Time: [id number]  Stop Time:   Total time in clinic (min): 47 minutes    Subjective: Client reports to skilled PT with no complaints  Objective: See treatment diary below    Fortino Moy     Warm up:  - High Knees with alternating UE punching  - Sidestepping with big arm movements  - Walking fwd/back with arm circles     Shadow boxing 20 reps each (pt R hand dominant)  - 1 jab  - 2 cross  - 3 non dominant hook  - 4 dominant hook  - 5 nondominant upper cut  - 6 dominant upper cut    Circuit 1 (1 min, 2 rounds)  -           In-in-out-out in agility ladder with combos  -           FWD/BWD stepping with side step to next square in agility ladder with combos     Circuit 2 (2 min each, 1 round each):  -           Powerwalking with alternating combos fwd/bwd  -           Side shuffling  with alternating combos     Circuit 3 (1 min each, 1 round):  -           Standing on bosu with speed combos     Core Circuit (1 min each, 2 rounds)  -           Modified burpees  -  Theraband core rotations bilaterally     Cooldown   -           Arm across chest   -           Arm overhead   -           Reaching down to floor  -           Standing twist  -           Quad stretch    Assessment: Client able to tolerate treatment session well  Client with improved tolerance with modified burpees today  Cool down focused on back stretches per his request  Min cues needed for combos  He will continue to benefit from skilled outpatient PT in order to maximize his function with PD diagnosis and slow disease progression  Plan: Continue per plan of care        Precautions:   Past Medical History:   Diagnosis Date    Ankle injury     last assessed 7/15/2013    BPH (benign prostatic hyperplasia)    Mitra Singleton Bronchiolitis     [facility] ([facility] )     Prostate    Diabetes mellitus ([facility] )     Type II    History of benign prostatic hyperplasia     History of gastritis     History of insect bite     last assessed 10/4/2014    History of nocturia     History of stroke     Hypertension     Lumbar canal stenosis     Osteoarthritis, knee     Otitis externa     Pain in back     UPPER BACK, last assessed 2/25/2013    Retinal and vitreous disorder     Sinusitis, acute     last assessed 10/6/2016    Tendinitis of right rotator cuff     last assessed 8/21/2012           Outcome Measures 3/5/20 7/23/20       ABC Not assessed 80 6%       5x STS 11 5 sec 14 22 sec       TUG 7 32    11 03 carry    12 12   cog 8 42     8 70 carry    9 95 cog       10 meter walk test 1 25 m/s 1 30 m/s       6 minute walk test 2100 ft 1375 ft       DGI 23/24 23/24       MiniBest 25/28 25/28       FGA Not assessed 27/30

## 2020-09-24 ENCOUNTER — APPOINTMENT (OUTPATIENT)
Dept: PHYSICAL THERAPY | Facility: CLINIC | Age: 65
End: 2020-09-24
Payer: COMMERCIAL

## 2020-09-28 ENCOUNTER — OFFICE VISIT (OUTPATIENT)
Dept: PHYSICAL THERAPY | Facility: CLINIC | Age: 65
End: 2020-09-28
Payer: COMMERCIAL

## 2020-09-28 DIAGNOSIS — G20 PARKINSON DISEASE (HCC): Primary | ICD-10-CM

## 2020-09-28 PROCEDURE — 97112 NEUROMUSCULAR REEDUCATION: CPT | Performed by: PHYSICAL THERAPIST

## 2020-09-28 NOTE — PROGRESS NOTES
Daily Note     Today's date: 2020  Patient name: Jojo Tineo  : 1955  MRN: [id number]  Referring provider: Radha Lovett MD  Dx:   Encounter Diagnosis     ICD-10-CM    1  Parkinson disease ([facility] )  G20                   Subjective: Client reports to skilled PT with no complaints  Objective: See treatment diary below    Fortino Moy     Warm up:  - High Knees with alternating UE punching  - Sidestepping with big arm movements  - Walking fwd/back with arm circles     Shadow boxing 20 reps each (pt R hand dominant)  - 1 jab  - 2 cross  - 3 non dominant hook  - 4 dominant hook  - 5 nondominant upper cut  - 6 dominant upper cut    Circuit 1 (2 min each, 2 rounds)  -           5xSTS with combos  -           Lateral lunge with punch to trailing foot    Circuit 2 (2 min each, 1 round):  -           High knee kicks with combos  -           Step-up onto bosu x 5 with combos, repeat     Core Circuit (1 min each, 2 rounds)  -           [place] twists w/ 3 lb weight  -  Bird dogs      Cooldown   -           Arm across chest   -           Arm overhead   -           Reaching down to floor  -           Calf stretch  -           Toe stretch    Assessment: Pt tolerated treatment session well  Pt had difficulty with crossing midline for 5's & 6's combos  Pt had difficulty with stepping down from Bosu demonstrated by increased backward stepping strategy  Pt will continue to benefit from skilled PT in order to address gait, trunk mobility, and endurance deficits secondary to PD diagnosis in order to maximize function at home and in the community  Plan: Continue per plan of care        Precautions:   Past Medical History:   Diagnosis Date    Ankle injury     last assessed 7/15/2013    BPH (benign prostatic hyperplasia)     Bronchiolitis     Cancer ([facility] )     Prostate    [facility]     Type II    History of benign prostatic hyperplasia     History of gastritis     History of insect bite     last assessed 10/4/2014    History of nocturia     History of stroke     Hypertension     Lumbar canal stenosis     Osteoarthritis, knee     Otitis externa     Pain in back     UPPER BACK, last assessed 2/25/2013    Retinal and vitreous disorder     Sinusitis, acute     last assessed 10/6/2016    Tendinitis of right rotator cuff     last assessed 8/21/2012           Outcome Measures 3/5/20 7/23/20       ABC Not assessed 80 6%       5x STS 11 5 sec 14 22 sec       TUG 7 32    11 03 carry    12 12   cog 8 42     8 70 carry    9 95 cog       10 meter walk test 1 25 m/s 1 30 m/s       6 minute walk test 2100 ft 1375 ft       DGI 23/24 23/24       MiniBest 25/28 25/28       FGA Not assessed 27/30

## 2020-09-29 ENCOUNTER — TRANSCRIBE ORDERS (OUTPATIENT)
Dept: LAB | Facility: CLINIC | Age: 65
End: 2020-09-29

## 2020-09-29 ENCOUNTER — APPOINTMENT (OUTPATIENT)
Dept: LAB | Facility: CLINIC | Age: 65
End: 2020-09-29
Payer: COMMERCIAL

## 2020-09-29 DIAGNOSIS — E11.65 TYPE 2 DIABETES MELLITUS WITH HYPERGLYCEMIA, WITHOUT LONG-TERM CURRENT USE OF INSULIN (HCC): ICD-10-CM

## 2020-09-29 DIAGNOSIS — E78.5 HYPERLIPIDEMIA, UNSPECIFIED HYPERLIPIDEMIA TYPE: ICD-10-CM

## 2020-09-29 DIAGNOSIS — I10 BENIGN ESSENTIAL HYPERTENSION: ICD-10-CM

## 2020-09-29 DIAGNOSIS — E55.9 VITAMIN D DEFICIENCY: ICD-10-CM

## 2020-09-29 LAB
25(OH)D3 SERPL-MCNC: 35.1 NG/ML (ref 30–100)
ALBUMIN SERPL BCP-MCNC: 2.3 G/DL (ref 3.5–5)
ALP SERPL-CCNC: 41 U/L (ref 46–116)
ALT SERPL W P-5'-P-CCNC: 23 U/L (ref 12–78)
ANION GAP SERPL CALCULATED.3IONS-SCNC: 10 MMOL/L (ref 4–13)
AST SERPL W P-5'-P-CCNC: 23 U/L (ref 5–45)
BILIRUB SERPL-MCNC: 0.52 MG/DL (ref 0.2–1)
BUN SERPL-MCNC: 16 MG/DL (ref 5–25)
CALCIUM ALBUM COR SERPL-MCNC: 10.9 MG/DL (ref 8.3–10.1)
CALCIUM SERPL-MCNC: 9.5 MG/DL (ref 8.3–10.1)
CHLORIDE SERPL-SCNC: 106 MMOL/L (ref 100–108)
CHOLEST SERPL-MCNC: 172 MG/DL (ref 50–200)
CO2 SERPL-SCNC: 25 MMOL/L (ref 21–32)
CREAT SERPL-MCNC: 1.04 MG/DL (ref 0.6–1.3)
EST. AVERAGE GLUCOSE BLD GHB EST-MCNC: 183 MG/DL
GFR SERPL CREATININE-BSD FRML MDRD: 76 ML/MIN/1.73SQ M
GLUCOSE P FAST SERPL-MCNC: 113 MG/DL (ref 65–99)
HBA1C MFR BLD: 8 %
HDLC SERPL-MCNC: 41 MG/DL
LDLC SERPL CALC-MCNC: 96 MG/DL (ref 0–100)
POTASSIUM SERPL-SCNC: 5.7 MMOL/L (ref 3.5–5.3)
PROT SERPL-MCNC: 7.2 G/DL (ref 6.4–8.2)
SODIUM SERPL-SCNC: 141 MMOL/L (ref 136–145)
TRIGL SERPL-MCNC: 177 MG/DL

## 2020-09-29 PROCEDURE — 82306 VITAMIN D 25 HYDROXY: CPT

## 2020-09-29 PROCEDURE — 83036 HEMOGLOBIN GLYCOSYLATED A1C: CPT

## 2020-09-29 PROCEDURE — 3052F HG A1C>EQUAL 8.0%<EQUAL 9.0%: CPT | Performed by: PHYSICIAN ASSISTANT

## 2020-09-29 PROCEDURE — 36415 COLL VENOUS BLD VENIPUNCTURE: CPT

## 2020-09-29 PROCEDURE — 80053 COMPREHEN METABOLIC PANEL: CPT

## 2020-09-29 PROCEDURE — 80061 LIPID PANEL: CPT

## 2020-09-30 ENCOUNTER — APPOINTMENT (OUTPATIENT)
Dept: PHYSICAL THERAPY | Facility: CLINIC | Age: 65
End: 2020-09-30
Payer: COMMERCIAL

## 2020-09-30 ENCOUNTER — OFFICE VISIT (OUTPATIENT)
Dept: ENDOCRINOLOGY | Facility: CLINIC | Age: 65
End: 2020-09-30
Payer: COMMERCIAL

## 2020-09-30 VITALS
SYSTOLIC BLOOD PRESSURE: 130 MMHG | TEMPERATURE: 98.6 F | DIASTOLIC BLOOD PRESSURE: 80 MMHG | HEART RATE: 87 BPM | WEIGHT: 196.2 LBS | HEIGHT: 70 IN | BODY MASS INDEX: 28.09 KG/M2

## 2020-09-30 DIAGNOSIS — E03.8 SUBCLINICAL HYPOTHYROIDISM: ICD-10-CM

## 2020-09-30 DIAGNOSIS — E55.9 VITAMIN D DEFICIENCY: ICD-10-CM

## 2020-09-30 DIAGNOSIS — E11.9 TYPE 2 DIABETES MELLITUS WITHOUT COMPLICATION, WITHOUT LONG-TERM CURRENT USE OF INSULIN (HCC): ICD-10-CM

## 2020-09-30 DIAGNOSIS — D75.839 THROMBOCYTOSIS: ICD-10-CM

## 2020-09-30 DIAGNOSIS — E87.5 HYPERKALEMIA: ICD-10-CM

## 2020-09-30 DIAGNOSIS — E83.52 HYPERCALCEMIA: Primary | ICD-10-CM

## 2020-09-30 PROCEDURE — 4004F PT TOBACCO SCREEN RCVD TLK: CPT | Performed by: PHYSICIAN ASSISTANT

## 2020-09-30 PROCEDURE — 3079F DIAST BP 80-89 MM HG: CPT | Performed by: PHYSICIAN ASSISTANT

## 2020-09-30 PROCEDURE — 99214 OFFICE O/P EST MOD 30 MIN: CPT | Performed by: PHYSICIAN ASSISTANT

## 2020-10-02 PROBLEM — E83.52 HYPERCALCEMIA: Status: ACTIVE | Noted: 2020-10-02

## 2020-10-02 PROBLEM — E87.5 HYPERKALEMIA: Status: ACTIVE | Noted: 2020-10-02

## 2020-10-05 ENCOUNTER — OFFICE VISIT (OUTPATIENT)
Dept: PHYSICAL THERAPY | Facility: CLINIC | Age: 65
End: 2020-10-05
Payer: COMMERCIAL

## 2020-10-05 DIAGNOSIS — G20 PARKINSON DISEASE (HCC): Primary | ICD-10-CM

## 2020-10-05 PROCEDURE — 97112 NEUROMUSCULAR REEDUCATION: CPT

## 2020-10-06 ENCOUNTER — APPOINTMENT (OUTPATIENT)
Dept: PHYSICAL THERAPY | Facility: CLINIC | Age: 65
End: 2020-10-06
Payer: COMMERCIAL

## 2020-10-12 ENCOUNTER — OFFICE VISIT (OUTPATIENT)
Dept: PHYSICAL THERAPY | Facility: CLINIC | Age: 65
End: 2020-10-12
Payer: COMMERCIAL

## 2020-10-12 DIAGNOSIS — G20 PARKINSON DISEASE (HCC): Primary | ICD-10-CM

## 2020-10-12 PROCEDURE — 97530 THERAPEUTIC ACTIVITIES: CPT

## 2020-10-14 ENCOUNTER — OFFICE VISIT (OUTPATIENT)
Dept: PHYSICAL THERAPY | Facility: CLINIC | Age: 65
End: 2020-10-14
Payer: COMMERCIAL

## 2020-10-14 DIAGNOSIS — G20 PARKINSON DISEASE (HCC): Primary | ICD-10-CM

## 2020-10-14 PROCEDURE — 97112 NEUROMUSCULAR REEDUCATION: CPT | Performed by: PHYSICAL THERAPIST

## 2020-10-19 ENCOUNTER — APPOINTMENT (OUTPATIENT)
Dept: PHYSICAL THERAPY | Facility: CLINIC | Age: 65
End: 2020-10-19
Payer: COMMERCIAL

## 2020-10-20 DIAGNOSIS — E78.2 MIXED HYPERLIPIDEMIA: ICD-10-CM

## 2020-10-20 DIAGNOSIS — I10 ESSENTIAL HYPERTENSION: ICD-10-CM

## 2020-10-20 DIAGNOSIS — F32.A DEPRESSION, UNSPECIFIED DEPRESSION TYPE: ICD-10-CM

## 2020-10-20 PROCEDURE — 4010F ACE/ARB THERAPY RXD/TAKEN: CPT | Performed by: PHYSICIAN ASSISTANT

## 2020-10-20 RX ORDER — LISINOPRIL 20 MG/1
TABLET ORAL
Qty: 90 TABLET | Refills: 1 | Status: SHIPPED | OUTPATIENT
Start: 2020-10-20 | End: 2021-04-02

## 2020-10-20 RX ORDER — PRAVASTATIN SODIUM 40 MG
TABLET ORAL
Qty: 90 TABLET | Refills: 1 | Status: SHIPPED | OUTPATIENT
Start: 2020-10-20 | End: 2021-04-02

## 2020-10-20 RX ORDER — VENLAFAXINE HYDROCHLORIDE 75 MG/1
CAPSULE, EXTENDED RELEASE ORAL
Qty: 90 CAPSULE | Refills: 1 | Status: SHIPPED | OUTPATIENT
Start: 2020-10-20 | End: 2021-04-02

## 2020-10-22 ENCOUNTER — OFFICE VISIT (OUTPATIENT)
Dept: PHYSICAL THERAPY | Facility: CLINIC | Age: 65
End: 2020-10-22
Payer: COMMERCIAL

## 2020-10-22 DIAGNOSIS — G20 PARKINSON DISEASE (HCC): Primary | ICD-10-CM

## 2020-10-22 PROCEDURE — 97110 THERAPEUTIC EXERCISES: CPT | Performed by: PHYSICAL THERAPIST

## 2020-10-22 PROCEDURE — 97112 NEUROMUSCULAR REEDUCATION: CPT | Performed by: PHYSICAL THERAPIST

## 2020-10-26 ENCOUNTER — APPOINTMENT (OUTPATIENT)
Dept: PHYSICAL THERAPY | Facility: CLINIC | Age: 65
End: 2020-10-26
Payer: COMMERCIAL

## 2020-10-29 ENCOUNTER — APPOINTMENT (OUTPATIENT)
Dept: PHYSICAL THERAPY | Facility: CLINIC | Age: 65
End: 2020-10-29
Payer: COMMERCIAL

## 2020-11-02 ENCOUNTER — TELEPHONE (OUTPATIENT)
Dept: NEUROLOGY | Facility: CLINIC | Age: 65
End: 2020-11-02

## 2020-11-02 DIAGNOSIS — G20 PARKINSON DISEASE (HCC): ICD-10-CM

## 2020-11-04 ENCOUNTER — OFFICE VISIT (OUTPATIENT)
Dept: PHYSICAL THERAPY | Facility: CLINIC | Age: 65
End: 2020-11-04
Payer: COMMERCIAL

## 2020-11-04 DIAGNOSIS — G20 PARKINSON DISEASE (HCC): Primary | ICD-10-CM

## 2020-11-04 PROCEDURE — 97530 THERAPEUTIC ACTIVITIES: CPT | Performed by: PHYSICAL THERAPIST

## 2020-11-04 PROCEDURE — 97112 NEUROMUSCULAR REEDUCATION: CPT | Performed by: PHYSICAL THERAPIST

## 2020-11-06 DIAGNOSIS — G20 PARKINSON DISEASE (HCC): Primary | ICD-10-CM

## 2020-11-06 RX ORDER — ROPINIROLE 2 MG/1
2 TABLET, FILM COATED ORAL 3 TIMES DAILY
Qty: 270 TABLET | Refills: 3 | Status: SHIPPED | OUTPATIENT
Start: 2020-11-06 | End: 2020-12-30 | Stop reason: ALTCHOICE

## 2020-11-06 RX ORDER — ROPINIROLE 0.5 MG/1
1.5 TABLET, FILM COATED ORAL 3 TIMES DAILY
Qty: 810 TABLET | Refills: 1 | Status: SHIPPED | OUTPATIENT
Start: 2020-11-06 | End: 2020-12-14 | Stop reason: SDUPTHER

## 2020-11-09 ENCOUNTER — OFFICE VISIT (OUTPATIENT)
Dept: PHYSICAL THERAPY | Facility: CLINIC | Age: 65
End: 2020-11-09
Payer: COMMERCIAL

## 2020-11-09 DIAGNOSIS — G20 PARKINSON DISEASE (HCC): Primary | ICD-10-CM

## 2020-11-09 PROCEDURE — 97530 THERAPEUTIC ACTIVITIES: CPT

## 2020-11-10 ENCOUNTER — TRANSCRIBE ORDERS (OUTPATIENT)
Dept: PHYSICAL THERAPY | Facility: CLINIC | Age: 65
End: 2020-11-10

## 2020-11-10 DIAGNOSIS — G20 PARKINSON DISEASE (HCC): Primary | ICD-10-CM

## 2020-11-11 ENCOUNTER — APPOINTMENT (OUTPATIENT)
Dept: PHYSICAL THERAPY | Facility: CLINIC | Age: 65
End: 2020-11-11
Payer: COMMERCIAL

## 2020-11-16 ENCOUNTER — OFFICE VISIT (OUTPATIENT)
Dept: PHYSICAL THERAPY | Facility: CLINIC | Age: 65
End: 2020-11-16
Payer: COMMERCIAL

## 2020-11-16 DIAGNOSIS — G20 PARKINSON DISEASE (HCC): Primary | ICD-10-CM

## 2020-11-16 PROCEDURE — 97112 NEUROMUSCULAR REEDUCATION: CPT

## 2020-11-16 PROCEDURE — 97530 THERAPEUTIC ACTIVITIES: CPT

## 2020-11-18 ENCOUNTER — APPOINTMENT (OUTPATIENT)
Dept: PHYSICAL THERAPY | Facility: CLINIC | Age: 65
End: 2020-11-18
Payer: COMMERCIAL

## 2020-11-20 ENCOUNTER — VBI (OUTPATIENT)
Dept: ADMINISTRATIVE | Facility: OTHER | Age: 65
End: 2020-11-20

## 2020-11-23 ENCOUNTER — OFFICE VISIT (OUTPATIENT)
Dept: PHYSICAL THERAPY | Facility: CLINIC | Age: 65
End: 2020-11-23
Payer: COMMERCIAL

## 2020-11-23 DIAGNOSIS — G20 PARKINSON DISEASE (HCC): Primary | ICD-10-CM

## 2020-11-23 PROCEDURE — 97110 THERAPEUTIC EXERCISES: CPT

## 2020-11-23 PROCEDURE — 97112 NEUROMUSCULAR REEDUCATION: CPT

## 2020-11-23 PROCEDURE — 97530 THERAPEUTIC ACTIVITIES: CPT

## 2020-11-25 ENCOUNTER — OFFICE VISIT (OUTPATIENT)
Dept: PHYSICAL THERAPY | Facility: CLINIC | Age: 65
End: 2020-11-25
Payer: COMMERCIAL

## 2020-11-25 DIAGNOSIS — G20 PARKINSON DISEASE (HCC): Primary | ICD-10-CM

## 2020-11-25 PROCEDURE — 97530 THERAPEUTIC ACTIVITIES: CPT | Performed by: PHYSICAL THERAPIST

## 2020-11-25 PROCEDURE — 97112 NEUROMUSCULAR REEDUCATION: CPT | Performed by: PHYSICAL THERAPIST

## 2020-11-30 ENCOUNTER — OFFICE VISIT (OUTPATIENT)
Dept: PHYSICAL THERAPY | Facility: CLINIC | Age: 65
End: 2020-11-30
Payer: COMMERCIAL

## 2020-11-30 DIAGNOSIS — G20 PARKINSON DISEASE (HCC): Primary | ICD-10-CM

## 2020-11-30 PROCEDURE — 97530 THERAPEUTIC ACTIVITIES: CPT

## 2020-11-30 PROCEDURE — 97112 NEUROMUSCULAR REEDUCATION: CPT

## 2020-12-02 ENCOUNTER — OFFICE VISIT (OUTPATIENT)
Dept: PHYSICAL THERAPY | Facility: CLINIC | Age: 65
End: 2020-12-02
Payer: COMMERCIAL

## 2020-12-02 DIAGNOSIS — G20 PARKINSON DISEASE (HCC): Primary | ICD-10-CM

## 2020-12-02 PROCEDURE — 97112 NEUROMUSCULAR REEDUCATION: CPT | Performed by: PHYSICAL THERAPIST

## 2020-12-02 PROCEDURE — 97530 THERAPEUTIC ACTIVITIES: CPT | Performed by: PHYSICAL THERAPIST

## 2020-12-07 ENCOUNTER — OFFICE VISIT (OUTPATIENT)
Dept: PHYSICAL THERAPY | Facility: CLINIC | Age: 65
End: 2020-12-07
Payer: COMMERCIAL

## 2020-12-07 DIAGNOSIS — G20 PARKINSON DISEASE (HCC): Primary | ICD-10-CM

## 2020-12-07 PROCEDURE — 97112 NEUROMUSCULAR REEDUCATION: CPT | Performed by: PHYSICAL THERAPIST

## 2020-12-09 ENCOUNTER — OFFICE VISIT (OUTPATIENT)
Dept: PHYSICAL THERAPY | Facility: CLINIC | Age: 65
End: 2020-12-09
Payer: COMMERCIAL

## 2020-12-09 DIAGNOSIS — G20 PARKINSON DISEASE (HCC): Primary | ICD-10-CM

## 2020-12-09 PROCEDURE — 97530 THERAPEUTIC ACTIVITIES: CPT

## 2020-12-09 PROCEDURE — 97112 NEUROMUSCULAR REEDUCATION: CPT

## 2020-12-14 ENCOUNTER — OFFICE VISIT (OUTPATIENT)
Dept: PHYSICAL THERAPY | Facility: CLINIC | Age: 65
End: 2020-12-14
Payer: COMMERCIAL

## 2020-12-14 DIAGNOSIS — G20 PARKINSON DISEASE (HCC): ICD-10-CM

## 2020-12-14 DIAGNOSIS — G20 PARKINSON DISEASE (HCC): Primary | ICD-10-CM

## 2020-12-14 PROCEDURE — 97530 THERAPEUTIC ACTIVITIES: CPT

## 2020-12-14 PROCEDURE — 97112 NEUROMUSCULAR REEDUCATION: CPT

## 2020-12-14 RX ORDER — ROPINIROLE 0.5 MG/1
1.5 TABLET, FILM COATED ORAL 3 TIMES DAILY
Qty: 810 TABLET | Refills: 1 | Status: SHIPPED | OUTPATIENT
Start: 2020-12-14 | End: 2021-11-30 | Stop reason: SDUPTHER

## 2020-12-16 ENCOUNTER — APPOINTMENT (OUTPATIENT)
Dept: PHYSICAL THERAPY | Facility: CLINIC | Age: 65
End: 2020-12-16
Payer: COMMERCIAL

## 2020-12-21 ENCOUNTER — OFFICE VISIT (OUTPATIENT)
Dept: PHYSICAL THERAPY | Facility: CLINIC | Age: 65
End: 2020-12-21
Payer: COMMERCIAL

## 2020-12-21 DIAGNOSIS — G20 PARKINSON DISEASE (HCC): Primary | ICD-10-CM

## 2020-12-21 PROCEDURE — 97110 THERAPEUTIC EXERCISES: CPT

## 2020-12-21 PROCEDURE — 97112 NEUROMUSCULAR REEDUCATION: CPT

## 2020-12-21 PROCEDURE — 97530 THERAPEUTIC ACTIVITIES: CPT

## 2020-12-23 ENCOUNTER — OFFICE VISIT (OUTPATIENT)
Dept: PHYSICAL THERAPY | Facility: CLINIC | Age: 65
End: 2020-12-23
Payer: COMMERCIAL

## 2020-12-23 DIAGNOSIS — G20 PARKINSON DISEASE (HCC): Primary | ICD-10-CM

## 2020-12-23 PROCEDURE — 97112 NEUROMUSCULAR REEDUCATION: CPT

## 2020-12-23 PROCEDURE — 97110 THERAPEUTIC EXERCISES: CPT

## 2020-12-23 PROCEDURE — 97530 THERAPEUTIC ACTIVITIES: CPT

## 2020-12-28 ENCOUNTER — APPOINTMENT (OUTPATIENT)
Dept: PHYSICAL THERAPY | Facility: CLINIC | Age: 65
End: 2020-12-28
Payer: COMMERCIAL

## 2020-12-29 ENCOUNTER — LAB (OUTPATIENT)
Dept: LAB | Facility: CLINIC | Age: 65
End: 2020-12-29
Payer: COMMERCIAL

## 2020-12-29 ENCOUNTER — TRANSCRIBE ORDERS (OUTPATIENT)
Dept: LAB | Facility: CLINIC | Age: 65
End: 2020-12-29

## 2020-12-29 DIAGNOSIS — D75.839 THROMBOCYTOSIS: ICD-10-CM

## 2020-12-29 DIAGNOSIS — E11.9 TYPE 2 DIABETES MELLITUS WITHOUT COMPLICATION, WITHOUT LONG-TERM CURRENT USE OF INSULIN (HCC): ICD-10-CM

## 2020-12-29 DIAGNOSIS — E83.52 HYPERCALCEMIA: ICD-10-CM

## 2020-12-29 DIAGNOSIS — E87.5 HYPERKALEMIA: ICD-10-CM

## 2020-12-29 LAB
ALBUMIN SERPL BCP-MCNC: 4.1 G/DL (ref 3.5–5)
ALP SERPL-CCNC: 46 U/L (ref 46–116)
ALT SERPL W P-5'-P-CCNC: 14 U/L (ref 12–78)
ANION GAP SERPL CALCULATED.3IONS-SCNC: 9 MMOL/L (ref 4–13)
AST SERPL W P-5'-P-CCNC: 20 U/L (ref 5–45)
BASOPHILS # BLD AUTO: 0.08 THOUSANDS/ΜL (ref 0–0.1)
BASOPHILS NFR BLD AUTO: 1 % (ref 0–1)
BILIRUB SERPL-MCNC: 0.63 MG/DL (ref 0.2–1)
BUN SERPL-MCNC: 21 MG/DL (ref 5–25)
CALCIUM SERPL-MCNC: 9.6 MG/DL (ref 8.3–10.1)
CHLORIDE SERPL-SCNC: 101 MMOL/L (ref 100–108)
CO2 SERPL-SCNC: 27 MMOL/L (ref 21–32)
CREAT SERPL-MCNC: 1.24 MG/DL (ref 0.6–1.3)
EOSINOPHIL # BLD AUTO: 0.26 THOUSAND/ΜL (ref 0–0.61)
EOSINOPHIL NFR BLD AUTO: 3 % (ref 0–6)
ERYTHROCYTE [DISTWIDTH] IN BLOOD BY AUTOMATED COUNT: 13.9 % (ref 11.6–15.1)
EST. AVERAGE GLUCOSE BLD GHB EST-MCNC: 171 MG/DL
GFR SERPL CREATININE-BSD FRML MDRD: 61 ML/MIN/1.73SQ M
GLUCOSE P FAST SERPL-MCNC: 179 MG/DL (ref 65–99)
HBA1C MFR BLD: 7.6 %
HCT VFR BLD AUTO: 50.6 % (ref 36.5–49.3)
HGB BLD-MCNC: 16.4 G/DL (ref 12–17)
IMM GRANULOCYTES # BLD AUTO: 0.05 THOUSAND/UL (ref 0–0.2)
IMM GRANULOCYTES NFR BLD AUTO: 1 % (ref 0–2)
LYMPHOCYTES # BLD AUTO: 2.61 THOUSANDS/ΜL (ref 0.6–4.47)
LYMPHOCYTES NFR BLD AUTO: 28 % (ref 14–44)
MCH RBC QN AUTO: 29.7 PG (ref 26.8–34.3)
MCHC RBC AUTO-ENTMCNC: 32.4 G/DL (ref 31.4–37.4)
MCV RBC AUTO: 92 FL (ref 82–98)
MONOCYTES # BLD AUTO: 0.68 THOUSAND/ΜL (ref 0.17–1.22)
MONOCYTES NFR BLD AUTO: 7 % (ref 4–12)
NEUTROPHILS # BLD AUTO: 5.72 THOUSANDS/ΜL (ref 1.85–7.62)
NEUTS SEG NFR BLD AUTO: 60 % (ref 43–75)
NRBC BLD AUTO-RTO: 0 /100 WBCS
PHOSPHATE SERPL-MCNC: 3.9 MG/DL (ref 2.3–4.1)
PLATELET # BLD AUTO: 479 THOUSANDS/UL (ref 149–390)
PMV BLD AUTO: 10 FL (ref 8.9–12.7)
POTASSIUM SERPL-SCNC: 4.8 MMOL/L (ref 3.5–5.3)
PROT SERPL-MCNC: 7.6 G/DL (ref 6.4–8.2)
PTH-INTACT SERPL-MCNC: 48 PG/ML (ref 18.4–80.1)
RBC # BLD AUTO: 5.53 MILLION/UL (ref 3.88–5.62)
SODIUM SERPL-SCNC: 137 MMOL/L (ref 136–145)
WBC # BLD AUTO: 9.4 THOUSAND/UL (ref 4.31–10.16)

## 2020-12-29 PROCEDURE — 83036 HEMOGLOBIN GLYCOSYLATED A1C: CPT

## 2020-12-29 PROCEDURE — 3051F HG A1C>EQUAL 7.0%<8.0%: CPT | Performed by: PHYSICIAN ASSISTANT

## 2020-12-29 PROCEDURE — 36415 COLL VENOUS BLD VENIPUNCTURE: CPT

## 2020-12-29 PROCEDURE — 84100 ASSAY OF PHOSPHORUS: CPT

## 2020-12-29 PROCEDURE — 83970 ASSAY OF PARATHORMONE: CPT

## 2020-12-29 PROCEDURE — 80053 COMPREHEN METABOLIC PANEL: CPT

## 2020-12-29 PROCEDURE — 85025 COMPLETE CBC W/AUTO DIFF WBC: CPT

## 2020-12-30 ENCOUNTER — OFFICE VISIT (OUTPATIENT)
Dept: ENDOCRINOLOGY | Facility: CLINIC | Age: 65
End: 2020-12-30
Payer: COMMERCIAL

## 2020-12-30 VITALS
WEIGHT: 196.38 LBS | HEART RATE: 86 BPM | SYSTOLIC BLOOD PRESSURE: 140 MMHG | BODY MASS INDEX: 28.12 KG/M2 | HEIGHT: 70 IN | DIASTOLIC BLOOD PRESSURE: 90 MMHG

## 2020-12-30 DIAGNOSIS — E11.9 TYPE 2 DIABETES MELLITUS WITHOUT COMPLICATION, WITHOUT LONG-TERM CURRENT USE OF INSULIN (HCC): Primary | ICD-10-CM

## 2020-12-30 DIAGNOSIS — E03.8 SUBCLINICAL HYPOTHYROIDISM: ICD-10-CM

## 2020-12-30 DIAGNOSIS — E55.9 VITAMIN D DEFICIENCY: ICD-10-CM

## 2020-12-30 DIAGNOSIS — E78.5 HYPERLIPIDEMIA, UNSPECIFIED HYPERLIPIDEMIA TYPE: ICD-10-CM

## 2020-12-30 DIAGNOSIS — I10 BENIGN ESSENTIAL HYPERTENSION: ICD-10-CM

## 2020-12-30 PROBLEM — E83.52 HYPERCALCEMIA: Status: RESOLVED | Noted: 2020-10-02 | Resolved: 2020-12-30

## 2020-12-30 PROCEDURE — 3077F SYST BP >= 140 MM HG: CPT | Performed by: PHYSICIAN ASSISTANT

## 2020-12-30 PROCEDURE — 99214 OFFICE O/P EST MOD 30 MIN: CPT | Performed by: PHYSICIAN ASSISTANT

## 2020-12-30 PROCEDURE — 3080F DIAST BP >= 90 MM HG: CPT | Performed by: PHYSICIAN ASSISTANT

## 2020-12-30 PROCEDURE — 4004F PT TOBACCO SCREEN RCVD TLK: CPT | Performed by: PHYSICIAN ASSISTANT

## 2020-12-30 PROCEDURE — 3008F BODY MASS INDEX DOCD: CPT | Performed by: PHYSICIAN ASSISTANT

## 2021-01-04 ENCOUNTER — APPOINTMENT (OUTPATIENT)
Dept: PHYSICAL THERAPY | Facility: CLINIC | Age: 66
End: 2021-01-04
Payer: COMMERCIAL

## 2021-01-11 ENCOUNTER — APPOINTMENT (OUTPATIENT)
Dept: PHYSICAL THERAPY | Facility: CLINIC | Age: 66
End: 2021-01-11
Payer: COMMERCIAL

## 2021-01-18 ENCOUNTER — OFFICE VISIT (OUTPATIENT)
Dept: PHYSICAL THERAPY | Facility: CLINIC | Age: 66
End: 2021-01-18
Payer: COMMERCIAL

## 2021-01-18 DIAGNOSIS — G20 PARKINSON DISEASE (HCC): Primary | ICD-10-CM

## 2021-01-18 PROCEDURE — 97110 THERAPEUTIC EXERCISES: CPT

## 2021-01-18 PROCEDURE — 97112 NEUROMUSCULAR REEDUCATION: CPT

## 2021-01-18 NOTE — LETTER
March 3, 2021    Ilya Montgomery MD    Patient: Fernando Forrest   YOB: 1955   Date of Visit: 2021     Encounter Diagnosis     ICD-10-CM    1  Parkinson disease [facility])  G20        Dear Dr Soren Cantrell:    Thank you for your recent referral of Fernando Forrest  Please review the attached evaluation summary from Andres's recent visit  Please verify that you agree with the plan of care by signing the attached order  If you have any questions or concerns, please do not hesitate to call  I sincerely appreciate the opportunity to share in the care of one of your patients and hope to have another opportunity to work with you in the near future  Sincerely,    Raysa Brooks PT      Referring Provider:      I certify that I have read the below Plan of Care and certify the need for these services furnished under this plan of treatment while under my care  Ilya Montgomery MD  Via In Basket          Re-Evaluation/Daily Note     Today's date: 2021  Patient name: Fernando Forrest  : 1955  MRN: [id number]  Referring provider: Stacia Wolf MD  Dx:   Encounter Diagnosis     ICD-10-CM    1  Parkinson disease ([facility] [place] [address] )  G20        Start Time:   Stop Time:   Total time in clinic (min): 46 minutes       Assessment  Assessment details: Mr Eric Stringer is a 71 y/o male who has been reporting to skilled outpatient PT for Parkinson's Disease with continued participation in [employer] exercise program  He continues to demonstrate consistent results with 5xSTS and 6 Minute Walk Test,TUG, TUG carry, TUG cognitive, FGA, and 10 meter walk test  Scores associated with mentioned outcome measures indicate the client is at low risk for falls, however results of 6 Minute Walk Test still fall below age matched normative values which portray decreased functional endurance (1750 feet)  He's continuing to meet short/ long term goals with good progress towards remaining ones   He currently reports notable improvements in his posture, endurance, and generalized strength, evident by reduced fatigue by the end of the work day  He would like to continue to focus on his endurance and flexibility  He also presents with noted LUE tremors, and may benefit from an OT evaluation in the near future to assist with controlling his overall UE function and potential cognitive changes  He has a history of progression and regression with skilled PT attendance and time between therapy, and per research provided by [facility], he will continue to benefit from skilled outpatient PT services to improve and maximize his function, to reduce risk for falls and potential injuries associated with falls, reduce healthcare costs via hospitalization, and reduce patient and national healthcare costs  In early stages of Parkinson's Disease, research indicates that intensive physical exercise can improve patient's motor control and assist in slowing the disease progression as a neuroprotective agent  Slight regression noted due to not being in the clinic in 1 month due to weather and low back pain  He will benefit from moving to a maintenance program to maintain his function due to his improvements and reduction of deficits due to his neurodegenerative status in order to maximize function in the short-term and long-term with overall improved postural strength with associated stability and mobility  Treatment assessment- Pt challenged with program with coordination with braiding  Improving with running and sequencing, notes slight fatigue with deep squatting  Pt will continue to benefit from skilled PT to address reduced mobility as a result of PD diagnosis to maximize function in roles at work, home, and the community        YBARRA Cutoff Scores:  MDC- 5 points  Cut off- 40 22    DGI Cutoff Scores:  Ryley et al 2008 Cherrie: Score <19    Charis Holter al 2011; [address]- 2 9 points    MiniBest Cutoff Scores:  Liz Hanna 2013: Cherrie <19/28  [place]ust[place] 2011: MDC 5 52 points     TUG Cutoff Scores: Anahi, 2011:   Tug Score Fallers: Medication ON: < 12 21 seconds; Off: < 15 5 seconds   Rick Oconnell al, 2011: MDC: 4 8 seconds     10 Meter Walk Test Cutoff Scores:  Micaela Lafleur 2008: [address]:  18 M/S  Age Norm Values Cherrie < 1 0 m/s    6 Minute Walk Test Cutoff Scores:  Micaela Lafleur , 2008: [address]- 269 feet    Norm Data Healthy Adults:  Drew eduardo, 2002:  60 to 71 years  Male: 572 meter;  Female: 538 meter  70-79 years :    Male: 1 meter,  Female: 471 meter  80-89 years :   Male 1 meter,   Female: 392 meter    5xSTS Cutoff Scores:  Natalia Sauceda 2011: Cherrie,Cut off- > 16 seconds   MDC- 2 3 secondsDGI Cutoff Scores:  Melva 2008 Cherrie: Score <19    Carin eduardo 2011; [address]- 2 9 points      Impairments: abnormal coordination, abnormal gait, abnormal muscle firing, abnormal muscle tone, activity intolerance, impaired balance, impaired physical strength, lacks appropriate home exercise program, pain with function, safety issue, poor posture  and poor body mechanics    Symptom irritability: moderateUnderstanding of Dx/Px/POC: good   Prognosis: good   Goals    Goals:  STGs (30 days)  1  Patient will improve TUG score to age related norms seconds or less to meet the age norm value for low risk of falls in order to increase safety with functional mobility  MET  2  Patient will improve 5 STS by the SANDRA of 2 3 seconds to 11 3 sec indicating an improvement in strength and decreased challenge with transfers  NOT MET  3  Patient will improve 6 MWT by the SANDRA 269 ft indicating an improvement in cardiovascular endurance in order to maximize function with functional mobility throughout the community  MET  4  Patient will be independent in basic HEP in order to manage condition at home  MET    LTGs (60 days)  1  Patient will score a low risk for falls 4/4 fall risks measures  - MET  2   Patient will score age norm values for 2/2 endurance measures  - NOT MET  3  Patient will be able to ambulate on uneven surfaces without loss of balance to increase safety with community mobility  - MET  4  Patient will be able to perform a floor transfer without physical assistance to assist with fall recovery at home and in the community  - MET  5  Patient will be independent in comprehensive HEP post discharge from program  - PARTIALLY MET  6  Patient will be able to walk up incline with decreased difficulty and no loss of balance in order to improve functional mobility throughout the community  - PARTIALLY MET  7  Patient will be able to perform sit to stands from softer surfaces such as a couch or bed in order to improvement function with transfers - MET  8  Patient will have an improvement FOTO score indicating an improvement in function - NOT APPLICABLE  9  Patient will be consistent with program for at least 2 days per week to assist with management of condition and functional independence of their condition  - MET    New Goals (1-)  1  Patient will perform a walking while talking test in 8 weeks order to improve and assess his overall function with dual tasking  2  Patient will perform a 4 square step test in 8 weeks in order to improve his overall multi-directional changing of directions  3  Patient will perform a UPDRS in 8 weeks in order to assess his subjective affects of his Parkinson's disease symptoms        Plan  Patient would benefit from: skilled physical therapy  Planned therapy interventions: neuromuscular re-education, motor coordination training, patient education, postural training, sensory integrative techniques, stretching, strengthening, therapeutic activities, therapeutic exercise, home exercise program, gait training, flexibility, coordination, balance, activity modification, abdominal trunk stabilization, community reintegration, graded exercise, graded activity, graded motor, therapeutic training and transfer training  Frequency: 2x week  Duration in weeks: 12  Plan of Care beginning date: 2021  Plan of Care expiration date: 3/29/2021  Treatment plan discussed with: patient         Subjective Evaluation     History of Present Illness  Mechanism of injury: Pt is prior [facility] patient, last visit on   He felt okay for about 2 months, and then 1 month ago he noticed his symptoms have worsened  He notices rigidity and toe curling in the left leg, and now migrating to the right leg also  He notices the toe curling when he is driving  He also notes tremors have worsened  He recently increased his medication dosage (sinemet)  He works full time as an , sitting at a computer most of the day  He has not been performing exercises at home, but he goes for daily walks and tries to walk as much as he can throughout the day  Patient is continuing to improve although has challenges with consistent attendance due to weather and recent onset of back pain  Pain  No pain reported  Current pain ratin  At best pain ratin  At worst pain ratin     Social Support  Steps to enter house: yes  Stairs in house: yes   Lives with: spouse     Employment status: working  Treatments  Previous treatment: physical therapy  Patient Goals  Patient goals for therapy: improved balance, increased motion, return to sport/leisure activities, independence with ADLs/IADLs and increased strength              Objective      Strength/Myotome Testing      Left Hip   Planes of Motion   Flexion: 4  Extension: 4  Abduction: 4  Adduction: 4     Right Hip   Planes of Motion   Flexion: 4  Extension: 4  Abduction: 4  Adduction: 4     Left Knee   Flexion: 4  Extension: 4     Right Knee   Flexion: 4  Extension: 4     Left Ankle/Foot   Dorsiflexion: 4  Plantar flexion: 4     Right Ankle/Foot   Dorsiflexion: 4  Plantar flexion: 4    Additional Strength Details  Increased rigidity LLE>RLE             Precautions:   Medical History Past Medical History:   Diagnosis Date    Ankle injury       last assessed 7/15/2013    BPH (benign prostatic hyperplasia)      Bronchiolitis      Cancer (HCC)       Prostate    Diabetes mellitus ([facility] [address] )       Type II    History of benign prostatic hyperplasia      History of gastritis      History of insect bite       last assessed 10/4/2014    History of nocturia      History of stroke      Hypertension      Lumbar canal stenosis      Osteoarthritis, knee      Otitis externa      Pain in back       UPPER BACK, last assessed 2/25/2013    Retinal and vitreous disorder      Sinusitis, acute       last assessed 10/6/2016    Tendinitis of right rotator cuff       last assessed 8/21/2012             Objective: See treatment diary below- Treatment note from 1/18/2021     Warm Up (all performed standing)  -BIG Step forward, Laterally, backward  -Rock and reach forward and laterally         Circuit 1 (2 min each, 2 rounds,)    - 10 combos on punch bag  - 6 inch step 10 step taps  - 12 inch step tap around  - punch combo case the rabbit      *30 sec rest break between circuits*     Circuit 2 (2:30 min each, 2 round)   Combinations on bosu-   -1-2 on bosu   -3-4 squat with comb   -5-6 180 deg jump firm floor       Agility/strength ( 2 minutes x 2 cycles ) no rest   - braiding over hurdles over and back   - lateral step up and down weight bench 5 reps      Circuit 3 (2:30 min each, 1 round)   Combinations on bosu-   -1-2 split on two bosu  -3-4 lateral stepping between two bosu   -5-6 lunge step on bosu      Agility and strength ( 2:30 x 1 round )   - braiding backwards over 6 inch hurdles   - step taps 10 reps with weight bench   - 10 knees into punch bag       Circuit 4: ( 1 minute x 2 reps each exercise )   1 to 6 with doretha the rabbit 60 sec x 2   Seated p-ball punch combo               On call combos              Alt leg kicks with combos              Alt high knees with combos     Circuit 5: ( 60 seconds 1 rep)   - med slams with 10 lbs,   - deep squat with butter fly with TRX strap   - kettle bell overhead press  - russian twist from bosu ball        Cool down:  -Arm circles  -Neck stretch (lateral flexion)  -Floor to ceiling stretch   -Arm across chest  -Tricep stretch behind head       Plan: Continue per plan of care  Progress treatment as tolerated  Precautions:   Past Medical History:   Diagnosis Date    Ankle injury     last assessed 7/15/2013    BPH (benign prostatic hyperplasia)     Bronchiolitis     Cancer ([address] [facility] )     Prostate    Diabetes mellitus ([address] )     Type II    History of benign prostatic hyperplasia     History of gastritis     History of insect bite     last assessed 10/4/2014    History of nocturia     History of stroke     Hypertension     Lumbar canal stenosis     Osteoarthritis, knee     Otitis externa     Pain in back     UPPER BACK, last assessed 2/25/2013    Retinal and vitreous disorder     Sinusitis, acute     last assessed 10/6/2016    Tendinitis of right rotator cuff     last assessed 8/21/2012           Outcome Measures 3/5/20 7/23/20 9/10 PN 10-12 11/25 PN 12/23/2020 1/18/2021   ABC Not assessed 80 6%  92 5%  92 5% 93%   5x STS 11 5 sec 14 22 sec 12 57 sec 12 66 sec 12 34 sec 10 12 11 02   TUG 7 32    11 03 carry    12 12   cog 8 42     8 70 carry    9 95 cog 7 53 sec    8 66 sec   Carry  10 13 sec Cog 6 10 sec    7 34 sec Carry    6 48 sec Cog 6 32 sec    7 09 sec Carry    8 78 sec 4 59 regular    4 85 Carry    5 89 sec counting down from 100 by  5 23 regular    5 85 Carry    6 89 sec counting down from 100 by    10 meter walk test 1 25 m/s 1 30 m/s 7 03 sec 1 4 m/s 1 8 m/s 1 9 m/s 1 95 1 84 m/s   6 minute walk test 2100 ft 1375 ft 1445 ft 1435 ft 1700 ft 1750 1700   DGI 23/24 23/24 23/24 23/24   MiniBest 25/28 25/28 27/28 27/28 27/28   FGA Not assessed 27/30 29/30 29/30 29/30

## 2021-01-19 NOTE — PROGRESS NOTES
Re-Evaluation/Daily Note     Today's date: 2021  Patient name: Cristopher Loredo  : 1955  MRN: [id number]  Referring provider: Humphrey Perez MD  Dx:   Encounter Diagnosis     ICD-10-CM    1  Parkinson disease ([facility] [address] )  G20        Start Time:   Stop Time:   Total time in clinic (min): 46 minutes       Assessment  Assessment details: Mr Shiraz Aquino is a 71 y/o male who has been reporting to skilled outpatient PT for Parkinson's Disease with continued participation in [employer] exercise program  He continues to demonstrate consistent results with 5xSTS and 6 Minute Walk Test,TUG, TUG carry, TUG cognitive, FGA, and 10 meter walk test  Scores associated with mentioned outcome measures indicate the client is at low risk for falls, however results of 6 Minute Walk Test still fall below age matched normative values which portray decreased functional endurance (1750 feet)  He's continuing to meet short/ long term goals with good progress towards remaining ones  He currently reports notable improvements in his posture, endurance, and generalized strength, evident by reduced fatigue by the end of the work day  He would like to continue to focus on his endurance and flexibility  He also presents with noted LUE tremors, and may benefit from an OT evaluation in the near future to assist with controlling his overall UE function and potential cognitive changes  He has a history of progression and regression with skilled PT attendance and time between therapy, and per research provided by [employer], he will continue to benefit from skilled outpatient PT services to improve and maximize his function, to reduce risk for falls and potential injuries associated with falls, reduce healthcare costs via hospitalization, and reduce patient and national healthcare costs   In early stages of Parkinson's Disease, research indicates that intensive physical exercise can improve patient's motor control and assist in slowing the disease progression as a neuroprotective agent  Slight regression noted due to not being in the clinic in 1 month due to weather and low back pain  He will benefit from moving to a maintenance program to maintain his function due to his improvements and reduction of deficits due to his neurodegenerative status in order to maximize function in the short-term and long-term with overall improved postural strength with associated stability and mobility  Treatment assessment- Pt challenged with program with coordination with braiding  Improving with running and sequencing, notes slight fatigue with deep squatting  Pt will continue to benefit from skilled PT to address reduced mobility as a result of PD diagnosis to maximize function in roles at work, home, and the community  YBARRA Cutoff Scores:  MDC- 5 points  Cut off- 40 22    DGI Cutoff Scores:  Melva 2008 Cherrie: Score <19    [place] eduardo 2011; [address]- 2 9 points    MiniBest Cutoff Scores:  Thompson Klinefelter and Auyeung 2013: Mile Griffin <19/28  [place] 2011: MDC 5 52 points     TUG Cutoff Scores: Anahi, 2011:   Tug Score Fallers: Medication ON: < 12 21 seconds;  Off: < 15 5 seconds   Rick Adamson et al, 2011: MDC: 4 8 seconds     10 Meter Walk Test Cutoff Scores:  Sary Paniagua 2008: [address]:  18 M/S  Age Norm Values Cherrie < 1 0 m/s    6 Minute Walk Test Cutoff Scores:  Sary Paniagua , 2008: [address]- 269 feet    Norm Data Healthy Adults:  Bhavesh eduardo, 2002:  60 to 71 years  Male: 572 meter;  Female: 538 meter  70-79 years :    Male: 1 meter,  Female: 471 meter  80-89 years :   Male 1 meter,   Female: 392 meter    5xSTS Cutoff Scores:  Radha Staton 2011: Cherrie,Cut off- > 16 seconds   MDC- 2 3 secondsDGI Cutoff Scores:  Melva 2008 Cherrie: Score <19    [place] eduardo 2011; Laurita [address]- 2 9 points      Impairments: abnormal coordination, abnormal gait, abnormal muscle firing, abnormal muscle tone, activity intolerance, impaired balance, impaired physical strength, lacks appropriate home exercise program, pain with function, safety issue, poor posture  and poor body mechanics    Symptom irritability: moderateUnderstanding of Dx/Px/POC: good   Prognosis: good   Goals    Goals:  STGs (30 days)  1  Patient will improve TUG score to age related norms seconds or less to meet the age norm value for low risk of falls in order to increase safety with functional mobility  MET  2  Patient will improve 5 STS by the SANDRA of 2 3 seconds to 11 3 sec indicating an improvement in strength and decreased challenge with transfers  NOT MET  3  Patient will improve 6 MWT by the SANDRA 269 ft indicating an improvement in cardiovascular endurance in order to maximize function with functional mobility throughout the community  MET  4  Patient will be independent in basic HEP in order to manage condition at home  MET    LTGs (60 days)  1  Patient will score a low risk for falls 4/4 fall risks measures  - MET  2  Patient will score age norm values for 2/2 endurance measures  - NOT MET  3  Patient will be able to ambulate on uneven surfaces without loss of balance to increase safety with community mobility  - MET  4  Patient will be able to perform a floor transfer without physical assistance to assist with fall recovery at home and in the community  - MET  5  Patient will be independent in comprehensive HEP post discharge from program  - PARTIALLY MET  6  Patient will be able to walk up incline with decreased difficulty and no loss of balance in order to improve functional mobility throughout the community  - PARTIALLY MET  7  Patient will be able to perform sit to stands from softer surfaces such as a couch or bed in order to improvement function with transfers - MET  8  Patient will have an improvement FOTO score indicating an improvement in function - NOT APPLICABLE  9   Patient will be consistent with program for at least 2 days per week to assist with management of condition and functional independence of their condition  - MET    New Goals (1-)  1  Patient will perform a walking while talking test in 8 weeks order to improve and assess his overall function with dual tasking  2  Patient will perform a 4 square step test in 8 weeks in order to improve his overall multi-directional changing of directions  3  Patient will perform a UPDRS in 8 weeks in order to assess his subjective affects of his Parkinson's disease symptoms  Plan  Patient would benefit from: skilled physical therapy  Planned therapy interventions: neuromuscular re-education, motor coordination training, patient education, postural training, sensory integrative techniques, stretching, strengthening, therapeutic activities, therapeutic exercise, home exercise program, gait training, flexibility, coordination, balance, activity modification, abdominal trunk stabilization, community reintegration, graded exercise, graded activity, graded motor, therapeutic training and transfer training  Frequency: 2x week  Duration in weeks: 12  Plan of Care beginning date: 1/4/2021  Plan of Care expiration date: 3/29/2021  Treatment plan discussed with: patient         Subjective Evaluation     History of Present Illness  Mechanism of injury: Pt is prior [facility] patient, last visit on March 5th  He felt okay for about 2 months, and then 1 month ago he noticed his symptoms have worsened  He notices rigidity and toe curling in the left leg, and now migrating to the right leg also  He notices the toe curling when he is driving  He also notes tremors have worsened  He recently increased his medication dosage (sinemet)  He works full time as an , sitting at a computer most of the day  He has not been performing exercises at home, but he goes for daily walks and tries to walk as much as he can throughout the day       Patient is continuing to improve although has challenges with consistent attendance due to weather and recent onset of back pain  Pain  No pain reported  Current pain ratin  At best pain ratin  At worst pain ratin     Social Support  Steps to enter house: yes  Stairs in house: yes   Lives with: spouse     Employment status: working  Treatments  Previous treatment: physical therapy  Patient Goals  Patient goals for therapy: improved balance, increased motion, return to sport/leisure activities, independence with ADLs/IADLs and increased strength              Objective      Strength/Myotome Testing      Left Hip   Planes of Motion   Flexion: 4  Extension: 4  Abduction: 4  Adduction: 4     Right Hip   Planes of Motion   Flexion: 4  Extension: 4  Abduction: 4  Adduction: 4     Left Knee   Flexion: 4  Extension: 4     Right Knee   Flexion: 4  Extension: 4     Left Ankle/Foot   Dorsiflexion: 4  Plantar flexion: 4     Right Ankle/Foot   Dorsiflexion: 4  Plantar flexion: 4    Additional Strength Details  Increased rigidity LLE>RLE             Precautions:   Medical History        Past Medical History:   Diagnosis Date    Ankle injury       last assessed 7/15/2013    BPH (benign prostatic hyperplasia)      Bronchiolitis      Cancer (HCC)       Prostate    Diabetes mellitus ([facility] [address] )       Type II    History of benign prostatic hyperplasia      History of gastritis      History of insect bite       last assessed 10/4/2014    History of nocturia      History of stroke      Hypertension      Lumbar canal stenosis      Osteoarthritis, knee      Otitis externa      Pain in back       UPPER BACK, last assessed 2013    Retinal and vitreous disorder      Sinusitis, acute       last assessed 10/6/2016    Tendinitis of right rotator cuff       last assessed 2012             Objective: See treatment diary below- Treatment note from 2021     Warm Up (all performed standing)  -BIG Step forward, Laterally, backward  -Rock and reach forward and laterally         Circuit 1 (2 min each, 2 rounds,)    - 10 combos on punch bag  - 6 inch step 10 step taps  - 12 inch step tap around  - punch combo case the rabbit      *30 sec rest break between circuits*     Circuit 2 (2:30 min each, 2 round)   Combinations on bosu-   -1-2 on bosu   -3-4 squat with comb   -5-6 180 deg jump firm floor       Agility/strength ( 2 minutes x 2 cycles ) no rest   - braiding over hurdles over and back   - lateral step up and down weight bench 5 reps      Circuit 3 (2:30 min each, 1 round)   Combinations on bosu-   -1-2 split on two bosu  -3-4 lateral stepping between two bosu   -5-6 lunge step on bosu      Agility and strength ( 2:30 x 1 round )   - braiding backwards over 6 inch hurdles   - step taps 10 reps with weight bench   - 10 knees into punch bag       Circuit 4: ( 1 minute x 2 reps each exercise )   1 to 6 with doretha the rabbit 60 sec x 2   Seated p-ball punch combo               On call combos              Alt leg kicks with combos              Alt high knees with combos     Circuit 5: ( 60 seconds 1 rep)   - med slams with 10 lbs,   - deep squat with butter fly with TRX strap   - Electronic Brailler bell overhead press  - russian twist from bosu ball        Cool down:  -Arm circles  -Neck stretch (lateral flexion)  -Floor to ceiling stretch   -Arm across chest  -Tricep stretch behind head       Plan: Continue per plan of care  Progress treatment as tolerated         Precautions:   Past Medical History:   Diagnosis Date    Ankle injury     last assessed 7/15/2013    BPH (benign prostatic hyperplasia)     Bronchiolitis     Cancer ([facility] [address] )     Prostate    Diabetes mellitus ([facility] [address] )     Type II    History of benign prostatic hyperplasia     History of gastritis     History of insect bite     last assessed 10/4/2014    History of nocturia     History of stroke     Hypertension     Lumbar canal stenosis     Osteoarthritis, knee     Otitis externa     Pain in back     UPPER BACK, last assessed 2/25/2013    Retinal and vitreous disorder     Sinusitis, acute     last assessed 10/6/2016    Tendinitis of right rotator cuff     last assessed 8/21/2012           Outcome Measures 3/5/20 7/23/20 9/10 PN 10-12 11/25 PN 12/23/2020 1/18/2021   ABC Not assessed 80 6%  92 5%  92 5% 93%   5x STS 11 5 sec 14 22 sec 12 57 sec 12 66 sec 12 34 sec 10 12 11 02   TUG 7 32    11 03 carry    12 12   cog 8 42     8 70 carry    9 95 cog 7 53 sec    8 66 sec   Carry  10 13 sec Cog 6 10 sec    7 34 sec Carry    6 48 sec Cog 6 32 sec    7 09 sec Carry    8 78 sec 4 59 regular    4 85 Carry    5 89 sec counting down from 100 by  5 23 regular    5 85 Carry    6 89 sec counting down from 100 by    10 meter walk test 1 25 m/s 1 30 m/s 7 03 sec 1 4 m/s 1 8 m/s 1 9 m/s 1 95 1 84 m/s   6 minute walk test 2100 ft 1375 ft 1445 ft 1435 ft 1700 ft 1750 1700   DGI 23/24 23/24 23/24 23/24   MiniBest 25/28 25/28 27/28 27/28 27/28   FGA Not assessed 27/30 29/30 29/30 29/30

## 2021-01-25 ENCOUNTER — OFFICE VISIT (OUTPATIENT)
Dept: PHYSICAL THERAPY | Facility: CLINIC | Age: 66
End: 2021-01-25
Payer: COMMERCIAL

## 2021-01-25 DIAGNOSIS — G20 PARKINSON DISEASE (HCC): Primary | ICD-10-CM

## 2021-01-25 PROCEDURE — 97112 NEUROMUSCULAR REEDUCATION: CPT | Performed by: PHYSICAL THERAPIST

## 2021-01-25 PROCEDURE — 97530 THERAPEUTIC ACTIVITIES: CPT | Performed by: PHYSICAL THERAPIST

## 2021-01-25 PROCEDURE — 97110 THERAPEUTIC EXERCISES: CPT | Performed by: PHYSICAL THERAPIST

## 2021-01-26 NOTE — PROGRESS NOTES
Daily Note     Today's date: 2021  Patient name: Gabriella Stark  : 1955  MRN: [id number]  Referring provider: Valerio Mahmood MD  Dx:   Encounter Diagnosis     ICD-10-CM    1  Parkinson disease ([address] )  G20                   Subjective: Baron Huffman reports "doing okay"  upon arrival to therapy today, notes no soreness in back  Objective: See treatment diary below     Warm Up (all performed standing)  -BIG Step forward, Laterally, backward  -Rock and reach forward and laterally      [place] [employer]  1 - 2 min each, 1x, 1 min rest break b/w  -           4 square kayden stepping > combos  -           [employer]one weaves > combos  -           Ducking > combos  -           Step ups/downs > combos  -           180 degree turns on foam > combos     2 - 2 min each, 1x, 1 min rest break b/w  -           Resisted punching  -           Jumping jacks in doorway (10) > combos  -           Running through maze  -           Running to called out cones in sequence f/b bwd running > combo  -           Stepping over large object > combos       Core Circuit - 30 sec each, 2x, 1-2 min rest break b/w  -           Standing w/ arms overhead, knee coming across midline and arms down (not a good description lol)  -           Standing PNF chops (on foam or Bosu to make it harder)  -           Holding dumbells lateral crunch (sliding hands down side of leg)     Cooldown  -           Arm across chest  -           Arm overhead  -           Reaching down to floor  -           Calf stretch  -           Toe stretch      Assessment: Pt reporting minimal back pain with line jumps, modified to magnus  Good recall of sequences for all combinations  Pt will continue to benefit from skilled PT to address reduced mobility as a result of PD diagnosis to maximize function in roles at work, home, and the community  Plan: Continue per plan of care  Progress treatment as tolerated         Precautions:   Past Medical History: Diagnosis Date    Ankle injury     last assessed 7/15/2013    BPH (benign prostatic hyperplasia)     Bronchiolitis     Cancer (HCC)     Prostate    Diabetes mellitus ([facility] [address] )     Type II    History of benign prostatic hyperplasia     History of gastritis     History of insect bite     last assessed 10/4/2014    History of nocturia     History of stroke     Hypertension     Lumbar canal stenosis     Osteoarthritis, knee     Otitis externa     Pain in back     UPPER BACK, last assessed 2/25/2013    Retinal and vitreous disorder     Sinusitis, acute     last assessed 10/6/2016    Tendinitis of right rotator cuff     last assessed 8/21/2012     Treatment on 9/10:  - single knee to chest 30 sec x 5 each leg  - standing twist 2x10 with 5 sec holds  - floor to ceiling stretch 20x with 10 sec holds      Outcome Measures 3/5/20 7/23/20 9/10 PN 10-12 11/25 PN    ABC Not assessed 80 6%  92 5%     5x STS 11 5 sec 14 22 sec 12 57 sec 12 66 sec 12 34 sec    TUG 7 32    11 03 carry    12 12   cog 8 42     8 70 carry    9 95 cog 7 53 sec    8 66 sec   Carry  10 13 sec Cog 6 10 sec    7 34 sec Carry    6 48 sec Cog 6 32 sec    7 09 sec Carry    8 78 sec    10 meter walk test 1 25 m/s 1 30 m/s 7 03 sec 1 4 m/s 1 8 m/s 1 9 m/s    6 minute walk test 2100 ft 1375 ft 1445 ft 1435 ft 1700 ft    DGI 23/24 23/24       MiniBest 25/28 25/28 27/28     FGA Not assessed 27/30 29/30

## 2021-01-28 ENCOUNTER — OFFICE VISIT (OUTPATIENT)
Dept: FAMILY MEDICINE CLINIC | Facility: CLINIC | Age: 66
End: 2021-01-28
Payer: COMMERCIAL

## 2021-01-28 VITALS
BODY MASS INDEX: 28.06 KG/M2 | HEIGHT: 70 IN | HEART RATE: 90 BPM | DIASTOLIC BLOOD PRESSURE: 62 MMHG | WEIGHT: 196 LBS | OXYGEN SATURATION: 98 % | SYSTOLIC BLOOD PRESSURE: 118 MMHG | TEMPERATURE: 98.1 F

## 2021-01-28 DIAGNOSIS — I10 BENIGN ESSENTIAL HYPERTENSION: ICD-10-CM

## 2021-01-28 DIAGNOSIS — F32.5 MAJOR DEPRESSIVE DISORDER IN FULL REMISSION, UNSPECIFIED WHETHER RECURRENT (HCC): ICD-10-CM

## 2021-01-28 DIAGNOSIS — I25.10 ARTERIOSCLEROSIS OF CORONARY ARTERY: ICD-10-CM

## 2021-01-28 DIAGNOSIS — E11.65 TYPE 2 DIABETES MELLITUS WITH HYPERGLYCEMIA, WITHOUT LONG-TERM CURRENT USE OF INSULIN (HCC): ICD-10-CM

## 2021-01-28 DIAGNOSIS — E11.9 TYPE 2 DIABETES MELLITUS WITHOUT COMPLICATION, WITHOUT LONG-TERM CURRENT USE OF INSULIN (HCC): Primary | ICD-10-CM

## 2021-01-28 DIAGNOSIS — E78.2 MIXED HYPERLIPIDEMIA: ICD-10-CM

## 2021-01-28 PROCEDURE — 99214 OFFICE O/P EST MOD 30 MIN: CPT | Performed by: FAMILY MEDICINE

## 2021-01-28 PROCEDURE — 3074F SYST BP LT 130 MM HG: CPT | Performed by: FAMILY MEDICINE

## 2021-01-28 PROCEDURE — 3078F DIAST BP <80 MM HG: CPT | Performed by: FAMILY MEDICINE

## 2021-01-28 PROCEDURE — 3725F SCREEN DEPRESSION PERFORMED: CPT | Performed by: FAMILY MEDICINE

## 2021-01-28 PROCEDURE — 3008F BODY MASS INDEX DOCD: CPT | Performed by: FAMILY MEDICINE

## 2021-01-28 PROCEDURE — 4004F PT TOBACCO SCREEN RCVD TLK: CPT | Performed by: FAMILY MEDICINE

## 2021-01-28 PROCEDURE — 1160F RVW MEDS BY RX/DR IN RCRD: CPT | Performed by: FAMILY MEDICINE

## 2021-01-28 NOTE — PROGRESS NOTES
BMI Counseling: Body mass index is 28 12 kg/m²  The BMI is above normal  Nutrition recommendations include decreasing portion sizes, encouraging healthy choices of fruits and vegetables, consuming healthier snacks, limiting drinks that contain sugar and moderation in carbohydrate intake  Exercise recommendations include moderate physical activity 150 minutes/week and exercising 3-5 times per week  No pharmacotherapy was ordered  Assessment/Plan:  Type 2 diabetes mellitus without complication, without long-term current use of insulin ([facility])    Lab Results   Component Value Date    HGBA1C 7 6 (H) 12/29/2020   Continue follow-up with Endocrinology  Foot exam today is normal     Arteriosclerosis of coronary artery  Currently asymptomatic  Continue with statin and aspirin    Benign essential hypertension  Blood pressure well controlled  Continue with lisinopril     Depression  Continue Effexor    Mixed hyperlipidemia  Continue pravastatin  Diagnoses and all orders for this visit:    Type 2 diabetes mellitus without complication, without long-term current use of insulin ([facility])  -     Hemochromatosis mutation; Future    Arteriosclerosis of coronary artery    Benign essential hypertension    Type 2 diabetes mellitus with hyperglycemia, without long-term current use of insulin ([facility])    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, unspecified whether recurrent ([facility] )    Mixed hyperlipidemia          Subjective:   Chief Complaint   Patient presents with    Follow-up     pt here for a med check        Patient ID: Jenae Justni is a 72 y o  male  Pretty well  HPI  The patient is a 70-year-old male who presents today for routine follow-up of multiple medical problems including major depressive disorder, restless leg syndrome, hyperlipidemia as well as essential hypertension and diabetes  He states he has been doing fairly well    His wife was recently hospitalized with depression for 2 weeks but she seems to be doing better which makes him happy  He denies any cardiovascular pulmonary complaint  He has no GI complaint  Currently no  complaint  He has been treated for prostate cancer  The following portions of the patient's history were reviewed and updated as appropriate: allergies, current medications, past family history, past medical history, past social history, past surgical history and problem list     Review of Systems   Constitution: Negative for decreased appetite, malaise/fatigue and weight gain  Cardiovascular: Negative for chest pain, irregular heartbeat, leg swelling, orthopnea and paroxysmal nocturnal dyspnea  Respiratory: Negative for cough, shortness of breath and wheezing  Endocrine: Negative for polydipsia, polyphagia and polyuria  Hematologic/Lymphatic: Negative for adenopathy and bleeding problem  Does not bruise/bleed easily  Gastrointestinal: Negative for constipation and diarrhea  Genitourinary:        History of prostate CA   Neurological: Negative for dizziness and headaches  Psychiatric/Behavioral: Positive for depression  Negative for suicidal ideas  The patient is not nervous/anxious  In partial remission         Objective:    Physical Exam   Constitutional: He is oriented to person, place, and time  He appears well-developed and well-nourished  HENT:   Mouth/Throat: Oropharynx is clear and moist    Eyes: Right eye exhibits no discharge  Left eye exhibits no discharge  No scleral icterus  Neck: No JVD present  No thyromegaly present  Cardiovascular: Normal rate, regular rhythm and normal heart sounds  Pulses are no weak pulses  Pulses:       Dorsalis pedis pulses are 2+ on the right side and 2+ on the left side  Pulmonary/Chest: Effort normal and breath sounds normal  No respiratory distress  He has no wheezes  He has no rales  Musculoskeletal:         General: No edema     Feet:   Right Foot:   Skin Integrity: Negative for ulcer, skin breakdown, erythema, warmth, callus or dry skin  Left Foot:   Skin Integrity: Negative for ulcer, skin breakdown, erythema, warmth, callus or dry skin  Neurological: He is alert and oriented to person, place, and time  Psychiatric: He has a normal mood and affect  His behavior is normal  Thought content normal    Nursing note and vitals reviewed  Lab Results   Component Value Date    [id number] 2 696 04/29/2020    TSH 2 06 12/20/2019   ]  Wt Readings from Last 12 Encounters:   01/28/21 88 9 kg (196 lb)   12/30/20 89 1 kg (196 lb 6 oz)   09/30/20 89 kg (196 lb 3 2 oz)   09/15/20 88 9 kg (195 lb 14 4 oz)   09/01/20 89 kg (196 lb 1 6 oz)   08/21/20 88 5 kg (195 lb)   03/09/20 91 6 kg (202 lb)   12/20/19 88 9 kg (196 lb)   11/07/19 89 8 kg (198 lb)   10/23/19 89 8 kg (198 lb)   10/08/19 87 1 kg (192 lb)   09/06/19 87 1 kg (192 lb)   [Patient's shoes and socks removed  Right Foot/Ankle   Right Foot Inspection  Skin Exam: skin normal and skin intact no dry skin, no warmth, no callus, no erythema, no maceration, no abnormal color, no pre-ulcer, no ulcer and no callus                          Toe Exam: ROM and strength within normal limits  Sensory       Monofilament testing: intact  Vascular  Capillary refills: < 3 seconds  The right DP pulse is 2+  Left Foot/Ankle  Left Foot Inspection  Skin Exam: skin normal and skin intactno dry skin, no warmth, no erythema, no maceration, normal color, no pre-ulcer, no ulcer and no callus                         Toe Exam: ROM and strength within normal limits                   Sensory       Monofilament: intact  Vascular  Capillary refills: < 3 seconds  The left DP pulse is 2+  Assign Risk Category:  No deformity present; No loss of protective sensation;  No weak pulses       Risk: 0

## 2021-01-29 NOTE — ASSESSMENT & PLAN NOTE
Lab Results   Component Value Date    HGBA1C 7 6 (H) 12/29/2020   Continue follow-up with Endocrinology    Foot exam today is normal

## 2021-02-05 ENCOUNTER — TELEMEDICINE (OUTPATIENT)
Dept: NEUROLOGY | Facility: CLINIC | Age: 66
End: 2021-02-05
Payer: COMMERCIAL

## 2021-02-05 ENCOUNTER — TELEPHONE (OUTPATIENT)
Dept: NEUROLOGY | Facility: CLINIC | Age: 66
End: 2021-02-05

## 2021-02-05 DIAGNOSIS — G20 PARKINSON DISEASE (HCC): Primary | ICD-10-CM

## 2021-02-05 PROCEDURE — 4004F PT TOBACCO SCREEN RCVD TLK: CPT | Performed by: PSYCHIATRY & NEUROLOGY

## 2021-02-05 PROCEDURE — 99213 OFFICE O/P EST LOW 20 MIN: CPT | Performed by: PSYCHIATRY & NEUROLOGY

## 2021-02-05 PROCEDURE — 1160F RVW MEDS BY RX/DR IN RCRD: CPT | Performed by: PSYCHIATRY & NEUROLOGY

## 2021-02-05 NOTE — PROGRESS NOTES
Virtual Brief Visit    Assessment/Plan:    Problem List Items Addressed This Visit        Nervous and Auditory    Parkinson disease ([facility] [address] ) - Primary     80-year-old man presents in follow-up for Parkinson's disease  Found that his symptoms were actually worse on higher doses of ropinirole or Sinemet  Current doses and timing of medication seem to be optimal at this point  While the patient still has off days with increased tremor overall he feels that his symptoms are sufficiently well controlled  There seems to be a large impact from his sleep but he was hesitant to add on any medications for sleep at this point  We discussed different options regarding medication management including adding on additional medications in the future but agreed not to make any changes today  I encouraged him to remain as physically active as safely possible  Follow up in 4 months with Dr Violetta Marroquin PA-C                       Reason for visit is   Chief Complaint   Patient presents with    Virtual Brief Visit        Encounter provider Jacobo Gaxiola MD    Provider located at [address]    Recent Visits  No visits were found meeting these conditions  Showing recent visits within past 7 days and meeting all other requirements     Today's Visits  Date Type Provider Dept   02/05/21 Telephone Neelam Montalvo [facility]   02/05/21 Telemedicine Jacobo Gaxiola MD [facility] today's visits and meeting all other requirements     Future Appointments  No visits were found meeting these conditions  Showing future appointments within next 150 days and meeting all other requirements        After connecting through telephone, the patient was identified by name and date of birth  Bailey Nurse was informed that this is a telemedicine visit and that the visit is being conducted through telephone    My office door was closed  No one else was in the room  He acknowledged consent and understanding of privacy and security of the platform  The patient has agreed to participate and understands he can discontinue the visit at any time  Patient is aware this is a billable service  Subjective  HPI   I had the pleasure of seeing your patient, Jesus Marquez in the Movement Disorders Clinic at the [facility]        Beni Brink is a 72year-old right-handed  with a small prior lacunar stroke who presents in follow-up for Parkinson's disease, symptom onset in late 2018 with left hand tremor (preceded by cognitive slowing)  He was previously diagnosed and followed by Dr Kassandra Last  He was found to be Sinemet responsive  Course is complicated by some dystonia including bothersome toe curling on the left and subtle left hand posturing      Prior medications:   Pramipexole - GI symptoms       Current medications and timing:  Sinemet 25/100; 2 5 tabs TID     Ropinirole 0 5mg tabs; 3 tablets, TID      Interval history:  Good days and bad days  Some days minimal symtoms and others he has a lot of tremor  Things are good if he has a good nights sleep  Gets to sleep ok, but trouble staying asleep  Symptoms were worse with higher doses of ropinirole   Rock steady boxing helps a lot  Working full time - mostly computer work       Past Medical History:   Diagnosis Date    Ankle injury     last assessed 7/15/2013    BPH (benign prostatic hyperplasia)     Bronchiolitis     Cancer ([facility] [address] )     Prostate    Diabetes mellitus ([facility] [address] )     Type II    History of benign prostatic hyperplasia     History of gastritis     History of insect bite     last assessed 10/4/2014    History of nocturia     History of stroke     Hypertension     Lumbar canal stenosis     Osteoarthritis, knee     Otitis externa     Pain in back     UPPER BACK, last assessed 2/25/2013    Retinal and vitreous disorder     Sinusitis, acute     last assessed 10/6/2016    Tendinitis of right rotator cuff     last assessed 8/21/2012       Past Surgical History:   Procedure Laterality Date    ANKLE FRACTURE SURGERY Left     triple fracture 4/2009    LAMINECTOMY      L5-S1 2003    LUMBAR LAMINECTOMY      partial L4-L5 in 2003    NOSE SURGERY      6/1989    OH COLONOSCOPY FLX DX W/COLLJ SPEC WHEN PFRMD N/A 1/27/2016    Procedure: COLONOSCOPY;  Surgeon: Demetrio Rawls MD;  Location: [facility];   Service: Gastroenterology    PROSTATE BIOPSY  12/08/2012    managed by Romeo Smart, needle biopsy    PROSTATECTOMY      9/23/2013    RHINOPLASTY      for deviated septum in 1989       Current Outpatient Medications   Medication Sig Dispense Refill    aspirin 325 mg tablet Take 325 mg by mouth daily      carbidopa-levodopa (SINEMET)  mg per tablet Take 2 tablets by mouth 3 (three) times a day Follow clinic instructions (Patient taking differently: Take 2 5 tablets by mouth 3 (three) times a day Follow clinic instructions) 540 tablet 3    Cholecalciferol (VITAMIN D) 2000 units CAPS Take 2 capsules (4,000 Units total) by mouth daily  0    co-enzyme Q-10 30 MG capsule Take 1 capsule (30 mg total) by mouth daily 30 capsule 0    Empagliflozin (Jardiance) 10 MG TABS TAKE 1 TABLET BY MOUTH DAILY 90 tablet 1    glucose blood (ONETOUCH VERIO) test strip Check BG 2x per day 200 each 3    Janumet  MG per tablet TAKE 1 TABLET BY MOUTH TWICE DAILY WITH MEALS 180 tablet 1    lisinopril (ZESTRIL) 20 mg tablet TAKE 1 TABLET BY MOUTH DAILY 90 tablet 1    pravastatin (PRAVACHOL) 40 mg tablet TAKE 1 TABLET BY MOUTH DAILY 90 tablet 1    rOPINIRole (REQUIP) 0 5 mg tablet Take 3 tablets (1 5 mg total) by mouth 3 (three) times a day 810 tablet 1    vardenafil (LEVITRA) 20 MG tablet Take by mouth daily as needed       venlafaxine (EFFEXOR-XR) 75 mg 24 hr capsule TAKE ONE CAPSULE BY MOUTH DAILY 90 capsule 1     No current facility-administered medications for this visit  No Known Allergies    Review of Systems   Constitutional: Negative  Negative for appetite change and fever  HENT: Negative  Negative for hearing loss, tinnitus, trouble swallowing and voice change  Eyes: Negative  Negative for photophobia and pain  Respiratory: Negative  Negative for shortness of breath  Cardiovascular: Negative  Negative for palpitations  Gastrointestinal: Negative  Negative for nausea and vomiting  Endocrine: Negative  Negative for cold intolerance  Genitourinary: Negative  Negative for dysuria, frequency and urgency  Musculoskeletal: Positive for gait problem  Negative for myalgias and neck pain  Skin: Negative  Negative for rash  Neurological: Positive for tremors  Negative for dizziness, seizures, syncope, facial asymmetry, speech difficulty, weakness, light-headedness, numbness and headaches  Hematological: Negative  Does not bruise/bleed easily  Psychiatric/Behavioral: Negative  Negative for confusion, hallucinations and sleep disturbance  All other systems reviewed and are negative  The above ROS was reviewed and updated  Krzysztof Jefferson MD  Medical Director   [place] [id number] [facility]       There were no vitals filed for this visit  I spent 20 minutes directly with the patient during this visit    [address] acknowledges that he has consented to an online visit or consultation  He understands that the online visit is based solely on information provided by him, and that, in the absence of a face-to-face physical evaluation by the physician, the diagnosis he receives is both limited and provisional in terms of accuracy and completeness  This is not intended to replace a full medical face-to-face evaluation by the physician  Ammon Raymond understands and accepts these terms

## 2021-02-05 NOTE — PATIENT INSTRUCTIONS
Parkinson's Disease Resources     Helpful web sites   -  www [employer]  org   -  www parkinson  org ([employer])   -  www [employer] ([address] for [employer]) - Order the "Every Victory Counts" hanna under the "resources" tab  Or visit [facility] org/EV or call [phone]  [facility]  org/resources/parkinsons-exercise-essentials   - Contact the [employer] for an "Aware in care kit" @ 2696.192.2047 (this is a kit to take with you if you ever have to go to the hospital)   - www pmdalliance  org  -  parkinsons  nate edu/exercise_videos  html  - [address] [facility] Exercise helpline: (773) 209-2325  - [employer]    - Check out "The [facility] [place] [facility]" for help paying for your medications: www tafcares  org

## 2021-02-05 NOTE — ASSESSMENT & PLAN NOTE
12-year-old man presents in follow-up for Parkinson's disease  Found that his symptoms were actually worse on higher doses of ropinirole or Sinemet  Current doses and timing of medication seem to be optimal at this point  While the patient still has off days with increased tremor overall he feels that his symptoms are sufficiently well controlled  There seems to be a large impact from his sleep but he was hesitant to add on any medications for sleep at this point  We discussed different options regarding medication management including adding on additional medications in the future but agreed not to make any changes today  I encouraged him to remain as physically active as safely possible      Follow up in 4 months with Dr Danis Cruz PA-C

## 2021-02-08 ENCOUNTER — APPOINTMENT (OUTPATIENT)
Dept: PHYSICAL THERAPY | Facility: CLINIC | Age: 66
End: 2021-02-08
Payer: COMMERCIAL

## 2021-02-11 ENCOUNTER — TELEPHONE (OUTPATIENT)
Dept: ADMINISTRATIVE | Facility: OTHER | Age: 66
End: 2021-02-11

## 2021-02-11 NOTE — LETTER
Diabetic Eye Exam Form    Date Requested: 21  Patient: Lena Marcus  Patient : 1955   Referring Provider: Ana De Santiago MD    Dilated Retinal Exam, Optomap-Iris Exam, or Fundus Photography Done         Yes ([place] one above)         No     Date of Diabetic Eye Exam ______________________________  Left Eye      Exam did show retinopathy    Exam did not show retinopathy         Mild       Moderate       None       Proliferative       Severe     Right Eye     Exam did show retinopathy    Exam did not show retinopathy         Mild       Moderate       None       Proliferative       Severe     Comments __________________________________________________________    Practice Providing Exam ______________________________________________    Exam Performed By (print name) _______________________________________      Provider Signature ___________________________________________________      These reports are needed for  compliance    Please fax this completed form and a copy of the Diabetic Eye Exam report to our office located at [place] [address] as soon as possible to [phone] [facility]: Phone [phone]    We thank you for your assistance in treating our mutual patient

## 2021-02-12 NOTE — TELEPHONE ENCOUNTER
----- Message from Gordy Hand sent at 2/9/2021 10:27 AM EST -----  Regarding: care gap request  02/09/21 10:27 AM    Hello, our patient Debra Carias has had Diabetic Eye Exam completed/performed  Please assist in updating the patient chart by pulling the document from the Media Tab  The date of service is 10/30/2019       Thank you  [address]

## 2021-02-12 NOTE — TELEPHONE ENCOUNTER
Upon review of the In Basket request and the patient's chart, initial outreach has been made via fax, please see Contacts section for details        Faxed request for Dr Paco Thompson exam   The documents in Media are for [facility] appts, not DM exam  [phone]    Thank you  Cash Lee MA

## 2021-02-12 NOTE — TELEPHONE ENCOUNTER
Upon review of the In Basket request we were able to locate, review, and update the patient chart as requested for Diabetic Eye Exam     Any additional questions or concerns should be emailed to the Practice Liaisons via [email or web address] email, please do not reply via In Basket      Thank you  Alva Jacobsen MA

## 2021-02-13 DIAGNOSIS — Z23 ENCOUNTER FOR IMMUNIZATION: ICD-10-CM

## 2021-02-15 ENCOUNTER — OFFICE VISIT (OUTPATIENT)
Dept: PHYSICAL THERAPY | Facility: CLINIC | Age: 66
End: 2021-02-15
Payer: COMMERCIAL

## 2021-02-15 DIAGNOSIS — G20 PARKINSON DISEASE (HCC): Primary | ICD-10-CM

## 2021-02-15 PROCEDURE — 97110 THERAPEUTIC EXERCISES: CPT | Performed by: PHYSICAL THERAPIST

## 2021-02-15 PROCEDURE — 97112 NEUROMUSCULAR REEDUCATION: CPT | Performed by: PHYSICAL THERAPIST

## 2021-02-15 NOTE — PROGRESS NOTES
Daily Note     Today's date: 2/15/2021  Patient name: [place] Loredo  : 1955  MRN: [id number]  Referring provider: Humphrey Perez MD  Dx:   Encounter Diagnosis     ICD-10-CM    1  Parkinson disease ([facility] [address] )  G20                   Subjective: Nereida Emanuel reports "doing okay"  upon arrival to therapy today, notes no soreness in back  Objective: See treatment diary below     [employer] Up (all performed standing)  -Marching w/ opposite hand to knee  -Sidestepping with UE abduction  -Butt kicks  -Heel and toe walking    Shadow boxing 1-6 (20 reps of each)     Circuits  1 - 3 min each, 1x, 1 min rest break b/w  -           1/2 kneel into standing knee to pad   -           Combos on bag while reading off colors  -           5 jumping jacks, run/jog down turf for combos at end      2 - 3 min each, 1x, 1 min rest break b/w  -           Rolling supine<>prone, floor> stand for combos  -           Step taps 30 sec, burnouts 30 sec     Core Circuit - 1 min, 2x   -           Core rotation with 8# med ball, seated on PB        Cool down  -           Barefoot walking on treadmill to address toe cramping  -           Arm across chest  -           Arm overhead  -           Reaching down to floor  -           Calf stretch  -           Toe stretch      Assessment: Pt tolerated session well with no c/o back pain during circuits  Cues for full elbow extension during jab and cross, also for increased arm swing during ambulation/jogging  Able to achieve increased toe extension barefoot at slower speed on treadmill  Pt will continue to benefit from skilled PT to address reduced mobility as a result of PD diagnosis to maximize function in roles at work, home, and the community  Plan: Continue per plan of care  Progress treatment as tolerated         Precautions:   Past Medical History:   Diagnosis Date    Ankle injury     last assessed 7/15/2013    BPH (benign prostatic hyperplasia)     Bronchiolitis     Cancer [facility])     Prostate    Diabetes mellitus ([facility] [address] )     Type II    History of benign prostatic hyperplasia     History of gastritis     History of insect bite     last assessed 10/4/2014    History of nocturia     History of stroke     Hypertension     Lumbar canal stenosis     Osteoarthritis, knee     Otitis externa     Pain in back     UPPER BACK, last assessed 2/25/2013    Retinal and vitreous disorder     Sinusitis, acute     last assessed 10/6/2016    Tendinitis of right rotator cuff     last assessed 8/21/2012     Treatment on 9/10:  - single knee to chest 30 sec x 5 each leg  - standing twist 2x10 with 5 sec holds  - floor to ceiling stretch 20x with 10 sec holds      Outcome Measures 3/5/20 7/23/20 9/10 PN 10-12 11/25 PN    ABC Not assessed 80 6%  92 5%     5x STS 11 5 sec 14 22 sec 12 57 sec 12 66 sec 12 34 sec    TUG 7 32    11 03 carry    12 12   cog 8 42     8 70 carry    9 95 cog 7 53 sec    8 66 sec   Carry  10 13 sec Cog 6 10 sec    7 34 sec Carry    6 48 sec Cog 6 32 sec    7 09 sec Carry    8 78 sec    10 meter walk test 1 25 m/s 1 30 m/s 7 03 sec 1 4 m/s 1 8 m/s 1 9 m/s    6 minute walk test 2100 ft 1375 ft 1445 ft 1435 ft 1700 ft    DGI 23/24 23/24       MiniBest 25/28 25/28 27/28     FGA Not assessed 27/30 29/30

## 2021-02-22 ENCOUNTER — APPOINTMENT (OUTPATIENT)
Dept: PHYSICAL THERAPY | Facility: CLINIC | Age: 66
End: 2021-02-22
Payer: COMMERCIAL

## 2021-02-25 ENCOUNTER — IMMUNIZATIONS (OUTPATIENT)
Dept: FAMILY MEDICINE CLINIC | Facility: HOSPITAL | Age: 66
End: 2021-02-25

## 2021-02-25 DIAGNOSIS — Z23 ENCOUNTER FOR IMMUNIZATION: Primary | ICD-10-CM

## 2021-02-25 PROCEDURE — 0001A SARS-COV-2 / COVID-19 MRNA VACCINE (PFIZER-BIONTECH) 30 MCG: CPT

## 2021-02-25 PROCEDURE — 91300 SARS-COV-2 / COVID-19 MRNA VACCINE (PFIZER-BIONTECH) 30 MCG: CPT

## 2021-03-01 ENCOUNTER — OFFICE VISIT (OUTPATIENT)
Dept: PHYSICAL THERAPY | Facility: CLINIC | Age: 66
End: 2021-03-01
Payer: COMMERCIAL

## 2021-03-01 DIAGNOSIS — G20 PARKINSON DISEASE (HCC): Primary | ICD-10-CM

## 2021-03-01 PROCEDURE — 97112 NEUROMUSCULAR REEDUCATION: CPT | Performed by: PHYSICAL THERAPIST

## 2021-03-02 NOTE — PROGRESS NOTES
Progress Report     Today's date: 3/1/2021  Patient name: Ginette Dutton  : 1955  MRN: [id number]  Referring provider: Flores Lino MD  Dx:   Encounter Diagnosis     ICD-10-CM    1  Parkinson disease ([facility] [address] )  G20                Assessment  Assessment details:         YBARRA Cutoff Scores:  MDC- 5 points  Cut off- 40 22    DGI Cutoff Scores:  Melva  Cherrie: Score <19    Andre eduardo ; [address]- 2 9 points    MiniBest Cutoff Scores:  Verizon and Auyeung 2013: Veronicalaura Stewartman <19/28  Gustabo 2011: MDC 5 52 points     TUG Cutoff Scores: Anahi, 2011:   Tug Score Fallers: Medication ON: < 12 21 seconds; Off: < 15 5 seconds   Rick Da Silva, 2011: MDC: 4 8 seconds     10 Meter Walk Test Cutoff Scores:  Rula Mcallister 2008: Laurita [address]:  18 M/S  Age Norm Values Cherrie < 1 0 m/s    6 Minute Walk Test Cutoff Scores:  Rula Mcallister , 2008: [address]- 269 feet    Norm Data Healthy Adults:  Mary Reis, 2002:  60 to 71 years  Male: 572 meter;  Female: 538 meter  70-79 years :    Male: 1 meter,  Female: 471 meter  80-89 years :   Male 1 meter,   Female: 392 meter    5xSTS Cutoff Scores:  Delores Causey 2011: Cherrie,Cut off- > 16 seconds   MDC- 2 3 secondsDGI Cutoff Scores:  Melva  Cherrie: Score <19    Andre eduardo ; Laurita [address]- 2 9 points      Impairments: abnormal coordination, abnormal gait, abnormal muscle firing, abnormal muscle tone, activity intolerance, impaired balance, impaired physical strength, lacks appropriate home exercise program, pain with function, safety issue, poor posture  and poor body mechanics    Symptom irritability: moderateUnderstanding of Dx/Px/POC: good   Prognosis: good   Goals    Goals:  STGs (30 days)  1  Patient will improve TUG score to age related norms seconds or less to meet the age norm value for low risk of falls in order to increase safety with functional mobility  MET  2   Patient will improve 5 STS by the SANDRA of 2 3 seconds to 11 3 sec indicating an improvement in strength and decreased challenge with transfers  NOT MET  3  Patient will improve 6 MWT by the SANDRA 269 ft indicating an improvement in cardiovascular endurance in order to maximize function with functional mobility throughout the community  MET  4  Patient will be independent in basic HEP in order to manage condition at home  MET    LTGs (60 days)  1  Patient will score a low risk for falls 4/4 fall risks measures  - MET  2  Patient will score age norm values for 2/2 endurance measures  - NOT MET  3  Patient will be able to ambulate on uneven surfaces without loss of balance to increase safety with community mobility  - MET  4  Patient will be able to perform a floor transfer without physical assistance to assist with fall recovery at home and in the community  - MET  5  Patient will be independent in comprehensive HEP post discharge from program  - PARTIALLY MET  6  Patient will be able to walk up incline with decreased difficulty and no loss of balance in order to improve functional mobility throughout the community  - PARTIALLY MET  7  Patient will be able to perform sit to stands from softer surfaces such as a couch or bed in order to improvement function with transfers - MET  8  Patient will have an improvement FOTO score indicating an improvement in function - NOT APPLICABLE  9   Patient will be consistent with program for at least 2 days per week to assist with management of condition and functional independence of their condition  - MET     Plan  Patient would benefit from: skilled physical therapy  Planned therapy interventions: neuromuscular re-education, motor coordination training, patient education, postural training, sensory integrative techniques, stretching, strengthening, therapeutic activities, therapeutic exercise, home exercise program, gait training, flexibility, coordination, balance, activity modification, abdominal trunk stabilization, community reintegration, graded exercise, graded activity, graded motor, therapeutic training and transfer training  Frequency: 2x week  Duration in weeks: 12  Plan of Care beginning date: 10/12/2020  Plan of Care expiration date: 2021  Treatment plan discussed with: patient         Subjective Evaluation     History of Present Illness  Mechanism of injury: Pt is prior RSB patient, last visit on   He felt okay for about 2 months, and then 1 month ago he noticed his symptoms have worsened  He notices rigidity and toe curling in the left leg, and now migrating to the right leg also  He notices the toe curling when he is driving  He also notes tremors have worsened  He recently increased his medication dosage (sinemet)  He works full time as an , sitting at a computer most of the day  He has not been performing exercises at home, but he goes for daily walks and tries to walk as much as he can throughout the day  Updated 10-: Patient reports no new changes or complaints  No new falls noted and stated he has continued to feel better with PT    Pain  No pain reported  Current pain ratin  At best pain ratin  At worst pain ratin     Social Support  Steps to enter house: yes  Stairs in house: yes   Lives with: spouse     Employment status: working  Treatments  Previous treatment: physical therapy  Patient Goals  Patient goals for therapy: improved balance, increased motion, return to sport/leisure activities, independence with ADLs/IADLs and increased strength              Objective      Strength/Myotome Testing      Left Hip   Planes of Motion   Flexion: 4  Extension: 4  Abduction: 4  Adduction: 4     Right Hip   Planes of Motion   Flexion: 4  Extension: 4  Abduction: 4  Adduction: 4     Left Knee   Flexion: 4  Extension: 4     Right Knee   Flexion: 4  Extension: 4     Left Ankle/Foot   Dorsiflexion: 4  Plantar flexion: 4     Right Ankle/Foot   Dorsiflexion: 4  Plantar flexion: 4    Additional Strength Details  Increased rigidity LLE>RLE          Treatment 3-1-2021       Circuit 1 (2 min each, 2 rounds,)    - 10 combos on punch bag  - 6 inch step 10 step taps  - 12 inch step tap around  - punch combo case the rabbit      *30 sec rest break between circuits*     Circuit 2 (2:30 min each, 2 round)   Combinations on bosu-   -1-2 on bosu   -3-4 squat with comb   -5-6 180 deg jump firm floor       Agility/strength ( 2 minutes x 2 cycles ) no rest   - braiding over hurdles over and back   - lateral step up and down weight bench 5 reps      Circuit 3 (2:30 min each, 1 round)   Combinations on bosu-   -1-2 split on two bosu  -3-4 lateral stepping between two bosu   -5-6 lunge step on bosu      Agility and strength ( 2:30 x 1 round )   - braiding backwards over 6 inch hurdles   - step taps 10 reps with weight bench   - 10 knees into punch bag       Circuit 4: ( 1 minute x 2 reps each exercise )   1 to 6 with doretha the rabbit 60 sec x 2   Seated p-ball punch combo               On call combos              Alt leg kicks with combos              Alt high knees with combos          Precautions:   Past Medical History:   Diagnosis Date    Ankle injury     last assessed 7/15/2013    BPH (benign prostatic hyperplasia)     Bronchiolitis     Cancer ([facility] [address] )     Prostate    Diabetes mellitus ([facility] [address] )     Type II    History of benign prostatic hyperplasia     History of gastritis     History of insect bite     last assessed 10/4/2014    History of nocturia     History of stroke     Hypertension     Lumbar canal stenosis     Osteoarthritis, knee     Otitis externa     Pain in back     UPPER BACK, last assessed 2/25/2013    Retinal and vitreous disorder     Sinusitis, acute     last assessed 10/6/2016    Tendinitis of right rotator cuff     last assessed 8/21/2012       Outcome Measures 3/5/20 7/23/20 9/10 PN 10-12 11/25 PN 3/1/2021   ABC Not assessed 80 6%  92 5%     5x STS 11 5 sec 14 22 sec 12 57 sec 12 66 sec 12 34 sec 13 11   TUG 7 32    11 03 carry    12 12   cog 8 42     8 70 carry    9 95 cog 7 53 sec    8 66 sec   Carry  10 13 sec Cog 6 10 sec    7 34 sec Carry    6 48 sec Cog 6 32 sec    7 09 sec Carry    8 78 sec 6 47 sec         9 12    10 meter walk test 1 25 m/s 1 30 m/s 7 03 sec 1 4 m/s 1 8 m/s 1 9 m/s 1 8 m/s   6 minute walk test 2100 ft 1375 ft 1445 ft 1435 ft 1700 ft    DGI 23/24 23/24       MiniBest 25/28 25/28 27/28     FGA Not assessed 27/30 29/30 27/30

## 2021-03-03 ENCOUNTER — TRANSCRIBE ORDERS (OUTPATIENT)
Dept: PHYSICAL THERAPY | Facility: CLINIC | Age: 66
End: 2021-03-03

## 2021-03-03 DIAGNOSIS — G20 PARKINSON DISEASE (HCC): Primary | ICD-10-CM

## 2021-03-08 ENCOUNTER — APPOINTMENT (OUTPATIENT)
Dept: PHYSICAL THERAPY | Facility: CLINIC | Age: 66
End: 2021-03-08
Payer: COMMERCIAL

## 2021-03-08 DIAGNOSIS — IMO0002 UNCONTROLLED TYPE 2 DIABETES MELLITUS WITH COMPLICATION, WITHOUT LONG-TERM CURRENT USE OF INSULIN: ICD-10-CM

## 2021-03-08 DIAGNOSIS — E11.65 TYPE 2 DIABETES MELLITUS WITH HYPERGLYCEMIA, UNSPECIFIED WHETHER LONG TERM INSULIN USE (HCC): ICD-10-CM

## 2021-03-08 RX ORDER — EMPAGLIFLOZIN 10 MG/1
TABLET, FILM COATED ORAL
Qty: 90 TABLET | Refills: 1 | Status: SHIPPED | OUTPATIENT
Start: 2021-03-08 | End: 2021-07-29 | Stop reason: SDUPTHER

## 2021-03-08 RX ORDER — SITAGLIPTIN AND METFORMIN HYDROCHLORIDE 1000; 50 MG/1; MG/1
1 TABLET, FILM COATED ORAL 2 TIMES DAILY WITH MEALS
Qty: 180 TABLET | Refills: 1 | Status: SHIPPED | OUTPATIENT
Start: 2021-03-08 | End: 2021-08-06 | Stop reason: SDUPTHER

## 2021-03-15 ENCOUNTER — APPOINTMENT (OUTPATIENT)
Dept: PHYSICAL THERAPY | Facility: CLINIC | Age: 66
End: 2021-03-15
Payer: COMMERCIAL

## 2021-03-16 ENCOUNTER — IMMUNIZATIONS (OUTPATIENT)
Dept: FAMILY MEDICINE CLINIC | Facility: HOSPITAL | Age: 66
End: 2021-03-16

## 2021-03-16 DIAGNOSIS — Z23 ENCOUNTER FOR IMMUNIZATION: Primary | ICD-10-CM

## 2021-03-16 PROCEDURE — 0002A SARS-COV-2 / COVID-19 MRNA VACCINE (PFIZER-BIONTECH) 30 MCG: CPT

## 2021-03-16 PROCEDURE — 91300 SARS-COV-2 / COVID-19 MRNA VACCINE (PFIZER-BIONTECH) 30 MCG: CPT

## 2021-03-22 ENCOUNTER — APPOINTMENT (OUTPATIENT)
Dept: PHYSICAL THERAPY | Facility: CLINIC | Age: 66
End: 2021-03-22
Payer: COMMERCIAL

## 2021-03-29 ENCOUNTER — APPOINTMENT (OUTPATIENT)
Dept: PHYSICAL THERAPY | Facility: CLINIC | Age: 66
End: 2021-03-29
Payer: COMMERCIAL

## 2021-04-02 DIAGNOSIS — I10 ESSENTIAL HYPERTENSION: ICD-10-CM

## 2021-04-02 DIAGNOSIS — F32.A DEPRESSION, UNSPECIFIED DEPRESSION TYPE: ICD-10-CM

## 2021-04-02 DIAGNOSIS — E78.2 MIXED HYPERLIPIDEMIA: ICD-10-CM

## 2021-04-02 RX ORDER — PRAVASTATIN SODIUM 40 MG
TABLET ORAL
Qty: 90 TABLET | Refills: 1 | Status: SHIPPED | OUTPATIENT
Start: 2021-04-02 | End: 2021-09-13

## 2021-04-02 RX ORDER — VENLAFAXINE HYDROCHLORIDE 75 MG/1
CAPSULE, EXTENDED RELEASE ORAL
Qty: 90 CAPSULE | Refills: 1 | Status: SHIPPED | OUTPATIENT
Start: 2021-04-02 | End: 2021-10-28 | Stop reason: SDUPTHER

## 2021-04-02 RX ORDER — LISINOPRIL 20 MG/1
TABLET ORAL
Qty: 90 TABLET | Refills: 1 | Status: SHIPPED | OUTPATIENT
Start: 2021-04-02

## 2021-04-16 ENCOUNTER — TELEPHONE (OUTPATIENT)
Dept: NEUROLOGY | Facility: CLINIC | Age: 66
End: 2021-04-16

## 2021-04-16 NOTE — TELEPHONE ENCOUNTER
Patient called said he has seen Dr Nataly Griffin in the past and Dr Juan Santamaria before him  He was told that someone would be calling him to schedule with a new provider that was taking over but never received a call  I told him I could get him scheduled with Dr River Gonzalez  He wanted the [facility] office only and unfortunately that office is booked till the end of the year and next years supriya is not opened  He was not happy about that  I did offer to see a PA in the mean time but he declined   I said I could add him to the waitlist which he was ok with but said he will be looking for another neurologist

## 2021-04-22 ENCOUNTER — TELEPHONE (OUTPATIENT)
Dept: NEUROLOGY | Facility: CLINIC | Age: 66
End: 2021-04-22

## 2021-04-22 NOTE — TELEPHONE ENCOUNTER
Pt left a voicemail message today at 11:10 am requesting his medical record sent to [facility]  Call back #[phone]  Called [phone] and advised that he will need to fill out ADRIANNE first before our office can release his record  Pt verbalized understanding and states that he already put in the request through [employer]  Requesting ADRIANNE form be mailed to the address on file in case his request previous request did not go through       Mailed as requested

## 2021-04-22 NOTE — PROGRESS NOTES
Discharge Summary:  Pt has not been attending PT sessions due to health issues of spouse  Spoke with pt on the phone on 4/21 and he reports he would like to return to the gym program for [employer]  Pt is a gym member and has received both doses of covid vaccine (requirement for gym use at this time)  Pt states he will try to return in May  Will follow up as needed  See below for goal status

## 2021-05-13 ENCOUNTER — VBI (OUTPATIENT)
Dept: ADMINISTRATIVE | Facility: OTHER | Age: 66
End: 2021-05-13

## 2021-05-18 ENCOUNTER — TRANSCRIBE ORDERS (OUTPATIENT)
Dept: LAB | Facility: CLINIC | Age: 66
End: 2021-05-18

## 2021-05-18 ENCOUNTER — APPOINTMENT (OUTPATIENT)
Dept: LAB | Facility: CLINIC | Age: 66
End: 2021-05-18
Payer: COMMERCIAL

## 2021-05-18 DIAGNOSIS — E11.9 TYPE 2 DIABETES MELLITUS WITHOUT COMPLICATION, WITHOUT LONG-TERM CURRENT USE OF INSULIN (HCC): ICD-10-CM

## 2021-05-18 LAB
ANION GAP SERPL CALCULATED.3IONS-SCNC: 9 MMOL/L (ref 4–13)
BUN SERPL-MCNC: 20 MG/DL (ref 5–25)
CALCIUM SERPL-MCNC: 8.6 MG/DL (ref 8.3–10.1)
CHLORIDE SERPL-SCNC: 103 MMOL/L (ref 100–108)
CO2 SERPL-SCNC: 28 MMOL/L (ref 21–32)
CREAT SERPL-MCNC: 1.18 MG/DL (ref 0.6–1.3)
CREAT UR-MCNC: 82.8 MG/DL
EST. AVERAGE GLUCOSE BLD GHB EST-MCNC: 169 MG/DL
GFR SERPL CREATININE-BSD FRML MDRD: 64 ML/MIN/1.73SQ M
GLUCOSE P FAST SERPL-MCNC: 119 MG/DL (ref 65–99)
HBA1C MFR BLD: 7.5 %
MICROALBUMIN UR-MCNC: 8 MG/L (ref 0–20)
MICROALBUMIN/CREAT 24H UR: 10 MG/G CREATININE (ref 0–30)
POTASSIUM SERPL-SCNC: 4.4 MMOL/L (ref 3.5–5.3)
SODIUM SERPL-SCNC: 140 MMOL/L (ref 136–145)

## 2021-05-18 PROCEDURE — 3051F HG A1C>EQUAL 7.0%<8.0%: CPT | Performed by: NURSE PRACTITIONER

## 2021-05-18 PROCEDURE — 82043 UR ALBUMIN QUANTITATIVE: CPT

## 2021-05-18 PROCEDURE — 36415 COLL VENOUS BLD VENIPUNCTURE: CPT

## 2021-05-18 PROCEDURE — 3061F NEG MICROALBUMINURIA REV: CPT | Performed by: NURSE PRACTITIONER

## 2021-05-18 PROCEDURE — 82570 ASSAY OF URINE CREATININE: CPT

## 2021-05-18 PROCEDURE — 83036 HEMOGLOBIN GLYCOSYLATED A1C: CPT

## 2021-05-18 PROCEDURE — 80048 BASIC METABOLIC PNL TOTAL CA: CPT

## 2021-05-18 PROCEDURE — 81256 HFE GENE: CPT

## 2021-05-19 ENCOUNTER — OFFICE VISIT (OUTPATIENT)
Dept: ENDOCRINOLOGY | Facility: CLINIC | Age: 66
End: 2021-05-19
Payer: COMMERCIAL

## 2021-05-19 VITALS
WEIGHT: 198.6 LBS | SYSTOLIC BLOOD PRESSURE: 128 MMHG | HEART RATE: 64 BPM | DIASTOLIC BLOOD PRESSURE: 78 MMHG | HEIGHT: 70 IN | BODY MASS INDEX: 28.43 KG/M2

## 2021-05-19 DIAGNOSIS — I10 BENIGN ESSENTIAL HYPERTENSION: ICD-10-CM

## 2021-05-19 DIAGNOSIS — E78.2 MIXED HYPERLIPIDEMIA: ICD-10-CM

## 2021-05-19 DIAGNOSIS — E11.9 TYPE 2 DIABETES MELLITUS WITHOUT COMPLICATION, WITHOUT LONG-TERM CURRENT USE OF INSULIN (HCC): Primary | ICD-10-CM

## 2021-05-19 DIAGNOSIS — E55.9 VITAMIN D DEFICIENCY: ICD-10-CM

## 2021-05-19 DIAGNOSIS — E03.8 SUBCLINICAL HYPOTHYROIDISM: ICD-10-CM

## 2021-05-19 PROCEDURE — 4004F PT TOBACCO SCREEN RCVD TLK: CPT | Performed by: NURSE PRACTITIONER

## 2021-05-19 PROCEDURE — 99214 OFFICE O/P EST MOD 30 MIN: CPT | Performed by: NURSE PRACTITIONER

## 2021-05-19 PROCEDURE — 1160F RVW MEDS BY RX/DR IN RCRD: CPT | Performed by: NURSE PRACTITIONER

## 2021-05-19 PROCEDURE — 3008F BODY MASS INDEX DOCD: CPT | Performed by: NURSE PRACTITIONER

## 2021-05-19 PROCEDURE — 3078F DIAST BP <80 MM HG: CPT | Performed by: NURSE PRACTITIONER

## 2021-05-19 PROCEDURE — 3074F SYST BP LT 130 MM HG: CPT | Performed by: NURSE PRACTITIONER

## 2021-05-19 NOTE — PROGRESS NOTES
Established Patient Progress Note      Chief Complaint   Patient presents with    Diabetes Type 2          History of Present Illness:   Corey Lopez is a 72 y o  male with a history of HTN, HLD, subclinical hypothyroidism, vitamin d deficiency, and type 2 diabetes without long term use of insulin  Reports no complications of diabetes  Last A1C 7 5  He did not bring in BG log or meter to today's visit  He is only checking morning fasting, 90-120s  He reports he is working on improving his diet and is looking at [employer] exercise program  Denies recent illness or hospitalizations  Denies recent severe hypoglycemic or severe hyperglycemic episodes  Denies any issues with his current regimen  Home glucose monitoring: are performed sporadically      Current regimen: Jardiance 25 mg and Janumet 50-1,000 mg BID  compliant all of the timedenies any side effects from current medications     Hypoglycemic episodes: No never   H/o of hypoglycemia causing hospitalization or Intervention such as glucagon injection or ambulance call No     Last Eye Exam: 10/30/19  Last Foot Exam: 1/28/21    Has hypertension: Taking Lisinopril   Has hyperlipidemia: Taking Pravastatin           Has subclinical hypothyroidism: was on Synthroid in the past, was taken off as he was becoming anxious, denies symptoms of hypo or hyperthyroidism   Has vitamin d deficiency: Taking vitamin d 4,000 units daily        Patient Active Problem List   Diagnosis    Prostate cancer ([facility] [address] )    Arteriosclerosis of coronary artery    Benign essential hypertension    Depression    Type 2 diabetes mellitus without complication, without long-term current use of insulin (HCC)    Subclinical hypothyroidism    Thrombocytosis ([address] )    Vitamin D deficiency    Mixed hyperlipidemia    Leukocytosis    Hyperlipidemia    Dermatitis of left ear canal    Tremor of left hand    Impingement syndrome of left shoulder    Thoracic spine pain    Tear of left supraspinatus tendon    Left carpal tunnel syndrome    Other male erectile dysfunction    [facility] disease [facility]    Type 2 diabetes mellitus with hyperglycemia, without long-term current use of insulin (HCC)    Dystonia    History of stroke    Hyperkalemia      Past Medical History:   Diagnosis Date    Ankle injury     last assessed 7/15/2013    BPH (benign prostatic hyperplasia)     Bronchiolitis     Cancer ([facility] )     Prostate    Diabetes mellitus ([facility] )     Type II    History of benign prostatic hyperplasia     History of gastritis     History of insect bite     last assessed 10/4/2014    History of nocturia     History of stroke     Hypertension     Lumbar canal stenosis     Osteoarthritis, knee     Otitis externa     Pain in back     UPPER BACK, last assessed 2/25/2013    Retinal and vitreous disorder     Sinusitis, acute     last assessed 10/6/2016    Tendinitis of right rotator cuff     last assessed 8/21/2012      Past Surgical History:   Procedure Laterality Date    ANKLE FRACTURE SURGERY Left     triple fracture 4/2009    LAMINECTOMY      L5-S1 2003    LUMBAR LAMINECTOMY      partial L4-L5 in 2003    NOSE SURGERY      6/1989    SD COLONOSCOPY FLX DX W/COLLJ SPEC WHEN PFRMD N/A 1/27/2016    Procedure: COLONOSCOPY;  Surgeon: Wilber Owen MD;  Location: [facility];   Service: Gastroenterology    PROSTATE BIOPSY  12/08/2012    managed by [facility], needle biopsy    PROSTATECTOMY      9/23/2013    RHINOPLASTY      for deviated septum in 1989      Family History   Problem Relation Age of Onset    Hypertension Mother    Mauri Polio Retinal detachment Mother     Retinitis pigmentosa Mother     Diabetes Father     Heart disease Father         MI at 62 yo     Social History     Tobacco Use    Smoking status: Light Tobacco Smoker     Years: 20 00     Types: Pipe    Smokeless tobacco: Never Used    Tobacco comment: per allscripts 'current smoker, current some day smoker'   Substance Use Topics    Alcohol use: Yes     Alcohol/week: 4 0 standard drinks     Types: 3 Cans of beer, 1 Shots of liquor per week     Frequency: 2-3 times a week     Drinks per session: 1 or 2     Comment: occassional      No Known Allergies      Current Outpatient Medications:     aspirin 325 mg tablet, Take 325 mg by mouth daily, Disp: , Rfl:     carbidopa-levodopa (SINEMET)  mg per tablet, Take 2 tablets by mouth 3 (three) times a day Follow clinic instructions (Patient taking differently: Take 2 5 tablets by mouth 3 (three) times a day Follow clinic instructions), Disp: 540 tablet, Rfl: 3    Cholecalciferol (VITAMIN D) 2000 units CAPS, Take 2 capsules (4,000 Units total) by mouth daily, Disp: , Rfl: 0    co-enzyme Q-10 30 MG capsule, Take 1 capsule (30 mg total) by mouth daily, Disp: 30 capsule, Rfl: 0    Empagliflozin (Jardiance) 10 MG TABS, TAKE 1 TABLET BY MOUTH DAILY, Disp: 90 tablet, Rfl: 1    glucose blood (ONETOUCH VERIO) test strip, Check BG 2x per day, Disp: 200 each, Rfl: 3    lisinopril (ZESTRIL) 20 mg tablet, TAKE 1 TABLET BY MOUTH DAILY, Disp: 90 tablet, Rfl: 1    pravastatin (PRAVACHOL) 40 mg tablet, TAKE 1 TABLET BY MOUTH DAILY, Disp: 90 tablet, Rfl: 1    sitaGLIPtin-metFORMIN (Janumet)  MG per tablet, Take 1 tablet by mouth 2 (two) times a day with meals, Disp: 180 tablet, Rfl: 1    vardenafil (LEVITRA) 20 MG tablet, Take by mouth daily as needed , Disp: , Rfl:     venlafaxine (EFFEXOR-XR) 75 mg 24 hr capsule, TAKE ONE CAPSULE BY MOUTH DAILY, Disp: 90 capsule, Rfl: 1    rOPINIRole (REQUIP) 0 5 mg tablet, Take 3 tablets (1 5 mg total) by mouth 3 (three) times a day (Patient not taking: Reported on 5/19/2021), Disp: 810 tablet, Rfl: 1    Review of Systems   Constitutional: Negative for activity change, appetite change and fatigue  HENT: Negative for sore throat, trouble swallowing and voice change  Eyes: Negative for visual disturbance     Respiratory: Negative for choking, chest tightness and shortness of breath  Cardiovascular: Negative for chest pain, palpitations and leg swelling  Gastrointestinal: Negative for abdominal pain, constipation and diarrhea  Endocrine: Negative for cold intolerance, heat intolerance, polydipsia, polyphagia and polyuria  Genitourinary: Negative for frequency  Musculoskeletal: Negative for arthralgias and myalgias  Skin: Negative for rash  Neurological: Negative for dizziness and syncope  Hematological: Negative for adenopathy  Psychiatric/Behavioral: Negative for sleep disturbance  All other systems reviewed and are negative  Physical Exam:  Body mass index is 28 5 kg/m²  /78   Pulse 64   Ht 5' 10" (1 778 m)   Wt 90 1 kg (198 lb 9 6 oz)   BMI 28 50 kg/m²    Wt Readings from Last 3 Encounters:   05/19/21 90 1 kg (198 lb 9 6 oz)   01/28/21 88 9 kg (196 lb)   12/30/20 89 1 kg (196 lb 6 oz)       Physical Exam  Vitals signs reviewed  Constitutional:       General: He is not in acute distress  Appearance: He is well-developed  HENT:      Head: Normocephalic and atraumatic  Eyes:      Conjunctiva/sclera: Conjunctivae normal       Pupils: Pupils are equal, round, and reactive to light  Neck:      Musculoskeletal: Normal range of motion and neck supple  Thyroid: No thyromegaly  Cardiovascular:      Rate and Rhythm: Normal rate and regular rhythm  Heart sounds: Normal heart sounds  No murmur  Pulmonary:      Effort: Pulmonary effort is normal  No respiratory distress  Breath sounds: Normal breath sounds  No wheezing or rales  Abdominal:      General: Bowel sounds are normal  There is no distension  Palpations: Abdomen is soft  Tenderness: There is no abdominal tenderness  Musculoskeletal: Normal range of motion  Lymphadenopathy:      Cervical: No cervical adenopathy  Skin:     General: Skin is warm and dry     Neurological:      Mental Status: He is alert and oriented to person, place, and time  Labs:     Lab Results   Component Value Date    HGBA1C 7 5 (H) 05/18/2021       Lab Results   Component Value Date     12/17/2015    SODIUM 140 05/18/2021    K 4 4 05/18/2021     05/18/2021    CO2 28 05/18/2021    ANIONGAP 8 12/17/2015    AGAP 9 05/18/2021    BUN 20 05/18/2021    CREATININE 1 18 05/18/2021    GLUC 230 (H) 04/29/2020    GLUF 119 (H) 05/18/2021    CALCIUM 8 6 05/18/2021    AST 20 12/29/2020    ALT 14 12/29/2020    ALKPHOS 46 12/29/2020    PROT 6 8 12/17/2015    TP 7 6 12/29/2020    BILITOT 0 47 12/17/2015    TBILI 0 63 12/29/2020    EGFR 64 05/18/2021         Lab Results   Component Value Date    CHOL 206 12/17/2015    HDL 41 09/29/2020    TRIG 177 (H) 09/29/2020       Lab Results   Component Value Date    ZRO7QYQLRSYA 2 696 04/29/2020    ZKN2XAHLMZKQ 3 240 06/07/2019    NQO4XGPFVGOM 3 846 (H) 02/14/2018     Lab Results   Component Value Date    FREET4 0 93 04/29/2020       Impression & Plan:    Problem List Items Addressed This Visit        Endocrine    Type 2 diabetes mellitus without complication, without long-term current use of insulin ([facility] ) - Primary     Above goal likely due to dietary excursions  Discussed switching DPP4 to GLP  He would like to continue current regimen for now  Dicussed referral to [facility]  He declined  Continue current regimen and focus on dietary and lifestyle modifications  Relevant Orders    Hemoglobin A1C    Comprehensive metabolic panel    Lipid Panel with Direct LDL reflex    Microalbumin / creatinine urine ratio    TSH, 3rd generation    T4, free    Subclinical hypothyroidism     Will continue to monitor off Synthroid   Labs have been normal  Check thyroid function test           Relevant Orders    TSH, 3rd generation    T4, free       Cardiovascular and Mediastinum    Benign essential hypertension     BP stable, continue current regimen          Relevant Orders    Comprehensive metabolic panel       Other    Vitamin D deficiency     Continue vitamin d supplementation          Mixed hyperlipidemia     Continue statin          Relevant Orders    Lipid Panel with Direct LDL reflex          Orders Placed This Encounter   Procedures    Hemoglobin A1C     Standing Status:   Future     Standing Expiration Date:   5/19/2022    Comprehensive metabolic panel     This is a patient instruction: Patient fasting for 8 hours or longer recommended  Standing Status:   Future     Standing Expiration Date:   5/19/2022    Lipid Panel with Direct LDL reflex     This is a patient instruction: This test requires patient fasting for 10-12 hours or longer  Drinking of black coffee or black tea is acceptable  Standing Status:   Future     Standing Expiration Date:   5/19/2022    Microalbumin / creatinine urine ratio     Standing Status:   Future     Standing Expiration Date:   5/19/2022    TSH, 3rd generation     This is a patient instruction: This test is non-fasting  Please drink two glasses of water morning of bloodwork  Standing Status:   Future     Standing Expiration Date:   8/19/2021    T4, free     Standing Status:   Future     Standing Expiration Date:   8/19/2021         Discussed with the patient and all questioned fully answered  He will call me if any problems arise  Follow-up appointment in 3 months       Counseled patient on diagnostic results, prognosis, risk and benefit of treatment options, instruction for management, importance of treatment compliance, Risk  factor reduction and impressions      Laurita Bryant [address]

## 2021-05-19 NOTE — ASSESSMENT & PLAN NOTE
Above goal likely due to dietary excursions  Discussed switching DPP4 to GLP  He would like to continue current regimen for now  Dicussed referral to [facility]  He declined  Continue current regimen and focus on dietary and lifestyle modifications

## 2021-05-21 LAB — HFE GENE MUT ANL BLD/T: NORMAL

## 2021-07-08 ENCOUNTER — PATIENT OUTREACH (OUTPATIENT)
Dept: CASE MANAGEMENT | Facility: OTHER | Age: 66
End: 2021-07-08

## 2021-07-08 NOTE — PROGRESS NOTES
1st Attempt    This CHW called patient this day in regards to Longitudinal Care Management call  No answer, Left voicemail with my name and phone number so patient can contact me

## 2021-07-12 ENCOUNTER — PATIENT OUTREACH (OUTPATIENT)
Dept: CASE MANAGEMENT | Facility: OTHER | Age: 66
End: 2021-07-12

## 2021-07-12 NOTE — PROGRESS NOTES
2nd attempt      [facility] Longitudinal Care Management Call:    Would the patient like to make an appointment? NO  (Yes or No)    Does the patient have a smart device? Yes  (Yes or No)    Is the patient active with MyChart? Yes  (Yes or No)    Is the patient interested in completing the SDOH questionnaire? No  (Yes or No)    Is the patient a smoker? Yes  (Yes or No)    Is the patient interested in smoking cessation counseling? N/A  (Yes, No, or n/a)    Is the patient Diabetic? Yes  (Yes or No)    What is the patient's A1C? 7 5  (equal or greater than 9 - refer to [address] [place] [address])    Who helps the patient manage their medications? Self    Does the patient have any issues in obtaining or affording medications? No  (Yes = Referral to [place], No, n/a)    Other barriers identified during the call: No    Summary of Call: Patient states he is doing well  Patient is taking medications as prescribed  Patient does not need any assistance with getting or paying for medications  Patient last PCP appt was on 01/28/2021  Patient does not have a pcp appt scheduled but will call the office to schedule  Patient did have his covid vaccine and stated he had no symptoms  Patient is not in need of any resources at this time  The patient was agreeable to additional outreaches from this CHW  This CHW will follow up in three months  This CHW provided the patient with my contact information

## 2021-07-29 DIAGNOSIS — IMO0002 UNCONTROLLED TYPE 2 DIABETES MELLITUS WITH COMPLICATION, WITHOUT LONG-TERM CURRENT USE OF INSULIN: ICD-10-CM

## 2021-07-29 RX ORDER — EMPAGLIFLOZIN 10 MG/1
TABLET, FILM COATED ORAL
Qty: 90 TABLET | Refills: 1 | Status: SHIPPED | OUTPATIENT
Start: 2021-07-29 | End: 2021-08-03 | Stop reason: ALTCHOICE

## 2021-08-03 DIAGNOSIS — E11.9 TYPE 2 DIABETES MELLITUS WITHOUT COMPLICATION, WITHOUT LONG-TERM CURRENT USE OF INSULIN (HCC): Primary | ICD-10-CM

## 2021-08-03 NOTE — TELEPHONE ENCOUNTER
Nikita Saunders was denied by pts insurance with prior auth they recommend glipizide, metformin or metformin ER, farxiga, invokana and Januvia

## 2021-08-03 NOTE — TELEPHONE ENCOUNTER
Left message for pt to call back so we can tell him his jardiance was denied by insurance so we are switching it to [place] 5 mg daily

## 2021-08-06 DIAGNOSIS — E11.65 TYPE 2 DIABETES MELLITUS WITH HYPERGLYCEMIA, UNSPECIFIED WHETHER LONG TERM INSULIN USE (HCC): ICD-10-CM

## 2021-08-06 DIAGNOSIS — E11.9 TYPE 2 DIABETES MELLITUS WITHOUT COMPLICATION, WITHOUT LONG-TERM CURRENT USE OF INSULIN (HCC): ICD-10-CM

## 2021-08-06 RX ORDER — SITAGLIPTIN AND METFORMIN HYDROCHLORIDE 1000; 50 MG/1; MG/1
1 TABLET, FILM COATED ORAL 2 TIMES DAILY WITH MEALS
Qty: 180 TABLET | Refills: 1 | Status: SHIPPED | OUTPATIENT
Start: 2021-08-06 | End: 2021-08-10 | Stop reason: CLARIF

## 2021-08-09 ENCOUNTER — TELEPHONE (OUTPATIENT)
Dept: ENDOCRINOLOGY | Facility: CLINIC | Age: 66
End: 2021-08-09

## 2021-08-09 NOTE — TELEPHONE ENCOUNTER
Received a Prior Authorization request from Pt's pharmacy  Regarding Janumet  mg  , Prior Authorization send to Pt's prescription plan [facility]  Sina Rutherford ([place]: [id number])    Your information has been submitted to "[employer]  [employer] is reviewing the PA request and you will receive an electronic response  You may check for the updated outcome later by reopening this request  The standard fax determination will also be sent to you directly  If you have any questions about your PA submission, contact "[employer] at [phone]

## 2021-08-10 DIAGNOSIS — E11.65 TYPE 2 DIABETES MELLITUS WITH HYPERGLYCEMIA, UNSPECIFIED WHETHER LONG TERM INSULIN USE (HCC): Primary | ICD-10-CM

## 2021-09-01 ENCOUNTER — VBI (OUTPATIENT)
Dept: ADMINISTRATIVE | Facility: OTHER | Age: 66
End: 2021-09-01

## 2021-09-01 NOTE — TELEPHONE ENCOUNTER
09/01/21 7:57 AM     See documentation in the [employer] SmartForm       [facility]

## 2021-09-11 DIAGNOSIS — E78.2 MIXED HYPERLIPIDEMIA: ICD-10-CM

## 2021-09-13 RX ORDER — PRAVASTATIN SODIUM 40 MG
TABLET ORAL
Qty: 90 TABLET | Refills: 1 | Status: SHIPPED | OUTPATIENT
Start: 2021-09-13 | End: 2021-10-28 | Stop reason: SDUPTHER

## 2021-10-02 ENCOUNTER — IMMUNIZATIONS (OUTPATIENT)
Dept: FAMILY MEDICINE CLINIC | Facility: HOSPITAL | Age: 66
End: 2021-10-02

## 2021-10-02 DIAGNOSIS — Z23 ENCOUNTER FOR IMMUNIZATION: Primary | ICD-10-CM

## 2021-10-02 PROCEDURE — 91300 SARS-COV-2 / COVID-19 MRNA VACCINE (PFIZER-BIONTECH) 30 MCG: CPT

## 2021-10-02 PROCEDURE — 0001A SARS-COV-2 / COVID-19 MRNA VACCINE (PFIZER-BIONTECH) 30 MCG: CPT

## 2021-10-28 ENCOUNTER — VBI (OUTPATIENT)
Dept: ADMINISTRATIVE | Facility: OTHER | Age: 66
End: 2021-10-28

## 2021-10-28 DIAGNOSIS — F32.A DEPRESSION, UNSPECIFIED DEPRESSION TYPE: ICD-10-CM

## 2021-10-28 DIAGNOSIS — E78.2 MIXED HYPERLIPIDEMIA: ICD-10-CM

## 2021-10-28 RX ORDER — PRAVASTATIN SODIUM 40 MG
40 TABLET ORAL DAILY
Qty: 90 TABLET | Refills: 0 | Status: SHIPPED | OUTPATIENT
Start: 2021-10-28 | End: 2021-11-30 | Stop reason: SDUPTHER

## 2021-10-28 RX ORDER — VENLAFAXINE HYDROCHLORIDE 75 MG/1
75 CAPSULE, EXTENDED RELEASE ORAL DAILY
Qty: 90 CAPSULE | Refills: 0 | Status: SHIPPED | OUTPATIENT
Start: 2021-10-28 | End: 2021-11-30 | Stop reason: SDUPTHER

## 2021-11-04 DIAGNOSIS — E11.65 TYPE 2 DIABETES MELLITUS WITH HYPERGLYCEMIA, UNSPECIFIED WHETHER LONG TERM INSULIN USE (HCC): ICD-10-CM

## 2021-11-08 ENCOUNTER — PATIENT OUTREACH (OUTPATIENT)
Dept: CASE MANAGEMENT | Facility: OTHER | Age: 66
End: 2021-11-08

## 2021-11-22 ENCOUNTER — RA CDI HCC (OUTPATIENT)
Dept: OTHER | Facility: HOSPITAL | Age: 66
End: 2021-11-22

## 2021-11-30 ENCOUNTER — OFFICE VISIT (OUTPATIENT)
Dept: FAMILY MEDICINE CLINIC | Facility: CLINIC | Age: 66
End: 2021-11-30
Payer: COMMERCIAL

## 2021-11-30 VITALS
TEMPERATURE: 96.9 F | OXYGEN SATURATION: 97 % | HEART RATE: 73 BPM | BODY MASS INDEX: 27.2 KG/M2 | HEIGHT: 70 IN | DIASTOLIC BLOOD PRESSURE: 72 MMHG | WEIGHT: 190 LBS | SYSTOLIC BLOOD PRESSURE: 116 MMHG

## 2021-11-30 DIAGNOSIS — E78.2 MIXED HYPERLIPIDEMIA: ICD-10-CM

## 2021-11-30 DIAGNOSIS — C61 PROSTATE CANCER (HCC): ICD-10-CM

## 2021-11-30 DIAGNOSIS — E78.5 HYPERLIPIDEMIA, UNSPECIFIED HYPERLIPIDEMIA TYPE: ICD-10-CM

## 2021-11-30 DIAGNOSIS — I10 BENIGN ESSENTIAL HYPERTENSION: ICD-10-CM

## 2021-11-30 DIAGNOSIS — F32.A DEPRESSION, UNSPECIFIED DEPRESSION TYPE: ICD-10-CM

## 2021-11-30 DIAGNOSIS — E11.9 TYPE 2 DIABETES MELLITUS WITHOUT COMPLICATION, WITHOUT LONG-TERM CURRENT USE OF INSULIN (HCC): Primary | ICD-10-CM

## 2021-11-30 DIAGNOSIS — G20 PARKINSON DISEASE (HCC): ICD-10-CM

## 2021-11-30 LAB — SL AMB POCT HEMOGLOBIN AIC: 7.8 (ref ?–6.5)

## 2021-11-30 PROCEDURE — 83036 HEMOGLOBIN GLYCOSYLATED A1C: CPT | Performed by: FAMILY MEDICINE

## 2021-11-30 PROCEDURE — 1101F PT FALLS ASSESS-DOCD LE1/YR: CPT | Performed by: FAMILY MEDICINE

## 2021-11-30 PROCEDURE — 99214 OFFICE O/P EST MOD 30 MIN: CPT | Performed by: FAMILY MEDICINE

## 2021-11-30 RX ORDER — PRAVASTATIN SODIUM 40 MG
40 TABLET ORAL DAILY
Qty: 90 TABLET | Refills: 1 | Status: SHIPPED | OUTPATIENT
Start: 2021-11-30

## 2021-11-30 RX ORDER — AMANTADINE HYDROCHLORIDE 100 MG/1
100 TABLET ORAL DAILY
COMMUNITY
Start: 2021-11-15

## 2021-11-30 RX ORDER — CARBIDOPA AND LEVODOPA 50; 200 MG/1; MG/1
1 TABLET, EXTENDED RELEASE ORAL
COMMUNITY
Start: 2021-11-05

## 2021-11-30 RX ORDER — SITAGLIPTIN AND METFORMIN HYDROCHLORIDE 1000; 50 MG/1; MG/1
TABLET, FILM COATED ORAL
COMMUNITY
Start: 2021-11-04 | End: 2022-05-03 | Stop reason: SDUPTHER

## 2021-11-30 RX ORDER — VENLAFAXINE HYDROCHLORIDE 75 MG/1
75 CAPSULE, EXTENDED RELEASE ORAL DAILY
Qty: 90 CAPSULE | Refills: 1 | Status: SHIPPED | OUTPATIENT
Start: 2021-11-30

## 2021-12-06 ENCOUNTER — APPOINTMENT (OUTPATIENT)
Dept: LAB | Facility: CLINIC | Age: 66
End: 2021-12-06
Payer: COMMERCIAL

## 2021-12-06 DIAGNOSIS — C61 PROSTATE CANCER (HCC): ICD-10-CM

## 2021-12-06 DIAGNOSIS — E78.2 MIXED HYPERLIPIDEMIA: ICD-10-CM

## 2021-12-06 DIAGNOSIS — E11.9 TYPE 2 DIABETES MELLITUS WITHOUT COMPLICATION, WITHOUT LONG-TERM CURRENT USE OF INSULIN (HCC): ICD-10-CM

## 2021-12-06 DIAGNOSIS — I10 BENIGN ESSENTIAL HYPERTENSION: ICD-10-CM

## 2021-12-06 LAB
ALBUMIN SERPL BCP-MCNC: 3.8 G/DL (ref 3.5–5)
ALP SERPL-CCNC: 41 U/L (ref 46–116)
ALT SERPL W P-5'-P-CCNC: 11 U/L (ref 12–78)
ANION GAP SERPL CALCULATED.3IONS-SCNC: 10 MMOL/L (ref 4–13)
AST SERPL W P-5'-P-CCNC: 20 U/L (ref 5–45)
BILIRUB SERPL-MCNC: 0.43 MG/DL (ref 0.2–1)
BUN SERPL-MCNC: 14 MG/DL (ref 5–25)
CALCIUM SERPL-MCNC: 8.8 MG/DL (ref 8.3–10.1)
CHLORIDE SERPL-SCNC: 104 MMOL/L (ref 100–108)
CHOLEST SERPL-MCNC: 187 MG/DL
CO2 SERPL-SCNC: 26 MMOL/L (ref 21–32)
CREAT SERPL-MCNC: 1.18 MG/DL (ref 0.6–1.3)
CREAT UR-MCNC: 124 MG/DL
GFR SERPL CREATININE-BSD FRML MDRD: 64 ML/MIN/1.73SQ M
GLUCOSE P FAST SERPL-MCNC: 133 MG/DL (ref 65–99)
HDLC SERPL-MCNC: 42 MG/DL
LDLC SERPL CALC-MCNC: 104 MG/DL (ref 0–100)
MICROALBUMIN UR-MCNC: 53.2 MG/L (ref 0–20)
MICROALBUMIN/CREAT 24H UR: 43 MG/G CREATININE (ref 0–30)
POTASSIUM SERPL-SCNC: 4.9 MMOL/L (ref 3.5–5.3)
PROT SERPL-MCNC: 7.3 G/DL (ref 6.4–8.2)
PSA SERPL-MCNC: <0.1 NG/ML (ref 0–4)
SODIUM SERPL-SCNC: 140 MMOL/L (ref 136–145)
TRIGL SERPL-MCNC: 205 MG/DL

## 2021-12-06 PROCEDURE — 80061 LIPID PANEL: CPT

## 2021-12-06 PROCEDURE — 36415 COLL VENOUS BLD VENIPUNCTURE: CPT

## 2021-12-06 PROCEDURE — 82570 ASSAY OF URINE CREATININE: CPT

## 2021-12-06 PROCEDURE — 82043 UR ALBUMIN QUANTITATIVE: CPT

## 2021-12-06 PROCEDURE — 80053 COMPREHEN METABOLIC PANEL: CPT

## 2021-12-06 PROCEDURE — 84153 ASSAY OF PSA TOTAL: CPT

## 2021-12-06 PROCEDURE — 3060F POS MICROALBUMINURIA REV: CPT | Performed by: NURSE PRACTITIONER

## 2021-12-07 ENCOUNTER — OFFICE VISIT (OUTPATIENT)
Dept: ENDOCRINOLOGY | Facility: CLINIC | Age: 66
End: 2021-12-07
Payer: COMMERCIAL

## 2021-12-07 VITALS
DIASTOLIC BLOOD PRESSURE: 76 MMHG | HEART RATE: 77 BPM | WEIGHT: 190.2 LBS | BODY MASS INDEX: 27.23 KG/M2 | HEIGHT: 70 IN | SYSTOLIC BLOOD PRESSURE: 120 MMHG

## 2021-12-07 DIAGNOSIS — E03.8 SUBCLINICAL HYPOTHYROIDISM: ICD-10-CM

## 2021-12-07 DIAGNOSIS — I10 BENIGN ESSENTIAL HYPERTENSION: ICD-10-CM

## 2021-12-07 DIAGNOSIS — E11.9 TYPE 2 DIABETES MELLITUS WITHOUT COMPLICATION, WITHOUT LONG-TERM CURRENT USE OF INSULIN (HCC): Primary | ICD-10-CM

## 2021-12-07 DIAGNOSIS — E55.9 VITAMIN D DEFICIENCY: ICD-10-CM

## 2021-12-07 DIAGNOSIS — E78.2 MIXED HYPERLIPIDEMIA: ICD-10-CM

## 2021-12-07 PROCEDURE — 99214 OFFICE O/P EST MOD 30 MIN: CPT | Performed by: NURSE PRACTITIONER

## 2021-12-07 PROCEDURE — 3074F SYST BP LT 130 MM HG: CPT | Performed by: NURSE PRACTITIONER

## 2021-12-07 PROCEDURE — 3008F BODY MASS INDEX DOCD: CPT | Performed by: NURSE PRACTITIONER

## 2021-12-07 PROCEDURE — 1160F RVW MEDS BY RX/DR IN RCRD: CPT | Performed by: NURSE PRACTITIONER

## 2021-12-07 PROCEDURE — 3078F DIAST BP <80 MM HG: CPT | Performed by: NURSE PRACTITIONER

## 2021-12-07 PROCEDURE — 4004F PT TOBACCO SCREEN RCVD TLK: CPT | Performed by: NURSE PRACTITIONER

## 2021-12-10 NOTE — ASSESSMENT & PLAN NOTE
Diabetes poorly controlled/worsening based on results of recent lab testing  Discussed medication options- will add Jardiance 10mg daily  Check BG 2x per day and send log in two weeks for review  Reviewed med side effects  He was doing a great job with lifestyle modifications/weight loss but stopped due to some recent family problems over past few months  He will return to prior dietary habits and work on losing the weight he has gained back  Kourtney Swift(PA)

## 2021-12-20 ENCOUNTER — PATIENT OUTREACH (OUTPATIENT)
Dept: CASE MANAGEMENT | Facility: OTHER | Age: 66
End: 2021-12-20

## 2022-01-06 ENCOUNTER — VBI (OUTPATIENT)
Dept: ADMINISTRATIVE | Facility: OTHER | Age: 67
End: 2022-01-06

## 2022-05-03 ENCOUNTER — TELEPHONE (OUTPATIENT)
Dept: ENDOCRINOLOGY | Facility: CLINIC | Age: 67
End: 2022-05-03

## 2022-05-03 DIAGNOSIS — E11.65 TYPE 2 DIABETES MELLITUS WITH HYPERGLYCEMIA, WITHOUT LONG-TERM CURRENT USE OF INSULIN (HCC): Primary | ICD-10-CM

## 2022-05-03 RX ORDER — SITAGLIPTIN AND METFORMIN HYDROCHLORIDE 1000; 50 MG/1; MG/1
1 TABLET, FILM COATED ORAL 2 TIMES DAILY WITH MEALS
Qty: 180 TABLET | Refills: 2 | Status: SHIPPED | OUTPATIENT
Start: 2022-05-03 | End: 2022-06-15 | Stop reason: SDUPTHER

## 2022-05-03 NOTE — TELEPHONE ENCOUNTER
Faustina Garcia 10 minutes ago (1:15 PM)     MA       Needs refill on jamubet and jardiance [facility]  [facility] day supply    Last seen 12-7-2021 and fu 6-  Thank you          Documentation

## 2022-05-03 NOTE — TELEPHONE ENCOUNTER
Needs refill on jamubet and jardiance [facility]  90 day supply    Last seen 12-7-2021 and fu 6-  Thank you

## 2022-05-03 NOTE — TELEPHONE ENCOUNTER
Spoke to pt he I been taking Janumet  mg BID and Jardiance 10mg  Pr Pt he is not longer taking Metformin,Januvia and Farxiga 5 mg    Can we fix his med list

## 2022-06-14 ENCOUNTER — APPOINTMENT (OUTPATIENT)
Dept: LAB | Facility: CLINIC | Age: 67
End: 2022-06-14
Payer: MEDICARE

## 2022-06-14 DIAGNOSIS — E03.8 SUBCLINICAL HYPOTHYROIDISM: ICD-10-CM

## 2022-06-14 DIAGNOSIS — E11.9 TYPE 2 DIABETES MELLITUS WITHOUT COMPLICATION, WITHOUT LONG-TERM CURRENT USE OF INSULIN (HCC): ICD-10-CM

## 2022-06-14 DIAGNOSIS — E78.2 MIXED HYPERLIPIDEMIA: ICD-10-CM

## 2022-06-14 DIAGNOSIS — I10 BENIGN ESSENTIAL HYPERTENSION: ICD-10-CM

## 2022-06-14 LAB
ALBUMIN SERPL BCP-MCNC: 4.1 G/DL (ref 3.5–5)
ALP SERPL-CCNC: 40 U/L (ref 34–104)
ALT SERPL W P-5'-P-CCNC: 4 U/L (ref 7–52)
ANION GAP SERPL CALCULATED.3IONS-SCNC: 10 MMOL/L (ref 4–13)
AST SERPL W P-5'-P-CCNC: 16 U/L (ref 13–39)
BILIRUB SERPL-MCNC: 0.5 MG/DL (ref 0.2–1)
BUN SERPL-MCNC: 17 MG/DL (ref 5–25)
CALCIUM SERPL-MCNC: 9.2 MG/DL (ref 8.4–10.2)
CHLORIDE SERPL-SCNC: 103 MMOL/L (ref 96–108)
CHOLEST SERPL-MCNC: 184 MG/DL
CO2 SERPL-SCNC: 27 MMOL/L (ref 21–32)
CREAT SERPL-MCNC: 0.98 MG/DL (ref 0.6–1.3)
CREAT UR-MCNC: 76.4 MG/DL
EST. AVERAGE GLUCOSE BLD GHB EST-MCNC: 177 MG/DL
GFR SERPL CREATININE-BSD FRML MDRD: 80 ML/MIN/1.73SQ M
GLUCOSE P FAST SERPL-MCNC: 160 MG/DL (ref 65–99)
HBA1C MFR BLD: 7.8 %
HDLC SERPL-MCNC: 39 MG/DL
LDLC SERPL CALC-MCNC: 100 MG/DL (ref 0–100)
MICROALBUMIN UR-MCNC: 5.7 MG/L (ref 0–20)
MICROALBUMIN/CREAT 24H UR: 7 MG/G CREATININE (ref 0–30)
POTASSIUM SERPL-SCNC: 4.4 MMOL/L (ref 3.5–5.3)
PROT SERPL-MCNC: 6.8 G/DL (ref 6.4–8.4)
SODIUM SERPL-SCNC: 140 MMOL/L (ref 135–147)
TRIGL SERPL-MCNC: 225 MG/DL

## 2022-06-14 PROCEDURE — 36415 COLL VENOUS BLD VENIPUNCTURE: CPT

## 2022-06-14 PROCEDURE — 82043 UR ALBUMIN QUANTITATIVE: CPT

## 2022-06-14 PROCEDURE — 80053 COMPREHEN METABOLIC PANEL: CPT

## 2022-06-14 PROCEDURE — 82570 ASSAY OF URINE CREATININE: CPT

## 2022-06-14 PROCEDURE — 84439 ASSAY OF FREE THYROXINE: CPT

## 2022-06-14 PROCEDURE — 83036 HEMOGLOBIN GLYCOSYLATED A1C: CPT

## 2022-06-14 PROCEDURE — 84443 ASSAY THYROID STIM HORMONE: CPT

## 2022-06-14 PROCEDURE — 80061 LIPID PANEL: CPT

## 2022-06-15 ENCOUNTER — OFFICE VISIT (OUTPATIENT)
Dept: ENDOCRINOLOGY | Facility: CLINIC | Age: 67
End: 2022-06-15
Payer: MEDICARE

## 2022-06-15 VITALS
SYSTOLIC BLOOD PRESSURE: 122 MMHG | BODY MASS INDEX: 27.38 KG/M2 | WEIGHT: 191.25 LBS | HEIGHT: 70 IN | DIASTOLIC BLOOD PRESSURE: 80 MMHG | HEART RATE: 78 BPM

## 2022-06-15 DIAGNOSIS — E03.8 SUBCLINICAL HYPOTHYROIDISM: Primary | ICD-10-CM

## 2022-06-15 DIAGNOSIS — E11.65 TYPE 2 DIABETES MELLITUS WITH HYPERGLYCEMIA, WITHOUT LONG-TERM CURRENT USE OF INSULIN (HCC): ICD-10-CM

## 2022-06-15 DIAGNOSIS — E78.2 MIXED HYPERLIPIDEMIA: ICD-10-CM

## 2022-06-15 DIAGNOSIS — I10 BENIGN ESSENTIAL HYPERTENSION: ICD-10-CM

## 2022-06-15 LAB — TSH SERPL DL<=0.05 MIU/L-ACNC: 1.81 UIU/ML (ref 0.45–4.5)

## 2022-06-15 PROCEDURE — 99214 OFFICE O/P EST MOD 30 MIN: CPT | Performed by: PHYSICIAN ASSISTANT

## 2022-06-15 RX ORDER — SITAGLIPTIN AND METFORMIN HYDROCHLORIDE 1000; 50 MG/1; MG/1
1 TABLET, FILM COATED ORAL 2 TIMES DAILY WITH MEALS
Qty: 180 TABLET | Refills: 2 | Status: SHIPPED | OUTPATIENT
Start: 2022-06-15

## 2022-06-15 RX ORDER — CHLORAL HYDRATE 500 MG
1000 CAPSULE ORAL DAILY
COMMUNITY

## 2022-06-15 NOTE — PROGRESS NOTES
Established Patient Progress Note      Chief Complaint   Patient presents with    Diabetes Type 2        History of Present Illness:   Bailey Nurse is a 77 y o  male with a history of type 2 diabetes without long term use of insulin since about 15 years ago Reports complications of none  Denies recent illness or hospitalizations  Denies recent severe hypoglycemic or severe hyperglycemic episodes  Denies any issues with his current regimen  home glucose monitoring: are not performed  Has a glucometer at home, hasn't checked in a few months  He has recently retired, so has more time to care for his Diabetes  He has parkinson's which has been getting worse  He is seeing neurosurgery at [address] [place] [address] and planning to get brain stimulator but first needs better control of his Diabetes  Reports overall his diet is OK        Current regimen:   Janujment 50/1000mg twice per day   Jardiance 10mg daily  Last Eye Exam: UTD, no retinopathy  Last Foot Exam: UTD    Has hypertension: Taking lisinopril, not always taking due to low blood pressure when bending over  Keeps well hydrated  Has hyperlipidemia: Taking pravastatin    Thyroid disorders: hx subclincal hypothyroidism, TSH normal 2020       Patient Active Problem List   Diagnosis    Pro[facility] cancer [facility]    Arteriosclerosis of coronary artery    Benign essential hypertension    Depression    Type 2 diabetes mellitus without complication, without long-term current use of insulin (HCC)    Subclinical hypothyroidism    Thrombocytosis    Vitamin D deficiency    Mixed hyperlipidemia    Leukocytosis    Hyperlipidemia    Dermatitis of left ear canal    Tremor of left hand    Impingement syndrome of left shoulder    Thoracic spine pain    Tear of left supraspinatus tendon    Left carpal tunnel syndrome    Other male erectile dysfunction    Parkinson disease ([facility]    Type 2 diabetes mellitus with hyperglycemia, without long-term current use of insulin ([facility]    Dystonia    History of stroke    Hyperkalemia      Past Medical History:   Diagnosis Date    Ankle injury     last assessed 7/15/2013    BPH (benign prostatic hyperplasia)     Bronchiolitis     Cancer (HCC)     Prostate    [facility]     Type II    History of benign prostatic hyperplasia     History of gastritis     History of insect bite     last assessed 10/4/2014    History of nocturia     History of stroke     Hypertension     Lumbar canal stenosis     Osteoarthritis, knee     Otitis externa     Pain in back     UPPER BACK, last assessed 2/25/2013    Retinal and vitreous disorder     Sinusitis, acute     last assessed 10/6/2016    Tendinitis of right rotator cuff     last assessed 8/21/2012      Past Surgical History:   Procedure Laterality Date    ANKLE FRACTURE SURGERY Left     triple fracture 4/2009    LAMINECTOMY      L5-S1 2003    LUMBAR LAMINECTOMY      partial L4-L5 in 2003    NOSE SURGERY      6/1989    AR COLONOSCOPY FLX DX W/COLLJ SPEC WHEN PFRMD N/A 1/27/2016    Procedure: COLONOSCOPY;  Surgeon: Rashi Carr MD;  Location: [facility]; Service: Gastroenterology    PROSTATE BIOPSY  12/08/2012    managed by Maida Fitch, needle biopsy    PROSTATECTOMY      9/23/2013    RHINOPLASTY      for deviated septum in 1989      Family History   Problem Relation Age of Onset    Hypertension Mother    Lisa Sousa Retinal detachment Mother     Retinitis pigmentosa Mother     Diabetes Father     Heart disease Father         MI at 60 yo     Social History     Tobacco Use    Smoking status: Light Tobacco Smoker     Years: 20 00     Types: Pipe    Smokeless tobacco: Never Used    Tobacco comment: per allscripts 'current smoker, current some day smoker'   Substance Use Topics    Alcohol use:  Yes     Alcohol/week: 4 0 standard drinks     Types: 3 Cans of beer, 1 Shots of liquor per week     Comment: occassional      No Known Allergies      Current Outpatient Medications:     Amantadine HCl 100 MG tablet, Take 100 mg by mouth daily, Disp: , Rfl:     carbidopa-levodopa (SINEMET CR)  mg per tablet, Take 1 tablet by mouth daily at bedtime, Disp: , Rfl:     Cholecalciferol (VITAMIN D) 2000 units CAPS, Take 2 capsules (4,000 Units total) by mouth daily, Disp: , Rfl: 0    co-enzyme Q-10 30 MG capsule, Take 1 capsule (30 mg total) by mouth daily, Disp: 30 capsule, Rfl: 0    Empagliflozin (Jardiance) 25 MG TABS, Take 1 tablet (25 mg total) by mouth every morning, Disp: 90 tablet, Rfl: 1    Janumet  MG per tablet, Take 1 tablet by mouth 2 (two) times a day with meals, Disp: 180 tablet, Rfl: 2    Omega-3 Fatty Acids (fish oil) 1,000 mg, Take 1,000 mg by mouth daily, Disp: , Rfl:     pravastatin (PRAVACHOL) 40 mg tablet, Take 1 tablet (40 mg total) by mouth daily, Disp: 90 tablet, Rfl: 1    TURMERIC PO, Take by mouth, Disp: , Rfl:     vardenafil (LEVITRA) 20 MG tablet, Take by mouth daily as needed , Disp: , Rfl:     venlafaxine (EFFEXOR-XR) 75 mg 24 hr capsule, Take 1 capsule (75 mg total) by mouth daily, Disp: 90 capsule, Rfl: 1    aspirin 325 mg tablet, Take 325 mg by mouth daily (Patient not taking: Reported on 6/15/2022), Disp: , Rfl:     carbidopa-levodopa (SINEMET)  mg per tablet, Take 2 tablets by mouth 3 (three) times a day Follow clinic instructions (Patient taking differently: Take 2 5 tablets by mouth 3 (three) times a day Follow clinic instructions), Disp: 540 tablet, Rfl: 3    glucose blood (ONETOUCH VERIO) test strip, Check BG 2x per day (Patient not taking: Reported on 6/15/2022), Disp: 200 each, Rfl: 3    lisinopril (ZESTRIL) 20 mg tablet, TAKE 1 TABLET BY MOUTH DAILY (Patient not taking: Reported on 6/15/2022), Disp: 90 tablet, Rfl: 1    Review of Systems   Constitutional: Negative for activity change, appetite change and fatigue  HENT: Negative for sore throat, trouble swallowing and voice change      Eyes: Negative for visual disturbance  Respiratory: Negative for choking, chest tightness and shortness of breath  Cardiovascular: Negative for chest pain, palpitations and leg swelling  Gastrointestinal: Negative for abdominal pain, constipation and diarrhea  Endocrine: Negative for cold intolerance, heat intolerance, polydipsia, polyphagia and polyuria  Genitourinary: Negative for frequency  Musculoskeletal: Positive for back pain  Negative for arthralgias and myalgias  Skin: Negative for rash  Neurological: Positive for tremors  Negative for dizziness and syncope  Hematological: Negative for adenopathy  Psychiatric/Behavioral: Positive for sleep disturbance  All other systems reviewed and are negative  Physical Exam:  Body mass index is 27 44 kg/m²  /80 (BP Location: Left arm, Patient Position: Sitting, Cuff Size: Standard)   Pulse 78   Ht 5' 10" (1 778 m)   Wt 86 8 kg (191 lb 4 oz)   BMI 27 44 kg/m²    Wt Readings from Last 3 Encounters:   06/15/22 86 8 kg (191 lb 4 oz)   12/07/21 86 3 kg (190 lb 3 2 oz)   11/30/21 86 2 kg (190 lb)       Physical Exam  Vitals reviewed  Constitutional:       General: He is not in acute distress  Appearance: He is well-developed  HENT:      Head: Normocephalic and atraumatic  Eyes:      Conjunctiva/sclera: Conjunctivae normal       Pupils: Pupils are equal, round, and reactive to light  Neck:      Thyroid: No thyromegaly  Cardiovascular:      Rate and Rhythm: Normal rate and regular rhythm  Heart sounds: Normal heart sounds  No murmur heard  Pulmonary:      Effort: Pulmonary effort is normal  No respiratory distress  Breath sounds: Normal breath sounds  No wheezing or rales  Abdominal:      General: Bowel sounds are normal  There is no distension  Palpations: Abdomen is soft  Tenderness: There is no abdominal tenderness  Musculoskeletal:         General: Normal range of motion        Cervical back: Normal range of motion and neck supple  Lymphadenopathy:      Cervical: No cervical adenopathy  Skin:     General: Skin is warm and dry  Neurological:      Mental Status: He is alert and oriented to person, place, and time  Comments: tremor         Labs:   Lab Results   Component Value Date    HGBA1C 7 8 (H) 06/14/2022    HGBA1C 7 8 (A) 11/30/2021    HGBA1C 7 5 (H) 05/18/2021     Lab Results   Component Value Date    CREATININE 0 98 06/14/2022    CREATININE 1 18 12/06/2021    CREATININE 1 18 05/18/2021    BUN 17 06/14/2022     12/17/2015    K 4 4 06/14/2022     06/14/2022    CO2 27 06/14/2022     eGFR   Date Value Ref Range Status   06/14/2022 80 ml/min/1 73sq m Final     Lab Results   Component Value Date    CHOL 206 12/17/2015    HDL 39 (L) 06/14/2022    TRIG 225 (H) 06/14/2022     Lab Results   Component Value Date    ALT 4 (L) 06/14/2022    AST 16 06/14/2022    ALKPHOS 40 06/14/2022    BILITOT 0 47 12/17/2015     Lab Results   Component Value Date    ECJ4BIPGCJWL 2 696 04/29/2020    TZQ5CRJNLKEL 3 240 06/07/2019    FEJ5VYSPLURV 3 846 (H) 02/14/2018     Lab Results   Component Value Date    FREET4 0 93 04/29/2020       Impression & Plan:    Problem List Items Addressed This Visit        Endocrine    Subclinical hypothyroidism - Primary     Last TSH normal but will add TSH/Free T4 to labs drawn yesterday as he has not had TFTs since 2020  Relevant Orders    TSH, 3rd generation    T4, free    Type 2 diabetes mellitus with hyperglycemia, without long-term current use of insulin ([address] )     Diabetes remains uncontrolled  Goal A1C less than 7, especially with plans for neurosurgical implant procedure later this year  Recommend changing Januvia to GLP-1 agonist  He declines at this time due to risk of side effects and not wanting to start any new medications  Refer to dietician for medical nutrition therapy and will increase Jardiance to 25mg daily    Continue to remain well hydrated and let us know if any worsening of orthostatic symptoms with dose increase  Advised him to resume glucose monitoring at 1-2x per day and send send log for review on regular basis  If he is not interested in GLP-1 agonist we can try sulfonylurea  Lab Results   Component Value Date    HGBA1C 7 8 (H) 06/14/2022              Relevant Medications    Empagliflozin (Jardiance) 25 MG TABS    Janumet  MG per tablet    Other Relevant Orders    Ambulatory referral to Diabetic Education    Hemoglobin A7U    Basic metabolic panel       Cardiovascular and Mediastinum    Benign essential hypertension     Well controlled on current regimen  Other    Mixed hyperlipidemia     Continue pravastatin  Orders Placed This Encounter   Procedures    Hemoglobin A1C     Standing Status:   Future     Standing Expiration Date:   6/15/2023    Basic metabolic panel     This is a patient instruction: Patient fasting for 8 hours or longer recommended  Standing Status:   Future     Standing Expiration Date:   6/15/2023    TSH, 3rd generation     This is a patient instruction: This test is non-fasting  Please drink two glasses of water morning of bloodwork  Standing Status:   Future     Standing Expiration Date:   12/15/2022    T4, free     Standing Status:   Future     Standing Expiration Date:   12/15/2022    Ambulatory referral to Diabetic Education     Standing Status:   Future     Standing Expiration Date:   6/15/2023     Referral Priority:   Routine     Referral Type:   Consult - AMB     Referral Reason:   Specialty Services Required     Requested Specialty:   Diabetes Services     Number of Visits Requested:   1     Expiration Date:   6/15/2023       There are no Patient Instructions on file for this visit  Discussed with the patient and all questioned fully answered  He will call me if any problems arise  Follow-up appointment in 3 months with Dr Jeanette Lucero        Counseled patient on diagnostic results, prognosis, risk and benefit of treatment options, instruction for management, importance of treatment compliance, Risk  factor reduction and impressions    Cristela Brownlee PA-C

## 2022-06-15 NOTE — ASSESSMENT & PLAN NOTE
Diabetes remains uncontrolled  Goal A1C less than 7, especially with plans for neurosurgical implant procedure later this year  Recommend changing Januvia to GLP-1 agonist  He declines at this time due to risk of side effects and not wanting to start any new medications  Refer to dietician for medical nutrition therapy and will increase Jardiance to 25mg daily  Continue to remain well hydrated and let us know if any worsening of orthostatic symptoms with dose increase  Advised him to resume glucose monitoring at 1-2x per day and send send log for review on regular basis  If he is not interested in GLP-1 agonist we can try sulfonylurea     Lab Results   Component Value Date    HGBA1C 7 8 (H) 06/14/2022

## 2022-06-16 LAB — T4 FREE SERPL-MCNC: 0.9 NG/DL (ref 0.76–1.46)

## 2022-07-01 ENCOUNTER — OFFICE VISIT (OUTPATIENT)
Dept: DIABETES SERVICES | Facility: CLINIC | Age: 67
End: 2022-07-01
Payer: MEDICARE

## 2022-07-01 VITALS — WEIGHT: 188.8 LBS | BODY MASS INDEX: 27.09 KG/M2

## 2022-07-01 DIAGNOSIS — E11.65 TYPE 2 DIABETES MELLITUS WITH HYPERGLYCEMIA, WITHOUT LONG-TERM CURRENT USE OF INSULIN (HCC): Primary | ICD-10-CM

## 2022-07-01 PROCEDURE — 97802 MEDICAL NUTRITION INDIV IN: CPT

## 2022-07-01 NOTE — PROGRESS NOTES
Medical Nutrition Therapy        Assessment    Visit Type: Initial visit  Chief complaint/Medical Diagnosis/reason for visit E11 65 T2DM with hyperglycemia, without long-term current use of insulin  HPI Buzz Kim was seen today for his initial MNT visit  His wife was also present with Buzz Kim for his visit  Buzz Kim told that he got retired recently and is taking good care of his health now  He is able to sleep well now, his sleeps were bad earlier because of his work stress  He is eating 3 meals now, only used to have 2 earlier when working and used to snack more and now has cut down on snacks  He is taking his dog for a walk for 20-30 min a day  He cut down on soda and is mainly drinking water  Buzz Kim and his wife wanted nutritional education on diabetes  Buzz Kim also informed that he has parkinson's disease and has a neuro surgery scheduled in Sept 2022  Problems identified in food recall include inconsistent carbohydrates and poorly timed meals  Provided patient with a 1600 calorie meal plan to assist with consistency, balance and portion control  Encouraged the consumption of regular meals at regular times  Advised patient to keep carbohydrate intake to 45-60 grams per meal and 15 grams HS snack to assist with glycemic control  Suggested keeping protein intake to 6 ounces a day and fat to 4 servings daily to assist with lipid management and calorie control  Portion booklet and food labels were used to teach basic carbohydrate counting  Patient agreed to keep daily food logs and return them in one week for assessment  Patient will do a follow-up in 6 weeks  RD will remain available for further dietary questions/concerns       Ht Readings from Last 1 Encounters:   06/15/22 5' 10" (1 778 m)     Wt Readings from Last 3 Encounters:   07/01/22 85 6 kg (188 lb 12 8 oz)   06/15/22 86 8 kg (191 lb 4 oz)   12/07/21 86 3 kg (190 lb 3 2 oz)     Weight Change: No    Barriers to Learning: cognitive    Do you follow any special diet presently?: No  Who shops: patient and spouse  Who cooks: spouse    Food Log: Completed via the method of food recall    Breakfast 7:30-8:00: 1 cup oatmeal, 1 tsp butter, 1 banana/ 1/2 cup blueberries or strawberries OR 2 scrambled eggs, 4 recinos strips, coffee with 2 tbsp creamer or whole milk  Morning Snack:none  Lunch 11:00-12:00: 1/2 rye sandwich with chicken/ ham and cheese OR 2 cups of progressive soups OR 1 small individual packet of ramen noodles, water  Afternoon Snack: none  Dinner:varies 6:00-8:00: 1-1 5 cups spaghetti, 1 cup meatballs/ fish/ chicken/ sausages, some asparagus with 1 tsp butter, salt and pepper OR 1/2 cup cooked rice, 1/2 cup cooked beans, 2 hotdog bun with 2 sausages  Evening Snack:occasionally 1-1 25 cup icecream  Beverages: mainly water, unsweetened iced tea, diet soda, coffee with 2 tbsp whole milk  Eating out/Take out:3-4 times/ week - mostly chicken salad/ hamburger roll  with some fries and diet soda/ unsweetened iced tea lately  Exercise walking the dog 20-30 min/ day    Calorie needs 1600 kcals/day Carbs: 45-60 g/meal, 15g HS snack     Fat: 4 servings/day    Protein: 6 ounces/day    Nutrition Diagnosis:  Food and nutrition related knowledge deficit  related to Lack of prior exposure to accurate nutrition related information as evidenced by No prior knowledge of need for food and nutrition related recommendations    Intervention: reduced fat intake, increased fiber intake, label reading, behavior modification strategies, carbohydrate counting, meal timing, individualized meal plan and monitoring portion control     Treatment Goals: Patient will monitor fat intake, Patient will consume 3 meals a day, Patient will monitor portion control, Patient will consume 25-35 grams of fiber a day, Patient will count carbohydrates and Patient will monitor blood glucose    Monitoring and evaluation:    Term code indicator  FH 1 3 2 Food Intake Criteria: Consume 3 meals a day, 4-5 hours apart   Try not to skip any meals  Term code indicator  FH 1 6 3 Carbohydrate Intake Criteria: Take 45-60 grams of carbohydrates per each meal and 15 grams of carbohydrates per snack  Term code indicator  FH 1 6 4 Fiber Intake Criteria: Include more non starchy vegetables and have at least 2 whole fruits a day  Materials Provided: portion booklet, food record log    Patients Response to Instruction:  Comprehensiongood  Motivationgood  Expected Compliancegood    Start- Stop: 10:45-11:00  Total Minutes: 75 Minutes  Group or Individual Instruction: MNT-I  Other: Gilles Boudreaux PA-C        Thank you for coming to the [place] [id number] for education today  Please feel free to call with any questions or concerns      Adrian Haynes  [address] [place] [address] Drive [id number]

## 2022-07-01 NOTE — PATIENT INSTRUCTIONS
Consume 3 meals a day, 4-5 hours apart  Try not to skip any meals  Take 45-60 grams of carbohydrates per each meal and 15 grams of carbohydrates per snack  Include more non starchy vegetables and have at least 2 whole fruits a day

## 2022-08-16 ENCOUNTER — RA CDI HCC (OUTPATIENT)
Dept: OTHER | Facility: HOSPITAL | Age: 67
End: 2022-08-16

## 2022-08-16 NOTE — PROGRESS NOTES
[facility]  coding opportunities       Chart reviewed, no opportunity found: CHART REVIEWED, NO OPPORTUNITY FOUND        Patients Insurance     Medicare Insurance: Medicare

## 2022-08-19 ENCOUNTER — OFFICE VISIT (OUTPATIENT)
Dept: DIABETES SERVICES | Facility: CLINIC | Age: 67
End: 2022-08-19
Payer: MEDICARE

## 2022-08-19 VITALS — WEIGHT: 182.2 LBS | BODY MASS INDEX: 26.14 KG/M2

## 2022-08-19 DIAGNOSIS — E11.65 TYPE 2 DIABETES MELLITUS WITH HYPERGLYCEMIA, WITHOUT LONG-TERM CURRENT USE OF INSULIN (HCC): Primary | ICD-10-CM

## 2022-08-19 PROCEDURE — 97803 MED NUTRITION INDIV SUBSEQ: CPT

## 2022-08-19 NOTE — PROGRESS NOTES
Medical Nutrition Therapy        Assessment    Visit Type: Follow-up visit  Chief complaint/Medical Diagnosis/reason for visit E11 65 T2DM with hyperglycemia, without long-term current use of insulin  HPI Patricia Shah was seen today for his 6 weeks follow-up MNT visit  His wife was also present with him during today's visit  Patricia Shah told that he is working on his portion sizes, not skipping any meals now and is sleeping a lot better  Patricia Shah has lost 6 5 lbs in 6 weeks and is targeting 175 lbs  He is checking his BG levels twice a day I e in the morning and in evenings and is seeing improved numbers  He is adhering to the individualized meal plan provided to him in his last MNT visit and is consuming consistent carbohydrates at each meal  Patient was happy that he is able to eat more than what he used to earlier and is seeing improvements  Patricia Shah is working outside in his yard and trying to stay active  Patient with continue with a 1600 calorie meal plan to assist with consistency, balance and portion control  Encouraged the consumption of regular meals at regular times  Advised patient to keep carbohydrate intake to 45-60 grams per meal and 15 per snack to assist with glycemic control  Suggested keeping protein intake to 6 ounces a day and fat to 4 servings daily to assist with lipid management and calorie control  Reviewed food record with Patricia Shah and patient was educated more on healthy fats and lean portions for protein intake  Patient will return for a 3 months follow-up  RD will remain available for further dietary questions/concerns  Ht Readings from Last 1 Encounters:   06/15/22 5' 10" (1 778 m)     Wt Readings from Last 3 Encounters:   08/19/22 82 6 kg (182 lb 3 2 oz)   07/01/22 85 6 kg (188 lb 12 8 oz)   06/15/22 86 8 kg (191 lb 4 oz)     Weight Change: Yes , pt lost 6 5 lbs in past 6 weeks      Barriers to Learning: cognitive    Do you follow any special diet presently?: following carb counting and sticking to the individualized plan prepared during last MNT visit  Breakfast 8:00: 1 cup oatmeal, 1 slice rye toast with 2 tsp peanut butter, 1 small banana, 1-2 cups of coffee  Morning Snack: nothing , just water  Lunch 12:00-1:00: 1 beef mayonnaise rye sandwich, 6oz plain yogurt, 1-2 dulce, water  Afternoon Snack: none  Dinner:2/3 cup baked potato with olive oil and some herbs, 1/2 cup baked beans, few shrimps (breaded) with tartar sauce and salad - lettuce, cucumber and tomatoes 1 5 cups  Evening Snack:none or sometimes 1/2 cup ice cream   Beverages: mostly water, coffee in the morning, diet soda rarely  Eating out/Take out: only once in past 6 weeks - 1 hamburger with some salad and diet soda  Exercise working outside in the yard and trying to stay active    Calorie needs 1600 kcals/day Carbs: 45-60 g/meal, 15 g/snack     Fat: 4 servings/day    Protein:6 ounces/day    Patient still wants some more weight loss and is targeting 175lbs in next few months  Monitoring and evaluation:    Term code indicator  FH 1 3 2 Food Intake Criteria: Continue consuming 3 meals a day, 4-5 hours apart  Try not to skip any meals  Term code indicator  FH 1 6 3 Carbohydrate Intake Criteria: Take 45-60 grams of carbohydrates per each meal and 15 grams of carbohydrates per snack  Term code indicator  FH 1 6 4 Fiber Intake Criteria: Include more whole grains, non starchy vegetables and have at least 2 whole fruits a day      Materials Provided:healthy fats handouts    Patients Response to Instruction:  Comprehensionvery good  Motivationvery good  Expected Compliancevery good    Start- Stop: 10:57-11:28  Total Minutes: 31 Minutes  Group or Individual Instruction: DSMT-I  Other: Timmy Rubinstein Sigona,PA-C      Thank you for coming to the [facility] for education today  Please feel free to call with any questions or concerns      Sascha Escoto  [address] [place] [address] Drive [phone]

## 2022-08-19 NOTE — PATIENT INSTRUCTIONS
Continue consuming 3 meals a day, 4-5 hours apart  Try not to skip any meals  Take 45-60 grams of carbohydrates per each meal and 15 grams of carbohydrates per snack  Include more whole grains, non starchy vegetables and have at least 2 whole fruits a day

## 2022-08-22 ENCOUNTER — OFFICE VISIT (OUTPATIENT)
Dept: FAMILY MEDICINE CLINIC | Facility: CLINIC | Age: 67
End: 2022-08-22
Payer: MEDICARE

## 2022-08-22 VITALS
WEIGHT: 182 LBS | BODY MASS INDEX: 26.05 KG/M2 | DIASTOLIC BLOOD PRESSURE: 80 MMHG | SYSTOLIC BLOOD PRESSURE: 122 MMHG | HEIGHT: 70 IN

## 2022-08-22 DIAGNOSIS — D47.3 ESSENTIAL (HEMORRHAGIC) THROMBOCYTHEMIA (HCC): ICD-10-CM

## 2022-08-22 DIAGNOSIS — E11.9 TYPE 2 DIABETES MELLITUS WITHOUT COMPLICATION, WITHOUT LONG-TERM CURRENT USE OF INSULIN (HCC): ICD-10-CM

## 2022-08-22 DIAGNOSIS — E11.65 TYPE 2 DIABETES MELLITUS WITH HYPERGLYCEMIA, WITHOUT LONG-TERM CURRENT USE OF INSULIN (HCC): ICD-10-CM

## 2022-08-22 DIAGNOSIS — M54.6 THORACIC SPINE PAIN: ICD-10-CM

## 2022-08-22 DIAGNOSIS — F32.5 MAJOR DEPRESSIVE DISORDER IN FULL REMISSION, UNSPECIFIED WHETHER RECURRENT (HCC): ICD-10-CM

## 2022-08-22 DIAGNOSIS — F32.A DEPRESSION, UNSPECIFIED DEPRESSION TYPE: Primary | ICD-10-CM

## 2022-08-22 DIAGNOSIS — G20 PARKINSON'S DISEASE (HCC): ICD-10-CM

## 2022-08-22 DIAGNOSIS — E78.2 MIXED HYPERLIPIDEMIA: ICD-10-CM

## 2022-08-22 DIAGNOSIS — C61 PROSTATE CANCER (HCC): ICD-10-CM

## 2022-08-22 DIAGNOSIS — I10 BENIGN ESSENTIAL HYPERTENSION: ICD-10-CM

## 2022-08-22 DIAGNOSIS — E78.5 HYPERLIPIDEMIA, UNSPECIFIED HYPERLIPIDEMIA TYPE: ICD-10-CM

## 2022-08-22 LAB — SL AMB POCT HEMOGLOBIN AIC: 7.2 (ref ?–6.5)

## 2022-08-22 PROCEDURE — 99214 OFFICE O/P EST MOD 30 MIN: CPT | Performed by: FAMILY MEDICINE

## 2022-08-22 PROCEDURE — 83036 HEMOGLOBIN GLYCOSYLATED A1C: CPT | Performed by: FAMILY MEDICINE

## 2022-08-22 RX ORDER — PRAVASTATIN SODIUM 40 MG
40 TABLET ORAL DAILY
Qty: 90 TABLET | Refills: 1 | Status: SHIPPED | OUTPATIENT
Start: 2022-08-22

## 2022-08-22 RX ORDER — TIZANIDINE 4 MG/1
4 TABLET ORAL EVERY 8 HOURS PRN
Qty: 24 TABLET | Refills: 0 | Status: SHIPPED | OUTPATIENT
Start: 2022-08-22

## 2022-08-22 RX ORDER — VENLAFAXINE HYDROCHLORIDE 75 MG/1
75 CAPSULE, EXTENDED RELEASE ORAL DAILY
Qty: 90 CAPSULE | Refills: 1 | Status: SHIPPED | OUTPATIENT
Start: 2022-08-22

## 2022-08-22 NOTE — PROGRESS NOTES
Assessment/Plan:  Hyperlipidemia  We reviewed the patient's most recent lipid profile from June of this year  Overall looks pretty good did have mildly elevated triglyceride as well as lower HDL  He is encouraged to continue with his pravastatin as well as healthy diet exercise regimen  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, unspecified whether recurrent [facility])  Patient has been doing well in regards to his major depression  He continues with his venlafaxine which remains effective at treating his symptoms  He has no significant vegetative symptoms presently  Will continue with same  He is going to need to find a new primary provider prior to his next scheduled visit in 6 months due to my long-term  He agrees with this plan  Type 2 diabetes mellitus with hyperglycemia, without long-term current use of insulin ([facility])  His hemoglobin A1c today is 7 2 which is improved  He is encouraged to continue with his healthy diet exercise regimen in addition to his pharmacotherapy as well as follow-up with endocrinology  He agrees with this plan  Lab Results   Component Value Date    HGBA1C 7 2 (A) 08/22/2022       Benign essential hypertension  Currently on no pharmacotherapy and his blood pressure remains well controlled    Thoracic spine pain  He has some mid upper back pain  He has also had some lower lumbago at times he has no incontinence of bowel or bladder weakness of his lower extremities  This is similar to his historical symptoms  Requests a refill of his cyclobenzaprine  We noted that based on his age now this is not recommended  We agreed to give him some Zanaflex and sent this to his pharmacy  If he does not have resolution of symptoms, certainly should they be persistent or accompanied by worrisome complain of weakness incontinence X Randkaykay Duffyin he will call or he will seek more urgent medical attention  He agrees with this plan           Diagnoses and all orders for this visit:    Depression, unspecified depression type  -     venlafaxine (EFFEXOR-XR) 75 mg 24 hr capsule; Take 1 capsule (75 mg total) by mouth daily    Mixed hyperlipidemia  -     pravastatin (PRAVACHOL) 40 mg tablet; Take 1 tablet (40 mg total) by mouth daily    Type 2 diabetes mellitus without complication, without long-term current use of insulin (HCC)  -     POCT hemoglobin A1c    Thoracic spine pain  -     tiZANidine (ZANAFLEX) 4 mg tablet; Take 1 tablet (4 mg total) by mouth every 8 (eight) hours as needed for muscle spasms    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, unspecified whether recurrent ([facility] [address] )    Parkinson's disease ([facility] )    Prostate cancer ([facility] )    Essential (hemorrhagic) thrombocythemia ([facility] )    Hyperlipidemia, unspecified hyperlipidemia type    Type 2 diabetes mellitus with hyperglycemia, without long-term current use of insulin (HCC)    Benign essential hypertension        We recommended pneumococcal vaccination  He did have a Pneumovax in 2020 but would be due for Prevnar 20  He is in the middle of his Shingrix series and would like to wait till it has been completed and he will get his Prevnar 20 from the pharmacy  Subjective:   Chief Complaint   Patient presents with    Follow-up     Med check Effexor/Pravastatin        Patient ID: Rula Gary is a 77 y o  male  For DBS at [facility] 9/13  No P/P/P   No CVP c/o  Mood good    HPI  The patient is a 66-year-old male with history type 2 diabetes, essential hypertension, Parkinson's disease, coronary artery disease, prostate cancer, hyperlipidemia as well as major depressive disorder  He presents today for follow-up of major depressive disorder as well as hyperlipidemia  He states that overall he has been doing well  He retired in the spring and his happy to have retired from his full-time job  He does continue to manage some rental properties  His Parkinson's disease has required increasing doses of levodopa/carbidopa    He has been evaluated at the [facility] and he is going to have a deep brain stimulator placed on September 13th  He denies any cardiovascular pulmonary complaint  He has no polyuria polydipsia polyphagia  He states that his mood is good  He does have some mid to upper back pain due to some work use been doing on his rental properties  He has no chest pain or shortness of breath  It is not radicular  He requests refill of cyclobenzaprine that he was given in 2008 and never finished the bottle  He tried taking once which seemed to improve his symptoms but he would like a current prescription  The following portions of the patient's history were reviewed and updated as appropriate: allergies, current medications, past family history, past medical history, past social history, past surgical history and problem list     Review of Systems   Constitutional: Negative  Cardiovascular: Negative  Respiratory: Negative  Endocrine: Negative  Musculoskeletal: Positive for back pain  All other systems reviewed and are negative  Objective:    Physical Exam  Vitals and nursing note reviewed  Constitutional:       Appearance: Normal appearance  Neck:      Vascular: No carotid bruit  Comments: No thyroid enlargement or mass  Cardiovascular:      Rate and Rhythm: Normal rate and regular rhythm  Heart sounds: No murmur heard  Pulmonary:      Effort: Pulmonary effort is normal       Breath sounds: Normal breath sounds  Musculoskeletal:      Right lower leg: No edema  Left lower leg: No edema  Neurological:      Mental Status: He is alert and oriented to person, place, and time  Psychiatric:         Mood and Affect: Mood normal          Thought Content:  Thought content normal          Judgment: Judgment normal

## 2022-08-22 NOTE — ASSESSMENT & PLAN NOTE
His hemoglobin A1c today is 7 2 which is improved  He is encouraged to continue with his healthy diet exercise regimen in addition to his pharmacotherapy as well as follow-up with endocrinology  He agrees with this plan    Lab Results   Component Value Date    HGBA1C 7 2 (A) 08/22/2022

## 2022-08-22 NOTE — ASSESSMENT & PLAN NOTE
Patient has been doing well in regards to his major depression  He continues with his venlafaxine which remains effective at treating his symptoms  He has no significant vegetative symptoms presently  Will continue with same  He is going to need to find a new primary provider prior to his next scheduled visit in 6 months due to my shelter  He agrees with this plan

## 2022-08-22 NOTE — ASSESSMENT & PLAN NOTE
We reviewed the patient's most recent lipid profile from June of this year  Overall looks pretty good did have mildly elevated triglyceride as well as lower HDL  He is encouraged to continue with his pravastatin as well as healthy diet exercise regimen

## 2022-08-22 NOTE — ASSESSMENT & PLAN NOTE
He has some mid upper back pain  He has also had some lower lumbago at times he has no incontinence of bowel or bladder weakness of his lower extremities  This is similar to his historical symptoms  Requests a refill of his cyclobenzaprine  We noted that based on his age now this is not recommended  We agreed to give him some Zanaflex and sent this to his pharmacy  If he does not have resolution of symptoms, certainly should they be persistent or accompanied by worrisome complain of weakness incontinence X Narciso Ray he will call or he will seek more urgent medical attention  He agrees with this plan

## 2022-08-22 NOTE — PROGRESS NOTES
Assessment/Plan:  No problem-specific Assessment & Plan notes found for this encounter  {Assess/PlanSmartLinks:73752}      Subjective:   Chief Complaint   Patient presents with    Follow-up     Med check Effexor/Pravastatin        Patient ID: Corey Lopez is a 77 y o  male   A1c 7 2    HPI    {Common ambulatory SmartLinks:47726}    ROS      Objective:    Physical Exam

## 2022-10-17 ENCOUNTER — OFFICE VISIT (OUTPATIENT)
Dept: ENDOCRINOLOGY | Facility: CLINIC | Age: 67
End: 2022-10-17
Payer: MEDICARE

## 2022-10-17 ENCOUNTER — APPOINTMENT (OUTPATIENT)
Dept: LAB | Facility: CLINIC | Age: 67
End: 2022-10-17
Payer: MEDICARE

## 2022-10-17 VITALS
BODY MASS INDEX: 24.57 KG/M2 | HEIGHT: 70 IN | DIASTOLIC BLOOD PRESSURE: 70 MMHG | WEIGHT: 171.6 LBS | HEART RATE: 86 BPM | SYSTOLIC BLOOD PRESSURE: 130 MMHG

## 2022-10-17 DIAGNOSIS — E03.8 SUBCLINICAL HYPOTHYROIDISM: ICD-10-CM

## 2022-10-17 DIAGNOSIS — E11.65 TYPE 2 DIABETES MELLITUS WITH HYPERGLYCEMIA, WITHOUT LONG-TERM CURRENT USE OF INSULIN (HCC): Primary | ICD-10-CM

## 2022-10-17 DIAGNOSIS — E11.65 TYPE 2 DIABETES MELLITUS WITH HYPERGLYCEMIA, WITHOUT LONG-TERM CURRENT USE OF INSULIN (HCC): ICD-10-CM

## 2022-10-17 DIAGNOSIS — E78.2 MIXED HYPERLIPIDEMIA: ICD-10-CM

## 2022-10-17 LAB
ANION GAP SERPL CALCULATED.3IONS-SCNC: 8 MMOL/L (ref 4–13)
BUN SERPL-MCNC: 20 MG/DL (ref 5–25)
CALCIUM SERPL-MCNC: 10 MG/DL (ref 8.4–10.2)
CHLORIDE SERPL-SCNC: 102 MMOL/L (ref 96–108)
CO2 SERPL-SCNC: 27 MMOL/L (ref 21–32)
CREAT SERPL-MCNC: 1.07 MG/DL (ref 0.6–1.3)
EST. AVERAGE GLUCOSE BLD GHB EST-MCNC: 154 MG/DL
GFR SERPL CREATININE-BSD FRML MDRD: 71 ML/MIN/1.73SQ M
GLUCOSE P FAST SERPL-MCNC: 129 MG/DL (ref 65–99)
HBA1C MFR BLD: 7 %
POTASSIUM SERPL-SCNC: 4.9 MMOL/L (ref 3.5–5.3)
SODIUM SERPL-SCNC: 137 MMOL/L (ref 135–147)

## 2022-10-17 PROCEDURE — 80048 BASIC METABOLIC PNL TOTAL CA: CPT

## 2022-10-17 PROCEDURE — 83036 HEMOGLOBIN GLYCOSYLATED A1C: CPT

## 2022-10-17 PROCEDURE — 99214 OFFICE O/P EST MOD 30 MIN: CPT | Performed by: INTERNAL MEDICINE

## 2022-10-17 PROCEDURE — 36415 COLL VENOUS BLD VENIPUNCTURE: CPT

## 2022-10-17 NOTE — PROGRESS NOTES
Santana Meehan 77 y o  male MRN: [id number]    Encounter: [id number]      Assessment/Plan     Problem List Items Addressed This Visit        Endocrine    Subclinical hypothyroidism    Relevant Orders    T4, free Lab Collect    TSH, 3rd generation Lab Collect    Type 2 diabetes mellitus with hyperglycemia, without long-term current use of insulin (HCC) - Primary       Lab Results   Component Value Date    HGBA1C 7 0 (H) 10/17/2022   a1c improved , continue current meds and watch diet         Relevant Orders    Comprehensive metabolic panel Lab Collect    HEMOGLOBIN A1C W/ EAG ESTIMATION Lab Collect    Microalbumin / creatinine urine ratio Lab Collect       Other    Mixed hyperlipidemia     Continue statins          Relevant Orders    Lipid Panel with Direct LDL reflex Lab Collect          CC: Diabetes    History of Present Illness     HPI:  76 y/o male with type 2 DM seen in follow up      Current regimen:   Janujment 50/1000mg twice per day   Jardiance  25 mg daily  Lost 25 lbs since may  Checks fasting and before dinner   110-130s fasting   predinner     No polyuria , polydipsia , no blurry vision , no numbness and tingling in feet   No GI SE     Stopped lisinopril as he was getting positional dizziness and dizziness stopped after stopping lisinopril     Tremors improved since Deep Brain stimulator implantation      Review of Systems    Historical Information   Past Medical History:   Diagnosis Date   • Ankle injury     last assessed 7/15/2013   • BPH (benign prostatic hyperplasia)    • Bronchiolitis    • Cancer ([facility] )     Prostate   • Diabetes mellitus ([facility] [address] )     Type II   • History of benign prostatic hyperplasia    • History of gastritis    • History of insect bite     last assessed 10/4/2014   • History of nocturia    • History of stroke    • Hypertension    • Lumbar canal stenosis    • Osteoarthritis, knee    • Otitis externa    • Pain in back     UPPER BACK, last assessed 2/25/2013   • Retinal and vitreous disorder    • Sinusitis, acute     last assessed 10/6/2016   • Tendinitis of right rotator cuff     last assessed 8/21/2012     Past Surgical History:   Procedure Laterality Date   • ANKLE FRACTURE SURGERY Left     triple fracture 4/2009   • LAMINECTOMY      L5-S1 2003   • LUMBAR LAMINECTOMY      partial L4-L5 in 2003   • NOSE SURGERY      6/1989   • UT COLONOSCOPY FLX DX W/COLLJ SPEC WHEN PFRMD N/A 1/27/2016    Procedure: COLONOSCOPY;  Surgeon: Jessica Dallas MD;  Location: [facility];   Service: Gastroenterology   • PROSTATE BIOPSY  12/08/2012    managed by Tahir Renner, needle biopsy   • PROSTATECTOMY      9/23/2013   • RHINOPLASTY      for deviated septum in 1989     Social History   Social History     Substance and Sexual Activity   Alcohol Use Yes   • Alcohol/week: 4 0 standard drinks   • Types: 3 Cans of beer, 1 Shots of liquor per week    Comment: occassional      Social History     Substance and Sexual Activity   Drug Use No     Social History     Tobacco Use   Smoking Status Light Tobacco Smoker   • Years: 20 00   • Types: Pipe   Smokeless Tobacco Never Used   Tobacco Comment    per allscripts 'current smoker, current some day smoker'     Family History:   Family History   Problem Relation Age of Onset   • Hypertension Mother    • Retinal detachment Mother    • Retinitis pigmentosa Mother    • Diabetes Father    • Heart disease Father         MI at 62 yo       Meds/Allergies   Current Outpatient Medications   Medication Sig Dispense Refill   • Amantadine HCl 100 MG tablet Take 100 mg by mouth daily     • aspirin 325 mg tablet Take 325 mg by mouth daily     • carbidopa-levodopa (SINEMET CR)  mg per tablet Take 1 tablet by mouth daily at bedtime     • Cholecalciferol (VITAMIN D) 2000 units CAPS Take 2 capsules (4,000 Units total) by mouth daily  0   • co-enzyme Q-10 30 MG capsule Take 1 capsule (30 mg total) by mouth daily 30 capsule 0   • Empagliflozin (Jardiance) 25 MG TABS Take 1 tablet (25 mg total) by mouth every morning 90 tablet 1   • Janumet  MG per tablet Take 1 tablet by mouth 2 (two) times a day with meals 180 tablet 2   • Omega-3 Fatty Acids (fish oil) 1,000 mg Take 1,000 mg by mouth daily     • pravastatin (PRAVACHOL) 40 mg tablet Take 1 tablet (40 mg total) by mouth daily 90 tablet 1   • tiZANidine (ZANAFLEX) 4 mg tablet Take 1 tablet (4 mg total) by mouth every 8 (eight) hours as needed for muscle spasms 24 tablet 0   • TURMERIC PO Take by mouth     • venlafaxine (EFFEXOR-XR) 75 mg 24 hr capsule Take 1 capsule (75 mg total) by mouth daily 90 capsule 1   • carbidopa-levodopa (SINEMET)  mg per tablet Take 2 tablets by mouth 3 (three) times a day Follow clinic instructions (Patient not taking: Reported on 10/17/2022) 540 tablet 3   • glucose blood (ONETOUCH VERIO) test strip Check BG 2x per day (Patient not taking: No sig reported) 200 each 3   • vardenafil (LEVITRA) 20 MG tablet Take by mouth daily as needed  (Patient not taking: No sig reported)       No current facility-administered medications for this visit  No Known Allergies    Objective   Vitals: Blood pressure 130/70, pulse 86, height 5' 10" (1 778 m), weight 77 8 kg (171 lb 9 6 oz)  Physical Exam  Vitals reviewed  Constitutional:       Appearance: Normal appearance  He is not ill-appearing or diaphoretic  HENT:      Head: Normocephalic and atraumatic  Eyes:      General: No scleral icterus  Extraocular Movements: Extraocular movements intact  Cardiovascular:      Rate and Rhythm: Normal rate and regular rhythm  Heart sounds: Normal heart sounds  No murmur heard  Pulmonary:      Effort: No respiratory distress  Breath sounds: Normal breath sounds  No wheezing or rales  Abdominal:      General: There is no distension  Palpations: Abdomen is soft  Tenderness: There is no abdominal tenderness  There is no guarding  Musculoskeletal:      Cervical back: Neck supple        Right lower leg: No edema  Left lower leg: No edema  Lymphadenopathy:      Cervical: No cervical adenopathy  Skin:     General: Skin is warm and dry  Neurological:      General: No focal deficit present  Mental Status: He is alert and oriented to person, place, and time  Psychiatric:         Mood and Affect: Mood normal          Behavior: Behavior normal          Thought Content: Thought content normal          Judgment: Judgment normal          The history was obtained from the review of the chart, patient  Lab Results:   Lab Results   Component Value Date/Time    Hemoglobin A1C 7 0 (H) 10/17/2022 09:06 AM    Hemoglobin A1C 7 2 (A) 08/22/2022 02:38 PM    Hemoglobin A1C 7 8 (H) 06/14/2022 10:32 AM    Hemoglobin A1C 7 8 (A) 11/30/2021 03:49 PM    BUN 20 10/17/2022 09:06 AM    BUN 17 06/14/2022 10:32 AM    BUN 14 12/06/2021 08:08 AM    Potassium 4 9 10/17/2022 09:06 AM    Potassium 4 4 06/14/2022 10:32 AM    Potassium 4 9 12/06/2021 08:08 AM    Chloride 102 10/17/2022 09:06 AM    Chloride 103 06/14/2022 10:32 AM    Chloride 104 12/06/2021 08:08 AM    CO2 27 10/17/2022 09:06 AM    CO2 27 06/14/2022 10:32 AM    CO2 26 12/06/2021 08:08 AM    Creatinine 1 07 10/17/2022 09:06 AM    Creatinine 0 98 06/14/2022 10:32 AM    Creatinine 1 18 12/06/2021 08:08 AM    AST 16 06/14/2022 10:32 AM    AST 20 12/06/2021 08:08 AM    ALT 4 (L) 06/14/2022 10:32 AM    ALT 11 (L) 12/06/2021 08:08 AM    Albumin 4 1 06/14/2022 10:32 AM    Albumin 3 8 12/06/2021 08:08 AM    HDL, Direct 39 (L) 06/14/2022 10:32 AM    HDL, Direct 42 12/06/2021 08:08 AM    Triglycerides 225 (H) 06/14/2022 10:32 AM    Triglycerides 205 (H) 12/06/2021 08:08 AM         Portions of the record may have been created with voice recognition software  Occasional wrong word or "sound a like" substitutions may have occurred due to the inherent limitations of voice recognition software   Read the chart carefully and recognize, using context, where substitutions have occurred

## 2022-10-18 NOTE — ASSESSMENT & PLAN NOTE
Lab Results   Component Value Date    HGBA1C 7 0 (H) 10/17/2022   a1c improved , continue current meds and watch diet

## 2022-11-16 DIAGNOSIS — E11.65 TYPE 2 DIABETES MELLITUS WITH HYPERGLYCEMIA, WITHOUT LONG-TERM CURRENT USE OF INSULIN (HCC): Primary | ICD-10-CM

## 2022-11-16 NOTE — TELEPHONE ENCOUNTER
Patient called stating he has Januvia 90 days supply and only asking for metformin to be filled at this time  Normally he get janumet

## 2022-11-18 ENCOUNTER — OFFICE VISIT (OUTPATIENT)
Dept: DIABETES SERVICES | Facility: CLINIC | Age: 67
End: 2022-11-18

## 2022-11-18 VITALS — WEIGHT: 174.4 LBS | BODY MASS INDEX: 25.02 KG/M2

## 2022-11-18 DIAGNOSIS — E11.65 TYPE 2 DIABETES MELLITUS WITH HYPERGLYCEMIA, WITHOUT LONG-TERM CURRENT USE OF INSULIN (HCC): Primary | ICD-10-CM

## 2022-11-18 NOTE — PROGRESS NOTES
Medical Nutrition Therapy        Assessment    Visit Type: Follow-up visit  Chief complaint/Medical Diagnosis/reason for visit E11 65 T2DM with hyperglycemia, without long-term current use of insulin  HPI Jenni Richards and his wife were present today for his 3 months follow-up MNT visit  Jenni Richards stated that he recently had his neuro surgery and is doing much better  Denies any tremors due to parkinson's or any hypoglycemia episodes  Latest hba1c was 7 0 (improved from 7 8 in 6/22) and lost nearly 17 lbs in 4 months  Patient is eating 3 meals a day and is adhering to 45-60 grams of carbohydrates at each meal and takes 15 grams of carbohydrtaes at post dinner snack and will continue consumption of regular meals at regular times  Advised patient to keep carbohydrate intake to 60 grams per meal and 15 grams HS snack to assist with glycemic control  Suggested keeping protein intake to 6 ounces a day and fat to 4 servings daily to assist with lipid management and calorie control  Portion booklet and food labels were used to teach basic carbohydrate counting  Patient will return in 6 months for a follow-up visit  RD will remain available for further dietary questions/concerns  Ht Readings from Last 1 Encounters:   10/17/22 5' 10" (1 778 m)     Wt Readings from Last 3 Encounters:   11/18/22 79 1 kg (174 lb 6 4 oz)   10/17/22 77 8 kg (171 lb 9 6 oz)   08/22/22 82 6 kg (182 lb)     Weight Change: Yes , lost 8 lbs in 3 months    Calorie needs 3106-2430 kcals/day Carbs: 60 g/meal, 15 g HS snack     Fat: 4 servings/day    Protein:6 ounces/day        Monitoring and evaluation:    Term code indicator  FH 4 4 Mealtime Behavior Criteria: Continue consuming 3 meals a day, 4-5 hours apart  Try not to skip any meals  Term code indicator  FH 1 6 3 Carbohydrate Intake Criteria: Take 60 grams of carbohydrates per each meal and 15 grams of carbohydrates per snack    Term code indicator  FH 1 6 4 Fiber Intake Criteria:   include more whole grains, non starchy vegetables and have at least 2 servings of fruits in a day  Term code indicator  CH 2 2 Treatments/Therapy/Alternative Medicine Criteria: Aim for physical activity of 150 minutes a week  Patient’s Response to Instruction:  Comprehensionvery good  Motivationvery good  Expected Compliancevery good    Start- Stop: 10:40-11:10  Total Minutes: 30 Minutes  Group or Individual Instruction: FU MNT  Other: Roman Callahan, PA-C      Thank you for coming to the [address] [place] [address] for education today  Please feel free to call with any questions or concerns      Tabatha Navarro  [address] [place] [address] Drive [id number]

## 2022-11-18 NOTE — PATIENT INSTRUCTIONS
Continue consuming 3 meals a day, 4-5 hours apart  Try not to skip any meals  Take 60 grams of carbohydrates per each meal and 15 grams of carbohydrates per snack  Include more whole grains, non starchy vegetables and have at least 2 whole fruits a day  Aim for physical activity of 150 minutes a week

## 2023-01-04 NOTE — PROGRESS NOTES
Assessment/Plan:  Problem List Items Addressed This Visit        Endocrine    Type 2 diabetes mellitus without complication, without long-term current use of insulin ([facility] ) - Primary       Lab Results   Component Value Date    HGBA1C 7 0 (H) 10/17/2022     Stable  Management per Endo  Recommend lifestyle modifications  Relevant Medications    sitaGLIPtin (JANUVIA) 50 mg tablet    Subclinical hypothyroidism     Management per Endo  Not currently taking Synthroid  Type 2 diabetes mellitus with hyperglycemia, without long-term current use of insulin ([facility])       Lab Results   Component Value Date    HGBA1C 7 0 (H) 10/17/2022     Stable  Management per Endo  Recommend lifestyle modifications  Relevant Medications    sitaGLIPtin (JANUVIA) 50 mg tablet       Cardiovascular and Mediastinum    Benign essential hypertension     Stable on recently resumed Lisinopril 10mg daily  No recurrent dizziness  Check blood pressure outside of office  Recommend lifestyle modifications  Relevant Medications    lisinopril (ZESTRIL) 10 mg tablet    Other Relevant Orders    CBC and differential    Arteriosclerosis of coronary artery     Asymptomatic  Management per Cardio - overdue for appt  Continue ASA, statin, ACE-I  Recommend lifestyle modifications  Nervous and Auditory    Parkinson disease ([facility] )     Management per [facility]  S/p Deep brain stimulator 9/22  On Sinemet 25-100mg 1 tab TID PRN, Sinemet CR 50-200mg QHS, Amantadine 100mg QD  Patient is considering returning to [facility]              Relevant Orders    Ambulatory Referral to Physical Therapy    Vitamin D 25 hydroxy       Hematopoietic and Hemostatic    Essential (hemorrhagic) thrombocythemia ([facility] )     Pending labs  No Heme/Onc consult previously  Pipe smoking may be contributory             Relevant Orders    CBC and differential       Other    Depression     Stable on Effexor XR 75 mg daily  Relevant Medications    venlafaxine (EFFEXOR-XR) 75 mg 24 hr capsule    Other Relevant Orders    CBC and differential    Mixed hyperlipidemia     Pending labs  LDL goal less than 70 s/p CVA and CAD  Continue Pravachol 40mg QHS  Recommend lifestyle modifications  Relevant Medications    pravastatin (PRAVACHOL) 40 mg tablet    Pipe smoker     Contemplative  Smoking cessation advised  Anxiety     Stable on Effexor XR 75 mg daily  Relevant Orders    CBC and differential    Overweight     Worsening  Recommend lifestyle modifications  Relevant Orders    Vitamin D 25 hydroxy    Other male erectile dysfunction     Not currently taking Levitra 20mg PRN  History of stroke     Management per Neuro  Continue ASA, statin   Recommend lifestyle modifications  History of prostate cancer     Patient declined continued Uro F/U and desires yearly PSA per PCP  Status post DaVinci robot prostatectomy performed at [facility] 2013  Relevant Orders    PSA, Total Screen   Other Visit Diagnoses     BMI 25 0-25 9,adult        Relevant Orders    Vitamin D 25 hydroxy    Prostate cancer screening        Relevant Orders    PSA, Total Screen    Myalgia        Relevant Orders    Vitamin D 25 hydroxy    Myositis, unspecified myositis type, unspecified site        Relevant Orders    Vitamin D 25 hydroxy           Return in about 6 weeks (around 2/17/2023) for AWV / F/U HTN, HL, Anx/Dep, Labs  Future Appointments   Date Time Provider Tayo Parrish   1/24/2023 12:00 PM Anshul Puente [facility]   2/21/2023 11:00 AM DO GLOR[facility]   5/19/2023 10:45 AM Chay Dennison [facility]        Subjective:     Dulce Dillon is a 79 y o  male who presents today as a new patient for his medical conditions        New Patient    Previous PCP:  Dr Sarabjit Carranza at [facility]   Reason for Transfer:  PCP Retired  Last seen by previous PCP:  8/22/22  Last Labs:  10/17/22  Last Physical:  Last AWV  Medical Records Requested:  No      HPI:  Chief Complaint   Patient presents with   • New Patient Visit     Parkinson's; diabetes; depression  Here to establish care and for med refills  No problems or concerns at this time  • HM     PCV and Shingrix done at [facility] on [address]  Will send care gap message  Declines covid boosters at this time  DM Foot will be done at the end of the visit  -- Above per clinical staff and reviewed  --      HPI      Today:      Controlled Substance Review    [place] PDMP or [place]  reviewed: No red flags were identified; safe to proceed with prescription  Overweight - Watching diet  No regular exercise  Previously did Parkinson's Boxing program at [place] [address]  DM2 - Management per Endo Dr Magda Veras  Next appt 1/23  Last DM Education 11/18/22 - next appt 5/23  José Vaughn - Last seen 7/21  No Podiatry  HTN - Restarted Lisinopril 20mg 1/2 pill QD x 1 week - previously caused dizziness  No BP check outside  No other HTN meds previously  Subclinical Hypothyroidism - Management per Endo Dr Magda Veras  Next appt 1/23  Patient is not taking Synthroid currently  Hyperlipidemia - On Pravachol 40mg QHS  Previously on Zocor 40mg QHS  - pt unsure why D/C  No higher dose or other statin previously  CAD - Management per Cardio Dr Jen Osorio  Next appt 9/21 - Overdue  Parkinson's Disease - Management per [facility] Dr Chaka Jameson  Next appt 3/23  S/p Deep brain stimulator 9/22  On Sinemet 25-100mg 1 tab TID PRN, Sinemet CR 50-200mg QHS, Amantadine 100mg QD  H/O CVA -  Unsure of date - 2/19? Management per [facility] Dr Chaka Jameson  Next appt 3/23? Left Carpal Tunnel Syndrome - Management per [facility] Dr Chaka Jameson  Next appt 3/23? Uses a brace  Declined EMG / surgery referral per Neuro       Depression / Anxiety - Mood is stable  On Effexor XR 75mg QD  He is unsure if he's taken high dose or other mood meds previously  He last attended counseling in the late  and states it was not helpful  Patients thinks he might be slightly autistic  Good social supports  No SI/HI/AH/VH  PHQ-2/9 Depression Screening    Little interest or pleasure in doing things: 0 - not at all  Feeling down, depressed, or hopeless: 0 - not at all  Trouble falling or staying asleep, or sleeping too much: 2 - more than half the days  Feeling tired or having little energy: 1 - several days  Poor appetite or overeatin - not at all  Feeling bad about yourself - or that you are a failure or have let yourself or your family down: 0 - not at all  Trouble concentrating on things, such as reading the newspaper or watching television: 0 - not at all  Moving or speaking so slowly that other people could have noticed  Or the opposite - being so fidgety or restless that you have been moving around a lot more than usual: 0 - not at all  Thoughts that you would be better off dead, or of hurting yourself in some way: 0 - not at all  PHQ-9 Score: 3   PHQ-9 Interpretation: No or Minimal depression          HEMANT-7 Flowsheet Screening    Flowsheet Row Most Recent Value   Over the last 2 weeks, how often have you been bothered by any of the following problems? Feeling nervous, anxious, or on edge 1   Not being able to stop or control worrying 0   Worrying too much about different things 0   Trouble relaxing 1   Being so restless that it is hard to sit still 1   Becoming easily annoyed or irritable 0   Feeling afraid as if something awful might happen 0   HEMANT-7 Total Score 3        MDQ:  1, No Problem, Asynchronous    H/O Prostate Cancer - Management per Uro Mr Ruiz Peaks CRNP  Next appt  - Overdue PRN as patient declined F/U and desires yearly PSA per PCP  Status post DaVinci robot prostatectomy performed at [facility]       ED - No longer taking Levitra 20mg QDPRN  Essential Thrombocythemia - No Heme/Onc consult previously  Pipe Smoker - Precontemplative  Intermittent, usually over winter  Reviewed:  Labs 10/17/22, [facility] 12/2/22, Endo 10/17/22, DM Educ 11/18/22, Uro 8/21/20, Cardio 9/15/20    No Uro  Last Colonoscopy 1/27/16, repeat in 10 years - 1/27/26  The following portions of the patient's history were reviewed and updated as appropriate: allergies, current medications, past family history, past medical history, past social history, past surgical history and problem list       Review of Systems   Constitutional: Negative for appetite change, chills, diaphoresis, fatigue and fever  Respiratory: Negative for chest tightness and shortness of breath  Cardiovascular: Negative for chest pain  Gastrointestinal: Negative for abdominal pain, blood in stool, diarrhea, nausea and vomiting  Genitourinary: Negative for dysuria  Musculoskeletal: Positive for myalgias  Current Outpatient Medications   Medication Sig Dispense Refill   • Amantadine HCl 100 MG tablet Take 100 mg by mouth daily Rx per Neuro     • aspirin 325 mg tablet Take 325 mg by mouth daily     • carbidopa-levodopa (SINEMET CR)  mg per tablet Take 1 tablet by mouth daily at bedtime Rx per Neuro     • carbidopa-levodopa (SINEMET)  mg per tablet Take 2 tablets by mouth 3 (three) times a day Follow clinic instructions (Patient taking differently: Take 1 tablet by mouth 2 (two) times a day Third dose PRN  Rx per Neuro) 540 tablet 3   • Cholecalciferol (VITAMIN D) 2000 units CAPS Take 2 capsules (4,000 Units total) by mouth daily  0   • co-enzyme Q-10 30 MG capsule Take 1 capsule (30 mg total) by mouth daily 30 capsule 0   • glucose blood (ONETOUCH VERIO) test strip Check BG 2x per day (Patient taking differently: Check BG 2x per day    Rx per Endo ) 200 each 3   • lisinopril (ZESTRIL) 10 mg tablet Take 10 mg by mouth daily     • metFORMIN (GLUCOPHAGE) 1000 MG tablet Take 1 tablet (1,000 mg total) by mouth 2 (two) times a day with meals (Patient taking differently: Take 1,000 mg by mouth 2 (two) times a day with meals Rx per Endo) 180 tablet 0   • Omega-3 Fatty Acids (fish oil) 1,000 mg Take 1,000 mg by mouth daily     • pravastatin (PRAVACHOL) 40 mg tablet Take 1 tablet (40 mg total) by mouth daily at bedtime 90 tablet 0   • sitaGLIPtin (JANUVIA) 50 mg tablet Take 50 mg by mouth 2 (two) times a day Rx per Endo     • venlafaxine (EFFEXOR-XR) 75 mg 24 hr capsule Take 1 capsule (75 mg total) by mouth daily 90 capsule 0   • Empagliflozin (Jardiance) 25 MG TABS Take 1 tablet (25 mg total) by mouth every morning (Patient not taking: Reported on 1/6/2023) 90 tablet 1   • vardenafil (LEVITRA) 20 MG tablet Take by mouth daily as needed (Patient not taking: Reported on 1/6/2023)       No current facility-administered medications for this visit  Objective:  /72   Pulse 78   Temp (!) 97 4 °F (36 3 °C)   Resp 14   Ht 5' 10" (1 778 m)   Wt 81 6 kg (179 lb 12 8 oz)   SpO2 98%   BMI 25 80 kg/m²    Wt Readings from Last 3 Encounters:   01/06/23 81 6 kg (179 lb 12 8 oz)   11/18/22 79 1 kg (174 lb 6 4 oz)   10/17/22 77 8 kg (171 lb 9 6 oz)      BP Readings from Last 3 Encounters:   01/06/23 136/72   10/17/22 130/70   08/22/22 122/80          Physical Exam  Vitals and nursing note reviewed  Constitutional:       Appearance: Normal appearance  He is well-developed  HENT:      Head: Normocephalic and atraumatic  Eyes:      Conjunctiva/sclera: Conjunctivae normal    Neck:      Thyroid: No thyromegaly  Vascular: No carotid bruit  Cardiovascular:      Rate and Rhythm: Normal rate and regular rhythm  Heart sounds: Normal heart sounds  Pulmonary:      Effort: Pulmonary effort is normal       Breath sounds: Normal breath sounds  Abdominal:      General: Bowel sounds are normal  There is no distension        Palpations: Abdomen is soft  There is no mass  Tenderness: There is no abdominal tenderness  There is no guarding or rebound  Musculoskeletal:         General: No swelling or tenderness  Cervical back: Neck supple  Right lower leg: No edema  Left lower leg: No edema  Skin:     General: Skin is warm and dry  Neurological:      Mental Status: He is alert and oriented to person, place, and time  Comments: No cogwheeling, wearing left wrist brace   Psychiatric:         Mood and Affect: Mood normal          Behavior: Behavior normal          Thought Content: Thought content normal          Judgment: Judgment normal          Lab Results:      Lab Results   Component Value Date    WBC 9 40 12/29/2020    HGB 16 4 12/29/2020    HCT 50 6 (H) 12/29/2020     (H) 12/29/2020    CHOL 206 12/17/2015    TRIG 225 (H) 06/14/2022    HDL 39 (L) 06/14/2022    ALT 4 (L) 06/14/2022    AST 16 06/14/2022     12/17/2015    K 4 9 10/17/2022     10/17/2022    CREATININE 1 07 10/17/2022    BUN 20 10/17/2022    CO2 27 10/17/2022    TSH 2 06 12/20/2019    PSA <0 1 12/06/2021    GLUF 129 (H) 10/17/2022    HGBA1C 7 0 (H) 10/17/2022     No results found for: URICACID  Invalid input(s): BASENAME Vitamin D    No results found  POCT Labs      BMI Counseling: Body mass index is 25 8 kg/m²  The BMI is above normal  Nutrition recommendations include encouraging healthy choices of fruits and vegetables  Exercise recommendations include exercising 3-5 times per week  No pharmacotherapy was ordered  Rationale for BMI follow-up plan is due to patient being overweight or obese  Tobacco Cessation Counseling: Tobacco cessation counseling was provided  The patient is sincerely urged to quit consumption of tobacco  He is not ready to quit tobacco  Medication options and side effects of medication not discussed  BMI Counseling: Body mass index is 25 8 kg/m²   The BMI is above normal  Nutrition recommendations include 3-5 servings of fruits/vegetables daily  Exercise recommendations include exercising 3-5 times per week

## 2023-01-05 PROBLEM — F17.200 SMOKER: Status: ACTIVE | Noted: 2023-01-05

## 2023-01-05 PROBLEM — Z85.46 HISTORY OF PROSTATE CANCER: Status: ACTIVE | Noted: 2023-01-05

## 2023-01-05 PROBLEM — M54.6 THORACIC SPINE PAIN: Status: RESOLVED | Noted: 2019-03-07 | Resolved: 2023-01-05

## 2023-01-05 PROBLEM — R25.1 TREMOR OF LEFT HAND: Status: RESOLVED | Noted: 2019-01-10 | Resolved: 2023-01-05

## 2023-01-05 PROBLEM — H60.542 DERMATITIS OF LEFT EAR CANAL: Status: RESOLVED | Noted: 2018-07-13 | Resolved: 2023-01-05

## 2023-01-06 ENCOUNTER — OFFICE VISIT (OUTPATIENT)
Dept: FAMILY MEDICINE CLINIC | Facility: CLINIC | Age: 68
End: 2023-01-06

## 2023-01-06 VITALS
DIASTOLIC BLOOD PRESSURE: 72 MMHG | TEMPERATURE: 97.4 F | HEIGHT: 70 IN | HEART RATE: 78 BPM | OXYGEN SATURATION: 98 % | RESPIRATION RATE: 14 BRPM | SYSTOLIC BLOOD PRESSURE: 136 MMHG | BODY MASS INDEX: 25.74 KG/M2 | WEIGHT: 179.8 LBS

## 2023-01-06 DIAGNOSIS — F41.9 ANXIETY: ICD-10-CM

## 2023-01-06 DIAGNOSIS — E03.8 SUBCLINICAL HYPOTHYROIDISM: ICD-10-CM

## 2023-01-06 DIAGNOSIS — F17.290 PIPE SMOKER: ICD-10-CM

## 2023-01-06 DIAGNOSIS — E11.9 TYPE 2 DIABETES MELLITUS WITHOUT COMPLICATION, WITHOUT LONG-TERM CURRENT USE OF INSULIN (HCC): Primary | ICD-10-CM

## 2023-01-06 DIAGNOSIS — Z12.5 PROSTATE CANCER SCREENING: ICD-10-CM

## 2023-01-06 DIAGNOSIS — I10 BENIGN ESSENTIAL HYPERTENSION: ICD-10-CM

## 2023-01-06 DIAGNOSIS — M79.10 MYALGIA: ICD-10-CM

## 2023-01-06 DIAGNOSIS — I25.10 ARTERIOSCLEROSIS OF CORONARY ARTERY: ICD-10-CM

## 2023-01-06 DIAGNOSIS — E11.65 TYPE 2 DIABETES MELLITUS WITH HYPERGLYCEMIA, WITHOUT LONG-TERM CURRENT USE OF INSULIN (HCC): ICD-10-CM

## 2023-01-06 DIAGNOSIS — M60.9 MYOSITIS, UNSPECIFIED MYOSITIS TYPE, UNSPECIFIED SITE: ICD-10-CM

## 2023-01-06 DIAGNOSIS — E66.3 OVERWEIGHT: ICD-10-CM

## 2023-01-06 DIAGNOSIS — D47.3 ESSENTIAL (HEMORRHAGIC) THROMBOCYTHEMIA (HCC): ICD-10-CM

## 2023-01-06 DIAGNOSIS — G20 PARKINSON DISEASE (HCC): ICD-10-CM

## 2023-01-06 DIAGNOSIS — E78.2 MIXED HYPERLIPIDEMIA: ICD-10-CM

## 2023-01-06 DIAGNOSIS — N52.8 OTHER MALE ERECTILE DYSFUNCTION: ICD-10-CM

## 2023-01-06 DIAGNOSIS — F32.A DEPRESSION, UNSPECIFIED DEPRESSION TYPE: ICD-10-CM

## 2023-01-06 DIAGNOSIS — Z85.46 HISTORY OF PROSTATE CANCER: ICD-10-CM

## 2023-01-06 DIAGNOSIS — F32.0 CURRENT MILD EPISODE OF MAJOR DEPRESSIVE DISORDER, UNSPECIFIED WHETHER RECURRENT (HCC): ICD-10-CM

## 2023-01-06 DIAGNOSIS — Z86.73 HISTORY OF STROKE: ICD-10-CM

## 2023-01-06 PROBLEM — M75.102 TEAR OF LEFT SUPRASPINATUS TENDON: Status: RESOLVED | Noted: 2019-07-11 | Resolved: 2023-01-06

## 2023-01-06 PROBLEM — C61 PROSTATE CANCER (HCC): Status: RESOLVED | Noted: 2018-02-02 | Resolved: 2023-01-06

## 2023-01-06 PROBLEM — F32.5 MAJOR DEPRESSIVE DISORDER IN FULL REMISSION, UNSPECIFIED WHETHER RECURRENT (HCC): Status: RESOLVED | Noted: 2022-08-22 | Resolved: 2023-01-06

## 2023-01-06 PROBLEM — M75.42 IMPINGEMENT SYNDROME OF LEFT SHOULDER: Status: RESOLVED | Noted: 2019-03-07 | Resolved: 2023-01-06

## 2023-01-06 RX ORDER — VENLAFAXINE HYDROCHLORIDE 75 MG/1
75 CAPSULE, EXTENDED RELEASE ORAL DAILY
Qty: 90 CAPSULE | Refills: 0 | Status: SHIPPED | OUTPATIENT
Start: 2023-01-06

## 2023-01-06 RX ORDER — LISINOPRIL 10 MG/1
10 TABLET ORAL DAILY
COMMUNITY

## 2023-01-06 RX ORDER — PRAVASTATIN SODIUM 40 MG
40 TABLET ORAL
Qty: 90 TABLET | Refills: 0 | Status: SHIPPED | OUTPATIENT
Start: 2023-01-06

## 2023-01-06 NOTE — ASSESSMENT & PLAN NOTE
Lab Results   Component Value Date    HGBA1C 7 0 (H) 10/17/2022     Stable  Management per Endo  Recommend lifestyle modifications

## 2023-01-06 NOTE — ASSESSMENT & PLAN NOTE
Stable on recently resumed Lisinopril 10mg daily  No recurrent dizziness  Check blood pressure outside of office  Recommend lifestyle modifications

## 2023-01-06 NOTE — PROGRESS NOTES
Diabetic Foot Exam    Patient's shoes and socks removed  Right Foot/Ankle   Right Foot Inspection  Skin Exam: skin normal and skin intact  No dry skin, no warmth, no callus, no erythema, no maceration, no abnormal color, no pre-ulcer, no ulcer and no callus  Toe Exam: ROM and strength within normal limits  Sensory   Monofilament testing: intact    Vascular  Capillary refills: elevated  The right DP pulse is 2+  Left Foot/Ankle  Left Foot Inspection  Skin Exam: skin normal and skin intact  No dry skin, no warmth, no erythema, no maceration, normal color, no pre-ulcer, no ulcer and no callus  Toe Exam: ROM and strength within normal limits  Sensory   Monofilament testing: intact    Vascular  Capillary refills: elevated  The left DP pulse is 2+       Assign Risk Category  No deformity present  No loss of protective sensation  No weak pulses  Risk: 0

## 2023-01-06 NOTE — ASSESSMENT & PLAN NOTE
Patient declined continued Uro F/U and desires yearly PSA per PCP  Status post DaVinci robot prostatectomy performed at [facility] 2013

## 2023-01-06 NOTE — ASSESSMENT & PLAN NOTE
Pending labs  LDL goal less than 70 s/p CVA and CAD  Continue Pravachol 40mg QHS  Recommend lifestyle modifications

## 2023-01-06 NOTE — PATIENT INSTRUCTIONS
To Do: 1  Call for appt - Heart Disease - Management per Cardio Dr Susy Kunz  Next appt 9/21 - Overdue  2  Call for Agustin Singh - Last seen 7/21  Weight Management   AMBULATORY CARE:   Why it is important to manage your weight:  Being overweight increases your risk of health conditions such as heart disease, high blood pressure, type 2 diabetes, and certain types of cancer  It can also increase your risk for osteoarthritis, sleep apnea, and other respiratory problems  Aim for a slow, steady weight loss  Even a small amount of weight loss can lower your risk of health problems  Risks of being overweight:  Extra weight can cause many health problems, including the following:  Diabetes (high blood sugar level)    High blood pressure or high cholesterol    Heart disease    Stroke    Gallbladder or liver disease    Cancer of the colon, breast, prostate, liver, or kidney    Sleep apnea    Arthritis or gout    Screening  is done to check for health conditions before you have signs or symptoms  If you are 28to 79years old, your blood sugar level may be checked every 3 years for signs of prediabetes or diabetes  Your healthcare provider will check your blood pressure at each visit  High blood pressure can lead to a stroke or other problems  Your provider may check for signs of heart disease, cancer, or other health problems  How to lose weight safely:  A safe and healthy way to lose weight is to eat fewer calories and get regular exercise  You can lose up about 1 pound a week by decreasing the number of calories you eat by 500 calories each day  You can decrease calories by eating smaller portion sizes or by cutting out high-calorie foods  Read labels to find out how many calories are in the foods you eat  You can also burn calories with exercise such as walking, swimming, or biking  You will be more likely to keep weight off if you make these changes part of your lifestyle   Exercise at least 30 minutes per day on most days of the week  You can also fit in more physical activity by taking the stairs instead of the elevator or parking farther away from stores  Ask your healthcare provider about the best exercise plan for you  Healthy meal plan for weight management:  A healthy meal plan includes a variety of foods, contains fewer calories, and helps you stay healthy  A healthy meal plan includes the following:     Eat whole-grain foods more often  A healthy meal plan should contain fiber  Fiber is the part of grains, fruits, and vegetables that is not broken down by your body  Whole-grain foods are healthy and provide extra fiber in your diet  Some examples of whole-grain foods are whole-wheat breads and pastas, oatmeal, brown rice, and bulgur  Eat a variety of vegetables every day  Include dark, leafy greens such as spinach, kale, michelle greens, and mustard greens  Eat yellow and orange vegetables such as carrots, sweet potatoes, and winter squash  Eat a variety of fruits every day  Choose fresh or canned fruit (canned in its own juice or light syrup) instead of juice  Fruit juice has very little or no fiber  Eat low-fat dairy foods  Drink fat-free (skim) milk or 1% milk  Eat fat-free yogurt and low-fat cottage cheese  Try low-fat cheeses such as mozzarella and other reduced-fat cheeses  Choose meat and other protein foods that are low in fat  Choose beans or other legumes such as split peas or lentils  Choose fish, skinless poultry (chicken or turkey), or lean cuts of red meat (beef or pork)  Before you cook meat or poultry, cut off any visible fat  Use less fat and oil  Try baking foods instead of frying them  Add less fat, such as margarine, sour cream, regular salad dressing and mayonnaise to foods  Eat fewer high-fat foods  Some examples of high-fat foods include french fries, doughnuts, ice cream, and cakes  Eat fewer sweets    Limit foods and drinks that are high in sugar  This includes candy, cookies, regular soda, and sweetened drinks  Ways to decrease calories:   Eat smaller portions  Use a small plate with smaller servings  Do not eat second helpings  When you eat at a restaurant, ask for a box and place half of your meal in the box before you eat  Share an entrée with someone else  Replace high-calorie snacks with healthy, low-calorie snacks  Choose fresh fruit, vegetables, fat-free rice cakes, or air-popped popcorn instead of potato chips, nuts, or chocolate  Choose water or calorie-free drinks instead of soda or sweetened drinks  Do not shop for groceries when you are hungry  You may be more likely to make unhealthy food choices  Take a grocery list of healthy foods and shop after you have eaten  Eat regular meals  Do not skip meals  Skipping meals can lead to overeating later in the day  This can make it harder for you to lose weight  Eat a healthy snack in place of a meal if you do not have time to eat a regular meal  Talk with a dietitian to help you create a meal plan and schedule that is right for you  Other things to consider as you try to lose weight:   Be aware of situations that may give you the urge to overeat, such as eating while watching television  Find ways to avoid these situations  For example, read a book, go for a walk, or do crafts  Meet with a weight loss support group or friends who are also trying to lose weight  This may help you stay motivated to continue working on your weight loss goals  © Copyright [employer] 2022 Information is for End User's use only and may not be sold, redistributed or otherwise used for commercial purposes  All illustrations and images included in CareNotes® are the copyrighted property of [employer]  or [facility] [employer]   The above information is an  only  It is not intended as medical advice for individual conditions or treatments   Talk to your doctor, nurse or pharmacist before following any medical regimen to see if it is safe and effective for you    3

## 2023-01-06 NOTE — ASSESSMENT & PLAN NOTE
Management per [facility]  S/p Deep brain stimulator 9/22  On Sinemet 25-100mg 1 tab TID PRN, Sinemet CR 50-200mg QHS, Amantadine 100mg QD    Patient is considering returning to [facility]

## 2023-01-06 NOTE — ASSESSMENT & PLAN NOTE
Asymptomatic  Management per Cardio - overdue for appt  Continue ASA, statin, ACE-I  Recommend lifestyle modifications

## 2023-01-09 ENCOUNTER — TELEPHONE (OUTPATIENT)
Dept: ADMINISTRATIVE | Facility: OTHER | Age: 68
End: 2023-01-09

## 2023-01-09 NOTE — LETTER
Vaccination Request Form: Pneumococcal (PCV13 and/or PPSV23 or PCV20) and Zoster      Date Requested: 23  Patient: Nicki Ramachandran  Patient : 1955   Referring Provider: Sande Moritz, DO       The above patient has informed us that they have had their   most recent Pneumococcal (PCV13 and/or PPSV23 or PCV20) and Zoster administered at your facility  Please   complete this form and attach all corresponding documentation  Date of Vaccine(s) Given  ______________________________    Lot Number(s) _______________________________________    Manufacture(s) ______________________________________    Dose Amount (s) _____________________________________    Expiration Date(s) ____________________________________    Comments __________________________________________________________  ____________________________________________________________________  ____________________________________________________________________  ____________________________________________________________________    Administering Facility  ________________________________________________    Vaccine Administered By (print name) ___________________________________      Form Completed By (print name) _______________________________________      Signature ___________________________________________________________      These reports are needed for  compliance  Please fax this completed form and a copy of the Vaccine Document(s) to our office located at [place] [address] as soon as possible to [phone] attention Beatrice: Phone [phone]    We thank you for your assistance in treating our mutual patient     (sent to [address])

## 2023-01-09 NOTE — LETTER
Vaccination Request Form: Pneumococcal (PCV13 and/or PPSV23 or PCV20) and Zoster      Date Requested: 23  Patient: Reilly Ritchie  Patient : 1955   Referring Provider: Mp Ontiveros DO       The above patient has informed us that they have had their   most recent Pneumococcal (PCV13 and/or PPSV23 or PCV20) and Zoster administered at your facility  Please   complete this form and attach all corresponding documentation  Date of Vaccine(s) Given  ______________________________    Lot Number(s) _______________________________________    Manufacture(s) ______________________________________    Dose Amount (s) _____________________________________    Expiration Date(s) ____________________________________    Comments __________________________________________________________  ____________________________________________________________________  ____________________________________________________________________  ____________________________________________________________________    Administering Facility  ________________________________________________    Vaccine Administered By (print name) ___________________________________      Form Completed By (print name) _______________________________________      Signature ___________________________________________________________      These reports are needed for  compliance  Please fax this completed form and a copy of the Vaccine Document(s) to our office located at [place] [address] as soon as possible to [phone] attention Beatrice: Phone [phone]    We thank you for your assistance in treating our mutual patient     (sent to [address])

## 2023-01-09 NOTE — TELEPHONE ENCOUNTER
----- Message from Tommie Segura LPN sent at 0/8/5043 10:17 AM EST -----  Regarding: GLORIA CURRIE  01/06/23 10:17 AM    Hello, our patient Petey Jones has had Immunization(s) completed/performed  Please assist in updating the patient chart by making an External outreach to [facility] facility located on [address] [place] [address] in [place], [place]  The date of service is in 2022      Thank you,  Alesia Field

## 2023-01-10 NOTE — TELEPHONE ENCOUNTER
Upon review of the In Basket request and the patient's chart, initial outreach has been made via telephone call to facility  Please see Contacts section for details       Thank you  Malcolm Osborne MA

## 2023-01-13 NOTE — TELEPHONE ENCOUNTER
As a follow-up, a second attempt has been made for outreach via fax to facility  Please see Contacts section for details      Thank you  Umm Brizuela MA

## 2023-01-17 NOTE — TELEPHONE ENCOUNTER
As a final attempt, a third outreach has been made via fax to facility  Please see Contacts section for details  This encounter will be closed and completed by end of day  Should we receive the requested information because of previous outreach attempts, the requested patient's chart will be updated appropriately       Thank you  Eduardo Harp MA

## 2023-01-23 ENCOUNTER — LAB (OUTPATIENT)
Dept: LAB | Facility: CLINIC | Age: 68
End: 2023-01-23

## 2023-01-23 DIAGNOSIS — Z85.46 HISTORY OF PROSTATE CANCER: ICD-10-CM

## 2023-01-23 DIAGNOSIS — F41.9 ANXIETY: ICD-10-CM

## 2023-01-23 DIAGNOSIS — D47.3 ESSENTIAL (HEMORRHAGIC) THROMBOCYTHEMIA (HCC): ICD-10-CM

## 2023-01-23 DIAGNOSIS — G20 PARKINSON DISEASE (HCC): ICD-10-CM

## 2023-01-23 DIAGNOSIS — F32.0 CURRENT MILD EPISODE OF MAJOR DEPRESSIVE DISORDER, UNSPECIFIED WHETHER RECURRENT (HCC): ICD-10-CM

## 2023-01-23 DIAGNOSIS — Z12.5 PROSTATE CANCER SCREENING: ICD-10-CM

## 2023-01-23 DIAGNOSIS — E11.65 TYPE 2 DIABETES MELLITUS WITH HYPERGLYCEMIA, WITHOUT LONG-TERM CURRENT USE OF INSULIN (HCC): ICD-10-CM

## 2023-01-23 DIAGNOSIS — E78.2 MIXED HYPERLIPIDEMIA: ICD-10-CM

## 2023-01-23 DIAGNOSIS — E66.3 OVERWEIGHT: ICD-10-CM

## 2023-01-23 DIAGNOSIS — M60.9 MYOSITIS, UNSPECIFIED MYOSITIS TYPE, UNSPECIFIED SITE: ICD-10-CM

## 2023-01-23 DIAGNOSIS — I10 BENIGN ESSENTIAL HYPERTENSION: ICD-10-CM

## 2023-01-23 DIAGNOSIS — E03.8 SUBCLINICAL HYPOTHYROIDISM: ICD-10-CM

## 2023-01-23 DIAGNOSIS — M79.10 MYALGIA: ICD-10-CM

## 2023-01-23 LAB
25(OH)D3 SERPL-MCNC: 32.4 NG/ML (ref 30–100)
ALBUMIN SERPL BCP-MCNC: 4.3 G/DL (ref 3.5–5)
ALP SERPL-CCNC: 43 U/L (ref 34–104)
ALT SERPL W P-5'-P-CCNC: 3 U/L (ref 7–52)
ANION GAP SERPL CALCULATED.3IONS-SCNC: 4 MMOL/L (ref 4–13)
AST SERPL W P-5'-P-CCNC: 18 U/L (ref 13–39)
BASOPHILS # BLD AUTO: 0.08 THOUSANDS/ÂΜL (ref 0–0.1)
BASOPHILS NFR BLD AUTO: 1 % (ref 0–1)
BILIRUB SERPL-MCNC: 0.57 MG/DL (ref 0.2–1)
BUN SERPL-MCNC: 18 MG/DL (ref 5–25)
CALCIUM SERPL-MCNC: 9.4 MG/DL (ref 8.4–10.2)
CHLORIDE SERPL-SCNC: 104 MMOL/L (ref 96–108)
CHOLEST SERPL-MCNC: 177 MG/DL
CO2 SERPL-SCNC: 29 MMOL/L (ref 21–32)
CREAT SERPL-MCNC: 1.01 MG/DL (ref 0.6–1.3)
EOSINOPHIL # BLD AUTO: 0.32 THOUSAND/ÂΜL (ref 0–0.61)
EOSINOPHIL NFR BLD AUTO: 3 % (ref 0–6)
ERYTHROCYTE [DISTWIDTH] IN BLOOD BY AUTOMATED COUNT: 14.5 % (ref 11.6–15.1)
EST. AVERAGE GLUCOSE BLD GHB EST-MCNC: 163 MG/DL
GFR SERPL CREATININE-BSD FRML MDRD: 76 ML/MIN/1.73SQ M
GLUCOSE P FAST SERPL-MCNC: 159 MG/DL (ref 65–99)
HBA1C MFR BLD: 7.3 %
HCT VFR BLD AUTO: 50.9 % (ref 36.5–49.3)
HDLC SERPL-MCNC: 48 MG/DL
HGB BLD-MCNC: 16.2 G/DL (ref 12–17)
IMM GRANULOCYTES # BLD AUTO: 0.05 THOUSAND/UL (ref 0–0.2)
IMM GRANULOCYTES NFR BLD AUTO: 1 % (ref 0–2)
LDLC SERPL CALC-MCNC: 101 MG/DL (ref 0–100)
LYMPHOCYTES # BLD AUTO: 2.42 THOUSANDS/ÂΜL (ref 0.6–4.47)
LYMPHOCYTES NFR BLD AUTO: 25 % (ref 14–44)
MCH RBC QN AUTO: 29.3 PG (ref 26.8–34.3)
MCHC RBC AUTO-ENTMCNC: 31.8 G/DL (ref 31.4–37.4)
MCV RBC AUTO: 92 FL (ref 82–98)
MONOCYTES # BLD AUTO: 0.72 THOUSAND/ÂΜL (ref 0.17–1.22)
MONOCYTES NFR BLD AUTO: 8 % (ref 4–12)
NEUTROPHILS # BLD AUTO: 6.01 THOUSANDS/ÂΜL (ref 1.85–7.62)
NEUTS SEG NFR BLD AUTO: 62 % (ref 43–75)
NRBC BLD AUTO-RTO: 0 /100 WBCS
PLATELET # BLD AUTO: 522 THOUSANDS/UL (ref 149–390)
PMV BLD AUTO: 9.9 FL (ref 8.9–12.7)
POTASSIUM SERPL-SCNC: 4.8 MMOL/L (ref 3.5–5.3)
PROT SERPL-MCNC: 7.1 G/DL (ref 6.4–8.4)
PSA SERPL-MCNC: <0.1 NG/ML (ref 0–4)
RBC # BLD AUTO: 5.52 MILLION/UL (ref 3.88–5.62)
SODIUM SERPL-SCNC: 137 MMOL/L (ref 135–147)
T4 FREE SERPL-MCNC: 0.79 NG/DL (ref 0.76–1.46)
TRIGL SERPL-MCNC: 140 MG/DL
TSH SERPL DL<=0.05 MIU/L-ACNC: 2.19 UIU/ML (ref 0.45–4.5)
WBC # BLD AUTO: 9.6 THOUSAND/UL (ref 4.31–10.16)

## 2023-01-23 NOTE — TELEPHONE ENCOUNTER
Upon review of the In Basket request we were able to locate, review, and update the patient chart as requested for Immunization(s)  (Zoster 8-, Pneumovax 12-)  Any additional questions or concerns should be emailed to the Practice Liaisons via the appropriate education email address, please do not reply via In Basket      Thank you  Ely Simpson MA

## 2023-01-23 NOTE — RESULT ENCOUNTER NOTE
Stable labs - will review with patient at upcoming appointment  Essential Thrombosis - Pipe smoking may be contributory  To consider Heme/Onc consult?

## 2023-01-24 ENCOUNTER — OFFICE VISIT (OUTPATIENT)
Dept: ENDOCRINOLOGY | Facility: CLINIC | Age: 68
End: 2023-01-24

## 2023-01-24 VITALS
DIASTOLIC BLOOD PRESSURE: 80 MMHG | SYSTOLIC BLOOD PRESSURE: 142 MMHG | WEIGHT: 182 LBS | HEIGHT: 70 IN | HEART RATE: 88 BPM | BODY MASS INDEX: 26.05 KG/M2

## 2023-01-24 DIAGNOSIS — E11.65 TYPE 2 DIABETES MELLITUS WITH HYPERGLYCEMIA, WITHOUT LONG-TERM CURRENT USE OF INSULIN (HCC): Primary | ICD-10-CM

## 2023-01-24 DIAGNOSIS — E78.2 MIXED HYPERLIPIDEMIA: ICD-10-CM

## 2023-01-24 DIAGNOSIS — I10 BENIGN ESSENTIAL HYPERTENSION: ICD-10-CM

## 2023-01-24 DIAGNOSIS — E03.8 SUBCLINICAL HYPOTHYROIDISM: ICD-10-CM

## 2023-01-24 NOTE — PROGRESS NOTES
Established Patient Progress Note      Chief Complaint   Patient presents with   • Diabetes Type 2        History of Present Illness:   Aggie Oliva is a 79 y o  male with a history of type 2 diabetes without long term use of insulin since about 15 years ago  Reports complications of none  Denies recent illness or hospitalizations  Denies recent severe hypoglycemic or severe hyperglycemic episodes  Denies any issues with his current regimen except cost of jardiance when in donut hole  home glucose monitoring: are performed sporadically  Blood sugar range 70s-150s on review of glucometer  Has gotten brain stimulator which has helped his Parkinsons Symptoms  Plans to return to [employer]    Recently has had stress due to wife's bipolar and stress eating and also more eating over the holidays  Ran out of Jardiance and hit donut hole (not sure when), had leftover Januvia and resumed  Current regimen:   Metformin 1000mg twice per day   Januvia 100mg daily  *Stopped Jardiance end of 2023 due to cost, donut hole  Last Eye Exam: Needs exam  Last Foot Exam: UTD 1/6/2023    Has hypertension: Taking lisinopril  (was off of it for a while due to dizziness, now taking 1/2 dose     Has hyperlipidemia: Taking pravastatin    Thyroid disorders: hx subclinical hypothyroidism, recent TSH normal      Patient Active Problem List   Diagnosis   • Arteriosclerosis of coronary artery   • Benign essential hypertension   • Depression   • Type 2 diabetes mellitus without complication, without long-term current use of insulin (HCC)   • Subclinical hypothyroidism   • Essential (hemorrhagic) thrombocythemia (HCC)   • Mixed hyperlipidemia   • Leukocytosis   • Other male erectile dysfunction   • Parkinson disease (HCC)   • Type 2 diabetes mellitus with hyperglycemia, without long-term current use of insulin (HCC)   • Dystonia   • History of stroke   • Hyperkalemia   • History of prostate cancer   • Pipe smoker   • Anxiety   • Overweight      Past Medical History:   Diagnosis Date   • Anxiety    • Arteriosclerosis of coronary artery 05/24/2017   • Benign essential hypertension 05/15/2015   • Depression 06/01/2012   • Essential (hemorrhagic) thrombocythemia (HCC)    • History of prostate cancer 01/05/2023   • History of stroke    • Left carpal tunnel syndrome    • Lumbar canal stenosis    • Mixed hyperlipidemia 11/10/2010   • Osteoarthritis, knee    • Other male erectile dysfunction 08/08/2019   • Overweight    • Parkinson disease ([facility] [address] ) 10/08/2019   • Pipe smoker    • Retinal and vitreous disorder    • Subclinical hypothyroidism 04/11/2017   • Type 2 diabetes mellitus without complication, without long-term current use of insulin ([facility] [address] ) 08/24/2017      Past Surgical History:   Procedure Laterality Date   • ANKLE FRACTURE SURGERY Left 04/2009    triple fracture; 2 Screws in place   • [address]  Bilateral 09/2022   • LUMBAR LAMINECTOMY  2003    L5-S1   • NASAL SEPTUM SURGERY  06/1989   • MI COLONOSCOPY FLX DX W/COLLJ SPEC WHEN PFRMD N/A 01/27/2016    Procedure: COLONOSCOPY;  Surgeon: Tam Rendon MD;  Location: [facility]; Service: Gastroenterology   • PROSTATE BIOPSY  12/08/2012    managed by Nick Petty, needle biopsy   • PROSTATECTOMY  09/23/2013      Family History   Problem Relation Age of Onset   • Hypertension Mother    • Retinal detachment Mother    • Retinitis pigmentosa Mother    • Diabetes type II Father    • Heart attack Father 61   • Coronary artery disease Father 61   • No Known Problems Son    • No Known Problems Son      Social History     Tobacco Use   • Smoking status: Some Days     Types: Pipe     Start date: 1/1/1995   • Smokeless tobacco: Never   Substance Use Topics   • Alcohol use:  Yes     Alcohol/week: 4 0 standard drinks     Types: 3 Cans of beer, 1 Shots of liquor per week     Comment: occassional      No Known Allergies      Current Outpatient Medications:   •  Amantadine HCl 100 MG tablet, Take 100 mg by mouth daily Rx per Neuro, Disp: , Rfl:   •  aspirin 325 mg tablet, Take 325 mg by mouth daily, Disp: , Rfl:   •  carbidopa-levodopa (SINEMET CR)  mg per tablet, Take 1 tablet by mouth daily at bedtime Rx per Neuro, Disp: , Rfl:   •  carbidopa-levodopa (SINEMET)  mg per tablet, Take 2 tablets by mouth 3 (three) times a day Follow clinic instructions (Patient taking differently: Take 1 tablet by mouth 2 (two) times a day Third dose PRN  Rx per Neuro), Disp: 540 tablet, Rfl: 3  •  Cholecalciferol (VITAMIN D) 2000 units CAPS, Take 2 capsules (4,000 Units total) by mouth daily, Disp: , Rfl: 0  •  co-enzyme Q-10 30 MG capsule, Take 1 capsule (30 mg total) by mouth daily, Disp: 30 capsule, Rfl: 0  •  glucose blood (ONETOUCH VERIO) test strip, Check BG 2x per day (Patient taking differently: Check BG 2x per day  Rx per Endo ), Disp: 200 each, Rfl: 3  •  lisinopril (ZESTRIL) 10 mg tablet, Take 10 mg by mouth daily, Disp: , Rfl:   •  Omega-3 Fatty Acids (fish oil) 1,000 mg, Take 1,000 mg by mouth daily, Disp: , Rfl:   •  pravastatin (PRAVACHOL) 40 mg tablet, Take 1 tablet (40 mg total) by mouth daily at bedtime, Disp: 90 tablet, Rfl: 0  •  sitaGLIPtin-metFORMIN (JANUMET)  MG per tablet, Take 1 tablet by mouth 2 (two) times a day with meals, Disp: 180 tablet, Rfl: 1  •  venlafaxine (EFFEXOR-XR) 75 mg 24 hr capsule, Take 1 capsule (75 mg total) by mouth daily, Disp: 90 capsule, Rfl: 0  •  vardenafil (LEVITRA) 20 MG tablet, Take by mouth daily as needed (Patient not taking: Reported on 1/24/2023), Disp: , Rfl:     Review of Systems   Constitutional: Negative for activity change, appetite change and fatigue  HENT: Negative for sore throat, trouble swallowing and voice change  Eyes: Negative for visual disturbance  Respiratory: Negative for choking, chest tightness and shortness of breath  Cardiovascular: Negative for chest pain, palpitations and leg swelling  Gastrointestinal: Negative for abdominal pain, constipation and diarrhea  Endocrine: Negative for cold intolerance, heat intolerance, polydipsia, polyphagia and polyuria  Genitourinary: Negative for frequency  Musculoskeletal: Positive for arthralgias and gait problem  Negative for myalgias  Skin: Negative for rash  Neurological: Positive for tremors (improved)  Negative for dizziness and syncope  Hematological: Negative for adenopathy  Psychiatric/Behavioral: Negative for sleep disturbance  All other systems reviewed and are negative  Physical Exam:  Body mass index is 26 11 kg/m²  /80   Pulse 88   Ht 5' 10" (1 778 m)   Wt 82 6 kg (182 lb)   BMI 26 11 kg/m²    Wt Readings from Last 3 Encounters:   01/24/23 82 6 kg (182 lb)   01/06/23 81 6 kg (179 lb 12 8 oz)   11/18/22 79 1 kg (174 lb 6 4 oz)       Physical Exam  Vitals reviewed  Constitutional:       General: He is not in acute distress  Appearance: He is well-developed  HENT:      Head: Normocephalic and atraumatic  Eyes:      Conjunctiva/sclera: Conjunctivae normal       Pupils: Pupils are equal, round, and reactive to light  Neck:      Thyroid: No thyromegaly  Cardiovascular:      Rate and Rhythm: Normal rate and regular rhythm  Heart sounds: Normal heart sounds  No murmur heard  Pulmonary:      Effort: Pulmonary effort is normal  No respiratory distress  Breath sounds: Normal breath sounds  No wheezing or rales  Abdominal:      General: Bowel sounds are normal  There is no distension  Palpations: Abdomen is soft  Tenderness: There is no abdominal tenderness  Musculoskeletal:         General: Normal range of motion  Cervical back: Normal range of motion and neck supple  Comments: Left wrist brace   Lymphadenopathy:      Cervical: No cervical adenopathy  Skin:     General: Skin is warm and dry     Neurological:      Mental Status: He is alert and oriented to person, place, and time            Labs:   Lab Results   Component Value Date    HGBA1C 7 3 (H) 01/23/2023    HGBA1C 7 0 (H) 10/17/2022    HGBA1C 7 2 (A) 08/22/2022     Lab Results   Component Value Date    CREATININE 1 01 01/23/2023    CREATININE 1 07 10/17/2022    CREATININE 0 98 06/14/2022    BUN 18 01/23/2023     12/17/2015    K 4 8 01/23/2023     01/23/2023    CO2 29 01/23/2023     eGFR   Date Value Ref Range Status   01/23/2023 76 ml/min/1 73sq m Final     Lab Results   Component Value Date    CHOL 206 12/17/2015    HDL 48 01/23/2023    TRIG 140 01/23/2023     Lab Results   Component Value Date    ALT 3 (L) 01/23/2023    AST 18 01/23/2023    ALKPHOS 43 01/23/2023    BILITOT 0 47 12/17/2015     Lab Results   Component Value Date    BRV9JCEHUHBR 2 192 01/23/2023    KIP3EJCCXJTT 1 807 06/14/2022    XLH8GXTYFVSF 2 696 04/29/2020     Lab Results   Component Value Date    FREET4 0 79 01/23/2023       Impression & Plan:    Problem List Items Addressed This Visit        Endocrine    Subclinical hypothyroidism     Thyroid lab testing is normal           Type 2 diabetes mellitus with hyperglycemia, without long-term current use of insulin (HCC) - Primary     Diabetes control has worsened  He stopped Jardiance end of last year due to cost and resumed Januvia that he had at home  Recent glucometer readings have been stable  Recommend return to SGLT2 inhibitor OR Change Januvia to GLP-1 agonist for cardiovascular benefits but he declines  At this time, he would prefer to change his Januvia and Metformin to Janumet due to cost   Will repeat lab testing 3 months and if A1C has not improved will make change to regimen  Discussed patient assistance programs but he does not think he would qualify due to income  Advised him to complete Diabetic Eye exam and send copy of report     Lab Results   Component Value Date    HGBA1C 7 3 (H) 01/23/2023            Relevant Medications    sitaGLIPtin-metFORMIN (JANUMET)  MG per tablet    Other Relevant Orders    Hemoglobin A5G    Basic metabolic panel       Cardiovascular and Mediastinum    Benign essential hypertension     Not at goal today  He has recently resumed lisinopril 10mg, previously had dizziness on 20mg dose  Continue current regimen and monitor  He has follow up with PCP next month  Other    Mixed hyperlipidemia     Continue statin  Orders Placed This Encounter   Procedures   • Hemoglobin A1C     Standing Status:   Future     Standing Expiration Date:   1/24/2024   • Basic metabolic panel     This is a patient instruction: Patient fasting for 8 hours or longer recommended  Standing Status:   Future     Standing Expiration Date:   1/24/2024       There are no Patient Instructions on file for this visit  Discussed with the patient and all questioned fully answered  He will call me if any problems arise  Follow-up appointment in 3 months       Counseled patient on diagnostic results, prognosis, risk and benefit of treatment options, instruction for management, importance of treatment compliance, Risk  factor reduction and impressions    Vanessa Franklin PA-C

## 2023-01-24 NOTE — ASSESSMENT & PLAN NOTE
Diabetes control has worsened  He stopped Jardiance end of last year due to cost and resumed Januvia that he had at home  Recent glucometer readings have been stable  Recommend return to SGLT2 inhibitor OR Change Januvia to GLP-1 agonist for cardiovascular benefits but he declines  At this time, he would prefer to change his Januvia and Metformin to Janumet due to cost   Will repeat lab testing 3 months and if A1C has not improved will make change to regimen  Discussed patient assistance programs but he does not think he would qualify due to income  Advised him to complete Diabetic Eye exam and send copy of report     Lab Results   Component Value Date    HGBA1C 7 3 (H) 01/23/2023 [Nl] : Neurological

## 2023-01-24 NOTE — ASSESSMENT & PLAN NOTE
Not at goal today  He has recently resumed lisinopril 10mg, previously had dizziness on 20mg dose  Continue current regimen and monitor  He has follow up with PCP next month

## 2023-01-27 DIAGNOSIS — E78.2 MIXED HYPERLIPIDEMIA: ICD-10-CM

## 2023-01-27 DIAGNOSIS — F32.A DEPRESSION, UNSPECIFIED DEPRESSION TYPE: ICD-10-CM

## 2023-01-27 RX ORDER — PRAVASTATIN SODIUM 40 MG
40 TABLET ORAL
Qty: 90 TABLET | Refills: 0 | Status: SHIPPED | OUTPATIENT
Start: 2023-01-27

## 2023-01-27 RX ORDER — VENLAFAXINE HYDROCHLORIDE 75 MG/1
75 CAPSULE, EXTENDED RELEASE ORAL DAILY
Qty: 90 CAPSULE | Refills: 0 | Status: SHIPPED | OUTPATIENT
Start: 2023-01-27

## 2023-01-27 NOTE — TELEPHONE ENCOUNTER
Prescriptions were originally sent to the incorrect mail order pharmacy  Patient uses [facility]  Please resend to correct pharmacy for 90 day supply

## 2023-02-02 ENCOUNTER — EVALUATION (OUTPATIENT)
Dept: PHYSICAL THERAPY | Facility: CLINIC | Age: 68
End: 2023-02-02

## 2023-02-02 DIAGNOSIS — R26.9 GAIT ABNORMALITY: Primary | ICD-10-CM

## 2023-02-02 DIAGNOSIS — G20 PARKINSON DISEASE (HCC): ICD-10-CM

## 2023-02-02 NOTE — PROGRESS NOTES
PT Evaluation          POC expires Auth Status Total   Visits  Start date  Expiration date PT/OT + Visit Limit? Co-Insurance   23 Submitted auth on 23, MJ Visits   PT $20 Co-pay                                           Visit/Unit Tracking  AUTH Status: Submitted Date 2-2              Visits  Authed: Submitted Used amira               Remaining                           Today's date: 2023  Patient name: Brunilda Berry  : 1955  MRN: [id number]  Referring provider: Maeve Mendenhall DO  Dx:   Encounter Diagnosis     ICD-10-CM    1  Gait abnormality  R26 9       2  Parkinson disease ([facility] [address] )  [address] Ambulatory Referral to Physical Therapy                Assessment  Assessment details: Patient is a 79 y o  Male who presents to skilled outpatient PT with referral for parkinsons disease  Patient recently had a DBS placement in 2022, due to significant difficulty with tremors   At this time [a][employer] is unable to work due to a decline in functional mobility  Patient's functional outcome measures indicate a regression in mobility status as compared to most recent assessment in   When comparing to the outcome measures performed on this date he scored below for all of the following measurements including; 5x STS, MiniBest, 10 MWT, 6 MWT, TUG and all TUG variations  Indicating decreased LE strength, stability with forward propulsion, static balance, gait speed and cardiovascular endurance  According to testing this date he is considered a LOW risk for falls  Patient is interested in the following program to address noted deficits: [employer]  He presents with the following symptoms that are consistent with Carmen and Yahr stage 1 5: unilateral and axial symptoms   Per research provided by [employer], patient will benefit from skilled outpatient PT services to improve and maximize his/her function, to reduce risk for falls and potential injuries associated with falls, reduce healthcare costs via hospitalization, and reduce patient and national healthcare costs  In early stages of Parkinson's Disease, research indicates that intensive physical exercise can improve patient's motor control and assist in slowing the disease progression as a neuroprotective agent  Patient will benefit from skilled outpatient PT in order to maximize function in the short-term and long-term with overall improved postural strength with associated stability and mobility  Impairments: Abnormal coordination, Abnormal gait, Abnormal or restricted ROM, Activity intolerance, Impaired balance, Impaired physical strength, Lacks appropriate HEP, Poor posture, Poor body mechanics and Abnormal movement  Understanding of Dx/Px/POC: Good  Prognosis: Good      Patient verbalized understanding of POC  Please contact me if you have any questions or recommendations  Thank you for the referral and the opportunity to share in [employer] care             Plan  Plan details: [employer]  Program: [employer]  Patient would benefit from: PT Eval  Planned therapy interventions: Abdominal trunk stabilization, Balance, Body mechanics training, Coordination, Functional ROM exercises, Gait training, HEP, Joint mobilization, Manual therapy, Motor coordination training, Neuromuscular re-education, Patient education, Postural training, Strengthening, Stretching, Therapeutic activities and Therapeutic exercises  Frequency: 2x/wk  Duration in weeks: 12 weeks  Plan of Care beginning date: 2-2-23  Plan of Care expiration date: 3 months - 5/2/23  Treatment plan discussed with: Patient         Goals  Short Term Goals (4 weeks):  - Patient will improve TUG score by MDC of 4 8 sec from 7 23 sec to previous baseline of 6 47 seconds in order to reduce risk of falls and to increase safety with functional mobility  - Patient will improve 5 STS by the [address] of 2 3 sec from 14 42 sec to 12 12 sec indicating an improvement in strength and decreased challenge with transfers  - Patient will improve 6 MWT by the [address] of 269 ft from 1550 ft to 1819 ft indicating an improvement in cardiovascular endurance in order to maximize function with functional mobility throughout the community  - Patient will improve MiniBESTest score by MDC of 5 52 points from 22/30 to 27 52/30 indicating an improvement with dynamic balance in order to further decrease risk of falls with functional tasks  - Patient will be independent in basic HEP in order to manage condition at home    Long Term Goals (12 weeks):  - Patient will score a low risk for falls 2/4 fall risks measures  - Patient will score age norm values for 1/2 endurance measures  - Patient will be able to ambulate on uneven surfaces with 50% reduction in near falls to increase safety with community mobility  - Patient will be able to perform a floor transfer without physical assistance to assist with fall recovery at home and in the community  - Patient will be independent in comprehensive HEP post discharge from program  - Patient will be able to walk up incline with decreased difficulty and no loss of balance in order to improve functional mobility throughout the community  - Patient will be able to perform sit to stands from softer surfaces such as a couch or bed in order to improvement function with transfers  - Patient will be consistent with program for at least 1 day per week to assist with management of condition and functional independence of their condition        Cut off score   All date taken from [facility] Neuro Section or Rehab Measures      YBARRA Cutoff Scores:  MDC: 5 pts  Falls Risk Cutoff: 40 22/56 DGI Cutoff Scores:  Maximiliano Bravo al 2011, MDC: 2 9 pts  Ryley et al 2008, Cherrie: Score <19/24   MiniBEST Cutoff Scores:  Geraldine Rader al 2011, MDC: 5 52 pts  Arlin Cooper 2013, [facility]: <19/28 5xSTS Cutoff Scores:  MDC: 2 3 sec  Jorje Kehr al, 2011, Cherrie: > 16 sec   TUG Cutoff Scores:  Miguelangel Escobar al, 2011, MDC: 4 8 sec  Steven eduardo, 2011, Fallers: Meds ON: < 12 21 sec, OFF: 15 5 sec 10 Meter Walk Test Cutoff Scores:  Rand Newton, 2008, MDC: 0 18 m/s  Age Norm Values, Cherrie: < 1 0 m/s   ABC Cutoff Scores:  Roopa Jaramillo, , MDC: 13 pts  Savannah Essex, MDC: 11 12 pts  Increased risk for falls: < 69% 6 Minute Walk Test Cutoff Scores:  Rand Newton, 2008, MDC: 269 ft  [employer] al, 2002, Norm Data Healthy Adults:  61 - 71 years:   M: 200 m      F: 1 m  70 - 79 years:   M: 1 m      F: 200 m  80 - 89 years:   M: 1 m      F: 80 m   PDQ-39 Cutoff Scores:  Lavon eduardo, 2004:  MDC: Mobility (12 24), ADL (16 72), Emotional (14 22), Stigma (21 21), Social Support (21 25), Cognition (21 12), Communication (21 04), Bodily Discomfort (24 48)  Tim et al, 2007:  Normative Data: Mobility (49 25), ADL (38 94), Emotional (37 92), Stigma (27 54), Social Support (14 78), Cognition (33 03), Communication (27 99), Bodily Discomfort (40 91) Carmen and Yahr Stages  Stage 0: No S/S  Stage 1: Mild unilateral symptoms  Stage 1 5: Unilateral and axial symptoms  Stage 2: Bilateral symptoms, no balance impairment  Stage 2 5: Mild bilateral symptoms and recovery with pull test  Stage 3: Mild to moderate postural instability, independent  Stage 4: Severe disability, walking or standing unassisted  Stage 5: Bed bound, w/c bound           Subjective Evaluation    History of Present Illness  Mechanism of injury: Patient has a history of PD and has been seen for RSB prior  Patient reports getting DBS back in September, due to having to take 14 does a day for medication  He takes 3 doses a day and one time release      Primary AD: None  Previous Programs: RSB      Pain  No pain reported  Current pain ratin  At best pain ratin  At worst pain ratin     Social Support  Steps to enter house: yes  Stairs in house: yes   Lives with: spouse     Employment status: Retured   Treatments  Previous treatment: physical therapy  Patient Goals  Patient goals for therapy: improved balance, increased motion, return to sport/leisure activities, independence with ADLs/IADLs and increased strength  Hand dominance: R handed    Treatments  Previous treatment: RSB  Current treatment: General mobility         Objective   Gait abnormalities: Slowed gait speed, limited arm swing and trunk ROT    MDS/UPDRS Part III  - Is the patient on medications for treating symptoms of PD? Yes  - If receiving medication for treatment: OFF: typical functional state when having a poor response in spite of taking medications  - Is the patient on Levodopa?  Yes        - If yes, how many minutes since last dose:  5 hours   - Speech: 2 - Mild: Loss of modulation, diction, or volume, with a few words unclear, but the overall sentences easy to follow  - Facial Expression: 2 - Mild: in addition to decreased eye-blink frequency, masked facies present in the lower face as well, namely fewer movements around the mouth, such as less spontaneous smiling, but lips not parted  - Rigidity:  - Neck: 0 - Normal: No rigidity  - RUE: 0 - Normal: No rigidity  - LUE: 0 - Normal: No rigidity  - RLE: 0 - Normal: No rigidity  - LLE: 0 - Normal: No rigidity  - Finger Tapping:  - R: 2 -Mild: Any of the following: (a) 3-5 interruptions during tapping, (b) mild slowing, (c) the amplitude decrements midway in the 10-tap sequence  - L: 2 -Mild: Any of the following: (a) 3-5 interruptions during tapping, (b) mild slowing, (c) the amplitude decrements midway in the 10-tap sequence  - Hand Movements:   - R: 1 - Slight: Any of the following: (a) regular rhythm is broken with 1-2 interruptions or hesitations of the movement, (b) slow slowing, (c) the amplitude decrements near end of task   - L: 2 - Mild: Any of the following: (a) 3-5 interruptions during the movements, (b) mild slowing, (c) the amplitude decrements midway in the task  - Pronation-Supination Movements of Hands:   - R: 0 - Normal: No problems   - L: 1 - Slight: Any of the following: (a) regular rhythm broken with 1-2 interruptions or hesitations of the movement, (b) slight slowing, (c) the amplitude decrements near the end of the sequence  - Toe Tapping:   - R: 0 - Normal: No problems   - L: 0 - Normal: No problems  - Leg Agility:   - R: 0 - Normal: no problems   - L: 0 - Normal: no problems  - Arising from Chair: 0 - Normal: no problems, able to arise quickly without hesitation  - Gait: 1 - Slight: Independent walking with minor gait impairment  - Freezing of Gait: 0 - Normal: No freezing  - Postural Stability: 0 - Normal: No problems, recovers with 1-2 steps)  - Posture: 1 - Slight: Not quite erect, but posture could be normal for older person  - Global Spontaneity of Movement (Body Bradykinesia): 2 - Mild: Mild global slowness and poverty of spontaneous movements  - Postural Tremor of the Hands:   - R: 1 - Slight: Tremor present, but < 1 cm in amplitude   - L: 2 - Mild: Tremor at least 1, but < 3 cm in amplitude  - Kinetic Tremor of the Hands:   - R: 0 - Normal: No tremor   - L: 2 - Mild: Tremor at least 1, but < 3 cm in amplitude  - Rest Tremor Amplitude:   - RUE: 0 - Normal: No tremor   - LUE: 1 - Slight: Tremor present, but < 1 cm in amplitude   - RLE: 0 - Normal: No tremor   - LLE: 0 - Normal: No tremor   - Lip/Jaw: 0 - Normal: No tremor  - Constancy of Rest Tremor: 2 - Mild: Tremor at rest is present 26-50% of the entire examination period  - Dyskinesia Impact on Part III Ratings:   - Were dyskinesias (chorea or dystonia) present during examination? No   - If yes, did these movements interfere with your ratings?  No  - Carmen and Yahr Stage: 1 5 - Unilateral and axial involvement only          Outcome Measures Initial Eval  2-2-21        5xSTS 14 42 sec        TUG  - Regular  - Cognitive  - Carry   7 23 sec  8 66 sec  9 28 sec        MiniBEST 22/28        10 meter 1 44 m/s        6MWT 1550 ft        ABC NV        PDQ-39 15/100 MDS-UPDRS  Part III   25 5/128        H&Y Stage 1 5        Floor > Stand NV sec                          Outcome Measures 3/5/20 7/23/20 9/10 PN 10-12 11/25 PN 3/1/2021   ABC Not assessed 80 6%  92 5%     5x STS 11 5 sec 14 22 sec 12 57 sec 12 66 sec 12 34 sec 13 11   TUG 7 32    11 03 carry    12 12   cog 8 42     8 70 carry    9 95 cog 7 53 sec    8 66 sec   Carry  10 13 sec Cog 6 10 sec    7 34 sec Carry    6 48 sec Cog 6 32 sec    7 09 sec Carry    8 78 sec 6 47 sec         9 12    10 meter walk test 1 25 m/s 1 30 m/s 7 03 sec 1 4 m/s 1 8 m/s 1 9 m/s 1 8 m/s   6 minute walk test 2100 ft 1375 ft 1445 ft 1435 ft 1700 ft    DGI 23/24 23/24       MiniBest 25/28 25/28 27/28     FGA Not assessed 27/30 29/30 27/30                  Precautions: DBS placement on 9/22  Past Medical History:   Diagnosis Date   • Anxiety    • Arteriosclerosis of coronary artery 05/24/2017   • Benign essential hypertension 05/15/2015   • Depression 06/01/2012   • Essential (hemorrhagic) thrombocythemia ([facility] [address] )    • History of prostate cancer 01/05/2023   • History of stroke    • Left carpal tunnel syndrome    • Lumbar canal stenosis    • Mixed hyperlipidemia 11/10/2010   • Osteoarthritis, knee    • Other male erectile dysfunction 08/08/2019   • Overweight    • Parkinson disease ([facility] [address] ) 10/08/2019   • Pipe smoker    • Retinal and vitreous disorder    • Subclinical hypothyroidism 04/11/2017   • Type 2 diabetes mellitus without complication, without long[facility]term [facility] 08/24/2017

## 2023-02-07 ENCOUNTER — APPOINTMENT (OUTPATIENT)
Dept: PHYSICAL THERAPY | Facility: CLINIC | Age: 68
End: 2023-02-07

## 2023-02-08 ENCOUNTER — OFFICE VISIT (OUTPATIENT)
Dept: PHYSICAL THERAPY | Facility: CLINIC | Age: 68
End: 2023-02-08

## 2023-02-08 DIAGNOSIS — G20 PARKINSON DISEASE (HCC): Primary | ICD-10-CM

## 2023-02-08 DIAGNOSIS — R26.9 GAIT ABNORMALITY: ICD-10-CM

## 2023-02-08 NOTE — PROGRESS NOTES
Daily Note     Today's date: 2023  Patient name: Saman Tidwell  : 1955  MRN: [id number]  Referring provider: Adonis Sacks, DO  Dx:   Encounter Diagnosis     ICD-10-CM    1  Parkinson disease ([address] )  G20       2  Gait abnormality  R26 9                      Subjective: Patient arrived to PT session approximately 5 minutes late, states he will occasionally experience nerve pain in his left hand due to carpal tunnel, is wearing a wrist brace  Objective: See treatment diary below    Warm up:  - Seated large amplitude floor to ceiling x 10  - Seated twist and reach x 10 B/L    Shadow boxing x 20 each  - 1's  - 2's  - 3's  - 4's  - 5's  - 6's    2# ankle weights donned    Circuit 1 (1 round, 10 reps B/L)  - Large amplitude FWD stepping over single 6" kayden > shadow burnouts  - Large amplitude sidestepping over single 6" kayden > shadow burnouts    Circuit 2 (1 round, 2 minutes each)  - STS + 5# TT hold  - Marching + 10# TT hold    Cool down  - OH tricep stretch  - Arm across the chest  - Calf stretch      Assessment: Patient tolerated treatment session well today with initiation of neuro boxing program with integration of large amplitude elements  Noted good assumption of scapular stabilizers with seated large amplitude movements with minimal cueing required  Forewent use of boxing gloves this session as patient has resolving carpal tunnel in his left wrist  Educated patient on shadow boxing combinations  Progress intensity of exercises as tolerated  Patient will continue to benefit from skilled OPPT services to maximize functional mobility and slow disease progression secondary to PD diagnosis  Plan: Continue per plan of care  Progress treatment as tolerated  POC expires Auth Status Total   Visits  Start date  Expiration date PT/OT + Visit Limit?  Co-Insurance   23 Auth not  Visits   PT $20 Co-pay                                           Visit/Unit Tracking  AUTH Status: Submitted Date 2-2 2/8             Visits  Authed: Submitted Used eval 2              Remaining

## 2023-02-09 ENCOUNTER — OFFICE VISIT (OUTPATIENT)
Dept: PHYSICAL THERAPY | Facility: CLINIC | Age: 68
End: 2023-02-09

## 2023-02-09 DIAGNOSIS — G20 PARKINSON DISEASE (HCC): Primary | ICD-10-CM

## 2023-02-09 DIAGNOSIS — R26.9 GAIT ABNORMALITY: ICD-10-CM

## 2023-02-09 NOTE — PROGRESS NOTES
Daily Note     Today's date: 2023  Patient name: Alpa Johnson  : 1955  MRN: [id number]  Referring provider: Guy Carrera DO  Dx:   Encounter Diagnosis     ICD-10-CM    1  Parkinson disease ([facility] [address] )  G20       2  Gait abnormality  R26 9                      Subjective: Patient reports he will occasionally experience nerve pain in his left hand due to carpal tunnel, is wearing a wrist brace  He reports he was sore after last session  Objective: See treatment diary below    Warm up:  - Seated large amplitude floor to ceiling x 10  - Seated twist and reach x 10 B/L    Shadow boxing x 20 each  - 1's  - 2's  - 3's  - 4's  - 5's  - 6's    2 5# ankle weights donned    Circuit 1 (2 minutes, 2 rounds):  - Side stepping on foam beam > shadow burnouts   - Large amplitude FWD stepping over single 9" kayden > shadow burnouts  - Large amplitude sidestepping over single 9" kayden > shadow burnouts    Circuit 2 (2 minutes, 1 round):  - STS + 5# TT hold  - Boom whacker taps to floor then overhead to wall behind pt  - Miriam Leitchfield cross body taps behind to wall     Cool down  - OH tricep stretch  - Arm across the chest  - Calf stretch      Assessment: Patient tolerated treatment session well today with use of neuro boxing program as modality for integration of large amplitude elements  Able to progress distal resistance as well as height of kayden with good tolerance  One near LOB with side stepping on foam beam with multiple compensatory steps taken to avoid total LOB  Patient with good amplitude of both arm and leg movements throughout session with minimal cueing required  Patient will continue to benefit from skilled OPPT services to maximize functional mobility and slow disease progression secondary to PD diagnosis  Plan: Continue per plan of care  Progress treatment as tolerated  POC expires Auth Status Total   Visits  Start date  Expiration date PT/OT + Visit Limit?  Co-Insurance   23 Emmy Crane not  Visits   PT $20 Co-pay                                           Visit/Unit Tracking  AUTH Status: Submitted Date 2-2 2/8 2/9            Visits  Authed: Submitted Used eval 2 3             Remaining

## 2023-02-14 ENCOUNTER — OFFICE VISIT (OUTPATIENT)
Dept: PHYSICAL THERAPY | Facility: CLINIC | Age: 68
End: 2023-02-14

## 2023-02-14 DIAGNOSIS — G20 PARKINSON DISEASE (HCC): Primary | ICD-10-CM

## 2023-02-14 DIAGNOSIS — R26.9 GAIT ABNORMALITY: ICD-10-CM

## 2023-02-16 ENCOUNTER — OFFICE VISIT (OUTPATIENT)
Dept: PHYSICAL THERAPY | Facility: CLINIC | Age: 68
End: 2023-02-16

## 2023-02-16 DIAGNOSIS — R26.9 GAIT ABNORMALITY: ICD-10-CM

## 2023-02-16 DIAGNOSIS — G20 PARKINSON DISEASE (HCC): Primary | ICD-10-CM

## 2023-02-16 NOTE — PROGRESS NOTES
Daily Note     Today's date: 2023  Patient name: Kirk Delgado  : 1955  MRN: [id number]  Referring provider: Patsy Price DO  Dx:   Encounter Diagnosis     ICD-10-CM    1  Parkinson disease ([facility] [address] )  G20       2  Gait abnormality  R26 9                      Subjective: Patient with no new complaints, arrives with L wrist brace on  Objective: See treatment diary below    Warm up:  - Seated large amplitude floor to ceiling x 10  - Seated twist and reach x 10 B/L    Shadow boxing x 20 each  - 1's  - 2's  - 3's  - 4's  - 5's  - 6's    Circuit 1 (2 minutes, 2 rounds):  - Side shuffle between cones with opposite upper cut  - Large forward step over 9" kayden > shadow burnouts  - Step ups to 8" step > 180 deg turn for shadow burnouts     Circuit 2 (2 minutes, 1 round):  - Sit>stand holding 10# tidal tank   - Sit>stand with boom whacker tap on floor and then to wall     Core Circuit (1 min each, 1 round):  - Wall sit with core rotation 10# med ball  - PNF chops with 10# med ball, 1 foot on chair (1 min each way)    Cool down  - OH tricep stretch  - Arm across the chest  - Calf stretch    Assessment: Patient tolerated treatment session well today with focus on large amplitude movement  No LOB during session  Good form during shadow punches  Patient will continue to benefit from skilled OPPT services to maximize functional mobility and slow disease progression secondary to PD diagnosis  Plan: Continue per plan of care  Progress treatment as tolerated  POC expires Auth Status Total   Visits  Start date  Expiration date PT/OT + Visit Limit?  Co-Insurance   23 Auth not  Visits   PT $20 Co-pay                                           Visit/Unit Tracking  AUTH Status: Submitted Date            Visits  Authed: Submitted Used eval 2 3 4            Remaining

## 2023-02-21 ENCOUNTER — OFFICE VISIT (OUTPATIENT)
Dept: FAMILY MEDICINE CLINIC | Facility: CLINIC | Age: 68
End: 2023-02-21

## 2023-02-21 VITALS
RESPIRATION RATE: 16 BRPM | WEIGHT: 187 LBS | DIASTOLIC BLOOD PRESSURE: 70 MMHG | HEIGHT: 70 IN | OXYGEN SATURATION: 99 % | HEART RATE: 70 BPM | BODY MASS INDEX: 26.77 KG/M2 | TEMPERATURE: 97.1 F | SYSTOLIC BLOOD PRESSURE: 120 MMHG

## 2023-02-21 DIAGNOSIS — E11.65 TYPE 2 DIABETES MELLITUS WITH HYPERGLYCEMIA, WITHOUT LONG-TERM CURRENT USE OF INSULIN (HCC): ICD-10-CM

## 2023-02-21 DIAGNOSIS — Z85.46 HISTORY OF PROSTATE CANCER: ICD-10-CM

## 2023-02-21 DIAGNOSIS — E66.3 OVERWEIGHT: ICD-10-CM

## 2023-02-21 DIAGNOSIS — F41.9 ANXIETY: ICD-10-CM

## 2023-02-21 DIAGNOSIS — I10 BENIGN ESSENTIAL HYPERTENSION: ICD-10-CM

## 2023-02-21 DIAGNOSIS — Z00.00 MEDICARE ANNUAL WELLNESS VISIT, SUBSEQUENT: Primary | ICD-10-CM

## 2023-02-21 DIAGNOSIS — E78.2 MIXED HYPERLIPIDEMIA: ICD-10-CM

## 2023-02-21 DIAGNOSIS — I25.10 ARTERIOSCLEROSIS OF CORONARY ARTERY: ICD-10-CM

## 2023-02-21 DIAGNOSIS — F32.A DEPRESSION, UNSPECIFIED DEPRESSION TYPE: ICD-10-CM

## 2023-02-21 DIAGNOSIS — E03.8 SUBCLINICAL HYPOTHYROIDISM: ICD-10-CM

## 2023-02-21 DIAGNOSIS — F32.0 CURRENT MILD EPISODE OF MAJOR DEPRESSIVE DISORDER, UNSPECIFIED WHETHER RECURRENT (HCC): ICD-10-CM

## 2023-02-21 DIAGNOSIS — Z13.6 SCREENING FOR AAA (ABDOMINAL AORTIC ANEURYSM): ICD-10-CM

## 2023-02-21 DIAGNOSIS — D47.3 ESSENTIAL (HEMORRHAGIC) THROMBOCYTHEMIA (HCC): ICD-10-CM

## 2023-02-21 DIAGNOSIS — E11.9 TYPE 2 DIABETES MELLITUS WITHOUT COMPLICATION, WITHOUT LONG-TERM CURRENT USE OF INSULIN (HCC): ICD-10-CM

## 2023-02-21 DIAGNOSIS — F17.290 PIPE SMOKER: ICD-10-CM

## 2023-02-21 DIAGNOSIS — G20 PARKINSON DISEASE (HCC): ICD-10-CM

## 2023-02-21 RX ORDER — VENLAFAXINE HYDROCHLORIDE 75 MG/1
75 CAPSULE, EXTENDED RELEASE ORAL DAILY
Qty: 90 CAPSULE | Refills: 2 | Status: SHIPPED | OUTPATIENT
Start: 2023-02-21

## 2023-02-21 RX ORDER — MULTIVITAMIN
1 CAPSULE ORAL DAILY
COMMUNITY

## 2023-02-21 RX ORDER — PRAVASTATIN SODIUM 80 MG/1
80 TABLET ORAL
Qty: 30 TABLET | Refills: 8 | Status: SHIPPED | OUTPATIENT
Start: 2023-02-21

## 2023-02-21 RX ORDER — LISINOPRIL 20 MG/1
20 TABLET ORAL DAILY
Qty: 90 TABLET | Refills: 2 | Status: SHIPPED | OUTPATIENT
Start: 2023-02-21

## 2023-02-21 NOTE — PROGRESS NOTES
Assessment and Plan:     Problem List Items Addressed This Visit        Endocrine    Type 2 diabetes mellitus without complication, without long-term current use of insulin ([facility] )       Lab Results   Component Value Date    HGBA1C 7 3 (H) 01/23/2023       Lab Results   Component Value Date    HGBA1C 7 3 (H) 01/23/2023     Uncontrolled  Management per Endo  Recommend lifestyle modifications  Relevant Medications    lisinopril (ZESTRIL) 20 mg tablet    Subclinical hypothyroidism     Management per Endo  Not currently taking Synthroid  Type 2 diabetes mellitus with hyperglycemia, without long-term current use of insulin ([facility])       Lab Results   Component Value Date    HGBA1C 7 3 (H) 01/23/2023     Uncontrolled  Management per Endo  Recommend lifestyle modifications  Relevant Medications    lisinopril (ZESTRIL) 20 mg tablet       Cardiovascular and Mediastinum    Benign essential hypertension     Stable on Lisinopril 20mg QD  Check blood pressure outside of office  Recommend lifestyle modifications  Relevant Medications    lisinopril (ZESTRIL) 20 mg tablet    Arteriosclerosis of coronary artery     Asymptomatic  Management per Cardio - overdue for appt  Continue ASA, statin, ACE-I  Recommend lifestyle modifications  LDL not at goal for CAD  Increase Pravachol 80mg QHS  Nervous and Auditory    Parkinson disease ([facility] )     Management per [facility]  S/p Deep brain stimulator 9/22  On Sinemet 25-100mg 1 tab TID PRN, Sinemet CR 50-200mg QHS, Amantadine 100mg QD  [address]                 Hematopoietic and Hemostatic    Essential (hemorrhagic) thrombocythemia ([facility] )     Pending Heme/Onc consult previously  Pipe smoking may be contributory  Relevant Orders    Ambulatory Referral to Hematology / Oncology       Other    Depression     Stable on Effexor XR 75 mg daily           Relevant Medications    venlafaxine (EFFEXOR-XR) 75 mg 24 hr capsule    Mixed hyperlipidemia     Uncontrolled  LDL not at goal for CAD  Increase Pravachol 80mg QHS  Recommend lifestyle modifications  Relevant Medications    pravastatin (PRAVACHOL) 80 mg tablet    Other Relevant Orders    Comprehensive metabolic panel    LDL cholesterol, direct    Pipe smoker     Contemplative  Smoking cessation advised  Relevant Orders    Ambulatory Referral to Hematology / Oncology    Anxiety     Stable on Effexor XR 75 mg daily  Overweight     Worsening  Recommend lifestyle modifications  History of prostate cancer     Patient declined continued Uro F/U and desires yearly PSA per PCP  Status post DaVinci robot prostatectomy performed at [facility] 2013  Relevant Orders    Ambulatory Referral to Hematology / Oncology   Other Visit Diagnoses     Medicare annual wellness visit, subsequent    -  Primary    Screening for AAA (abdominal aortic aneurysm)        Relevant Orders    US abdominal aorta screening aaa           Preventive health issues were discussed with patient, and age appropriate screening tests were ordered as noted in patient's After Visit Summary  Personalized health advice and appropriate referrals for health education or preventive services given if needed, as noted in patient's After Visit Summary  History of Present Illness:     Patient presents for a Medicare Wellness Visit    HPI   Patient Care Team:  Dav Norman, DO as PCP - General (Family Medicine)  Funmilayo Perkins MD as PCP - PCP-[facility]  Unice Norman, DO as PCP - [address] [place] [address],[address] [facility] ([facility])  MD Funmilayo Patel MD Ferna Leaf, MD Adella Henderson, MD as Endoscopist  Julissa Groves MD (Endocrinology)  Lazara Pearson as Diabetes Educator (Nutrition)     Review of Systems:     Review of Systems     See other note           Problem List:     Patient Active Problem List Diagnosis   • Arteriosclerosis of coronary artery   • Benign essential hypertension   • Depression   • Type 2 diabetes mellitus without complication, without long-term current use of insulin (HCC)   • Subclinical hypothyroidism   • Essential (hemorrhagic) thrombocythemia (HCC)   • Mixed hyperlipidemia   • Leukocytosis   • Other male erectile dysfunction   • Parkinson disease (HCC)   • Type 2 diabetes mellitus with hyperglycemia, without long-term current use of insulin (HCC)   • Dystonia   • History of stroke   • Hyperkalemia   • History of prostate cancer   • Pipe smoker   • Anxiety   • Overweight      Past Medical and Surgical History:     Past Medical History:   Diagnosis Date   • Anxiety    • Arteriosclerosis of coronary artery 05/24/2017   • Benign essential hypertension 05/15/2015   • Depression 06/01/2012   • Essential (hemorrhagic) thrombocythemia ([address] )    • History of prostate cancer 01/05/2023   • History of stroke    • Left carpal tunnel syndrome    • Lumbar canal stenosis    • Mixed hyperlipidemia 11/10/2010   • Osteoarthritis, knee    • Other male erectile dysfunction 08/08/2019   • Overweight    • Parkinson disease ([facility] ) 10/08/2019   • Pipe smoker    • Retinal and vitreous disorder    • Subclinical hypothyroidism 04/11/2017   • Type 2 diabetes mellitus without complication, without long-term current use of insulin ([facility] ) 08/24/2017     Past Surgical History:   Procedure Laterality Date   • ANKLE FRACTURE SURGERY Left 04/2009    triple fracture; 2 Screws in place   • [address]  Bilateral 09/2022   • LUMBAR LAMINECTOMY  2003    L5-S1   • NASAL SEPTUM SURGERY  06/1989   • [place] COLONOSCOPY FLX DX W/COLLJ SPEC WHEN PFRMD N/A 01/27/2016    Procedure: COLONOSCOPY;  Surgeon: Jean Paul Escobar MD;  Location: [facility];   Service: Gastroenterology   • PROSTATE BIOPSY  12/08/2012    managed by Minda Pinto, needle biopsy   • PROSTATECTOMY  09/23/2013      Family History:     Family History Problem Relation Age of Onset   • Hypertension Mother    • Retinal detachment Mother    • Retinitis pigmentosa Mother    • Diabetes type II Father    • Heart attack Father 61   • Coronary artery disease Father 61   • No Known Problems Son    • No Known Problems Son       Social History:     Social History     Socioeconomic History   • Marital status: /Civil Union     Spouse name: Roslyn Delgado   • Number of children: 2   • Years of education: None   • Highest education level: None   Occupational History   • 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   Tobacco Use   • Smoking status: Some Days     Types: Pipe     Start date: 1/1/1995   • Smokeless tobacco: Never   • Tobacco comments:     Pipe smoking 1 time per week in cold weather, 0 times per week in hot weather  Vaping Use   • Vaping Use: Never used   Substance and Sexual Activity   • Alcohol use: Yes     Alcohol/week: 4 0 standard drinks     Types: 3 Cans of beer, 1 Shots of liquor per week     Comment: occassional    • Drug use: Never   • Sexual activity: Not Currently     Partners: Female     Birth control/protection: Post-menopausal, Surgical     Comment: s/p Prostatectomy   Other Topics Concern   • None   Social History Narrative        Lives with wife, Roslyn Delgado    2 Children - 2 Sons    Retired - Electrical Engineering     Social Determinants of Health     Financial Resource Strain: Low Risk    • Difficulty of Paying Living Expenses: Not hard at all   Food Insecurity: Not on file   Transportation Needs: No Transportation Needs   • Lack of Transportation (Medical): No   • Lack of Transportation (Non-Medical):  No   Physical Activity: Not on file   Stress: Not on file   Social Connections: Not on file   Intimate Partner Violence: Not on file   Housing Stability: Not on file      Medications and Allergies:     Current Outpatient Medications   Medication Sig Dispense Refill   • Amantadine HCl 100 MG tablet Take 100 mg by mouth daily Rx per Neuro • aspirin 325 mg tablet Take 325 mg by mouth daily     • carbidopa-levodopa (SINEMET CR)  mg per tablet Take 1 tablet by mouth daily at bedtime Rx per Neuro     • carbidopa-levodopa (SINEMET)  mg per tablet Take 2 tablets by mouth 3 (three) times a day Follow clinic instructions (Patient taking differently: Take 1 tablet by mouth 2 (two) times a day Third dose PRN  Rx per Neuro) 540 tablet 3   • Cholecalciferol (VITAMIN D) 2000 units CAPS Take 2 capsules (4,000 Units total) by mouth daily  0   • co-enzyme Q-10 30 MG capsule Take 1 capsule (30 mg total) by mouth daily 30 capsule 0   • glucose blood (ONETOUCH VERIO) test strip Check BG 2x per day (Patient taking differently: Check BG 2x per day  Rx per Endo ) 200 each 3   • lisinopril (ZESTRIL) 20 mg tablet Take 1 tablet (20 mg total) by mouth daily 90 tablet 2   • Multiple Vitamin (multivitamin) capsule Take 1 capsule by mouth daily     • Omega-3 Fatty Acids (fish oil) 1,000 mg Take 1,000 mg by mouth daily     • pravastatin (PRAVACHOL) 80 mg tablet Take 1 tablet (80 mg total) by mouth daily at bedtime 30 tablet 8   • sitaGLIPtin-metFORMIN (JANUMET)  MG per tablet Take 1 tablet by mouth 2 (two) times a day with meals 180 tablet 1   • vardenafil (LEVITRA) 20 MG tablet Take by mouth daily as needed     • venlafaxine (EFFEXOR-XR) 75 mg 24 hr capsule Take 1 capsule (75 mg total) by mouth daily 90 capsule 2     No current facility-administered medications for this visit       No Known Allergies   Immunizations:     Immunization History   Administered Date(s) Administered   • COVID-19 PFIZER VACCINE 0 3 ML IM 02/25/2021, 03/16/2021, 10/02/2021   • INFLUENZA 11/01/2018, 10/01/2019, 10/01/2020, 11/01/2021   • Influenza Quadrivalent Preservative Free 3 years and older IM 10/20/2015   • Influenza, high dose seasonal 0 7 mL 11/22/2022   • Influenza, seasonal, injectable 09/01/2016   • Pneumococcal Polysaccharide PPV23 12/20/2019, 12/27/2022   • Tdap 09/27/2016   • Zoster 08/22/2022   • Zoster Vaccine Recombinant 05/24/2022      Health Maintenance:         Topic Date Due   • Colorectal Cancer Screening  01/27/2026   • Hepatitis C Screening  Completed     There are no preventive care reminders to display for this patient  Medicare Screening Tests and Risk Assessments:     Tahira Glass is here for his Subsequent Wellness visit  Health Risk Assessment:   Patient rates overall health as very good  Patient feels that their physical health rating is much better  Patient is very satisfied with their life  Eyesight was rated as same  Hearing was rated as same  Patient feels that their emotional and mental health rating is much better  Patients states they are never, rarely angry  Patient states they are sometimes unusually tired/fatigued  Pain experienced in the last 7 days has been some  Patient's pain rating has been 3/10  Patient states that he has experienced no weight loss or gain in last 6 months  Depression Screening:   PHQ-9 Score: 4      Fall Risk Screening: In the past year, patient has experienced: no history of falling in past year      Home Safety:  Patient does not have trouble with stairs inside or outside of their home  Patient has working smoke alarms and has no working carbon monoxide detector  Home safety hazards include: uneven floors  CO Monitor use encouraged  Transitions between rooms are uneven, but not a problem for patient  Nutrition:   Current diet is Diabetic  Medications:   Patient is currently taking over-the-counter supplements  OTC medications include: see medication list  Patient is able to manage medications  Activities of Daily Living (ADLs)/Instrumental Activities of Daily Living (IADLs):   Walk and transfer into and out of bed and chair?: Yes  Dress and groom yourself?: Yes    Bathe or shower yourself?: Yes    Feed yourself?  Yes  Do your laundry/housekeeping?: Yes  Manage your money, pay your bills and track your expenses?: Yes  Make your own meals?: Yes    Do your own shopping?: Yes    Previous Hospitalizations:   Any hospitalizations or ED visits within the last 12 months?: No      Advance Care Planning:   Living will: No    Durable POA for healthcare: No    Advanced directive: No    Advanced directive counseling given: Yes    Five wishes given: Yes    Patient declined ACP directive: No      Comments: Living Will, Five Wishes, and POLST given today  Cognitive Screening:   Provider or family/friend/caregiver concerned regarding cognition?: No    PREVENTIVE SCREENINGS      Cardiovascular Screening:    General: Screening Not Indicated and History Lipid Disorder      Diabetes Screening:     General: Screening Not Indicated and History Diabetes      Colorectal Cancer Screening:     General: Screening Current      Prostate Cancer Screening:    General: History Prostate Cancer      Osteoporosis Screening:    General: Screening Not Indicated      Abdominal Aortic Aneurysm (AAA) Screening:    Risk factors include: age between 73-69 yo and tobacco use        General: Risks and Benefits Discussed    Due for: Screening AAA Ultrasound      Lung Cancer Screening:     General: Screening Not Indicated      Hepatitis C Screening:    General: Screening Current    Screening, Brief Intervention, and Referral to Treatment (SBIRT)    Screening  Typical number of drinks in a day: 0  Typical number of drinks in a week: 0  Interpretation: Low risk drinking behavior  Single Item Drug Screening:  How often have you used an illegal drug (including marijuana) or a prescription medication for non-medical reasons in the past year? never    Single Item Drug Screen Score: 0  Interpretation: Negative screen for possible drug use disorder    Other Counseling Topics:   Regular weightbearing exercise and calcium and vitamin D intake  No results found       Physical Exam:     /70   Pulse 70   Temp (!) 97 1 °F (36 2 °C)   Resp 16   Ht 5' 10" (1 778 m)   Wt 84 8 kg (187 lb)   SpO2 99%   BMI 26 83 kg/m²     Physical Exam     Vitals and nursing note reviewed  Constitutional:       Appearance: Normal appearance  He is well-developed  HENT:      Head: Normocephalic and atraumatic  Eyes:      Conjunctiva/sclera: Conjunctivae normal    Neck:      Thyroid: No thyromegaly  Cardiovascular:      Rate and Rhythm: Normal rate and regular rhythm  Pulses: Normal pulses  Heart sounds: Normal heart sounds  Pulmonary:      Effort: Pulmonary effort is normal       Breath sounds: Normal breath sounds  Musculoskeletal:         General: No swelling or tenderness  Cervical back: Neck supple  Right lower leg: No edema  Left lower leg: No edema  Neurological:      General: No focal deficit present  Mental Status: He is alert and oriented to person, place, and time  Psychiatric:         Mood and Affect: Mood normal          Thought Content:  Thought content normal          Judgment: Judgment normal      B[employer] Runner, DO

## 2023-02-21 NOTE — PATIENT INSTRUCTIONS
Vitamin D - Recommend start multivitamin and over-the-counter vitamin D3 6000 International Units daily  To Do:      Call for appt - Heart Disease - Management per Cardio Dr Rashmi Cunha  Next appt 9/21 - Overdue  Call for Sujatha Feliz - Last seen 7/21  Medicare Preventive Visit Patient Instructions  Thank you for completing your Welcome to Medicare Visit or Medicare Annual Wellness Visit today  Your next wellness visit will be due in one year (2/22/2024)  The screening/preventive services that you may require over the next 5-10 years are detailed below  Some tests may not apply to you based off risk factors and/or age  Screening tests ordered at today's visit but not completed yet may show as past due  Also, please note that scanned in results may not display below  Preventive Screenings:  Service Recommendations Previous Testing/Comments   Colorectal Cancer Screening  Colonoscopy    Fecal Occult Blood Test (FOBT)/Fecal Immunochemical Test (FIT)  Fecal DNA/Cologuard Test  Flexible Sigmoidoscopy Age: 39-70 years old   Colonoscopy: every 10 years (May be performed more frequently if at higher risk)  OR  FOBT/FIT: every 1 year  OR  Cologuard: every 3 years  OR  Sigmoidoscopy: every 5 years  Screening may be recommended earlier than age 39 if at higher risk for colorectal cancer  Also, an individualized decision between you and your healthcare provider will decide whether screening between the ages of 74-80 would be appropriate   Colonoscopy: 01/27/2016  FOBT/FIT: Not on file  Cologuard: Not on file  Sigmoidoscopy: Not on file    Screening Current     Prostate Cancer Screening Individualized decision between patient and health care provider in men between ages of 53-78   Medicare will cover every 12 months beginning on the day after your 50th birthday PSA: <0 1 ng/mL     History Prostate Cancer     Hepatitis C Screening Once for adults born between 1945 and 1965  More frequently in patients at high risk for Hepatitis C Hep C Antibody: 12/20/2019    Screening Current   Diabetes Screening 1-2 times per year if you're at risk for diabetes or have pre-diabetes Fasting glucose: 159 mg/dL (1/23/2023)  A1C: 7 3 % (1/23/2023)  Screening Not Indicated  History Diabetes   Cholesterol Screening Once every 5 years if you don't have a lipid disorder  May order more often based on risk factors  Lipid panel: 01/23/2023  Screening Not Indicated  History Lipid Disorder      Other Preventive Screenings Covered by Medicare:  Abdominal Aortic Aneurysm (AAA) Screening: covered once if your at risk  You're considered to be at risk if you have a family history of AAA or a male between the age of 73-68 who smoking at least 100 cigarettes in your lifetime  Lung Cancer Screening: covers low dose CT scan once per year if you meet all of the following conditions: (1) Age 50-69; (2) No signs or symptoms of lung cancer; (3) Current smoker or have quit smoking within the last 15 years; (4) You have a tobacco smoking history of at least 20 pack years (packs per day x number of years you smoked); (5) You get a written order from a healthcare provider  Glaucoma Screening: covered annually if you're considered high risk: (1) You have diabetes OR (2) Family history of glaucoma OR (3)  aged 48 and older OR (3)  American aged 72 and older  Osteoporosis Screening: covered every 2 years if you meet one of the following conditions: (1) Have a vertebral abnormality; (2) On glucocorticoid therapy for more than 3 months; (3) Have primary hyperparathyroidism; (4) On osteoporosis medications and need to assess response to drug therapy  HIV Screening: covered annually if you're between the age of 12-76  Also covered annually if you are younger than 13 and older than 72 with risk factors for HIV infection  For pregnant patients, it is covered up to 3 times per pregnancy      Immunizations:  Immunization Recommendations Influenza Vaccine Annual influenza vaccination during flu season is recommended for all persons aged >= 6 months who do not have contraindications   Pneumococcal Vaccine   * Pneumococcal conjugate vaccine = PCV13 (Prevnar 13), PCV15 (Vaxneuvance), PCV20 (Prevnar 20)  * Pneumococcal polysaccharide vaccine = PPSV23 (Pneumovax) Adults 25-60 years old: 1-3 doses may be recommended based on certain risk factors  Adults 72 years old: 1-2 doses may be recommended based off what pneumonia vaccine you previously received   Hepatitis B Vaccine 3 dose series if at intermediate or high risk (ex: diabetes, end stage renal disease, liver disease)   Tetanus (Td) Vaccine - COST NOT COVERED BY MEDICARE PART B Following completion of primary series, a booster dose should be given every 10 years to maintain immunity against tetanus  Td may also be given as tetanus wound prophylaxis  Tdap Vaccine - COST NOT COVERED BY MEDICARE PART B Recommended at least once for all adults  For pregnant patients, recommended with each pregnancy  Shingles Vaccine (Shingrix) - COST NOT COVERED BY MEDICARE PART B  2 shot series recommended in those aged 48 and above     Health Maintenance Due:      Topic Date Due    Colorectal Cancer Screening  01/27/2026    Hepatitis C Screening  Completed     Immunizations Due:  There are no preventive care reminders to display for this patient  Advance Directives   What are advance directives? Advance directives are legal documents that state your wishes and plans for medical care  These plans are made ahead of time in case you lose your ability to make decisions for yourself  Advance directives can apply to any medical decision, such as the treatments you want, and if you want to donate organs  What are the types of advance directives? There are many types of advance directives, and each state has rules about how to use them  You may choose a combination of any of the following:  Living will:   This is a written record of the treatment you want  You can also choose which treatments you do not want, which to limit, and which to stop at a certain time  This includes surgery, medicine, IV fluid, and tube feedings  [facility] power of  for healthcare [facility]): This is a written record that states who you want to make healthcare choices for you when you are unable to make them for yourself  This person, called a proxy, is usually a family member or a friend  You may choose more than 1 proxy  Do not resuscitate (DNR) order:  A DNR order is used in case your heart stops beating or you stop breathing  It is a request not to have certain forms of treatment, such as CPR  A DNR order may be included in other types of advance directives  Medical directive: This covers the care that you want if you are in a coma, near death, or unable to make decisions for yourself  You can list the treatments you want for each condition  Treatment may include pain medicine, surgery, blood transfusions, dialysis, IV or tube feedings, and a ventilator (breathing machine)  Values history: This document has questions about your views, beliefs, and how you feel and think about life  This information can help others choose the care that you would choose  Why are advance directives important? An advance directive helps you control your care  Although spoken wishes may be used, it is better to have your wishes written down  Spoken wishes can be misunderstood, or not followed  Treatments may be given even if you do not want them  An advance directive may make it easier for your family to make difficult choices about your care  Cigarette Smoking and Your Health   Risks to your health if you smoke:  Nicotine and other chemicals found in tobacco damage every cell in your body  Even if you are a light smoker, you have an increased risk for cancer, heart disease, and lung disease   If you are pregnant or have diabetes, smoking increases your risk for complications  Benefits to your health if you stop smoking: You decrease respiratory symptoms such as coughing, wheezing, and shortness of breath  You reduce your risk for cancers of the lung, mouth, throat, kidney, bladder, pancreas, stomach, and cervix  If you already have cancer, you increase the benefits of chemotherapy  You also reduce your risk for cancer returning or a second cancer from developing  You reduce your risk for heart disease, blood clots, heart attack, and stroke  You reduce your risk for lung infections, and diseases such as pneumonia, asthma, chronic bronchitis, and emphysema  Your circulation improves  More oxygen can be delivered to your body  If you have diabetes, you lower your risk for complications, such as kidney, artery, and eye diseases  You also lower your risk for nerve damage  Nerve damage can lead to amputations, poor vision, and blindness  You improve your body's ability to heal and to fight infections  For more information and support to stop smoking:   [employer]  Phone: 9- 598 - [phone]  Web Address: [employer]  Weight Management   Why it is important to manage your weight:  Being overweight increases your risk of health conditions such as heart disease, high blood pressure, type 2 diabetes, and certain types of cancer  It can also increase your risk for osteoarthritis, sleep apnea, and other respiratory problems  Aim for a slow, steady weight loss  Even a small amount of weight loss can lower your risk of health problems  How to lose weight safely:  A safe and healthy way to lose weight is to eat fewer calories and get regular exercise  You can lose up about 1 pound a week by decreasing the number of calories you eat by 500 calories each day  Healthy meal plan for weight management:  A healthy meal plan includes a variety of foods, contains fewer calories, and helps you stay healthy   A healthy meal plan includes the following:  Eat whole-grain foods more often  A healthy meal plan should contain fiber  Fiber is the part of grains, fruits, and vegetables that is not broken down by your body  Whole-grain foods are healthy and provide extra fiber in your diet  Some examples of whole-grain foods are whole-wheat breads and pastas, oatmeal, brown rice, and bulgur  Eat a variety of vegetables every day  Include dark, leafy greens such as spinach, kale, michelle greens, and mustard greens  Eat yellow and orange vegetables such as carrots, sweet potatoes, and winter squash  Eat a variety of fruits every day  Choose fresh or canned fruit (canned in its own juice or light syrup) instead of juice  Fruit juice has very little or no fiber  Eat low-fat dairy foods  Drink fat-free (skim) milk or 1% milk  Eat fat-free yogurt and low-fat cottage cheese  Try low-fat cheeses such as mozzarella and other reduced-fat cheeses  Choose meat and other protein foods that are low in fat  Choose beans or other legumes such as split peas or lentils  Choose fish, skinless poultry (chicken or turkey), or lean cuts of red meat (beef or pork)  Before you cook meat or poultry, cut off any visible fat  Use less fat and oil  Try baking foods instead of frying them  Add less fat, such as margarine, sour cream, regular salad dressing and mayonnaise to foods  Eat fewer high-fat foods  Some examples of high-fat foods include french fries, doughnuts, ice cream, and cakes  Eat fewer sweets  Limit foods and drinks that are high in sugar  This includes candy, cookies, regular soda, and sweetened drinks  Exercise:  Exercise at least 30 minutes per day on most days of the week  Some examples of exercise include walking, biking, dancing, and swimming  You can also fit in more physical activity by taking the stairs instead of the elevator or parking farther away from stores  Ask your healthcare provider about the best exercise plan for you        © Copyright [employer] 2018 Information is for End User's use only and may not be sold, redistributed or otherwise used for commercial purposes   All illustrations and images included in CareNotes® are the copyrighted property of [employer]  or [facility]

## 2023-02-21 NOTE — PROGRESS NOTES
Assessment/Plan:  Problem List Items Addressed This Visit        Endocrine    Type 2 diabetes mellitus without complication, without long-term current use of insulin ([facility] )       Lab Results   Component Value Date    HGBA1C 7 3 (H) 01/23/2023       Lab Results   Component Value Date    HGBA1C 7 3 (H) 01/23/2023     Uncontrolled  Management per Endo  Recommend lifestyle modifications  Relevant Medications    lisinopril (ZESTRIL) 20 mg tablet    Subclinical hypothyroidism     Management per Endo  Not currently taking Synthroid  Type 2 diabetes mellitus with hyperglycemia, without long-term current use of insulin ([facility])       Lab Results   Component Value Date    HGBA1C 7 3 (H) 01/23/2023     Uncontrolled  Management per Endo  Recommend lifestyle modifications  Relevant Medications    lisinopril (ZESTRIL) 20 mg tablet       Cardiovascular and Mediastinum    Benign essential hypertension     Stable on Lisinopril 20mg QD  Check blood pressure outside of office  Recommend lifestyle modifications  Relevant Medications    lisinopril (ZESTRIL) 20 mg tablet    Arteriosclerosis of coronary artery     Asymptomatic  Management per Cardio - overdue for appt  Continue ASA, statin, ACE-I  Recommend lifestyle modifications  LDL not at goal for CAD  Increase Pravachol 80mg QHS  Nervous and Auditory    Parkinson disease ([facility] )     Management per [facility]  S/p Deep brain stimulator 9/22  On Sinemet 25-100mg 1 tab TID PRN, Sinemet CR 50-200mg QHS, Amantadine 100mg QD  [address]                 Hematopoietic and Hemostatic    Essential (hemorrhagic) thrombocythemia ([facility] )     Pending Heme/Onc consult previously  Pipe smoking may be contributory  Relevant Orders    Ambulatory Referral to Hematology / Oncology       Other    Depression     Stable on Effexor XR 75 mg daily           Relevant Medications    venlafaxine (EFFEXOR-XR) 75 mg 24 hr capsule    Mixed hyperlipidemia     Uncontrolled  LDL not at goal for CAD  Increase Pravachol 80mg QHS  Recommend lifestyle modifications  Relevant Medications    pravastatin (PRAVACHOL) 80 mg tablet    Other Relevant Orders    Comprehensive metabolic panel    LDL cholesterol, direct    Pipe smoker     Contemplative  Smoking cessation advised  Relevant Orders    Ambulatory Referral to Hematology / Oncology    Anxiety     Stable on Effexor XR 75 mg daily  Overweight     Worsening  Recommend lifestyle modifications  History of prostate cancer     Patient declined continued Uro F/U and desires yearly PSA per PCP  Status post DaVinci robot prostatectomy performed at [facility] 2013  Relevant Orders    Ambulatory Referral to Hematology / Oncology   Other Visit Diagnoses     Medicare annual wellness visit, subsequent    -  Primary    Screening for AAA (abdominal aortic aneurysm)        Relevant Orders    US abdominal aorta screening aaa           Return in 4 months (on 6/21/2023) for 4mo- DM2, HTN, HL, Anx/Dep, ET, H/O Prostae CA, Smoker, Labs  Future Appointments   Date Time Provider Tayo Dingisti   2/24/2023 11:00 AM Edmar Lunsford, [address]  PT [facility]   3/1/2023  3:45 PM Roc Lewis PT [place] PT [facility]   3/2/2023  4:30 PM Edmar Lunsford, PT [facility] PT [facility]   3/9/2023  9:00 AM BENNY Melgar [facility]EMA ONC [facility]   5/19/2023 10:45 AM [facility] DIAB ED [facility]   5/24/2023 12:40 PM Bradley Hernandez MD DIAB CTR [facility] Med Spc   6/21/2023 11:00 AM Dawna Garza DO [facility]        Subjective:     Gideon Pacheco is a 79 y o  male who presents today for a follow-up on his chronic medical conditions  HPI:  Chief Complaint   Patient presents with   • Medicare Wellness Visit     AWV / F/U HTN, HL, Anx/Dep, Labs    No new problems or concerns at this time        -- Above per clinical staff and reviewed  --      HPI      Today:    Return in about 6 weeks (around 2/17/2023) for AWV / F/U HTN, HL, Anx/Dep, Labs      Overweight - Watching diet  +Regular exercise - Parkinson's Boxing program at [place] [id number] for 45 minutes, 2 times per week        DM2 - Management per Endo Dr Mariel Driver  Next appt 5/23  Last DM Education 11/18/22 - next appt 5/23  Vanna Ash - Last seen 7/21  No Podiatry        HTN - Restarted Lisinopril 20mg 1 pill QD x 2 weeks (previously restarted Lisinopril 10mg QD x 4 weeks) tolerating well, previously caused dizziness  No BP check outside of office  No other HTN meds previously       Subclinical Hypothyroidism - Management per Endo Dr Mariel Driver  Next appt 5/23  Patient is not taking Synthroid currently         Hyperlipidemia - On Pravachol 40mg QHS  Previously on Zocor 40mg QHS  - pt unsure why D/C  No higher dose or other statin previously        CAD - Management per Cardio Dr Bre Pedro  Next appt 9/21 - Overdue        Parkinson's Disease - Management per [facility] Dr Cristina Jay  Next appt 3/23  S/p Deep brain stimulator 9/22  On Sinemet 25-100mg 1 tab TID PRN, Sinemet CR 50-200mg QHS, Amantadine 100mg QD  Attending individual [facility] Parkinson's PT 2 times weekly since 2/23        H/O CVA -  Unsure of date - 2/19? Management per [facility] Dr Cristina Jay  Next appt 3/23         Left Carpal Tunnel Syndrome - Management per [facility] Neuro Dr Cristina Jay  Next appt 3/23  Uses a brace PRN  Declined EMG / surgery referral per Neuro       Depression / Anxiety - Mood is stable  On Effexor XR 75mg QD  He is unsure if he's taken high dose or other mood meds previously  He last attended counseling in the late 1990's and states it was not helpful  Patients thinks he might be slightly autistic  Good social supports  No SI/HI/AH/VH            PHQ-2/9 Depression Screening    Little interest or pleasure in doing things: 0 - not at all  Feeling down, depressed, or hopeless: 0 - not at all  Trouble falling or staying asleep, or sleeping too much: 3 - nearly every day  Feeling tired or having little energy: 1 - several days  Poor appetite or overeatin - not at all  Feeling bad about yourself - or that you are a failure or have let yourself or your family down: 0 - not at all  Trouble concentrating on things, such as reading the newspaper or watching television: 0 - not at all  Moving or speaking so slowly that other people could have noticed  Or the opposite - being so fidgety or restless that you have been moving around a lot more than usual: 0 - not at all  Thoughts that you would be better off dead, or of hurting yourself in some way: 0 - not at all  PHQ-9 Score: 4   PHQ-9 Interpretation: No or Minimal depression        HEAMNT-7 Flowsheet Screening    Flowsheet Row Most Recent Value   Over the last 2 weeks, how often have you been bothered by any of the following problems? Feeling nervous, anxious, or on edge 0   Not being able to stop or control worrying 0   Worrying too much about different things 1   Trouble relaxing 1   Being so restless that it is hard to sit still 0   Becoming easily annoyed or irritable 1   Feeling afraid as if something awful might happen 0   HEMANT-7 Total Score 3          MDQ:  1, No Problem, Asynchronous     H/O Prostate Cancer - Management per Uro Mr Brice ZAPATA  Next appt  - Overdue PRN as patient declined F/U and desires yearly PSA per PCP  Status post DaVinci robot prostatectomy performed at [facility]       ED - No longer taking Levitra 20mg QDPRN        Essential Thrombocythemia - No Heme/Onc consult previously        Pipe Smoker - Precontemplative  Intermittent, usually over winter  Pipe smoking 1 time per week in cold weather, 0 times per week in hot weather              From previous note:    Controlled Substance Review     [place] PDMP or [place]  reviewed: No red flags were identified; safe to proceed with prescription     Overweight - Watching diet  No regular exercise  Previously did Parkinson's Boxing program at [facility]        DM2 - Management per Endo Dr Joe Forbes  Next appt   Last DM Education 22 - next appt   [facility] Dr Marixa Retana - Last seen   No Podiatry        HTN - Restarted Lisinopril 20mg 1/2 pill QD x 1 week - previously caused dizziness  No BP check outside  No other HTN meds previously       Subclinical Hypothyroidism - Management per Endo Dr Joe Forbes  Next appt   Patient is not taking Synthroid currently         Hyperlipidemia - On Pravachol 40mg QHS  Previously on Zocor 40mg QHS  - pt unsure why D/C  No higher dose or other statin previously        CAD - Management per Cardio Dr Carine Beal  Next appt  - Overdue        Parkinson's Disease - [facility] Dr Laverne Clark  Next appt 3/23  S/p Deep brain stimulator   On Sinemet 25-100mg 1 tab TID PRN, Sinemet CR 50-200mg QHS, Amantadine 100mg QD      H/O CVA -  Unsure of date - ? [facility] Dr Laverne Clark  Next appt 3/23?        Left Carpal Tunnel Syndrome - Management per [facility] Dr Laverne Clark  Next appt 3/23? Uses a brace  Declined EMG / surgery referral per Neuro       Depression / Anxiety - Mood is stable  On Effexor XR 75mg QD  He is unsure if he's taken high dose or other mood meds previously  He last attended counseling in the late  and states it was not helpful  Patients thinks he might be slightly autistic  Good social supports  No SI/HI/AH/VH            PHQ-2/9 Depression Screening       Little interest or pleasure in doing things: 0 - not at all  Feeling down, depressed, or hopeless: 0 - not at all  Trouble falling or staying asleep, or sleeping too much: 2 - more than half the days  Feeling tired or having little energy: 1 - several days  Poor appetite or overeatin - not at all  Feeling bad about yourself - or that you are a failure or have let yourself or your family down: 0 - not at all  Trouble concentrating on things, such as reading the newspaper or watching television: 0 - not at all  Moving or speaking so slowly that other people could have noticed  Or the opposite - being so fidgety or restless that you have been moving around a lot more than usual: 0 - not at all  Thoughts that you would be better off dead, or of hurting yourself in some way: 0 - not at all  PHQ-9 Score: 3   PHQ-9 Interpretation: No or Minimal depression                    HEMANT-7 Flowsheet Screening    Flowsheet Row Most Recent Value   Over the last 2 weeks, how often have you been bothered by any of the following problems?     Feeling nervous, anxious, or on edge 1   Not being able to stop or control worrying 0   Worrying too much about different things 0   Trouble relaxing 1   Being so restless that it is hard to sit still 1   Becoming easily annoyed or irritable 0   Feeling afraid as if something awful might happen 0   HEMANT-7 Total Score 3          MDQ:  1, No Problem, Asynchronous     H/O Prostate Cancer - Management per Uro Mr  Abbe Nate ZAPATA  Next appt 8/21 - Overdue PRN as patient declined F/U and desires yearly PSA per PCP  Status post DaVinci robot prostatectomy performed at [facility] 2013      ED - No longer taking Levitra 20mg QDPRN        Essential Thrombocythemia - No Heme/Onc consult previously        Pipe Smoker - Precontemplative    Intermittent, usually over winter            Reviewed:  Labs 1/23/23, [facility] 2/3/23, Endo 1/24/23, DM Educ 11/18/22, Uro 8/21/20, Cardio 9/15/20     No Uro      Last Colonoscopy 1/27/16, repeat in 10 years - 1/27/26              The following portions of the patient's history were reviewed and updated as appropriate: allergies, current medications, past family history, past medical history, past social history, past surgical history and problem list       Review of Systems   Constitutional: Negative for appetite change, chills, diaphoresis, fatigue and fever  Respiratory: Negative for chest tightness and shortness of breath  Cardiovascular: Negative for chest pain  Gastrointestinal: Negative for abdominal pain, blood in stool, diarrhea, nausea and vomiting  Genitourinary: Negative for dysuria  Current Outpatient Medications   Medication Sig Dispense Refill   • Amantadine HCl 100 MG tablet Take 100 mg by mouth daily Rx per Neuro     • aspirin 325 mg tablet Take 325 mg by mouth daily     • carbidopa-levodopa (SINEMET CR)  mg per tablet Take 1 tablet by mouth daily at bedtime Rx per Neuro     • carbidopa-levodopa (SINEMET)  mg per tablet Take 2 tablets by mouth 3 (three) times a day Follow clinic instructions (Patient taking differently: Take 1 tablet by mouth 2 (two) times a day Third dose PRN  Rx per Neuro) 540 tablet 3   • Cholecalciferol (VITAMIN D) 2000 units CAPS Take 2 capsules (4,000 Units total) by mouth daily  0   • co-enzyme Q-10 30 MG capsule Take 1 capsule (30 mg total) by mouth daily 30 capsule 0   • glucose blood (ONETOUCH VERIO) test strip Check BG 2x per day (Patient taking differently: Check BG 2x per day  Rx per Endo ) 200 each 3   • lisinopril (ZESTRIL) 20 mg tablet Take 1 tablet (20 mg total) by mouth daily 90 tablet 2   • Multiple Vitamin (multivitamin) capsule Take 1 capsule by mouth daily     • Omega-3 Fatty Acids (fish oil) 1,000 mg Take 1,000 mg by mouth daily     • pravastatin (PRAVACHOL) 80 mg tablet Take 1 tablet (80 mg total) by mouth daily at bedtime 30 tablet 8   • sitaGLIPtin-metFORMIN (JANUMET)  MG per tablet Take 1 tablet by mouth 2 (two) times a day with meals 180 tablet 1   • vardenafil (LEVITRA) 20 MG tablet Take by mouth daily as needed     • venlafaxine (EFFEXOR-XR) 75 mg 24 hr capsule Take 1 capsule (75 mg total) by mouth daily 90 capsule 2     No current facility-administered medications for this visit         Objective:  /70   Pulse 70   Temp (!) 97 1 °F (36 2 °C)   Resp 16   Ht 5' 10" (1 778 m)   Wt 84 8 kg (187 lb)   SpO2 99%   BMI 26 83 kg/m²    Wt Readings from Last 3 Encounters:   02/21/23 84 8 kg (187 lb)   01/24/23 82 6 kg (182 lb)   01/06/23 81 6 kg (179 lb 12 8 oz)      BP Readings from Last 3 Encounters:   02/21/23 120/70   01/24/23 142/80   01/06/23 136/72          Physical Exam  Vitals and nursing note reviewed  Constitutional:       Appearance: Normal appearance  He is well-developed  HENT:      Head: Normocephalic and atraumatic  Eyes:      Conjunctiva/sclera: Conjunctivae normal    Neck:      Thyroid: No thyromegaly  Cardiovascular:      Rate and Rhythm: Normal rate and regular rhythm  Pulses: Normal pulses  Heart sounds: Normal heart sounds  Pulmonary:      Effort: Pulmonary effort is normal       Breath sounds: Normal breath sounds  Musculoskeletal:         General: No swelling or tenderness  Cervical back: Neck supple  Right lower leg: No edema  Left lower leg: No edema  Neurological:      General: No focal deficit present  Mental Status: He is alert and oriented to person, place, and time  Psychiatric:         Mood and Affect: Mood normal          Thought Content: Thought content normal          Judgment: Judgment normal          Lab Results:      Lab Results   Component Value Date    WBC 9 60 01/23/2023    HGB 16 2 01/23/2023    HCT 50 9 (H) 01/23/2023     (H) 01/23/2023    CHOL 206 12/17/2015    TRIG 140 01/23/2023    HDL 48 01/23/2023    ALT 3 (L) 01/23/2023    AST 18 01/23/2023     12/17/2015    K 4 8 01/23/2023     01/23/2023    CREATININE 1 01 01/23/2023    BUN 18 01/23/2023    CO2 29 01/23/2023    TSH 2 06 12/20/2019    PSA <0 1 01/23/2023    GLUF 159 (H) 01/23/2023    HGBA1C 7 3 (H) 01/23/2023     No results found for: URICACID  Invalid input(s): BASENAME Vitamin D    No results found       POCT Labs        Tobacco Cessation Counseling: Tobacco cessation counseling was provided  The patient is sincerely urged to quit consumption of tobacco  He is not ready to quit tobacco  Medication options not discussed

## 2023-02-22 ENCOUNTER — OFFICE VISIT (OUTPATIENT)
Dept: PHYSICAL THERAPY | Facility: CLINIC | Age: 68
End: 2023-02-22

## 2023-02-22 DIAGNOSIS — R26.9 GAIT ABNORMALITY: ICD-10-CM

## 2023-02-22 DIAGNOSIS — G20 PARKINSON DISEASE (HCC): Primary | ICD-10-CM

## 2023-02-22 NOTE — ASSESSMENT & PLAN NOTE
Asymptomatic  Management per Cardio - overdue for appt  Continue ASA, statin, ACE-I  Recommend lifestyle modifications  LDL not at goal for CAD  Increase Pravachol 80mg QHS

## 2023-02-22 NOTE — ASSESSMENT & PLAN NOTE
Management per [facility]  S/p Deep brain stimulator 9/22  On Sinemet 25-100mg 1 tab TID PRN, Sinemet CR 50-200mg QHS, Amantadine 100mg QD    Continue Kahlil's

## 2023-02-22 NOTE — ASSESSMENT & PLAN NOTE
Lab Results   Component Value Date    HGBA1C 7 3 (H) 01/23/2023     Uncontrolled  Management per Endo  Recommend lifestyle modifications

## 2023-02-22 NOTE — ASSESSMENT & PLAN NOTE
Uncontrolled  LDL not at goal for CAD  Increase Pravachol 80mg QHS  Recommend lifestyle modifications

## 2023-02-22 NOTE — ASSESSMENT & PLAN NOTE
Patient declined continued Uro F/U and desires yearly PSA per PCP  Status post DaVinci robot prostatectomy performed at [facility] 2013

## 2023-02-22 NOTE — ASSESSMENT & PLAN NOTE
Stable on Lisinopril 20mg QD  Check blood pressure outside of office  Recommend lifestyle modifications

## 2023-02-22 NOTE — ASSESSMENT & PLAN NOTE
Lab Results   Component Value Date    HGBA1C 7 3 (H) 01/23/2023       Lab Results   Component Value Date    HGBA1C 7 3 (H) 01/23/2023     Uncontrolled  Management per Endo  Recommend lifestyle modifications

## 2023-02-22 NOTE — PROGRESS NOTES
Daily Note     Today's date: 2023  Patient name: Sammy Tejada  : 1955  MRN: [id number]  Referring provider: Yared Calderon DO  Dx:   Encounter Diagnosis     ICD-10-CM    1  Parkinson disease ([address] )  G20       2  Gait abnormality  R26 9                      Subjective: Patient with no new complaints, wants to try boxing gloves today as his wrist is feeling good  Objective: See treatment diary below    Warm up:  - Seated large amplitude floor to ceiling x 10  - Seated twist and reach x 10 B/L    Shadow boxing x 20 each  - 1's  - 2's  - 3's  - 4's  - 5's  - 6's    Circuit 1 (2 minutes, 2 rounds):  - Ambulation w/ posterior resistance > combos  - FWD/BWD kayden negotiation > combos  - LAT ski jumps over flat [place] > combos    Circuit 2 (2 minutes, 1 round):  - Standing on balance pods + placing rings on hoop with facility dog    Core Circuit (1 min each, 1 round):  - Stationary marching + 10# TT hold  - Trunk rotations with 10# TT    Cool down  - OH tricep stretch  - Arm across the chest  - Calf stretch    Assessment: Patient tolerated treatment session well today with focus on large amplitude movement  Significant difficulty maintaining balance while reaching outside KATHERINE on balance pods; illustrated excessive extension in low back as compensation for maintaining balance  Plan to follow up next session regarding wrist pain, as patient completed all combos with boxing gloves donned, to boxing pads  Patient will continue to benefit from skilled OPPT services to maximize functional mobility and slow disease progression secondary to PD diagnosis  Plan: Continue per plan of care  Progress treatment as tolerated  POC expires Auth Status Total   Visits  Start date  Expiration date PT/OT + Visit Limit?  Co-Insurance   23 Auth not  Visits   PT $20 Co-pay                                           Visit/Unit Tracking  AUTH Status: Submitted Date           Visits Authed: Submitted Used eval 2 3 4 5           Remaining

## 2023-02-24 ENCOUNTER — OFFICE VISIT (OUTPATIENT)
Dept: PHYSICAL THERAPY | Facility: CLINIC | Age: 68
End: 2023-02-24

## 2023-02-24 DIAGNOSIS — R26.9 GAIT ABNORMALITY: ICD-10-CM

## 2023-02-24 DIAGNOSIS — G20 PARKINSON DISEASE (HCC): Primary | ICD-10-CM

## 2023-02-24 NOTE — PROGRESS NOTES
Daily Note     POC expires Auth Status Total   Visits  Start date  Expiration date PT/OT + Visit Limit? Co-Insurance   23 Auth not required per FD Visits   PT $20 Co-pay                                           Visit/Unit Tracking  AUTH Status: Submitted Date          Visits  Authed: Submitted Used eval 2 3 4 5 6          Remaining                      Today's date: 2023  Patient name: Herminia Maurer  : 1955  MRN: [id number]  Referring provider: Corbin Parekh DO  Dx:   Encounter Diagnosis     ICD-10-CM    1  Parkinson disease ([facility] )  G20       2  Gait abnormality  R26 9                      Subjective: Patient with no new complaints, wants to try boxing gloves today as his wrist is feeling good  Objective: See treatment diary below    Warm up:  - Seated large amplitude floor to ceiling x 10  - Seated twist and reach x 10 B/L    Shadow boxing x 20 each  - 1's  - 2's  - 3's  - 4's  - 5's  - 6's    Circuit 1 (2 minutes, 2 rounds):  - Laterally resisted steps over hurdles > combos  - FWD step onto large and medium river rock + vertical tap to wall > combos  - Split jumps on 4'' step > combos    Circuit 2 (2 minutes, 1 round):  - Step up/down FWD 8'' step + 10# [place]  - Step up/over LAT 8'' step + 10# [place]    Cool down  - OH tricep stretch  - Arm across the chest  - Calf stretch    Assessment: Patient tolerated treatment session well today with focus on large amplitude movement  Patient good ability to negotiate compliant surfaces with no LOB demonstrated this date  Patient challenged with split stance jumps and fatigued at times, but able to complete with self pacing  Patient will continue to benefit from skilled OPPT services to maximize functional mobility and slow disease progression secondary to PD diagnosis  Plan: Continue per plan of care  Progress treatment as tolerated

## 2023-03-02 ENCOUNTER — OFFICE VISIT (OUTPATIENT)
Dept: PHYSICAL THERAPY | Facility: CLINIC | Age: 68
End: 2023-03-02

## 2023-03-02 DIAGNOSIS — R26.9 GAIT ABNORMALITY: ICD-10-CM

## 2023-03-02 DIAGNOSIS — G20 PARKINSON DISEASE (HCC): Primary | ICD-10-CM

## 2023-03-02 NOTE — PROGRESS NOTES
PT-Re-Eval           POC expires Auth Status Total   Visits  Start date  Expiration date PT/OT + Visit Limit? Co-Insurance   23 Auth not required per FD Visits   PT $20 Co-pay                                           Visit/Unit Tracking  AUTH Status: Submitted Date 2-2 2/8 2/9 2/14 2/22 2/24 3/2        Visits  Authed: Submitted Used eval 2 3 4 5 6 7         Remaining                      Today's date: 3/2/2023  Patient name: Reilly Ritchie  : 1955  MRN: [id number]  Referring provider: Jason Amezquita DO  Dx:   Encounter Diagnosis     ICD-10-CM    1  Parkinson disease ([facility] [address] )  G20       2  Gait abnormality  R26 9                 Assessment  Assessment details: Patient is a 79 y o  Male who presents to skilled outpatient PT with referral for parkinsons disease  Patient recently had a DBS placement in 2022, due to significant difficulty with tremors   Patient has demonstrated good progress over the past month as illustrated by the outcome measures performed this date in comparison to previous evaluation  Patient demonstrated good progress with 5x STS, TUG (Regular, Cog and Dual), 10 MWT, and 6 MWT  This indicates improvements with LE strength/power, stability with gait and dual task, dynamic balance, gait speed and cardiovascular endurance  According to testing this date he is considered a LOW risk for falls  Patient is interested in the following program to address noted deficits: [employer]  He presents with the following symptoms that are consistent with Carmen and Yahr stage 1 5: unilateral and axial symptoms  Per research provided by [employer], patient will benefit from skilled outpatient PT services to improve and maximize his/her function, to reduce risk for falls and potential injuries associated with falls, reduce healthcare costs via hospitalization, and reduce patient and national healthcare costs   In early stages of Parkinson's Disease, research indicates that intensive physical exercise can improve patient's motor control and assist in slowing the disease progression as a neuroprotective agent  Plan is to transition to once a week one-on-one with a greater focus on mobility and one rock steady class at the gym  Patient will benefit from skilled outpatient PT in order to maximize function in the short-term and long-term with overall improved postural strength with associated stability and mobility  Impairments: Abnormal coordination, Abnormal gait, Abnormal or restricted ROM, Activity intolerance, Impaired balance, Impaired physical strength, Lacks appropriate HEP, Poor posture, Poor body mechanics and Abnormal movement  Understanding of Dx/Px/POC: Good  Prognosis: Good      Patient verbalized understanding of POC  Please contact me if you have any questions or recommendations  Thank you for the referral and the opportunity to share in [employer] care             Plan  Plan details: [employer]  Program: [employer]  Patient would benefit from: PT Eval  Planned therapy interventions: Abdominal trunk stabilization, Balance, Body mechanics training, Coordination, Functional ROM exercises, Gait training, HEP, Joint mobilization, Manual therapy, Motor coordination training, Neuromuscular re-education, Patient education, Postural training, Strengthening, Stretching, Therapeutic activities and Therapeutic exercises  Frequency: 2x/wk  Duration in weeks: 12 weeks  Plan of Care beginning date: 2-2-23  Plan of Care expiration date: 3 months - 5/2/23  Treatment plan discussed with: Patient         Goals  Short Term Goals (4 weeks):  - Patient will improve TUG score by MDC of 4 8 sec from 7 23 sec to previous baseline of 6 47 seconds in order to reduce risk of falls and to increase safety with functional mobility- CLOSE  - Patient will improve 5 STS by the [address] of 2 3 sec from 14 42 sec to 12 12 sec indicating an improvement in strength and decreased challenge with transfers- CLOSE  - Patient will improve 6 MWT by the [address] of 269 ft from 1550 ft to 1819 ft indicating an improvement in cardiovascular endurance in order to maximize function with functional mobility throughout the community  - Patient will improve MiniBESTest score by MDC of 5 52 points from 22/30 to 27 52/30 indicating an improvement with dynamic balance in order to further decrease risk of falls with functional tasks- NOT MET  - Patient will be independent in basic HEP in order to manage condition at home- MET    Long Term Goals (12 weeks):  - Patient will score a low risk for falls 2/4 fall risks measures  - Patient will score age norm values for 1/2 endurance measures  - Patient will be able to ambulate on uneven surfaces with 50% reduction in near falls to increase safety with community mobility  - Patient will be able to perform a floor transfer without physical assistance to assist with fall recovery at home and in the community  - Patient will be independent in comprehensive HEP post discharge from program  - Patient will be able to walk up incline with decreased difficulty and no loss of balance in order to improve functional mobility throughout the community  - Patient will be able to perform sit to stands from softer surfaces such as a couch or bed in order to improvement function with transfers  - Patient will be consistent with program for at least 1 day per week to assist with management of condition and functional independence of their condition        Cut off score   All date taken from [facility] Neuro Section or Rehab Measures      YBARRA Cutoff Scores:  MDC: 5 pts  Falls Risk Cutoff: 40 22/56 DGI Cutoff Scores:  Isa Melissa al 2011, MDC: 2 9 pts  Ryley et al 2008, Cherrie: Score <19/24   MiniBEST Cutoff Scores:  Dung Sargent al 2011, MDC: 5 52 pts  Vance Paez 2013[facility]: <19/28 5xSTS Cutoff Scores:  MDC: 2 3 sec  Sravanthi Ribeiro et al, 2011, Cherrie: > 16 sec   TUG Cutoff Scores:  Mariia Betts et al, 2011, MDC: 4 8 sec  Norbert et al, 2011, Fallers: Meds ON: < 12 21 sec, OFF: 15 5 sec 10 Meter Walk Test Cutoff Scores:  Andreina Saul, 2008, MDC: 0 18 m/s  Age Norm Values, Cherrie: < 1 0 m/s   ABC Cutoff Scores:  Leigh Kennedy, 2008, MDC: 13 pts  Deann Forth, MDC: 11 12 pts  Increased risk for falls: < 69% 6 Minute Walk Test Cutoff Scores:  Andreina Saul, 2008, MDC: 269 ft  Rush, 2002, Norm Data Healthy Adults:  61 - 71 years:   M: 200 m      F: 538 m  70 - 79 years:   M: 1 m      F: 200 m  80 - 89 years:   M: 1 m      F: 80 m   PDQ-39 Cutoff Scores:  Dave eduardo, 2004:  MDC: Mobility (12 24), ADL (16 72), Emotional (14 22), Stigma (21 21), Social Support (21 25), Cognition (21 12), Communication (21 04), Bodily Discomfort (24 48)  Tim et al, 2007:  Normative Data: Mobility (49 25), ADL (38 94), Emotional (37 92), Stigma (27 54), Social Support (14 78), Cognition (33 03), Communication (27 99), Bodily Discomfort (40 91) Carmen and Yahr Stages  Stage 0: No S/S  Stage 1: Mild unilateral symptoms  Stage 1 5: Unilateral and axial symptoms  Stage 2: Bilateral symptoms, no balance impairment  Stage 2 5: Mild bilateral symptoms and recovery with pull test  Stage 3: Mild to moderate postural instability, independent  Stage 4: Severe disability, walking or standing unassisted  Stage 5: Bed bound, w/c bound           Subjective Evaluation    History of Present Illness  Mechanism of injury: Patient has a history of PD and has been seen for RSB prior  Patient reports getting DBS back in September, due to having to take 14 does a day for medication  He takes 3 doses a day and one time release  Update: (3-2-33)  - Patient reports that he needs help with mobility  He feels he is improving overall         Primary AD: None  Previous Programs: RSB      Pain  No pain reported  Current pain ratin  At best pain ratin  At worst pain ratin     Social Support  Steps to enter house: yes  Stairs in house: yes   Lives with: spouse     Employment status: Retured   Treatments  Previous treatment: physical therapy  Patient Goals  Patient goals for therapy: improved balance, increased motion, return to sport/leisure activities, independence with ADLs/IADLs and increased strength  Hand dominance: R handed    Treatments  Previous treatment: RSB  Current treatment: General mobility         Objective   Gait abnormalities: Slowed gait speed, limited arm swing and trunk ROT    MDS/UPDRS Part III  - Is the patient on medications for treating symptoms of PD? Yes  - If receiving medication for treatment: OFF: typical functional state when having a poor response in spite of taking medications  - Is the patient on Levodopa?  Yes        - If yes, how many minutes since last dose:  5 hours   - Speech: 2 - Mild: Loss of modulation, diction, or volume, with a few words unclear, but the overall sentences easy to follow  - Facial Expression: 2 - Mild: in addition to decreased eye-blink frequency, masked facies present in the lower face as well, namely fewer movements around the mouth, such as less spontaneous smiling, but lips not parted  - Rigidity:  - Neck: 0 - Normal: No rigidity  - RUE: 0 - Normal: No rigidity  - LUE: 0 - Normal: No rigidity  - RLE: 0 - Normal: No rigidity  - LLE: 0 - Normal: No rigidity  - Finger Tapping:  - R: 2 -Mild: Any of the following: (a) 3-5 interruptions during tapping, (b) mild slowing, (c) the amplitude decrements midway in the 10-tap sequence  - L: 2 -Mild: Any of the following: (a) 3-5 interruptions during tapping, (b) mild slowing, (c) the amplitude decrements midway in the 10-tap sequence  - Hand Movements:   - R: 1 - Slight: Any of the following: (a) regular rhythm is broken with 1-2 interruptions or hesitations of the movement, (b) slow slowing, (c) the amplitude decrements near end of task   - L: 2 - Mild: Any of the following: (a) 3-5 interruptions during the movements, (b) mild slowing, (c) the amplitude decrements midway in the task  - Pronation-Supination Movements of Hands:   - R: 0 - Normal: No problems   - L: 1 - Slight: Any of the following: (a) regular rhythm broken with 1-2 interruptions or hesitations of the movement, (b) slight slowing, (c) the amplitude decrements near the end of the sequence  - Toe Tapping:   - R: 0 - Normal: No problems   - L: 0 - Normal: No problems  - Leg Agility:   - R: 0 - Normal: no problems   - L: 0 - Normal: no problems  - Arising from Chair: 0 - Normal: no problems, able to arise quickly without hesitation  - Gait: 1 - Slight: Independent walking with minor gait impairment  - Freezing of Gait: 0 - Normal: No freezing  - Postural Stability: 0 - Normal: No problems, recovers with 1-2 steps)  - Posture: 1 - Slight: Not quite erect, but posture could be normal for older person  - Global Spontaneity of Movement (Body Bradykinesia): 2 - Mild: Mild global slowness and poverty of spontaneous movements  - Postural Tremor of the Hands:   - R: 1 - Slight: Tremor present, but < 1 cm in amplitude   - L: 2 - Mild: Tremor at least 1, but < 3 cm in amplitude  - Kinetic Tremor of the Hands:   - R: 0 - Normal: No tremor   - L: 2 - Mild: Tremor at least 1, but < 3 cm in amplitude  - Rest Tremor Amplitude:   - RUE: 0 - Normal: No tremor   - LUE: 1 - Slight: Tremor present, but < 1 cm in amplitude   - RLE: 0 - Normal: No tremor   - LLE: 0 - Normal: No tremor   - Lip/Jaw: 0 - Normal: No tremor  - Constancy of Rest Tremor: 2 - Mild: Tremor at rest is present 26-50% of the entire examination period  - Dyskinesia Impact on Part III Ratings:   - Were dyskinesias (chorea or dystonia) present during examination? No   - If yes, did these movements interfere with your ratings?  No  - Carmen and Yahr Stage: 1 5 - Unilateral and axial involvement only          Outcome Measures Initial Eval  2-2-21 PN  3-2-23       5xSTS 14 42 sec 12 87 sec , no UE       TUG  - Regular  - Cognitive  - Carry   7 23 sec  8 66 sec  9 28 sec   7 02 sec   7 09 sec  7 71 sec       MiniBEST 22/28 24/28       10 meter 1 44 m/s 1 77 m/s       6MWT 1550 ft 1660 ft        ABC 92 5% 90%       PDQ-39 15/100 NT       MDS-UPDRS  Part III   25 5/128 NT       H&Y Stage 1 5 NT       Floor > Stand NV sec Supine> sit: 3 76 sec    Stand>supine: 3 73 sec       FGA  30/30                Outcome Measures 3/5/20 7/23/20 9/10 PN 10-12 11/25 PN 3/1/2021   ABC Not assessed 80 6%  92 5%     5x STS 11 5 sec 14 22 sec 12 57 sec 12 66 sec 12 34 sec 13 11   TUG 7 32    11 03 carry    12 12   cog 8 42     8 70 carry    9 95 cog 7 53 sec    8 66 sec   Carry  10 13 sec Cog 6 10 sec    7 34 sec Carry    6 48 sec Cog 6 32 sec    7 09 sec Carry    8 78 sec 6 47 sec         9 12    10 meter walk test 1 25 m/s 1 30 m/s 7 03 sec 1 4 m/s 1 8 m/s 1 9 m/s 1 8 m/s   6 minute walk test 2100 ft 1375 ft 1445 ft 1435 ft 1700 ft    DGI 23/24 23/24       MiniBest 25/28 25/28 27/28     FGA Not assessed 27/30 29/30 27/30                  Precautions: DBS placement on 9/22  Past Medical History:   Diagnosis Date   • Anxiety    • Arteriosclerosis of coronary artery 05/24/2017   • Benign essential hypertension 05/15/2015   • Depression 06/01/2012   • [facility]    • History of prostate cancer 01/05/2023   • History of stroke    • Left carpal tunnel syndrome    • Lumbar canal stenosis    • Mixed hyperlipidemia 11/10/2010   • Osteoarthritis, knee    • Other male erectile dysfunction 08/08/2019   • Overweight    • Parkinson disease ([facility] [address] ) 10/08/2019   • Pipe smoker    • Retinal and vitreous disorder    • Subclinical hypothyroidism 04/11/2017   • Type 2 diabetes mellitus without complication, without long-term current use of insulin ([facility] [address] ) 08/24/2017     Daily Note  - Floor to Ceiling: 10 reps  - Side to side reach: 10 reps   - Trunk SB: 10 reps   - TM: 2 5 mph x 5 mins

## 2023-03-06 ENCOUNTER — OFFICE VISIT (OUTPATIENT)
Dept: PHYSICAL THERAPY | Facility: CLINIC | Age: 68
End: 2023-03-06

## 2023-03-06 DIAGNOSIS — R26.9 GAIT ABNORMALITY: Primary | ICD-10-CM

## 2023-03-06 DIAGNOSIS — G20 PARKINSON DISEASE (HCC): ICD-10-CM

## 2023-03-06 NOTE — PROGRESS NOTES
Daily Note     POC expires Auth Status Total   Visits  Start date  Expiration date PT/OT + Visit Limit? Co-Insurance   23 Auth not required per FD Visits   PT $20 Co-pay                                           Visit/Unit Tracking  AUTH Status: Submitted Date          Visits  Authed: Submitted Used eval 2 3 4 5 6          Remaining                      Today's date: 3/6/2023  Patient name: Bassam Cheng  : 1955  MRN: [id number]  Referring provider: Tri Collado DO  Dx:   Encounter Diagnosis     ICD-10-CM    1  Gait abnormality  R26 9       2  Parkinson disease ([facility] [address] )  G20                      Subjective: Patient with no new complaints, wants to try boxing gloves today as his wrist is feeling good      Objective: See treatment diary below      [employer] Session      Warmup:  - Large amplitude steps forward - 10x B/L  - Large amplitude steps to side - 10x B/L  - Large amplitude steps backward - 10x B/L     Shadow Boxinx ea  1's  2's  3's  4's  5's  6's      Warm up -Performed prior to treatment   Cardio on TM vs NuStep: 15 mins (focus on increase in HR > neuromuscular activation)  Shadow Boxing (20 reps ea, 1-6’s)    Cardio Circuit (2 mins, 1 round)  - Speed squats (10) + LAT lunges (5) > burnouts  - Side stepping + speed stepping on/off foam beam > burnouts  - FWD suicide drill to cones > burnouts  - Speed step tups > burnouts   - Bounding FWD + job backwards > burnouts   Balance and Cog Circuit (2 mins, 1 round)  - Blaze pod task FWD hurdles + blaze pod on elevated surface (hit target w/ multiple distractor blaze pods) > combos  - STS + vertical jump + tap cone > combos  - Backwards lunges + cog task (worksheet) > combos  - Various obstacle negotiation backwards > combos   Fine Motor and Agility Circuit (2 mins, 1 round)  - Standing on balance pods + scap squeezes/No money's with Tband  - Cone Taps + blaze pod taps at elevated surface (Green-fist, Orange-palm hand, purple-)  - Carrying Tidal Tank behind back with sidestep on cline ropes  - Inchworms  peg + place on peg board   - Lateral step up/over + med ball slam on ea side   Partner Circuit: 5 mins total  - 10 Squats with 10# Tidal Tank > Side step with med ball slam 10# > jog end to end on turf  Cool Down  - Standing wall stretch for thoracic extension  - Gastroc/solues stretch      Assessment: Patient tolerated treatment session well today with focus on large amplitude movement  Patient good ability to tolerate his first 90 minute session  He did require occasional self pacing, but otherwise tolerated well  Patient challenged at times by amplitude and compliant surfaces, but otherwise good stability  Patient will continue to benefit from skilled OPPT services to maximize functional mobility and slow disease progression secondary to PD diagnosis  Plan: Continue per plan of care  Progress treatment as tolerated             Outcome Measures Initial Eval  2-2-21 PN  3-2-23       5xSTS 14 42 sec 12 87 sec , no UE       TUG  - Regular  - Cognitive  - Carry   7 23 sec  8 66 sec  9 28 sec   7 02 sec   7 09 sec  7 71 sec       MiniBEST 22/28 24/28       10 meter 1 44 m/s 1 77 m/s       6MWT 1550 ft 1660 ft        ABC 92 5% 90%       PDQ-39 15/100 NT       MDS-UPDRS  Part III   25 5/128 NT       H&Y Stage 1 5 NT       Floor > Stand NV sec Supine> sit: 3 76 sec    Stand>supine: 3 73 sec       FGA  30/30

## 2023-03-07 DIAGNOSIS — E78.2 MIXED HYPERLIPIDEMIA: ICD-10-CM

## 2023-03-07 RX ORDER — PRAVASTATIN SODIUM 80 MG/1
TABLET ORAL
Qty: 90 TABLET | Refills: 3 | Status: SHIPPED | OUTPATIENT
Start: 2023-03-07

## 2023-03-08 ENCOUNTER — APPOINTMENT (OUTPATIENT)
Dept: PHYSICAL THERAPY | Facility: CLINIC | Age: 68
End: 2023-03-08

## 2023-03-09 ENCOUNTER — APPOINTMENT (OUTPATIENT)
Dept: LAB | Facility: CLINIC | Age: 68
End: 2023-03-09

## 2023-03-09 ENCOUNTER — OFFICE VISIT (OUTPATIENT)
Dept: PHYSICAL THERAPY | Facility: CLINIC | Age: 68
End: 2023-03-09

## 2023-03-09 ENCOUNTER — CONSULT (OUTPATIENT)
Dept: HEMATOLOGY ONCOLOGY | Facility: CLINIC | Age: 68
End: 2023-03-09

## 2023-03-09 VITALS
TEMPERATURE: 97.8 F | HEIGHT: 70 IN | SYSTOLIC BLOOD PRESSURE: 130 MMHG | WEIGHT: 189 LBS | HEART RATE: 52 BPM | OXYGEN SATURATION: 99 % | RESPIRATION RATE: 16 BRPM | BODY MASS INDEX: 27.06 KG/M2 | DIASTOLIC BLOOD PRESSURE: 78 MMHG

## 2023-03-09 DIAGNOSIS — D47.1 MYELOPROLIFERATIVE DISORDER (HCC): ICD-10-CM

## 2023-03-09 DIAGNOSIS — R26.9 GAIT ABNORMALITY: Primary | ICD-10-CM

## 2023-03-09 DIAGNOSIS — D47.3 ESSENTIAL (HEMORRHAGIC) THROMBOCYTHEMIA (HCC): ICD-10-CM

## 2023-03-09 DIAGNOSIS — D47.3 ESSENTIAL (HEMORRHAGIC) THROMBOCYTHEMIA (HCC): Primary | ICD-10-CM

## 2023-03-09 DIAGNOSIS — G20 PARKINSON DISEASE (HCC): ICD-10-CM

## 2023-03-09 DIAGNOSIS — F17.290 PIPE SMOKER: ICD-10-CM

## 2023-03-09 LAB
ALBUMIN SERPL BCP-MCNC: 4.5 G/DL (ref 3.5–5)
ALP SERPL-CCNC: 39 U/L (ref 34–104)
ALT SERPL W P-5'-P-CCNC: 6 U/L (ref 7–52)
ANA SER QL IA: NEGATIVE
ANION GAP SERPL CALCULATED.3IONS-SCNC: 7 MMOL/L (ref 4–13)
AST SERPL W P-5'-P-CCNC: 17 U/L (ref 13–39)
BILIRUB SERPL-MCNC: 0.37 MG/DL (ref 0.2–1)
BUN SERPL-MCNC: 24 MG/DL (ref 5–25)
CALCIUM SERPL-MCNC: 9.8 MG/DL (ref 8.4–10.2)
CHLORIDE SERPL-SCNC: 100 MMOL/L (ref 96–108)
CO2 SERPL-SCNC: 29 MMOL/L (ref 21–32)
CREAT SERPL-MCNC: 1.14 MG/DL (ref 0.6–1.3)
CRP SERPL QL: 1 MG/L
ERYTHROCYTE [DISTWIDTH] IN BLOOD BY AUTOMATED COUNT: 14.2 % (ref 11.6–15.1)
ERYTHROCYTE [SEDIMENTATION RATE] IN BLOOD: 6 MM/HOUR (ref 0–19)
FERRITIN SERPL-MCNC: 166 NG/ML (ref 8–388)
GFR SERPL CREATININE-BSD FRML MDRD: 66 ML/MIN/1.73SQ M
GLUCOSE SERPL-MCNC: 209 MG/DL (ref 65–140)
HCT VFR BLD AUTO: 48.6 % (ref 36.5–49.3)
HGB BLD-MCNC: 15.4 G/DL (ref 12–17)
IMM EOSINOPHIL NFR BLD MANUAL: 1 % (ref 0–6)
IRON SATN MFR SERPL: 20 % (ref 20–50)
IRON SERPL-MCNC: 68 UG/DL (ref 65–175)
LYMPHOCYTES NFR BLD: 29 % (ref 14–44)
MCH RBC QN AUTO: 29.1 PG (ref 26.8–34.3)
MCHC RBC AUTO-ENTMCNC: 31.7 G/DL (ref 31.4–37.4)
MCV RBC AUTO: 92 FL (ref 82–98)
MONOCYTES NFR BLD AUTO: 5 % (ref 4–12)
NEUTS SEG NFR BLD AUTO: 65 % (ref 45–77)
NRBC BLD AUTO-RTO: 0 /100 WBCS
PLATELET # BLD AUTO: 558 THOUSANDS/UL (ref 149–390)
PLATELET BLD QL SMEAR: ABNORMAL
PMV BLD AUTO: 10 FL (ref 8.9–12.7)
POTASSIUM SERPL-SCNC: 4.8 MMOL/L (ref 3.5–5.3)
PROT SERPL-MCNC: 7.4 G/DL (ref 6.4–8.4)
RBC # BLD AUTO: 5.29 MILLION/UL (ref 3.88–5.62)
RBC MORPH BLD: NORMAL
RHEUMATOID FACT SER QL LA: NEGATIVE
SODIUM SERPL-SCNC: 136 MMOL/L (ref 135–147)
TIBC SERPL-MCNC: 332 UG/DL (ref 250–450)
TOTAL CELLS COUNTED SPEC: 100
WBC # BLD AUTO: 10.81 THOUSAND/UL (ref 4.31–10.16)

## 2023-03-09 NOTE — PROGRESS NOTES
Daily Note     POC expires Auth Status Total   Visits  Start date  Expiration date PT/OT + Visit Limit? Co-Insurance   23 Auth not required per FD Visits   PT $20 Co-pay                                           Visit/Unit Tracking  AUTH Status: Submitted Date          Visits  Authed: Submitted Used eval 2 3 4 5 6          Remaining                      Today's date: 3/9/2023  Patient name: Aggie Oliva  : 1955  MRN: [id number]  Referring provider: Ainsley Arguelles DO  Dx:   Encounter Diagnosis     ICD-10-CM    1  Gait abnormality  R26 9       2  Parkinson disease ([facility] [address] )  G20                      Subjective: Patient with no new complaints or changes since last session    Objective: See treatment diary below      [employer] Session      Warmup:  - Large amplitude steps forward - 10x B/L  - Large amplitude steps to side - 10x B/L  - Large amplitude steps backward - 10x B/L  - Toe/heel walk  - Lateral jacks     Shadow Boxinx ea  1's  2's  3's  4's  5's  6's    Circuit 1 (2 minutes, 2 rounds):  - FWD/BWD stepping on uneven river rocks > combos  - Step ups w/ oppsite arm swing > combos  - Reciprocal jump taps to river rocks > burnouts    Circuit 2 (2 minutes, 1 round):  - Sidestepping along foam beams at 90 degrees > combos  - Tandem ambulation on foam beams at 90 degrees > combos    Cool down  - OH tricep stretch  - Arm across the chest  - Calf stretch      Assessment: Patient tolerated treatment well  Challenged patient with unstable and uneven surfaces to increase dynamic balance challenge, completing with occasional LOB that is self-corrected with stepping reactions  Occasional visual cueing used for appropriate punch combination recall  Patient will continue to benefit from skilled OPPT services to maximize functional mobility and slow disease progression secondary to PD diagnosis  Plan: Continue per plan of care  Progress treatment as tolerated  Outcome Measures Initial Eval  2-2-21 PN  3-2-23       5xSTS 14 42 sec 12 87 sec , no UE       TUG  - Regular  - Cognitive  - Carry   7 23 sec  8 66 sec  9 28 sec   7 02 sec   7 09 sec  7 71 sec       MiniBEST 22/28 24/28       10 meter 1 44 m/s 1 77 m/s       6MWT 1550 ft 1660 ft        ABC 92 5% 90%       PDQ-39 15/100 NT       MDS-UPDRS  Part III   25 5/128 NT       H&Y Stage 1 5 NT       Floor > Stand NV sec Supine> sit: 3 76 sec    Stand>supine: 3 73 sec       FGA  30/30

## 2023-03-09 NOTE — PROGRESS NOTES
[address] [facility]  HEMATOLOGY ONCOLOGY SPECIALISTS [place] [id number]  Hematology Ambulatory Consult  Steven Stone, 1955, [id number]  3/9/2023    Assessment/Plan:  1  Essential (hemorrhagic) thrombocythemia ([address] )  2  Pipe smoker  3  Myeloproliferative disorder [facility])  Mr Sukumar Car is a 71-year-old male seen in consultation for longstanding history of thrombocytosis  This was first noted back in 2015 and his platelet count tends to range from the 400,000 to low 500,000 range  He has never had complications associated with this and does not have any symptoms associated with this either  I explained in detail the work-up listed below including inflammatory markers, autoimmune/rheumatological work-up, peripheral smear, iron panel, BCR/ABL, leukemia lymphoma flow cytometry, and mutations JAK2, GOLDEN R, and MPL  He will also schedule an abdominal ultrasound today at checkout to assess for hepatosplenomegaly  I will call him when the results of this work-up is available to me just to discuss if any further work-up is necessary  Otherwise he will plan to repeat labs in 4 months prior to follow-up with me in the office  He will continue taking aspirin daily  I explained the 81 mg of aspirin daily is sufficient unless there are other reasons as to why he is taking a full dose 325 mg prescribed by his other specialist     - Ambulatory Referral to Hematology / Oncology  - C-reactive protein; Future  - Sedimentation rate, automated; Future  - ALEENA w/Reflex; Future  - RF Screen w/ Reflex to Titer; Future  - Iron Panel (Includes Ferritin, Iron Sat%, Iron, and TIBC); Future  - Peripheral Smear; Future  - US abdomen complete; Future  - CBC and differential; Future  - Comprehensive metabolic panel; Future  - BCR/ABL, PCR; Future  - Leukemia/Lymphoma flow cytometry;  Future  - JAK2 V617F,Ql,W/RFL Exons 12,13 and MPL S548,X898; Future  - Calreticulin (CALR) Mutation; Future      The patient is scheduled for follow-up in approximately 4 months  Patient voiced agreement and understanding to the above  Patient knows to call the Hematology/Oncology office with any questions and concerns regarding the above  Barrier(s) to care: None  The patient is able to self care     -------------------------------------------------------------------------------------------------------    Chief Complaint   Patient presents with   • Consult       Referring provider:  DO Heidi Swain 5  [address]  [address]    History of present illness: Shelbi Wills is a 71-year-old male with past medical history of type 2 diabetes, coronary artery disease, hypertension, Parkinson's disease, history of prostate cancer status post prostatectomy in 2013, and mixed lipidemia  He is seen in consultation for longstanding history of thrombocytosis dating back to at least 2015  There were labs to review from 2013 which demonstrated a platelet count within normal limits  He has a family history of a maternal aunt with a blood problem but is not sure of the details of this diagnosis, and one of his other family members have been diagnosed with hemochromatosis requiring phlebotomies  He has no history of VTE  He has never had a heart attack  He has no peripheral vascular disease that he is aware of  He does not have any easy bruising muscle aches or weakness  He also denies any B symptoms including fevers, night sweats, fatigue, early satiety, or weight loss  He is taking 325 mg of aspirin daily currently      9/14/2013: Hemoglobin 11 2, platelets 398, WBC 25 42  12/17/2015: Hemoglobin 15 1, platelets 134, WBC 9 61  10/22/2016: Hemoglobin 15 4, platelets 482, WBC 01 41  1/26/2017: Hemoglobin 14 7, platelets 101, WBC 24 1  12/29/2020: Hemoglobin 16 4, platelets 000, WBC 9 4  1/23/2023: Hemoglobin 16 2, platelets 129, WBC 9 6    Review of Systems   Constitutional: Negative for activity change, appetite change, fatigue, fever and unexpected weight change  HENT: Negative for trouble swallowing and voice change  Eyes: Negative for photophobia and visual disturbance  Respiratory: Negative for cough, chest tightness and shortness of breath  Cardiovascular: Negative for chest pain, palpitations and leg swelling  Gastrointestinal: Negative for abdominal distention, abdominal pain, anal bleeding, blood in stool, constipation, diarrhea, nausea and vomiting  Endocrine: Negative for cold intolerance and heat intolerance  Genitourinary: Negative  Negative for dysuria, hematuria and urgency  Musculoskeletal: Negative for arthralgias, back pain, gait problem, joint swelling and myalgias  Skin: Negative for pallor and rash  Neurological: Negative for dizziness, weakness, light-headedness, numbness and headaches  Hematological: Negative for adenopathy  Does not bruise/bleed easily  Psychiatric/Behavioral: Negative for confusion and sleep disturbance         Patient Active Problem List   Diagnosis   • Arteriosclerosis of coronary artery   • Benign essential hypertension   • Depression   • Type 2 diabetes mellitus without complication, without long-term current use of insulin ([facility])   • Subclinical hypothyroidism   • Essential (hemorrhagic) thrombocythemia ([facility])   • Mixed hyperlipidemia   • Leukocytosis   • Other male erectile dysfunction   • Parkinson disease (HCC)   • Type 2 diabetes mellitus with hyperglycemia, without long-term current use of insulin ([facility])   • Dystonia   • History of stroke   • Hyperkalemia   • History of prostate cancer   • Pipe smoker   • Anxiety   • Overweight       Past Medical History:   Diagnosis Date   • Anxiety    • Arteriosclerosis of coronary artery 05/24/2017   • Benign essential hypertension 05/15/2015   • Depression 06/01/2012   • Essential (hemorrhagic) thrombocythemia ([facility] [address] )    • History of prostate cancer 01/05/2023   • History of stroke    • Left carpal tunnel syndrome    • Lumbar canal stenosis    • Mixed hyperlipidemia 11/10/2010   • Osteoarthritis, knee    • Other male erectile dysfunction 08/08/2019   • Overweight    • Parkinson disease ([facility] [address] ) 10/08/2019   • Pipe smoker    • Retinal and vitreous disorder    • Subclinical hypothyroidism 04/11/2017   • Type 2 diabetes mellitus without complication, without long-term current use of insulin ([facility] [address] ) 08/24/2017       Past Surgical History:   Procedure Laterality Date   • ANKLE FRACTURE SURGERY Left 04/2009    triple fracture; 2 Screws in place   • [address]  Bilateral 09/2022   • LUMBAR LAMINECTOMY  2003    L5-S1   • NASAL SEPTUM SURGERY  06/1989   • PA COLONOSCOPY FLX DX W/COLLJ SPEC WHEN PFRMD N/A 01/27/2016    Procedure: COLONOSCOPY;  Surgeon: Tam Rendon MD;  Location:  GI LAB; Service: Gastroenterology   • PROSTATE BIOPSY  12/08/2012    managed by Nick Petty, needle biopsy   • PROSTATECTOMY  09/23/2013       Family History   Problem Relation Age of Onset   • Hypertension Mother    • Retinal detachment Mother    • Retinitis pigmentosa Mother    • Diabetes type II Father    • Heart attack Father 61   • Coronary artery disease Father 61   • No Known Problems Son    • No Known Problems Son        Social History     Socioeconomic History   • Marital status: /Civil Union     Spouse name: David Search   • Number of children: 2   • Years of education: None   • Highest education level: None   Occupational History   • Occupation: Retired - Electrical Engineering   Tobacco Use   • Smoking status: Some Days     Types: Pipe     Start date: 1/1/1995   • Smokeless tobacco: Never   • Tobacco comments:     Pipe smoking 1 time per week in cold weather, 0 times per week in hot weather  Vaping Use   • Vaping Use: Never used   Substance and Sexual Activity   • Alcohol use:  Yes     Alcohol/week: 4 0 standard drinks     Types: 3 Cans of beer, 1 Shots of liquor per week     Comment: occassional    • Drug use: Never   • Sexual activity: Not Currently     Partners: Female     Birth control/protection: Post-menopausal, Surgical     Comment: s/p Prostatectomy   Other Topics Concern   • None   Social History Narrative        Lives with wife, Belgica Nair    2 Children - 2 Sons    Retired - Electrical Engineering     Social Determinants of Health     Financial Resource Strain: Low Risk    • Difficulty of Paying Living Expenses: Not hard at all   Food Insecurity: Not on file   Transportation Needs: No Transportation Needs   • Lack of Transportation (Medical): No   • Lack of Transportation (Non-Medical): No   Physical Activity: Not on file   Stress: Not on file   Social Connections: Not on file   Intimate Partner Violence: Not on file   Housing Stability: Not on file         Current Outpatient Medications:   •  Amantadine HCl 100 MG tablet, Take 100 mg by mouth daily Rx per Neuro, Disp: , Rfl:   •  aspirin 325 mg tablet, Take 325 mg by mouth daily, Disp: , Rfl:   •  carbidopa-levodopa (SINEMET CR)  mg per tablet, Take 1 tablet by mouth daily at bedtime Rx per Neuro, Disp: , Rfl:   •  carbidopa-levodopa (SINEMET)  mg per tablet, Take 2 tablets by mouth 3 (three) times a day Follow clinic instructions (Patient taking differently: Take 1 tablet by mouth 1 (one) time Follow clinic instructions), Disp: 540 tablet, Rfl: 3  •  Cholecalciferol (VITAMIN D) 2000 units CAPS, Take 2 capsules (4,000 Units total) by mouth daily, Disp: , Rfl: 0  •  co-enzyme Q-10 30 MG capsule, Take 1 capsule (30 mg total) by mouth daily, Disp: 30 capsule, Rfl: 0  •  glucose blood (ONETOUCH VERIO) test strip, Check BG 2x per day (Patient taking differently: Check BG 2x per day    Rx per Endo ), Disp: 200 each, Rfl: 3  •  lisinopril (ZESTRIL) 20 mg tablet, Take 1 tablet (20 mg total) by mouth daily, Disp: 90 tablet, Rfl: 2  •  Multiple Vitamin (multivitamin) capsule, Take 1 capsule by mouth daily, Disp: , Rfl:   •  Omega-3 Fatty Acids (fish oil) 1,000 mg, Take 1,000 mg by mouth daily, Disp: , Rfl:   •  pravastatin (PRAVACHOL) 80 mg tablet, TAKE 1 TABLET BY MOUTH DAILY AT BEDTIME, Disp: 90 tablet, Rfl: 3  •  sitaGLIPtin-metFORMIN (JANUMET)  MG per tablet, Take 1 tablet by mouth 2 (two) times a day with meals, Disp: 180 tablet, Rfl: 1  •  vardenafil (LEVITRA) 20 MG tablet, Take by mouth daily as needed, Disp: , Rfl:   •  venlafaxine (EFFEXOR-XR) 75 mg 24 hr capsule, Take 1 capsule (75 mg total) by mouth daily, Disp: 90 capsule, Rfl: 2    No Known Allergies    Objective:  /78 (BP Location: Right arm, Patient Position: Sitting, Cuff Size: Large)   Pulse (!) 52   Temp 97 8 °F (36 6 °C) (Temporal)   Resp 16   Ht 5' 10" (1 778 m)   Wt 85 7 kg (189 lb)   SpO2 99%   BMI 27 12 kg/m²   Physical Exam  Constitutional:       General: He is not in acute distress  Appearance: Normal appearance  He is normal weight  He is not ill-appearing  HENT:      Head: Normocephalic and atraumatic  Eyes:      Extraocular Movements: Extraocular movements intact  Conjunctiva/sclera: Conjunctivae normal       Pupils: Pupils are equal, round, and reactive to light  Cardiovascular:      Rate and Rhythm: Normal rate and regular rhythm  Pulmonary:      Effort: No respiratory distress  Abdominal:      General: There is no distension  Tenderness: There is no abdominal tenderness  Musculoskeletal:         General: Normal range of motion  Cervical back: Normal range of motion  No tenderness  Right lower leg: No edema  Left lower leg: No edema  Lymphadenopathy:      Cervical: No cervical adenopathy  Skin:     General: Skin is warm and dry  Capillary Refill: Capillary refill takes less than 2 seconds  Coloration: Skin is not jaundiced or pale  Neurological:      General: No focal deficit present  Mental Status: He is alert  Mental status is at baseline        Cranial Nerves: No cranial nerve deficit  Motor: No weakness  Gait: Gait normal    Psychiatric:         Behavior: Behavior normal          Thought Content: Thought content normal          Judgment: Judgment normal          Result Review  Labs:  No visits with results within 1 Month(s) from this visit  Latest known visit with results is:   Lab on 01/23/2023   Component Date Value Ref Range Status   • Sodium 01/23/2023 137  135 - 147 mmol/L Final   • Potassium 01/23/2023 4 8  3 5 - 5 3 mmol/L Final   • Chloride 01/23/2023 104  96 - 108 mmol/L Final   • CO2 01/23/2023 29  21 - 32 mmol/L Final   • ANION GAP 01/23/2023 4  4 - 13 mmol/L Final   • BUN 01/23/2023 18  5 - 25 mg/dL Final   • Creatinine 01/23/2023 1 01  0 60 - 1 30 mg/dL Final    Standardized to IDMS reference method   • Glucose, Fasting 01/23/2023 159 (H)  65 - 99 mg/dL Final    Specimen collection should occur prior to Sulfasalazine administration due to the potential for falsely depressed results  Specimen collection should occur prior to Sulfapyridine administration due to the potential for falsely elevated results  • Calcium 01/23/2023 9 4  8 4 - 10 2 mg/dL Final   • AST 01/23/2023 18  13 - 39 U/L Final    Specimen collection should occur prior to Sulfasalazine administration due to the potential for falsely depressed results  • ALT 01/23/2023 3 (L)  7 - 52 U/L Final    Specimen collection should occur prior to Sulfasalazine administration due to the potential for falsely depressed results  • Alkaline Phosphatase 01/23/2023 43  34 - 104 U/L Final   • Total Protein 01/23/2023 7 1  6 4 - 8 4 g/dL Final   • Albumin 01/23/2023 4 3  3 5 - 5 0 g/dL Final   • Total Bilirubin 01/23/2023 0 57  0 20 - 1 00 mg/dL Final   • eGFR 01/23/2023 76  ml/min/1 73sq m Final   • Hemoglobin A1C 01/23/2023 7 3 (H)  Normal 3 8-5 6%; PreDiabetic 5 7-6 4%;  Diabetic >=6 5%; Glycemic control for adults with diabetes <7 0% % Final   • EAG 01/23/2023 163  mg/dl Final   • Cholesterol 01/23/2023 177 See Comment mg/dL Final    Cholesterol:         Pediatric <18 Years        Desirable          <170 mg/dL      Borderline High    170-199 mg/dL      High               >=200 mg/dL        Adult >=18 Years            Desirable         <200 mg/dL      Borderline High   200-239 mg/dL      High              >239 mg/dL     • Triglycerides 01/23/2023 140  See Comment mg/dL Final    Triglyceride:     0-9Y            <75mg/dL     10Y-17Y         <90 mg/dL       >=18Y     Normal          <150 mg/dL     Borderline High 150-199 mg/dL     High            200-499 mg/dL        Very High       >499 mg/dL    Specimen collection should occur prior to N-Acetylcysteine or Metamizole administration due to the potential for falsely depressed results  • HDL, Direct 01/23/2023 48  >=40 mg/dL Final   • LDL Calculated 01/23/2023 101 (H)  0 - 100 mg/dL Final    LDL Cholesterol:     Optimal           <100 mg/dl     Near Optimal      100-129 mg/dl     Above Optimal       Borderline High 130-159 mg/dl       High            160-189 mg/dl       Very High       >189 mg/dl         This screening LDL is a calculated result  It does not have the accuracy of the Direct Measured LDL in the monitoring of patients with hyperlipidemia and/or statin therapy  Direct Measure LDL (GRM084) must be ordered separately in these patients  • Free T4 01/23/2023 0 79  0 76 - 1 46 ng/dL Final    Specimen collection should occur prior to Sulfasalazine administration due to the potential for falsely elevated results  • TSH 3RD GENERATON 01/23/2023 2  192  0 450 - 4 500 uIU/mL Final    Adult TSH (3rd generation) reference range follows the recommended guidelines of the American Thyroid Association, January, 2020     • WBC 01/23/2023 9 60  4 31 - 10 16 Thousand/uL Final   • RBC 01/23/2023 5 52  3 88 - 5 62 Million/uL Final   • Hemoglobin 01/23/2023 16 2  12 0 - 17 0 g/dL Final   • Hematocrit 01/23/2023 50 9 (H)  36 5 - 49 3 % Final   • MCV 01/23/2023 92  82 - 98 fL Final   • MCH 01/23/2023 29 3  26 8 - 34 3 pg Final   • MCHC 01/23/2023 31 8  31 4 - 37 4 g/dL Final   • RDW 01/23/2023 14 5  11 6 - 15 1 % Final   • MPV 01/23/2023 9 9  8 9 - 12 7 fL Final   • Platelets 31/60/1035 522 (H)  149 - 390 Thousands/uL Final   • nRBC 01/23/2023 0  /100 WBCs Final   • Neutrophils Relative 01/23/2023 62  43 - 75 % Final   • Immat GRANS % 01/23/2023 1  0 - 2 % Final   • Lymphocytes Relative 01/23/2023 25  14 - 44 % Final   • Monocytes Relative 01/23/2023 8  4 - 12 % Final   • Eosinophils Relative 01/23/2023 3  0 - 6 % Final   • Basophils Relative 01/23/2023 1  0 - 1 % Final   • Neutrophils Absolute 01/23/2023 6 01  1 85 - 7 62 Thousands/µL Final   • Immature Grans Absolute 01/23/2023 0 05  0 00 - 0 20 Thousand/uL Final   • Lymphocytes Absolute 01/23/2023 2 42  0 60 - 4 47 Thousands/µL Final   • Monocytes Absolute 01/23/2023 0 72  0 17 - 1 22 Thousand/µL Final   • Eosinophils Absolute 01/23/2023 0 32  0 00 - 0 61 Thousand/µL Final   • Basophils Absolute 01/23/2023 0 08  0 00 - 0 10 Thousands/µL Final   • Vit D, 25-Hydroxy 01/23/2023 32 4  30 0 - 100 0 ng/mL Final   • PSA 01/23/2023 <0 1  0 0 - 4 0 ng/mL Final    American Urological Association Guidelines define biochemical recurrence of prostate cancer as a detectable or rising PSA value post-radical prostatectomy that is greater than or equal to 0 2 ng/mL with a second confirmatory level of greater than or equal to 0 2 ng/mL  Imaging:  No relevant imaging to review     Please note: This report has been generated by a voice recognition software system  Therefore there may be syntax, spelling, and/or grammatical errors  Please call if you have any questions

## 2023-03-13 ENCOUNTER — OFFICE VISIT (OUTPATIENT)
Dept: PHYSICAL THERAPY | Facility: CLINIC | Age: 68
End: 2023-03-13

## 2023-03-13 DIAGNOSIS — R26.9 GAIT ABNORMALITY: Primary | ICD-10-CM

## 2023-03-13 DIAGNOSIS — G20 PARKINSON DISEASE (HCC): ICD-10-CM

## 2023-03-13 NOTE — PROGRESS NOTES
Daily Note     POC expires Auth Status Total   Visits  Start date  Expiration date PT/OT + Visit Limit? Co-Insurance   23 Auth not required per FD Visits   PT $20 Co-pay                                           Visit/Unit Tracking  AUTH Status: Submitted Date          Visits  Authed: Submitted Used eval 2 3 4 5 6          Remaining                      Today's date: 3/13/2023  Patient name: Libia Conner  : 1955  MRN: [id number]  Referring provider: Buddy Champion DO  Dx:   Encounter Diagnosis     ICD-10-CM    1  Gait abnormality  R26 9       2   Parkinson disease ([facility] [address] )  G20                      Subjective: Patient with no new complaints or changes since last session    Objective: See treatment diary below    Neuro Boxing Program    Warm up - Performed prior to treatment   Cardio on TM vs NuStep: 15 mins (focus on increase in HR > neuromuscular activation)  Shadow Boxing (20 reps ea, 1-6’s)    Cardio Circuit (2 mins, 1 round)  - Jumping jacks with posterior resistance > burnouts  - Rapid step taps at different heights to speed sideshuffle > burnouts  - Mountain climbers to pushups (modified if needed) > burnouts  - STS with posterior resistance > burnouts  Balance and Cog Circuit (2 mins, 1 round)  - FWD/BWD stepping over single kayden from one foam pad to another + naming Safari animals > complex combos  - Twist and reach standing on uneven river rocks > complex combos  - Tandem ambulation in narrow space + blaze pod cog task (blue = add 2, red = subtract 3) > complex combos  - Stepping up on river rocks + side kick to bag > complex combos  - Karaoke up over cline rope > complex combos  Fine Motor and Agility Circuit (2 mins, 1 round)  - Single kayden negotiation + Lego building task  - Partner FWD/BWD cone weaving + cat’s cradle   - Partner FWD/BWD kayden negotiation + cat’s cradle  - Standing on BOSU + easy level Sudoku puzzle (complete as group)   - Wall ball + side stepping over ladder  Cool Down  - Calf stretch  - Across the chest      Assessment: Patient tolerated treatment session well today with continued focus on neuro boxing program  Patient with difficulty with visual-perceptual skills when attempting cat's cradle activity, albeit about 25% improvements noted with visual and manual cues  Difficulty sustaining cognitive task of naming safari animals  Unable to maintain position when performing jumping jacks against resistance  Patient will continue to benefit from skilled OPPT services to maximize functional mobility and slow disease progression secondary to PD diagnosis  Plan: Continue per plan of care  Progress treatment as tolerated             Outcome Measures Initial Eval  2-2-21 PN  3-2-23       5xSTS 14 42 sec 12 87 sec , no UE       TUG  - Regular  - Cognitive  - Carry   7 23 sec  8 66 sec  9 28 sec   7 02 sec   7 09 sec  7 71 sec       MiniBEST 22/28 24/28       10 meter 1 44 m/s 1 77 m/s       6MWT 1550 ft 1660 ft        ABC 92 5% 90%       PDQ-39 15/100 NT       MDS-UPDRS  Part III   25 5/128 NT       H&Y Stage 1 5 NT       Floor > Stand NV sec Supine> sit: 3 76 sec    Stand>supine: 3 73 sec       FGA  30/30

## 2023-03-14 ENCOUNTER — OFFICE VISIT (OUTPATIENT)
Dept: PHYSICAL THERAPY | Facility: CLINIC | Age: 68
End: 2023-03-14

## 2023-03-14 DIAGNOSIS — G20 PARKINSON DISEASE (HCC): ICD-10-CM

## 2023-03-14 DIAGNOSIS — R26.9 GAIT ABNORMALITY: Primary | ICD-10-CM

## 2023-03-14 LAB
SCAN RESULT: NORMAL
SCAN RESULT: NORMAL

## 2023-03-14 NOTE — PROGRESS NOTES
Daily Note     POC expires Auth Status Total   Visits  Start date  Expiration date PT/OT + Visit Limit? Co-Insurance   23 Auth not required per FD Visits   PT $20 Co-pay                                           Visit/Unit Tracking  AUTH Status: Submitted Date          Visits  Authed: Submitted Used eval 2 3 4 5 6          Remaining                      Today's date: 3/14/2023  Patient name: Yusuf Zapata  : 1955  MRN: [id number]  Referring provider: Bunny Acosta,   Dx:   Encounter Diagnosis     ICD-10-CM    1  Gait abnormality  R26 9       2  Parkinson disease ([address] )  G20                      Subjective: Patient reports minor soreness after last session      Objective: See treatment diary below      Genuine Parts Session      Warmup:  - Large amplitude steps forward - 10x B/L  - Large amplitude steps to side - 10x B/L  - Large amplitude steps backward - 10x B/L  - Toe/heel walk  - Lateral jacks     Shadow Boxinx ea  1's  2's  3's  4's  5's  6's    Circuit 1 (2 minutes, 2 rounds):  - CW 12" hurdles> combos  - LAT step jumps > combos  - FWD/BWD ambulation > burnouts    Circuit 2 (2 minutes, 1 round):  - Sidestepping along foam beam w/ tidal tank  - Tandem ambulation on foam beams w/ tidal tank    Cool down  - OH tricep stretch  - Arm across the chest  - Calf stretch      Assessment: Patient tolerated treatment well  Patient challenged with dual tasking while ambulating FWD/BWD and completing burnouts at the same time, occasionally catching toe from poor foot clearance and overall reduced accuracy with punches  180 degree turns on foam when sidestepping required PT CGA due to instability with patient self correcting using stepping reactions  Patient will continue to benefit from skilled OPPT services to maximize functional mobility and slow disease progression secondary to PD diagnosis  Plan: Continue per plan of care  Progress treatment as tolerated  Outcome Measures Initial Eval  2-2-21 PN  3-2-23       5xSTS 14 42 sec 12 87 sec , no UE       TUG  - Regular  - Cognitive  - Carry   7 23 sec  8 66 sec  9 28 sec   7 02 sec   7 09 sec  7 71 sec       MiniBEST 22/28 24/28       10 meter 1 44 m/s 1 77 m/s       6MWT 1550 ft 1660 ft        ABC 92 5% 90%       PDQ-39 15/100 NT       MDS-UPDRS  Part III   25 5/128 NT       H&Y Stage 1 5 NT       Floor > Stand NV sec Supine> sit: 3 76 sec    Stand>supine: 3 73 sec       FGA  30/30

## 2023-03-19 ENCOUNTER — HOSPITAL ENCOUNTER (OUTPATIENT)
Dept: ULTRASOUND IMAGING | Facility: HOSPITAL | Age: 68
Discharge: HOME | End: 2023-03-19

## 2023-03-19 DIAGNOSIS — D47.1 MYELOPROLIFERATIVE DISORDER (HCC): ICD-10-CM

## 2023-03-19 DIAGNOSIS — D47.3 ESSENTIAL (HEMORRHAGIC) THROMBOCYTHEMIA (HCC): ICD-10-CM

## 2023-03-20 ENCOUNTER — APPOINTMENT (OUTPATIENT)
Dept: PHYSICAL THERAPY | Facility: CLINIC | Age: 68
End: 2023-03-20

## 2023-03-21 ENCOUNTER — OFFICE VISIT (OUTPATIENT)
Dept: PHYSICAL THERAPY | Facility: CLINIC | Age: 68
End: 2023-03-21

## 2023-03-21 DIAGNOSIS — G20 PARKINSON DISEASE (HCC): ICD-10-CM

## 2023-03-21 DIAGNOSIS — R26.9 GAIT ABNORMALITY: Primary | ICD-10-CM

## 2023-03-21 NOTE — PROGRESS NOTES
Daily Note     POC expires Auth Status Total   Visits  Start date  Expiration date PT/OT + Visit Limit? Co-Insurance   23 Auth not required per FD Visits   PT $20 Co-pay                                           Visit/Unit Tracking  AUTH Status: Submitted Date          Visits  Authed: Submitted Used eval 2 3 4 5 6          Remaining                      Today's date: 3/21/2023  Patient name: Sammy Arguelles  : 1955  MRN: [id number]  Referring provider: Calli Caro DO  Dx:   Encounter Diagnosis     ICD-10-CM    1  Gait abnormality  R26 9       2  Parkinson disease ([facility] [address] )  G20           Start Time: 1330  Stop Time: 1500  Total time in clinic (min): 90 minutes    Subjective: No new complaints      Objective: See treatment diary below      Electric Cloud circuit (2 minutes, 1 round):   - STS with tidal tank pass off to partner   - Sumo squat with tidal tank pass off to partner   - Lateral shuffle to focus blaze pods, jump squat every 3 hits   - Speed taps around cone > burnout combos  - 10 jumping jacks > 5 speed squats > burnout combos  Cardio on TM 15 minutes    Balance and Cog (2 minutes, 1 round): - Lateral squat walks with lateral resistance, carrying tidal tank on gloves   - Side stepping on foam beam between upright bags, hit blaze pod then associated combo on   other bag   o Red = 1-4-2-6  o Green = 5-2-4-1  o Blue = 3-6-1-4    - Backwards tandem around cones + naming alternating foods and cities through alphabet   - Four square hurdles while alternating adding 5, subtracting 2   Agility and fine motor (2 minutes, 1 round):    - Dynamic stepping in/out of hula hoop simulating dressing tasks   - Crunches + bananagrams puzzle of drinks   - Thread the needle in quadruped to reach for peg on opposite side and place on board  - Crawling under large kayden to place/remove pegs  - Sustained pinch  w/ obstacle course (hurdles and cone weaving)  Functional mobility (5 minutes, together):   - Partner tall kneel > half kneel + scarf toss  - Tall kneel to sitting on heels repetitions during scarf toss    Core (partner exercises, 3 minute each - 2 rounds)  - [place] twist with med ball pass to partner    Stretching: calf stretching        Assessment: Patient tolerated treatment well  Pt challenged with backwards heel toe stepping through tight cones holding Tidal Tank  Patient will continue to benefit from skilled OPPT services to maximize functional mobility and slow disease progression secondary to PD diagnosis  Plan: Continue per plan of care  Progress treatment as tolerated             Outcome Measures Initial Eval  2-2-21 PN  3-2-23       5xSTS 14 42 sec 12 87 sec , no UE       TUG  - Regular  - Cognitive  - Carry   7 23 sec  8 66 sec  9 28 sec   7 02 sec   7 09 sec  7 71 sec       MiniBEST 22/28 24/28       10 meter 1 44 m/s 1 77 m/s       6MWT 1550 ft 1660 ft        ABC 92 5% 90%       PDQ-39 15/100 NT       MDS-UPDRS  Part III   25 5/128 NT       H&Y Stage 1 5 NT       Floor > Stand NV sec Supine> sit: 3 76 sec    Stand>supine: 3 73 sec       FGA  30/30

## 2023-03-23 ENCOUNTER — OFFICE VISIT (OUTPATIENT)
Dept: PHYSICAL THERAPY | Facility: CLINIC | Age: 68
End: 2023-03-23

## 2023-03-23 DIAGNOSIS — G20 PARKINSON DISEASE (HCC): ICD-10-CM

## 2023-03-23 DIAGNOSIS — R26.9 GAIT ABNORMALITY: Primary | ICD-10-CM

## 2023-03-23 NOTE — PROGRESS NOTES
Daily Note     POC expires Auth Status Total   Visits  Start date  Expiration date PT/OT + Visit Limit? Co-Insurance   23 Auth not required per FD Visits   PT $20 Co-pay                                           Visit/Unit Tracking  AUTH Status: Submitted Date          Visits  Authed: Submitted Used eval 2 3 4 5 6          Remaining                      Today's date: 3/23/2023  Patient name: Omero Prabhakar  : 1955  MRN: [id number]  Referring provider: Riley Ogden DO  Dx:   Encounter Diagnosis     ICD-10-CM    1  Gait abnormality  R26 9       2  Parkinson disease ([address] )  G20                      Subjective: Patient reports "I forget to wear my brace at night"  Objective: See treatment diary below      [employer] Session      Warmup:  - Large amplitude steps forward - 10x B/L  - Large amplitude steps to side - 10x B/L  - Large amplitude steps backward - 10x B/L  - Toe/heel walk  - Lateral jacks     Shadow Boxinx ea  1's  2's  3's  4's  5's  6's    Circuit 1 (2 minutes, 2 rounds):  - 4 square hurdles + river rocks > combos  - Posterior resisted step up med river rocks + star jumps > combos    Circuit 2 (2 minutes, 2 rounds):  - Sit to stand on river rocks + 20# tidal tank  - FWD walking over hurdles + foam beam (kayden 6-9'') + chaining/dechaining     Cool down  - Floor to ceiling  - Arm across the chest  - Calf stretch      Assessment: Patient tolerated treatment well with focus on balance with RSB private class  Patient good stability with vertical jump on river rocks, but did have occasional need for a stepping strategy  Patient will continue to benefit from skilled OPPT services to maximize functional mobility and slow disease progression secondary to PD diagnosis  Plan: Continue per plan of care  Progress treatment as tolerated             Outcome Measures Initial Eval  2-- PN  3-2-23       5xSTS 14 42 sec 12 87 sec , no UE       TUG  - Regular  - Cognitive  - Carry   7 23 sec  8 66 sec  9 28 sec   7 02 sec   7 09 sec  7 71 sec       MiniBEST 22/28 24/28       10 meter 1 44 m/s 1 77 m/s       6MWT 1550 ft 1660 ft        ABC 92 5% 90%       PDQ-39 15/100 NT       MDS-UPDRS  Part III   25 5/128 NT       H&Y Stage 1 5 NT       Floor > Stand NV sec Supine> sit: 3 76 sec    Stand>supine: 3 73 sec       FGA  30/30

## 2023-03-27 ENCOUNTER — APPOINTMENT (OUTPATIENT)
Dept: PHYSICAL THERAPY | Facility: CLINIC | Age: 68
End: 2023-03-27

## 2023-03-27 NOTE — PROGRESS NOTES
"Daily Note     POC expires Auth Status Total   Visits  Start date  Expiration date PT/OT + Visit Limit? Co-Insurance   23 Auth not required per FD Visits   PT $20 Co-pay                                           Visit/Unit Tracking  AUTH Status: Submitted Date          Visits  Authed: Submitted Used eval 2 3 4 5 6          Remaining                      Today's date: 3/27/2023  Patient name: Dylon Fields  : 1955  MRN: [id number]  Referring provider: Joey Stanton DO  Dx:   No diagnosis found  Subjective: Patient reports \"I forget to wear my brace at night\"         Objective: See treatment diary below      Neuro Boxing Session      Cardio Circuit (2 mins, 1 round)?   • Speed step taps around BOSU > burnouts   • Standing heel raises (20 reps) + step ups > burnouts    • Push ups at the wall > burnouts   • Resisted clamshells > burnouts   • Walking snow angels at wall > burnouts      Balance and Cog Circuit (2 mins, 1 round)?   • Multi-directional step ups (various box heights) + tap to blaze pods > complex combos   • Tandem on river rocks + rock and reach > complex combos   • Tight cone weaves (colorful cones) + say foods that match the letter of the cones (ie R-red (next time e, d) > complex combos   • Lateral stepping in/out agility ladder + various objects to break up pattern > complex combos   • Standing on foam pad + big step FWD over kayden to balance pod > complex combos      Fine Motor and Agility Circuit (2 mins, 1 round)?   • Tandem on foam beam + stroop stick   • Bounding to cone + shoulder extension with resistance bands   • Standing on balance pods put clothes pins on Tband/place on another    • Speed skaters cone to cone + place tennis ball on ea cone   • Stand + dead lift Tidal Tank> put on the ground > get on floor + mountain climber to external cues with color dots        Assessment: Patient tolerated treatment well with focus on balance with " RSB private class  Patient good stability with vertical jump on river rocks, but did have occasional need for a stepping strategy  Patient will continue to benefit from skilled OPPT services to maximize functional mobility and slow disease progression secondary to PD diagnosis  Plan: Continue per plan of care  Progress treatment as tolerated             Outcome Measures Initial Eval  2-2-21 PN  3-2-23       5xSTS 14 42 sec 12 87 sec , no UE       TUG  - Regular  - Cognitive  - Carry   7 23 sec  8 66 sec  9 28 sec   7 02 sec   7 09 sec  7 71 sec       MiniBEST 22/28 24/28       10 meter 1 44 m/s 1 77 m/s       6MWT 1550 ft 1660 ft        ABC 92 5% 90%       PDQ-39 15/100 NT       MDS-UPDRS  Part III   25 5/128 NT       H&Y Stage 1 5 NT       Floor > Stand NV sec Supine> sit: 3 76 sec    Stand>supine: 3 73 sec       FGA  30/30

## 2023-03-28 ENCOUNTER — OFFICE VISIT (OUTPATIENT)
Dept: PHYSICAL THERAPY | Facility: CLINIC | Age: 68
End: 2023-03-28

## 2023-03-28 DIAGNOSIS — R26.9 GAIT ABNORMALITY: Primary | ICD-10-CM

## 2023-03-28 DIAGNOSIS — G20 PARKINSON DISEASE (HCC): ICD-10-CM

## 2023-03-28 NOTE — PROGRESS NOTES
"Daily Note     POC expires Auth Status Total   Visits  Start date  Expiration date PT/OT + Visit Limit? Co-Insurance   23 Auth not required per FD Visits   PT $20 Co-pay                                               Today's date: 3/28/2023  Patient name: Deana Carrillo  : 1955  MRN: [id number]  Referring provider: Kiki Jimenez DO  Dx:   Encounter Diagnosis     ICD-10-CM    1  Gait abnormality  R26 9       2  Parkinson disease ([address] )  G20                      Subjective: Patient reports \"I forget to wear my brace at night\"  Objective: See treatment diary below      Neuro Boxing Session      Warmup:  - Large amplitude steps forward - 10x B/L  - Large amplitude steps to side - 10x B/L  - Large amplitude steps backward - 10x B/L  - Toe/heel walk  - Lateral jacks     Shadow Boxinx ea  1's  2's  3's  4's  5's  6's    Circuit 1 (2 minutes, 2 rounds):  - Side stepping on foam beam + LE blaze pod taps (180 deg turn on red pods)  - Posterior resisted figure 8's around cones > complex combos   - STS with feet on foam > burnout combos     Circuit 2 (2 minutes, 1 round):  - Squats on foam beam while tying/untying therabands   - Carioca + naming restaurants through [employer] over row with 10# TT    Core (2 minutes, 1 round):   - 10# TT rotation       Assessment: Patient tolerated treatment well with focus on balance within neuro boxing format  Patient displayed significant instability on compliant surface when performing turns, with several near LOB requiring assistance to steady  Patient with good dual tasking throughout session  Occasional prompting for multiple item recall of combos  Patient will continue to benefit from skilled OPPT services to maximize functional mobility and slow disease progression secondary to PD diagnosis  Plan: Continue per plan of care  Progress treatment as tolerated             Outcome Measures Initial Eval  -- PN  3-2-23       5xSTS 14 42 sec 12 87 sec " , no UE       TUG  - Regular  - Cognitive  - Carry   7 23 sec  8 66 sec  9 28 sec   7 02 sec   7 09 sec  7 71 sec       MiniBEST 22/28 24/28       10 meter 1 44 m/s 1 77 m/s       6MWT 1550 ft 1660 ft        ABC 92 5% 90%       PDQ-39 15/100 NT       MDS-UPDRS  Part III   25 5/128 NT       H&Y Stage 1 5 NT       Floor > Stand NV sec Supine> sit: 3 76 sec    Stand>supine: 3 73 sec       FGA  30/30

## 2023-03-30 ENCOUNTER — OFFICE VISIT (OUTPATIENT)
Dept: PHYSICAL THERAPY | Facility: CLINIC | Age: 68
End: 2023-03-30

## 2023-03-30 DIAGNOSIS — R26.9 GAIT ABNORMALITY: Primary | ICD-10-CM

## 2023-03-30 DIAGNOSIS — G20 PARKINSON DISEASE (HCC): ICD-10-CM

## 2023-03-30 NOTE — PROGRESS NOTES
"Daily Note     POC expires Auth Status Total   Visits  Start date  Expiration date PT/OT + Visit Limit? Co-Insurance   23 Auth not required per FD Visits   PT $20 Co-pay                                               Today's date: 3/30/2023  Patient name: Jevon Hernandez  : 1955  MRN: [id number]  Referring provider: Brando Izquierdo DO  Dx:   Encounter Diagnosis     ICD-10-CM    1  Gait abnormality  R26 9       2  Parkinson disease ([facility] [address] )  G20                      Subjective: Patient reports to PT session with no new issues or complaints  Objective: See treatment diary below      Neuro Boxing Session      Warmup:  - Large amplitude steps forward - 10x B/L  - Large amplitude steps to side - 10x B/L  - Large amplitude steps backward - 10x B/L  - Toe/heel walk  - Lateral jacks     Shadow Boxinx ea  1's  2's  3's  4's  5's  6's    Circuit 1 (2 minutes, 2 rounds):  - John negotiation (6\" and 9\") with intermittent river rocks + naming foods in alphabetical order > combos  - Agility ladder negotiation (lateral in/outs) + 10# TT hold > combos  - STS w/ posterior resistance (red) > combos    Circuit 2 (2 minutes, 1 round):  - Trunk rotation on foam pad taking Squigz off mirror and placing in container  - Dead lifts with 20# TT    Core (2 minutes, 1 round):   - 10# med ball chops    Cool down  - Arm across chest  - Gastroc stretch  - Open books at wall      Assessment: Patient tolerated treatment well with focus on balance and high intensity tasks through neuro boxing program  Patient demonstrated good balance strategy with river rocks negotiation  Initial posterior LOB with resisted STS that improved with increased repetitions  Noted good form with dead lifts  Incorporated thoracic mobility in cool down per patient request  Patient will continue to benefit from skilled OPPT services to maximize functional mobility and slow disease progression secondary to PD diagnosis        Plan: Continue per plan " of care  Progress treatment as tolerated             Outcome Measures Initial Eval  2-2-21 PN  3-2-23       5xSTS 14 42 sec 12 87 sec , no UE       TUG  - Regular  - Cognitive  - Carry   7 23 sec  8 66 sec  9 28 sec   7 02 sec   7 09 sec  7 71 sec       MiniBEST 22/28 24/28       10 meter 1 44 m/s 1 77 m/s       6MWT 1550 ft 1660 ft        ABC 92 5% 90%       PDQ-39 15/100 NT       MDS-UPDRS  Part III   25 5/128 NT       H&Y Stage 1 5 NT       Floor > Stand NV sec Supine> sit: 3 76 sec    Stand>supine: 3 73 sec       FGA  30/30

## 2023-04-03 ENCOUNTER — EVALUATION (OUTPATIENT)
Dept: PHYSICAL THERAPY | Facility: CLINIC | Age: 68
End: 2023-04-03

## 2023-04-03 ENCOUNTER — TELEPHONE (OUTPATIENT)
Dept: LAB | Facility: HOSPITAL | Age: 68
End: 2023-04-03

## 2023-04-03 DIAGNOSIS — G20 PARKINSON DISEASE (HCC): ICD-10-CM

## 2023-04-03 DIAGNOSIS — R26.9 GAIT ABNORMALITY: Primary | ICD-10-CM

## 2023-04-03 NOTE — PROGRESS NOTES
PT-Re-Eval           POC expires Auth Status Total   Visits  Start date  Expiration date PT/OT + Visit Limit? Co-Insurance   23 Auth not required per FD Visits   PT $20 Co-pay                                           Visit/Unit Tracking  AUTH Status: Submitted Date 2-2 2/8 2/9 2/14 2/22 2/24 3/2        Visits  Authed: Submitted Used eval 2 3 4 5 6 7         Remaining                      Today's date: 4/3/2023  Patient name: Mercedes Keller  : 1955  MRN: [id number]  Referring provider: Guedra Armijo DO  Dx:   Encounter Diagnosis     ICD-10-CM    1  Gait abnormality  R26 9       2  Parkinson disease ([facility] [address] )  G20                 Assessment  Assessment details: Patient is a 79 y o  Male who presents to skilled outpatient PT with referral for Parkinson's Disease  Patient recently had a DBS placement in 2022, due to significant difficulty with tremors  Patient demonstrated improvements in the following outcome measures since last reassessment: 5 x STS, TUG (regular and manual), miniBEST, and 6 MWT, likely indicating overall gains in functional LE strength, safe mobility, manual dual tasking, reactive balance and postural stability, and cardiovascular endurance, respectively  According to all outcome measure testing this date, he is considered LOW risk for falls per cutoff scores provided by the [facility] Neuro Section and Rehab Measures  He presents with the following symptoms that are consistent with Carmen and Yahr stage 1 5: unilateral and axial symptoms  Per research provided by [facility], patient will benefit from skilled outpatient PT services to improve and maximize his/her function, to reduce risk for falls and potential injuries associated with falls, reduce healthcare costs via hospitalization, and reduce patient and national healthcare costs   In early stages of Parkinson's Disease, research indicates that intensive physical exercise can improve patient's motor control and assist in slowing the disease progression as a neuroprotective agent  Plan to continue with gym-based rock steady boxing classes supplemented with once a week one-one-one sessions at the clinic  He has met 4 short term and 2 long term goals at this time  Patient will benefit from continued skilled outpatient PT in order to maximize function in the short-term and long-term with overall improved postural strength with associated stability and mobility  Impairments: Abnormal coordination, Abnormal gait, Abnormal or restricted ROM, Activity intolerance, Impaired balance, Impaired physical strength, Lacks appropriate HEP, Poor posture, Poor body mechanics and Abnormal movement  Understanding of Dx/Px/POC: Good  Prognosis: Good      Patient verbalized understanding of POC  Please contact me if you have any questions or recommendations  Thank you for the referral and the opportunity to share in [employer] care             Plan  Plan details: [employer]  Program: [employer]  Patient would benefit from: PT Eval  Planned therapy interventions: Abdominal trunk stabilization, Balance, Body mechanics training, Coordination, Functional ROM exercises, Gait training, HEP, Joint mobilization, Manual therapy, Motor coordination training, Neuromuscular re-education, Patient education, Postural training, Strengthening, Stretching, Therapeutic activities and Therapeutic exercises  Frequency: 2x/wk  Duration in weeks: 12 weeks  Plan of Care beginning date: 2-2-23  Plan of Care expiration date: 3 months - 5/2/23  Treatment plan discussed with: Patient         Goals  Short Term Goals (4 weeks):  - Patient will improve TUG score by MDC of 4 8 sec from 7 23 sec to previous baseline of 6 47 seconds in order to reduce risk of falls and to increase safety with functional mobility- CLOSE  - Patient will improve 5 STS by the [address] of 2 3 sec from 14 42 sec to 12 12 sec indicating an improvement in strength and decreased challenge with transfers- MET  - Patient will improve 6 MWT by the [address] of 269 ft from 1550 ft to 1819 ft indicating an improvement in cardiovascular endurance in order to maximize function with functional mobility throughout the community - MET  - Patient will improve MiniBESTest score by MDC of 5 52 points from 22/30 to 27 52/30 indicating an improvement with dynamic balance in order to further decrease risk of falls with functional tasks- NOT MET - MET  - Patient will be independent in basic HEP in order to manage condition at home- MET    Long Term Goals (12 weeks):  - Patient will score a low risk for falls 2/4 fall risks measures - MET  - Patient will score age norm values for 1/2 endurance measures - ONGOING  - Patient will be able to ambulate on uneven surfaces with 50% reduction in near falls to increase safety with community mobility - ONGOING  - Patient will be able to perform a floor transfer without physical assistance to assist with fall recovery at home and in the community - ONGOING  - Patient will be independent in comprehensive HEP post discharge from program - NOT MET  - Patient will be able to walk up incline with decreased difficulty and no loss of balance in order to improve functional mobility throughout the community - NOT MET  - Patient will be able to perform sit to stands from softer surfaces such as a couch or bed in order to improvement function with transfers - ONGOING  - Patient will be consistent with program for at least 1 day per week to assist with management of condition and functional independence of their condition - MET        Cut off score   All date taken from [facility] Neuro Section or Rehab Measures      YBARRA Cutoff Scores:  MDC: 5 pts  Falls Risk Cutoff: 40 22/56 DGI Cutoff Scores:  Elliott Murcia al 2011, MDC: 2 9 pts  Ryley et al 2008, Cherrie: Score <19/24   MiniBEST Cutoff Scores:  Welford Sport al 2011, MDC: 5 52 pts  Arnold Shabazz 2013, [place]: <19/28 5xSTS Cutoff Scores:  MDC: 2 3 sec  Messi Rich "et al, 2011, Cherrie: > 16 sec   TUG Cutoff Scores:  Court Form et al, 2011, MDC: 4 8 sec  Adela Shaw al, 2011, [place]: Meds ON: < 12 21 sec, OFF: 15 5 sec 10 Meter Walk Test Cutoff Scores:  Jelani Garcia, 2008, MDC: 0 18 m/s  Age Norm Values, Cherrie: < 1 0 m/s   ABC Cutoff Scores:  Ana Rodgers, 2008, MDC: 13 pts  Court Form, MDC: 11 12 pts  Increased risk for falls: < 69% 6 Minute Walk Test Cutoff Scores:  Jelani Garcia, 2008, MDC: 269 ft  Daniel et al, 2002, Norm Data Healthy Adults:  61 - 71 years:   M: 200 m      F: 538 m  70 - 79 years:   M: 1 m      F: 471 m  80 - 89 years:   M: 1 m      F: 80 m   PDQ-39 Cutoff Scores:  Karie eduardo, 2004:  MDC: Mobility (12 24), ADL (16 72), Emotional (14 22), Stigma (21 21), Social Support (21 25), Cognition (21 12), Communication (21 04), Bodily Discomfort (24 48)  Tim et al, 2007:  Normative Data: Mobility (49 25), ADL (38 94), Emotional (37 92), Stigma (27 54), Social Support (14 78), Cognition (33 03), Communication (27 99), Bodily Discomfort (40 91) Caremn and Yahr Stages  Stage 0: No S/S  Stage 1: Mild unilateral symptoms  Stage 1 5: Unilateral and axial symptoms  Stage 2: Bilateral symptoms, no balance impairment  Stage 2 5: Mild bilateral symptoms and recovery with pull test  Stage 3: Mild to moderate postural instability, independent  Stage 4: Severe disability, walking or standing unassisted  Stage 5: Bed bound, w/c bound           Subjective Evaluation    History of Present Illness  Mechanism of injury: Patient has a history of PD and has been seen for RSB prior  Patient reports getting DBS back in September, due to having to take 14 does a day for medication  He takes 3 doses a day and one time release  Update: (3-2-33)  - Patient reports that he needs help with mobility  He feels he is improving overall  (Updated 4/3/2023): Patient reports overall satisfaction with PT services thus far   Feels his balance \"is getting " "back where it should be  \"      Primary AD: None  Previous Programs: RSB      Pain  No pain reported  Current pain ratin  At best pain ratin  At worst pain ratin     Social Support  Steps to enter house: yes  Stairs in house: yes   Lives with: spouse     Employment status: Retured   Treatments  Previous treatment: physical therapy  Patient Goals  Patient goals for therapy: improved balance, increased motion, return to sport/leisure activities, independence with ADLs/IADLs and increased strength  Hand dominance: R handed    Treatments  Previous treatment: RSB  Current treatment: General mobility         Objective   Gait abnormalities: Slowed gait speed, limited arm swing and trunk ROT    MDS/UPDRS Part III  - Is the patient on medications for treating symptoms of PD? Yes  - If receiving medication for treatment: OFF: typical functional state when having a poor response in spite of taking medications  - Is the patient on Levodopa?  Yes        - If yes, how many minutes since last dose:  5 hours   - Speech: 2 - Mild: Loss of modulation, diction, or volume, with a few words unclear, but the overall sentences easy to follow  - Facial Expression: 2 - Mild: in addition to decreased eye-blink frequency, masked facies present in the lower face as well, namely fewer movements around the mouth, such as less spontaneous smiling, but lips not parted  - Rigidity:  - Neck: 0 - Normal: No rigidity  - RUE: 0 - Normal: No rigidity  - LUE: 0 - Normal: No rigidity  - RLE: 0 - Normal: No rigidity  - LLE: 0 - Normal: No rigidity  - Finger Tapping:  - R: 2 -Mild: Any of the following: (a) 3-5 interruptions during tapping, (b) mild slowing, (c) the amplitude decrements midway in the 10-tap sequence  - L: 2 -Mild: Any of the following: (a) 3-5 interruptions during tapping, (b) mild slowing, (c) the amplitude decrements midway in the 10-tap sequence  - Hand Movements:   - R: 1 - Slight: Any of the following: (a) regular rhythm is " broken with 1-2 interruptions or hesitations of the movement, (b) slow slowing, (c) the amplitude decrements near end of task   - L: 2 - Mild: Any of the following: (a) 3-5 interruptions during the movements, (b) mild slowing, (c) the amplitude decrements midway in the task  - Pronation-Supination Movements of Hands:   - R: 0 - Normal: No problems   - L: 1 - Slight: Any of the following: (a) regular rhythm broken with 1-2 interruptions or hesitations of the movement, (b) slight slowing, (c) the amplitude decrements near the end of the sequence  - Toe Tapping:   - R: 0 - Normal: No problems   - L: 0 - Normal: No problems  - Leg Agility:   - R: 0 - Normal: no problems   - L: 0 - Normal: no problems  - Arising from Chair: 0 - Normal: no problems, able to arise quickly without hesitation  - Gait: 1 - Slight: Independent walking with minor gait impairment  - Freezing of Gait: 0 - Normal: No freezing  - Postural Stability: 0 - Normal: No problems, recovers with 1-2 steps)  - Posture: 1 - Slight: Not quite erect, but posture could be normal for older person  - Global Spontaneity of Movement (Body Bradykinesia): 2 - Mild: Mild global slowness and poverty of spontaneous movements  - Postural Tremor of the Hands:   - R: 1 - Slight: Tremor present, but < 1 cm in amplitude   - L: 2 - Mild: Tremor at least 1, but < 3 cm in amplitude  - Kinetic Tremor of the Hands:   - R: 0 - Normal: No tremor   - L: 2 - Mild: Tremor at least 1, but < 3 cm in amplitude  - Rest Tremor Amplitude:   - RUE: 0 - Normal: No tremor   - LUE: 1 - Slight: Tremor present, but < 1 cm in amplitude   - RLE: 0 - Normal: No tremor   - LLE: 0 - Normal: No tremor   - Lip/Jaw: 0 - Normal: No tremor  - Constancy of Rest Tremor: 2 - Mild: Tremor at rest is present 26-50% of the entire examination period  - Dyskinesia Impact on Part III Ratings:   - Were dyskinesias (chorea or dystonia) present during examination?  No   - If yes, did these movements interfere with your ratings? No  - Carmen and Yahr Stage: 1 5 - Unilateral and axial involvement only          Outcome Measures Initial Eval  2-2-21 PN  3-2-23 PN  4/3/2023      5xSTS 14 42 sec 12 87 sec , no UE 11 38 sec, no UE      TUG  - Regular  - Cognitive  - Carry   7 23 sec  8 66 sec  9 28 sec   7 02 sec   7 09 sec  7 71 sec   6 58 sec  8 05 sec  6 78 sec      MiniBEST 22/28 24/28 26/28      10 meter 1 44 m/s 1 77 m/s 1 76 m/s      6MWT 1550 ft 1660 ft  1700 ft      ABC 92 5% 90% 93 75%      PDQ-39 15/100 NT NT      MDS-UPDRS  Part III   25 5/128 NT NT      H&Y Stage 1 5 NT 1 5      Floor > Stand NV sec Supine> sit: 3 76 sec    Stand>supine: 3 73 sec Supine > sit: 3 56 sec      Stand > supine: 2 74 sec      FGA  30/30 30/30               Outcome Measures 3/5/20 7/23/20 9/10 PN 10-12 11/25 PN 3/1/2021   ABC Not assessed 80 6%  92 5%     5x STS 11 5 sec 14 22 sec 12 57 sec 12 66 sec 12 34 sec 13 11   TUG 7 32    11 03 carry    12 12   cog 8 42     8 70 carry    9 95 cog 7 53 sec    8 66 sec  Carry  10 13 sec Cog 6 10 sec    7 34 sec Carry    6 48 sec Cog 6 32 sec    7 09 sec Carry    8 78 sec 6 47 sec         9 12    10 meter walk test 1 25 m/s 1 30 m/s 7 03 sec 1 4 m/s 1 8 m/s 1 9 m/s 1 8 m/s   6 minute walk test 2100 ft 1375 ft 1445 ft 1435 ft 1700 ft    DGI 23/24 23/24       MiniBest 25/28 25/28 27/28     FGA Not assessed 27/30 29/30 27/30              DN (4/3/2023):   Fine Motor and Agility Circuit (2 mins, 1 round)?   • Chops with resistance bands in tandem stance   • Ta ropes + blaze pod taps (focus mode)   • Cone weaving + opposition with rubber bands   • Scrabble game spell words (spring words) while in Q-ped   • Sit to stand with Bicep curl > push press       Core?   • Walking out inch worms   • Sit up > touch opp foot      Cool Down?   • Sidelying open books        Precautions: DBS placement on 9/22  Past Medical History:   Diagnosis Date   • Anxiety    • Arteriosclerosis of coronary artery 05/24/2017   • Benign essential hypertension 05/15/2015   • Depression 06/01/2012   • Essential (hemorrhagic) thrombocythemia ([place] [address] )    • History of prostate cancer 01/05/2023   • History of stroke    • Left carpal tunnel syndrome    • Lumbar canal stenosis    • Mixed hyperlipidemia 11/10/2010   • Osteoarthritis, knee    • Other male erectile dysfunction 08/08/2019   • Overweight    • Parkinson disease ([place] [address] ) 10/08/2019   • Pipe smoker    • Retinal and vitreous disorder    • Subclinical hypothyroidism 04/11/2017   • Type 2 diabetes mellitus without complication, without long-term current use of insulin ([place] [address] ) 08/24/2017     Daily Note  - Floor to Ceiling: 10 reps  - Side to side reach: 10 reps   - Trunk SB: 10 reps   - TM: 2 5 mph x 5 mins

## 2023-04-19 ENCOUNTER — APPOINTMENT (OUTPATIENT)
Dept: PHYSICAL THERAPY | Facility: CLINIC | Age: 68
End: 2023-04-19

## 2023-04-24 ENCOUNTER — APPOINTMENT (OUTPATIENT)
Dept: PHYSICAL THERAPY | Facility: CLINIC | Age: 68
End: 2023-04-24

## 2023-04-24 NOTE — PROGRESS NOTES
Daily Note     POC expires Auth Status Total   Visits  Start date  Expiration date PT/OT + Visit Limit? Co-Insurance   23 Auth not required per FD Visits   PT $20 Co-pay                                               Today's date: 2023  Patient name: Harjeet Kauffman  : 1955  MRN: [id number]  Referring provider: Clemente Schmidt DO  Dx:   No diagnosis found  Subjective: Patient reports to PT session with no new issues or complaints         Objective: See treatment diary below     [employer] up -Performed prior to treatment    Cardio on TM vs NuStep: 15 mins (focus on increase in HR > neuromuscular activation)   Shadow Boxing (20 reps ea, 1-6s)      Cardio Circuit (2 mins, 1 round)   - Ice skaters > burnouts  - Speed heel + toe walking > burnouts  - Lateral jump up/over (line on ground) + 10 heel raises on ea side > burnouts  - Speed Lateral step up box > burnouts  - Opp hand to knee to cone > burnouts  Balance and Cog Circuit (2 mins, 1 round)   - Backwards opp hand to knee over cline rope > complex combos  - Marching vs squats on foam > complex combos  - FWD/BCK around figure 8 pattern with partner (count out loud  by 3s) > complex combos  - LAT around figure 8 pattern with partner (count out loud  by 3s) > complex combos  - Lunge on bosu + blaze pod tap to bag > complex combos  Fine Motor and Agility Circuit (2 mins, 1 round)   - LAT hurdles + FWD twist T-spine ROT with resistance rope   - FWD/BCK bounding over target + playing spot it  - Suicides to blaze pods + place squigs on color blaze pod that matches (Green, Pink,Blue)   - Tandem walking with Tidal tank behind shoulder + dead lift on ea end  - Small Tidal Tank windmills + BCK step on river rocks   Core  - Crunches  - [place] twists  Cool Down    Cat Cow   Thread the needle   Adductor stretch seated/standing       Assessment: Patient tolerated treatment well with focus on balance and high intensity tasks through neuro boxing program  Patient required verbal prompting approximately 50% of the time for activities with dual tasks  Good recall of complex combos and fair stability on compliant surfaces  Patient will continue to benefit from skilled OPPT services to maximize functional mobility and slow disease progression secondary to PD diagnosis  Plan: Continue per plan of care  Progress treatment as tolerated           Outcome Measures Initial Eval  2-2-21 PN  3-2-23 PN  4/3/2023      5xSTS 14 42 sec 12 87 sec , no UE 11 38 sec, no UE      TUG  - Regular  - Cognitive  - Carry   7 23 sec  8 66 sec  9 28 sec   7 02 sec   7 09 sec  7 71 sec   6 58 sec  8 05 sec  6 78 sec      MiniBEST 22/28 24/28 26/28      10 meter 1 44 m/s 1 77 m/s 1 76 m/s      6MWT 1550 ft 1660 ft  1700 ft      ABC 92 5% 90% 93 75%      PDQ-39 15/100 NT NT      MDS-UPDRS  Part III   25 5/128 NT NT      H&Y Stage 1 5 NT 1 5      Floor > Stand NV sec Supine> sit: 3 76 sec    Stand>supine: 3 73 sec Supine > sit: 3 56 sec      Stand > supine: 2 74 sec      FGA  30/30 30/30

## 2023-04-25 ENCOUNTER — OFFICE VISIT (OUTPATIENT)
Dept: PHYSICAL THERAPY | Facility: CLINIC | Age: 68
End: 2023-04-25

## 2023-04-25 DIAGNOSIS — R26.9 GAIT ABNORMALITY: Primary | ICD-10-CM

## 2023-04-25 DIAGNOSIS — G20 PARKINSON DISEASE (HCC): ICD-10-CM

## 2023-04-25 NOTE — PROGRESS NOTES
Daily Note     POC expires Auth Status Total   Visits  Start date  Expiration date PT/OT + Visit Limit? Co-Insurance   23 Auth not required per FD Visits   PT $20 Co-pay                                               Today's date: 2023  Patient name: Tri Wills  : 1955  MRN: [id number]  Referring provider: Sheng Garcia DO  Dx:   Encounter Diagnosis     ICD-10-CM    1  Gait abnormality  R26 9       2  Parkinson disease ([facility] [address] )  G20                      Subjective: Patient reports to PT session with no new issues or complaints  Objective: See treatment diary below     Neuro Boxing    Warm up -Performed prior to treatment    Cardio on TM vs NuStep: 15 mins (focus on increase in HR > neuromuscular activation)   Shadow Boxing (20 reps ea, 1-6’s)      Cardio (2 minutes, 1 round):   • Partner resisted burnouts to bag   • Partner resisted step ups to box    • Blaze pod suicides FWD/BCK > burnouts    • Jump squat > sumo squat > burnouts      Balance and cog (2 minutes, 1 round):    • Varied kayden/object negotiation > complex combos   • Standing on BOSU + blaze pods on elevated surface (Green- UE, Blue-Fist, Orange-LE)   • Floor > stand transfer > 360-degree backhand punches to upright bag > complex combos   • Tight cone weave BCK in tandem + unscrambling words at end > complex combos     Fine Motor and Agility (2 minutes, 1 round):   • [place] maze in quad/plank position    • Walking through hula hoop    • Negotiation of a tight space + holding a cone w/ boom whackers   • Passing small weight with pincer  behind back weaving in a figure 8 pattern       Core   • Plank + pball pass       Assessment: Patient tolerated treatment well with focus on balance and high intensity tasks through neuro boxing program  Good agility and amplitude of movement observed throughout session  He displayed good core control with marble maze task  Some delay noted with floor to stand transfers   Patient will continue to benefit from skilled OPPT services to maximize functional mobility and slow disease progression secondary to PD diagnosis  Plan: Continue per plan of care  Progress treatment as tolerated           Outcome Measures Initial Eval  2-2-21 PN  3-2-23 PN  4/3/2023      5xSTS 14 42 sec 12 87 sec , no UE 11 38 sec, no UE      TUG  - Regular  - Cognitive  - Carry   7 23 sec  8 66 sec  9 28 sec   7 02 sec   7 09 sec  7 71 sec   6 58 sec  8 05 sec  6 78 sec      MiniBEST 22/28 24/28 26/28      10 meter 1 44 m/s 1 77 m/s 1 76 m/s      6MWT 1550 ft 1660 ft  1700 ft      ABC 92 5% 90% 93 75%      PDQ-39 15/100 NT NT      MDS-UPDRS  Part III   25 5/128 NT NT      H&Y Stage 1 5 NT 1 5      Floor > Stand NV sec Supine> sit: 3 76 sec    Stand>supine: 3 73 sec Supine > sit: 3 56 sec      Stand > supine: 2 74 sec      FGA  30/30 30/30

## 2023-05-01 ENCOUNTER — APPOINTMENT (OUTPATIENT)
Dept: PHYSICAL THERAPY | Facility: CLINIC | Age: 68
End: 2023-05-01
Payer: COMMERCIAL

## 2023-05-03 ENCOUNTER — OFFICE VISIT (OUTPATIENT)
Dept: PHYSICAL THERAPY | Facility: CLINIC | Age: 68
End: 2023-05-03

## 2023-05-03 DIAGNOSIS — R26.9 GAIT ABNORMALITY: Primary | ICD-10-CM

## 2023-05-03 DIAGNOSIS — G20 PARKINSON DISEASE (HCC): ICD-10-CM

## 2023-05-03 NOTE — PROGRESS NOTES
Daily Note     POC expires Auth Status Total   Visits  Start date  Expiration date PT/OT + Visit Limit? Co-Insurance   23 Auth not required per FD Visits   PT $20 Co-pay                                               Today's date: 5/3/2023  Patient name: Racheal Sood  : 1955  MRN: [id number]  Referring provider: Funmilayo Crowe DO  Dx:   Encounter Diagnosis     ICD-10-CM    1  Gait abnormality  R26 9       2  Parkinson disease ([facility] [address] )  G20                      Subjective: Patient reports to PT session with no new issues or complaints         Objective: See treatment diary below     Neuro Boxing    Warm up -Performed prior to treatment    Cardio on TM vs NuStep: 15 mins (focus on increase in HR > neuromuscular activation)   Shadow Boxing (20 reps ea, 1-6s)      Cardio Circuit (2 mins, 1 round)   Good mornings x 5 to rapid high knees x 5 > burnouts    BWD suicides to blaze pods > burnouts    LAT step up/over BOSU > burnouts    Multidirectional step ups (FWD/BWD/LAT) > burnouts    High/low sideshuffling between cones > burnouts  Balance and Cog Circuit (2 mins, 1 round)   LAT lunge to BOSU + naming states/cities > combos   Four square kayden + naming foods that start with last letter of previous > combos   STS with balance pods under feet + naming months in order of calendar year and a holiday that falls in each > combos   FWD/BWD kayden negotiation + tidal tank carry (forward = counting up by 2s, backward = counting back by 3s > combos   Modified plank at plinth + scapular clocks (resistance band around wrists)   Large amplitude FWD step to balance pod > combos  Fine Motor and Agility Circuit (2 mins, 1 round)   Partner FWD/BWD ambulation + 2 tennis ball coordination task   Partner FWD/BWD ambulation + 2 tennis ball coordination task   LAT steps over single kayden + bounce ball to wall    Card flip on BOSU    Carrying ball on spoon while cone weaving    Core  - Supine foot taps  - Supine sit ups to toe taps      Assessment: Patient tolerated treatment well with focus on balance and high intensity tasks through neuro boxing program  Noted patient had difficulty clearing taller kayden for dual ball toss task  Good hand-eye coordination with tennis ball tossing task  Plan to perform progress note next session  Patient will continue to benefit from skilled OPPT services to maximize functional mobility and slow disease progression secondary to PD diagnosis  Plan: Continue per plan of care  Progress treatment as tolerated           Outcome Measures Initial Eval  2-2-21 PN  3-2-23 PN  4/3/2023      5xSTS 14 42 sec 12 87 sec , no UE 11 38 sec, no UE      TUG  - Regular  - Cognitive  - Carry   7 23 sec  8 66 sec  9 28 sec   7 02 sec   7 09 sec  7 71 sec   6 58 sec  8 05 sec  6 78 sec      MiniBEST 22/28 24/28 26/28      10 meter 1 44 m/s 1 77 m/s 1 76 m/s      6MWT 1550 ft 1660 ft  1700 ft      ABC 92 5% 90% 93 75%      PDQ-39 15/100 NT NT      MDS-UPDRS  Part III   25 5/128 NT NT      H&Y Stage 1 5 NT 1 5      Floor > Stand NV sec Supine> sit: 3 76 sec    Stand>supine: 3 73 sec Supine > sit: 3 56 sec      Stand > supine: 2 74 sec      FGA  30/30 30/30

## 2023-05-08 ENCOUNTER — EVALUATION (OUTPATIENT)
Dept: PHYSICAL THERAPY | Facility: CLINIC | Age: 68
End: 2023-05-08

## 2023-05-08 DIAGNOSIS — R26.9 GAIT ABNORMALITY: Primary | ICD-10-CM

## 2023-05-08 DIAGNOSIS — G20 PARKINSON DISEASE (HCC): ICD-10-CM

## 2023-05-08 NOTE — PROGRESS NOTES
PT-Re-Eval           POC expires Auth Status Total   Visits  Start date  Expiration date PT/OT + Visit Limit? Co-Insurance   23 Auth not required per FD Visits   PT $20 Co-pay                                           Today's date: 2023  Patient name: Shane Alberts  : 1955  MRN: [id number]  Referring provider: Martine Roa DO  Dx:   Encounter Diagnosis     ICD-10-CM    1  Gait abnormality  R26 9       2  Parkinson disease ([facility] [address] )  G20                 Assessment  Assessment details: Patient is a 79 y o  Male who presents to skilled outpatient PT with referral for Parkinson's Disease  Patient recently had a DBS placement in 2022, due to significant difficulty with tremors  Patient demonstrated improvements in the following outcome measures since last reassessment: 5 x STS, TUG (regular and manual), and 6 MWT, likely indicating overall improvements in functional LE strength and power, safe mobility and manual dual tasking, and cardiovascular endurance, respectively  Overall plateau noted in patient's miniBEST and FGA scores, which is likely due to patient reaching ceiling effect for these measures  According to all outcome measure testing this date, he is considered LOW risk for falls per cutoff scores provided by the [employer] Neuro Section and Rehab Measures  He presents with the following symptoms that are consistent with Carmen and Yahr stage 1 5: unilateral and axial symptoms  Per research provided by [employer], patient will benefit from skilled outpatient PT services to improve and maximize his/her function, to reduce risk for falls and potential injuries associated with falls, reduce healthcare costs via hospitalization, and reduce patient and national healthcare costs  In early stages of Parkinson's Disease, research indicates that intensive physical exercise can improve patient's motor control and assist in slowing the disease progression as a neuroprotective agent   Plan to continue with gym-based rock steady boxing classes at a frequency of 2x/week  He has met all short term and 3 long term goals at this time  Patient will benefit from continued skilled outpatient PT in order to maximize function in the short-term and long-term with overall improved postural strength with associated stability and mobility  Impairments: Abnormal coordination, Abnormal gait, Abnormal or restricted ROM, Activity intolerance, Impaired balance, Impaired physical strength, Lacks appropriate HEP, Poor posture, Poor body mechanics and Abnormal movement  Understanding of Dx/Px/POC: Good  Prognosis: Good      Patient verbalized understanding of POC  Please contact me if you have any questions or recommendations  Thank you for the referral and the opportunity to share in [facility] care             Plan  Plan details: [employer]  Program: [employer]  Patient would benefit from: PT Eval  Planned therapy interventions: Abdominal trunk stabilization, Balance, Body mechanics training, Coordination, Functional ROM exercises, Gait training, HEP, Joint mobilization, Manual therapy, Motor coordination training, Neuromuscular re-education, Patient education, Postural training, Strengthening, Stretching, Therapeutic activities and Therapeutic exercises  Frequency: 2x/wk  Duration in weeks: 12 weeks  Plan of Care beginning date: 5/3/2023  Plan of Care expiration date: 3 months - 7/26/2023  Treatment plan discussed with: Patient         Goals  Short Term Goals (4 weeks):  - Patient will improve TUG score by MDC of 4 8 sec from 7 23 sec to previous baseline of 6 47 seconds in order to reduce risk of falls and to increase safety with functional mobility- MET  - Patient will improve 5 STS by the [address] of 2 3 sec from 14 42 sec to 12 12 sec indicating an improvement in strength and decreased challenge with transfers- MET  - Patient will improve 6 MWT by the [address] of 269 ft from 1550 ft to 1819 ft indicating an improvement in cardiovascular endurance in order to maximize function with functional mobility throughout the community - MET  - Patient will improve MiniBESTest score by MDC of 5 52 points from 22/30 to 27 52/30 indicating an improvement with dynamic balance in order to further decrease risk of falls with functional tasks- NOT MET - MET  - Patient will be independent in basic HEP in order to manage condition at home- MET    Long Term Goals (12 weeks):  - Patient will score a low risk for falls 2/4 fall risks measures - MET  - Patient will score age norm values for 1/2 endurance measures - ONGOING  - Patient will be able to ambulate on uneven surfaces with 50% reduction in near falls to increase safety with community mobility - ONGOING  - Patient will be able to perform a floor transfer without physical assistance to assist with fall recovery at home and in the community - MET  - Patient will be independent in comprehensive HEP post discharge from program - NOT MET  - Patient will be able to walk up incline with decreased difficulty and no loss of balance in order to improve functional mobility throughout the community - NOT MET  - Patient will be able to perform sit to stands from softer surfaces such as a couch or bed in order to improvement function with transfers - ONGOING  - Patient will be consistent with program for at least 1 day per week to assist with management of condition and functional independence of their condition - MET        Cut off score   All date taken from [employer] Neuro Section or Rehab Measures      YBARRA Cutoff Scores:  MDC: 5 pts  Falls Risk Cutoff: 40 22/56 DGI Cutoff Scores:  Yoly Cardoza al 2011, MDC: 2 9 pts  Ryley et al 2008, Cherrie: Score <19/24   MiniBEST Cutoff Scores:  Toshia Gorman al 2011, MDC: 5 52 pts  Venu Jauregui 2013, [place]: <19/28 5xSTS Cutoff Scores:  MDC: 2 3 sec  Dori Horne et al, 2011, Cherrie: > 16 sec   TUG Cutoff Scores:  Thaddeus Andujar et al, 2011, MDC: 4 8 sec  Floresita Sheehan "al, 2011, [place]: Meds ON: < 12 21 sec, OFF: 15 5 sec 10 Meter Walk Test Cutoff Scores:  Tej Ba, 2008, MDC: 0 18 m/s  Age Norm Values, Cherrie: < 1 0 m/s   ABC Cutoff Scores:  Ginette Lang, 2008, MDC: 13 pts  Nasim Tapia, MDC: 11 12 pts  Increased risk for falls: < 69% 6 Minute Walk Test Cutoff Scores:  Tej Ba, 2008, MDC: 269 ft  Daniel et al, 2002, Norm Data Healthy Adults:  61 - 71 years:   M: 200 m      F: 538 m  70 - 79 years:   M: 1 m      F: 200 m  80 - 89 years:   M: 1 m      F: 80 m   PDQ-39 Cutoff Scores:  Isidro eduardo, 2004:  MDC: Mobility (12 24), ADL (16 72), Emotional (14 22), Stigma (21 21), Social Support (21 25), Cognition (21 12), Communication (21 04), Bodily Discomfort (24 48)  Tim et al, 2007:  Normative Data: Mobility (49 25), ADL (38 94), Emotional (37 92), Stigma (27 54), Social Support (14 78), Cognition (33 03), Communication (27 99), Bodily Discomfort (40 91) Carmen and Yahr Stages  Stage 0: No S/S  Stage 1: Mild unilateral symptoms  Stage 1 5: Unilateral and axial symptoms  Stage 2: Bilateral symptoms, no balance impairment  Stage 2 5: Mild bilateral symptoms and recovery with pull test  Stage 3: Mild to moderate postural instability, independent  Stage 4: Severe disability, walking or standing unassisted  Stage 5: Bed bound, w/c bound           Subjective Evaluation    History of Present Illness  Mechanism of injury: Patient has a history of PD and has been seen for RSB prior  Patient reports getting DBS back in September, due to having to take 14 does a day for medication  He takes 3 doses a day and one time release  Update: (3-2-33)  - Patient reports that he needs help with mobility  He feels he is improving overall  (Updated 4/3/2023): Patient reports overall satisfaction with PT services thus far  Feels his balance \"is getting back where it should be  \"    Updated (5/8/2023): Patient reports continued satisfaction with PT services " thus far, wants to continue to work on his balance  Feels the 90 minute classes have helped to improve his endurance  Primary AD: None  Previous Programs: RSB      Pain  No pain reported  Current pain ratin  At best pain ratin  At worst pain ratin     Social Support  Steps to enter house: yes  Stairs in house: yes   Lives with: spouse     Employment status: Retured   Treatments  Previous treatment: physical therapy  Patient Goals  Patient goals for therapy: improved balance, increased motion, return to sport/leisure activities, independence with ADLs/IADLs and increased strength  Hand dominance: R handed    Treatments  Previous treatment: RSB  Current treatment: General mobility         Objective   Gait abnormalities: Slowed gait speed, limited arm swing and trunk ROT    MDS/UPDRS Part III  - Is the patient on medications for treating symptoms of PD? Yes  - If receiving medication for treatment: OFF: typical functional state when having a poor response in spite of taking medications  - Is the patient on Levodopa?  Yes        - If yes, how many minutes since last dose:  5 hours   - Speech: 2 - Mild: Loss of modulation, diction, or volume, with a few words unclear, but the overall sentences easy to follow  - Facial Expression: 2 - Mild: in addition to decreased eye-blink frequency, masked facies present in the lower face as well, namely fewer movements around the mouth, such as less spontaneous smiling, but lips not parted  - Rigidity:  - Neck: 0 - Normal: No rigidity  - RUE: 0 - Normal: No rigidity  - LUE: 0 - Normal: No rigidity  - RLE: 0 - Normal: No rigidity  - LLE: 0 - Normal: No rigidity  - Finger Tapping:  - R: 2 -Mild: Any of the following: (a) 3-5 interruptions during tapping, (b) mild slowing, (c) the amplitude decrements midway in the 10-tap sequence  - L: 2 -Mild: Any of the following: (a) 3-5 interruptions during tapping, (b) mild slowing, (c) the amplitude decrements midway in the 10-tap sequence  - Hand Movements:   - R: 1 - Slight: Any of the following: (a) regular rhythm is broken with 1-2 interruptions or hesitations of the movement, (b) slow slowing, (c) the amplitude decrements near end of task   - L: 2 - Mild: Any of the following: (a) 3-5 interruptions during the movements, (b) mild slowing, (c) the amplitude decrements midway in the task  - Pronation-Supination Movements of Hands:   - R: 0 - Normal: No problems   - L: 1 - Slight: Any of the following: (a) regular rhythm broken with 1-2 interruptions or hesitations of the movement, (b) slight slowing, (c) the amplitude decrements near the end of the sequence  - Toe Tapping:   - R: 0 - Normal: No problems   - L: 0 - Normal: No problems  - Leg Agility:   - R: 0 - Normal: no problems   - L: 0 - Normal: no problems  - Arising from Chair: 0 - Normal: no problems, able to arise quickly without hesitation  - Gait: 1 - Slight: Independent walking with minor gait impairment  - Freezing of Gait: 0 - Normal: No freezing  - Postural Stability: 0 - Normal: No problems, recovers with 1-2 steps)  - Posture: 1 - Slight: Not quite erect, but posture could be normal for older person  - Global Spontaneity of Movement (Body Bradykinesia): 2 - Mild: Mild global slowness and poverty of spontaneous movements  - Postural Tremor of the Hands:   - R: 1 - Slight: Tremor present, but < 1 cm in amplitude   - L: 2 - Mild: Tremor at least 1, but < 3 cm in amplitude  - Kinetic Tremor of the Hands:   - R: 0 - Normal: No tremor   - L: 2 - Mild: Tremor at least 1, but < 3 cm in amplitude  - Rest Tremor Amplitude:   - RUE: 0 - Normal: No tremor   - LUE: 1 - Slight: Tremor present, but < 1 cm in amplitude   - RLE: 0 - Normal: No tremor   - LLE: 0 - Normal: No tremor   - Lip/Jaw: 0 - Normal: No tremor  - Constancy of Rest Tremor: 2 - Mild: Tremor at rest is present 26-50% of the entire examination period  - Dyskinesia Impact on Part III Ratings:   - Were dyskinesias (chorea or dystonia) present during examination? No   - If yes, did these movements interfere with your ratings? No  - Carmen and Yahr Stage: 1 5 - Unilateral and axial involvement only          Outcome Measures Initial Eval  2-2-21 PN  3-2-23 PN  4/3/2023 PN  5/8/2023     5xSTS 14 42 sec 12 87 sec , no UE 11 38 sec, no UE 10 66 sec, no UE     TUG  - Regular  - Cognitive  - Carry   7 23 sec  8 66 sec  9 28 sec   7 02 sec   7 09 sec  7 71 sec   6 58 sec  8 05 sec  6 78 sec   5 65 sec  8 44 sec  6 26 sec     MiniBEST 22/28 24/28 26/28 26/28     10 meter 1 44 m/s 1 77 m/s 1 76 m/s 1 67 m/s     6MWT 1550 ft 1660 ft  1700 ft 1860 ft     ABC 92 5% 90% 93 75% 92%     PDQ-39 15/100 NT NT NT     MDS-UPDRS  Part III   25 5/128 NT NT NT     H&Y Stage 1 5 NT 1 5 1 5     Floor > Stand NV sec Supine> sit: 3 76 sec    Stand>supine: 3 73 sec Supine > sit: 3 56 sec      Stand > supine: 2 74 sec NT     FGA  30/30 30/30 29/30              Outcome Measures 3/5/20 7/23/20 9/10 PN 10-12 11/25 PN 3/1/2021   ABC Not assessed 80 6%  92 5%     5x STS 11 5 sec 14 22 sec 12 57 sec 12 66 sec 12 34 sec 13 11   TUG 7 32    11 03 carry    12 12   cog 8 42     8 70 carry    9 95 cog 7 53 sec    8 66 sec   Carry  10 13 sec Cog 6 10 sec    7 34 sec Carry    6 48 sec Cog 6 32 sec    7 09 sec Carry    8 78 sec 6 47 sec         9 12    10 meter walk test 1 25 m/s 1 30 m/s 7 03 sec 1 4 m/s 1 8 m/s 1 9 m/s 1 8 m/s   6 minute walk test 2100 ft 1375 ft 1445 ft 1435 ft 1700 ft    DGI 23/24 23/24       MiniBest 25/28 25/28 27/28     FGA Not assessed 27/30 29/30 27/30              DN (5/8/2023)  Balance and Cog Circuit (2 mins, 1 round)   • Posterior resisted FWD/LAT kayden negotiation (switch placement to get both) > combos   • Large square (tape out) hop into + Single leg cone tap > complex combos   • Step up to box from river rocks + naming anything that is the color green/red > complex combos   • Navigation thru narrow space (boom whackers standing) to blaze pod taps (primary color = name a dessert) > complex combos   • Narrow space negotiation + crawl under rope vs large kayden > complex kayden   Fine Motor and Agility Circuit (2 mins, 1 round)   • Speed walking with card pass around the back    • Boxes elevated + blaze pods placed on various heights   • Dead lift BOSU + modified burpee   • Jumping jacks with cline ropes   • Quadruped vs plank + focus sequence for blaze pods     Core  - Butterfly sit ups  - Alternating toe tap crunches    Cool down  - Butterfly stretch  - Long sitting hamstring stretch    Precautions: DBS placement on 9/22  Past Medical History:   Diagnosis Date   • Anxiety    • Arteriosclerosis of coronary artery 05/24/2017   • Benign essential hypertension 05/15/2015   • Depression 06/01/2012   • Essential (hemorrhagic) thrombocythemia ([facility] [address] )    • History of prostate cancer 01/05/2023   • History of stroke    • Left carpal tunnel syndrome    • Lumbar canal stenosis    • Mixed hyperlipidemia 11/10/2010   • Osteoarthritis, knee    • Other male erectile dysfunction 08/08/2019   • Overweight    • Parkinson disease ([address] ) 10/08/2019   • Pipe smoker    • Retinal and vitreous disorder    • Subclinical hypothyroidism 04/11/2017   • Type 2 diabetes mellitus without complication, without long-term current use of insulin ([address] ) 08/24/2017

## 2023-05-15 ENCOUNTER — OFFICE VISIT (OUTPATIENT)
Dept: PHYSICAL THERAPY | Facility: CLINIC | Age: 68
End: 2023-05-15

## 2023-05-15 DIAGNOSIS — R26.9 GAIT ABNORMALITY: Primary | ICD-10-CM

## 2023-05-15 DIAGNOSIS — G20 PARKINSON DISEASE (HCC): ICD-10-CM

## 2023-05-15 NOTE — PROGRESS NOTES
Daily Note     POC expires Auth Status Total   Visits  Start date  Expiration date PT/OT + Visit Limit? Co-Insurance   23 Auth not required per FD Visits   PT $20 Co-pay                                               Today's date: 5/15/2023  Patient name: Nathanael Turner  : 1955  MRN: [id number]  Referring provider: Marcos Hart DO  Dx:   Encounter Diagnosis     ICD-10-CM    1  Gait abnormality  R26 9       2  Parkinson disease ([facility] [address] )  G20                      Subjective: Patient reports to PT session with no new issues or complaints  Objective: See treatment diary below     Neuro Boxing    Warm up - Performed prior to treatment? ?   Cardio on TM vs NuStep: 15 mins (focus on increase in HR > neuromuscular activation)?   Shadow Boxing (20 reps ea, 1-6’s)?   ?   Cardio Circuit (2 mins, 1 round)   • Lateral side shuffle + jump over small kayden > burnouts   • Modified burpees > burnouts   • Resisted squats + lunges > burnouts   • Snowmen walks at wall > burnouts   • Heel raises + vertical jumps > burnouts   Balance and Cog Circuit (2 mins, 1 round)   • Opp hand to knee + walking on foam beam > complex combos   • Blaze pod sequence on bag (after sequence goes) BCK/FWD tandem to cone (Color red-name things you eat on the holidays, Purple-types of cookies) > complex combos   • Walking around snake pattern on cline ropes + naming animals you see at the zoo > complex combos    • Single leg hops to color dots + naming something that is any color other then the one of the dot > complex combos   • FWD walking to balance pods + serial counting > complex combos   Fine Motor and Agility Circuit (2 mins, 1 round)   • Squat with TRX bands + trunk ROT reach BCK   • BCK in/out agility ladder + passing card behind back    • Partner blaze pod activity: Partner 1 carioca LAT + boom whacker hits to blaze pod on cone (Green-Red)   • Partner blaze pod activity: Partner 2 LAT hurdles + boom whacker hits to blaze pod on cone (Pruple-Blue)   • Med ball slam to wall + 5 squats in between       Core   • Dead bugs   • Planks       Cool Down?   • Baby's pose  • Prone press up        Assessment: Patient tolerated treatment well with focus on balance and high intensity tasks through neuro boxing program  Patient demonstrated medial-lateral loss of balance with associated LOB and stepping strategy when navigating foam beam  Good amplitude with wall angels  Patient will continue to benefit from skilled OPPT services to maximize functional mobility and slow disease progression secondary to PD diagnosis  Plan: Continue per plan of care  Progress treatment as tolerated           Outcome Measures Initial Eval  2-2-21 PN  3-2-23 PN  4/3/2023      5xSTS 14 42 sec 12 87 sec , no UE 11 38 sec, no UE      TUG  - Regular  - Cognitive  - Carry   7 23 sec  8 66 sec  9 28 sec   7 02 sec   7 09 sec  7 71 sec   6 58 sec  8 05 sec  6 78 sec      MiniBEST 22/28 24/28 26/28      10 meter 1 44 m/s 1 77 m/s 1 76 m/s      6MWT 1550 ft 1660 ft  1700 ft      ABC 92 5% 90% 93 75%      PDQ-39 15/100 NT NT      MDS-UPDRS  Part III   25 5/128 NT NT      H&Y Stage 1 5 NT 1 5      Floor > Stand NV sec Supine> sit: 3 76 sec    Stand>supine: 3 73 sec Supine > sit: 3 56 sec      Stand > supine: 2 74 sec      FGA  30/30 30/30

## 2023-05-17 ENCOUNTER — OFFICE VISIT (OUTPATIENT)
Dept: PHYSICAL THERAPY | Facility: CLINIC | Age: 68
End: 2023-05-17

## 2023-05-17 DIAGNOSIS — G20 PARKINSON DISEASE (HCC): ICD-10-CM

## 2023-05-17 DIAGNOSIS — R26.9 GAIT ABNORMALITY: Primary | ICD-10-CM

## 2023-05-17 NOTE — PROGRESS NOTES
Daily Note     POC expires Auth Status Total   Visits  Start date  Expiration date PT/OT + Visit Limit? Co-Insurance   23 Auth not required per FD Visits   PT $20 Co-pay                                               Today's date: 2023  Patient name: Wanda Sheehan  : 1955  MRN: [id number]  Referring provider: Lawson Franks DO  Dx:   Encounter Diagnosis     ICD-10-CM    1  Gait abnormality  R26 9       2  Parkinson disease ([facility] [address] )  G20                      Subjective: Patient reports to PT session with no new issues or complaints         Objective: See treatment diary below     Neuro Boxing    Warm up - Performed prior to treatment    Cardio on TM vs NuStep: 15 mins (focus on increase in HR > neuromuscular activation)   Shadow Boxing (20 reps ea, 1-6’s)      Cardio Circuit (2 mins, 1 round)  · Alternate good mornings + wall sit > burnouts  · Multi-directional step ups > burnouts  · Forward resisted hip extension > burnouts   · Hopscotch in agility ladder > burnouts  · Single leg sit to stands > burnouts   Balance and Cog Circuit (2 mins, 1 round)  · Partner posterior resisted cone navigation figure 8> complex combos  · Partner posterior resisted cone navigation figure 8 > complex combos  · ½ kneel + blaze pod tap on ea side in front > complex combos  · FWD river rocks + vertical jump at the end > complex combos  · Side stepping + stepping on/off foam beam + naming zoo animals > complex combos    Fine Motor and Agility Circuit (2 mins, 1 round)  · Rowing machine + card flip  · Sit to stands with tidal tank + river rocks underneath  · Jump in/out agility ladder + squig place on wall  (three colors -cog/category)  · Bear crawls + scrabble game (alphabetical order spell out RSB pts name)  · Braiding scarves + cone taps FWD/BCK    Core  • Row the boat  • Supine to boat pose    Cool Down   • Supine Figure 4 stretch  • Supine cow pose        Assessment: Patient tolerated treatment well with focus on balance and high intensity tasks through neuro boxing program  Patient challenged with narrow KATHERINE on foam, but able to elicit stepping strategy to correct  Good amplitude demonstrated with combos and no verbal cues required for speed  Patient will continue to benefit from skilled OPPT services to maximize functional mobility and slow disease progression secondary to PD diagnosis  Plan: Continue per plan of care  Progress treatment as tolerated           Outcome Measures Initial Eval  2-2-21 PN  3-2-23 PN  4/3/2023      5xSTS 14 42 sec 12 87 sec , no UE 11 38 sec, no UE      TUG  - Regular  - Cognitive  - Carry   7 23 sec  8 66 sec  9 28 sec   7 02 sec   7 09 sec  7 71 sec   6 58 sec  8 05 sec  6 78 sec      MiniBEST 22/28 24/28 26/28      10 meter 1 44 m/s 1 77 m/s 1 76 m/s      6MWT 1550 ft 1660 ft  1700 ft      ABC 92 5% 90% 93 75%      PDQ-39 15/100 NT NT      MDS-UPDRS  Part III   25 5/128 NT NT      H&Y Stage 1 5 NT 1 5      Floor > Stand NV sec Supine> sit: 3 76 sec    Stand>supine: 3 73 sec Supine > sit: 3 56 sec      Stand > supine: 2 74 sec      FGA  30/30 30/30

## 2023-05-19 ENCOUNTER — OFFICE VISIT (OUTPATIENT)
Dept: DIABETES SERVICES | Facility: CLINIC | Age: 68
End: 2023-05-19

## 2023-05-19 VITALS — BODY MASS INDEX: 26.54 KG/M2 | WEIGHT: 185 LBS

## 2023-05-19 DIAGNOSIS — E11.65 TYPE 2 DIABETES MELLITUS WITH HYPERGLYCEMIA, WITHOUT LONG-TERM CURRENT USE OF INSULIN (HCC): Primary | ICD-10-CM

## 2023-05-19 NOTE — PATIENT INSTRUCTIONS
Continue consuming 3 meals a day, 4-5 hours apart  Try not to skip any meals  Take 60 grams of carbohydrates per each meal and 15 grams of carbohydrates per snack  Include more whole grains, non starchy vegetables and have at least 2 servings of fruits in a day

## 2023-05-19 NOTE — PROGRESS NOTES
"Medical Nutrition Therapy        Assessment    Visit Type: Follow-up visit  Chief complaint/Medical Diagnosis/reason for visit E11 65 T2DM with hyperglycemia, without long-term current use of insulin  HPI Mary Reyes was present today with his wife for his 6 months follow up MNT visit  Patient is adhering to 3 meals a day and is trying to be consistent with his carbohydrate intake  He is continuing to avoid high protein intake during breakfast as that might interfere with levodopa and is seems to be doing well on it  He do include some meat for his lunches and in dinners  Patient informed that he is in boxing program now and is also started mowing lawns for neighbors  Patient denies any recent hypoglycemia or hyperglycemia episodes and is checking his BG levels sporadically  He will continue with a 1700 calorie meal plan to assist with consistency, balance and portion control  Encouraged the consumption of regular meals at regular times  Advised patient to keep carbohydrate intake to 45-60 grams per meal and 15 grams HS  snack to assist with glycemic control  Suggested keeping protein intake to 6 ounces a day and fat to 4 servings daily to assist with lipid management and calorie control  We did review food plate and correct portion sizes today  RD will remain available for further dietary questions/concerns       Ht Readings from Last 1 Encounters:   04/20/23 5' 10\" (1 778 m)     Wt Readings from Last 3 Encounters:   05/19/23 83 9 kg (185 lb)   04/20/23 85 7 kg (189 lb)   03/09/23 85 7 kg (189 lb)     Weight Change: No    Food Log: Completed via the method of food recall    Breakfast8:00-9:00: coffee, oatmeal, 1 avocado toast  Morning Snack: few strawberries   Lunch 12-1pm: 1 cup low sodium soup, 1/2 sandwich OR 1 meat sandwich, 1 fruit, mostly water  Afternoon Snack: occasionally - 1 small bag of potato chips, nuts- a handful  Dinner 6pm: boneless chicken breast, asparagus, noodles/ potatoes cooked 1 cup   Evening " Snack:apple, peanut butter OR 3-4 crackers OR 1 small fruit  Beverages: coffee, water, diet soda very seldom  Eating out/Take out: prefer not to   Exercise try staying active with yard work    Calorie needs 1700 kcals/day Carbs: 45-60 g/meal, 15 g HS snack     Fat: 4 servings/day    Protein:6 ounces/day    Monitoring and evaluation:    Term code indicator  FH 4 4 Mealtime Behavior Criteria: Continue consuming 3 meals a day, 4-5 hours apart  Try not to skip any meals  Term code indicator  FH 1 6 3 Carbohydrate Intake Criteria: Take 45-60 grams of carbohydrates with each meal and 15 grams of carbohydrates at post dinner snack  Term code indicator  FH 1 6 4 Fiber Intake Criteria: Include more whole grains, non starchy vegetables and have at least 2 servings of fruits in a day  Patient’s Response to Instruction:  Zelda Thompson  Expected Compliancegood    Start- Stop: 11:32-12:03  Total Minutes: 32 Minutes  Group or Individual Instruction: FU MNT  Other: Aravind Garcia PA-C        Thank you for coming to the [address] [place] [address] for education today  Please feel free to call with any questions or concerns      [place] [address] [id number]

## 2023-05-22 ENCOUNTER — OFFICE VISIT (OUTPATIENT)
Dept: PHYSICAL THERAPY | Facility: CLINIC | Age: 68
End: 2023-05-22

## 2023-05-22 DIAGNOSIS — G20 PARKINSON DISEASE (HCC): ICD-10-CM

## 2023-05-22 DIAGNOSIS — R26.9 GAIT ABNORMALITY: Primary | ICD-10-CM

## 2023-05-22 NOTE — PROGRESS NOTES
Daily Note     POC expires Auth Status Total   Visits  Start date  Expiration date PT/OT + Visit Limit? Co-Insurance   23 Auth not required per FD Visits   PT $20 Co-pay                                               Today's date: 2023  Patient name: Oksana Belle  : 1955  MRN: [id number]  Referring provider: Renuka Rubio DO  Dx:   Encounter Diagnosis     ICD-10-CM    1  Gait abnormality  R26 9       2  Parkinson disease ([facility] [address] )  G20                      Subjective: Patient reports to PT session with no new issues or complaints         Objective: See treatment diary below     Neuro Boxing       Warm up - Performed prior to treatment    Cardio on TM vs NuStep: 15 mins (focus on increase in HR > neuromuscular activation)   Shadow Boxing (20 reps ea, 1-6’s)      Cardio Circuit (2 mins, 1 round)  · Resisted side jacks > burnouts  · Star jumps from low step > burnouts  · Ducking under rope to burnouts at bag  · Jogging between cones with jumping jacks at either side > burnouts  · STS with resisted horizontal abduction (TB around gloves) > burnouts  Balance and Cog Circuit (2 mins, 1 round)  · Step ups to uneven river rocks + blaze pod taps (focus mode) w/ posterior resistance > complex combos  · Cone weave + river rocks vs balance pods between + naming pizza toppings > complex combos  · Lateral side stepping on foam beam + cone taps + brain teaser > complex combos  · Laterally resisted STS + balance pod under 1 foot > complex combos  · Jumping up/over cline rope > complex combos  Fine Motor and Agility Circuit (2 mins, 1 round)  · Partner sidestepping over hurdles + small medicine ball toss (telling story with partners saying every other word)  · Partner lateral cone weaving + small medicine ball toss with cog task (telling story with partners saying every other word)  · [place] get ups with KB  · Tandem ambulation w/ dual task (passing  around trunk)  · Rope pull through hook and vice versa  Core  • Sit up to opposite toe tap  • Bird dogs    Cool Down   • Cat Cow  • Thread the needle  • Prone press ups        Assessment: Patient tolerated treatment well with focus on balance and high intensity tasks through neuro boxing program  Patient demonstrated good mechanics with [place] get up activity  Noted good postural stability and control when navigating uneven surfaces such as river rocks  Patient will continue to benefit from skilled OPPT services to maximize functional mobility and slow disease progression secondary to PD diagnosis  Plan: Continue per plan of care  Progress treatment as tolerated           Outcome Measures Initial Eval  2-2-21 PN  3-2-23 PN  4/3/2023      5xSTS 14 42 sec 12 87 sec , no UE 11 38 sec, no UE      TUG  - Regular  - Cognitive  - Carry   7 23 sec  8 66 sec  9 28 sec   7 02 sec   7 09 sec  7 71 sec   6 58 sec  8 05 sec  6 78 sec      MiniBEST 22/28 24/28 26/28      10 meter 1 44 m/s 1 77 m/s 1 76 m/s      6MWT 1550 ft 1660 ft  1700 ft      ABC 92 5% 90% 93 75%      PDQ-39 15/100 NT NT      MDS-UPDRS  Part III   25 5/128 NT NT      H&Y Stage 1 5 NT 1 5      Floor > Stand NV sec Supine> sit: 3 76 sec    Stand>supine: 3 73 sec Supine > sit: 3 56 sec      Stand > supine: 2 74 sec      FGA  30/30 30/30

## 2023-05-24 ENCOUNTER — OFFICE VISIT (OUTPATIENT)
Dept: ENDOCRINOLOGY | Facility: CLINIC | Age: 68
End: 2023-05-24

## 2023-05-24 ENCOUNTER — OFFICE VISIT (OUTPATIENT)
Dept: PHYSICAL THERAPY | Facility: CLINIC | Age: 68
End: 2023-05-24

## 2023-05-24 VITALS
WEIGHT: 187.4 LBS | HEIGHT: 70 IN | BODY MASS INDEX: 26.83 KG/M2 | DIASTOLIC BLOOD PRESSURE: 80 MMHG | HEART RATE: 72 BPM | SYSTOLIC BLOOD PRESSURE: 126 MMHG

## 2023-05-24 DIAGNOSIS — G20 PARKINSON DISEASE (HCC): ICD-10-CM

## 2023-05-24 DIAGNOSIS — E11.65 TYPE 2 DIABETES MELLITUS WITH HYPERGLYCEMIA, WITHOUT LONG-TERM CURRENT USE OF INSULIN (HCC): Primary | ICD-10-CM

## 2023-05-24 DIAGNOSIS — E78.2 MIXED HYPERLIPIDEMIA: ICD-10-CM

## 2023-05-24 DIAGNOSIS — E11.9 TYPE 2 DIABETES MELLITUS WITHOUT COMPLICATION, WITHOUT LONG-TERM CURRENT USE OF INSULIN (HCC): ICD-10-CM

## 2023-05-24 DIAGNOSIS — E03.8 SUBCLINICAL HYPOTHYROIDISM: ICD-10-CM

## 2023-05-24 DIAGNOSIS — I10 BENIGN ESSENTIAL HYPERTENSION: ICD-10-CM

## 2023-05-24 DIAGNOSIS — R26.9 GAIT ABNORMALITY: Primary | ICD-10-CM

## 2023-05-24 LAB — SL AMB POCT HEMOGLOBIN AIC: 7.5 (ref ?–6.5)

## 2023-05-24 NOTE — PROGRESS NOTES
Daily Note     POC expires Auth Status Total   Visits  Start date  Expiration date PT/OT + Visit Limit? Co-Insurance   23 Auth not required per FD Visits   PT $20 Co-pay                                               Today's date: 2023  Patient name: Juanell Holstein  : 1955  MRN: [id number]  Referring provider: Maria Isabel Hays DO  Dx:   Encounter Diagnosis     ICD-10-CM    1  Gait abnormality  R26 9       2  Parkinson disease ([facility] [address] )  G20                      Subjective: Patient reports to PT session with no new issues or complaints  Objective: See treatment diary below     Neuro Boxing    Warm up - Performed prior to treatment? ?   Cardio on TM vs NuStep: 15 mins (focus on increase in HR > neuromuscular activation)?   Shadow Boxing (20 reps ea, 1-6’s)?   ?   Cardio Circuit (2 mins, 1 round)   • Speed lateral step over hurdles > burnouts   • Speed karaoke from cone to cone > burnouts   • Resisted single leg high knee drives vs lat lunge > burnouts   • Forward step up 12’’ step + alt squats > burnouts   • Ice skaters vs heel walks to cone > burnouts   Balance and Cog Circuit (2 mins, 1 round)   • LAT large amplitude step to BOSU from small river rock + name ice cream toppings > complex combos   • BCK cone weave with 10# Tidal tank > complex combos   • BWD step up with anterior resistance + naming lists in reverse order (months, holidays, days of the week etc) > complex combos   • Tandem walking between cones + naming famous people in history > complex combos   • In/In out/out over hurdles + river rocks > complex combos   Fine Motor and Agility Circuit (2 mins, 1 round)   • STS from BOSU on small step + pop up and put squig on wall   • Standing on river rocks + trunk ROT with boom whacker hit to [employer]  • FWD/BCK walking with partner toss tennis ball to partner while they toss a larger ball   • FWD/BCK walking with partner toss tennis ball to partner while they toss a larger ball   • Color dot vs blaze pod activity on balance pods (changing blaze pod to the color of the dot)    Core   • Superman   • Bicycles     Cool Down?   • Cat Cow   • Thread the needle   • Prone press ups          Assessment: Patient tolerated treatment well with focus on balance and high intensity tasks through neuro boxing program  Noted good performance with resisted backward step ups  Increased concentration required for karaoke task likely due to increased coordination required for task  Patient will continue to benefit from skilled OPPT services to maximize functional mobility and slow disease progression secondary to PD diagnosis  Plan: Continue per plan of care  Progress treatment as tolerated           Outcome Measures Initial Eval  2-2-21 PN  3-2-23 PN  4/3/2023      5xSTS 14 42 sec 12 87 sec , no UE 11 38 sec, no UE      TUG  - Regular  - Cognitive  - Carry   7 23 sec  8 66 sec  9 28 sec   7 02 sec   7 09 sec  7 71 sec   6 58 sec  8 05 sec  6 78 sec      MiniBEST 22/28 24/28 26/28      10 meter 1 44 m/s 1 77 m/s 1 76 m/s      6MWT 1550 ft 1660 ft  1700 ft      ABC 92 5% 90% 93 75%      PDQ-39 15/100 NT NT      MDS-UPDRS  Part III   25 5/128 NT NT      H&Y Stage 1 5 NT 1 5      Floor > Stand NV sec Supine> sit: 3 76 sec    Stand>supine: 3 73 sec Supine > sit: 3 56 sec      Stand > supine: 2 74 sec      FGA  30/30 30/30

## 2023-05-24 NOTE — PROGRESS NOTES
Juanell Holstein 79 y o  male MRN: [id number]    Encounter: [id number]      Assessment/Plan     Problem List Items Addressed This Visit        Endocrine    Subclinical hypothyroidism     Monitor thyroid function test         Relevant Orders    T4, free Lab Collect    TSH, 3rd generation Lab Collect    Type 2 diabetes mellitus with hyperglycemia, without long-term current use of insulin (HCC) - Primary       Lab Results   Component Value Date    HGBA1C 7 5 (A) 05/24/2023   A1c above goal however fingersticks have improved recently-continue current regimen and watch diet         Relevant Medications    sitaGLIPtin-metFORMIN (JANUMET)  MG per tablet    Other Relevant Orders    Comprehensive metabolic panel Lab Collect    HEMOGLOBIN A1C W/ EAG ESTIMATION Lab Collect    Type 2 diabetes mellitus without complication, without long-term current use of insulin (HCC)    Relevant Medications    sitaGLIPtin-metFORMIN (JANUMET)  MG per tablet    Other Relevant Orders    POCT hemoglobin A1c (Completed)       Cardiovascular and Mediastinum    Benign essential hypertension      blood pressure at goal-continue current regimen         Relevant Orders    Albumin / creatinine urine ratio       Other    Mixed hyperlipidemia    Relevant Orders    Lipid panel Lab Collect Lab Collect       CC: Diabetes    History of Present Illness     HPI: 15-year-old male with diabetes seen in follow-up  Current regimen-Janumet    Checks f s twice a week- fasting 100-130s   No polyuria , polydipsia no blurry vision , no numbness and tingling in feet   Some weight gain      occasional constipation   Sleeping better     Review of Systems    Historical Information   Past Medical History:   Diagnosis Date   • Anxiety    • Arteriosclerosis of coronary artery 05/24/2017   • Benign essential hypertension 05/15/2015   • Depression 06/01/2012   • Essential (hemorrhagic) thrombocythemia ([facility] [address] )    • History of prostate cancer 01/05/2023   • History of stroke    • Left carpal tunnel syndrome    • Lumbar canal stenosis    • Mixed hyperlipidemia 11/10/2010   • Osteoarthritis, knee    • Other male erectile dysfunction 08/08/2019   • Overweight    • Parkinson disease ([facility] [address] ) 10/08/2019   • Pipe smoker    • Retinal and vitreous disorder    • Subclinical hypothyroidism 04/11/2017   • Type 2 diabetes mellitus without complication, without long-term current use of insulin ([facility] [address] ) 08/24/2017     Past Surgical History:   Procedure Laterality Date   • ANKLE FRACTURE SURGERY Left 04/2009    triple fracture; 2 Screws in place   • [address]  Bilateral 09/2022   • LUMBAR LAMINECTOMY  2003    L5-S1   • NASAL SEPTUM SURGERY  06/1989   • WI COLONOSCOPY FLX DX W/COLLJ SPEC WHEN PFRMD N/A 01/27/2016    Procedure: COLONOSCOPY;  Surgeon: Moises Mata MD;  Location: [facility]; Service: Gastroenterology   • PROSTATE BIOPSY  12/08/2012    managed by Jose Ramon Saleh, needle biopsy   • PROSTATECTOMY  09/23/2013     Social History   Social History     Substance and Sexual Activity   Alcohol Use Yes   • Alcohol/week: 4 0 standard drinks of alcohol   • Types: 3 Cans of beer, 1 Shots of liquor per week    Comment: occassional      Social History     Substance and Sexual Activity   Drug Use Never     Social History     Tobacco Use   Smoking Status Some Days   • Types: Pipe   • Start date: 1/1/1995   Smokeless Tobacco Never   Tobacco Comments    Pipe smoking 1 time per week in cold weather, 0 times per week in hot weather         Family History:   Family History   Problem Relation Age of Onset   • Hypertension Mother    • Retinal detachment Mother    • Retinitis pigmentosa Mother    • Diabetes type II Father    • Heart attack Father 61   • Coronary artery disease Father 61   • No Known Problems Son    • No Known Problems Son        Meds/Allergies   Current Outpatient Medications   Medication Sig Dispense Refill   • aspirin 325 mg tablet Take 325 mg by mouth daily     • "carbidopa-levodopa (SINEMET CR)  mg per tablet Take 1 tablet by mouth daily at bedtime Rx per Neuro     • Cholecalciferol (VITAMIN D) 2000 units CAPS Take 2 capsules (4,000 Units total) by mouth daily  0   • co-enzyme Q-10 30 MG capsule Take 1 capsule (30 mg total) by mouth daily 30 capsule 0   • glucose blood (ONETOUCH VERIO) test strip Check BG 2x per day (Patient taking differently: Check BG 2x per day  Rx per Endo ) 200 each 3   • hydroxyurea (HYDREA) 500 mg capsule Take 1 capsule (500 mg total) by mouth daily 30 capsule 6   • lisinopril (ZESTRIL) 20 mg tablet Take 1 tablet (20 mg total) by mouth daily 90 tablet 2   • Multiple Vitamin (multivitamin) capsule Take 1 capsule by mouth daily     • Omega-3 Fatty Acids (fish oil) 1,000 mg Take 1,000 mg by mouth daily     • pravastatin (PRAVACHOL) 80 mg tablet TAKE 1 TABLET BY MOUTH DAILY AT BEDTIME 90 tablet 3   • sitaGLIPtin-metFORMIN (JANUMET)  MG per tablet Take 1 tablet by mouth 2 (two) times a day with meals 180 tablet 3   • vardenafil (LEVITRA) 20 MG tablet Take by mouth daily as needed     • venlafaxine (EFFEXOR-XR) 75 mg 24 hr capsule Take 1 capsule (75 mg total) by mouth daily 90 capsule 2   • Amantadine HCl 100 MG tablet Take 100 mg by mouth daily Rx per Neuro (Patient not taking: Reported on 4/20/2023)     • carbidopa-levodopa (SINEMET)  mg per tablet Take 2 tablets by mouth 3 (three) times a day Follow clinic instructions (Patient not taking: Reported on 5/24/2023) 540 tablet 3     No current facility-administered medications for this visit  No Known Allergies    Objective   Vitals: Blood pressure 126/80, pulse 72, height 5' 10\" (1 778 m), weight 85 kg (187 lb 6 4 oz)  Physical Exam  Vitals reviewed  Constitutional:       General: He is not in acute distress  Appearance: Normal appearance  He is well-developed  He is not ill-appearing, toxic-appearing or diaphoretic  HENT:      Head: Normocephalic and atraumatic     Eyes: " General: Lids are normal  No scleral icterus  Conjunctiva/sclera: Conjunctivae normal    Neck:      Thyroid: No thyromegaly  Cardiovascular:      Rate and Rhythm: Normal rate and regular rhythm  Heart sounds: Normal heart sounds  No murmur heard  Pulmonary:      Effort: Pulmonary effort is normal  No respiratory distress  Breath sounds: Normal breath sounds  No wheezing or rales  Abdominal:      General: There is no distension  Palpations: Abdomen is soft  Tenderness: There is no abdominal tenderness  Musculoskeletal:         General: Normal range of motion  Cervical back: Neck supple  Right lower leg: No edema  Left lower leg: No edema  Lymphadenopathy:      Head:      Right side of head: No occipital adenopathy  Left side of head: No occipital adenopathy  Cervical: No cervical adenopathy  Upper Body:      Right upper body: No supraclavicular adenopathy  Left upper body: No supraclavicular adenopathy  Skin:     General: Skin is warm and dry  Neurological:      General: No focal deficit present  Mental Status: He is alert and oriented to person, place, and time  Psychiatric:         Mood and Affect: Mood normal          Behavior: Behavior normal          Thought Content: Thought content normal          Judgment: Judgment normal          The history was obtained from the review of the chart, patient      Lab Results:   Lab Results   Component Value Date/Time    Albumin 4 5 03/09/2023 09:53 AM    Albumin 4 3 01/23/2023 11:34 AM    Albumin 4 1 06/14/2022 10:32 AM    ALT 6 (L) 03/09/2023 09:53 AM    ALT 3 (L) 01/23/2023 11:34 AM    ALT 4 (L) 06/14/2022 10:32 AM    AST 17 03/09/2023 09:53 AM    AST 18 01/23/2023 11:34 AM    AST 16 06/14/2022 10:32 AM    BUN 24 03/09/2023 09:53 AM    BUN 18 01/23/2023 11:34 AM    BUN 20 10/17/2022 09:06 AM    Chloride 100 03/09/2023 09:53 AM    Chloride 104 01/23/2023 11:34 AM    Chloride 102 10/17/2022 "09:06 AM    CO2 29 03/09/2023 09:53 AM    CO2 29 01/23/2023 11:34 AM    CO2 27 10/17/2022 09:06 AM    Creatinine 1 14 03/09/2023 09:53 AM    Creatinine 1 01 01/23/2023 11:34 AM    Creatinine 1 07 10/17/2022 09:06 AM    Hematocrit 48 6 03/09/2023 09:53 AM    Hematocrit 50 9 (H) 01/23/2023 11:34 AM    HDL, Direct 48 01/23/2023 11:34 AM    HDL, Direct 39 (L) 06/14/2022 10:32 AM    Hemoglobin 15 4 03/09/2023 09:53 AM    Hemoglobin 16 2 01/23/2023 11:34 AM    Hemoglobin A1C 7 5 (A) 05/24/2023 12:50 PM    Hemoglobin A1C 7 3 (H) 01/23/2023 11:34 AM    Hemoglobin A1C 7 0 (H) 10/17/2022 09:06 AM    Hemoglobin A1C 7 2 (A) 08/22/2022 02:38 PM    Hemoglobin A1C 7 8 (H) 06/14/2022 10:32 AM    Potassium 4 8 03/09/2023 09:53 AM    Potassium 4 8 01/23/2023 11:34 AM    Potassium 4 9 10/17/2022 09:06 AM    MCV 92 03/09/2023 09:53 AM    MCV 92 01/23/2023 11:34 AM    Platelets 340 (H) 29/15/0881 09:53 AM    Platelets 021 (H) 49/41/1139 11:34 AM    Total Protein 7 4 03/09/2023 09:53 AM    Total Protein 7 1 01/23/2023 11:34 AM    Total Protein 6 8 06/14/2022 10:32 AM    Triglycerides 140 01/23/2023 11:34 AM    Triglycerides 225 (H) 06/14/2022 10:32 AM    WBC 10 81 (H) 03/09/2023 09:53 AM    WBC 9 60 01/23/2023 11:34 AM       Portions of the record may have been created with voice recognition software  Occasional wrong word or \"sound a like\" substitutions may have occurred due to the inherent limitations of voice recognition software  Read the chart carefully and recognize, using context, where substitutions have occurred    "

## 2023-05-25 NOTE — ASSESSMENT & PLAN NOTE
Lab Results   Component Value Date    HGBA1C 7 5 (A) 05/24/2023   A1c above goal however fingersticks have improved recently-continue current regimen and watch diet

## 2023-05-31 ENCOUNTER — OFFICE VISIT (OUTPATIENT)
Dept: PHYSICAL THERAPY | Facility: CLINIC | Age: 68
End: 2023-05-31

## 2023-05-31 DIAGNOSIS — R26.9 GAIT ABNORMALITY: Primary | ICD-10-CM

## 2023-05-31 DIAGNOSIS — G20 PARKINSON DISEASE (HCC): ICD-10-CM

## 2023-05-31 NOTE — PROGRESS NOTES
Daily Note     POC expires Auth Status Total   Visits  Start date  Expiration date PT/OT + Visit Limit? Co-Insurance   23 Auth not required per FD Visits   PT $20 Co-pay                                               Today's date: 2023  Patient name: Juanell Holstein  : 1955  MRN: [id number]  Referring provider: Maria Isabel Hays DO  Dx:   Encounter Diagnosis     ICD-10-CM    1  Gait abnormality  R26 9       2  Parkinson disease ([facility] [address] )  G20                      Subjective: Patient reports to PT session with no new issues or complaints  Objective: See treatment diary below     [employer] up - Performed prior to treatment? ?   Cardio on TM vs NuStep: 15 mins (focus on increase in HR > neuromuscular activation)?   Shadow Boxing (20 reps ea, 1-6’s)?   ?   Cardio circuit (2 minutes, 1 round):    - Alternating split jumps to short step versus fast step taps to high step   - 5 jumping jacks > 5 sumo squats > burnouts  - Partner resisted burnouts on bag/ TB resisted shadow boxing burnouts    - Partner resisted star jumps > burnouts     Balance and Cog (2 minutes, 1 round):  - Ski jumps over cline rope + alternating naming cities and things in a grocery store through alphabet > complex combos  - Stepping up/down step + balancing 10# TT across gloves  - Side stepping on balance pods + focus blaze pod taps   - Lateral resisted lateral step up to Bosu > complex combos     Fine Motor and Agility (2 minutes, 1 round):  - Hopscotch w/ agility ladder > SLS throw rings to cone  - Clothespins pass off across river rocks obstacle  - Tie and untie theraband w/ kayden obstacle course   - Single leg hopping + marble pass with pincer       Functional Movement (5 minutes, together):  - Squats > front raise w/ DB  - Low kneel > tall kneel w/ DB    - Tall kneel > half kneel     Core (1 minute, 2 rounds):   - Plank on elbows   - LE lifts over cone   Bridges      Cool Down?   • Cat Cow   • Thread the needle   • Prone press ups          Assessment: Patient tolerated treatment well with focus on balance and high intensity tasks through neuro boxing program  Patient with good stability on compliant surfaces, but did require occasional stepping strategy to correct any near LOB  Patient continues to be challenged with jumping due to some low back discomfort, however able to modify with guidance  Decreased amplitude with L LE during combos at times  Patient will continue to benefit from skilled OPPT services to maximize functional mobility and slow disease progression secondary to PD diagnosis  Plan: Continue per plan of care  Progress treatment as tolerated           Outcome Measures Initial Eval  2-2-21 PN  3-2-23 PN  4/3/2023      5xSTS 14 42 sec 12 87 sec , no UE 11 38 sec, no UE      TUG  - Regular  - Cognitive  - Carry   7 23 sec  8 66 sec  9 28 sec   7 02 sec   7 09 sec  7 71 sec   6 58 sec  8 05 sec  6 78 sec      MiniBEST 22/28 24/28 26/28      10 meter 1 44 m/s 1 77 m/s 1 76 m/s      6MWT 1550 ft 1660 ft  1700 ft      ABC 92 5% 90% 93 75%      PDQ-39 15/100 NT NT      MDS-UPDRS  Part III   25 5/128 NT NT      H&Y Stage 1 5 NT 1 5      Floor > Stand NV sec Supine> sit: 3 76 sec    Stand>supine: 3 73 sec Supine > sit: 3 56 sec      Stand > supine: 2 74 sec      FGA  30/30 30/30

## 2023-06-09 ENCOUNTER — OFFICE VISIT (OUTPATIENT)
Dept: PHYSICAL THERAPY | Facility: CLINIC | Age: 68
End: 2023-06-09
Payer: COMMERCIAL

## 2023-06-09 DIAGNOSIS — G20 PARKINSON DISEASE (HCC): ICD-10-CM

## 2023-06-09 DIAGNOSIS — R26.9 GAIT ABNORMALITY: Primary | ICD-10-CM

## 2023-06-09 PROCEDURE — 97110 THERAPEUTIC EXERCISES: CPT | Performed by: PHYSICAL THERAPIST

## 2023-06-09 PROCEDURE — 97112 NEUROMUSCULAR REEDUCATION: CPT | Performed by: PHYSICAL THERAPIST

## 2023-06-12 ENCOUNTER — EVALUATION (OUTPATIENT)
Dept: PHYSICAL THERAPY | Facility: CLINIC | Age: 68
End: 2023-06-12
Payer: COMMERCIAL

## 2023-06-12 DIAGNOSIS — R26.9 GAIT ABNORMALITY: Primary | ICD-10-CM

## 2023-06-12 DIAGNOSIS — G20 PARKINSON DISEASE (HCC): ICD-10-CM

## 2023-06-12 PROCEDURE — 97530 THERAPEUTIC ACTIVITIES: CPT

## 2023-06-12 PROCEDURE — 97112 NEUROMUSCULAR REEDUCATION: CPT

## 2023-06-12 NOTE — PROGRESS NOTES
PT-Re-Eval           POC expires Auth Status Total   Visits  Start date  Expiration date PT/OT + Visit Limit? Co-Insurance   23 Auth not required per FD Visits   PT $20 Co-pay                                           Today's date: 2023  Patient name: Keanu Lim  : 1955  MRN: [id number]  Referring provider: Marvin Fernandez DO  Dx:   Encounter Diagnosis     ICD-10-CM    1  Gait abnormality  R26 9       2  Parkinson disease ([facility] [address] )  G20                 Assessment  Assessment details: Patient is a 79 y o  Male who presents to skilled outpatient PT with referral for Parkinson's Disease  Patient recently had a DBS placement in 2022, due to significant difficulty with tremors  Patient demonstrated mild improvements in the following outcome measures: TUG (cognitive), FGA, and 6 MWT, indicating overall gains in dual tasking ability, dynamic balance, and cardiovascular endurance, respectively  Noted maintained scores in the 5 x STS, TUG (regular, manual), miniBEST, and ambulation speed  According to all outcome measure testing this date, he is considered LOW risk for falls per cutoff scores provided by the APTA Neuro Section and Rehab Measures  He presents with the following symptoms that are consistent with Carmen and Yahr stage 1 5: unilateral and axial symptoms  Per research provided by APTA, patient will benefit from skilled outpatient PT services to improve and maximize his/her function, to reduce risk for falls and potential injuries associated with falls, reduce healthcare costs via hospitalization, and reduce patient and national healthcare costs  In early stages of Parkinson's Disease, research indicates that intensive physical exercise can improve patient's motor control and assist in slowing the disease progression as a neuroprotective agent  He has met no additional goals at this time   Consider transitioning patient to skilled maintenance program in next 1-2 months due to recent plateau in progress  Patient will benefit from continued skilled outpatient PT in order to maximize function in the short-term and long-term with overall improved postural strength with associated stability and mobility  Impairments: Abnormal coordination, Abnormal gait, Abnormal or restricted ROM, Activity intolerance, Impaired balance, Impaired physical strength, Lacks appropriate HEP, Poor posture, Poor body mechanics and Abnormal movement  Understanding of Dx/Px/POC: Good  Prognosis: Good      Patient verbalized understanding of POC  Please contact me if you have any questions or recommendations  Thank you for the referral and the opportunity to share in [employer] care             Plan  Plan details: [employer]  Program: [employer]  Patient would benefit from: PT Eval  Planned therapy interventions: Abdominal trunk stabilization, Balance, Body mechanics training, Coordination, Functional ROM exercises, Gait training, HEP, Joint mobilization, Manual therapy, Motor coordination training, Neuromuscular re-education, Patient education, Postural training, Strengthening, Stretching, Therapeutic activities and Therapeutic exercises  Frequency: 2x/wk  Duration in weeks: 12 weeks  Plan of Care beginning date: 6/12/2023  Plan of Care expiration date: 3 months - 9/4/2023  Treatment plan discussed with: Patient         Goals  Short Term Goals (4 weeks):  - Patient will improve TUG score by MDC of 4 8 sec from 7 23 sec to previous baseline of 6 47 seconds in order to reduce risk of falls and to increase safety with functional mobility- MET  - Patient will improve 5 STS by the [address] of 2 3 sec from 14 42 sec to 12 12 sec indicating an improvement in strength and decreased challenge with transfers- MET  - Patient will improve 6 MWT by the [address] of 269 ft from 1550 ft to 1819 ft indicating an improvement in cardiovascular endurance in order to maximize function with functional mobility throughout the community - MET  - Patient will improve MiniBESTest score by MDC of 5 52 points from 22/30 to 27 52/30 indicating an improvement with dynamic balance in order to further decrease risk of falls with functional tasks- NOT MET - MET  - Patient will be independent in basic HEP in order to manage condition at home- MET    Long Term Goals (12 weeks):  - Patient will score a low risk for falls 2/4 fall risks measures - MET  - Patient will score age norm values for 1/2 endurance measures - ONGOING  - Patient will be able to ambulate on uneven surfaces with 50% reduction in near falls to increase safety with community mobility - ONGOING  - Patient will be able to perform a floor transfer without physical assistance to assist with fall recovery at home and in the community - MET  - Patient will be independent in comprehensive HEP post discharge from program - NOT MET  - Patient will be able to walk up incline with decreased difficulty and no loss of balance in order to improve functional mobility throughout the community - NOT MET  - Patient will be able to perform sit to stands from softer surfaces such as a couch or bed in order to improvement function with transfers - ONGOING  - Patient will be consistent with program for at least 1 day per week to assist with management of condition and functional independence of their condition - MET        Cut off score   All date taken from APTA Neuro Section or Rehab Measures      YBARRA Cutoff Scores:  MDC: 5 pts  Falls Risk Cutoff: 40 22/56 DGI Cutoff Scores:  Marzena Manzano al 2011, MDC: 2 9 pts  Ryley et al 2008, Cherrie: Score <19/24   MiniBEST Cutoff Scores:  Vernon Hernandez al 2011, MDC: 5 52 pts  Pollo Green 2013, [place]: <19/28 5xSTS Cutoff Scores:  MDC: 2 3 sec  Sander Nyhan et al, 2011, Cherrie: > 16 sec   TUG Cutoff Scores:  Margery Goldberg et al, 2011, MDC: 4 8 sec  Casie Neville et al, 2011, [place]: Meds ON: < 12 21 sec, OFF: 15 5 sec 10 Meter Walk Test Cutoff Scores:  Viky Shepherd "and Tony, 2008, [address]: 0 18 m/s  Age Norm Values, Cherrie: < 1 0 m/s   ABC Cutoff Scores:  Yao Easley, 2008, MDC: 13 pts  Dennis Cox, MDC: 11 12 pts  Increased risk for falls: < 69% 6 Minute Walk Test Cutoff Scores:  Chanel Navjot and Tony, 2008, MDC: 269 ft  Daniel et al, 2002, Norm Data Healthy Adults:  61 - 71 years:   M: 200 m      F: 538 m  70 - 79 years:   M: 1 m      F: 200 m  [de-identified] - 80 years:   M: 1 m      F: 80 m   PDQ-39 Cutoff Scores:  Megan eduardo, 2004:  MDC: Mobility (12 24), ADL (16 72), Emotional (14 22), Stigma (21 21), Social Support (21 25), Cognition (21 12), Communication (21 04), Bodily Discomfort (24 48)  Tim et al, 2007:  Normative Data: Mobility (49 25), ADL (38 94), Emotional (37 92), Stigma (27 54), Social Support (14 78), Cognition (33 03), Communication (27 99), Bodily Discomfort (40 91) Carmen and Yahr Stages  Stage 0: No S/S  Stage 1: Mild unilateral symptoms  Stage 1 5: Unilateral and axial symptoms  Stage 2: Bilateral symptoms, no balance impairment  Stage 2 5: Mild bilateral symptoms and recovery with pull test  Stage 3: Mild to moderate postural instability, independent  Stage 4: Severe disability, walking or standing unassisted  Stage 5: Bed bound, w/c bound           Subjective Evaluation    History of Present Illness  Mechanism of injury: Patient has a history of PD and has been seen for RSB prior  Patient reports getting DBS back in September, due to having to take 14 does a day for medication  He takes 3 doses a day and one time release  Update: (3-2-33)  - Patient reports that he needs help with mobility  He feels he is improving overall  (Updated 4/3/2023): Patient reports overall satisfaction with PT services thus far  Feels his balance \"is getting back where it should be  \"    Updated (5/8/2023): Patient reports continued satisfaction with PT services thus far, wants to continue to work on his balance   Feels the 90 minute classes have helped to " improve his endurance  Updated (2023): Patient reports continued satisfaction with PT services, notes his largest complaint at this time is pain and stiffness in his low and mid back  Primary AD: None  Previous Programs: RSB      Pain  No pain reported  Current pain ratin  At best pain ratin  At worst pain ratin     Social Support  Steps to enter house: yes  Stairs in house: yes   Lives with: spouse     Employment status: Retured   Treatments  Previous treatment: physical therapy  Patient Goals  Patient goals for therapy: improved balance, increased motion, return to sport/leisure activities, independence with ADLs/IADLs and increased strength  Hand dominance: R handed    Treatments  Previous treatment: RSB  Current treatment: General mobility         Objective   Gait abnormalities: Slowed gait speed, limited arm swing and trunk ROT    MDS/UPDRS Part III  - Is the patient on medications for treating symptoms of PD? Yes  - If receiving medication for treatment: OFF: typical functional state when having a poor response in spite of taking medications  - Is the patient on Levodopa?  Yes        - If yes, how many minutes since last dose:  5 hours   - Speech: 2 - Mild: Loss of modulation, diction, or volume, with a few words unclear, but the overall sentences easy to follow  - Facial Expression: 2 - Mild: in addition to decreased eye-blink frequency, masked facies present in the lower face as well, namely fewer movements around the mouth, such as less spontaneous smiling, but lips not parted  - Rigidity:  - Neck: 0 - Normal: No rigidity  - RUE: 0 - Normal: No rigidity  - LUE: 0 - Normal: No rigidity  - RLE: 0 - Normal: No rigidity  - LLE: 0 - Normal: No rigidity  - Finger Tapping:  - R: 2 -Mild: Any of the following: (a) 3-5 interruptions during tapping, (b) mild slowing, (c) the amplitude decrements midway in the 10-tap sequence  - L: 2 -Mild: Any of the following: (a) 3-5 interruptions during tapping, (b) mild slowing, (c) the amplitude decrements midway in the 10-tap sequence  - Hand Movements:   - R: 1 - Slight: Any of the following: (a) regular rhythm is broken with 1-2 interruptions or hesitations of the movement, (b) slow slowing, (c) the amplitude decrements near end of task   - L: 2 - Mild: Any of the following: (a) 3-5 interruptions during the movements, (b) mild slowing, (c) the amplitude decrements midway in the task  - Pronation-Supination Movements of Hands:   - R: 0 - Normal: No problems   - L: 1 - Slight: Any of the following: (a) regular rhythm broken with 1-2 interruptions or hesitations of the movement, (b) slight slowing, (c) the amplitude decrements near the end of the sequence  - Toe Tapping:   - R: 0 - Normal: No problems   - L: 0 - Normal: No problems  - Leg Agility:   - R: 0 - Normal: no problems   - L: 0 - Normal: no problems  - Arising from Chair: 0 - Normal: no problems, able to arise quickly without hesitation  - Gait: 1 - Slight: Independent walking with minor gait impairment  - Freezing of Gait: 0 - Normal: No freezing  - Postural Stability: 0 - Normal: No problems, recovers with 1-2 steps)  - Posture: 1 - Slight: Not quite erect, but posture could be normal for older person  - Global Spontaneity of Movement (Body Bradykinesia): 2 - Mild: Mild global slowness and poverty of spontaneous movements  - Postural Tremor of the Hands:   - R: 1 - Slight: Tremor present, but < 1 cm in amplitude   - L: 2 - Mild: Tremor at least 1, but < 3 cm in amplitude  - Kinetic Tremor of the Hands:   - R: 0 - Normal: No tremor   - L: 2 - Mild: Tremor at least 1, but < 3 cm in amplitude  - Rest Tremor Amplitude:   - RUE: 0 - Normal: No tremor   - LUE: 1 - Slight: Tremor present, but < 1 cm in amplitude   - RLE: 0 - Normal: No tremor   - LLE: 0 - Normal: No tremor   - Lip/Jaw: 0 - Normal: No tremor  - Constancy of Rest Tremor: 2 - Mild: Tremor at rest is present 26-50% of the entire examination period  - Dyskinesia Impact on Part III Ratings:   - Were dyskinesias (chorea or dystonia) present during examination? No   - If yes, did these movements interfere with your ratings? No  - Carmen and Yahr Stage: 1 5 - Unilateral and axial involvement only          Outcome Measures Initial Eval  2-2-21 PN  3-2-23 PN  4/3/2023 PN  5/8/2023 PN  6/12/2023    5xSTS 14 42 sec 12 87 sec , no UE 11 38 sec, no UE 10 66 sec, no UE 10 92 sec, no UE    TUG  - Regular  - Cognitive  - Carry   7 23 sec  8 66 sec  9 28 sec   7 02 sec   7 09 sec  7 71 sec   6 58 sec  8 05 sec  6 78 sec   5 65 sec  8 44 sec  6 26 sec   5 93 sec  6 85 sec  6 48 sec    MiniBEST 22/28 24/28 26/28 26/28 26/28    10 meter 1 44 m/s 1 77 m/s 1 76 m/s 1 67 m/s 1 40 m/s    6MWT 1550 ft 1660 ft  1700 ft 1860 ft 1940 ft    ABC 92 5% 90% 93 75% 92% 96 25%    PDQ-39 15/100 NT NT NT NT    MDS-UPDRS  Part III   25 5/128 NT NT NT NT    H&Y Stage 1 5 NT 1 5 1 5 1 5    Floor > Stand NV sec Supine> sit: 3 76 sec    Stand>supine: 3 73 sec Supine > sit: 3 56 sec      Stand > supine: 2 74 sec NT NT    FGA  30/30 30/30 29/30 30/30             Outcome Measures 3/5/20 7/23/20 9/10 PN 10-12 11/25 PN 3/1/2021   ABC Not assessed 80 6%  92 5%     5x STS 11 5 sec 14 22 sec 12 57 sec 12 66 sec 12 34 sec 13 11   TUG 7 32    11 03 carry    12 12   cog 8 42     8 70 carry    9 95 cog 7 53 sec    8 66 sec   Carry  10 13 sec Cog 6 10 sec    7 34 sec Carry    6 48 sec Cog 6 32 sec    7 09 sec Carry    8 78 sec 6 47 sec         9 12    10 meter walk test 1 25 m/s 1 30 m/s 7 03 sec 1 4 m/s 1 8 m/s 1 9 m/s 1 8 m/s   6 minute walk test 2100 ft 1375 ft 1445 ft 1435 ft 1700 ft    DGI 23/24 23/24       MiniBest 25/28 25/28 27/28     FGA Not assessed 27/30 29/30 27/30              DN (6/12/2023)  Cardio Training- (TM vs Nustep 15 mins)   Balance/cog circuit (2 mins, 1-2 rounds)   • BCK shuffle around cones + naming favorite games > complex combos   • Sitting on pball 10 STS + EC with marches > complex combos   • Posterior resisted step up/downs > complex combos   • Step up/over cline ropes with step over hurdles + blaze pod focus pattern > complex combos   • Crawl under large kayden + tandem walking BCK on ea side > complex combos      Fine Motor and Agility (2 mins, 1-2 rounds)   • Farmer carry with kettle bell + speed walking   • Partner pickle ball activity   • Partner pickle ball activity   • Cone weaving + moving clothes pins from front of shirt to back of shirt   • Agility ladder drills +  around trunk       Precautions: DBS placement on 9/22  Past Medical History:   Diagnosis Date   • Anxiety    • Arteriosclerosis of coronary artery 05/24/2017   • Benign essential hypertension 05/15/2015   • Depression 06/01/2012   • Essential (hemorrhagic) thrombocythemia ([facility] [address] )    • History of prostate cancer 01/05/2023   • History of stroke    • Left carpal tunnel syndrome    • Lumbar canal stenosis    • Mixed hyperlipidemia 11/10/2010   • Osteoarthritis, knee    • Other male erectile dysfunction 08/08/2019   • Overweight    • Parkinson disease ([address] ) 10/08/2019   • Pipe smoker    • Retinal and vitreous disorder    • Subclinical hypothyroidism 04/11/2017   • Type 2 diabetes mellitus without complication, without long-term current use of insulin ([address] ) 08/24/2017

## 2023-06-14 ENCOUNTER — OFFICE VISIT (OUTPATIENT)
Dept: PHYSICAL THERAPY | Facility: CLINIC | Age: 68
End: 2023-06-14
Payer: COMMERCIAL

## 2023-06-14 DIAGNOSIS — R26.9 GAIT ABNORMALITY: Primary | ICD-10-CM

## 2023-06-14 DIAGNOSIS — G20 PARKINSON DISEASE (HCC): ICD-10-CM

## 2023-06-14 PROCEDURE — 97112 NEUROMUSCULAR REEDUCATION: CPT

## 2023-06-14 PROCEDURE — 97110 THERAPEUTIC EXERCISES: CPT

## 2023-06-14 NOTE — PROGRESS NOTES
Daily Note     POC expires Auth Status Total   Visits  Start date  Expiration date PT/OT + Visit Limit? Co-Insurance   23 Auth not required per FD Visits   PT $20 Co-pay                                               Today's date: 2023  Patient name: Shalini Shearer  : 1955  MRN: [id number]  Referring provider: Blair Ocampo DO  Dx:   Encounter Diagnosis     ICD-10-CM    1  Gait abnormality  R26 9       2  Parkinson disease ([facility] [address] )  G20                      Subjective: Patient reports to PT session with no new issues or complaints  Objective: See treatment diary below     [employer] up - Performed prior to treatment? ?   Cardio on TM vs NuStep: 15 mins (focus on increase in HR > neuromuscular activation)?   Shadow Boxing (20 reps ea, 1-6’s)?   ?   Cardio Training- (TM vs Nustep 15 mins)  Cardio Circuit (2 mins, 1 round)  • Lateral lunges + squats (10 ea) > burnouts  • Alternating heel raises + vertical reach/jump to sticky note > burnouts  • Multi-directional step ups on step > burnouts  • Speed high knee marches over agility ladder > burnouts  • Lateral shuffle around cones > burnouts  Balance/cog circuit (2 mins, 1-2 rounds)  • Tandem ambulation on foam beam + naming dog breeds > complex combos  • LAT kayden negotiation with opposite side resistance > complex combos  • Speed walking around cones + holding tidal tank > complex combos  • FWD step over kayden + kick to bag > complex combos  • LAT step up/over BOSU > complex combos  Fine Motor and Agility (2 mins, 1-2 rounds)  • Perform brushing service dog in training standing on foam  • Perform crossword making using Bananagram and relevant dog names/words  • Perform large amplitude writing using 2lb wrist weight of all Service and service dogs in training names  • Perform Bananagram on dog vest stating ideas of animal names   • Placing clips on/off service dog vest from theraband beam while standing on river rocks      Assessment: Patient tolerated treatment well with focus on balance and high intensity tasks through neuro boxing program  Noted good amplitude with handwriting exercise  Difficulty with proper sequencing for cone weaving activity, likely due to performing in a different pattern (lateral shuffling)  Reported increased LBP when bending over to perform scrabble task with facility dog  Patient will continue to benefit from skilled OPPT services to maximize functional mobility and slow disease progression secondary to PD diagnosis  Plan: Continue per plan of care  Progress treatment as tolerated             Outcome Measures Initial Eval  2-2-21 PN  3-2-23 PN  4/3/2023 PN  5/8/2023 PN  6/12/2023    5xSTS 14 42 sec 12 87 sec , no UE 11 38 sec, no UE 10 66 sec, no UE 10 92 sec, no UE    TUG  - Regular  - Cognitive  - Carry   7 23 sec  8 66 sec  9 28 sec   7 02 sec   7 09 sec  7 71 sec   6 58 sec  8 05 sec  6 78 sec   5 65 sec  8 44 sec  6 26 sec   5 93 sec  6 85 sec  6 48 sec    MiniBEST 22/28 24/28 26/28 26/28 26/28    10 meter 1 44 m/s 1 77 m/s 1 76 m/s 1 67 m/s 1 40 m/s    6MWT 1550 ft 1660 ft  1700 ft 1860 ft 1940 ft    ABC 92 5% 90% 93 75% 92% 96 25%    PDQ-39 15/100 NT NT NT NT    MDS-UPDRS  Part III   25 5/128 NT NT NT NT    H&Y Stage 1 5 NT 1 5 1 5 1 5    Floor > Stand NV sec Supine> sit: 3 76 sec    Stand>supine: 3 73 sec Supine > sit: 3 56 sec      Stand > supine: 2 74 sec NT NT    FGA  30/30 30/30 29/30 30/30

## 2023-06-16 NOTE — PROGRESS NOTES
Daily Note     POC expires Auth Status Total   Visits  Start date  Expiration date PT/OT + Visit Limit? Co-Insurance   23 Auth not required per FD Visits   PT $20 Co-pay                                               Today's date: 2023  Patient name: Zelda Watters  : 1955  MRN: [id number]  Referring provider: Gwenda Crigler, DO  Dx:   Encounter Diagnosis     ICD-10-CM    1  Gait abnormality  R26 9       2  Parkinson disease ([facility] [address] )  G20                      Subjective: Patient reports no new changes, complaints, or falls  Objective: See treatment diary below     [address] up - Performed prior to treatment? ?   Cardio on TM vs NuStep: 15 mins (focus on increase in HR > neuromuscular activation)?   Shadow Boxing (20 reps ea, 1-6’s)?   ?   Cardio (2 minutes, 1 round):   • STS from varied height surfaces > burnout combos   • Posterior resisted star jumps > burnout combos   • Partner resisted burnouts onto bag   • Partner resisted step ups   • Mountain climbers > burnout combos   • 10 jumping jacks > 10 speed squats > burnout combos      Balance and cognition (2 minutes, 1 round):   • BWD step ups to river rocks (medium) + 10# TT  • Step up > high knee drives on Bosu > complex combos   • Side stepping on foam beam > LE blaze pod taps + 360 deg turn on red pods  • Four square hurdles + naming alternating cities and foods in alphabetical order  • Backwards tandem weaving around cones + tiny tank carry between gloves      Agility and fine motor (2 minutes, 1 round): • Lateral bounding + chaining activity  • Half kneel > stand > place clothespin on theraband  • Agility ladder backwards in/in/out/out + circles around body with tiny tank   •  rubber band across fingers > army crawl under rope > place rubber band on can   • Four square speed stepping + untying knots with theraband      Functional mobility (10 minutes, together):    • Open books x 10 B/L    • LTRs x 10 B/L    • Quadruped > tall kneel x 10    • Tall kneel > half kneel x 10 B/L       Core:   • Partner sit up > med ball pass off   • Partner plank > alternate high 5’s         Assessment: Patient able to tolerate treatment session well today with continued focus on balance and high intensity tasks via [place]  No significant difficulty on compliant surfaces today  Challenged with dual tasking and maintenance of large amplitude within activities  He will continue to benefit from skilled outpatient PT in order to maximize his function with PD diagnosis and slow disease progression  Plan: Continue per plan of care  Progress treatment as tolerated           Outcome Measures Initial Eval  2-2-21 PN  3-2-23 PN  4/3/2023      5xSTS 14 42 sec 12 87 sec , no UE 11 38 sec, no UE      TUG  - Regular  - Cognitive  - Carry   7 23 sec  8 66 sec  9 28 sec   7 02 sec   7 09 sec  7 71 sec   6 58 sec  8 05 sec  6 78 sec      MiniBEST 22/28 24/28 26/28      10 meter 1 44 m/s 1 77 m/s 1 76 m/s      6MWT 1550 ft 1660 ft  1700 ft      ABC 92 5% 90% 93 75%      PDQ-39 15/100 NT NT      MDS-UPDRS  Part III   25 5/128 NT NT      H&Y Stage 1 5 NT 1 5      Floor > Stand NV sec Supine> sit: 3 76 sec    Stand>supine: 3 73 sec Supine > sit: 3 56 sec      Stand > supine: 2 74 sec      FGA  30/30 30/30

## 2023-06-19 ENCOUNTER — OFFICE VISIT (OUTPATIENT)
Dept: PHYSICAL THERAPY | Facility: CLINIC | Age: 68
End: 2023-06-19
Payer: COMMERCIAL

## 2023-06-19 DIAGNOSIS — R26.9 GAIT ABNORMALITY: Primary | ICD-10-CM

## 2023-06-19 DIAGNOSIS — G20 PARKINSON DISEASE (HCC): ICD-10-CM

## 2023-06-19 PROCEDURE — 97112 NEUROMUSCULAR REEDUCATION: CPT

## 2023-06-19 NOTE — PROGRESS NOTES
Daily Note     POC expires Auth Status Total   Visits  Start date  Expiration date PT/OT + Visit Limit? Co-Insurance   23 Auth not required per FD Visits   PT $20 Co-pay                                               Today's date: 2023  Patient name: Scottie Galvan  : 1955  MRN: [id number]  Referring provider: Bairon Martinez DO  Dx:   Encounter Diagnosis     ICD-10-CM    1  Gait abnormality  R26 9       2  Parkinson disease ([facility] [address] )  G20                      Subjective: Patient reports to PT session with no new issues or complaints  Objective: See treatment diary below     [employer] up - Performed prior to treatment? ?   Cardio on TM vs NuStep: 15 mins (focus on increase in HR > neuromuscular activation)?   Shadow Boxing (20 reps ea, 1-6’s)?   ?  Cardio Training- (TM vs Nustep 15 mins)  Cardio Circuit (2 mins, 1 round)  • Bounding > burnout combos   • Backwards banded monster walks > burnout combos  • Modified burpees (5) > Lateral shuffle (2) > burnout combos   • Sled push/pull > burnout combos   • Speed step ups > burnout combos   Balance/cog circuit (2 mins, 1-2 rounds)  • Partner resisted step ups > complex combos  • Partner resisted cog puzzle > complex combos  • Dance mat (memory task) > complex combos  • Tandem on foam beam + TT hold > complex combos  • STS w/ foam pad under feet + small TT hold > complex combos  Fine Motor and Agility (2 mins, 1-2 rounds)  • Swing lunges  • Cone weaving + bop it activity  • Ambulation w/ B/L punch  of 5# weighted plate  • Bicep curls to OH press  • Wide based to narrow based squats w/ finger opposition  Functional mobility  • Sitting on [place]ab[place] + Lat reach  • Floor to ceiling on Pball       Assessment: Patient tolerated treatment well with focus on balance and high intensity tasks through neuro boxing program  Difficulty with reaction time based activities such as bop-it, particularly with addition of dual physical task of cone mark  On the other hand, noted good performance with memory component of dance mat, able to complete in its entirety  Patient will continue to benefit from skilled OPPT services to maximize functional mobility and slow disease progression secondary to PD diagnosis  Plan: Continue per plan of care  Progress treatment as tolerated             Outcome Measures Initial Eval  2-2-21 PN  3-2-23 PN  4/3/2023 PN  5/8/2023 PN  6/12/2023    5xSTS 14 42 sec 12 87 sec , no UE 11 38 sec, no UE 10 66 sec, no UE 10 92 sec, no UE    TUG  - Regular  - Cognitive  - Carry   7 23 sec  8 66 sec  9 28 sec   7 02 sec   7 09 sec  7 71 sec   6 58 sec  8 05 sec  6 78 sec   5 65 sec  8 44 sec  6 26 sec   5 93 sec  6 85 sec  6 48 sec    MiniBEST 22/28 24/28 26/28 26/28 26/28    10 meter 1 44 m/s 1 77 m/s 1 76 m/s 1 67 m/s 1 40 m/s    6MWT 1550 ft 1660 ft  1700 ft 1860 ft 1940 ft    ABC 92 5% 90% 93 75% 92% 96 25%    PDQ-39 15/100 NT NT NT NT    MDS-UPDRS  Part III   25 5/128 NT NT NT NT    H&Y Stage 1 5 NT 1 5 1 5 1 5    Floor > Stand NV sec Supine> sit: 3 76 sec    Stand>supine: 3 73 sec Supine > sit: 3 56 sec      Stand > supine: 2 74 sec NT NT    FGA  30/30 30/30 29/30 30/30

## 2023-06-21 ENCOUNTER — OFFICE VISIT (OUTPATIENT)
Dept: PHYSICAL THERAPY | Facility: CLINIC | Age: 68
End: 2023-06-21
Payer: COMMERCIAL

## 2023-06-21 DIAGNOSIS — G20 PARKINSON DISEASE (HCC): ICD-10-CM

## 2023-06-21 DIAGNOSIS — R26.9 GAIT ABNORMALITY: Primary | ICD-10-CM

## 2023-06-21 PROCEDURE — 97112 NEUROMUSCULAR REEDUCATION: CPT

## 2023-06-21 NOTE — PROGRESS NOTES
Daily Note     POC expires Auth Status Total   Visits  Start date  Expiration date PT/OT + Visit Limit? Co-Insurance   23 Auth not required per FD Visits   PT $20 Co-pay                                               Today's date: 2023  Patient name: Magui Pang  : 1955  MRN: [id number]  Referring provider: Christal Mitchell DO  Dx:   Encounter Diagnosis     ICD-10-CM    1  Gait abnormality  R26 9       2  Parkinson disease ([facility] [address] )  G20                      Subjective: Patient reports to PT session with no new issues or complaints         Objective: See treatment diary below     Neuro Boxing Program    Warm up - Performed prior to treatment    Shadow Boxing (20 reps ea, 1-6’s)      ?   Cardio Circuit (2 mins, 1 round)   • Ski jumps over cline ropes > burnouts   • Cline ropes    • Rowing machine    • Star shuffle to home base blaze pods > burnouts   • Backwards jump to box > burnouts    • Running down/back turf > burnouts on bag      Balance and Cog Circuit (2 mins, 1 round)   • Tandem fwd/bck on foam beam + blaze pod taps > complex combos    • River rock course + tiny tank balance between gloves + count +7/-3   • Lateral resisted lateral hurdles + unscramble words   • Single leg hopping to balance pods based on blaze pod color > complex combos   • Kayden course with tidal tank carry on gloves    • Step up > high knee drive on [employer]      Fine Motor and Agility Circuit (2 mins, 2 rounds)   • Cone weaving + balancing cup of water in upturned river rock   • Quadruped thread the needle with reach to Katty removal from all   • Agility ladder negotiation + maze ball task   • Sidestepping over single kayden + manipulating a gait belt    • Step ups to balance pods + tying/untying Tband    • Bent over rows with DB, one foot on balance pod      Functional mobility (5 minutes, 1 round):    • Large amplitude star reaching to pass squigz    • Large amplitude cross body reaching to pass squigz       Assessment: Patient tolerated treatment well with focus on balance and high intensity tasks through neuro boxing program  Patient demonstrated good hand-eye coordination with carrying glass of water in overturned river rock  No overt LOB noted this session  Difficulty with maze task, more so due to poor visualization of ball inside maze/visual limitations  Patient will continue to benefit from skilled OPPT services to maximize functional mobility and slow disease progression secondary to PD diagnosis  Plan: Continue per plan of care  Progress treatment as tolerated             Outcome Measures Initial Eval  2-2-21 PN  3-2-23 PN  4/3/2023 PN  5/8/2023 PN  6/12/2023    5xSTS 14 42 sec 12 87 sec , no UE 11 38 sec, no UE 10 66 sec, no UE 10 92 sec, no UE    TUG  - Regular  - Cognitive  - Carry   7 23 sec  8 66 sec  9 28 sec   7 02 sec   7 09 sec  7 71 sec   6 58 sec  8 05 sec  6 78 sec   5 65 sec  8 44 sec  6 26 sec   5 93 sec  6 85 sec  6 48 sec    MiniBEST 22/28 24/28 26/28 26/28 26/28    10 meter 1 44 m/s 1 77 m/s 1 76 m/s 1 67 m/s 1 40 m/s    6MWT 1550 ft 1660 ft  1700 ft 1860 ft 1940 ft    ABC 92 5% 90% 93 75% 92% 96 25%    PDQ-39 15/100 NT NT NT NT    MDS-UPDRS  Part III   25 5/128 NT NT NT NT    H&Y Stage 1 5 NT 1 5 1 5 1 5    Floor > Stand NV sec Supine> sit: 3 76 sec    Stand>supine: 3 73 sec Supine > sit: 3 56 sec      Stand > supine: 2 74 sec NT NT    FGA  30/30 30/30 29/30 30/30

## 2023-06-23 DIAGNOSIS — Z15.89 JAK2 GENE MUTATION: ICD-10-CM

## 2023-06-23 DIAGNOSIS — D47.3 ESSENTIAL (HEMORRHAGIC) THROMBOCYTHEMIA (HCC): ICD-10-CM

## 2023-06-23 RX ORDER — HYDROXYUREA 500 MG/1
500 CAPSULE ORAL DAILY
Qty: 90 CAPSULE | Refills: 2 | Status: SHIPPED | OUTPATIENT
Start: 2023-06-23

## 2023-06-28 ENCOUNTER — APPOINTMENT (OUTPATIENT)
Dept: PHYSICAL THERAPY | Facility: CLINIC | Age: 68
End: 2023-06-28
Payer: COMMERCIAL

## 2023-06-30 ENCOUNTER — OFFICE VISIT (OUTPATIENT)
Dept: PHYSICAL THERAPY | Facility: CLINIC | Age: 68
End: 2023-06-30
Payer: COMMERCIAL

## 2023-06-30 DIAGNOSIS — G20 PARKINSON DISEASE (HCC): ICD-10-CM

## 2023-06-30 DIAGNOSIS — R26.9 GAIT ABNORMALITY: Primary | ICD-10-CM

## 2023-06-30 PROCEDURE — 97112 NEUROMUSCULAR REEDUCATION: CPT

## 2023-06-30 PROCEDURE — 97530 THERAPEUTIC ACTIVITIES: CPT

## 2023-06-30 NOTE — PROGRESS NOTES
Daily Note     POC expires Auth Status Total   Visits  Start date  Expiration date PT/OT + Visit Limit? Co-Insurance   23 Auth not required per FD Visits   PT $20 Co-pay                                               Today's date: 2023  Patient name: Selene Nelson  : 1955  MRN: [id number]  Referring provider: Nimesh Amado DO  Dx:   Encounter Diagnosis     ICD-10-CM    1  Gait abnormality  R26 9       2  Parkinson disease ([facility] [address] )  G20                      Subjective: Patient reports to PT session with no new issues or complaints         Objective: See treatment diary below     Neuro Boxing Program    Warm up - Performed prior to treatment    Shadow Boxing (20 reps ea, 1-6’s)      Cardio (2 minutes, 1 round):   • Partner resisted burnouts to bag   • Partner resisted step ups to box    • Blaze pod suicides FWD/BCK > burnouts    • Lateral jumps over cline ropes FWD/BCK > burnouts   • Box jumps > burnouts    Balance and cog (2 minutes, 1 round):    • Kayden negotiation > 360-degree backhand punches to upright bag > knees to upright bag > complex combos   • Tight cone weave BCK in tandem + balance tidal tank on arms + alternating naming animals + foods through alphabet    • Sitting on Pball + complex combos on upright bag     • Stepping to color dots, river rocks, pods based on blaze pod color    • Anterior resisted backwards running > complex combos    Fine Motor and Agility (2 minutes, 1 round):   • [place] maze in quad/plank position    • Holding kettle bell unilaterally weaving in a figure 8 pattern    • Picking up marbles with tweezers and depositing while weaving in opposite figure 8 pattern    • Crawling under kayden to complete puzzle    • Bounding between cones to making chains in rainbow order    Functional mobility (10 minutes, together):    • Yoga flow    Core (1 minute, 3 rounds):   • Supine LE marching with static UE weight hold    • [place] twists    • Long sit LE lifts over cone          Assessment: Patient tolerated treatment well with focus on balance and high intensity tasks through neuro boxing program  Patient demonstrated good amplitude with punches, however as he fatigues he illustrates poor contact with the mits  This could likely indicate poor proprioceptive awareness  Fair ability to perform dual tasks, but required verbal reinforcement at times  Patient will continue to benefit from skilled OPPT services to maximize functional mobility and slow disease progression secondary to PD diagnosis  Plan: Continue per plan of care  Progress treatment as tolerated             Outcome Measures Initial Eval  2-2-21 PN  3-2-23 PN  4/3/2023 PN  5/8/2023 PN  6/12/2023    5xSTS 14 42 sec 12 87 sec , no UE 11 38 sec, no UE 10 66 sec, no UE 10 92 sec, no UE    TUG  - Regular  - Cognitive  - Carry   7 23 sec  8 66 sec  9 28 sec   7 02 sec   7 09 sec  7 71 sec   6 58 sec  8 05 sec  6 78 sec   5 65 sec  8 44 sec  6 26 sec   5 93 sec  6 85 sec  6 48 sec    MiniBEST 22/28 24/28 26/28 26/28 26/28    10 meter 1 44 m/s 1 77 m/s 1 76 m/s 1 67 m/s 1 40 m/s    6MWT 1550 ft 1660 ft  1700 ft 1860 ft 1940 ft    ABC 92 5% 90% 93 75% 92% 96 25%    PDQ-39 15/100 NT NT NT NT    MDS-UPDRS  Part III   25 5/128 NT NT NT NT    H&Y Stage 1 5 NT 1 5 1 5 1 5    Floor > Stand NV sec Supine> sit: 3 76 sec    Stand>supine: 3 73 sec Supine > sit: 3 56 sec      Stand > supine: 2 74 sec NT NT    FGA  30/30 30/30 29/30 30/30

## 2023-07-03 ENCOUNTER — OFFICE VISIT (OUTPATIENT)
Dept: PHYSICAL THERAPY | Facility: CLINIC | Age: 68
End: 2023-07-03
Payer: COMMERCIAL

## 2023-07-03 DIAGNOSIS — R26.9 GAIT ABNORMALITY: Primary | ICD-10-CM

## 2023-07-03 DIAGNOSIS — G20 PARKINSON DISEASE (HCC): ICD-10-CM

## 2023-07-03 PROCEDURE — 97112 NEUROMUSCULAR REEDUCATION: CPT

## 2023-07-03 NOTE — PROGRESS NOTES
Daily Note     POC expires Auth Status Total   Visits  Start date  Expiration date PT/OT + Visit Limit? Co-Insurance   23 Auth not required per FD Visits   PT $20 Co-pay                                               Today's date: 7/3/2023  Patient name: Brutus Simmonds  : 1955  MRN: [id number]  Referring provider: Swapna Husain DO  Dx:   Encounter Diagnosis     ICD-10-CM    1. Gait abnormality  R26.9       2. Parkinson disease ([address])  G20                      Subjective: Patient reports to PT session with no new issues or complaints.        Objective: See treatment diary below     Neuro Boxing Program    Cardio Training   • Treadmill/NuStep (10 min)   Cardio circuit (2 mins, 1 rounds)   • Resisted Tband step taps > burnouts   • Resisted UE > burnouts   • Lateral lunges (6) + squats(2) > burnouts   • Jump up and over + side kick to bag > burnouts   • Resisted run FWD + split jumps > burnouts    • Speed karaoke > burnouts    Balance and Cog Circuit: (1 min, 1 round), Complex combos   • Posterior resisted 4 square step pattern (FWD L: 1-2, FWD R: 3-4, BCK L: 5-6, BCK R: 3-6) > combos   • Side stepping foam beams + cog step to river rocks based on blaze pods (red- go to green, purple-go to yellow, orange-go to blue) > complex combos   • STS from lower surface + river rocks under LE > complex combos   • Step up from foam + foam on box > complex combos   • Tall kneel on BOSU + resistance band at hips (random pulls) + cog task in book > complex combos   • Resistance bands around the ankles (BlueTband) + in/out of hurdles > complex combos   Agility and Fine Motor Circuit: (2 min, 1 round)   • Ladder sequence with spoon > marble + sprint to dop it off   • Standing on balance pods + playing perfection game   • Zig zag to cones + rip/put tape on floor (random tape placement)    • Partner connect 4 + bounding to cone   • Partner connect 4 + backwards lunge to cone   • Modified burpee into hexostick throw with stating correct color.       Assessment: Patient tolerated treatment well with focus on balance and high intensity tasks through neuro boxing program. Patient demonstrated impulsivity with Connect 4 activity and required VC's for rule-following. Required VC's regarding correct sequencing of complex boxing combinations approximately 25% of the time. Patient will continue to benefit from skilled OPPT services to maximize functional mobility and slow disease progression secondary to PD diagnosis. Plan: Continue per plan of care. Progress treatment as tolerated.            Outcome Measures Initial Eval  2-2-21 PN  3-2-23 PN  4/3/2023 PN  5/8/2023 PN  6/12/2023    5xSTS 14.42 sec 12.87 sec , no UE 11.38 sec, no UE 10.66 sec, no UE 10.92 sec, no UE    TUG  - Regular  - Cognitive  - Carry   7.23 sec  8.66 sec  9.28 sec   7.02 sec   7.09 sec  7.71 sec   6.58 sec  8.05 sec  6.78 sec   5.65 sec  8.44 sec  6.26 sec   5.93 sec  6.85 sec  6.48 sec    MiniBEST 22/28 24/28 26/28 26/28 26/28    10 meter 1.44 m/s 1.77 m/s 1.76 m/s 1.67 m/s 1.40 m/s    6MWT 1550 ft 1660 ft  1700 ft 1860 ft 1940 ft    ABC 92.5% 90% 93.75% 92% 96.25%    PDQ-39 15/100 NT NT NT NT    MDS-UPDRS  Part III   25.5/128 NT NT NT NT    H&Y Stage 1.5 NT 1.5 1.5 1.5    Floor > Stand NV sec Supine> sit: 3.76 sec    Stand>supine: 3.73 sec Supine > sit: 3.56 sec      Stand > supine: 2.74 sec NT NT    FGA  30/30 30/30 29/30 30/30

## 2023-07-05 NOTE — PROGRESS NOTES
Assessment/Plan:  Problem List Items Addressed This Visit        Endocrine    Type 2 diabetes mellitus without complication, without long-term current use of insulin ([address])       Lab Results   Component Value Date    HGBA1C 7.5 (A) 05/24/2023       Lab Results   Component Value Date    HGBA1C 7.5 (A) 05/24/2023     Uncontrolled. Management per Endo. Recommend lifestyle modifications. Relevant Medications    lisinopril (ZESTRIL) 20 mg tablet    Other Relevant Orders    Ambulatory Referral to Ophthalmology    Subclinical hypothyroidism     Management per Endo. Not currently taking Synthroid. Type 2 diabetes mellitus with hyperglycemia, without long-term current use of insulin ([facility]) - Primary       Lab Results   Component Value Date    HGBA1C 7.5 (A) 05/24/2023       Lab Results   Component Value Date    HGBA1C 7.5 (A) 05/24/2023     Uncontrolled. Management per Endo. Recommend lifestyle modifications. Relevant Medications    lisinopril (ZESTRIL) 20 mg tablet    Other Relevant Orders    Ambulatory Referral to Ophthalmology       Cardiovascular and Mediastinum    Benign essential hypertension     Stable on Lisinopril 20mg QD. Check blood pressure outside of office. Recommend lifestyle modifications. Relevant Medications    lisinopril (ZESTRIL) 20 mg tablet    Arteriosclerosis of coronary artery     Asymptomatic. Management per Cardio - Continue ASA, statin, ACE-I. Recommend lifestyle modifications. LDL not at goal for CAD. On increased Pravachol 80mg QHS. Nervous and Auditory    Parkinson disease ([address])     Management per [facility] Neuro. S/p Deep brain stimulator 9/22. On Sinemet 25-100mg 1 tab QD. Continue [employer].                Hematopoietic and Hemostatic    Essential (hemorrhagic) thrombocythemia ([address])     Management per Heme/Onc. Pipe smoking may be contributory.               Other    Depression     Stable on Effexor XR 75 mg daily. Relevant Medications    venlafaxine (EFFEXOR-XR) 75 mg 24 hr capsule    Other Relevant Orders    Comprehensive metabolic panel    Mixed hyperlipidemia     Pending labs. Previously uncontrolled as LDL not at goal for CAD. On increased Pravachol 80mg QHS. Recommend lifestyle modifications. Relevant Orders    LDL cholesterol, direct    Pipe smoker     Contemplative. Smoking cessation advised. Anxiety     Stable on Effexor XR 75 mg daily. Overweight     Stable. Recommend lifestyle modifications. Other male erectile dysfunction     Not currently taking Levitra 20mg PRN. History of stroke     Management per Neuro. Continue ASA, statin . Recommend lifestyle modifications. History of prostate cancer     Patient declined continued Uro F/U and desires yearly PSA per PCP. Status post DaVinci robot prostatectomy performed at [facility] 2013. Return in about 6 months (around 1/6/2024) for 6mo- DM2, HTN, HL, Anx/Dep, ET, H/O Prostate CA, Smoker, Labs.       Future Appointments   Date Time Provider [facility] [address]   7/10/2023  4:30 PM Maryan Roblero, PT [address] [facility]   7/11/2023  8:00 AM BENNY Berger [facility]   7/12/2023  4:30 PM Maryan Roblero, PT [address]   7/17/2023  4:30 PM Maryan Roblero, PT [address] [facility]   7/19/2023  4:30 PM Maryan Roblero, PT [address] [facility]   7/24/2023  4:30 PM Maryan Roblero PT [address] [facility]   7/26/2023  4:30 PM Maryan Roblero, PT [address] [facility]   7/31/2023  4:30 PM Maryan Roblero PT [facility]   8/2/2023  4:30 PM Maryan Roblero, PT [facility]   8/7/2023  4:30 PM Víctor Schwartz, MICHAEL [facility]   8/9/2023  4:30 PM Isamar Irwin, [address] [facility]   8/14/2023  4:30 PM Maryan Roblero PT [facility]   8/16/2023  4:30 PM Maryan Roblero, PT [facility]   8/21/2023  4:30 PM Maryan Roblero, PT [address] [facility]   8/23/2023  4:30 PM Joey Johnson, PT [address] PT [facility]   8/28/2023  4:30 PM Joey Alex, PT [address] [facility] [facility] [address] [facility]   8/30/2023  4:30 PM Joey Johnson, PT [address] PT [facility]. [facility]   9/19/2023 12:30 PM Rati BENNY Valentin [facility]   1/8/2024 11:20 AM Jeff Aguilar DO [facility]        Subjective:     Elliot Melendez is a 79 y.o. male who presents today for a follow-up on his chronic medical conditions. HPI:  Chief Complaint   Patient presents with   • Follow-up     4mo- DM2, HTN, HL, Anx/Dep, ET, H/O Prostae CA, Smoker, Labs. No new problems or concerns. No new problems or concerns. Labs not done. •      Pt needs a new ophthalmologist, but states they have to take his insurance. He will call and find a new provider. No additional covid vaccines. -- Above per clinical staff and reviewed. --      HPI      Today:      Return in 4 months (on 6/21/2023) for 4mo- DM2, HTN, HL, Anx/Dep, ET, H/O Prostae CA, Smoker, Labs. 4mo OV    Patient did not complete labs 2/21/2023     Overweight - Watching diet.  +Regular exercise - Parkinson's Boxing program at [facility] for 90 minutes, 2 times per week.       DM2 - Management per Landy Spaulding.  Next appt 9/23.  Last DM Education 5/23 - next appt JAVAD Duong - Last seen 7/21.   No Podiatry.       HTN - On Lisinopril 20mg 1 pill QD. No BP check outside of office.  No other HTN meds previously.      Subclinical Hypothyroidism - Management per Landy Spaulding.  Next appt 9/23.  Patient is not taking Synthroid currently.        Hyperlipidemia - On increased Pravachol 80mg QHS x 4 months.  Previously on Zocor 40mg QHS. - pt unsure why D/C.  No higher dose or other statin previously.       CAD - Management per Cardio Dr. Annabel Sinclair.  Next appt 4/24.     Parkinson's Disease - Management per [facility] Neuro Dr. Meredith Patton.  Next appt 7/23.  S/p Deep brain stimulator 9/22.  On Sinemet 25-100mg 1 tab in AM. Attending individual [facility] Parkinson's PT 2 times weekly since .       H/O CVA -  Unsure of date - ? Management per [facility] Neuro Dr. Maurilio Randhawa.  Next appt .     Left Carpal Tunnel Syndrome - Management per [facility] Neuro Dr. Maurilio Randhawa.  Next appt .  Uses a brace PRN.  Declined EMG / surgery referral per Neuro.      Depression / Anxiety - Mood is stable.  On Effexor XR 75mg QD.  He is unsure if he's taken high dose or other mood meds previously. Filemon Angel last attended counseling in the late  and states it was not helpful.  Patients thinks he might be slightly autistic.  Good social supports.  No SI/HI/AH/VH.       PHQ-2/9 Depression Screening    Little interest or pleasure in doing things: 0 - not at all  Feeling down, depressed, or hopeless: 0 - not at all  Trouble falling or staying asleep, or sleeping too much: 1 - several days  Feeling tired or having little energy: 1 - several days  Poor appetite or overeatin - not at all  Feeling bad about yourself - or that you are a failure or have let yourself or your family down: 0 - not at all  Trouble concentrating on things, such as reading the newspaper or watching television: 0 - not at all  Moving or speaking so slowly that other people could have noticed. Or the opposite - being so fidgety or restless that you have been moving around a lot more than usual: 0 - not at all  Thoughts that you would be better off dead, or of hurting yourself in some way: 0 - not at all  PHQ-9 Score: 2   PHQ-9 Interpretation: No or Minimal depression          HEMANT-7 Flowsheet Screening    Flowsheet Row Most Recent Value   Over the last 2 weeks, how often have you been bothered by any of the following problems?     Feeling nervous, anxious, or on edge 1   Not being able to stop or control worrying 0   Worrying too much about different things 1   Trouble relaxing 0   Being so restless that it is hard to sit still 1   Becoming easily annoyed or irritable 1   Feeling afraid as if something awful might happen 0   HEMANT-7 Total Score 4         H/O Prostate Cancer - Management per Uro Mr. Gita Carreon BENNY.  Next appt 8/21 - Overdue PRN as patient declined F/U and desires yearly PSA per PCP.  Status post DaVinci robot prostatectomy performed at [facility] 2013.     ED - No longer taking Levitra 20mg QDPRN.       Essential Thrombocythemia / Myeloproliferative disorder- Management per Heme/Onc Ms. Cleary BENNY Obrien - next appt 7/23.       Pipe Smoker - Contemplative, considering quitting cold turkey over the summer.  Intermittent, usually over winter. Pipe smoking 1 time per week in cold weather, 0 times per week in hot weather.        Reviewed:  Labs 1/23/23, [facility] Neuro 3/8/23, Endo 5/24/23, DM Educ 11/18/22, Uro 8/21/20, Cardio 4/20/23, Heme/Onc 3/9/23     No Uro.     Last Colonoscopy 1/27/16, repeat in 10 years - 1/27/26.           The following portions of the patient's history were reviewed and updated as appropriate: allergies, current medications, past family history, past medical history, past social history, past surgical history and problem list.      Review of Systems   Constitutional: Negative for appetite change, chills, diaphoresis, fatigue and fever. Respiratory: Negative for chest tightness and shortness of breath. Cardiovascular: Negative for chest pain. Gastrointestinal: Negative for abdominal pain, blood in stool, diarrhea, nausea and vomiting. Genitourinary: Negative for dysuria. Current Outpatient Medications   Medication Sig Dispense Refill   • aspirin 325 mg tablet Take 325 mg by mouth daily     • carbidopa-levodopa (SINEMET)  mg per tablet Take 2 tablets by mouth 3 (three) times a day Follow clinic instructions (Patient taking differently: Take 2 tablets by mouth in the morning Follow clinic instructions.   Rx per Neuro.) 540 tablet 3   • Cholecalciferol (VITAMIN D) 2000 units CAPS Take 2 capsules (4,000 Units total) by mouth daily  0   • co-enzyme Q-10 30 MG capsule Take 1 capsule (30 mg total) by mouth daily 30 capsule 0   • glucose blood (ONETOUCH VERIO) test strip Check BG 2x per day (Patient taking differently: Check BG 2x per day. Rx per Endo.) 200 each 3   • lisinopril (ZESTRIL) 20 mg tablet Take 1 tablet (20 mg total) by mouth daily 90 tablet 1   • Multiple Vitamin (multivitamin) capsule Take 1 capsule by mouth daily     • Omega-3 Fatty Acids (fish oil) 1,000 mg Take 1,000 mg by mouth daily     • pravastatin (PRAVACHOL) 80 mg tablet TAKE 1 TABLET BY MOUTH DAILY AT BEDTIME 90 tablet 3   • sitaGLIPtin-metFORMIN (JANUMET)  MG per tablet Take 1 tablet by mouth 2 (two) times a day with meals 180 tablet 3   • vardenafil (LEVITRA) 20 MG tablet Take by mouth daily as needed     • venlafaxine (EFFEXOR-XR) 75 mg 24 hr capsule Take 1 capsule (75 mg total) by mouth daily 90 capsule 1   • hydroxyurea (HYDREA) 500 mg capsule Take 1 capsule (500 mg total) by mouth daily (Patient not taking: Reported on 7/6/2023) 90 capsule 2     No current facility-administered medications for this visit. Objective:  /70   Pulse 83   Temp 97.8 °F (36.6 °C)   Resp 16   Ht 5' 10" (1.778 m)   Wt 86 kg (189 lb 8 oz)   SpO2 97%   BMI 27.19 kg/m²    Wt Readings from Last 3 Encounters:   07/06/23 86 kg (189 lb 8 oz)   05/24/23 85 kg (187 lb 6.4 oz)   05/19/23 83.9 kg (185 lb)      BP Readings from Last 3 Encounters:   07/06/23 136/70   05/24/23 126/80   04/20/23 148/76          Physical Exam  Vitals and nursing note reviewed. Constitutional:       Appearance: Normal appearance. He is well-developed. HENT:      Head: Normocephalic and atraumatic. Eyes:      Conjunctiva/sclera: Conjunctivae normal.   Neck:      Thyroid: No thyromegaly. Vascular: No carotid bruit. Cardiovascular:      Rate and Rhythm: Normal rate and regular rhythm. Pulses: Normal pulses. Heart sounds: Normal heart sounds.    Pulmonary:      Effort: Pulmonary effort is normal.      Breath sounds: Normal breath sounds. Abdominal:      General: There is no distension. Palpations: Abdomen is soft. Tenderness: There is no abdominal tenderness. There is no guarding or rebound. Musculoskeletal:         General: No swelling or tenderness. Cervical back: Neck supple. Right lower leg: No edema. Left lower leg: No edema. Lymphadenopathy:      Cervical: No cervical adenopathy. Neurological:      General: No focal deficit present. Mental Status: He is alert and oriented to person, place, and time. Psychiatric:         Mood and Affect: Mood normal.         Behavior: Behavior normal.         Thought Content: Thought content normal.         Judgment: Judgment normal.         Lab Results:      Lab Results   Component Value Date    WBC 10.81 (H) 03/09/2023    HGB 15.4 03/09/2023    HCT 48.6 03/09/2023     (H) 03/09/2023    CHOL 206 12/17/2015    TRIG 140 01/23/2023    HDL 48 01/23/2023    ALT 6 (L) 03/09/2023    AST 17 03/09/2023     12/17/2015    K 4.8 03/09/2023     03/09/2023    CREATININE 1.14 03/09/2023    BUN 24 03/09/2023    CO2 29 03/09/2023    TSH 2.06 12/20/2019    PSA <0.1 01/23/2023    GLUF 159 (H) 01/23/2023    HGBA1C 7.5 (A) 05/24/2023     No results found for: "URICACID"  Invalid input(s): "BASENAME" Vitamin D    No results found. POCT Labs        Tobacco Cessation Counseling: Tobacco cessation counseling was provided. The patient is sincerely urged to quit consumption of tobacco. He is ready to quit tobacco. Medication options not discussed.

## 2023-07-06 ENCOUNTER — OFFICE VISIT (OUTPATIENT)
Dept: FAMILY MEDICINE CLINIC | Facility: CLINIC | Age: 68
End: 2023-07-06
Payer: COMMERCIAL

## 2023-07-06 VITALS
SYSTOLIC BLOOD PRESSURE: 136 MMHG | HEART RATE: 83 BPM | RESPIRATION RATE: 16 BRPM | TEMPERATURE: 97.8 F | WEIGHT: 189.5 LBS | BODY MASS INDEX: 27.13 KG/M2 | OXYGEN SATURATION: 97 % | HEIGHT: 70 IN | DIASTOLIC BLOOD PRESSURE: 70 MMHG

## 2023-07-06 DIAGNOSIS — F41.9 ANXIETY: ICD-10-CM

## 2023-07-06 DIAGNOSIS — E78.2 MIXED HYPERLIPIDEMIA: ICD-10-CM

## 2023-07-06 DIAGNOSIS — I10 BENIGN ESSENTIAL HYPERTENSION: ICD-10-CM

## 2023-07-06 DIAGNOSIS — N52.8 OTHER MALE ERECTILE DYSFUNCTION: ICD-10-CM

## 2023-07-06 DIAGNOSIS — Z86.73 HISTORY OF STROKE: ICD-10-CM

## 2023-07-06 DIAGNOSIS — Z85.46 HISTORY OF PROSTATE CANCER: ICD-10-CM

## 2023-07-06 DIAGNOSIS — D47.3 ESSENTIAL (HEMORRHAGIC) THROMBOCYTHEMIA (HCC): ICD-10-CM

## 2023-07-06 DIAGNOSIS — F32.A DEPRESSION, UNSPECIFIED DEPRESSION TYPE: ICD-10-CM

## 2023-07-06 DIAGNOSIS — E11.65 TYPE 2 DIABETES MELLITUS WITH HYPERGLYCEMIA, WITHOUT LONG-TERM CURRENT USE OF INSULIN (HCC): Primary | ICD-10-CM

## 2023-07-06 DIAGNOSIS — F32.0 CURRENT MILD EPISODE OF MAJOR DEPRESSIVE DISORDER, UNSPECIFIED WHETHER RECURRENT (HCC): ICD-10-CM

## 2023-07-06 DIAGNOSIS — F17.290 PIPE SMOKER: ICD-10-CM

## 2023-07-06 DIAGNOSIS — E66.3 OVERWEIGHT: ICD-10-CM

## 2023-07-06 DIAGNOSIS — E11.9 TYPE 2 DIABETES MELLITUS WITHOUT COMPLICATION, WITHOUT LONG-TERM CURRENT USE OF INSULIN (HCC): ICD-10-CM

## 2023-07-06 DIAGNOSIS — E03.8 SUBCLINICAL HYPOTHYROIDISM: ICD-10-CM

## 2023-07-06 DIAGNOSIS — G20 PARKINSON DISEASE (HCC): ICD-10-CM

## 2023-07-06 DIAGNOSIS — I25.10 ARTERIOSCLEROSIS OF CORONARY ARTERY: ICD-10-CM

## 2023-07-06 PROCEDURE — 99214 OFFICE O/P EST MOD 30 MIN: CPT | Performed by: FAMILY MEDICINE

## 2023-07-06 RX ORDER — VENLAFAXINE HYDROCHLORIDE 75 MG/1
75 CAPSULE, EXTENDED RELEASE ORAL DAILY
Qty: 90 CAPSULE | Refills: 1 | Status: SHIPPED | OUTPATIENT
Start: 2023-07-06

## 2023-07-06 RX ORDER — LISINOPRIL 20 MG/1
20 TABLET ORAL DAILY
Qty: 90 TABLET | Refills: 1 | Status: SHIPPED | OUTPATIENT
Start: 2023-07-06

## 2023-07-06 NOTE — ASSESSMENT & PLAN NOTE
Stable on Lisinopril 20mg QD. Check blood pressure outside of office. Recommend lifestyle modifications.

## 2023-07-06 NOTE — ASSESSMENT & PLAN NOTE
Management per [facility] Neuro. S/p Deep brain stimulator 9/22. On Sinemet 25-100mg 1 tab QD.   Continue Kahlil's.

## 2023-07-06 NOTE — ASSESSMENT & PLAN NOTE
Patient declined continued Uro F/U and desires yearly PSA per PCP. Status post DaVinci robot prostatectomy performed at [facility] 2013.

## 2023-07-06 NOTE — ASSESSMENT & PLAN NOTE
Asymptomatic. Management per Cardio - Continue ASA, statin, ACE-I. Recommend lifestyle modifications. LDL not at goal for CAD. On increased Pravachol 80mg QHS.

## 2023-07-06 NOTE — ASSESSMENT & PLAN NOTE
Pending labs. Previously uncontrolled as LDL not at goal for CAD. On increased Pravachol 80mg QHS. Recommend lifestyle modifications.

## 2023-07-06 NOTE — ASSESSMENT & PLAN NOTE
Lab Results   Component Value Date    HGBA1C 7.5 (A) 05/24/2023       Lab Results   Component Value Date    HGBA1C 7.5 (A) 05/24/2023     Uncontrolled. Management per Endo. Recommend lifestyle modifications.

## 2023-07-10 ENCOUNTER — OFFICE VISIT (OUTPATIENT)
Dept: PHYSICAL THERAPY | Facility: CLINIC | Age: 68
End: 2023-07-10
Payer: COMMERCIAL

## 2023-07-10 ENCOUNTER — TELEPHONE (OUTPATIENT)
Dept: HEMATOLOGY ONCOLOGY | Facility: CLINIC | Age: 68
End: 2023-07-10

## 2023-07-10 DIAGNOSIS — R26.9 GAIT ABNORMALITY: Primary | ICD-10-CM

## 2023-07-10 DIAGNOSIS — G20 PARKINSON DISEASE (HCC): ICD-10-CM

## 2023-07-10 PROCEDURE — 97110 THERAPEUTIC EXERCISES: CPT

## 2023-07-10 NOTE — PROGRESS NOTES
Daily Note     POC expires Auth Status Total   Visits  Start date  Expiration date PT/OT + Visit Limit? Co-Insurance   23 Auth not required per FD Visits   PT $20 Co-pay                                               Today's date: 7/10/2023  Patient name: Buckner Mcburney  : 1955  MRN: [id number]  Referring provider: Eyad Crowe DO  Dx:   Encounter Diagnosis     ICD-10-CM    1. Gait abnormality  R26.9       2. Parkinson disease ([address])  G20                      Subjective: Patient reports to PT session with no new issues or complaints.        Objective: See treatment diary below     Warm up - Performed prior to treatment    Cardio on TM vs NuStep: 15 mins (focus on increase in HR > neuromuscular activation)   Shadow Boxing (20 reps ea, 1-6’s)      Cardio Circuit (2 mins, 1 round)  • Squats vs wall sit (10 sec hold, 10 reps) > burnouts  • Speed step taps to BOSU > burnouts  • Speed karaoke > burnouts  • Forward lunge (5) + opp hand to knee (5) > burnouts  • Speed step up/down from foam > burnouts  Balance and Cog Circuit (2 mins, 1 round)  • Speed walking in narrow space carrying Tidal Tank > complex combos  • Posterior resisted LE taps to various blaze pods (only hit red-green) > complex combos  • Laterally resisted LE taps to various blaze pods (only hit red-green) > complex combos  • Tandem walking around bananagram letters + making words > complex combos  • Jog to the end/back + duck under rope (naming different types of ice cream/desserts) > complex combos  Fine Motor and Agility Circuit (2 mins, 2 rounds)  · Partner soccer ball pass + jog to flip card over  · Partner soccer ball pass + vertical jump trying to move sticky note up on the wall as high as you can  · Med ball pass to wall on side (Trunk ROT)  · One foot on foam one on balance pod + chops with tidal tank      Assessment: Patient tolerated treatment well with focus on balance and high intensity tasks through neuro boxing program. Deferred jumping task for partner soccer ball task due to reported increased low back pain. Tendency to prioritize cognitive task over physical when performing dual tasking. No overt LoB for length of session. Patient will continue to benefit from skilled OPPT services to maximize functional mobility and slow disease progression secondary to PD diagnosis. Plan: Continue per plan of care. Progress treatment as tolerated.            Outcome Measures Initial Eval  2-2-21 PN  3-2-23 PN  4/3/2023 PN  5/8/2023 PN  6/12/2023    5xSTS 14.42 sec 12.87 sec , no UE 11.38 sec, no UE 10.66 sec, no UE 10.92 sec, no UE    TUG  - Regular  - Cognitive  - Carry   7.23 sec  8.66 sec  9.28 sec   7.02 sec   7.09 sec  7.71 sec   6.58 sec  8.05 sec  6.78 sec   5.65 sec  8.44 sec  6.26 sec   5.93 sec  6.85 sec  6.48 sec    MiniBEST 22/28 24/28 26/28 26/28 26/28    10 meter 1.44 m/s 1.77 m/s 1.76 m/s 1.67 m/s 1.40 m/s    6MWT 1550 ft 1660 ft  1700 ft 1860 ft 1940 ft    ABC 92.5% 90% 93.75% 92% 96.25%    PDQ-39 15/100 NT NT NT NT    MDS-UPDRS  Part III   25.5/128 NT NT NT NT    H&Y Stage 1.5 NT 1.5 1.5 1.5    Floor > Stand NV sec Supine> sit: 3.76 sec    Stand>supine: 3.73 sec Supine > sit: 3.56 sec      Stand > supine: 2.74 sec NT NT    FGA  30/30 30/30 29/30 30/30

## 2023-07-10 NOTE — TELEPHONE ENCOUNTER
Called and lvm for patient to have his cbc collected prior to his appointment with Kanika Santamaria on 7/11 @8:00am.  Mentioned that if he's unable to have labs collected to please give the office a call back.

## 2023-07-11 ENCOUNTER — OFFICE VISIT (OUTPATIENT)
Dept: HEMATOLOGY ONCOLOGY | Facility: CLINIC | Age: 68
End: 2023-07-11
Payer: COMMERCIAL

## 2023-07-11 ENCOUNTER — LAB (OUTPATIENT)
Dept: LAB | Facility: CLINIC | Age: 68
End: 2023-07-11
Payer: COMMERCIAL

## 2023-07-11 VITALS
OXYGEN SATURATION: 100 % | TEMPERATURE: 97.4 F | WEIGHT: 189.5 LBS | RESPIRATION RATE: 16 BRPM | HEIGHT: 70 IN | SYSTOLIC BLOOD PRESSURE: 134 MMHG | DIASTOLIC BLOOD PRESSURE: 78 MMHG | HEART RATE: 70 BPM | BODY MASS INDEX: 27.13 KG/M2

## 2023-07-11 DIAGNOSIS — Z51.81 ENCOUNTER FOR MONITORING OF HYDROXYUREA THERAPY: ICD-10-CM

## 2023-07-11 DIAGNOSIS — Z79.64 ENCOUNTER FOR MONITORING OF HYDROXYUREA THERAPY: ICD-10-CM

## 2023-07-11 DIAGNOSIS — D47.3 ESSENTIAL THROMBOCYTOSIS (HCC): Primary | ICD-10-CM

## 2023-07-11 DIAGNOSIS — Z15.89 JAK2 V617F MUTATION: ICD-10-CM

## 2023-07-11 DIAGNOSIS — E78.2 MIXED HYPERLIPIDEMIA: ICD-10-CM

## 2023-07-11 LAB
ALBUMIN SERPL BCP-MCNC: 4.2 G/DL (ref 3.5–5)
ALP SERPL-CCNC: 33 U/L (ref 34–104)
ALT SERPL W P-5'-P-CCNC: 27 U/L (ref 7–52)
ANION GAP SERPL CALCULATED.3IONS-SCNC: 8 MMOL/L
AST SERPL W P-5'-P-CCNC: 20 U/L (ref 13–39)
BILIRUB SERPL-MCNC: 0.41 MG/DL (ref 0.2–1)
BUN SERPL-MCNC: 24 MG/DL (ref 5–25)
CALCIUM SERPL-MCNC: 9.3 MG/DL (ref 8.4–10.2)
CHLORIDE SERPL-SCNC: 103 MMOL/L (ref 96–108)
CO2 SERPL-SCNC: 26 MMOL/L (ref 21–32)
CREAT SERPL-MCNC: 1.04 MG/DL (ref 0.6–1.3)
GFR SERPL CREATININE-BSD FRML MDRD: 73 ML/MIN/1.73SQ M
GLUCOSE P FAST SERPL-MCNC: 200 MG/DL (ref 65–99)
LDLC SERPL DIRECT ASSAY-MCNC: 111 MG/DL (ref 0–100)
POTASSIUM SERPL-SCNC: 4.6 MMOL/L (ref 3.5–5.3)
PROT SERPL-MCNC: 6.8 G/DL (ref 6.4–8.4)
SODIUM SERPL-SCNC: 137 MMOL/L (ref 135–147)

## 2023-07-11 PROCEDURE — 99214 OFFICE O/P EST MOD 30 MIN: CPT

## 2023-07-11 PROCEDURE — 83721 ASSAY OF BLOOD LIPOPROTEIN: CPT

## 2023-07-11 PROCEDURE — 80053 COMPREHEN METABOLIC PANEL: CPT

## 2023-07-11 PROCEDURE — 36415 COLL VENOUS BLD VENIPUNCTURE: CPT

## 2023-07-11 NOTE — PROGRESS NOTES
[facility] [place]  [address] [place] [address]  Hematology Ambulatory Follow-Up  Josseline Alba, 1955, [id number]  7/11/2023    Assessment/Plan:  1. Essential thrombocytosis ([address])  2. JAK2 V617F mutation  3. Encounter for monitoring of hydroxyurea therapy  Mr. bAdulaziz Garcia is a 28-year-old male seen in follow-up for essential thrombocytosis due to JAK2 V6 17 F mutation. He was prescribed Hydrea 500 mg once daily. He was unable to tolerate this due to side effects and discontinued after 1 week of use. His side effects included feeling flulike and ill. He denied any fevers, chills, or diarrhea at the time. After discontinuing his symptoms went away. We discussed the importance of Hydrea therapy due to his JAK2 mutation and decreasing CV risks. He will resume Hydrea therapy and take once daily on Monday, Wednesday, and Friday for at least 2 weeks. My office will call him in 2 weeks for a symptom update and determine if a medication change will need to be made. He is in agreement with this plan and will attempt taking this consistently as discussed. He knows to call the office with any new or worsening symptoms for sooner evaluation. Otherwise he will see me back in the office in about 3 months for routine follow-up. The patient is scheduled for follow-up in approximately 3 months. Patient voiced agreement and understanding to the above. Patient knows to call the Hematology/Oncology office with any questions and concerns regarding the above. Barrier(s) to care: None  The patient is able to self care.  ------------------------------------------------------------------------------------------------------    Chief Complaint   Patient presents with   • Follow-up       History of present illness: Anu Issa is a 28-year-old male seen in follow-up for essential thrombocytosis due to JAK2 V617F mutation.   He has past medical history of type 2 diabetes, coronary artery disease, hypertension, Parkinson's disease, history of prostate cancer status post prostatectomy in 2013, and mixed lipidemia. He was originally seen in consultation in March 2023 with longstanding history of thrombocytosis dating back to at least 2015. In 2013 platelet count was within normal limits. He has a family history of a maternal aunt with a blood problem but is not sure of the details of this diagnosis, and one of his other family members have been diagnosed with hemochromatosis requiring phlebotomies. He has no history of VTE. He has never had a heart attack. He has no peripheral vascular disease that he is aware of.     9/14/2013: Hemoglobin 11.2, platelets 137, WBC 41.53  12/17/2015: Hemoglobin 15.1, platelets 980, WBC 3.00  10/22/2016: Hemoglobin 15.4, platelets 000, WBC 22.63  1/26/2017: Hemoglobin 14.7, platelets 066, WBC 41.0  12/29/2020: Hemoglobin 16.4, platelets 433, WBC 9.4  1/23/2023: Hemoglobin 16.2, platelets 357, WBC 9.6  7/11/2023: Hemoglobin 14.6, platelets 673, WBC 36.1    Interval history: He started Hydrea in the middle of April and took this for 1 week. He stopped taking it due to side effects. He said he felt "ill "without any fevers or chills but definitely increased fatigue. He started again about a week ago and developed the same side effects and stopped taking it. Denies any other symptoms or side effects at this time. Review of Systems   Constitutional: Negative for activity change, appetite change, fatigue, fever and unexpected weight change. HENT: Negative for trouble swallowing and voice change. Eyes: Negative for photophobia and visual disturbance. Respiratory: Negative for cough, chest tightness and shortness of breath. Cardiovascular: Negative for chest pain, palpitations and leg swelling.    Gastrointestinal: Negative for abdominal distention, abdominal pain, anal bleeding, blood in stool, constipation, diarrhea, nausea and vomiting. Endocrine: Negative for cold intolerance and heat intolerance. Genitourinary: Negative. Negative for dysuria, hematuria and urgency. Musculoskeletal: Negative for arthralgias, back pain, gait problem, joint swelling and myalgias. Skin: Negative for pallor and rash. Neurological: Negative for dizziness, weakness, light-headedness, numbness and headaches. Hematological: Negative for adenopathy. Does not bruise/bleed easily. Psychiatric/Behavioral: Negative for confusion and sleep disturbance.        Patient Active Problem List   Diagnosis   • Arteriosclerosis of coronary artery   • Benign essential hypertension   • Depression   • Type 2 diabetes mellitus without complication, without long-term current use of insulin ([facility])   • Subclinical hypothyroidism   • Essential (hemorrhagic) thrombocythemia ([address])   • Mixed hyperlipidemia   • Leukocytosis   • Other male erectile dysfunction   • Parkinson disease (HCC)   • Type 2 diabetes mellitus with hyperglycemia, without long-term current use of insulin (HCC)   • Dystonia   • History of stroke   • Hyperkalemia   • History of prostate cancer   • Pipe smoker   • Anxiety   • Overweight       Past Medical History:   Diagnosis Date   • Anxiety    • Arteriosclerosis of coronary artery 05/24/2017   • Benign essential hypertension 05/15/2015   • Depression 06/01/2012   • Essential (hemorrhagic) thrombocythemia ([address])    • History of prostate cancer 01/05/2023   • History of stroke    • Left carpal tunnel syndrome    • Lumbar canal stenosis    • Mixed hyperlipidemia 11/10/2010   • Osteoarthritis, knee    • Other male erectile dysfunction 08/08/2019   • Overweight    • Parkinson disease ([address]) 10/08/2019   • Pipe smoker    • Retinal and vitreous disorder    • Subclinical hypothyroidism 04/11/2017   • Type 2 diabetes mellitus without complication, without long-term current use of insulin ([address]) 08/24/2017       Past Surgical History:   Procedure Laterality Date   • ANKLE FRACTURE SURGERY Left 04/2009    triple fracture; 2 Screws in place   • [address] Bilateral 09/2022   • LUMBAR LAMINECTOMY  2003    L5-S1   • NASAL SEPTUM SURGERY  06/1989   • OH COLONOSCOPY FLX DX W/COLLJ SPEC WHEN PFRMD N/A 01/27/2016    Procedure: COLONOSCOPY;  Surgeon: Mckenzie Jessica MD;  Location:  GI LAB; Service: Gastroenterology   • PROSTATE BIOPSY  12/08/2012    managed by Arleth Yanes, needle biopsy   • PROSTATECTOMY  09/23/2013       Family History   Problem Relation Age of Onset   • Hypertension Mother    • Retinal detachment Mother    • Retinitis pigmentosa Mother    • Diabetes type II Father    • Heart attack Father 61   • Coronary artery disease Father 61   • No Known Problems Son    • No Known Problems Son        Social History     Socioeconomic History   • Marital status: /Civil Union     Spouse name: Randi Tay   • Number of children: 2   • Years of education: None   • Highest education level: None   Occupational History   • 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 Vaping Use   • Vaping Use: Never used   Substance and Sexual Activity   • Alcohol use:  Yes     Alcohol/week: 4.0 standard drinks of alcohol     Types: 3 Cans of beer, 1 Shots of liquor per week     Comment: occassional    • Drug use: Never   • Sexual activity: Not Currently     Partners: Female     Birth control/protection: Post-menopausal, Surgical     Comment: s/p Prostatectomy   Other Topics Concern   • None   Social History Narrative        Lives with wife, Randi Tay    2 Children - 2 Sons    Retired - Electrical Engineering     Social Determinants of Health     Financial Resource Strain: [address]  (2/21/2023)    Overall Financial Resource [employer] (CARDIA)    • Difficulty of Paying Living Expenses: Not hard at all   Food Insecurity: Not on file   Transportation Needs: No Transportation Needs (2/21/2023)    PRAPARE - Transportation    • Lack of Transportation (Medical): No    • Lack of Transportation (Non-Medical): No   Physical Activity: Not on file   Stress: Not on file   Social Connections: Not on file   Intimate Partner Violence: Not on file   Housing Stability: Not on file         Current Outpatient Medications:   •  aspirin 325 mg tablet, Take 325 mg by mouth daily, Disp: , Rfl:   •  carbidopa-levodopa (SINEMET)  mg per tablet, Take 2 tablets by mouth 3 (three) times a day Follow clinic instructions (Patient taking differently: Take 2 tablets by mouth in the morning Follow clinic instructions. Rx per Neuro.), Disp: 540 tablet, Rfl: 3  •  Cholecalciferol (VITAMIN D) 2000 units CAPS, Take 2 capsules (4,000 Units total) by mouth daily, Disp: , Rfl: 0  •  co-enzyme Q-10 30 MG capsule, Take 1 capsule (30 mg total) by mouth daily, Disp: 30 capsule, Rfl: 0  •  glucose blood (ONETOUCH VERIO) test strip, Check BG 2x per day (Patient taking differently: Check BG 2x per day.   Rx per Endo.), Disp: 200 each, Rfl: 3  •  lisinopril (ZESTRIL) 20 mg tablet, Take 1 tablet (20 mg total) by mouth daily, Disp: 90 tablet, Rfl: 1  •  Multiple Vitamin (multivitamin) capsule, Take 1 capsule by mouth daily, Disp: , Rfl:   •  Omega-3 Fatty Acids (fish oil) 1,000 mg, Take 1,000 mg by mouth daily, Disp: , Rfl:   •  pravastatin (PRAVACHOL) 80 mg tablet, TAKE 1 TABLET BY MOUTH DAILY AT BEDTIME, Disp: 90 tablet, Rfl: 3  •  sitaGLIPtin-metFORMIN (JANUMET)  MG per tablet, Take 1 tablet by mouth 2 (two) times a day with meals, Disp: 180 tablet, Rfl: 3  •  vardenafil (LEVITRA) 20 MG tablet, Take by mouth daily as needed, Disp: , Rfl:   •  venlafaxine (EFFEXOR-XR) 75 mg 24 hr capsule, Take 1 capsule (75 mg total) by mouth daily, Disp: 90 capsule, Rfl: 1  •  hydroxyurea (HYDREA) 500 mg capsule, Take 1 capsule (500 mg total) by mouth daily (Patient not taking: Reported on 7/6/2023), Disp: 90 capsule, Rfl: 2    No Known Allergies    Objective:  /78 (BP Location: Right arm, Patient Position: Sitting, Cuff Size: Adult)   Pulse 70   Temp (!) 97.4 °F (36.3 °C) (Temporal)   Resp 16   Ht 5' 10" (1.778 m)   Wt 86 kg (189 lb 8 oz)   SpO2 100%   BMI 27.19 kg/m²    Physical Exam  Constitutional:       General: He is not in acute distress. Appearance: Normal appearance. He is normal weight. He is not ill-appearing. HENT:      Head: Normocephalic and atraumatic. Eyes:      Extraocular Movements: Extraocular movements intact. Conjunctiva/sclera: Conjunctivae normal.      Pupils: Pupils are equal, round, and reactive to light. Cardiovascular:      Rate and Rhythm: Normal rate and regular rhythm. Pulmonary:      Effort: No respiratory distress. Abdominal:      General: There is no distension. Tenderness: There is no abdominal tenderness. Musculoskeletal:         General: Normal range of motion. Cervical back: Normal range of motion. No tenderness. Right lower leg: No edema. Left lower leg: No edema. Lymphadenopathy:      Cervical: No cervical adenopathy. Skin:     General: Skin is warm and dry. Capillary Refill: Capillary refill takes less than 2 seconds. Coloration: Skin is not jaundiced or pale. Neurological:      General: No focal deficit present. Mental Status: He is alert. Mental status is at baseline. Cranial Nerves: No cranial nerve deficit. Motor: No weakness. Gait: Gait normal.   Psychiatric:         Behavior: Behavior normal.         Thought Content:  Thought content normal.         Judgment: Judgment normal.         Result Review  Labs:  Lab Results   Component Value Date    WBC 11.20 (H) 07/11/2023    HGB 14.6 07/11/2023    HCT 44.9 07/11/2023    MCV 94 07/11/2023     (H) 07/11/2023     Lab Results   Component Value Date     12/17/2015    SODIUM 136 03/09/2023    K 4.8 03/09/2023     03/09/2023    CO2 29 03/09/2023    ANIONGAP 8 12/17/2015    AGAP 7 03/09/2023    BUN 24 03/09/2023    CREATININE 1.14 03/09/2023    GLUC 209 (H) 03/09/2023    GLUF 159 (H) 01/23/2023    CALCIUM 9.8 03/09/2023    AST 17 03/09/2023    ALT 6 (L) 03/09/2023    ALKPHOS 39 03/09/2023    PROT 6.8 12/17/2015    TP 7.4 03/09/2023    BILITOT 0.47 12/17/2015    TBILI 0.37 03/09/2023    EGFR 66 03/09/2023       Lab Results   Component Value Date    WBC 10.81 (H) 03/09/2023    HGB 15.4 03/09/2023    HCT 48.6 03/09/2023    MCV 92 03/09/2023     (H) 03/09/2023       Imaging:   No relevant imaging to review       Please note: This report has been generated by a voice recognition software system. Therefore there may be syntax, spelling, and/or grammatical errors. Please call if you have any questions.

## 2023-07-11 NOTE — RESULT ENCOUNTER NOTE
Elevated fasting blood sugar-200. Continue diabetes plan of care per Endo. Hyperlipidemia-elevated LDL (bad) cholesterol 111. Goal LDL for coronary artery disease and stroke patient is less than 70. At patient is currently taking maximum dose of Pravachol 80 mg nightly and not at goal, would recommend changing statin to Lipitor 20mg nightly, which will likely need to be increased in future. Patient to please advise. Other resulted labs are stable. Message sent to patient via [email or web address] patient portal.    Nurse to also call patient.

## 2023-07-11 NOTE — PATIENT INSTRUCTIONS
Take hydrea once in the evening on Monday Wednesday Friday.  Office will call in 2 weeks to check on symptoms and discuss if medication change is necessary

## 2023-07-12 ENCOUNTER — OFFICE VISIT (OUTPATIENT)
Facility: CLINIC | Age: 68
End: 2023-07-12
Payer: COMMERCIAL

## 2023-07-12 DIAGNOSIS — G20 PARKINSON DISEASE (HCC): ICD-10-CM

## 2023-07-12 DIAGNOSIS — R26.9 GAIT ABNORMALITY: Primary | ICD-10-CM

## 2023-07-12 PROCEDURE — 97112 NEUROMUSCULAR REEDUCATION: CPT

## 2023-07-12 NOTE — PROGRESS NOTES
Daily Note     POC expires Auth Status Total   Visits  Start date  Expiration date PT/OT + Visit Limit? Co-Insurance   23 Auth not required per FD Visits   PT $20 Co-pay                                               Today's date: 2023  Patient name: Rogelio Sage  : 1955  MRN: [id number]  Referring provider: Raul Mijares DO  Dx:   Encounter Diagnosis     ICD-10-CM    1. Gait abnormality  R26.9       2. Parkinson disease ([address])  G20                      Subjective: Patient reports to PT session with no new issues or complaints.        Objective: See treatment diary below     Warm up - Performed prior to treatment    Cardio on TM vs NuStep: 15 mins (focus on increase in HR > neuromuscular activation)   Shadow Boxing (20 reps ea, 1-6’s)      Cardio (2 minutes, 1 round):  - Burpees (modified if needed) > burnouts  - Good mornings to opposite hand to knee > burnouts  - Squats to front kick > burnouts  - Step ups to opposite knee drive on [facility] > burnouts  - Running figure 8 pattern > burnouts  Balance and cog (2 minutes, 1 round):   - Side stepping on foam beam + cone taps > complex combos  - Partner cone negotiation to focus blaze pods (Green - Squats, Purple- 2 vertical jumps) > complex combos  - Partner cone negotiation to focus blaze pods (Red - Wall pushups, Blue- 4 tandem steps) > complex combos  - Stepping over bench with balance pods on ea side > complex combos  - Backwards walking around cones + head turns/head nods > complex combos  Fine Motor and Agility (2 minutes, 1 round):  - Partner sidestepping on foam beam + partner juggle toss with tennis ball  - Partner lateral kayden negotiation + partner juggle toss with tennis ball  - FWD/BWD cone weaving with small TT OH hold/snatch  - Jumping jacks vs squats with kettle bell   - Step ups to river rocks + sorting cards by suite  Core (1 minute, 3 rounds):  - Forward reach from plank position       Assessment: Patient tolerated treatment session well today with focus on balance and high intensity tasks through neuro boxing program. Patient reported exacerbated low back pain with burpees but able to complete round in its entirety with standing rest breaks as needed. Difficulty with sequencing and complex rule following required for [facility] task. Patient will continue to benefit from skilled OPPT services to maximize functional mobility and slow disease progression secondary to PD diagnosis. Plan: Continue per plan of care. Progress treatment as tolerated.            Outcome Measures Initial Eval  2-2-21 PN  3-2-23 PN  4/3/2023 PN  5/8/2023 PN  6/12/2023    5xSTS 14.42 sec 12.87 sec , no UE 11.38 sec, no UE 10.66 sec, no UE 10.92 sec, no UE    TUG  - Regular  - Cognitive  - Carry   7.23 sec  8.66 sec  9.28 sec   7.02 sec   7.09 sec  7.71 sec   6.58 sec  8.05 sec  6.78 sec   5.65 sec  8.44 sec  6.26 sec   5.93 sec  6.85 sec  6.48 sec    MiniBEST 22/28 24/28 26/28 26/28 26/28    10 meter 1.44 m/s 1.77 m/s 1.76 m/s 1.67 m/s 1.40 m/s    6MWT 1550 ft 1660 ft  1700 ft 1860 ft 1940 ft    ABC 92.5% 90% 93.75% 92% 96.25%    PDQ-39 15/100 NT NT NT NT    MDS-UPDRS  Part III   25.5/128 NT NT NT NT    H&Y Stage 1.5 NT 1.5 1.5 1.5    Floor > Stand NV sec Supine> sit: 3.76 sec    Stand>supine: 3.73 sec Supine > sit: 3.56 sec      Stand > supine: 2.74 sec NT NT    FGA  30/30 30/30 29/30 30/30

## 2023-07-17 ENCOUNTER — EVALUATION (OUTPATIENT)
Facility: CLINIC | Age: 68
End: 2023-07-17
Payer: COMMERCIAL

## 2023-07-17 DIAGNOSIS — R26.9 GAIT ABNORMALITY: Primary | ICD-10-CM

## 2023-07-17 DIAGNOSIS — G20 PARKINSON DISEASE (HCC): ICD-10-CM

## 2023-07-17 PROCEDURE — 97530 THERAPEUTIC ACTIVITIES: CPT

## 2023-07-17 NOTE — PROGRESS NOTES
PT-Re-Eval           POC expires Auth Status Total   Visits  Start date  Expiration date PT/OT + Visit Limit? Co-Insurance   23 Auth not required per FD Visits   PT $20 Co-pay                                           Today's date: 2023  Patient name: Kate Diallo  : 1955  MRN: [id number]  Referring provider: Gal Kate DO  Dx:   Encounter Diagnosis     ICD-10-CM    1. Gait abnormality  R26.9       2. Parkinson disease ([address])  G20                 Assessment  Assessment details: Patient is a 79 y.o. Male who presents to skilled outpatient PT with referral for Parkinson's Disease. Patient recently had a DBS placement in 2022, due to significant difficulty with tremors. Patient demonstrated overall plateau in the following outcome measures: 5 x STS, TUG (all variations), FGA, miniBEST, 10 MWT, and 6 MWT. According to all outcome measure testing this date, he is considered LOW risk for falls per cutoff scores provided by the [employer]TA Neuro Section and Rehab Measures. He presents with the following symptoms that are consistent with Carmen and Yahr stage 1.5: unilateral and axial symptoms. Per research provided by [employer], patient will benefit from skilled outpatient PT services to improve and maximize his/her function, to reduce risk for falls and potential injuries associated with falls, reduce healthcare costs via hospitalization, and reduce patient and national healthcare costs. In early stages of Parkinson's Disease, research indicates that intensive physical exercise can improve patient's motor control and assist in slowing the disease progression as a neuroprotective agent. He has met no additional goals at this time. Plan to d/c patient in next month as he transitions to gym membership program or switch to maintenance therapy due to his recent plateau in progress.  Patient will benefit from continued skilled outpatient PT in order to maximize function in the short-term and long-term with overall improved postural strength with associated stability and mobility. Impairments: Abnormal coordination, Abnormal gait, Abnormal or restricted ROM, Activity intolerance, Impaired balance, Impaired physical strength, Lacks appropriate HEP, Poor posture, Poor body mechanics and Abnormal movement  Understanding of Dx/Px/POC: Good  Prognosis: Good      Patient verbalized understanding of POC. Please contact me if you have any questions or recommendations. Thank you for the referral and the opportunity to share in [facility] care.            Plan  Plan details: [employer]  Program: [employer]  Patient would benefit from: PT Saul  Planned therapy interventions: Abdominal trunk stabilization, Balance, Body mechanics training, Coordination, Functional ROM exercises, Gait training, HEP, Joint mobilization, Manual therapy, Motor coordination training, Neuromuscular re-education, Patient education, Postural training, Strengthening, Stretching, Therapeutic activities and Therapeutic exercises  Frequency: 2x/wk  Duration in weeks: 12 weeks  Plan of Care beginning date: 6/12/2023  Plan of Care expiration date: 3 months - 9/4/2023  Treatment plan discussed with: Patient         Goals  Short Term Goals (4 weeks):  - Patient will improve TUG score by MDC of 4.8 sec from 7.23 sec to previous baseline of 6.47 seconds in order to reduce risk of falls and to increase safety with functional mobility- MET  - Patient will improve 5 STS by the [facility] of 2.3 sec from 14.42 sec to 12.12 sec indicating an improvement in strength and decreased challenge with transfers- MET  - Patient will improve 6 MWT by the [facility] of 269 ft from 1550 ft to 1819 ft indicating an improvement in cardiovascular endurance in order to maximize function with functional mobility throughout the community - MET  - Patient will improve MiniBESTest score by MDC of 5.52 points from 22/30 to 27.52/30 indicating an improvement with dynamic balance in order to further decrease risk of falls with functional tasks- NOT MET - MET  - Patient will be independent in basic HEP in order to manage condition at home- MET    Long Term Goals (12 weeks):  - Patient will score a low risk for falls 2/4 fall risks measures - MET  - Patient will score age norm values for 1/2 endurance measures - ONGOING  - Patient will be able to ambulate on uneven surfaces with 50% reduction in near falls to increase safety with community mobility - ONGOING  - Patient will be able to perform a floor transfer without physical assistance to assist with fall recovery at home and in the community - MET  - Patient will be independent in comprehensive HEP post discharge from program - NOT MET  - Patient will be able to walk up incline with decreased difficulty and no loss of balance in order to improve functional mobility throughout the community - MET  - Patient will be able to perform sit to stands from softer surfaces such as a couch or bed in order to improvement function with transfers - MET  - Patient will be consistent with program for at least 1 day per week to assist with management of condition and functional independence of their condition - MET        Cut off score   All date taken from [employer] Neuro Section or Rehab Measures      YBARRA Cutoff Scores:  MDC: 5 pts  Falls Risk Cutoff: 40.22/56 DGI Cutoff Scores:  Mana Valle al 2011, MDC: 2.9 pts  Ryley marin al 2008, Cherrie: Score <19/24   MiniBEST Cutoff Scores:  Laury eduardo 2011, MDC: 5.52 pts  Mara Cumberland 2013, [place]: <19/28 5xSTS Cutoff Scores:  MDC: 2.3 sec  Gerhard Jeans et al, 2011, Cherrie: > 16 sec   TUG Cutoff Scores:  Oliva Palmer al, 2011, MDC: 4.8 sec  Kayley Muñiz al, 2011, [place]: Meds ON: < 12.21 sec, OFF: 15.5 sec 10 Meter Walk Test Cutoff Scores:  Theresa Pardo, 2008, MDC: 0.18 m/s  Age Norm Values, Cherrie: < 1.0 m/s   ABC Cutoff Scores:  Richar Prather, 2008, MDC: 13 pts  Oliva Ellison, MDC: 11.12 pts  Increased risk for falls: < 69% 6 Minute Walk Test Cutoff Scores:  Addie Brito, 2008, [facility]: 269 ft  [facility] al, 2002, Norm Data Healthy Adults:  61 - 71 years:   M: 200 m      F: 538 m  70 - 79 years:   M: 1 m      F: 200 m  80 - 89 years:   M: 1 m      F: 80 m   PDQ-39 Cutoff Scores:  Emerald eduardo, 2004:  MDC: Mobility (12.24), ADL (16.72), Emotional (14.22), Stigma (21.21), Social Support (21.25), Cognition (21.12), Communication (21.04), Bodily Discomfort (24.48)  Tim marin al, 2007:  Normative Data: Mobility (49.25), ADL (38.94), Emotional (37.92), Stigma (27.54), Social Support (14.78), Cognition (33.03), Communication (27.99), Bodily Discomfort (40.91) Carmen and Yahr Stages  Stage 0: No S/S  Stage 1: Mild unilateral symptoms  Stage 1.5: Unilateral and axial symptoms  Stage 2: Bilateral symptoms, no balance impairment  Stage 2.5: Mild bilateral symptoms and recovery with pull test  Stage 3: Mild to moderate postural instability, independent  Stage 4: Severe disability, walking or standing unassisted  Stage 5: Bed bound, w/c bound           Subjective Evaluation    History of Present Illness  Mechanism of injury: Patient has a history of PD and has been seen for RSB prior. Patient reports getting DBS back in September, due to having to take 14 does a day for medication. He takes 3 doses a day and one time release. Update: (3-2-33)  - Patient reports that he needs help with mobility. He feels he is improving overall. (Updated 4/3/2023): Patient reports overall satisfaction with PT services thus far. Feels his balance "is getting back where it should be."    Updated (5/8/2023): Patient reports continued satisfaction with PT services thus far, wants to continue to work on his balance. Feels the 90 minute classes have helped to improve his endurance.     Updated (6/12/2023): Patient reports continued satisfaction with PT services, notes his largest complaint at this time is pain and stiffness in his low and mid back. Updated (2023): Patient reports he is doing well and denies any recent changes. Was recently diagnosed with essential thrombocytosis for which he is taking medication. Expressed interest in switching to gym membership. Primary AD: None  Previous Programs: RSB      Pain  No pain reported  Current pain ratin  At best pain ratin  At worst pain ratin     Social Support  Steps to enter house: yes  Stairs in house: yes   Lives with: spouse     Employment status: Retured   Treatments  Previous treatment: physical therapy  Patient Goals  Patient goals for therapy: improved balance, increased motion, return to sport/leisure activities, independence with ADLs/IADLs and increased strength  Hand dominance: R handed    Treatments  Previous treatment: RSB  Current treatment: General mobility         Objective   Gait abnormalities: Slowed gait speed, limited arm swing and trunk ROT    MDS/UPDRS Part III  - Is the patient on medications for treating symptoms of PD? Yes  - If receiving medication for treatment: OFF: typical functional state when having a poor response in spite of taking medications  - Is the patient on Levodopa?  Yes        - If yes, how many minutes since last dose:  5 hours   - Speech: 2 - Mild: Loss of modulation, diction, or volume, with a few words unclear, but the overall sentences easy to follow  - Facial Expression: 2 - Mild: in addition to decreased eye-blink frequency, masked facies present in the lower face as well, namely fewer movements around the mouth, such as less spontaneous smiling, but lips not parted  - Rigidity:  - Neck: 0 - Normal: No rigidity  - RUE: 0 - Normal: No rigidity  - LUE: 0 - Normal: No rigidity  - RLE: 0 - Normal: No rigidity  - LLE: 0 - Normal: No rigidity  - Finger Tapping:  - R: 2 -Mild: Any of the following: (a) 3-5 interruptions during tapping, (b) mild slowing, (c) the amplitude decrements midway in the 10-tap sequence  - L: 2 -Mild: Any of the following: (a) 3-5 interruptions during tapping, (b) mild slowing, (c) the amplitude decrements midway in the 10-tap sequence  - Hand Movements:   - R: 1 - Slight: Any of the following: (a) regular rhythm is broken with 1-2 interruptions or hesitations of the movement, (b) slow slowing, (c) the amplitude decrements near end of task   - L: 2 - Mild: Any of the following: (a) 3-5 interruptions during the movements, (b) mild slowing, (c) the amplitude decrements midway in the task  - Pronation-Supination Movements of Hands:   - R: 0 - Normal: No problems   - L: 1 - Slight: Any of the following: (a) regular rhythm broken with 1-2 interruptions or hesitations of the movement, (b) slight slowing, (c) the amplitude decrements near the end of the sequence  - Toe Tapping:   - R: 0 - Normal: No problems   - L: 0 - Normal: No problems  - Leg Agility:   - R: 0 - Normal: no problems   - L: 0 - Normal: no problems  - Arising from Chair: 0 - Normal: no problems, able to arise quickly without hesitation  - Gait: 1 - Slight: Independent walking with minor gait impairment  - Freezing of Gait: 0 - Normal: No freezing  - Postural Stability: 0 - Normal: No problems, recovers with 1-2 steps)  - Posture: 1 - Slight: Not quite erect, but posture could be normal for older person  - Global Spontaneity of Movement (Body Bradykinesia): 2 - Mild: Mild global slowness and poverty of spontaneous movements  - Postural Tremor of the Hands:   - R: 1 - Slight: Tremor present, but < 1 cm in amplitude   - L: 2 - Mild: Tremor at least 1, but < 3 cm in amplitude  - Kinetic Tremor of the Hands:   - R: 0 - Normal: No tremor   - L: 2 - Mild: Tremor at least 1, but < 3 cm in amplitude  - Rest Tremor Amplitude:   - RUE: 0 - Normal: No tremor   - LUE: 1 - Slight: Tremor present, but < 1 cm in amplitude   - RLE: 0 - Normal: No tremor   - LLE: 0 - Normal: No tremor   - Lip/Jaw: 0 - Normal: No tremor  - Constancy of Rest Tremor: 2 - Mild: Tremor at rest is present 26-50% of the entire examination period  - Dyskinesia Impact on Part III Ratings:   - Were dyskinesias (chorea or dystonia) present during examination? No   - If yes, did these movements interfere with your ratings? No  - Carmen and Yahr Stage: 1.5 - Unilateral and axial involvement only          Outcome Measures Initial Eval  2-2-21 PN  3-2-23 PN  4/3/2023 PN  5/8/2023 PN  6/12/2023 PN  7/17/2023   5xSTS 14.42 sec 12.87 sec , no UE 11.38 sec, no UE 10.66 sec, no UE 10.92 sec, no UE 9.92 sec,   no UE   TUG  - Regular  - Cognitive  - Carry   7.23 sec  8.66 sec  9.28 sec   7.02 sec   7.09 sec  7.71 sec   6.58 sec  8.05 sec  6.78 sec   5.65 sec  8.44 sec  6.26 sec   5.93 sec  6.85 sec  6.48 sec   5.74 sec  7.23 sec  6.40 sec   MiniBEST 22/28 24/28 26/28 26/28 26/28 26/28   10 meter 1.44 m/s 1.77 m/s 1.76 m/s 1.67 m/s 1.40 m/s 1.53 m/s   6MWT 1550 ft 1660 ft  1700 ft 1860 ft 1940 ft 2000 ft   ABC 92.5% 90% 93.75% 92% 96.25% 95%   PDQ-39 15/100 NT NT NT NT NT   MDS-UPDRS  Part III   25.5/128 NT NT NT NT NT   H&Y Stage 1.5 NT 1.5 1.5 1.5 1.5   Floor > Stand NV sec Supine> sit: 3.76 sec    Stand>supine: 3.73 sec Supine > sit: 3.56 sec      Stand > supine: 2.74 sec NT NT NT   FGA  30/30 30/30 29/30 30/30 28/30            Outcome Measures 3/5/20 7/23/20 9/10 PN 10-12 11/25 PN 3/1/2021   ABC Not assessed 80.6%  92.5%     5x STS 11.5 sec 14.22 sec 12.57 sec 12.66 sec 12.34 sec 13.11   TUG 7.32    11.03 carry    12.12   cog 8.42     8.70 carry    9.95 cog 7.53 sec    8.66 sec.  Carry  10.13 sec Cog 6.10 sec    7.34 sec Carry    6.48 sec Cog 6.32 sec    7.09 sec Carry    8.78 sec 6.47 sec         9.12    10 meter walk test 1.25 m/s 1.30 m/s 7.03 sec 1.4 m/s 1.8 m/s 1.9 m/s 1.8 m/s   6 minute walk test 2100 ft 1375 ft 1445 ft 1435 ft 1700 ft    DGI 23/24 23/24       MiniBest 25/28 25/28 27/28     FGA Not assessed 27/30 29/30 27/30              Warm up - Performed prior to treatment    Cardio on TM vs NuStep: 15 mins (focus on increase in HR > neuromuscular activation)   Shadow Boxing (20 reps ea. 1-6’s)      Cardio Circuit (2 mins, 1 round)  • Resisted side stepping > burnouts  • Resisted step taps > burnouts  • Speed step up on foams + 10 opp hand to knee > burnouts  • Bounding around cones > burnouts  • Lateral step up/down on step > burnouts  Balance and Cog Circuit (2 mins, 1 round)  • Backwards step on/off cline rope + name sports teams > complex combos  • Standing on river rocks + focus pattern of blaze pods on bag > complex combos  • Duck under rope while standing on foam beam > complex combos  • Fwd lunge to bench with BIG amplitude UE + standing on balance pods > complex combos  • Lateral step up/over BOSU + naming types of fish > complex combos  Fine Motor and Agility Circuit (2 mins, 2 rounds)  · Karaoke around partner + scarf toss   · Partner Tandem walking + playing bop it  · Speed walking to cone between katlyn rounds with partner  · Single leg hops to cone between katlyn rounds with partner   · VOR Cx with volleyball over hurdles (at each end try to get as many self volleys)   Functional mobility (3 minutes, 1 round):    • Floor to stand transfers  • Sitting on Pball sit to stand      Precautions: DBS placement on 9/22  Past Medical History:   Diagnosis Date   • Anxiety    • Arteriosclerosis of coronary artery 05/24/2017   • Benign essential hypertension 05/15/2015   • Depression 06/01/2012   • Essential (hemorrhagic) thrombocythemia ([address])    • History of prostate cancer 01/05/2023   • History of stroke    • Left carpal tunnel syndrome    • Lumbar canal stenosis    • Mixed hyperlipidemia 11/10/2010   • Osteoarthritis, knee    • Other male erectile dysfunction 08/08/2019   • Overweight    • Parkinson disease ([address]) 10/08/2019   • Pipe smoker    • Retinal and vitreous disorder    • Subclinical hypothyroidism 04/11/2017   • Type 2 diabetes mellitus without complication, without long-term current use of insulin ([address]) 08/24/2017

## 2023-07-18 LAB
LEFT EYE DIABETIC RETINOPATHY: NORMAL
RIGHT EYE DIABETIC RETINOPATHY: NORMAL

## 2023-07-19 ENCOUNTER — OFFICE VISIT (OUTPATIENT)
Facility: CLINIC | Age: 68
End: 2023-07-19
Payer: COMMERCIAL

## 2023-07-19 DIAGNOSIS — G20 PARKINSON DISEASE (HCC): ICD-10-CM

## 2023-07-19 DIAGNOSIS — R26.9 GAIT ABNORMALITY: Primary | ICD-10-CM

## 2023-07-19 PROCEDURE — 97112 NEUROMUSCULAR REEDUCATION: CPT

## 2023-07-19 NOTE — PROGRESS NOTES
Daily Note     POC expires Auth Status Total   Visits  Start date  Expiration date PT/OT + Visit Limit? Co-Insurance   23 Auth not required per FD Visits   PT $20 Co-pay                                               Today's date: 2023  Patient name: Kate Diallo  : 1955  MRN: [id number]  Referring provider: Gal Kate DO  Dx:   Encounter Diagnosis     ICD-10-CM    1. Gait abnormality  R26.9       2. Parkinson disease ([address])  G20                      Subjective: Patient reports to PT session with no new issues or complaints. Objective: See treatment diary below     Warm up - Performed prior to treatment? ?   Cardio on TM vs NuStep: 15 mins (focus on increase in HR > neuromuscular activation)?   Shadow Boxing (20 reps ea.  1-6’s)?   ?   Cardio Circuit (2 mins, 1 round)   • Speed step taps to blaze pods with partner (Red Partner 1, Blue- Partner 2) > burnouts   • Speed step taps to blaze pods with partner (Red Partner 1, Blue- Partner 2) > burnouts   • Split stance jumps at step > burnouts   • Sit ups > burnouts at bag   • Wall angels over hurdles > burnouts   • Opposite elbow to knee + STS from TRAKLOK and [address] [place] [address] (2 mins, 1 round)   • 4 square stepping + naming different bad habits > complex combos   • Stepping to/from BOSU in front + naming famous people in history > complex combos   • BIG FWD step to river rocks + counting BCK from 100 by 7’s > complex combos   • Side stepping on foam + find letters on sticky notes in order (BCK alphabetical) > complex combos   • BCK step over hurdles + counting FWD by 6’s > complex combos   • Good mornings w/ 10# TT on foam pad   Fine Motor and Agility Circuit (2 mins, 2 rounds)   • Stepping on/off balance pods + playing matching game   • Partner 1 single leg hops from one end to the next + place screw in follow pattern   • Partner 2 BCK hopping + place screw in follow pattern   • Supine roll + reach to [place] with placement to wall   • Seated up/over kayden to STS + tying scarf around post       Assessment: Patient tolerated treatment session well today with focus on balance and high intensity tasks through neuro boxing program. Noted good performance with complex combinations > 5. Able to maintain quality of physical tasks with addition of dual cognitive tasks. Patient will continue to benefit from skilled OPPT services to maximize functional mobility and slow disease progression secondary to PD diagnosis. Plan: Continue per plan of care. Progress treatment as tolerated.            Outcome Measures Initial Eval  2-2-21 PN  3-2-23 PN  4/3/2023 PN  5/8/2023 PN  6/12/2023    5xSTS 14.42 sec 12.87 sec , no UE 11.38 sec, no UE 10.66 sec, no UE 10.92 sec, no UE    TUG  - Regular  - Cognitive  - Carry   7.23 sec  8.66 sec  9.28 sec   7.02 sec   7.09 sec  7.71 sec   6.58 sec  8.05 sec  6.78 sec   5.65 sec  8.44 sec  6.26 sec   5.93 sec  6.85 sec  6.48 sec    MiniBEST 22/28 24/28 26/28 26/28 26/28    10 meter 1.44 m/s 1.77 m/s 1.76 m/s 1.67 m/s 1.40 m/s    6MWT 1550 ft 1660 ft  1700 ft 1860 ft 1940 ft    ABC 92.5% 90% 93.75% 92% 96.25%    PDQ-39 15/100 NT NT NT NT    MDS-UPDRS  Part III   25.5/128 NT NT NT NT    H&Y Stage 1.5 NT 1.5 1.5 1.5    Floor > Stand NV sec Supine> sit: 3.76 sec    Stand>supine: 3.73 sec Supine > sit: 3.56 sec      Stand > supine: 2.74 sec NT NT    FGA  30/30 30/30 29/30 30/30

## 2023-07-24 ENCOUNTER — OFFICE VISIT (OUTPATIENT)
Facility: CLINIC | Age: 68
End: 2023-07-24
Payer: COMMERCIAL

## 2023-07-24 DIAGNOSIS — G20 PARKINSON DISEASE (HCC): ICD-10-CM

## 2023-07-24 DIAGNOSIS — R26.9 GAIT ABNORMALITY: Primary | ICD-10-CM

## 2023-07-24 PROCEDURE — 97110 THERAPEUTIC EXERCISES: CPT

## 2023-07-24 NOTE — PROGRESS NOTES
Daily Note     POC expires Auth Status Total   Visits  Start date  Expiration date PT/OT + Visit Limit? Co-Insurance   23 Auth not required per FD Visits   PT $20 Co-pay                                               Today's date: 2023  Patient name: Iris Doll  : 1955  MRN: [id number]  Referring provider: Katy Mckeon DO  Dx:   Encounter Diagnosis     ICD-10-CM    1. Gait abnormality  R26.9       2. Parkinson disease ([address])  G20                      Subjective: Patient reports to PT session with no new issues or complaints. Objective: See treatment diary below     Warm up - Performed prior to treatment? ?   Cardio on TM vs NuStep: 15 mins (focus on increase in HR > neuromuscular activation)?   Shadow Boxing (20 reps ea.  1-6’s)?   ?   Cardio Circuit (2 mins, 1 round)   • Partner Blaze pod activity running to blaze pods (suicides)  + alternating squats > burnouts   • Partner Blaze pod activity running to blaze pods (suicides)  + alternating squats > burnouts   • Jump on/off foam beam (@ each end wall push ups) > burnouts   • Burnouts at bag w/ posterior resistance   • Run length of the gym > burnouts   Balance and Cog Circuit (2 mins, 1 round)   • Tandem ambulation on river rocks + naming states in alphabetical order > complex combos   • Split stance (1 foot balance pod / 1 foot on elevated chair) + reach for targets on wall > complex combos   • Step up/over hurdles + cline rope under feet (name all countries in [place]) > complex combos   • Lateral river rock negotiation + naming opp color stepping on > complex combos   • BCK stepping up stairs + serial counting by 7’s > complex combos   Fine Motor and Agility Circuit (2 mins, 2 rounds)   • Speed walking around patients + passing 5.5# TT around   • Push/pull sled (jump w/ pull backwards)   • Front raises to rotation w/ weighted plate   • John negotiation + moving clothes pins from back to front of shirt & vice versa   • Agility ladder + linking/unlinking chains in rainbow order   Core   • Cheyanne Calderon up/Taylor down        Assessment: Patient tolerated treatment session well today with focus on balance and high intensity tasks through neuro boxing program. Demonstrated posterior stepping strategy with backwards stair negotiation. Difficulty with recall of rainbow order when performing chain linking task. Continues to demonstrate good amplitude and power with both complex and burnout combos. Patient will continue to benefit from skilled OPPT services to maximize functional mobility and slow disease progression secondary to PD diagnosis. Plan: Continue per plan of care. Progress treatment as tolerated.            Outcome Measures Initial Eval  2-2-21 PN  3-2-23 PN  4/3/2023 PN  5/8/2023 PN  6/12/2023    5xSTS 14.42 sec 12.87 sec , no UE 11.38 sec, no UE 10.66 sec, no UE 10.92 sec, no UE    TUG  - Regular  - Cognitive  - Carry   7.23 sec  8.66 sec  9.28 sec   7.02 sec   7.09 sec  7.71 sec   6.58 sec  8.05 sec  6.78 sec   5.65 sec  8.44 sec  6.26 sec   5.93 sec  6.85 sec  6.48 sec    MiniBEST 22/28 24/28 26/28 26/28 26/28    10 meter 1.44 m/s 1.77 m/s 1.76 m/s 1.67 m/s 1.40 m/s    6MWT 1550 ft 1660 ft  1700 ft 1860 ft 1940 ft    ABC 92.5% 90% 93.75% 92% 96.25%    PDQ-39 15/100 NT NT NT NT    MDS-UPDRS  Part III   25.5/128 NT NT NT NT    H&Y Stage 1.5 NT 1.5 1.5 1.5    Floor > Stand NV sec Supine> sit: 3.76 sec    Stand>supine: 3.73 sec Supine > sit: 3.56 sec      Stand > supine: 2.74 sec NT NT    FGA  30/30 30/30 29/30 30/30

## 2023-07-25 ENCOUNTER — TELEPHONE (OUTPATIENT)
Dept: HEMATOLOGY ONCOLOGY | Facility: CLINIC | Age: 68
End: 2023-07-25

## 2023-07-25 NOTE — TELEPHONE ENCOUNTER
----- Message from [facility] sent at 7/25/2023  8:27 AM EDT -----  Please call patient and assess his symptoms on Hydrea Monday, Wednesday, and Friday. He previously stopped due to side effects. Thanks  RG    Attempted to phone patient with above recommendations.   Left voice message requesting return call with direct call back number

## 2023-07-26 ENCOUNTER — OFFICE VISIT (OUTPATIENT)
Facility: CLINIC | Age: 68
End: 2023-07-26
Payer: COMMERCIAL

## 2023-07-26 DIAGNOSIS — G20 PARKINSON DISEASE (HCC): ICD-10-CM

## 2023-07-26 DIAGNOSIS — R26.9 GAIT ABNORMALITY: Primary | ICD-10-CM

## 2023-07-26 PROCEDURE — 97112 NEUROMUSCULAR REEDUCATION: CPT

## 2023-07-26 NOTE — PROGRESS NOTES
Daily Note     POC expires Auth Status Total   Visits  Start date  Expiration date PT/OT + Visit Limit? Co-Insurance   23 Auth not required per FD Visits   PT $20 Co-pay                                               Today's date: 2023  Patient name: Monica Sánchez  : 1955  MRN: [id number]  Referring provider: Lizeth Villafana DO  Dx:   Encounter Diagnosis     ICD-10-CM    1. Gait abnormality  R26.9       2. Parkinson disease ([address])  G20                      Subjective: Patient reports to PT session with no new issues or complaints. Objective: See treatment diary below     Neuro Boxing Program    Warm up - Performed prior to treatment? ?   Cardio on TM vs NuStep: 15 mins (focus on increase in HR > neuromuscular activation)?   Shadow Boxing (20 reps ea.  1-6’s)?   ?   Cardio Circuit (2 mins, 1 round)   • Run FWD/BCK with cones in a zig/zag formation > burnouts   • 10 jump squats /10 heel raises > burnouts   • Star taps on firm ground > burnouts   • Prone superman + bird dogs > burnouts    • Speed step taps at BOSU > burnouts    Balance and Cog Circuit (2 mins, 1 round)   • 4 square stepping + squats after ea round (while doing 5 squats serial count from 100 by 3’s ) > complex combos   • Partner variable placement with blaze pods (Color Red/Green hit w/ Red-, Green - L hand) > complex combos   • Partner variable placement with blaze pods (Color Blue/Purple hit w/ Blue-side hand, Purple - R hand) > complex combos   • Standing on foam + cone taps in front (varied height of cones) + name things you find the rain forest > complex combos   • Stepping over variable items (tidal tanks, hurdles, steps, etc) > complex combos   Fine Motor and Agility Circuit (2 mins, 1 rounds)   • Grabbing socks on one side > side shuffle > drop onto target on other after unfold   • Fold socks from partner + FWD hurdles    • Hit golf clubs to cup   • Sitting on a pball + playing [employer] (every time complete a word roll a die)   • Supine push press with DB’s       Assessment: Patient tolerated treatment session well today with focus on balance and high intensity tasks through neuro boxing program. Patient required multiple verbal cues throughout blaze pod task to only attend to assigned colors, appeared to be easily distracted particularly as he also had to navigate through a busy environment for this task. Patient plans to transition to gym program beginning next week. Patient will continue to benefit from skilled OPPT services to maximize functional mobility and slow disease progression secondary to PD diagnosis. Plan: Continue per plan of care. Progress treatment as tolerated.

## 2023-07-26 NOTE — TELEPHONE ENCOUNTER
Attempted to phone patient. Left voice message requesting return call.   Provided direct call back number

## 2023-07-27 NOTE — TELEPHONE ENCOUNTER
Phoned patient to inquire about symptoms with M-W-F hydrea. Patient states he is feeling well and is not having side effects.

## 2023-07-31 ENCOUNTER — APPOINTMENT (OUTPATIENT)
Facility: CLINIC | Age: 68
End: 2023-07-31
Payer: COMMERCIAL

## 2023-08-01 ENCOUNTER — OFFICE VISIT (OUTPATIENT)
Facility: CLINIC | Age: 68
End: 2023-08-01
Payer: COMMERCIAL

## 2023-08-01 DIAGNOSIS — G20 PARKINSON DISEASE (HCC): ICD-10-CM

## 2023-08-01 DIAGNOSIS — R26.9 GAIT ABNORMALITY: Primary | ICD-10-CM

## 2023-08-01 PROCEDURE — 97110 THERAPEUTIC EXERCISES: CPT

## 2023-08-01 NOTE — PROGRESS NOTES
Daily Note     POC expires Auth Status Total   Visits  Start date  Expiration date PT/OT + Visit Limit? Co-Insurance   23 Auth not required per FD Visits   PT $20 Co-pay                                               Today's date: 2023  Patient name: Casie Wynn  : 1955  MRN: [id number]  Referring provider: Thomas Cash DO  Dx:   Encounter Diagnosis     ICD-10-CM    1. Gait abnormality  R26.9       2. Parkinson disease ([address])  G20                      Subjective: Patient reports to PT session stating he is following up with his neurologist [facility] tomorrow. Objective: See treatment diary below     Neuro Boxing Program    Warm up - Performed prior to treatment? ?   Cardio on TM vs NuStep: 15 mins (focus on increase in HR > neuromuscular activation)?   Shadow Boxing (20 reps ea.  1-6’s)?   ?   Cardio (2 minutes, 1 round):    • Speed squats (10) > high knees (10) > burnout combos    • Lateral shuffle to blaze pod taps    • Speed stepping versus jumping over line > burnout combos    • Step up to box > vertical jump at wall > burnout combos    • High knees over short hurdles > burnout combos     Balance and Cog (2 minutes, 1 round):    • FWD/BCK tandem + 10# TT carry + alternating add 7, subtract 4    • Stepping around upright bag on balance pods + complex combos on bag    • Side stepping on foam beam > LE blaze pod taps > 360 deg turn on red pods   • FWD/BCK on river rocks + naming foods starting with 2nd letter of word prior > complex combos   • Step ups to BOSU w/ posterior resistance > complex combos     Fine motor and agility (2 minutes, 1 round):    • Step up/down river rocks + chaining activity    • In/in/out/out hurdles + picking up pom poms with tweezers and dropping in container    • Lateral ducking under rope >  peg > under rope to place in board   • Tall kneel > reach posterior to  squigz > stand up to place on wall  • Cone weaving + tying/untying knots in Tband       Functional mobility:    • Large amplitude stepping FWD x 10 B/L    • Large amplitude stepping LAT x 10 B/L   • Large amplitude stepping BCK x 10 B/L   • Rock and reach x 10 B/L    • Twist x 10 B/L    • STS from pball x 10          Assessment: Patient tolerated treatment session well today with focus on balance and high intensity tasks through neuro boxing program. Noted to have good reactive balance and postural stability when stepping up to BOSU with posterior resistance. Patient plans to transition to gym program in next few visits; has paperwork to fill out for gym first. Patient will continue to benefit from skilled OPPT services to maximize functional mobility and slow disease progression secondary to PD diagnosis. Plan: Continue per plan of care. Progress treatment as tolerated.

## 2023-08-02 ENCOUNTER — APPOINTMENT (OUTPATIENT)
Facility: CLINIC | Age: 68
End: 2023-08-02
Payer: COMMERCIAL

## 2023-08-07 ENCOUNTER — APPOINTMENT (OUTPATIENT)
Facility: CLINIC | Age: 68
End: 2023-08-07
Payer: COMMERCIAL

## 2023-08-07 NOTE — PROGRESS NOTES
Daily Note     POC expires Auth Status Total   Visits  Start date  Expiration date PT/OT + Visit Limit? Co-Insurance   23 Auth not required per FD Visits   PT $20 Co-pay                                               Today's date: 2023  Patient name: Pinky Frey  : 1955  MRN: [id number]  Referring provider: Denys Vizcaino DO  Dx:   No diagnosis found. Subjective: Patient reports to PT session stating he is following up with his neurologist in [facility] tomorrow. Objective: See treatment diary below     Neuro Boxing Program    Warm up - Performed prior to treatment? ?   Cardio on TM vs NuStep: 15 mins (focus on increase in HR > neuromuscular activation)?   Shadow Boxing (20 reps ea.  1-6’s)?   ?   Cardio (2 minutes, 1 round):   • Ski jumps over rope > burnout combos   • Partner resisted speed step taps > burnout combos   • Partner resisted step ups > burnout combos    • (Elevated) burpees > burnout combos    • STS w/ 20# TT on gloves      Balance and Cog (2 minutes, 1 round):    • Lateral resisted lateral lunges to Bosu > complex combos    • River rock course negotiation w/ 10# TT carry    • Four square kayden negotiation while alternating naming vacation spots through alphabet and counting by 8’s    • Large amplitude diagonal stepping over items > complex combos    • Step up > uneven river rocks while naming items found in a grocery store in backwards alphabetical order > complex combos      Fine motor and Agility (2 minutes, 1 round):   • Plank on wall w/ thread the needle with squigz    • Carioca between cones while braiding scarves together    • Carry water cup on tray + stepping over/on river rocks   • Partner stair negotiation (FWD)  grab various objects > handoff  at top step (every time get a marble count)   • Partner stair negotiation (LAT) grab various objects > handoff  at top step (every time get a marble count)   •         Assessment: Patient tolerated treatment session well today with focus on balance and high intensity tasks through neuro boxing program. Noted to have good reactive balance and postural stability when stepping up to BOSU with posterior resistance. Patient plans to transition to gym program in next few visits; has paperwork to fill out for gym first. Patient will continue to benefit from skilled OPPT services to maximize functional mobility and slow disease progression secondary to PD diagnosis. Plan: Continue per plan of care. Progress treatment as tolerated.

## 2023-08-09 ENCOUNTER — TELEPHONE (OUTPATIENT)
Dept: FAMILY MEDICINE CLINIC | Facility: CLINIC | Age: 68
End: 2023-08-09

## 2023-08-09 ENCOUNTER — OFFICE VISIT (OUTPATIENT)
Dept: FAMILY MEDICINE CLINIC | Facility: CLINIC | Age: 68
End: 2023-08-09
Payer: COMMERCIAL

## 2023-08-09 ENCOUNTER — APPOINTMENT (OUTPATIENT)
Facility: CLINIC | Age: 68
End: 2023-08-09
Payer: COMMERCIAL

## 2023-08-09 VITALS
OXYGEN SATURATION: 98 % | HEART RATE: 88 BPM | HEIGHT: 70 IN | TEMPERATURE: 97.7 F | BODY MASS INDEX: 27.49 KG/M2 | WEIGHT: 192 LBS | RESPIRATION RATE: 16 BRPM | SYSTOLIC BLOOD PRESSURE: 138 MMHG | DIASTOLIC BLOOD PRESSURE: 74 MMHG

## 2023-08-09 DIAGNOSIS — L30.9 DERMATITIS: Primary | ICD-10-CM

## 2023-08-09 DIAGNOSIS — E78.2 MIXED HYPERLIPIDEMIA: ICD-10-CM

## 2023-08-09 DIAGNOSIS — E11.9 TYPE 2 DIABETES MELLITUS WITHOUT COMPLICATION, WITHOUT LONG-TERM CURRENT USE OF INSULIN (HCC): Primary | ICD-10-CM

## 2023-08-09 DIAGNOSIS — I10 BENIGN ESSENTIAL HYPERTENSION: ICD-10-CM

## 2023-08-09 DIAGNOSIS — E66.3 OVERWEIGHT: ICD-10-CM

## 2023-08-09 DIAGNOSIS — F17.290 PIPE SMOKER: ICD-10-CM

## 2023-08-09 DIAGNOSIS — E11.9 TYPE 2 DIABETES MELLITUS WITHOUT COMPLICATION, WITHOUT LONG-TERM CURRENT USE OF INSULIN (HCC): ICD-10-CM

## 2023-08-09 DIAGNOSIS — D47.3 ESSENTIAL (HEMORRHAGIC) THROMBOCYTHEMIA (HCC): ICD-10-CM

## 2023-08-09 PROCEDURE — 99214 OFFICE O/P EST MOD 30 MIN: CPT | Performed by: FAMILY MEDICINE

## 2023-08-09 RX ORDER — VENLAFAXINE HYDROCHLORIDE 37.5 MG/1
CAPSULE, EXTENDED RELEASE ORAL
COMMUNITY
Start: 2023-08-02

## 2023-08-09 RX ORDER — MOMETASONE FUROATE 1 MG/G
CREAM TOPICAL 2 TIMES DAILY PRN
Qty: 15 G | Refills: 0 | Status: SHIPPED | OUTPATIENT
Start: 2023-08-09

## 2023-08-09 NOTE — PROGRESS NOTES
Assessment/Plan:  Problem List Items Addressed This Visit        Endocrine    Type 2 diabetes mellitus without complication, without long-term current use of insulin ([address])       Lab Results   Component Value Date    HGBA1C 7.5 (A) 05/24/2023       Lab Results   Component Value Date    HGBA1C 7.5 (A) 05/24/2023       Lab Results   Component Value Date    HGBA1C 7.5 (A) 05/24/2023     Uncontrolled. Management per Endo. Recommend lifestyle modifications. Relevant Orders    Ambulatory referral to clinical pharmacy       Cardiovascular and Mediastinum    Benign essential hypertension     Stable on Lisinopril 20mg QD. Check blood pressure outside of office. Recommend lifestyle modifications. Hematopoietic and Hemostatic    Essential (hemorrhagic) thrombocythemia ([address])     Management per Heme/Onc. Pipe smoking may be contributory. Other    Mixed hyperlipidemia     Pending labs. Previously uncontrolled as LDL not at goal for CAD. On increased Pravachol 80mg QHS. Recommend lifestyle modifications. Relevant Orders    Ambulatory referral to clinical pharmacy    Pipe smoker     Contemplative. Smoking cessation advised. Overweight     Stable. Recommend lifestyle modifications. Other Visit Diagnoses     Dermatitis    -  Primary    Relevant Medications    mometasone (ELOCON) 0.1 % cream    Other Relevant Orders    Ambulatory Referral to Dermatology    Start Elocon twice daily as needed. To Derm if no improvement. Advise fragrance free topicals. Handout given. BMI 27.0-27.9,adult               Return if symptoms worsen or fail to improve.       Future Appointments   Date Time Provider [address]   9/19/2023 12:30 PM BENNY Rizo [facility]   10/17/2023 10:30 AM BENNY Mensah [facility]   1/8/2024 11:20 AM Patricia Torres DO [facility]        Subjective:     Alejo Arizmendi is a 79 y.o. male who presents today for a follow-up on his acute medical conditions. HPI:  Chief Complaint   Patient presents with   • Skin Problem     Pt states he has had an itchy area on the L shoulder blade for the last few years. Lately the area has been more irritated and itchy. Bleeds if scratched, otherwise no drainage. Pt has not used any products on the area. No other known areas on the body. -- Above per clinical staff and reviewed. --    HPI      Today:      Skin Irritation - Symptoms x 6 years. Left shoulder blade. Increased itching and irritation, bleeds if scratched. Rash is always present. Not using OTC meds. No Derm currently. The following portions of the patient's history were reviewed and updated as appropriate: allergies, current medications, past family history, past medical history, past social history, past surgical history and problem list.      Review of Systems   Constitutional: Negative for appetite change, chills, diaphoresis, fatigue and fever. Respiratory: Negative for chest tightness and shortness of breath. Cardiovascular: Negative for chest pain. Gastrointestinal: Positive for diarrhea (Intermittent x couple weeks). Negative for abdominal pain, blood in stool, nausea and vomiting. Genitourinary: Negative for dysuria. Current Outpatient Medications   Medication Sig Dispense Refill   • aspirin 325 mg tablet Take 325 mg by mouth daily     • carbidopa-levodopa (SINEMET)  mg per tablet Take 2 tablets by mouth 3 (three) times a day Follow clinic instructions (Patient taking differently: Take 2 tablets by mouth in the morning Follow clinic instructions.   Rx per Neuro.) 540 tablet 3   • Cholecalciferol (VITAMIN D) 2000 units CAPS Take 2 capsules (4,000 Units total) by mouth daily  0   • co-enzyme Q-10 30 MG capsule Take 1 capsule (30 mg total) by mouth daily 30 capsule 0   • glucose blood (ONETOUCH VERIO) test strip Check BG 2x per day (Patient taking differently: Check BG 2x per day. Rx per Endo.) 200 each 3   • hydroxyurea (HYDREA) 500 mg capsule Take 1 capsule (500 mg total) by mouth daily (Patient taking differently: Take 500 mg by mouth 3 (three) times a day) 90 capsule 2   • lisinopril (ZESTRIL) 20 mg tablet Take 1 tablet (20 mg total) by mouth daily 90 tablet 1   • mometasone (ELOCON) 0.1 % cream Apply topically 2 (two) times a day as needed (Rash) 15 g 0   • Multiple Vitamin (multivitamin) capsule Take 1 capsule by mouth daily     • Omega-3 Fatty Acids (fish oil) 1,000 mg Take 1,000 mg by mouth daily     • pravastatin (PRAVACHOL) 80 mg tablet TAKE 1 TABLET BY MOUTH DAILY AT BEDTIME 90 tablet 3   • sitaGLIPtin-metFORMIN (JANUMET)  MG per tablet Take 1 tablet by mouth 2 (two) times a day with meals 180 tablet 3   • vardenafil (LEVITRA) 20 MG tablet Take by mouth daily as needed     • venlafaxine (EFFEXOR-XR) 37.5 mg 24 hr capsule TAKE 1 CAPSULE BY MOUTH EVERY DAY IN ADDITION TO 75MG     • venlafaxine (EFFEXOR-XR) 75 mg 24 hr capsule Take 1 capsule (75 mg total) by mouth daily 90 capsule 1     No current facility-administered medications for this visit. Objective:  /74   Pulse 88   Temp 97.7 °F (36.5 °C)   Resp 16   Ht 5' 10" (1.778 m)   Wt 87.1 kg (192 lb)   SpO2 98%   BMI 27.55 kg/m²    Wt Readings from Last 3 Encounters:   08/09/23 87.1 kg (192 lb)   07/11/23 86 kg (189 lb 8 oz)   07/06/23 86 kg (189 lb 8 oz)      BP Readings from Last 3 Encounters:   08/09/23 138/74   07/11/23 134/78   07/06/23 136/70          Physical Exam  Vitals and nursing note reviewed. Constitutional:       Appearance: Normal appearance. He is well-developed. HENT:      Head: Normocephalic and atraumatic. Eyes:      Conjunctiva/sclera: Conjunctivae normal.   Neck:      Thyroid: No thyromegaly. Cardiovascular:      Rate and Rhythm: Normal rate and regular rhythm. Pulses: Normal pulses. Heart sounds: Normal heart sounds.    Pulmonary:      Effort: Pulmonary effort is normal.      Breath sounds: Normal breath sounds. Musculoskeletal:         General: No swelling or tenderness. Cervical back: Neck supple. Right lower leg: No edema. Left lower leg: No edema. Skin:     Findings: Rash (Left scapula c 3 excoriated pustules) present. Neurological:      General: No focal deficit present. Mental Status: He is alert and oriented to person, place, and time. Lab Results:      Lab Results   Component Value Date    WBC 11.20 (H) 07/11/2023    HGB 14.6 07/11/2023    HCT 44.9 07/11/2023     (H) 07/11/2023    CHOL 206 12/17/2015    TRIG 140 01/23/2023    HDL 48 01/23/2023    LDLDIRECT 111 (H) 07/11/2023    ALT 27 07/11/2023    AST 20 07/11/2023     12/17/2015    K 4.6 07/11/2023     07/11/2023    CREATININE 1.04 07/11/2023    BUN 24 07/11/2023    CO2 26 07/11/2023    TSH 2.06 12/20/2019    PSA <0.1 01/23/2023    GLUF 200 (H) 07/11/2023    HGBA1C 7.5 (A) 05/24/2023     No results found for: "URICACID"  Invalid input(s): "BASENAME" Vitamin D    No results found. POCT Labs                       BMI Counseling: Body mass index is 27.55 kg/m². The BMI is above normal. Nutrition recommendations include 3-5 servings of fruits/vegetables daily. Exercise recommendations include exercising 3-5 times per week.

## 2023-08-09 NOTE — PATIENT INSTRUCTIONS
Try [facility] in [facility] or [facility] ([facility]) on [address] In [facility]. Dermatitis   AMBULATORY CARE:   Dermatitis  is skin inflammation. Dermatitis may be caused by allergens such as dust mites, pet dander, pollen, and certain foods. Dermatitis can also develop when something touches your skin and irritates it or causes an allergic reaction. Examples include soaps, chemicals, latex, and poison ivy. Signs and symptoms of dermatitis  depend on the cause: An itchy rash    Redness    Bumps or blisters that crust over or ooze clear fluid    Swelling    Call your local emergency number (911 in the [address]) or have someone call if:   You have symptoms of anaphylaxis, such as sudden trouble breathing, throat swelling, or feeling dizzy or lightheaded. Seek care immediately if:   You develop a fever or have red streaks going up your arm or leg. Your rash gets more swollen, red, or hot. Call your doctor or dermatologist if:   Your skin blisters, oozes white or yellow pus, or has a foul-smelling discharge. Your rash spreads or does not get better, even after treatment. You have questions or concerns about your condition or care. Treatment for dermatitis  depends on the cause of your rash. You may need medicines to help decrease itching and inflammation or treat a bacterial infection. They may be given as a topical cream, shot, or a pill. Manage dermatitis:   Apply a cool compress to your rash. This will help soothe your skin. Apply lotions or creams to the area. These help keep your skin moist and decrease itching. Apply the lotion or cream right after a lukewarm bath or shower when your skin is still damp. Use products that do not contain dye or a scent. Avoid skin irritants. Examples include makeup, hair products, soaps, and cleansers. Use products that do not contain a scent or dye.     Follow up with your doctor or dermatologist as directed:  Write down your questions so you remember to ask them during your visits. © Copyright Chris Lester 2022 Information is for End User's use only and may not be sold, redistributed or otherwise used for commercial purposes. The above information is an  only. It is not intended as medical advice for individual conditions or treatments. Talk to your doctor, nurse or pharmacist before following any medical regimen to see if it is safe and effective for you. Weight Management   AMBULATORY CARE:   Why it is important to manage your weight:  Being overweight increases your risk of health conditions such as heart disease, high blood pressure, type 2 diabetes, and certain types of cancer. It can also increase your risk for osteoarthritis, sleep apnea, and other respiratory problems. Aim for a slow, steady weight loss. Even a small amount of weight loss can lower your risk of health problems. Risks of being overweight:  Extra weight can cause many health problems, including the following:  Diabetes (high blood sugar level)    High blood pressure or high cholesterol    Heart disease    Stroke    Gallbladder or liver disease    Cancer of the colon, breast, prostate, liver, or kidney    Sleep apnea    Arthritis or gout    Screening  is done to check for health conditions before you have signs or symptoms. If you are 28to 79years old, your blood sugar level may be checked every 3 years for signs of prediabetes or diabetes. Your healthcare provider will check your blood pressure at each visit. High blood pressure can lead to a stroke or other problems. Your provider may check for signs of heart disease, cancer, or other health problems. How to lose weight safely:  A safe and healthy way to lose weight is to eat fewer calories and get regular exercise. You can lose up about 1 pound a week by decreasing the number of calories you eat by 500 calories each day.  You can decrease calories by eating smaller portion sizes or by cutting out high-calorie foods. Read labels to find out how many calories are in the foods you eat. You can also burn calories with exercise such as walking, swimming, or biking. You will be more likely to keep weight off if you make these changes part of your lifestyle. Exercise at least 30 minutes per day on most days of the week. You can also fit in more physical activity by taking the stairs instead of the elevator or parking farther away from stores. Ask your healthcare provider about the best exercise plan for you. Healthy meal plan for weight management:  A healthy meal plan includes a variety of foods, contains fewer calories, and helps you stay healthy. A healthy meal plan includes the following:     Eat whole-grain foods more often. A healthy meal plan should contain fiber. Fiber is the part of grains, fruits, and vegetables that is not broken down by your body. Whole-grain foods are healthy and provide extra fiber in your diet. Some examples of whole-grain foods are whole-wheat breads and pastas, oatmeal, brown rice, and bulgur. Eat a variety of vegetables every day. Include dark, leafy greens such as spinach, kale, michelle greens, and mustard greens. Eat yellow and orange vegetables such as carrots, sweet potatoes, and winter squash. Eat a variety of fruits every day. Choose fresh or canned fruit (canned in its own juice or light syrup) instead of juice. Fruit juice has very little or no fiber. Eat low-fat dairy foods. Drink fat-free (skim) milk or 1% milk. Eat fat-free yogurt and low-fat cottage cheese. Try low-fat cheeses such as mozzarella and other reduced-fat cheeses. Choose meat and other protein foods that are low in fat. Choose beans or other legumes such as split peas or lentils. Choose fish, skinless poultry (chicken or turkey), or lean cuts of red meat (beef or pork). Before you cook meat or poultry, cut off any visible fat. Use less fat and oil.   Try baking foods instead of frying them. Add less fat, such as margarine, sour cream, regular salad dressing and mayonnaise to foods. Eat fewer high-fat foods. Some examples of high-fat foods include french fries, doughnuts, ice cream, and cakes. Eat fewer sweets. Limit foods and drinks that are high in sugar. This includes candy, cookies, regular soda, and sweetened drinks. Ways to decrease calories:   Eat smaller portions. Use a small plate with smaller servings. Do not eat second helpings. When you eat at a restaurant, ask for a box and place half of your meal in the box before you eat. Share an entrée with someone else. Replace high-calorie snacks with healthy, low-calorie snacks. Choose fresh fruit, vegetables, fat-free rice cakes, or air-popped popcorn instead of potato chips, nuts, or chocolate. Choose water or calorie-free drinks instead of soda or sweetened drinks. Do not shop for groceries when you are hungry. You may be more likely to make unhealthy food choices. Take a grocery list of healthy foods and shop after you have eaten. Eat regular meals. Do not skip meals. Skipping meals can lead to overeating later in the day. This can make it harder for you to lose weight. Eat a healthy snack in place of a meal if you do not have time to eat a regular meal. Talk with a dietitian to help you create a meal plan and schedule that is right for you. Other things to consider as you try to lose weight:   Be aware of situations that may give you the urge to overeat, such as eating while watching television. Find ways to avoid these situations. For example, read a book, go for a walk, or do crafts. Meet with a weight loss support group or friends who are also trying to lose weight. This may help you stay motivated to continue working on your weight loss goals.     © Copyright Lovetta Inch 2022 Information is for End User's use only and may not be sold, redistributed or otherwise used for commercial purposes. The above information is an  only. It is not intended as medical advice for individual conditions or treatments. Talk to your doctor, nurse or pharmacist before following any medical regimen to see if it is safe and effective for you.

## 2023-08-09 NOTE — ASSESSMENT & PLAN NOTE
Lab Results   Component Value Date    HGBA1C 7.5 (A) 05/24/2023       Lab Results   Component Value Date    HGBA1C 7.5 (A) 05/24/2023       Lab Results   Component Value Date    HGBA1C 7.5 (A) 05/24/2023     Uncontrolled. Management per Endo. Recommend lifestyle modifications.

## 2023-08-09 NOTE — LETTER
RE: Anum Richardson -- MR#: [id number]   September 11, 2023    [address] [place] [address] [place]  Helagdra Lewis [id number]    Dear Mr. Bree Leal,     At [facility], we understand the important role your medications play in maintaining your health. If you're managing conditions such as high blood pressure, diabetes, or high cholesterol, taking your medications correctly can make a big difference. It can help keep these conditions from getting worse and might even prevent a hospital visit. The Centers for Disease Control (CDC) says that one out of every five new medicines is never picked up from the pharmacy. And half of the time, the medicines that are picked up are not taken correctly. For most medicines, you need to take them 80% of the time to get the most help from them. That means if you forget to take them two days each week, they might not work as well. We know it can be easy to get off track, especially if you're taking several medications each day. We encourage you to bring ALL your medication bottles (even the ones you can buy without a prescription) to your clinic visits. That way your provider can discuss your medications with you and make sure you're taking them the best way.     Here are some reasons it might be hard to take medicines:  Forgetting to take them  Worrying about side effects  The cost is too high  Trouble getting to the pharmacy  It takes too long to get refills  You feel like you take too many medicines  You feel like your medicine isn't needed or isn't working  You don't know why you take it or how to take it    And here are some ways to make it easier:  Use a pill box to keep your medicines organized  Set reminders to take your pills  Ask family or friends for help  Talk to other people who take the same medicines  Use helpful services at your pharmacy like:  Talking to the pharmacist  80- or 100-day supplies of your medicines  Automatic refills to avoid gaps in medication  Getting reminders when it's time for a refill  Having your medicine mailed or delivered to your house  Getting all your medicines refilled at the same time each month (med synchronization)    Remember, we are here to help you. Our providers and care teams are committed to understanding your needs and working together to find a medication regimen that works for you. We also have a team of clinical pharmacy professionals working to make sure you get the right medicines in the right way. If you're having trouble taking your medicines the right way, don't wait for your next visit. Call us or send us a message today. We care about you and your health. Thank you for being a valued patient of [facility]!       Sincerely,        Jerson Arguelles, Pharmacist  Clinical Integration Pharmacist  [facility]

## 2023-08-09 NOTE — TELEPHONE ENCOUNTER
[address] Williams Patricio, Pharmacist     Reason for Outreach: Patient referred to clinical pharmacist by Sarahi Woodard DO for Medication Adherence for Diabetic Care     First outreach attempt to patient to introduce services. Patient unavailable at time of call. Left voicemail with contact information for return call.  Will follow-up     Measure Outcome Adherence Score:   · Biguanides: 67%  · Sulfonylureas: N/A  · TZDs: N/A  · DPP-4: 67%  · GLP-1: N/A  · Meglitinides: N/A  · SGLT2: 30%

## 2023-08-10 ENCOUNTER — VBI (OUTPATIENT)
Dept: ADMINISTRATIVE | Facility: OTHER | Age: 68
End: 2023-08-10

## 2023-08-14 ENCOUNTER — APPOINTMENT (OUTPATIENT)
Facility: CLINIC | Age: 68
End: 2023-08-14
Payer: COMMERCIAL

## 2023-08-16 ENCOUNTER — APPOINTMENT (OUTPATIENT)
Facility: CLINIC | Age: 68
End: 2023-08-16
Payer: COMMERCIAL

## 2023-08-16 NOTE — TELEPHONE ENCOUNTER
[address] [facility] [address] Ankur gNuyen, Pharmacist     Reason for Outreach: Patient referred to clinical pharmacist by Lazara Singleton DO for Medication Adherence for Diabetic Care     Second outreach attempt to patient to introduce services. Patient unavailable at time of call. Left voicemail with contact information for return call. Will send letter.

## 2023-08-21 ENCOUNTER — APPOINTMENT (OUTPATIENT)
Facility: CLINIC | Age: 68
End: 2023-08-21
Payer: COMMERCIAL

## 2023-08-22 ENCOUNTER — TELEPHONE (OUTPATIENT)
Dept: HEMATOLOGY ONCOLOGY | Facility: CLINIC | Age: 68
End: 2023-08-22

## 2023-08-22 NOTE — TELEPHONE ENCOUNTER
Received vm: Yes, this is Jodie Ibarra calling on the [facility] had [facility] the questions about how I'm doing the on the high hydroxyurea was doing OK for a while and the last week not so great. So I haven't taken it since last Friday. I'm starting to feel a little better. So anyway, that's the story. Talk to you later. Thank you. Attempted to return call to patient. Left voice message requesting call back. Provided direct call back number.

## 2023-08-22 NOTE — TELEPHONE ENCOUNTER
Left Vm for patient to get his labs drawn and to let me know how often he is taking his hydrea. Direct call back number provided.

## 2023-08-23 ENCOUNTER — APPOINTMENT (OUTPATIENT)
Facility: CLINIC | Age: 68
End: 2023-08-23
Payer: COMMERCIAL

## 2023-08-23 ENCOUNTER — TELEPHONE (OUTPATIENT)
Dept: HEMATOLOGY ONCOLOGY | Facility: CLINIC | Age: 68
End: 2023-08-23

## 2023-08-23 NOTE — TELEPHONE ENCOUNTER
Patient returned phone call. He is taking the Hydrea 3 times per week  Monday, Wednesday, & Friday    Patient states that he feels better today.      He will get labs drawn then as well

## 2023-08-26 ENCOUNTER — APPOINTMENT (OUTPATIENT)
Dept: LAB | Facility: CLINIC | Age: 68
End: 2023-08-26
Payer: COMMERCIAL

## 2023-08-28 ENCOUNTER — APPOINTMENT (OUTPATIENT)
Facility: CLINIC | Age: 68
End: 2023-08-28
Payer: COMMERCIAL

## 2023-08-30 ENCOUNTER — APPOINTMENT (OUTPATIENT)
Facility: CLINIC | Age: 68
End: 2023-08-30
Payer: COMMERCIAL

## 2023-10-04 ENCOUNTER — TELEPHONE (OUTPATIENT)
Dept: HEMATOLOGY ONCOLOGY | Facility: CLINIC | Age: 68
End: 2023-10-04

## 2023-10-04 NOTE — TELEPHONE ENCOUNTER
Appointment Change  Cancel, Reschedule, Change to Virtual      Who are you speaking with? Patient   If it is not the patient, is the caller listed on the communication consent form? N/A   Which provider is the appointment scheduled with? BENNY Pitt   When was the original appointment scheduled? Please list date and time 10/17/23 10:30AM   At which location is the appointment scheduled to take place? [facility]   Was the appointment rescheduled? Was the appointment changed from an in person visit to a virtual visit? If so, please list the details of the change. Appointment made virtual   What is the reason for the appointment change?  Provider

## 2023-10-16 ENCOUNTER — APPOINTMENT (OUTPATIENT)
Dept: LAB | Facility: CLINIC | Age: 68
End: 2023-10-16
Payer: COMMERCIAL

## 2023-10-16 ENCOUNTER — TELEPHONE (OUTPATIENT)
Dept: HEMATOLOGY ONCOLOGY | Facility: CLINIC | Age: 68
End: 2023-10-16

## 2023-10-16 DIAGNOSIS — Z79.64 ENCOUNTER FOR MONITORING OF HYDROXYUREA THERAPY: ICD-10-CM

## 2023-10-16 DIAGNOSIS — Z51.81 ENCOUNTER FOR MONITORING OF HYDROXYUREA THERAPY: ICD-10-CM

## 2023-10-16 DIAGNOSIS — Z15.89 JAK2 V617F MUTATION: ICD-10-CM

## 2023-10-16 DIAGNOSIS — D47.3 ESSENTIAL THROMBOCYTOSIS (HCC): Primary | ICD-10-CM

## 2023-10-16 DIAGNOSIS — D47.3 ESSENTIAL THROMBOCYTOSIS (HCC): ICD-10-CM

## 2023-10-16 LAB
ALBUMIN SERPL BCP-MCNC: 4.4 G/DL (ref 3.5–5)
ALP SERPL-CCNC: 39 U/L (ref 34–104)
ALT SERPL W P-5'-P-CCNC: 22 U/L (ref 7–52)
ANION GAP SERPL CALCULATED.3IONS-SCNC: 6 MMOL/L
AST SERPL W P-5'-P-CCNC: 22 U/L (ref 13–39)
BASOPHILS # BLD AUTO: 0.06 THOUSANDS/ÂΜL (ref 0–0.1)
BASOPHILS NFR BLD AUTO: 1 % (ref 0–1)
BILIRUB SERPL-MCNC: 0.27 MG/DL (ref 0.2–1)
BUN SERPL-MCNC: 17 MG/DL (ref 5–25)
CALCIUM SERPL-MCNC: 9.9 MG/DL (ref 8.4–10.2)
CHLORIDE SERPL-SCNC: 100 MMOL/L (ref 96–108)
CO2 SERPL-SCNC: 29 MMOL/L (ref 21–32)
CREAT SERPL-MCNC: 1.21 MG/DL (ref 0.6–1.3)
EOSINOPHIL # BLD AUTO: 0.39 THOUSAND/ÂΜL (ref 0–0.61)
EOSINOPHIL NFR BLD AUTO: 4 % (ref 0–6)
ERYTHROCYTE [DISTWIDTH] IN BLOOD BY AUTOMATED COUNT: 13.7 % (ref 11.6–15.1)
GFR SERPL CREATININE-BSD FRML MDRD: 61 ML/MIN/1.73SQ M
GLUCOSE P FAST SERPL-MCNC: 239 MG/DL (ref 65–99)
HCT VFR BLD AUTO: 41.4 % (ref 36.5–49.3)
HGB BLD-MCNC: 13.7 G/DL (ref 12–17)
IMM GRANULOCYTES # BLD AUTO: 0.06 THOUSAND/UL (ref 0–0.2)
IMM GRANULOCYTES NFR BLD AUTO: 1 % (ref 0–2)
LYMPHOCYTES # BLD AUTO: 2.76 THOUSANDS/ÂΜL (ref 0.6–4.47)
LYMPHOCYTES NFR BLD AUTO: 25 % (ref 14–44)
MCH RBC QN AUTO: 31 PG (ref 26.8–34.3)
MCHC RBC AUTO-ENTMCNC: 33.1 G/DL (ref 31.4–37.4)
MCV RBC AUTO: 94 FL (ref 82–98)
MONOCYTES # BLD AUTO: 0.98 THOUSAND/ÂΜL (ref 0.17–1.22)
MONOCYTES NFR BLD AUTO: 9 % (ref 4–12)
NEUTROPHILS # BLD AUTO: 6.87 THOUSANDS/ÂΜL (ref 1.85–7.62)
NEUTS SEG NFR BLD AUTO: 60 % (ref 43–75)
NRBC BLD AUTO-RTO: 0 /100 WBCS
PLATELET # BLD AUTO: 501 THOUSANDS/UL (ref 149–390)
PMV BLD AUTO: 10 FL (ref 8.9–12.7)
POTASSIUM SERPL-SCNC: 5 MMOL/L (ref 3.5–5.3)
PROT SERPL-MCNC: 7 G/DL (ref 6.4–8.4)
RBC # BLD AUTO: 4.42 MILLION/UL (ref 3.88–5.62)
SODIUM SERPL-SCNC: 135 MMOL/L (ref 135–147)
WBC # BLD AUTO: 11.12 THOUSAND/UL (ref 4.31–10.16)

## 2023-10-16 PROCEDURE — 80053 COMPREHEN METABOLIC PANEL: CPT

## 2023-10-16 PROCEDURE — 85025 COMPLETE CBC W/AUTO DIFF WBC: CPT

## 2023-10-16 PROCEDURE — 36415 COLL VENOUS BLD VENIPUNCTURE: CPT

## 2023-10-16 NOTE — TELEPHONE ENCOUNTER
Called and spoke with patient he will be getting labs collected today 10/16 prior to his 10/17 virtual appointment with Ankita Sandoval.

## 2023-10-17 ENCOUNTER — TELEMEDICINE (OUTPATIENT)
Dept: HEMATOLOGY ONCOLOGY | Facility: CLINIC | Age: 68
End: 2023-10-17
Payer: COMMERCIAL

## 2023-10-17 ENCOUNTER — TELEPHONE (OUTPATIENT)
Dept: HEMATOLOGY ONCOLOGY | Facility: CLINIC | Age: 68
End: 2023-10-17

## 2023-10-17 DIAGNOSIS — Z15.89 JAK2 V617F MUTATION: ICD-10-CM

## 2023-10-17 DIAGNOSIS — Z51.81 ENCOUNTER FOR MONITORING OF HYDROXYUREA THERAPY: ICD-10-CM

## 2023-10-17 DIAGNOSIS — D47.3 ESSENTIAL THROMBOCYTOSIS (HCC): Primary | ICD-10-CM

## 2023-10-17 DIAGNOSIS — Z79.64 ENCOUNTER FOR MONITORING OF HYDROXYUREA THERAPY: ICD-10-CM

## 2023-10-17 PROCEDURE — 99215 OFFICE O/P EST HI 40 MIN: CPT

## 2023-10-17 RX ORDER — ANAGRELIDE 1 MG/1
1 CAPSULE ORAL 2 TIMES DAILY
Qty: 60 CAPSULE | Refills: 3 | Status: SHIPPED | OUTPATIENT
Start: 2023-10-17

## 2023-10-17 NOTE — PROGRESS NOTES
Virtual Regular Visit    Verification of patient location:    Patient is located at Home in the following state in which I hold an active license [place]      Assessment/Plan:  1. Essential thrombocytosis ([address])  2. JAK2 V617F mutation  3. Encounter for monitoring of hydroxyurea therapy  Mr. Bree Leal is a 59-year-old male seen in follow-up for essential thrombocytosis due to JAK2 V6 17 F mutation. He was previously on Hydrea 500 mg once daily did not tolerate due to side effects. His dose was reduced to once daily on Monday, Wednesday, Friday. He tolerated this for a longer period of time but then self discontinued due to feeling "ill ". Since discontinuing Hydrea he is back to baseline and has no constitutional complaints or concerns. Reviewed that his platelet count remains elevated at 501,000 as well as a mildly elevated white blood cell count. Discussed the importance of controlling his essential thrombocytosis due to other comorbidities and reducing risk factors for VTE. These risk factors also include former smoker, type 2 diabetes, and Parkinson's disease. We also discussed that it is also important due to his age being greater than 61years old that his platelet count be normalized. Since he is not tolerating Hydrea we will switch his management to Anagrelide 1 mg BID. He will call the office if he experiences any adverse effects or side effects from the medication prior to discontinuing. He will repeat labs in 6-8 weeks prior to a follow-up with provider to review his tolerance as well as dose adjustment as necessary.      - CBC and differential; Standing  - CBC and differential  - Comprehensive metabolic panel; Future      Reason for visit is No chief complaint on file.        Encounter provider BENNY Lovett    Provider located at [address] [place] [address] [place] [id number]      Recent Visits  Date Type Provider Dept 10/16/23 Telephone Martha Cherry MA [facility] Onc Marcellus Rodriguez   Showing recent visits within past 7 days and meeting all other requirements  Future Appointments  No visits were found meeting these conditions. Showing future appointments within next 150 days and meeting all other requirements       The patient was identified by name and date of birth. Margarette Hess was informed that this is a telemedicine visit and that the visit is being conducted through the [employer]. He agrees to proceed. .  My office door was closed. No one else was in the room. He acknowledged consent and understanding of privacy and security of the video platform. The patient has agreed to participate and understands they can discontinue the visit at any time. Patient is aware this is a billable service. Subjective  Margarette Hess is a 79 y.o. male seen in follow-up for essential thrombocytosis due to JAK2 V617F mutation. He has past medical history of type 2 diabetes, coronary artery disease, hypertension, Parkinson's disease, history of prostate cancer status post prostatectomy in 2013, and mixed lipidemia. He was originally seen in consultation in March 2023 with longstanding history of thrombocytosis dating back to at least 2015. In 2013 platelet count was within normal limits. He has a family history of a maternal aunt with a blood problem but is not sure of the details of this diagnosis, and one of his other family members have been diagnosed with hemochromatosis requiring phlebotomies. He has no history of VTE. He has never had a heart attack.   He has no peripheral vascular disease that he is aware of.      9/14/2013: Hemoglobin 11.2, platelets 408, WBC 43.56  12/17/2015: Hemoglobin 15.1, platelets 333, WBC 0.99  10/22/2016: Hemoglobin 15.4, platelets 911, WBC 34.56  1/26/2017: Hemoglobin 14.7, platelets 316, WBC 35.0  12/29/2020: Hemoglobin 16.4, platelets 805, WBC 9.4  1/23/2023: Hemoglobin 16.2, platelets 725, WBC 9.6  7/11/2023: Hemoglobin 14.6, platelets 298, WBC 46.1  10/16/2023: Hemoglobin 13.7, platelets 846, WBC 70.88    Interval history: He previously had stopped taking Hydrea due to side effects with feeling "ill ". He then restarted taking Hydrea Monday, Wednesday, Friday 500 mg. Not taking currently because made him feel sick. Since stopping the Hydrea he is feeling back to his baseline and has no complaints or concerns. Past Medical History:   Diagnosis Date    Anxiety     Arteriosclerosis of coronary artery 05/24/2017    Benign essential hypertension 05/15/2015    Depression 06/01/2012    Essential (hemorrhagic) thrombocythemia ([address])     History of prostate cancer 01/05/2023    History of stroke     Left carpal tunnel syndrome     Lumbar canal stenosis     Mixed hyperlipidemia 11/10/2010    Osteoarthritis, knee     Other male erectile dysfunction 08/08/2019    Overweight     Parkinson disease ([address]) 10/08/2019    Pipe smoker     Retinal and vitreous disorder     Subclinical hypothyroidism 04/11/2017    Type 2 diabetes mellitus without complication, without long-term current use of insulin ([address]) 08/24/2017       Past Surgical History:   Procedure Laterality Date    ANKLE FRACTURE SURGERY Left 04/2009    triple fracture; 2 Screws in place    [address] Bilateral 09/2022    LUMBAR LAMINECTOMY  2003    L5-S1    NASAL SEPTUM SURGERY  06/1989    CA COLONOSCOPY FLX DX W/COLLJ SPEC WHEN PFRMD N/A 01/27/2016    Procedure: COLONOSCOPY;  Surgeon: Citlali Grande MD;  Location: [facility];   Service: Gastroenterology    PROSTATE BIOPSY  12/08/2012    managed by Shikha Rizo, needle biopsy    PROSTATECTOMY  09/23/2013       Current Outpatient Medications   Medication Sig Dispense Refill    aspirin 325 mg tablet Take 325 mg by mouth daily      carbidopa-levodopa (SINEMET)  mg per tablet Take 2 tablets by mouth 3 (three) times a day Follow clinic instructions (Patient taking differently: Take 2 tablets by mouth in the morning Follow clinic instructions. Rx per Neuro.) 540 tablet 3    Cholecalciferol (VITAMIN D) 2000 units CAPS Take 2 capsules (4,000 Units total) by mouth daily  0    co-enzyme Q-10 30 MG capsule Take 1 capsule (30 mg total) by mouth daily 30 capsule 0    glucose blood (ONETOUCH VERIO) test strip Check BG 2x per day (Patient taking differently: Check BG 2x per day. Rx per Endo.) 200 each 3    hydroxyurea (HYDREA) 500 mg capsule Take 1 capsule (500 mg total) by mouth daily (Patient taking differently: Take 500 mg by mouth 3 (three) times a day) 90 capsule 2    lisinopril (ZESTRIL) 20 mg tablet TAKE 1 TABLET BY MOUTH DAILY 100 tablet 1    mometasone (ELOCON) 0.1 % cream Apply topically 2 (two) times a day as needed (Rash) 15 g 0    Multiple Vitamin (multivitamin) capsule Take 1 capsule by mouth daily      Omega-3 Fatty Acids (fish oil) 1,000 mg Take 1,000 mg by mouth daily      pravastatin (PRAVACHOL) 80 mg tablet TAKE 1 TABLET BY MOUTH DAILY AT BEDTIME 90 tablet 3    sitaGLIPtin-metFORMIN (JANUMET)  MG per tablet Take 1 tablet by mouth 2 (two) times a day with meals 180 tablet 3    vardenafil (LEVITRA) 20 MG tablet Take by mouth daily as needed      venlafaxine (EFFEXOR-XR) 37.5 mg 24 hr capsule TAKE 1 CAPSULE BY MOUTH EVERY DAY IN ADDITION TO 75MG      venlafaxine (EFFEXOR-XR) 75 mg 24 hr capsule Take 1 capsule (75 mg total) by mouth daily 90 capsule 1     No current facility-administered medications for this visit. No Known Allergies    Review of Systems   Constitutional:  Negative for activity change, appetite change, fatigue, fever and unexpected weight change. HENT:  Negative for trouble swallowing and voice change. Eyes:  Negative for photophobia and visual disturbance. Respiratory:  Negative for cough, chest tightness and shortness of breath. Cardiovascular:  Negative for chest pain, palpitations and leg swelling.    Gastrointestinal: Negative for abdominal distention, abdominal pain, anal bleeding, blood in stool, constipation, diarrhea, nausea and vomiting. Endocrine: Negative for cold intolerance and heat intolerance. Genitourinary: Negative. Negative for dysuria, hematuria and urgency. Musculoskeletal:  Negative for arthralgias, back pain, gait problem, joint swelling and myalgias. Skin:  Negative for pallor and rash. Neurological:  Negative for dizziness, weakness, light-headedness, numbness and headaches. Hematological:  Negative for adenopathy. Does not bruise/bleed easily. Psychiatric/Behavioral:  Negative for confusion and sleep disturbance. Video Exam    There were no vitals filed for this visit. Physical Exam  Constitutional:       General: He is not in acute distress. Appearance: Normal appearance. He is not ill-appearing. HENT:      Head: Normocephalic and atraumatic. Skin:     Coloration: Skin is not jaundiced or pale. Neurological:      General: No focal deficit present. Mental Status: He is alert and oriented to person, place, and time. Mental status is at baseline. Psychiatric:         Mood and Affect: Mood normal.         Behavior: Behavior normal.         Thought Content:  Thought content normal.         Judgment: Judgment normal.          Visit Time  Total Visit Duration: 35 minutes

## 2023-10-17 NOTE — TELEPHONE ENCOUNTER
Called pt and left message that Gabriella Ibarra would like that he have an in person visit in 6 Weeks with a physician .  I made the appt on 12/6/23 at 3 pm with Dr Dilia Castro. Jerson Reasons # if he needs to reschedule

## 2023-10-24 ENCOUNTER — HOSPITAL ENCOUNTER (OUTPATIENT)
Dept: RADIOLOGY | Facility: HOSPITAL | Age: 68
Discharge: HOME | End: 2023-10-24
Attending: FAMILY MEDICINE
Payer: COMMERCIAL

## 2023-10-24 ENCOUNTER — OFFICE VISIT (OUTPATIENT)
Dept: FAMILY MEDICINE CLINIC | Facility: CLINIC | Age: 68
End: 2023-10-24
Payer: COMMERCIAL

## 2023-10-24 VITALS
WEIGHT: 198.8 LBS | SYSTOLIC BLOOD PRESSURE: 124 MMHG | OXYGEN SATURATION: 98 % | RESPIRATION RATE: 16 BRPM | BODY MASS INDEX: 28.46 KG/M2 | DIASTOLIC BLOOD PRESSURE: 68 MMHG | TEMPERATURE: 98.5 F | HEIGHT: 70 IN | HEART RATE: 90 BPM

## 2023-10-24 DIAGNOSIS — Z87.891: ICD-10-CM

## 2023-10-24 DIAGNOSIS — E66.3 OVERWEIGHT: ICD-10-CM

## 2023-10-24 DIAGNOSIS — R05.1 ACUTE COUGH: ICD-10-CM

## 2023-10-24 DIAGNOSIS — R05.8 POST-VIRAL COUGH SYNDROME: Primary | ICD-10-CM

## 2023-10-24 DIAGNOSIS — Z23 ENCOUNTER FOR IMMUNIZATION: ICD-10-CM

## 2023-10-24 DIAGNOSIS — R05.8 POST-VIRAL COUGH SYNDROME: ICD-10-CM

## 2023-10-24 DIAGNOSIS — I10 BENIGN ESSENTIAL HYPERTENSION: ICD-10-CM

## 2023-10-24 PROBLEM — F17.290 PIPE SMOKER: Status: RESOLVED | Noted: 2023-01-05 | Resolved: 2023-10-24

## 2023-10-24 PROCEDURE — G0008 ADMIN INFLUENZA VIRUS VAC: HCPCS

## 2023-10-24 PROCEDURE — 99214 OFFICE O/P EST MOD 30 MIN: CPT | Performed by: FAMILY MEDICINE

## 2023-10-24 PROCEDURE — 90662 IIV NO PRSV INCREASED AG IM: CPT

## 2023-10-24 PROCEDURE — 71046 X-RAY EXAM CHEST 2 VIEWS: CPT

## 2023-10-24 RX ORDER — ALBUTEROL SULFATE 90 UG/1
2 AEROSOL, METERED RESPIRATORY (INHALATION) EVERY 4 HOURS PRN
Qty: 18 G | Refills: 0 | Status: SHIPPED | OUTPATIENT
Start: 2023-10-24 | End: 2023-11-03

## 2023-10-24 RX ORDER — BENZONATATE 100 MG/1
100 CAPSULE ORAL 3 TIMES DAILY PRN
Qty: 30 CAPSULE | Refills: 0 | Status: SHIPPED | OUTPATIENT
Start: 2023-10-24 | End: 2023-11-03

## 2023-10-24 NOTE — PROGRESS NOTES
COVID-19 Outpatient Progress Note    Assessment/Plan:    Problem List Items Addressed This Visit        Cardiovascular and Mediastinum    Benign essential hypertension       Other    Overweight   Other Visit Diagnoses     Post-viral cough syndrome    -  Primary    Relevant Orders    XR chest pa & lateral    Complete PFT with post bronchodilator    Acute cough        Relevant Medications    albuterol (PROVENTIL HFA,VENTOLIN HFA) 90 mcg/act inhaler    benzonatate (TESSALON PERLES) 100 mg capsule    Other Relevant Orders    XR chest pa & lateral    Complete PFT with post bronchodilator    History of prior pipe smoking        Relevant Orders    XR chest pa & lateral    Complete PFT with post bronchodilator    Encounter for immunization        Relevant Orders    influenza vaccine, high-dose, PF 0.7 mL (FLUZONE HIGH-DOSE) (Completed)           Disposition:     Risks and benefits of COVID-19 vaccination was discussed with patient. Discussed symptom directed medication options with patient. Post-Viral Cough / Cough / Former Pipe Smoker / HTN / Overweight - s/p COVID. Check CXR - R/O Pnuemonia vs. Mass. Start Albuterol HFA PRN, Tessalon Perles 100mg TID PRN. When breathing returns to baseline, check PFT's to R/O COPD / Asthma. Advise Aris anguiano sinus rinse kit, Mucinex, Claritin/Zyrtec/Allegra/Xyzal, Flonase / Nasacort nasal spray. Avoid decongestants if you have high blood pressure. I have spent a total time of 20 minutes on the day of the encounter for this patient including discussing diagnostic results, discussing prognosis, risks and benefits of treatment options, instructions for management, patient and family education, importance of treatment compliance, risk factor reductions, impressions, counseling/coordination of care, documenting in the medical record, reviewing/ordering tests, medicine, procedures and obtaining or reviewing history.        Encounter provider: Nelli Lanier DO Provider located at: [facility] [address]  [address]  [phone]     Recent Visits  No visits were found meeting these conditions. Showing recent visits within past 7 days and meeting all other requirements  Today's Visits  Date Type Provider Dept   10/24/23 Office Visit Leander Rubio DO Pg Fm [id number] today's visits and meeting all other requirements  Future Appointments  No visits were found meeting these conditions. Showing future appointments within next 150 days and meeting all other requirements     Subjective:   Kira Masterson is a 79 y.o. male who is concerned about COVID-19. Patient's symptoms include fatigue, rhinorrhea and cough (Dry). Patient denies fever, chills, congestion, sore throat, anosmia, loss of taste, shortness of breath, chest tightness, abdominal pain, nausea, vomiting, diarrhea, myalgias and headaches. - Date of symptom onset: 10/1/2023      COVID-19 vaccination status: Fully vaccinated with booster    Exposure:   Contact with a person who is under investigation (PUI) for or who is positive for COVID-19 within the last 14 days?: No    Hospitalized recently for fever and/or lower respiratory symptoms?: No      S/p COVID mid- September. Cough started early Oct.    Using cough drops, TheraFlu c benefit for sleep. He has been coughing x 3 weeks. He had prolonged cough previously with acute illness. H/O Pipe smoking. Lab Results   Component Value Date    SARSCOV2 Not Detected 09/10/2022    SARSCOV2  09/10/2022     INTERPRETATION:  SARS-CoV-2 NOT detected. CLINICAL SIGNIFICANCE AND ASSAY LIMITATIONS:  SARS-CoV-2 detection may be   affected by sample collection methods, patient factors (e.g., presence of   symptoms), and/or stage of infection. Nasopharyngeal specimens are preferred,   but other upper respiratory specimen types are acceptable.  The performance   characteristics of other specimen types have been evaluated, but not fully   established. A "Positive" result does not rule out bacterial infection or   co-infection with other viruses. A "Not Detected" result does not preclude   SARS-CoV-2 infection and should not be used as the sole basis for treatment or   patient management.  This assay does not detect other common respiratory   pathogens, including common human coronavirus strains (229E, HKU1, NL63, OC43). METHOD (SARS-CoV-2 only):  Presence of SARS-CoV-2 nucleic acid was determined   using the PCR-based ruth SARS-CoV-2 assay (Roche [employer]) for use on   the ruth 6800/8800 Systems. This method only provides a SARS-CoV-2 result. The   normal reference range is "Not detected". METHOD (SARS-CoV-2 and Influenza A/B):  Presence of SARS-CoV-2, Influenza A, and   Influenza B nucleic acid was determined using the PCR-based ruth SARS-CoV-2   and Influenza A/B assay ([employer] Systems) for use on the ruth 6800/8800   Systems. This method provides concurrent SARS-CoV-2, Influenza A, and Influenza   B results; please refer to separate influenza comment for additional   interpretive details. The normal reference range is "Not detected". ADDITIONAL INFORMATION:  SARS-CoV-2 Only  For Healthcare Providers: BagFit.fi  For Patients: BagFit.fi    SARS-CoV-2 and Influenza A/B  For Healthcare Providers: WebCheating.fr  For Patients: WebCheating.fr    This test has received Emergency Use Authorization (EUA) by the FDA. This test   was performed by the [facility] at [address] [place] [address] at   [address] [facility], [place], [address]  Review of Systems   Constitutional:  Positive for fatigue. Negative for chills and fever. HENT:  Positive for rhinorrhea. Negative for congestion and sore throat.     Respiratory:  Positive for cough (Dry). Negative for chest tightness and shortness of breath. Gastrointestinal:  Negative for abdominal pain, diarrhea, nausea and vomiting. Musculoskeletal:  Negative for myalgias. Neurological:  Negative for headaches. Current Outpatient Medications on File Prior to Visit   Medication Sig   • anagrelide (AGRYLIN) 1 mg capsule Take 1 capsule (1 mg total) by mouth 2 (two) times a day   • aspirin 325 mg tablet Take 325 mg by mouth daily   • carbidopa-levodopa (SINEMET)  mg per tablet Take 2 tablets by mouth 3 (three) times a day Follow clinic instructions (Patient taking differently: Take 2 tablets by mouth in the morning Follow clinic instructions. Rx per Neuro.)   • Cholecalciferol (VITAMIN D) 2000 units CAPS Take 2 capsules (4,000 Units total) by mouth daily   • co-enzyme Q-10 30 MG capsule Take 1 capsule (30 mg total) by mouth daily   • glucose blood (ONETOUCH VERIO) test strip Check BG 2x per day (Patient taking differently: Check BG 2x per day.   Rx per Endo.)   • lisinopril (ZESTRIL) 20 mg tablet TAKE 1 TABLET BY MOUTH DAILY   • mometasone (ELOCON) 0.1 % cream Apply topically 2 (two) times a day as needed (Rash)   • Multiple Vitamin (multivitamin) capsule Take 1 capsule by mouth daily   • Omega-3 Fatty Acids (fish oil) 1,000 mg Take 1,000 mg by mouth daily   • pravastatin (PRAVACHOL) 80 mg tablet TAKE 1 TABLET BY MOUTH DAILY AT BEDTIME   • sitaGLIPtin-metFORMIN (JANUMET)  MG per tablet Take 1 tablet by mouth 2 (two) times a day with meals   • vardenafil (LEVITRA) 20 MG tablet Take by mouth daily as needed   • venlafaxine (EFFEXOR-XR) 37.5 mg 24 hr capsule TAKE 1 CAPSULE BY MOUTH EVERY DAY IN ADDITION TO 75MG   • venlafaxine (EFFEXOR-XR) 75 mg 24 hr capsule Take 1 capsule (75 mg total) by mouth daily       Objective:    /68   Pulse 90   Temp 98.5 °F (36.9 °C)   Resp 16   Ht 5' 10" (1.778 m)   Wt 90.2 kg (198 lb 12.8 oz)   SpO2 98%   BMI 28.52 kg/m²        Physical Exam  Vitals and nursing note reviewed. Constitutional:       General: He is not in acute distress. Appearance: Normal appearance. He is well-developed. HENT:      Head: Normocephalic and atraumatic. Right Ear: Tympanic membrane, ear canal and external ear normal.      Left Ear: Tympanic membrane, ear canal and external ear normal.      Nose: Nose normal.      Mouth/Throat:      Mouth: Mucous membranes are moist.      Pharynx: Oropharynx is clear. Eyes:      Extraocular Movements: Extraocular movements intact. Conjunctiva/sclera: Conjunctivae normal.   Cardiovascular:      Rate and Rhythm: Normal rate and regular rhythm. Heart sounds: No murmur heard. Pulmonary:      Effort: Pulmonary effort is normal. No respiratory distress. Breath sounds: Normal breath sounds. Musculoskeletal:         General: No swelling or tenderness. Cervical back: Neck supple. Lymphadenopathy:      Cervical: No cervical adenopathy. Neurological:      General: No focal deficit present. Mental Status: He is alert and oriented to person, place, and time.        Gracie Estrella, DO

## 2023-11-14 DIAGNOSIS — D47.3 ESSENTIAL THROMBOCYTOSIS (HCC): ICD-10-CM

## 2023-11-14 DIAGNOSIS — Z15.89 JAK2 V617F MUTATION: ICD-10-CM

## 2023-11-14 RX ORDER — ANAGRELIDE 1 MG/1
1 CAPSULE ORAL 2 TIMES DAILY
Qty: 180 CAPSULE | Refills: 0 | Status: SHIPPED | OUTPATIENT
Start: 2023-11-14 | End: 2024-02-12

## 2023-11-14 RX ORDER — ANAGRELIDE 1 MG/1
1 CAPSULE ORAL 2 TIMES DAILY
Qty: 180 CAPSULE | Refills: 2 | OUTPATIENT
Start: 2023-11-14

## 2023-11-15 ENCOUNTER — LAB (OUTPATIENT)
Dept: LAB | Facility: CLINIC | Age: 68
End: 2023-11-15
Payer: COMMERCIAL

## 2023-11-15 DIAGNOSIS — E03.8 SUBCLINICAL HYPOTHYROIDISM: ICD-10-CM

## 2023-11-15 DIAGNOSIS — R05.1 ACUTE COUGH: ICD-10-CM

## 2023-11-15 DIAGNOSIS — I10 BENIGN ESSENTIAL HYPERTENSION: ICD-10-CM

## 2023-11-15 DIAGNOSIS — E11.65 TYPE 2 DIABETES MELLITUS WITH HYPERGLYCEMIA, WITHOUT LONG-TERM CURRENT USE OF INSULIN (HCC): ICD-10-CM

## 2023-11-15 DIAGNOSIS — E78.2 MIXED HYPERLIPIDEMIA: ICD-10-CM

## 2023-11-15 LAB
ALBUMIN SERPL BCP-MCNC: 4.4 G/DL (ref 3.5–5)
ALP SERPL-CCNC: 34 U/L (ref 34–104)
ALT SERPL W P-5'-P-CCNC: 26 U/L (ref 7–52)
ANION GAP SERPL CALCULATED.3IONS-SCNC: 4 MMOL/L
AST SERPL W P-5'-P-CCNC: 18 U/L (ref 13–39)
BASOPHILS # BLD AUTO: 0.07 THOUSANDS/ÂΜL (ref 0–0.1)
BASOPHILS NFR BLD AUTO: 1 % (ref 0–1)
BILIRUB SERPL-MCNC: 0.38 MG/DL (ref 0.2–1)
BUN SERPL-MCNC: 19 MG/DL (ref 5–25)
CALCIUM SERPL-MCNC: 9.4 MG/DL (ref 8.4–10.2)
CHLORIDE SERPL-SCNC: 102 MMOL/L (ref 96–108)
CHOLEST SERPL-MCNC: 178 MG/DL
CO2 SERPL-SCNC: 28 MMOL/L (ref 21–32)
CREAT SERPL-MCNC: 1.09 MG/DL (ref 0.6–1.3)
EOSINOPHIL # BLD AUTO: 0.31 THOUSAND/ÂΜL (ref 0–0.61)
EOSINOPHIL NFR BLD AUTO: 3 % (ref 0–6)
ERYTHROCYTE [DISTWIDTH] IN BLOOD BY AUTOMATED COUNT: 13.4 % (ref 11.6–15.1)
EST. AVERAGE GLUCOSE BLD GHB EST-MCNC: 220 MG/DL
GFR SERPL CREATININE-BSD FRML MDRD: 69 ML/MIN/1.73SQ M
GLUCOSE P FAST SERPL-MCNC: 229 MG/DL (ref 65–99)
HBA1C MFR BLD: 9.3 %
HCT VFR BLD AUTO: 44.7 % (ref 36.5–49.3)
HDLC SERPL-MCNC: 44 MG/DL
HGB BLD-MCNC: 14.2 G/DL (ref 12–17)
IMM GRANULOCYTES # BLD AUTO: 0.02 THOUSAND/UL (ref 0–0.2)
IMM GRANULOCYTES NFR BLD AUTO: 0 % (ref 0–2)
LDLC SERPL CALC-MCNC: 94 MG/DL (ref 0–100)
LYMPHOCYTES # BLD AUTO: 2.4 THOUSANDS/ÂΜL (ref 0.6–4.47)
LYMPHOCYTES NFR BLD AUTO: 26 % (ref 14–44)
MCH RBC QN AUTO: 30 PG (ref 26.8–34.3)
MCHC RBC AUTO-ENTMCNC: 31.8 G/DL (ref 31.4–37.4)
MCV RBC AUTO: 94 FL (ref 82–98)
MONOCYTES # BLD AUTO: 0.69 THOUSAND/ÂΜL (ref 0.17–1.22)
MONOCYTES NFR BLD AUTO: 7 % (ref 4–12)
NEUTROPHILS # BLD AUTO: 5.88 THOUSANDS/ÂΜL (ref 1.85–7.62)
NEUTS SEG NFR BLD AUTO: 63 % (ref 43–75)
NONHDLC SERPL-MCNC: 134 MG/DL
NRBC BLD AUTO-RTO: 0 /100 WBCS
PLATELET # BLD AUTO: 548 THOUSANDS/UL (ref 149–390)
PMV BLD AUTO: 10.5 FL (ref 8.9–12.7)
POTASSIUM SERPL-SCNC: 4.7 MMOL/L (ref 3.5–5.3)
PROT SERPL-MCNC: 7.2 G/DL (ref 6.4–8.4)
RBC # BLD AUTO: 4.74 MILLION/UL (ref 3.88–5.62)
SODIUM SERPL-SCNC: 134 MMOL/L (ref 135–147)
T4 FREE SERPL-MCNC: 0.57 NG/DL (ref 0.61–1.12)
TRIGL SERPL-MCNC: 202 MG/DL
TSH SERPL DL<=0.05 MIU/L-ACNC: 2.91 UIU/ML (ref 0.45–4.5)
WBC # BLD AUTO: 9.37 THOUSAND/UL (ref 4.31–10.16)

## 2023-11-15 PROCEDURE — 84443 ASSAY THYROID STIM HORMONE: CPT

## 2023-11-15 PROCEDURE — 80053 COMPREHEN METABOLIC PANEL: CPT

## 2023-11-15 PROCEDURE — 84439 ASSAY OF FREE THYROXINE: CPT

## 2023-11-15 PROCEDURE — 83036 HEMOGLOBIN GLYCOSYLATED A1C: CPT

## 2023-11-15 PROCEDURE — 80061 LIPID PANEL: CPT

## 2023-11-15 RX ORDER — ALBUTEROL SULFATE 90 UG/1
2 AEROSOL, METERED RESPIRATORY (INHALATION) EVERY 4 HOURS PRN
Qty: 8.5 G | Refills: 1 | Status: SHIPPED | OUTPATIENT
Start: 2023-11-15 | End: 2023-11-25

## 2023-11-16 ENCOUNTER — OFFICE VISIT (OUTPATIENT)
Dept: ENDOCRINOLOGY | Facility: CLINIC | Age: 68
End: 2023-11-16
Payer: COMMERCIAL

## 2023-11-16 VITALS
HEIGHT: 70 IN | BODY MASS INDEX: 27.52 KG/M2 | DIASTOLIC BLOOD PRESSURE: 98 MMHG | WEIGHT: 192.2 LBS | HEART RATE: 58 BPM | OXYGEN SATURATION: 98 % | SYSTOLIC BLOOD PRESSURE: 150 MMHG

## 2023-11-16 DIAGNOSIS — E78.2 MIXED HYPERLIPIDEMIA: ICD-10-CM

## 2023-11-16 DIAGNOSIS — E78.1 HYPERTRIGLYCERIDEMIA: ICD-10-CM

## 2023-11-16 DIAGNOSIS — R79.89 LOW T4: ICD-10-CM

## 2023-11-16 DIAGNOSIS — E11.65 TYPE 2 DIABETES MELLITUS WITH HYPERGLYCEMIA, WITHOUT LONG-TERM CURRENT USE OF INSULIN (HCC): Primary | ICD-10-CM

## 2023-11-16 DIAGNOSIS — I10 BENIGN ESSENTIAL HYPERTENSION: ICD-10-CM

## 2023-11-16 DIAGNOSIS — E03.8 SUBCLINICAL HYPOTHYROIDISM: ICD-10-CM

## 2023-11-16 PROCEDURE — 99215 OFFICE O/P EST HI 40 MIN: CPT

## 2023-11-16 RX ORDER — ICOSAPENT ETHYL 1000 MG/1
2 CAPSULE ORAL 2 TIMES DAILY
Qty: 180 CAPSULE | Refills: 2 | Status: SHIPPED | OUTPATIENT
Start: 2023-11-16

## 2023-11-16 RX ORDER — EZETIMIBE 10 MG/1
10 TABLET ORAL DAILY
Qty: 30 TABLET | Refills: 2 | Status: SHIPPED | OUTPATIENT
Start: 2023-11-16

## 2023-11-16 NOTE — ASSESSMENT & PLAN NOTE
Total cholesterol 178, trigs 202, HDL 44, LDL 94    LDL and trigs above target. For LDL, add zetia once daily. For elevated trigs, switch to vascepa and increase to 2 g BID.  Repeat lipid panel in 1 month    Monitor diet and exercise

## 2023-11-16 NOTE — PROGRESS NOTES
Established Patient Progress Note    CC: Follow up for type 2 diabetes mellitus    Impression & Plan:    Problem List Items Addressed This Visit          Endocrine    Subclinical hypothyroidism     Most recent TSH is inappropriately normal for low T4 found on lab work. Recommend patient repeat in 2 weeks. He is not taking any biotin or other medication that would appear to interfere with this. He has not had any head trauma in the past. Will review results once available. Relevant Orders    TSH, 3rd generation Lab Collect    T4, free Lab Collect    Type 2 diabetes mellitus with hyperglycemia, without long-term current use of insulin ([address]) - Primary     Worsened from prior most likely due to decreased exercise and dietary excursions. After discussion with patient, he would like to work on his diet and exercise instead of adding more medications today. Repeat A1C in 3 months prior to next visit. Continue current medication regimen. Lab Results   Component Value Date    HGBA1C 9.3 (H) 11/15/2023          Relevant Orders    HEMOGLOBIN A1C W/ EAG ESTIMATION Lab Collect       Cardiovascular and Mediastinum    Benign essential hypertension     Elevated in office today on 2 occasions. Foal BP < 130/80. Discussed with patient to monitor blood pressure at home. If remains above 130/80, he should contact his PCP            Other    Mixed hyperlipidemia     Total cholesterol 178, trigs 202, HDL 44, LDL 94    LDL and trigs above target. For LDL, add zetia once daily. For elevated trigs, switch to vascepa and increase to 2 g BID.  Repeat lipid panel in 1 month    Monitor diet and exercise         Relevant Medications    ezetimibe (ZETIA) 10 mg tablet    Icosapent Ethyl (Vascepa) 1 g CAPS    Other Relevant Orders    Lipid panel- Lab Collect     Other Visit Diagnoses       Hypertriglyceridemia        Relevant Medications    ezetimibe (ZETIA) 10 mg tablet    Icosapent Ethyl (Vascepa) 1 g CAPS    Low T4 Orders Placed This Encounter   Procedures    Lipid panel- Lab Collect     This is a patient instruction: This test requires patient fasting for 10-12 hours or longer. Drinking of black coffee or black tea is acceptable. Standing Status:   Future     Standing Expiration Date:   11/16/2024    TSH, 3rd generation Lab Collect     This is a patient instruction: This test is non-fasting. Please drink two glasses of water morning of bloodwork. Standing Status:   Future     Standing Expiration Date:   11/16/2024    T4, free Lab Collect     Standing Status:   Future     Standing Expiration Date:   11/16/2024    HEMOGLOBIN A1C W/ EAG ESTIMATION Lab Collect     Standing Status:   Future     Standing Expiration Date:   11/16/2024       History of Present Illness:   Sheng Purcell is a 79 y.o. male with a history of type 2 diabetes, hyperlipidemia, hypertension. Complications: stroke. Last A1C was 9.3. Home glucose monitoring: home glucose monitor    No log provided to review today. Reports morning BG in 170-190 range. Patient reports since having COVID in September, he has been exercising less and has been eating more poorly than usual. He used to be good at portion control but now he is over-eating and snacking more often. He reports feeling fatigued however attributes this to his Parkinson's.       Current regimen:   Janumet 2 tabs daily    Last Foot Exam: UTD, 1/6/23    ACE/ARB: lisinopril  Has hyperlipidemia: Taking pravastatin        Patient Active Problem List   Diagnosis    Arteriosclerosis of coronary artery    Benign essential hypertension    Depression    Type 2 diabetes mellitus without complication, without long-term current use of insulin (HCC)    Subclinical hypothyroidism    Essential (hemorrhagic) thrombocythemia (HCC)    Mixed hyperlipidemia    Leukocytosis    Other male erectile dysfunction    Parkinson disease    Type 2 diabetes mellitus with hyperglycemia, without long-term current use of insulin ([address])    Dystonia    History of stroke    Hyperkalemia    History of prostate cancer    Anxiety    Overweight      Past Medical History:   Diagnosis Date    Anxiety     Arteriosclerosis of coronary artery 2017    Benign essential hypertension 05/15/2015    Depression 2012    Essential (hemorrhagic) thrombocythemia ([address])     History of prostate cancer 2023    History of stroke     Left carpal tunnel syndrome     Lumbar canal stenosis     Mixed hyperlipidemia 11/10/2010    Osteoarthritis, knee     Other male erectile dysfunction 2019    Overweight     Parkinson disease 10/08/2019    Pipe smoker     Retinal and vitreous disorder     Subclinical hypothyroidism 2017    Type 2 diabetes mellitus without complication, without long-term current use of insulin ([address]) 2017      Past Surgical History:   Procedure Laterality Date    ANKLE FRACTURE SURGERY Left 2009    triple fracture; 2 Screws in place    [address] Bilateral 2022    LUMBAR LAMINECTOMY      L5-S1    NASAL SEPTUM SURGERY  1989    [place] COLONOSCOPY FLX DX W/COLLJ SPEC WHEN PFRMD N/A 2016    Procedure: COLONOSCOPY;  Surgeon: Michelle Villar MD;  Location: [facility];   Service: Gastroenterology    PROSTATE BIOPSY  2012    managed by Yoselin Deutsch, needle biopsy    PROSTATECTOMY  2013      Family History   Problem Relation Age of Onset    Hypertension Mother          2017    Retinal detachment Mother     Retinitis pigmentosa Mother     Osteoporosis Mother     Diabetes type II Father          80    Heart attack Father 61    Coronary artery disease Father 61    No Known Problems Son     No Known Problems Son      Social History     Tobacco Use    Smoking status: Some Days     Types: Pipe     Start date: 1995     Last attempt to quit: 2023     Years since quittin.2    Smokeless tobacco: Never    Tobacco comments:     Occasional pipe or cigar < 6 times a month   Substance Use Topics    Alcohol use: Yes     Alcohol/week: 4.0 standard drinks of alcohol     Types: 3 Cans of beer, 1 Shots of liquor per week     Comment: occassional      No Known Allergies      Current Outpatient Medications:     albuterol (PROVENTIL HFA,VENTOLIN HFA) 90 mcg/act inhaler, INHALE 2 PUFFS EVERY 4 (FOUR) HOURS AS NEEDED (COUGH) FOR UP TO 10 DAYS, Disp: 8.5 g, Rfl: 1    anagrelide (AGRYLIN) 1 mg capsule, Take 1 capsule (1 mg total) by mouth 2 (two) times a day, Disp: 180 capsule, Rfl: 0    aspirin 325 mg tablet, Take 325 mg by mouth daily, Disp: , Rfl:     carbidopa-levodopa (SINEMET)  mg per tablet, Take 2 tablets by mouth 3 (three) times a day Follow clinic instructions (Patient taking differently: Take 2 tablets by mouth in the morning Follow clinic instructions. Rx per Neuro.), Disp: 540 tablet, Rfl: 3    Cholecalciferol (VITAMIN D) 2000 units CAPS, Take 2 capsules (4,000 Units total) by mouth daily, Disp: , Rfl: 0    co-enzyme Q-10 30 MG capsule, Take 1 capsule (30 mg total) by mouth daily, Disp: 30 capsule, Rfl: 0    ezetimibe (ZETIA) 10 mg tablet, Take 1 tablet (10 mg total) by mouth daily, Disp: 30 tablet, Rfl: 2    glucose blood (ONETOUCH VERIO) test strip, Check BG 2x per day (Patient taking differently: Check BG 2x per day.   Rx per Endo.), Disp: 200 each, Rfl: 3    Icosapent Ethyl (Vascepa) 1 g CAPS, Take 2 capsules (2 g total) by mouth 2 (two) times a day, Disp: 180 capsule, Rfl: 2    lisinopril (ZESTRIL) 20 mg tablet, TAKE 1 TABLET BY MOUTH DAILY, Disp: 100 tablet, Rfl: 1    mometasone (ELOCON) 0.1 % cream, Apply topically 2 (two) times a day as needed (Rash), Disp: 15 g, Rfl: 0    Multiple Vitamin (multivitamin) capsule, Take 1 capsule by mouth daily, Disp: , Rfl:     pravastatin (PRAVACHOL) 80 mg tablet, TAKE 1 TABLET BY MOUTH DAILY AT BEDTIME, Disp: 90 tablet, Rfl: 3    sitaGLIPtin-metFORMIN (JANUMET)  MG per tablet, Take 1 tablet by mouth 2 (two) times a day with meals, Disp: 180 tablet, Rfl: 3    venlafaxine (EFFEXOR-XR) 37.5 mg 24 hr capsule, TAKE 1 CAPSULE BY MOUTH EVERY DAY IN ADDITION TO 75MG, Disp: , Rfl:     venlafaxine (EFFEXOR-XR) 75 mg 24 hr capsule, Take 1 capsule (75 mg total) by mouth daily, Disp: 90 capsule, Rfl: 1    vardenafil (LEVITRA) 20 MG tablet, Take by mouth daily as needed (Patient not taking: Reported on 11/16/2023), Disp: , Rfl:     Review of Systems   Constitutional:  Positive for fatigue. Negative for chills and fever. HENT:  Negative for ear pain and sore throat. Eyes:  Negative for pain and visual disturbance. Respiratory:  Negative for cough and shortness of breath. Cardiovascular:  Negative for chest pain and palpitations. Gastrointestinal:  Negative for abdominal pain and vomiting. Genitourinary:  Negative for dysuria and hematuria. Musculoskeletal:  Negative for arthralgias and back pain. Skin:  Negative for color change and rash. Neurological:  Negative for seizures and syncope. All other systems reviewed and are negative. Physical Exam:  Body mass index is 27.58 kg/m². /98   Pulse 58   Ht 5' 10" (1.778 m)   Wt 87.2 kg (192 lb 3.2 oz)   SpO2 98%   BMI 27.58 kg/m²    Wt Readings from Last 3 Encounters:   11/16/23 87.2 kg (192 lb 3.2 oz)   10/24/23 90.2 kg (198 lb 12.8 oz)   08/09/23 87.1 kg (192 lb)       Physical Exam  Vitals reviewed. Constitutional:       Appearance: Normal appearance. HENT:      Head: Normocephalic and atraumatic. Cardiovascular:      Rate and Rhythm: Normal rate. Heart sounds: Normal heart sounds. Pulmonary:      Effort: Pulmonary effort is normal.      Breath sounds: Normal breath sounds. Neurological:      Mental Status: He is alert and oriented to person, place, and time.    Psychiatric:         Mood and Affect: Mood normal.         Behavior: Behavior normal.       Diabetic Foot Exam    Labs:   Lab Results   Component Value Date    HGBA1C 9.3 (H) 11/15/2023 HGBA1C 7.5 (A) 05/24/2023    HGBA1C 7.3 (H) 01/23/2023     Lab Results   Component Value Date    CREATININE 1.09 11/15/2023    CREATININE 1.21 10/16/2023    CREATININE 1.04 07/11/2023    BUN 19 11/15/2023     12/17/2015    K 4.7 11/15/2023     11/15/2023    CO2 28 11/15/2023     eGFR   Date Value Ref Range Status   11/15/2023 69 ml/min/1.73sq m Final     Lab Results   Component Value Date    CHOL 206 12/17/2015    HDL 44 11/15/2023    TRIG 202 (H) 11/15/2023     Lab Results   Component Value Date    ALT 26 11/15/2023    AST 18 11/15/2023    ALKPHOS 34 11/15/2023    BILITOT 0.47 12/17/2015     Lab Results   Component Value Date    ECO2AZSLNGEM 2.906 11/15/2023    ITZ0SWJBNWBK 2.192 01/23/2023    VDZ5XEGFLEDE 1.807 06/14/2022     Lab Results   Component Value Date    FREET4 0.57 (L) 11/15/2023         There are no Patient Instructions on file for this visit. Discussed with the patient and all questioned fully answered. He will call me if any problems arise.

## 2023-11-16 NOTE — ASSESSMENT & PLAN NOTE
Most recent TSH is inappropriately normal for low T4 found on lab work. Recommend patient repeat in 2 weeks. He is not taking any biotin or other medication that would appear to interfere with this. He has not had any head trauma in the past. Will review results once available.

## 2023-11-16 NOTE — ASSESSMENT & PLAN NOTE
Worsened from prior most likely due to decreased exercise and dietary excursions. After discussion with patient, he would like to work on his diet and exercise instead of adding more medications today. Repeat A1C in 3 months prior to next visit. Continue current medication regimen.      Lab Results   Component Value Date    HGBA1C 9.3 (H) 11/15/2023

## 2023-11-16 NOTE — ASSESSMENT & PLAN NOTE
Elevated in office today on 2 occasions. Foal BP < 130/80. Discussed with patient to monitor blood pressure at home.  If remains above 130/80, he should contact his PCP

## 2023-11-23 DIAGNOSIS — F32.A DEPRESSION, UNSPECIFIED DEPRESSION TYPE: ICD-10-CM

## 2023-11-24 RX ORDER — VENLAFAXINE HYDROCHLORIDE 75 MG/1
75 CAPSULE, EXTENDED RELEASE ORAL DAILY
Qty: 90 CAPSULE | Refills: 1 | Status: SHIPPED | OUTPATIENT
Start: 2023-11-24

## 2023-12-05 ENCOUNTER — APPOINTMENT (OUTPATIENT)
Dept: LAB | Facility: CLINIC | Age: 68
End: 2023-12-05
Payer: COMMERCIAL

## 2023-12-05 DIAGNOSIS — E78.2 MIXED HYPERLIPIDEMIA: ICD-10-CM

## 2023-12-05 DIAGNOSIS — E03.8 SUBCLINICAL HYPOTHYROIDISM: ICD-10-CM

## 2023-12-05 LAB
T4 FREE SERPL-MCNC: 0.65 NG/DL (ref 0.61–1.12)
TSH SERPL DL<=0.05 MIU/L-ACNC: 3.11 UIU/ML (ref 0.45–4.5)

## 2023-12-05 PROCEDURE — 36415 COLL VENOUS BLD VENIPUNCTURE: CPT

## 2023-12-05 PROCEDURE — 84443 ASSAY THYROID STIM HORMONE: CPT

## 2023-12-05 PROCEDURE — 84439 ASSAY OF FREE THYROXINE: CPT

## 2023-12-06 ENCOUNTER — TELEPHONE (OUTPATIENT)
Dept: HEMATOLOGY ONCOLOGY | Facility: CLINIC | Age: 68
End: 2023-12-06

## 2023-12-06 ENCOUNTER — OFFICE VISIT (OUTPATIENT)
Dept: HEMATOLOGY ONCOLOGY | Facility: CLINIC | Age: 68
End: 2023-12-06
Payer: COMMERCIAL

## 2023-12-06 VITALS
RESPIRATION RATE: 18 BRPM | TEMPERATURE: 97.9 F | HEIGHT: 70 IN | WEIGHT: 198 LBS | SYSTOLIC BLOOD PRESSURE: 128 MMHG | HEART RATE: 88 BPM | BODY MASS INDEX: 28.35 KG/M2 | OXYGEN SATURATION: 98 % | DIASTOLIC BLOOD PRESSURE: 68 MMHG

## 2023-12-06 DIAGNOSIS — D47.3 ESSENTIAL THROMBOCYTOSIS (HCC): Primary | ICD-10-CM

## 2023-12-06 DIAGNOSIS — Z15.89 JAK2 V617F MUTATION: ICD-10-CM

## 2023-12-06 PROCEDURE — 99215 OFFICE O/P EST HI 40 MIN: CPT | Performed by: INTERNAL MEDICINE

## 2023-12-06 RX ORDER — OMEGA-3/DHA/EPA/FISH OIL 300-1000MG
2 CAPSULE ORAL 2 TIMES DAILY
COMMUNITY

## 2023-12-06 NOTE — H&P (VIEW-ONLY)
"Andres Chacko  1955  [address] [facility]  [address] [facility]  [place] [id number]    Chief Complaint   Patient presents with    Follow-up     Assessment/plan:   1. Essential thrombocytosis    2. JAK2 V617F mutation     Tremayne Chacko is a 68-year-old male with essential thrombocytosis with JAK2 V617F mutation.  He was previously following with BENNY Mejia and now presents for transfer of care. He was previously on Hydrea 500 mg once daily did not tolerate due to side effects.  His dose was reduced to once daily on Monday, Wednesday, Friday.  He tolerated this for a longer period of time but then self discontinued due to feeling \"ill \".  Since discontinuing Hydrea he is back to baseline and has no constitutional complaints or concerns.  His platelet count remained greater than 500 K with mildly elevated WBC count.  Due to intolerance of Hydrea he was switched to Anagrelide 1 mg BID in November 2023.  He continues ASA 81 mg.  He notes compliance and tolerance with anagrelide.  I encouraged patient to have updated labs to assess treatment response as well as need for dose adjustments.  We reviewed updated guidelines necessitating bone marrow biopsy at the time of diagnosis.  Patient agreeable.  Referral made to IR for bone marrow biopsy.  He will continue current treatment and surveillance labs monthly.  Office follow-up in 3 months.      Oncology history:   Primary Diagnosis:  1.  Essential thrombocytosis.  Diagnosed April 2023.  2.  JAK2 V617F mutation      Previous Hematologic/ Oncologic Treatment:   Hydrea DC'd due to intolerance     Current Hematologic/ Oncologic Treatment:    Anagrelide 1 mg BID since November 2023.   ASA 81 mg    Test Results:  Pathology:     Radiology:  3/19/2023: abd US: no splenomegaly    Laboratory:  See below      History of present illness:   Tremayne Chacko is a 68-year-old male with past medical history significant for " "type 2 diabetes, CAD, HTN, Parkinson's disease, history of prostate cancer s/p prostatectomy in 2013.  Patient was previously following with BENNY Mejia for longstanding history of thrombocytosis since at least 2016.  He denies any personal history of thrombosis..  Subsequent workup noted JAK2 V6 17F mutation positive and patient was initiated on hydroxyurea which she subsequently stopped due to intolerance.  Patient does not state specific side effects, but notes it made him feel \"ill\".  He was switched to anagrelide 1 mg twice daily since November 2023.  Patient notes compliance with current treatment and denies any adverse effects.  No repeat labs available.  Patient presents for transfer of care/follow-up visit today.  Offers no new complaints.  Denies any chest pain, shortness of breath, abdominal pain, nausea, vomiting, diarrhea.  Denies any fever, night sweats or unexpected weight changes.  He has never had a bone marrow biopsy.      Denies personal or family hx of blood clots    Review of systems:   Review of Systems   Constitutional:  Negative for chills, fatigue and fever.   Eyes:  Negative for pain and visual disturbance.   Respiratory:  Negative for cough and shortness of breath.    Cardiovascular:  Negative for chest pain and palpitations.   Gastrointestinal:  Negative for abdominal pain and vomiting.   Genitourinary:  Negative for dysuria and hematuria.   Musculoskeletal:  Negative for arthralgias and back pain.   Skin:  Negative for color change and rash.   Neurological:  Negative for seizures and syncope.   All other systems reviewed and are negative.      Patient Active Problem List   Diagnosis    Arteriosclerosis of coronary artery    Benign essential hypertension    Depression    Type 2 diabetes mellitus without complication, without long-term current use of insulin (HCC)    Subclinical hypothyroidism    Essential (hemorrhagic) thrombocythemia (HCC)    Mixed hyperlipidemia    Leukocytosis "    Other male erectile dysfunction    Parkinson disease    Type 2 diabetes mellitus with hyperglycemia, without long-term current use of insulin (HCC)    Dystonia    History of stroke    Hyperkalemia    History of prostate cancer    Anxiety    Overweight     Past Medical History:   Diagnosis Date    Anxiety     Arteriosclerosis of coronary artery 2017    Benign essential hypertension 05/15/2015    Depression 2012    Essential (hemorrhagic) thrombocythemia (HCC)     History of prostate cancer 2023    History of stroke     Left carpal tunnel syndrome     Lumbar canal stenosis     Mixed hyperlipidemia 11/10/2010    Osteoarthritis, knee     Other male erectile dysfunction 2019    Overweight     Parkinson disease 10/08/2019    Pipe smoker     Retinal and vitreous disorder     Subclinical hypothyroidism 2017    Type 2 diabetes mellitus without complication, without long-term current use of insulin (HCC) 2017     Past Surgical History:   Procedure Laterality Date    ANKLE FRACTURE SURGERY Left 2009    triple fracture; 2 Screws in place    DEEP BRAIN STIMULATOR PLACEMENT Bilateral 2022    LUMBAR LAMINECTOMY      L5-S1    NASAL SEPTUM SURGERY  1989    [place] COLONOSCOPY FLX DX W/COLLJ SPEC WHEN PFRMD N/A 2016    Procedure: COLONOSCOPY;  Surgeon: Jigar Alvarado MD;  Location: [facility];  Service: Gastroenterology    PROSTATE BIOPSY  2012    managed by Rambo Roy, needle biopsy    PROSTATECTOMY  2013     Family History   Problem Relation Age of Onset    Hypertension Mother              Retinal detachment Mother     Retinitis pigmentosa Mother     Osteoporosis Mother     Diabetes type II Father              Heart attack Father 59    Coronary artery disease Father 59    No Known Problems Son     No Known Problems Son      Social History     Socioeconomic History    Marital status: /Civil Union     Spouse name: Brandi Velez of  children: 2    Years of education: Not on file    Highest education level: Not on file   Occupational History    Occupation: Retired - [employer]   Tobacco Use    Smoking status: Some Days     Types: Pipe     Start date: 1995     Last attempt to quit: 2023     Years since quittin.3    Smokeless tobacco: Never    Tobacco comments:     Occasional pipe or cigar < 6 times a month   Vaping Use    Vaping Use: Never used   Substance and Sexual Activity    Alcohol use: Yes     Alcohol/week: 4.0 standard drinks of alcohol     Types: 3 Cans of beer, 1 Shots of liquor per week     Comment: occassional     Drug use: Never    Sexual activity: Not Currently     Partners: Female     Birth control/protection: Surgical     Comment: Prostate removed on 2013   Other Topics Concern    Not on file   Social History Narrative        Lives with wifeBrandi    2 Children - 2 Sons    Retired - Electrical Engineering     Social Determinants of Health     Financial Resource Strain: Low Risk  (2023)    Overall Financial Resource Strain (CARDIA)     Difficulty of Paying Living Expenses: Not hard at all   Food Insecurity: Not on file   Transportation Needs: No Transportation Needs (2023)    PRAPARE - Transportation     Lack of Transportation (Medical): No     Lack of Transportation (Non-Medical): No   Physical Activity: Not on file   Stress: Not on file   Social Connections: Not on file   Intimate Partner Violence: Not on file   Housing Stability: Not on file       Current Outpatient Medications:     anagrelide (AGRYLIN) 1 mg capsule, Take 1 capsule (1 mg total) by mouth 2 (two) times a day, Disp: 180 capsule, Rfl: 0    aspirin 325 mg tablet, Take 325 mg by mouth daily, Disp: , Rfl:     Cholecalciferol (VITAMIN D) 2000 units CAPS, Take 2 capsules (4,000 Units total) by mouth daily, Disp: , Rfl: 0    co-enzyme Q-10 30 MG capsule, Take 1 capsule (30 mg total) by mouth daily, Disp: 30 capsule, Rfl: 0     ezetimibe (ZETIA) 10 mg tablet, Take 1 tablet (10 mg total) by mouth daily, Disp: 30 tablet, Rfl: 2    fish oil-omega-3 fatty acids 1000 MG capsule, Take 2 g by mouth 2 (two) times a day, Disp: , Rfl:     glucose blood (ONETOUCH VERIO) test strip, Check BG 2x per day (Patient taking differently: Check BG 2x per day.  Rx per Endo.), Disp: 200 each, Rfl: 3    lisinopril (ZESTRIL) 20 mg tablet, TAKE 1 TABLET BY MOUTH DAILY, Disp: 100 tablet, Rfl: 1    mometasone (ELOCON) 0.1 % cream, Apply topically 2 (two) times a day as needed (Rash), Disp: 15 g, Rfl: 0    Multiple Vitamin (multivitamin) capsule, Take 1 capsule by mouth daily, Disp: , Rfl:     pravastatin (PRAVACHOL) 80 mg tablet, TAKE 1 TABLET BY MOUTH DAILY AT BEDTIME, Disp: 90 tablet, Rfl: 3    sitaGLIPtin-metFORMIN (JANUMET)  MG per tablet, Take 1 tablet by mouth 2 (two) times a day with meals, Disp: 180 tablet, Rfl: 3    venlafaxine (EFFEXOR-XR) 75 mg 24 hr capsule, TAKE 1 CAPSULE BY MOUTH DAILY, Disp: 90 capsule, Rfl: 1    carbidopa-levodopa (SINEMET)  mg per tablet, Take 2 tablets by mouth 3 (three) times a day Follow clinic instructions (Patient taking differently: Take 2 tablets by mouth in the morning Follow clinic instructions.  Rx per Neuro.), Disp: 540 tablet, Rfl: 3    Icosapent Ethyl (Vascepa) 1 g CAPS, Take 2 capsules (2 g total) by mouth 2 (two) times a day, Disp: 180 capsule, Rfl: 2    vardenafil (LEVITRA) 20 MG tablet, Take by mouth daily as needed (Patient not taking: Reported on 11/16/2023), Disp: , Rfl:     venlafaxine (EFFEXOR-XR) 37.5 mg 24 hr capsule, TAKE 1 CAPSULE BY MOUTH EVERY DAY IN ADDITION TO 75MG (Patient not taking: Reported on 12/6/2023), Disp: , Rfl:   No Known Allergies    Vitals:    12/06/23 1439   BP: 128/68   Pulse: 88   Resp: 18   Temp: 97.9 °F (36.6 °C)   SpO2: 98%     Wt Readings from Last 3 Encounters:   12/06/23 89.8 kg (198 lb)   11/16/23 87.2 kg (192 lb 3.2 oz)   10/24/23 90.2 kg (198 lb 12.8 oz)      Performance Status: ECOG/Zubrod/WHO: 0 - Asymptomatic  Physical Exam  Vitals reviewed.   Constitutional:       General: He is not in acute distress.     Appearance: Normal appearance.   HENT:      Head: Normocephalic and atraumatic.      Mouth/Throat:      Mouth: Mucous membranes are moist.   Eyes:      Extraocular Movements: Extraocular movements intact.      Conjunctiva/sclera: Conjunctivae normal.   Cardiovascular:      Rate and Rhythm: Normal rate.   Pulmonary:      Effort: Pulmonary effort is normal. No respiratory distress.      Breath sounds: No wheezing, rhonchi or rales.   Abdominal:      General: Abdomen is flat. There is no distension.      Palpations: Abdomen is soft.   Musculoskeletal:      Cervical back: Normal range of motion.      Right lower leg: No edema.      Left lower leg: No edema.   Skin:     General: Skin is warm.      Coloration: Skin is not jaundiced.   Neurological:      General: No focal deficit present.      Mental Status: He is alert and oriented to person, place, and time. Mental status is at baseline.      Gait: Gait normal.   Psychiatric:         Thought Content: Thought content normal.         Labs:  9/14/2013: Hemoglobin 11.2, platelets 313, WBC 13.82  12/17/2015: Hemoglobin 15.1, platelets 407, WBC 8.28  10/22/2016: Hemoglobin 15.4, platelets 515, WBC 11.74  1/26/2017: Hemoglobin 14.7, platelets 405, WBC 10.9  12/29/2020: Hemoglobin 16.4, platelets 479, WBC 9.4  1/23/2023: Hemoglobin 16.2, platelets 522, WBC 9.6  7/11/2023: Hemoglobin 14.6, platelets 562, WBC 11.2  10/16/2023: Hemoglobin 13.7, platelets 501, WBC 11.12    11/15/2023: WBC: 9.37, H/H: 14.2/44.7, MCV: 94, platelets: 548    Orders Placed This Encounter   Procedures    CBC and differential    Comprehensive metabolic panel    Ambulatory Referral to Interventional Radiology     Return for Office Visit, Labs - See Treatment Plan.  1. Monthly CBC, CMP  2. Refer to IR for bone marrow biopsy  3. FU in 3 months

## 2023-12-06 NOTE — PROGRESS NOTES
Gopi Alexander  1955  [facility] [place] [id number]    Chief Complaint   Patient presents with    Follow-up     Assessment/plan:   1. Essential thrombocytosis    2. JAK2 V617F mutation     Justine Cano is a 27-year-old male with essential thrombocytosis with JAK2 V617F mutation. He was previously following with Juan Shaffer and now presents for transfer of care. He was previously on Hydrea 500 mg once daily did not tolerate due to side effects. His dose was reduced to once daily on Monday, Wednesday, Friday. He tolerated this for a longer period of time but then self discontinued due to feeling "ill ". Since discontinuing Hydrea he is back to baseline and has no constitutional complaints or concerns. His platelet count remained greater than 500 K with mildly elevated WBC count. Due to intolerance of Hydrea he was switched to Anagrelide 1 mg BID in November 2023. He continues ASA 81 mg. He notes compliance and tolerance with anagrelide. I encouraged patient to have updated labs to assess treatment response as well as need for dose adjustments. We reviewed updated guidelines necessitating bone marrow biopsy at the time of diagnosis. Patient agreeable. Referral made to IR for bone marrow biopsy. He will continue current treatment and surveillance labs monthly. Office follow-up in 3 months. Oncology history:   Primary Diagnosis:  1. Essential thrombocytosis. Diagnosed April 2023. 2.  JAK2 V617F mutation      Previous Hematologic/ Oncologic Treatment:   Hydrea DC'd due to intolerance     Current Hematologic/ Oncologic Treatment:    Anagrelide 1 mg BID since November 2023. ASA 81 mg    Test Results:  Pathology:     Radiology:  3/19/2023: abd US: no splenomegaly    Laboratory:  See below      History of present illness:    Justine Cano is a 27-year-old male with past medical history significant for type 2 diabetes, CAD, HTN, Parkinson's disease, history of prostate cancer s/p prostatectomy in 2013. Patient was previously following with BENNY Georges for longstanding history of thrombocytosis since at least 2016. He denies any personal history of thrombosis. .  Subsequent workup noted JAK2 V6 17F mutation positive and patient was initiated on hydroxyurea which she subsequently stopped due to intolerance. Patient does not state specific side effects, but notes it made him feel "ill". He was switched to anagrelide 1 mg twice daily since November 2023. Patient notes compliance with current treatment and denies any adverse effects. No repeat labs available. Patient presents for transfer of care/follow-up visit today. Offers no new complaints. Denies any chest pain, shortness of breath, abdominal pain, nausea, vomiting, diarrhea. Denies any fever, night sweats or unexpected weight changes. He has never had a bone marrow biopsy. Denies personal or family hx of blood clots    Review of systems:   Review of Systems   Constitutional:  Negative for chills, fatigue and fever. Eyes:  Negative for pain and visual disturbance. Respiratory:  Negative for cough and shortness of breath. Cardiovascular:  Negative for chest pain and palpitations. Gastrointestinal:  Negative for abdominal pain and vomiting. Genitourinary:  Negative for dysuria and hematuria. Musculoskeletal:  Negative for arthralgias and back pain. Skin:  Negative for color change and rash. Neurological:  Negative for seizures and syncope. All other systems reviewed and are negative.       Patient Active Problem List   Diagnosis    Arteriosclerosis of coronary artery    Benign essential hypertension    Depression    Type 2 diabetes mellitus without complication, without long-term current use of insulin (HCC)    Subclinical hypothyroidism    Essential (hemorrhagic) thrombocythemia (HCC)    Mixed hyperlipidemia    Leukocytosis Other male erectile dysfunction    Parkinson disease    Type 2 diabetes mellitus with hyperglycemia, without long-term current use of insulin (HCC)    Dystonia    History of stroke    Hyperkalemia    History of prostate cancer    Anxiety    Overweight     Past Medical History:   Diagnosis Date    Anxiety     Arteriosclerosis of coronary artery 2017    Benign essential hypertension 05/15/2015    Depression 2012    Essential (hemorrhagic) thrombocythemia ([address])     History of prostate cancer 2023    History of stroke     Left carpal tunnel syndrome     Lumbar canal stenosis     Mixed hyperlipidemia 11/10/2010    Osteoarthritis, knee     Other male erectile dysfunction 2019    Overweight     Parkinson disease 10/08/2019    Pipe smoker     Retinal and vitreous disorder     Subclinical hypothyroidism 2017    Type 2 diabetes mellitus without complication, without long-term current use of insulin ([address]) 2017     Past Surgical History:   Procedure Laterality Date    ANKLE FRACTURE SURGERY Left 2009    triple fracture; 2 Screws in place    [address] Bilateral 2022    LUMBAR LAMINECTOMY      L5-S1    NASAL SEPTUM SURGERY  1989    OK COLONOSCOPY FLX DX W/COLLJ SPEC WHEN PFRMD N/A 2016    Procedure: COLONOSCOPY;  Surgeon: Pete Grande MD;  Location: [facility];   Service: Gastroenterology    PROSTATE BIOPSY  2012    managed by Isa Encarnacion, needle biopsy    PROSTATECTOMY  2013     Family History   Problem Relation Age of Onset    Hypertension Mother              Retinal detachment Mother     Retinitis pigmentosa Mother     Osteoporosis Mother     Diabetes type II Father          80    Heart attack Father 61    Coronary artery disease Father 61    No Known Problems Son     No Known Problems Son      Social History     Socioeconomic History    Marital status: /Civil Union     Spouse name: Sabiha Patterson    Number of children: 2    Years of education: Not on file    Highest education level: Not on file   Occupational History    Occupation: Retired - Electrical [employer]   Tobacco Use    Smoking status: Some Days     Types: Pipe     Start date: 1995     Last attempt to quit: 2023     Years since quittin.3    Smokeless tobacco: Never    Tobacco comments:     Occasional pipe or cigar < 6 times a month   Vaping Use    Vaping Use: Never used   Substance and Sexual Activity    Alcohol use: Yes     Alcohol/week: 4.0 standard drinks of alcohol     Types: 3 Cans of beer, 1 Shots of liquor per week     Comment: occassional     Drug use: Never    Sexual activity: Not Currently     Partners: Female     Birth control/protection: Surgical     Comment: Prostate removed on 2013   Other Topics Concern    Not on file   Social History Narrative        Lives with wife, Sabiha Patterson    2 Children - 2 Sons    Retired - Electrical Engineering     Social Determinants of [address] Strain: [address]  (2023)    Overall Financial Resource Strain (CARDIA)     Difficulty of Paying Living Expenses: Not hard at all   Food Insecurity: Not on file   Transportation Needs: No Transportation Needs (2023)    PRAPARE - Transportation     Lack of Transportation (Medical): No     Lack of Transportation (Non-Medical):  No   Physical Activity: Not on file   Stress: Not on file   Social Connections: Not on file   Intimate Partner Violence: Not on file   Housing Stability: Not on file       Current Outpatient Medications:     anagrelide (AGRYLIN) 1 mg capsule, Take 1 capsule (1 mg total) by mouth 2 (two) times a day, Disp: 180 capsule, Rfl: 0    aspirin 325 mg tablet, Take 325 mg by mouth daily, Disp: , Rfl:     Cholecalciferol (VITAMIN D) 2000 units CAPS, Take 2 capsules (4,000 Units total) by mouth daily, Disp: , Rfl: 0    co-enzyme Q-10 30 MG capsule, Take 1 capsule (30 mg total) by mouth daily, Disp: 30 capsule, Rfl: 0 ezetimibe (ZETIA) 10 mg tablet, Take 1 tablet (10 mg total) by mouth daily, Disp: 30 tablet, Rfl: 2    fish oil-omega-3 fatty acids 1000 MG capsule, Take 2 g by mouth 2 (two) times a day, Disp: , Rfl:     glucose blood (ONETOUCH VERIO) test strip, Check BG 2x per day (Patient taking differently: Check BG 2x per day. Rx per Endo.), Disp: 200 each, Rfl: 3    lisinopril (ZESTRIL) 20 mg tablet, TAKE 1 TABLET BY MOUTH DAILY, Disp: 100 tablet, Rfl: 1    mometasone (ELOCON) 0.1 % cream, Apply topically 2 (two) times a day as needed (Rash), Disp: 15 g, Rfl: 0    Multiple Vitamin (multivitamin) capsule, Take 1 capsule by mouth daily, Disp: , Rfl:     pravastatin (PRAVACHOL) 80 mg tablet, TAKE 1 TABLET BY MOUTH DAILY AT BEDTIME, Disp: 90 tablet, Rfl: 3    sitaGLIPtin-metFORMIN (JANUMET)  MG per tablet, Take 1 tablet by mouth 2 (two) times a day with meals, Disp: 180 tablet, Rfl: 3    venlafaxine (EFFEXOR-XR) 75 mg 24 hr capsule, TAKE 1 CAPSULE BY MOUTH DAILY, Disp: 90 capsule, Rfl: 1    carbidopa-levodopa (SINEMET)  mg per tablet, Take 2 tablets by mouth 3 (three) times a day Follow clinic instructions (Patient taking differently: Take 2 tablets by mouth in the morning Follow clinic instructions.   Rx per Neuro.), Disp: 540 tablet, Rfl: 3    Icosapent Ethyl (Vascepa) 1 g CAPS, Take 2 capsules (2 g total) by mouth 2 (two) times a day, Disp: 180 capsule, Rfl: 2    vardenafil (LEVITRA) 20 MG tablet, Take by mouth daily as needed (Patient not taking: Reported on 11/16/2023), Disp: , Rfl:     venlafaxine (EFFEXOR-XR) 37.5 mg 24 hr capsule, TAKE 1 CAPSULE BY MOUTH EVERY DAY IN ADDITION TO 75MG (Patient not taking: Reported on 12/6/2023), Disp: , Rfl:   No Known Allergies    Vitals:    12/06/23 1439   BP: 128/68   Pulse: 88   Resp: 18   Temp: 97.9 °F (36.6 °C)   SpO2: 98%     Wt Readings from Last 3 Encounters:   12/06/23 89.8 kg (198 lb)   11/16/23 87.2 kg (192 lb 3.2 oz)   10/24/23 90.2 kg (198 lb 12.8 oz) Performance Status: ECOG/Zubrod/WHO: 0 - Asymptomatic  Physical Exam  Vitals reviewed. Constitutional:       General: He is not in acute distress. Appearance: Normal appearance. HENT:      Head: Normocephalic and atraumatic. Mouth/Throat:      Mouth: Mucous membranes are moist.   Eyes:      Extraocular Movements: Extraocular movements intact. Conjunctiva/sclera: Conjunctivae normal.   Cardiovascular:      Rate and Rhythm: Normal rate. Pulmonary:      Effort: Pulmonary effort is normal. No respiratory distress. Breath sounds: No wheezing, rhonchi or rales. Abdominal:      General: Abdomen is flat. There is no distension. Palpations: Abdomen is soft. Musculoskeletal:      Cervical back: Normal range of motion. Right lower leg: No edema. Left lower leg: No edema. Skin:     General: Skin is warm. Coloration: Skin is not jaundiced. Neurological:      General: No focal deficit present. Mental Status: He is alert and oriented to person, place, and time. Mental status is at baseline. Gait: Gait normal.   Psychiatric:         Thought Content: Thought content normal.         Labs:  9/14/2013: Hemoglobin 11.2, platelets 585, WBC 54.86  12/17/2015: Hemoglobin 15.1, platelets 810, WBC 9.71  10/22/2016: Hemoglobin 15.4, platelets 837, WBC 92.44  1/26/2017: Hemoglobin 14.7, platelets 025, WBC 86.5  12/29/2020: Hemoglobin 16.4, platelets 890, WBC 9.4  1/23/2023: Hemoglobin 16.2, platelets 048, WBC 9.6  7/11/2023: Hemoglobin 14.6, platelets 370, WBC 71.9  10/16/2023: Hemoglobin 13.7, platelets 908, WBC 49.25    11/15/2023: WBC: 9.37, H/H: 14.2/44.7, MCV: 94, platelets: 670    Orders Placed This Encounter   Procedures    CBC and differential    Comprehensive metabolic panel    Ambulatory Referral to Interventional Radiology     Return for Office Visit, Labs - See Treatment Plan. 1. Monthly CBC, CMP  2.  Refer to IR for bone marrow biopsy  3. FU in 3 months

## 2023-12-10 DIAGNOSIS — E78.2 MIXED HYPERLIPIDEMIA: ICD-10-CM

## 2023-12-11 RX ORDER — EZETIMIBE 10 MG/1
10 TABLET ORAL DAILY
Qty: 90 TABLET | Refills: 0 | Status: SHIPPED | OUTPATIENT
Start: 2023-12-11

## 2023-12-11 NOTE — PRE-PROCEDURE INSTRUCTIONS
Pre-procedure Instructions for Interventional Radiology  [place] [address] [place] [address]   [id number]  INTERVENTIONAL RADIOLOGY [phone]    You are scheduled for a/an Bone Marrow Biopsy.    On Thursday 12/21/23.    Your arrival time is 1200.  Our Interventional Radiology nurse will notify you a few days before your procedure with the exact arrival time and location to arrive.    To prepare for your procedure:  Please arrange for someone to drive you home after the procedure and stay with you until the next morning if you are instructed to do so.  This is typically for patients receiving some type of sedative or anesthetic for the procedure.  DO NOT EAT OR DRINK ANYTHING after midnight on the evening before your procedure including candy & gum.  ONLY SIPS OF WATER with your medications are allowed on the morning of your procedure.  TAKE ALL OF YOUR REGULAR MEDICATIONS THE MORNING OF YOUR PROCEDURE with sips of water!  We may call you to stop some of your blood sugar, blood pressure and blood thinning medications depending on the procedure.  Please take all of these medications unless we instruct you to stop them.  If you have an allergy to x-ray dye, please contact Interventional Radiology for an x-ray dye preparation which usually consists of an oral steroid and Benadryl.    The day of your procedure:  Bring a list of the medications you take at home.  Bring medications you take for breathing problems (such as inhalers), medications for chest pain, or both.  Bring your insurance card and a form of photo ID.  Bring a case for your glasses or contacts.  Please leave all valuables such as credit cards and jewelry at home.  Report to the registration desk in the main lobby at the [facility].  Ask to be directed to the After Procedure Unit on the 2nd floor.  While your procedure is being performed your family may wait in the Waiting Room on the 2nd floor.  Be prepared to stay overnight just in  case. Sometimes procedures will indicate the need for further observation or treatment.   If you are scheduled for a follow-up visit with the Interventional Radiologist after your procedure, you will be called with a date and time.    Special Instructions (Medications to alter or stop taking before your procedure etc.):  Hold AM Metformin 12/21/23.

## 2023-12-21 ENCOUNTER — HOSPITAL ENCOUNTER (OUTPATIENT)
Dept: CT IMAGING | Facility: HOSPITAL | Age: 68
End: 2023-12-21
Payer: COMMERCIAL

## 2023-12-21 VITALS
OXYGEN SATURATION: 92 % | SYSTOLIC BLOOD PRESSURE: 142 MMHG | RESPIRATION RATE: 16 BRPM | HEIGHT: 70 IN | DIASTOLIC BLOOD PRESSURE: 75 MMHG | BODY MASS INDEX: 27.96 KG/M2 | HEART RATE: 78 BPM | TEMPERATURE: 96.7 F | WEIGHT: 195.3 LBS

## 2023-12-21 DIAGNOSIS — I10 BENIGN ESSENTIAL HYPERTENSION: ICD-10-CM

## 2023-12-21 DIAGNOSIS — E03.8 SUBCLINICAL HYPOTHYROIDISM: ICD-10-CM

## 2023-12-21 DIAGNOSIS — E66.3 OVERWEIGHT: Primary | ICD-10-CM

## 2023-12-21 DIAGNOSIS — E11.65 TYPE 2 DIABETES MELLITUS WITH HYPERGLYCEMIA, WITHOUT LONG-TERM CURRENT USE OF INSULIN (HCC): ICD-10-CM

## 2023-12-21 DIAGNOSIS — E78.2 MIXED HYPERLIPIDEMIA: ICD-10-CM

## 2023-12-21 DIAGNOSIS — E11.9 TYPE 2 DIABETES MELLITUS WITHOUT COMPLICATION, WITHOUT LONG-TERM CURRENT USE OF INSULIN (HCC): ICD-10-CM

## 2023-12-21 DIAGNOSIS — N52.8 OTHER MALE ERECTILE DYSFUNCTION: ICD-10-CM

## 2023-12-21 DIAGNOSIS — G24.9 DYSTONIA: ICD-10-CM

## 2023-12-21 DIAGNOSIS — Z86.73 HISTORY OF STROKE: ICD-10-CM

## 2023-12-21 DIAGNOSIS — Z85.46 HISTORY OF PROSTATE CANCER: ICD-10-CM

## 2023-12-21 DIAGNOSIS — E87.5 HYPERKALEMIA: ICD-10-CM

## 2023-12-21 DIAGNOSIS — F41.9 ANXIETY: ICD-10-CM

## 2023-12-21 DIAGNOSIS — D47.3 ESSENTIAL THROMBOCYTOSIS (HCC): ICD-10-CM

## 2023-12-21 DIAGNOSIS — Z15.89 JAK2 V617F MUTATION: ICD-10-CM

## 2023-12-21 DIAGNOSIS — I25.10 ARTERIOSCLEROSIS OF CORONARY ARTERY: ICD-10-CM

## 2023-12-21 DIAGNOSIS — D47.3 ESSENTIAL (HEMORRHAGIC) THROMBOCYTHEMIA (HCC): ICD-10-CM

## 2023-12-21 LAB
ERYTHROCYTE [DISTWIDTH] IN BLOOD BY AUTOMATED COUNT: 13.3 % (ref 11.6–15.1)
GLUCOSE SERPL-MCNC: 223 MG/DL (ref 65–140)
HCT VFR BLD AUTO: 45.6 % (ref 36.5–49.3)
HGB BLD-MCNC: 15 G/DL (ref 12–17)
MCH RBC QN AUTO: 30.2 PG (ref 26.8–34.3)
MCHC RBC AUTO-ENTMCNC: 32.9 G/DL (ref 31.4–37.4)
MCV RBC AUTO: 92 FL (ref 82–98)
NRBC BLD AUTO-RTO: 0 /100 WBCS
PLATELET # BLD AUTO: 370 THOUSANDS/UL (ref 149–390)
PMV BLD AUTO: 11.9 FL (ref 8.9–12.7)
RBC # BLD AUTO: 4.97 MILLION/UL (ref 3.88–5.62)
WBC # BLD AUTO: 12.16 THOUSAND/UL (ref 4.31–10.16)

## 2023-12-21 PROCEDURE — 88342 IMHCHEM/IMCYTCHM 1ST ANTB: CPT | Performed by: STUDENT IN AN ORGANIZED HEALTH CARE EDUCATION/TRAINING PROGRAM

## 2023-12-21 PROCEDURE — 88364 INSITU HYBRIDIZATION (FISH): CPT | Performed by: STUDENT IN AN ORGANIZED HEALTH CARE EDUCATION/TRAINING PROGRAM

## 2023-12-21 PROCEDURE — 88341 IMHCHEM/IMCYTCHM EA ADD ANTB: CPT | Performed by: STUDENT IN AN ORGANIZED HEALTH CARE EDUCATION/TRAINING PROGRAM

## 2023-12-21 PROCEDURE — 85007 BL SMEAR W/DIFF WBC COUNT: CPT | Performed by: RADIOLOGY

## 2023-12-21 PROCEDURE — 88305 TISSUE EXAM BY PATHOLOGIST: CPT | Performed by: STUDENT IN AN ORGANIZED HEALTH CARE EDUCATION/TRAINING PROGRAM

## 2023-12-21 PROCEDURE — 82948 REAGENT STRIP/BLOOD GLUCOSE: CPT

## 2023-12-21 PROCEDURE — 88365 INSITU HYBRIDIZATION (FISH): CPT | Performed by: STUDENT IN AN ORGANIZED HEALTH CARE EDUCATION/TRAINING PROGRAM

## 2023-12-21 PROCEDURE — 85097 BONE MARROW INTERPRETATION: CPT | Performed by: STUDENT IN AN ORGANIZED HEALTH CARE EDUCATION/TRAINING PROGRAM

## 2023-12-21 PROCEDURE — 88311 DECALCIFY TISSUE: CPT | Performed by: STUDENT IN AN ORGANIZED HEALTH CARE EDUCATION/TRAINING PROGRAM

## 2023-12-21 PROCEDURE — 88313 SPECIAL STAINS GROUP 2: CPT | Performed by: STUDENT IN AN ORGANIZED HEALTH CARE EDUCATION/TRAINING PROGRAM

## 2023-12-21 RX ORDER — MIDAZOLAM HYDROCHLORIDE 2 MG/2ML
INJECTION, SOLUTION INTRAMUSCULAR; INTRAVENOUS AS NEEDED
Status: COMPLETED | OUTPATIENT
Start: 2023-12-21 | End: 2023-12-21

## 2023-12-21 RX ORDER — SODIUM CHLORIDE 9 MG/ML
75 INJECTION, SOLUTION INTRAVENOUS CONTINUOUS
Status: DISCONTINUED | OUTPATIENT
Start: 2023-12-21 | End: 2023-12-25 | Stop reason: HOSPADM

## 2023-12-21 RX ORDER — LIDOCAINE HYDROCHLORIDE 10 MG/ML
INJECTION, SOLUTION EPIDURAL; INFILTRATION; INTRACAUDAL; PERINEURAL AS NEEDED
Status: COMPLETED | OUTPATIENT
Start: 2023-12-21 | End: 2023-12-21

## 2023-12-21 RX ORDER — FENTANYL CITRATE 50 UG/ML
INJECTION, SOLUTION INTRAMUSCULAR; INTRAVENOUS AS NEEDED
Status: COMPLETED | OUTPATIENT
Start: 2023-12-21 | End: 2023-12-21

## 2023-12-21 RX ADMIN — FENTANYL CITRATE 25 MCG: 50 INJECTION, SOLUTION INTRAMUSCULAR; INTRAVENOUS at 13:55

## 2023-12-21 RX ADMIN — FENTANYL CITRATE 50 MCG: 50 INJECTION, SOLUTION INTRAMUSCULAR; INTRAVENOUS at 13:46

## 2023-12-21 RX ADMIN — MIDAZOLAM HYDROCHLORIDE 0.5 MG: 1 INJECTION, SOLUTION INTRAMUSCULAR; INTRAVENOUS at 13:55

## 2023-12-21 RX ADMIN — MIDAZOLAM HYDROCHLORIDE 1 MG: 1 INJECTION, SOLUTION INTRAMUSCULAR; INTRAVENOUS at 13:46

## 2023-12-21 RX ADMIN — LIDOCAINE HYDROCHLORIDE 10 ML: 10 INJECTION, SOLUTION EPIDURAL; INFILTRATION; INTRACAUDAL at 13:52

## 2023-12-21 NOTE — BRIEF OP NOTE (RAD/CATH)
INTERVENTIONAL RADIOLOGY PROCEDURE NOTE    Date: 12/21/2023    Procedure:   Procedure Summary       Date: 12/21/23 Room / Location: [facility] CT Scan    Anesthesia Start:  Anesthesia Stop:     Procedure: IR BIOPSY BONE MARROW Diagnosis:       Essential thrombocytosis (HCC)      JAK2 V617F mutation      (Essential thrombocytosis, JAK2 V617F mutation)    Scheduled Providers:  Responsible Provider:     Anesthesia Type: Not recorded ASA Status: Not recorded            Preoperative diagnosis:   1. Essential thrombocytosis (HCC)    2. JAK2 V617F mutation         Postoperative diagnosis: Same.    Surgeon: Kelsea rFazier MD     Assistant: None. No qualified resident was available.    Blood loss: 6 mL    Specimens: 5 mL marrow, 1 bone core.    Findings: Successful image guided bone marrow biopsy obtained from right ilium.    Complications: None immediate.    Anesthesia: conscious sedation

## 2023-12-21 NOTE — INTERVAL H&P NOTE
"The history and physical were reviewed, along with progress notes, and the patient was examined. There are no changes since it was written.    /75   Pulse 77   Temp (!) 96.3 °F (35.7 °C) (Temporal)   Resp 16   Ht 5' 10\" (1.778 m)   Wt 88.6 kg (195 lb 4.8 oz)   SpO2 99%   BMI 28.02 kg/m²        Kelsea Frazier MD/December 21, 2023/1:23 PM  "

## 2023-12-22 LAB
BASOPHILS NFR BLD MANUAL: 1 % (ref 0–1)
IMM EOSINOPHIL NFR BLD MANUAL: 4 % (ref 0–6)
LYMPHOCYTES NFR BLD: 25 % (ref 14–44)
MONOCYTES NFR BLD AUTO: 6 % (ref 4–12)
NEUTS BAND NFR BLD MANUAL: 2 THOUSAND/UL
NEUTS SEG NFR BLD AUTO: 62 % (ref 45–77)
PLATELET BLD QL SMEAR: ABNORMAL
RBC MORPH BLD: NORMAL
TOTAL CELLS COUNTED SPEC: 100

## 2024-01-03 PROCEDURE — 85060 BLOOD SMEAR INTERPRETATION: CPT | Performed by: STUDENT IN AN ORGANIZED HEALTH CARE EDUCATION/TRAINING PROGRAM

## 2024-01-03 PROCEDURE — 88365 INSITU HYBRIDIZATION (FISH): CPT | Performed by: STUDENT IN AN ORGANIZED HEALTH CARE EDUCATION/TRAINING PROGRAM

## 2024-01-03 PROCEDURE — 88311 DECALCIFY TISSUE: CPT | Performed by: STUDENT IN AN ORGANIZED HEALTH CARE EDUCATION/TRAINING PROGRAM

## 2024-01-03 PROCEDURE — 88341 IMHCHEM/IMCYTCHM EA ADD ANTB: CPT | Performed by: STUDENT IN AN ORGANIZED HEALTH CARE EDUCATION/TRAINING PROGRAM

## 2024-01-03 PROCEDURE — 88313 SPECIAL STAINS GROUP 2: CPT | Performed by: STUDENT IN AN ORGANIZED HEALTH CARE EDUCATION/TRAINING PROGRAM

## 2024-01-03 PROCEDURE — 85097 BONE MARROW INTERPRETATION: CPT | Performed by: STUDENT IN AN ORGANIZED HEALTH CARE EDUCATION/TRAINING PROGRAM

## 2024-01-03 PROCEDURE — 88305 TISSUE EXAM BY PATHOLOGIST: CPT | Performed by: STUDENT IN AN ORGANIZED HEALTH CARE EDUCATION/TRAINING PROGRAM

## 2024-01-03 PROCEDURE — 88342 IMHCHEM/IMCYTCHM 1ST ANTB: CPT | Performed by: STUDENT IN AN ORGANIZED HEALTH CARE EDUCATION/TRAINING PROGRAM

## 2024-01-03 PROCEDURE — 88364 INSITU HYBRIDIZATION (FISH): CPT | Performed by: STUDENT IN AN ORGANIZED HEALTH CARE EDUCATION/TRAINING PROGRAM

## 2024-01-08 ENCOUNTER — OFFICE VISIT (OUTPATIENT)
Dept: FAMILY MEDICINE CLINIC | Facility: CLINIC | Age: 69
End: 2024-01-08
Payer: COMMERCIAL

## 2024-01-08 VITALS
HEART RATE: 89 BPM | TEMPERATURE: 98 F | SYSTOLIC BLOOD PRESSURE: 130 MMHG | HEIGHT: 70 IN | OXYGEN SATURATION: 100 % | RESPIRATION RATE: 16 BRPM | DIASTOLIC BLOOD PRESSURE: 80 MMHG | BODY MASS INDEX: 27.57 KG/M2 | WEIGHT: 192.6 LBS

## 2024-01-08 DIAGNOSIS — F32.0 CURRENT MILD EPISODE OF MAJOR DEPRESSIVE DISORDER, UNSPECIFIED WHETHER RECURRENT (HCC): ICD-10-CM

## 2024-01-08 DIAGNOSIS — M79.10 MYALGIA: ICD-10-CM

## 2024-01-08 DIAGNOSIS — E66.3 OVERWEIGHT: ICD-10-CM

## 2024-01-08 DIAGNOSIS — E11.65 TYPE 2 DIABETES MELLITUS WITH HYPERGLYCEMIA, WITHOUT LONG-TERM CURRENT USE OF INSULIN (HCC): ICD-10-CM

## 2024-01-08 DIAGNOSIS — M60.9 MYOSITIS, UNSPECIFIED MYOSITIS TYPE, UNSPECIFIED SITE: ICD-10-CM

## 2024-01-08 DIAGNOSIS — E11.9 TYPE 2 DIABETES MELLITUS WITHOUT COMPLICATION, WITHOUT LONG-TERM CURRENT USE OF INSULIN (HCC): Primary | ICD-10-CM

## 2024-01-08 DIAGNOSIS — D47.3 ESSENTIAL (HEMORRHAGIC) THROMBOCYTHEMIA (HCC): ICD-10-CM

## 2024-01-08 DIAGNOSIS — E78.2 MIXED HYPERLIPIDEMIA: ICD-10-CM

## 2024-01-08 DIAGNOSIS — Z85.46 HISTORY OF PROSTATE CANCER: ICD-10-CM

## 2024-01-08 DIAGNOSIS — F41.9 ANXIETY: ICD-10-CM

## 2024-01-08 DIAGNOSIS — I10 BENIGN ESSENTIAL HYPERTENSION: ICD-10-CM

## 2024-01-08 DIAGNOSIS — E03.8 SUBCLINICAL HYPOTHYROIDISM: ICD-10-CM

## 2024-01-08 DIAGNOSIS — Z12.5 ENCOUNTER FOR SCREENING FOR MALIGNANT NEOPLASM OF PROSTATE: ICD-10-CM

## 2024-01-08 DIAGNOSIS — F32.A DEPRESSION, UNSPECIFIED DEPRESSION TYPE: ICD-10-CM

## 2024-01-08 DIAGNOSIS — I25.10 ARTERIOSCLEROSIS OF CORONARY ARTERY: ICD-10-CM

## 2024-01-08 DIAGNOSIS — Z86.73 HISTORY OF STROKE: ICD-10-CM

## 2024-01-08 DIAGNOSIS — N52.8 OTHER MALE ERECTILE DYSFUNCTION: ICD-10-CM

## 2024-01-08 DIAGNOSIS — G20.A1 PARKINSON'S DISEASE, UNSPECIFIED WHETHER DYSKINESIA PRESENT, UNSPECIFIED WHETHER MANIFESTATIONS FLUCTUATE: ICD-10-CM

## 2024-01-08 DIAGNOSIS — Z23 ENCOUNTER FOR IMMUNIZATION: ICD-10-CM

## 2024-01-08 PROCEDURE — 99214 OFFICE O/P EST MOD 30 MIN: CPT | Performed by: FAMILY MEDICINE

## 2024-01-08 PROCEDURE — 90677 PCV20 VACCINE IM: CPT

## 2024-01-08 PROCEDURE — G0009 ADMIN PNEUMOCOCCAL VACCINE: HCPCS

## 2024-01-08 RX ORDER — VENLAFAXINE HYDROCHLORIDE 75 MG/1
CAPSULE, EXTENDED RELEASE ORAL
Qty: 90 CAPSULE | Refills: 1 | Status: SHIPPED | OUTPATIENT
Start: 2024-01-08

## 2024-01-08 RX ORDER — VENLAFAXINE HYDROCHLORIDE 37.5 MG/1
37.5 CAPSULE, EXTENDED RELEASE ORAL DAILY
Qty: 90 CAPSULE | Refills: 1 | Status: SHIPPED | OUTPATIENT
Start: 2024-01-08

## 2024-01-08 RX ORDER — LISINOPRIL 20 MG/1
20 TABLET ORAL DAILY
Qty: 90 TABLET | Refills: 1 | Status: SHIPPED | OUTPATIENT
Start: 2024-01-08

## 2024-01-08 RX ORDER — PRAVASTATIN SODIUM 80 MG/1
80 TABLET ORAL
Qty: 90 TABLET | Refills: 1 | Status: SHIPPED | OUTPATIENT
Start: 2024-01-08

## 2024-01-08 NOTE — PROGRESS NOTES
Diabetic Foot Exam    Patient's shoes and socks removed.    Right Foot/Ankle   Right Foot Inspection  Skin Exam: skin normal, skin intact and dry skin. No warmth, no callus, no erythema, no maceration, no abnormal color, no pre-ulcer, no ulcer and no callus.     Toe Exam: ROM and strength within normal limits.     Sensory   Monofilament testing: intact    Vascular  Capillary refills: elevated  The right DP pulse is 2+.     Left Foot/Ankle  Left Foot Inspection  Skin Exam: skin normal, skin intact and dry skin. No warmth, no erythema, no maceration, normal color, no pre-ulcer, no ulcer and no callus.     Toe Exam: ROM and strength within normal limits.     Sensory   Monofilament testing: intact    Vascular  Capillary refills: elevated  The left DP pulse is 2+.     Assign Risk Category  No deformity present  No loss of protective sensation  No weak pulses  Risk: 0

## 2024-01-08 NOTE — PROGRESS NOTES
Assessment/Plan:  Problem List Items Addressed This Visit        Endocrine    Type 2 diabetes mellitus without complication, without long-term current use of insulin (HCC) - Primary       Lab Results   Component Value Date    HGBA1C 9.3 (H) 11/15/2023       Lab Results   Component Value Date    HGBA1C 9.3 (H) 11/15/2023       Lab Results   Component Value Date    HGBA1C 9.3 (H) 11/15/2023       Lab Results   Component Value Date    HGBA1C 9.3 (H) 11/15/2023     Uncontrolled.  Management per Endo.  Recommend lifestyle modifications.           Relevant Medications    lisinopril (ZESTRIL) 20 mg tablet    Other Relevant Orders    Comprehensive metabolic panel    Subclinical hypothyroidism     Management per Endo.  Not currently taking Synthroid.           Type 2 diabetes mellitus with hyperglycemia, without long-term current use of insulin (HCC)       Lab Results   Component Value Date    HGBA1C 9.3 (H) 11/15/2023       Lab Results   Component Value Date    HGBA1C 9.3 (H) 11/15/2023     Uncontrolled.  Management per Endo.  Recommend lifestyle modifications.           Relevant Medications    lisinopril (ZESTRIL) 20 mg tablet    Other Relevant Orders    Comprehensive metabolic panel       Cardiovascular and Mediastinum    Benign essential hypertension     Stable on Lisinopril 20mg QD.  Check blood pressure outside of office.  Recommend lifestyle modifications.           Relevant Medications    lisinopril (ZESTRIL) 20 mg tablet    Arteriosclerosis of coronary artery     Asymptomatic.  Management per Cardio - Continue ASA, statin, ACE-I.  Recommend lifestyle modifications.    LDL not at goal for CAD.  On increased Pravachol 80mg QHS.            Nervous and Auditory    Parkinson disease     Management per [facility].  S/p Deep brain stimulator 9/22.  On Sinemet 25-100mg 1 tab QD.  Continue [facility].             Relevant Orders    Vitamin D 25 hydroxy       Hematopoietic and Hemostatic     Essential (hemorrhagic) thrombocythemia (HCC)     Management per Heme/Onc.  Pipe smoking may be contributory.              Other    Depression     Stable on Effexor .5 mg daily.         Relevant Medications    venlafaxine (EFFEXOR-XR) 37.5 mg 24 hr capsule    venlafaxine (EFFEXOR-XR) 75 mg 24 hr capsule    Mixed hyperlipidemia     Pending labs.  Previously uncontrolled as LDL not at goal for CAD.  On increased Pravachol 80mg QHS.  Recommend lifestyle modifications.         Relevant Medications    pravastatin (PRAVACHOL) 80 mg tablet    Other Relevant Orders    Comprehensive metabolic panel    LDL cholesterol, direct    Anxiety     Stable on Effexor .5 mg daily.         Relevant Medications    venlafaxine (EFFEXOR-XR) 37.5 mg 24 hr capsule    venlafaxine (EFFEXOR-XR) 75 mg 24 hr capsule    Overweight     Stable.  Recommend lifestyle modifications.         Other male erectile dysfunction     Not currently taking Levitra 20mg PRN.           History of stroke     Management per Neuro.  Continue ASA, statin . Recommend lifestyle modifications.           History of prostate cancer     Patient declined continued Uro F/U and desires yearly PSA per PCP.  Status post DaVinci robot prostatectomy performed at [facility] 2013.           Relevant Orders    PSA, Total Screen   Other Visit Diagnoses     Encounter for screening for malignant neoplasm of prostate        Relevant Orders    PSA, Total Screen    Myalgia        Relevant Orders    Vitamin D 25 hydroxy    Myositis, unspecified myositis type, unspecified site        Relevant Orders    Vitamin D 25 hydroxy    Encounter for immunization        Relevant Orders    Pneumococcal Conjugate Vaccine 20-valent (Pcv20) (Completed)           Return in about 6 months (around 7/8/2024) for AWV / 6mo- DM2, HTN, HL, Anx/Dep, ET, H/O Prostate CA, Smoker, Labs.      Future Appointments   Date Time Provider Department Center   2/27/2024 12:30 PM [facility]   3/6/2024 10:20 AM Sophia Ansari, DO [facility]   7/10/2024 10:40 AM Mireille Martines DO [facility]        Subjective:     Andres is a 68 y.o. male who presents today for a follow-up on his chronic medical conditions.        HPI:  Chief Complaint   Patient presents with   • Follow-up     6mo- DM2, HTN, HL, Anx/Dep, ET, H/O Prostate CA, Smoker, Labs. No new problems or concerns at this time.    • HM     PCV20 today. DM foot at end of visit. DM eye exam done June 2023, [facility]. No additional covid boosters.      -- Above per clinical staff and reviewed. --      HPI      Today:      Return in about 6 months (around 1/6/2024) for 6mo- DM2, HTN, HL, Anx/Dep, ET, H/O Prostate CA, Smoker, Labs     4mo OV     Patient did not complete labs 7/6/23.     Overweight - Watching diet.  +Regular exercise - Parkinson's Boxing program at [facility] for 90 minutes, 2 times per week, Walking for 30 minutes, 7 times per week.       DM2 - Management per Endo Dr. Turner.  Next appt 2/24.  Last DM Education 5/23 - next appt PRN.  Sees [facility] - Last seen 6/23 - next appt 6/24.   No Podiatry.       HTN - On Lisinopril 20mg 1 pill QD.  No BP check outside of office.  No other HTN meds previously.      Subclinical Hypothyroidism - Management per Endo Dr. Turner.  Next appt 2/24.  Patient is not taking Synthroid currently.        Hyperlipidemia - On increased Pravachol 80mg QHS, On Zetia 10mg QD and Vascepa 1gram 2 caps BID per Endo.  Previously on Zocor 40mg QHS.- pt unsure why D/C.  No higher dose or other statin previously.       CAD - Management per Cardio Dr. Ward.  Next appt 4/24.     Parkinson's Disease - Management per [facility] Dr. Waddell.  Next appt 2/7/24.  S/p Deep brain stimulator 9/22.  On Sinemet 25-100mg 1 tab in AM.  Attending individual [facility] 2 times weekly since 2/23.       H/O CVA -  Unsure of date - 2/19?  Management per [facility] Dr. Waddell.  Next appt .       Left Carpal Tunnel Syndrome - Management per [facility] Dr. Waddell.  Next appt .  Uses a brace PRN.  Declined EMG / surgery referral per Neuro.      Depression / Anxiety - Mood is stable.  On Effexor .5mg QD per [facility] since 23.  He is unsure if he's taken high dose or other mood meds previously.  He last attended counseling in the late  and states it was not helpful.  Patients thinks he might be slightly autistic.  Good social supports.  No SI/HI/AH/VH.         PHQ-2/9 Depression Screening    Little interest or pleasure in doing things: 0 - not at all  Feeling down, depressed, or hopeless: 0 - not at all  Trouble falling or staying asleep, or sleeping too much: 0 - not at all  Feeling tired or having little energy: 0 - not at all  Poor appetite or overeatin - not at all  Feeling bad about yourself - or that you are a failure or have let yourself or your family down: 0 - not at all  Trouble concentrating on things, such as reading the newspaper or watching television: 0 - not at all  Moving or speaking so slowly that other people could have noticed. Or the opposite - being so fidgety or restless that you have been moving around a lot more than usual: 0 - not at all  Thoughts that you would be better off dead, or of hurting yourself in some way: 0 - not at all  PHQ-9 Score: 0  PHQ-9 Interpretation: No or Minimal depression       HEMANT-7 Flowsheet Screening    Flowsheet Row Most Recent Value   Over the last 2 weeks, how often have you been bothered by any of the following problems?    Feeling nervous, anxious, or on edge 0   Not being able to stop or control worrying 0   Worrying too much about different things 0   Trouble relaxing 0   Being so restless that it is hard to sit still 0   Becoming easily annoyed or irritable 0   Feeling afraid as if something awful might happen 0   HEMANT-7 Total Score 0           H/O Prostate Cancer  - Management per [facility] Mr. Camilo Elliott [facility].  Next appt 8/21 - Overdue PRN as patient declined F/U and desires yearly PSA per PCP.  Status post DaVinci robot prostatectomy performed at [facility] 2013.     ED - No longer taking Levitra 20mg QDPRN.       Essential Thrombocythemia / Myeloproliferative disorder / JAK2 [id number] Mutation - Management per Heme/Onc Dr. Ansari - next appt 3/24.  On Anagrelide.  S/p IR Bone Marrow Biopsy 12/21/23.       Pipe Smoker - Contemplative, considering quitting cold turkey. Intermittent, usually over winter.  Pipe smoking 1 time per week in cold weather, 0 times per week in hot weather.        Reviewed:  Labs 12/21/23, 12/5/23, [facility] 8/2/23, Endo 11/16/23, DM Educ 11/18/22, Uro 8/21/20, Cardio 4/20/23, Heme/Onc 12/6/23     No Uro.     Last Colonoscopy 1/27/16, repeat in 10 years - 1/27/26.       The following portions of the patient's history were reviewed and updated as appropriate: allergies, current medications, past family history, past medical history, past social history, past surgical history and problem list.      Review of Systems   Constitutional:  Positive for fatigue (Chronic, due to Parkinson's Disease). Negative for appetite change, chills, diaphoresis and fever.   Respiratory:  Negative for chest tightness and shortness of breath.    Cardiovascular:  Negative for chest pain.   Gastrointestinal:  Negative for abdominal pain, blood in stool, diarrhea, nausea and vomiting.   Genitourinary:  Negative for dysuria.        Current Outpatient Medications   Medication Sig Dispense Refill   • anagrelide (AGRYLIN) 1 mg capsule Take 1 capsule (1 mg total) by mouth 2 (two) times a day 180 capsule 0   • aspirin 325 mg tablet Take 325 mg by mouth daily     • carbidopa-levodopa (SINEMET)  mg per tablet Take 2 tablets by mouth 3 (three) times a day Follow clinic instructions (Patient taking differently: Take 1 tablet by mouth in the morning Follow clinic instructions.  Rx per  "Neuro.) 540 tablet 3   • Cholecalciferol (VITAMIN D) 2000 units CAPS Take 2 capsules (4,000 Units total) by mouth daily  0   • co-enzyme Q-10 30 MG capsule Take 1 capsule (30 mg total) by mouth daily 30 capsule 0   • ezetimibe (ZETIA) 10 mg tablet TAKE 1 TABLET BY MOUTH EVERY DAY 90 tablet 0   • fish oil-omega-3 fatty acids 1000 MG capsule Take 2 g by mouth 2 (two) times a day     • glucose blood (ONETOUCH VERIO) test strip Check BG 2x per day (Patient taking differently: Check BG 2x per day.  Rx per Endo.) 200 each 3   • Icosapent Ethyl (Vascepa) 1 g CAPS Take 2 capsules (2 g total) by mouth 2 (two) times a day 180 capsule 2   • lisinopril (ZESTRIL) 20 mg tablet Take 1 tablet (20 mg total) by mouth daily 90 tablet 1   • mometasone (ELOCON) 0.1 % cream Apply topically 2 (two) times a day as needed (Rash) 15 g 0   • Multiple Vitamin (multivitamin) capsule Take 1 capsule by mouth daily     • pravastatin (PRAVACHOL) 80 mg tablet Take 1 tablet (80 mg total) by mouth daily at bedtime 90 tablet 1   • sitaGLIPtin-metFORMIN (JANUMET)  MG per tablet Take 1 tablet by mouth 2 (two) times a day with meals 180 tablet 3   • venlafaxine (EFFEXOR-XR) 37.5 mg 24 hr capsule Take 1 capsule (37.5 mg total) by mouth daily Take with 75mg for a total of 112.5mg daily. 90 capsule 1   • venlafaxine (EFFEXOR-XR) 75 mg 24 hr capsule Take with 37.5mg for a total of 112.5mg daily. 90 capsule 1     No current facility-administered medications for this visit.       Objective:  /80   Pulse 89   Temp 98 °F (36.7 °C)   Resp 16   Ht 5' 10\" (1.778 m)   Wt 87.4 kg (192 lb 9.6 oz)   SpO2 100%   BMI 27.64 kg/m²    Wt Readings from Last 3 Encounters:   01/08/24 87.4 kg (192 lb 9.6 oz)   12/21/23 88.6 kg (195 lb 4.8 oz)   12/06/23 89.8 kg (198 lb)      BP Readings from Last 3 Encounters:   01/08/24 130/80   12/21/23 142/75   12/06/23 128/68          Physical Exam  Vitals and nursing note reviewed.   Constitutional:       Appearance: " "Normal appearance. He is well-developed.   HENT:      Head: Normocephalic and atraumatic.   Eyes:      Conjunctiva/sclera: Conjunctivae normal.   Neck:      Thyroid: No thyromegaly.      Vascular: No carotid bruit.   Cardiovascular:      Rate and Rhythm: Normal rate and regular rhythm.      Pulses: Normal pulses.      Heart sounds: Normal heart sounds.   Pulmonary:      Effort: Pulmonary effort is normal.      Breath sounds: Normal breath sounds.   Abdominal:      General: There is no distension.      Palpations: Abdomen is soft.      Tenderness: There is no abdominal tenderness. There is no guarding or rebound.   Musculoskeletal:         General: No swelling or tenderness.      Cervical back: Neck supple.      Right lower leg: No edema.      Left lower leg: No edema.   Lymphadenopathy:      Cervical: No cervical adenopathy.   Neurological:      General: No focal deficit present.      Mental Status: He is alert and oriented to person, place, and time.   Psychiatric:         Mood and Affect: Mood normal.         Behavior: Behavior normal.         Thought Content: Thought content normal.         Judgment: Judgment normal.         Lab Results:      Lab Results   Component Value Date    WBC 12.16 (H) 12/21/2023    HGB 15.0 12/21/2023    HCT 45.6 12/21/2023     12/21/2023    CHOL 206 12/17/2015    TRIG 202 (H) 11/15/2023    HDL 44 11/15/2023    LDLDIRECT 111 (H) 07/11/2023    ALT 26 11/15/2023    AST 18 11/15/2023     12/17/2015    K 4.7 11/15/2023     11/15/2023    CREATININE 1.09 11/15/2023    BUN 19 11/15/2023    CO2 28 11/15/2023    TSH 2.06 12/20/2019    PSA <0.1 01/23/2023    GLUF 229 (H) 11/15/2023    HGBA1C 9.3 (H) 11/15/2023     No results found for: \"URICACID\"  Invalid input(s): \"BASENAME\" Vitamin D    IR biopsy bone marrow    Result Date: 12/21/2023  Narrative: CT-scan guided diagnostic bone marrow aspiration and core biopsy History: Thrombocytosis with JAK2 mutation Conscious sedation time: " 15 minutes Technique: The patient was brought to the CT scanner and placed prone on the table. After axial images were obtained through the pelvis, an area of the skin was then marked, prepped, and draped in usual sterile fashion. Lidocaine was administered to the skin, and subcutaneous tissues down to the periosteum of the right ileum, and a small skin incision was made. An OnControl needle was advanced percutaneously through the cortex, and a bone marrow aspiration was performed. The specimen was given to the cytotech to prepare. A core biopsy was then performed. The bone core was placed in formalin. The patient tolerated the procedure well and suffered no complications.     Impression: Impression: Successful percutaneous diagnostic bone marrow aspiration and bone core biopsy of the right ileum. A full pathology report will follow. Workstation performed: [id number]        POCT Labs        Depression Screening and Follow-up Plan: Patient was screened for depression during today's encounter. They screened negative with a PHQ-9 score of 0.    Tobacco Cessation Counseling: Tobacco cessation counseling was provided. The patient is sincerely urged to quit consumption of tobacco. He is ready to quit tobacco. Medication options not discussed.

## 2024-01-09 ENCOUNTER — TELEPHONE (OUTPATIENT)
Dept: ADMINISTRATIVE | Facility: OTHER | Age: 69
End: 2024-01-09

## 2024-01-09 NOTE — TELEPHONE ENCOUNTER
----- Message from Mireille Martines DO sent at 1/8/2024 11:49 AM EST -----  [facility] Last seen 6/23 01/08/24 11:49 AM    Hello, our patient Andres Chacko has had Diabetic Eye Exam completed/performed. Please assist in updating the patient chart by making an External outreach to  facility located in . The date of service is .    [facility] [place] - Last seen 6/23    Thank you,  Mireille Martines DO  [facility]  [facility]

## 2024-01-09 NOTE — TELEPHONE ENCOUNTER
Upon review of the In Basket request and the patient's chart, initial outreach has been made via fax to facility. Please see Contacts section for details.     Thank you  Elmer Woods

## 2024-01-09 NOTE — LETTER
Diabetic Eye Exam Form    Date Requested: 24  Patient: Andres Chacko  Patient : 1955   Referring Provider: Mireille Martines DO      DIABETIC Eye Exam Date _______________________________      Type of Exam MUST be documented for Diabetic Eye Exams. Please CHECK ONE.     Retinal Exam       Dilated Retinal Exam       OCT       Optomap-Iris Exam      Fundus Photography       Left Eye - Please check Retinopathy or No Retinopathy        Exam did show retinopathy    Exam did not show retinopathy       Right Eye - Please check Retinopathy or No Retinopathy       Exam did show retinopathy    Exam did not show retinopathy       Comments __________________________________________________________    Practice Providing Exam ______________________________________________    Exam Performed By (print name) _______________________________________      Provider Signature ___________________________________________________      These reports are needed for  compliance.  Please fax this completed form and a copy of the Diabetic Eye Exam report to our office located at [address] [place] [address] as soon as possible via Fax [phone] attention Elmer: Phone [phone]  We thank you for your assistance in treating our mutual patient.

## 2024-01-10 LAB
MISCELLANEOUS LAB TEST RESULT: NORMAL
MISCELLANEOUS LAB TEST RESULT: NORMAL
SCAN RESULT: NORMAL

## 2024-01-11 NOTE — ASSESSMENT & PLAN NOTE
Lab Results   Component Value Date    HGBA1C 9.3 (H) 11/15/2023       Lab Results   Component Value Date    HGBA1C 9.3 (H) 11/15/2023       Lab Results   Component Value Date    HGBA1C 9.3 (H) 11/15/2023       Lab Results   Component Value Date    HGBA1C 9.3 (H) 11/15/2023     Uncontrolled.  Management per Endo.  Recommend lifestyle modifications.

## 2024-01-11 NOTE — ASSESSMENT & PLAN NOTE
Lab Results   Component Value Date    HGBA1C 9.3 (H) 11/15/2023       Lab Results   Component Value Date    HGBA1C 9.3 (H) 11/15/2023     Uncontrolled.  Management per Endo.  Recommend lifestyle modifications.

## 2024-01-11 NOTE — ASSESSMENT & PLAN NOTE
Patient declined continued Uro F/U and desires yearly PSA per PCP.  Status post DaVinci robot prostatectomy performed at [facility] 2013.

## 2024-01-11 NOTE — ASSESSMENT & PLAN NOTE
Management per [facility].  S/p Deep brain stimulator 9/22.  On Sinemet 25-100mg 1 tab QD.  Continue [facility] [place]'s [facility].

## 2024-01-14 LAB — MISCELLANEOUS LAB TEST RESULT: NORMAL

## 2024-02-07 NOTE — TELEPHONE ENCOUNTER
As a follow-up, a second attempt has been made for outreach via fax to facility. Please see Contacts section for details.    Thank you  Elmer Woods

## 2024-02-15 NOTE — TELEPHONE ENCOUNTER
As a final attempt, a third outreach has been made via telephone call to facility. Please see Contacts section for details. This encounter will be closed and completed by end of day. Should we receive the requested information because of previous outreach attempts, the requested patient's chart will be updated appropriately.     Thank you  Elmer Woods

## 2024-02-22 ENCOUNTER — TELEPHONE (OUTPATIENT)
Dept: HEMATOLOGY ONCOLOGY | Facility: CLINIC | Age: 69
End: 2024-02-22

## 2024-02-22 NOTE — TELEPHONE ENCOUNTER
Appointment Change  Cancel, Reschedule, Change to Virtual      Who are you speaking with? Patient   If it is not the patient, is the caller listed on the communication consent form? Yes   Which provider is the appointment scheduled with? Dr. Ansari   When was the original appointment scheduled?    Please list date and time 3/6/24   At which location is the appointment scheduled to take place? [place]   Was the appointment rescheduled?     Was the appointment changed from an in person visit to a virtual visit?    If so, please list the details of the change. No   What is the reason for the appointment change? Provider/Left message to call and reschedule

## 2024-02-22 NOTE — TELEPHONE ENCOUNTER
Called and spoke with Yoly from [facility] [place] who faxed over patient's Diabetic Eye Exam.    I [facility]ed exam [facility].

## 2024-02-28 ENCOUNTER — HOSPITAL ENCOUNTER (OUTPATIENT)
Dept: PULMONOLOGY | Facility: HOSPITAL | Age: 69
Discharge: HOME | End: 2024-02-28
Payer: COMMERCIAL

## 2024-02-28 DIAGNOSIS — Z87.891: ICD-10-CM

## 2024-02-28 DIAGNOSIS — R05.1 ACUTE COUGH: ICD-10-CM

## 2024-02-28 DIAGNOSIS — R05.8 POST-VIRAL COUGH SYNDROME: ICD-10-CM

## 2024-02-28 PROCEDURE — 94060 EVALUATION OF WHEEZING: CPT | Performed by: INTERNAL MEDICINE

## 2024-02-28 PROCEDURE — 94726 PLETHYSMOGRAPHY LUNG VOLUMES: CPT | Performed by: INTERNAL MEDICINE

## 2024-02-28 PROCEDURE — 94729 DIFFUSING CAPACITY: CPT | Performed by: INTERNAL MEDICINE

## 2024-02-28 PROCEDURE — 94729 DIFFUSING CAPACITY: CPT

## 2024-02-28 PROCEDURE — 94760 N-INVAS EAR/PLS OXIMETRY 1: CPT

## 2024-02-28 PROCEDURE — 94726 PLETHYSMOGRAPHY LUNG VOLUMES: CPT

## 2024-02-28 PROCEDURE — 94060 EVALUATION OF WHEEZING: CPT

## 2024-02-28 RX ORDER — ALBUTEROL SULFATE 2.5 MG/3ML
2.5 SOLUTION RESPIRATORY (INHALATION) ONCE
Status: COMPLETED | OUTPATIENT
Start: 2024-02-28 | End: 2024-02-28

## 2024-02-28 RX ADMIN — ALBUTEROL SULFATE 2.5 MG: 2.5 SOLUTION RESPIRATORY (INHALATION) at 13:50

## 2024-02-29 NOTE — RESULT ENCOUNTER NOTE
Pulmonary function test with postbronchodilator is stable.  No signs of asthma or COPD.  Continue plan of care.      Message sent to patient via [employer] patient portal.

## 2024-03-13 DIAGNOSIS — E78.2 MIXED HYPERLIPIDEMIA: ICD-10-CM

## 2024-03-13 RX ORDER — EZETIMIBE 10 MG/1
10 TABLET ORAL DAILY
Qty: 90 TABLET | Refills: 0 | Status: SHIPPED | OUTPATIENT
Start: 2024-03-13

## 2024-03-14 DIAGNOSIS — E11.65 TYPE 2 DIABETES MELLITUS WITH HYPERGLYCEMIA, WITHOUT LONG-TERM CURRENT USE OF INSULIN (HCC): ICD-10-CM

## 2024-03-14 DIAGNOSIS — E11.9 TYPE 2 DIABETES MELLITUS WITHOUT COMPLICATION, WITHOUT LONG-TERM CURRENT USE OF INSULIN (HCC): ICD-10-CM

## 2024-03-14 DIAGNOSIS — I10 BENIGN ESSENTIAL HYPERTENSION: ICD-10-CM

## 2024-03-14 RX ORDER — LISINOPRIL 20 MG/1
20 TABLET ORAL DAILY
Qty: 90 TABLET | Refills: 1 | Status: SHIPPED | OUTPATIENT
Start: 2024-03-14

## 2024-03-15 ENCOUNTER — PATIENT MESSAGE (OUTPATIENT)
Dept: HEMATOLOGY ONCOLOGY | Facility: CLINIC | Age: 69
End: 2024-03-15

## 2024-03-17 NOTE — PROGRESS NOTES
"HEMATOLOGY / ONCOLOGY CLINIC FOLLOW UP NOTE    Patient Andres Chacko  MRN: [id number]  : 1955  Date of Encounter 3/18/2024      Referring Provider:  MCKENNA Ansari 2023    Reason for Encounter: follow up for ET          1. Essential thrombocytosis    2. JAK2 V617F mutation            Assessment / Plan:      Tremayne Chacko is a 68-year-old male with essential thrombocytosis with JAK2 V617F mutation.  He was previously following with BENNY Mejia and now presents for transfer of care. He was previously on Hydrea 500 mg once daily did not tolerate due to side effects.  His dose was reduced to once daily on Monday, Wednesday, Friday.  He tolerated this for a longer period of time but then self discontinued due to feeling \"ill \".  Since discontinuing Hydrea he is back to baseline and has no constitutional complaints or concerns.  His platelet count remained greater than 500 K with mildly elevated WBC count.  Due to intolerance of Hydrea he was switched to Anagrelide 1 mg BID in 2023.  He continues ASA 81 mg.  He notes compliance and tolerance with anagrelide.  I encouraged patient to have updated labs to assess treatment response as well as need for dose adjustments.  We reviewed updated guidelines necessitating bone marrow biopsy at the time of diagnosis.  Patient agreeable.  Referral made to IR for bone marrow biopsy.  He will continue current treatment and surveillance labs monthly.  Office follow-up in 3 months.      ET    Patient was started on anagrelide 1 mg bid at last appointment in Dec 2023.  He had plt count of 548 11/15/2023 and on anagrelide 370.  He has been off due to \"fatigue\" and is now 497    Will restart anagrelide at 50% dose reduction    Will repeat labs in one month    Did discuss use of pegylated interferon as well as ruxolitinib     Follow up     1 month     HPI  2023    Tremayne Chacko is a 68-year-old male with past medical history significant for type 2 diabetes, " "CAD, HTN, Parkinson's disease, history of prostate cancer s/p prostatectomy in 2013.  Patient was previously following with BENNY Mejia for longstanding history of thrombocytosis since at least 2016.  He denies any personal history of thrombosis..  Subsequent workup noted JAK2 V6 17F mutation positive and patient was initiated on hydroxyurea which she subsequently stopped due to intolerance.  Patient does not state specific side effects, but notes it made him feel \"ill\".  He was switched to anagrelide 1 mg twice daily since November 2023.  Patient notes compliance with current treatment and denies any adverse effects.  No repeat labs available.  Patient presents for transfer of care/follow-up visit today.  Offers no new complaints.  Denies any chest pain, shortness of breath, abdominal pain, nausea, vomiting, diarrhea.  Denies any fever, night sweats or unexpected weight changes.  He has never had a bone marrow biopsy.    Interval Follow up 3/18/2024    Patient stopped anagrelide one month ago as didn't like how it made him feel with decreased energy.  Labs on anagrelide 1 mg bid as follows:    Labs 12/21/2023      WBC 12.2 Hg 15 plts 370    Labs prior 11/15/2023    WBC 9.4 Hgb 14.2 plts 548    Labs off anagrelide 3/18/2024      WBC 9.8 Hgb 14.8 plts 497    Has no other complaints    Will restart anagrelide at 50% reduction as did not tolerate hydrea at all       Denies personal or family hx of blood clots     Review of systems:   Review of Systems   Constitutional:  Negative for chills, fatigue and fever.   Eyes:  Negative for pain and visual disturbance.   Respiratory:  Negative for cough and shortness of breath.    Cardiovascular:  Negative for chest pain and palpitations.   Gastrointestinal:  Negative for abdominal pain and vomiting.   Genitourinary:  Negative for dysuria and hematuria.   Musculoskeletal:  Negative for arthralgias and back pain.   Skin:  Negative for color change and rash. "   Neurological:  Negative for seizures and syncope.   All other systems reviewed and are negative.         Oncology history:   Primary Diagnosis:  1.  Essential thrombocytosis.  Diagnosed April 2023.  2.  JAK2 V617F mutation      Previous Hematologic/ Oncologic Treatment:   Hydrea DC'd due to intolerance     Current Hematologic/ Oncologic Treatment:    Anagrelide 1 mg BID since November 2023.   ASA 81 mg     Test Results:  Pathology:     Radiology:  3/19/2023: abd US: no splenomegaly     Laboratory:  See below         ECOG PS: 0    PROBLEM LIST:  Patient Active Problem List   Diagnosis    Arteriosclerosis of coronary artery    Benign essential hypertension    Depression    Type 2 diabetes mellitus without complication, without long-term current use of insulin (HCC)    Subclinical hypothyroidism    Essential (hemorrhagic) thrombocythemia (HCC)    Mixed hyperlipidemia    Leukocytosis    Other male erectile dysfunction    Parkinson disease    Type 2 diabetes mellitus with hyperglycemia, without long-term current use of insulin (HCC)    Dystonia    History of stroke    Hyperkalemia    History of prostate cancer    Anxiety    Overweight       Past Medical History:   has a past medical history of Anxiety, Arteriosclerosis of coronary artery (05/24/2017), Arthritis (1999), Benign essential hypertension (05/15/2015), Cancer (HCC) (2012), Depression (06/01/2012), Diabetes mellitus (HCC), Essential (hemorrhagic) thrombocythemia ([facility]), History of prostate cancer (01/05/2023), History of stroke, Hypertension, Left carpal tunnel syndrome, Lumbar canal stenosis, Mixed hyperlipidemia (11/10/2010), Osteoarthritis, knee, Other male erectile dysfunction (08/08/2019), Overweight, Parkinson disease (10/08/2019), Pipe smoker, Retinal and vitreous disorder, Stroke ([facility]) (2019), Subclinical hypothyroidism (04/11/2017), and Type 2 diabetes mellitus without complication, without long-term current use of insulin (HCC) (08/24/2017).    PAST  SURGICAL HISTORY:   has a past surgical history that includes Prostatectomy (09/23/2013); Ankle fracture surgery (Left, 04/2009); pr colonoscopy flx dx w/collj spec when pfrmd (N/A, 01/27/2016); Prostate biopsy (12/08/2012); Lumbar laminectomy (2003); Deep brain stimulator placement (Bilateral, 09/2022); Nasal septum surgery (06/1989); IR biopsy bone marrow (12/21/2023); and Fracture surgery (4/15/2009).    CURRENT MEDICATIONS  Current Outpatient Medications   Medication Sig Dispense Refill    anagrelide (AGRYLIN) 1 mg capsule Take 1 capsule (1 mg total) by mouth 2 (two) times a day 180 capsule 0    aspirin 325 mg tablet Take 325 mg by mouth daily      carbidopa-levodopa (SINEMET)  mg per tablet Take 2 tablets by mouth 3 (three) times a day Follow clinic instructions (Patient taking differently: Take 1 tablet by mouth in the morning Follow clinic instructions.  Rx per Neuro.) 540 tablet 3    Cholecalciferol (VITAMIN D) 2000 units CAPS Take 2 capsules (4,000 Units total) by mouth daily  0    co-enzyme Q-10 30 MG capsule Take 1 capsule (30 mg total) by mouth daily 30 capsule 0    ezetimibe (ZETIA) 10 mg tablet TAKE 1 TABLET BY MOUTH EVERY DAY 90 tablet 0    fish oil-omega-3 fatty acids 1000 MG capsule Take 2 g by mouth 2 (two) times a day      glucose blood (ONETOUCH VERIO) test strip Check BG 2x per day (Patient taking differently: Check BG 2x per day.  Rx per Endo.) 200 each 3    Icosapent Ethyl (Vascepa) 1 g CAPS Take 2 capsules (2 g total) by mouth 2 (two) times a day 180 capsule 2    lisinopril (ZESTRIL) 20 mg tablet TAKE 1 TABLET BY MOUTH DAILY 90 tablet 1    mometasone (ELOCON) 0.1 % cream Apply topically 2 (two) times a day as needed (Rash) 15 g 0    Multiple Vitamin (multivitamin) capsule Take 1 capsule by mouth daily      pravastatin (PRAVACHOL) 80 mg tablet Take 1 tablet (80 mg total) by mouth daily at bedtime 90 tablet 1    sitaGLIPtin-metFORMIN (JANUMET)  MG per tablet Take 1 tablet by mouth  2 (two) times a day with meals 180 tablet 3    venlafaxine (EFFEXOR-XR) 37.5 mg 24 hr capsule Take 1 capsule (37.5 mg total) by mouth daily Take with 75mg for a total of 112.5mg daily. 90 capsule 1    venlafaxine (EFFEXOR-XR) 75 mg 24 hr capsule Take with 37.5mg for a total of 112.5mg daily. 90 capsule 1     No current facility-administered medications for this visit.     [unfilled]    SOCIAL HISTORY:   reports that he has been smoking pipe. He started smoking about 29 years ago. He has never used smokeless tobacco. He reports current alcohol use of about 4.0 standard drinks of alcohol per week. He reports that he does not use drugs.     FAMILY HISTORY:  family history includes Coronary artery disease (age of onset: 59) in his father; Diabetes in his father; Diabetes type II in his father; Heart attack (age of onset: 59) in his father; Heart disease in his father; Hypertension in his mother; No Known Problems in his son and son; Osteoporosis in his mother; Retinal detachment in his mother; Retinitis pigmentosa in his mother; Vision loss in his mother.     ALLERGIES:  has No Known Allergies.      Physical Exam:  Vital Signs:   Visit Vitals  Smoking Status Some Days     There is no height or weight on file to calculate BMI.  There is no height or weight on file to calculate BSA.    GEN: Alert, awake oriented x3, in no acute distress  HEENT- No pallor, icterus, cyanosis, no oral mucosal lesions,   LAD - no palpable cervical, clavicle, axillary, inguinal LAD  Heart- normal S1 S2, regular rate and rhythm, No murmur, rubs.   Lungs- clear breathing sound bilateral.   Abdomen- soft, Non tender, bowel sounds present  Extremities- No cyanosis, clubbing, edema  Neuro- No focal neurological deficit    Labs:  Lab Results   Component Value Date    WBC 12.16 (H) 12/21/2023    HGB 15.0 12/21/2023    HCT 45.6 12/21/2023    MCV 92 12/21/2023     12/21/2023     Lab Results   Component Value Date     12/17/2015    SODIUM 134  (L) 11/15/2023    K 4.7 11/15/2023     11/15/2023    CO2 28 11/15/2023    ANIONGAP 8 12/17/2015    AGAP 4 11/15/2023    BUN 19 11/15/2023    CREATININE 1.09 11/15/2023    GLUC 209 (H) 03/09/2023    GLUF 229 (H) 11/15/2023    CALCIUM 9.4 11/15/2023    AST 18 11/15/2023    ALT 26 11/15/2023    ALKPHOS 34 11/15/2023    PROT 6.8 12/17/2015    TP 7.2 11/15/2023    BILITOT 0.47 12/17/2015    TBILI 0.38 11/15/2023    EGFR 69 11/15/2023        Latest Reference Range & Units 12/21/23 12:01   WBC 4.31 - 10.16 Thousand/uL 12.16 (H)   RBC 3.88 - 5.62 Million/uL 4.97   Hemoglobin 12.0 - 17.0 g/dL 15.0   Hematocrit 36.5 - 49.3 % 45.6   MCV 82 - 98 fL 92   MCH 26.8 - 34.3 pg 30.2   MCHC 31.4 - 37.4 g/dL 32.9   RDW 11.6 - 15.1 % 13.3   Platelet Count 149 - 390 Thousands/uL 370   MPV 8.9 - 12.7 fL 11.9   Platelet Estimate Adequate  Increased !   nRBC /100 WBCs 0   Segmented Neutrophils Manual 45 - 77 % 62   Eosinophils % 0 - 6 % 4   (H): Data is abnormally high  !: Data is abnormal    I spent 40 minutes on chart review, face to face counseling time, coordination of care and documentation.    Mariajose Vaca MD PhD

## 2024-03-18 ENCOUNTER — LAB (OUTPATIENT)
Dept: LAB | Facility: HOSPITAL | Age: 69
End: 2024-03-18
Payer: COMMERCIAL

## 2024-03-18 ENCOUNTER — OFFICE VISIT (OUTPATIENT)
Dept: HEMATOLOGY ONCOLOGY | Facility: CLINIC | Age: 69
End: 2024-03-18
Payer: COMMERCIAL

## 2024-03-18 VITALS
HEIGHT: 70 IN | WEIGHT: 197 LBS | SYSTOLIC BLOOD PRESSURE: 136 MMHG | OXYGEN SATURATION: 97 % | BODY MASS INDEX: 28.2 KG/M2 | DIASTOLIC BLOOD PRESSURE: 80 MMHG | RESPIRATION RATE: 16 BRPM | HEART RATE: 82 BPM | TEMPERATURE: 97.6 F

## 2024-03-18 DIAGNOSIS — G20.A1 PARKINSON'S DISEASE, UNSPECIFIED WHETHER DYSKINESIA PRESENT, UNSPECIFIED WHETHER MANIFESTATIONS FLUCTUATE: ICD-10-CM

## 2024-03-18 DIAGNOSIS — D47.3 ESSENTIAL THROMBOCYTOSIS (HCC): ICD-10-CM

## 2024-03-18 DIAGNOSIS — E78.2 MIXED HYPERLIPIDEMIA: ICD-10-CM

## 2024-03-18 DIAGNOSIS — Z12.5 ENCOUNTER FOR SCREENING FOR MALIGNANT NEOPLASM OF PROSTATE: ICD-10-CM

## 2024-03-18 DIAGNOSIS — M60.9 MYOSITIS, UNSPECIFIED MYOSITIS TYPE, UNSPECIFIED SITE: ICD-10-CM

## 2024-03-18 DIAGNOSIS — Z85.46 HISTORY OF PROSTATE CANCER: ICD-10-CM

## 2024-03-18 DIAGNOSIS — M79.10 MYALGIA: ICD-10-CM

## 2024-03-18 DIAGNOSIS — Z15.89 JAK2 V617F MUTATION: ICD-10-CM

## 2024-03-18 DIAGNOSIS — E11.65 TYPE 2 DIABETES MELLITUS WITH HYPERGLYCEMIA, WITHOUT LONG-TERM CURRENT USE OF INSULIN (HCC): ICD-10-CM

## 2024-03-18 DIAGNOSIS — E11.9 TYPE 2 DIABETES MELLITUS WITHOUT COMPLICATION, WITHOUT LONG-TERM CURRENT USE OF INSULIN (HCC): ICD-10-CM

## 2024-03-18 LAB
ALBUMIN SERPL BCP-MCNC: 4.4 G/DL (ref 3.5–5)
ALP SERPL-CCNC: 29 U/L (ref 34–104)
ALT SERPL W P-5'-P-CCNC: 5 U/L (ref 7–52)
ANION GAP SERPL CALCULATED.3IONS-SCNC: 9 MMOL/L (ref 4–13)
AST SERPL W P-5'-P-CCNC: 20 U/L (ref 13–39)
BASOPHILS # BLD AUTO: 0.06 THOUSANDS/ÂΜL (ref 0–0.1)
BASOPHILS NFR BLD AUTO: 1 % (ref 0–1)
BILIRUB SERPL-MCNC: 0.41 MG/DL (ref 0.2–1)
BUN SERPL-MCNC: 19 MG/DL (ref 5–25)
CALCIUM SERPL-MCNC: 9.4 MG/DL (ref 8.4–10.2)
CHLORIDE SERPL-SCNC: 99 MMOL/L (ref 96–108)
CHOLEST SERPL-MCNC: 146 MG/DL
CO2 SERPL-SCNC: 28 MMOL/L (ref 21–32)
CREAT SERPL-MCNC: 1.31 MG/DL (ref 0.6–1.3)
EOSINOPHIL # BLD AUTO: 0.33 THOUSAND/ÂΜL (ref 0–0.61)
EOSINOPHIL NFR BLD AUTO: 3 % (ref 0–6)
ERYTHROCYTE [DISTWIDTH] IN BLOOD BY AUTOMATED COUNT: 14.2 % (ref 11.6–15.1)
GFR SERPL CREATININE-BSD FRML MDRD: 55 ML/MIN/1.73SQ M
GLUCOSE P FAST SERPL-MCNC: 241 MG/DL (ref 65–99)
HCT VFR BLD AUTO: 45.2 % (ref 36.5–49.3)
HDLC SERPL-MCNC: 42 MG/DL
HGB BLD-MCNC: 14.8 G/DL (ref 12–17)
IMM GRANULOCYTES # BLD AUTO: 0.04 THOUSAND/UL (ref 0–0.2)
IMM GRANULOCYTES NFR BLD AUTO: 0 % (ref 0–2)
LDLC SERPL CALC-MCNC: 46 MG/DL (ref 0–100)
LYMPHOCYTES # BLD AUTO: 2.17 THOUSANDS/ÂΜL (ref 0.6–4.47)
LYMPHOCYTES NFR BLD AUTO: 22 % (ref 14–44)
MCH RBC QN AUTO: 30.3 PG (ref 26.8–34.3)
MCHC RBC AUTO-ENTMCNC: 32.7 G/DL (ref 31.4–37.4)
MCV RBC AUTO: 93 FL (ref 82–98)
MONOCYTES # BLD AUTO: 0.72 THOUSAND/ÂΜL (ref 0.17–1.22)
MONOCYTES NFR BLD AUTO: 7 % (ref 4–12)
NEUTROPHILS # BLD AUTO: 6.46 THOUSANDS/ÂΜL (ref 1.85–7.62)
NEUTS SEG NFR BLD AUTO: 67 % (ref 43–75)
NONHDLC SERPL-MCNC: 104 MG/DL
NRBC BLD AUTO-RTO: 0 /100 WBCS
PLATELET # BLD AUTO: 497 THOUSANDS/UL (ref 149–390)
PMV BLD AUTO: 10.1 FL (ref 8.9–12.7)
POTASSIUM SERPL-SCNC: 4.8 MMOL/L (ref 3.5–5.3)
PROT SERPL-MCNC: 7 G/DL (ref 6.4–8.4)
PSA SERPL-MCNC: <0.01 NG/ML (ref 0–4)
RBC # BLD AUTO: 4.88 MILLION/UL (ref 3.88–5.62)
SODIUM SERPL-SCNC: 136 MMOL/L (ref 135–147)
TRIGL SERPL-MCNC: 292 MG/DL
WBC # BLD AUTO: 9.78 THOUSAND/UL (ref 4.31–10.16)

## 2024-03-18 PROCEDURE — 99215 OFFICE O/P EST HI 40 MIN: CPT | Performed by: INTERNAL MEDICINE

## 2024-03-18 PROCEDURE — 36415 COLL VENOUS BLD VENIPUNCTURE: CPT

## 2024-03-18 PROCEDURE — G0103 PSA SCREENING: HCPCS

## 2024-03-18 RX ORDER — ANAGRELIDE 1 MG/1
1 CAPSULE ORAL 2 TIMES DAILY
Qty: 60 CAPSULE | Refills: 3 | Status: SHIPPED | OUTPATIENT
Start: 2024-03-18 | End: 2024-07-16

## 2024-03-22 DIAGNOSIS — E11.65 TYPE 2 DIABETES MELLITUS WITH HYPERGLYCEMIA, WITHOUT LONG-TERM CURRENT USE OF INSULIN (HCC): ICD-10-CM

## 2024-03-25 RX ORDER — SITAGLIPTIN AND METFORMIN HYDROCHLORIDE 1000; 50 MG/1; MG/1
1 TABLET, FILM COATED ORAL 2 TIMES DAILY WITH MEALS
Qty: 200 TABLET | Refills: 1 | Status: SHIPPED | OUTPATIENT
Start: 2024-03-25

## 2024-03-26 ENCOUNTER — OFFICE VISIT (OUTPATIENT)
Dept: ENDOCRINOLOGY | Facility: CLINIC | Age: 69
End: 2024-03-26
Payer: COMMERCIAL

## 2024-03-26 VITALS
SYSTOLIC BLOOD PRESSURE: 138 MMHG | HEART RATE: 100 BPM | HEIGHT: 70 IN | WEIGHT: 194 LBS | OXYGEN SATURATION: 96 % | DIASTOLIC BLOOD PRESSURE: 78 MMHG | BODY MASS INDEX: 27.77 KG/M2

## 2024-03-26 DIAGNOSIS — E78.2 MIXED HYPERLIPIDEMIA: ICD-10-CM

## 2024-03-26 DIAGNOSIS — I10 BENIGN ESSENTIAL HYPERTENSION: ICD-10-CM

## 2024-03-26 DIAGNOSIS — E11.65 TYPE 2 DIABETES MELLITUS WITH HYPERGLYCEMIA, WITHOUT LONG-TERM CURRENT USE OF INSULIN (HCC): Primary | ICD-10-CM

## 2024-03-26 LAB — SL AMB POCT HEMOGLOBIN AIC: 10.6 (ref ?–6.5)

## 2024-03-26 PROCEDURE — 83036 HEMOGLOBIN GLYCOSYLATED A1C: CPT | Performed by: INTERNAL MEDICINE

## 2024-03-26 PROCEDURE — 99214 OFFICE O/P EST MOD 30 MIN: CPT | Performed by: INTERNAL MEDICINE

## 2024-03-26 RX ORDER — BLOOD-GLUCOSE,RECEIVER,CONT
EACH MISCELLANEOUS
Qty: 1 EACH | Refills: 0 | Status: SHIPPED | OUTPATIENT
Start: 2024-03-26

## 2024-03-26 RX ORDER — INSULIN DEGLUDEC 200 U/ML
INJECTION, SOLUTION SUBCUTANEOUS
Qty: 9 ML | Refills: 1 | Status: SHIPPED | OUTPATIENT
Start: 2024-03-26

## 2024-03-26 RX ORDER — BLOOD-GLUCOSE SENSOR
EACH MISCELLANEOUS
Qty: 2 EACH | Refills: 3 | Status: SHIPPED | OUTPATIENT
Start: 2024-03-26

## 2024-03-26 RX ORDER — PEN NEEDLE, DIABETIC 32GX 5/32"
NEEDLE, DISPOSABLE MISCELLANEOUS
Qty: 100 EACH | Refills: 1 | Status: SHIPPED | OUTPATIENT
Start: 2024-03-26

## 2024-03-26 NOTE — ASSESSMENT & PLAN NOTE
Lab Results   Component Value Date    HGBA1C 10.6 (A) 03/26/2024   A1c has worsened, diabetes is poorly controlled.  Continue Janumet and start basal insulin.  Patient counseled on the importance of monitoring blood sugars on a regular basis especially as he is being started on insulin therapy.  He is interested in getting a personal CGM, will send in a prescription and if covered he will set up for teaching with diabetes educators, will also set up for insulin pen teaching for now monitor blood sugars fasting, predinner and bedtime and send over log in the next 2 weeks

## 2024-03-26 NOTE — PROGRESS NOTES
Andres Chacko 68 y.o. male MRN: [id number]    Encounter: [id number]      Assessment/Plan     Problem List Items Addressed This Visit          Cardiovascular and Mediastinum    Benign essential hypertension    Relevant Orders    Albumin / creatinine urine ratio       Endocrine    Type 2 diabetes mellitus with hyperglycemia, without long-term current use of insulin ([facility]) - Primary       Lab Results   Component Value Date    HGBA1C 10.6 (A) 03/26/2024   A1c has worsened, diabetes is poorly controlled.  Continue Janumet and start basal insulin.  Patient counseled on the importance of monitoring blood sugars on a regular basis especially as he is being started on insulin therapy.  He is interested in getting a personal CGM, will send in a prescription and if covered he will set up for teaching with diabetes educators, will also set up for insulin pen teaching for now monitor blood sugars fasting, predinner and bedtime and send over log in the next 2 weeks         Relevant Medications    insulin degludec (Tresiba FlexTouch) 200 units/mL CONCENTRATED U-200 injection pen    Insulin Pen Needle (BD Pen Needle Yara U/F) 32G X 4 MM MISC    Continuous Blood Gluc Sensor (FreeStyle Luisa 3 Sensor) MISC    Continuous Blood Gluc  (FreeStyle Luisa 3 [place]) ARMANDO    Other Relevant Orders    POCT hemoglobin A1c (Completed)    Ambulatory referral to Diabetic Education    Hemoglobin A1C    Comprehensive metabolic panel    TSH, 3rd generation    T4, free       Other    Mixed hyperlipidemia     Continue current meds-will repeat fasting lipid profile         Relevant Orders    Lipid Panel with Direct LDL reflex      CC: Diabetes    History of Present Illness     HPI:  68-year-old male with type 2 diabetes, hypertension and hyperlipidemia seen in follow-up  Current regimen   Janumet twice daily     Checks f.s rarely - yesterday fasting 163    No polyuria , polydipsia , no blurry vision , no numbness and tingling in feet   Weight  stable   Occasional diarrhea          Review of Systems    Historical Information   Past Medical History:   Diagnosis Date    Anxiety     Arteriosclerosis of coronary artery 05/24/2017    Arthritis 1999    Benign essential hypertension 05/15/2015    Cancer ([facility]) 2012    Depression 06/01/2012    Diabetes mellitus ([facility])     Essential (hemorrhagic) thrombocythemia ([facility])     History of prostate cancer 01/05/2023    History of stroke     Hypertension     Left carpal tunnel syndrome     Lumbar canal stenosis     Mixed hyperlipidemia 11/10/2010    Osteoarthritis, knee     Other male erectile dysfunction 08/08/2019    Overweight     Parkinson disease 10/08/2019    Pipe smoker     Retinal and vitreous disorder     Stroke ([facility]) 2019    Subclinical hypothyroidism 04/11/2017    Type 2 diabetes mellitus without complication, without long-term current use of insulin ([facility]) 08/24/2017     Past Surgical History:   Procedure Laterality Date    ANKLE FRACTURE SURGERY Left 04/2009    triple fracture; 2 Screws in place    DEEP BRAIN STIMULATOR PLACEMENT Bilateral 09/2022    FRACTURE SURGERY  4/15/2009    IR BIOPSY BONE MARROW  12/21/2023    LUMBAR LAMINECTOMY  2003    L5-S1    NASAL SEPTUM SURGERY  06/1989    NC COLONOSCOPY FLX DX W/COLLJ SPEC WHEN PFRMD N/A 01/27/2016    Procedure: COLONOSCOPY;  Surgeon: Jigar Alvarado MD;  Location: [facility];  Service: Gastroenterology    PROSTATE BIOPSY  12/08/2012    managed by Rambo Roy, needle biopsy    PROSTATECTOMY  09/23/2013     Social History   Social History     Substance and Sexual Activity   Alcohol Use Yes    Alcohol/week: 4.0 standard drinks of alcohol    Types: 3 Cans of beer, 1 Shots of liquor per week    Comment: occassional      Social History     Substance and Sexual Activity   Drug Use No     Social History     Tobacco Use   Smoking Status Some Days    Types: Pipe    Start date: 1/1/1995   Smokeless Tobacco Never   Tobacco Comments    Occasional pipe or cigar < 6 times a  month     Family History:   Family History   Problem Relation Age of Onset    Hypertension Mother          2017    Retinal detachment Mother     Retinitis pigmentosa Mother     Osteoporosis Mother     Vision loss Mother         retinitis pigmentosa    Diabetes type II Father              Heart attack Father 59    Coronary artery disease Father 59    Heart disease Father              Diabetes Father              No Known Problems Son     No Known Problems Son        Meds/Allergies   Current Outpatient Medications   Medication Sig Dispense Refill    anagrelide (AGRYLIN) 1 mg capsule Take 1 capsule (1 mg total) by mouth 2 (two) times a day 60 capsule 3    aspirin 325 mg tablet Take 325 mg by mouth daily      carbidopa-levodopa (SINEMET)  mg per tablet Take 2 tablets by mouth 3 (three) times a day Follow clinic instructions (Patient taking differently: Take 1 tablet by mouth in the morning Follow clinic instructions.  Rx per Neuro.) 540 tablet 3    Cholecalciferol (VITAMIN D) 2000 units CAPS Take 2 capsules (4,000 Units total) by mouth daily  0    co-enzyme Q-10 30 MG capsule Take 1 capsule (30 mg total) by mouth daily 30 capsule 0    Continuous Blood Gluc  (FreeStyle Luisa 3 [place]) ARMANDO once 1 each 0    Continuous Blood Gluc Sensor ([employer] Luisa 3 Sensor) MISC Every 14 days 2 each 3    ezetimibe (ZETIA) 10 mg tablet TAKE 1 TABLET BY MOUTH EVERY DAY 90 tablet 0    glucose blood (ONETOUCH VERIO) test strip Check BG 2x per day (Patient taking differently: Check BG 2x per day.  Rx per Endo.) 200 each 3    Icosapent Ethyl (Vascepa) 1 g CAPS Take 2 capsules (2 g total) by mouth 2 (two) times a day 180 capsule 2    insulin degludec (Tresiba FlexTouch) 200 units/mL CONCENTRATED U-200 injection pen 20 units daily 9 mL 1    Insulin Pen Needle (BD Pen Needle Yara U/F) 32G X 4 MM MISC Daily 100 each 1    lisinopril (ZESTRIL) 20 mg tablet TAKE 1 TABLET BY MOUTH DAILY 90  "tablet 1    mometasone (ELOCON) 0.1 % cream Apply topically 2 (two) times a day as needed (Rash) 15 g 0    Multiple Vitamin (multivitamin) capsule Take 1 capsule by mouth daily      pravastatin (PRAVACHOL) 80 mg tablet Take 1 tablet (80 mg total) by mouth daily at bedtime 90 tablet 1    sitaGLIPtin-metFORMIN (Janumet)  MG per tablet TAKE 1 TABLET BY MOUTH TWICE  DAILY WITH MEALS 200 tablet 1    venlafaxine (EFFEXOR-XR) 37.5 mg 24 hr capsule Take 1 capsule (37.5 mg total) by mouth daily Take with 75mg for a total of 112.5mg daily. 90 capsule 1    venlafaxine (EFFEXOR-XR) 75 mg 24 hr capsule Take with 37.5mg for a total of 112.5mg daily. 90 capsule 1     No current facility-administered medications for this visit.     No Known Allergies    Objective   Vitals: Blood pressure 138/78, pulse 100, height 5' 10\" (1.778 m), weight 88 kg (194 lb), SpO2 96%.    Physical Exam  Vitals reviewed.   Constitutional:       General: He is not in acute distress.     Appearance: Normal appearance. He is not ill-appearing, toxic-appearing or diaphoretic.   HENT:      Head: Normocephalic and atraumatic.   Eyes:      General: No scleral icterus.     Extraocular Movements: Extraocular movements intact.   Cardiovascular:      Rate and Rhythm: Normal rate and regular rhythm.      Heart sounds: Normal heart sounds. No murmur heard.  Pulmonary:      Effort: Pulmonary effort is normal. No respiratory distress.      Breath sounds: Normal breath sounds. No wheezing or rales.   Musculoskeletal:      Cervical back: Neck supple.      Right lower leg: No edema.      Left lower leg: No edema.   Lymphadenopathy:      Cervical: No cervical adenopathy.   Skin:     General: Skin is warm and dry.   Neurological:      General: No focal deficit present.      Mental Status: He is alert and oriented to person, place, and time.      Gait: Gait normal.   Psychiatric:         Mood and Affect: Mood normal.         Behavior: Behavior normal.         Thought " Content: Thought content normal.         Judgment: Judgment normal.         The history was obtained from the review of the chart, patient.    Lab Results:   Lab Results   Component Value Date/Time    Hemoglobin A1C 10.6 (A) 03/26/2024 01:55 PM    Hemoglobin A1C 9.3 (H) 11/15/2023 09:36 AM    Hemoglobin A1C 7.5 (A) 05/24/2023 12:50 PM    WBC 9.78 03/18/2024 09:11 AM    WBC 12.16 (H) 12/21/2023 12:01 PM    WBC 9.37 11/15/2023 09:36 AM    Hemoglobin 14.8 03/18/2024 09:11 AM    Hemoglobin 15.0 12/21/2023 12:01 PM    Hemoglobin 14.2 11/15/2023 09:36 AM    Hematocrit 45.2 03/18/2024 09:11 AM    Hematocrit 45.6 12/21/2023 12:01 PM    Hematocrit 44.7 11/15/2023 09:36 AM    MCV 93 03/18/2024 09:11 AM    MCV 92 12/21/2023 12:01 PM    MCV 94 11/15/2023 09:36 AM    Platelets 497 (H) 03/18/2024 09:11 AM    Platelets 370 12/21/2023 12:01 PM    Platelets 548 (H) 11/15/2023 09:36 AM    BUN 19 03/18/2024 09:11 AM    BUN 19 11/15/2023 09:36 AM    BUN 17 10/16/2023 04:21 PM    Potassium 4.8 03/18/2024 09:11 AM    Potassium 4.7 11/15/2023 09:36 AM    Potassium 5.0 10/16/2023 04:21 PM    Chloride 99 03/18/2024 09:11 AM    Chloride 102 11/15/2023 09:36 AM    Chloride 100 10/16/2023 04:21 PM    CO2 28 03/18/2024 09:11 AM    CO2 28 11/15/2023 09:36 AM    CO2 29 10/16/2023 04:21 PM    Creatinine 1.31 (H) 03/18/2024 09:11 AM    Creatinine 1.09 11/15/2023 09:36 AM    Creatinine 1.21 10/16/2023 04:21 PM    AST 20 03/18/2024 09:11 AM    AST 18 11/15/2023 09:36 AM    AST 22 10/16/2023 04:21 PM    ALT 5 (L) 03/18/2024 09:11 AM    ALT 26 11/15/2023 09:36 AM    ALT 22 10/16/2023 04:21 PM    Total Protein 7.0 03/18/2024 09:11 AM    Total Protein 7.2 11/15/2023 09:36 AM    Total Protein 7.0 10/16/2023 04:21 PM    Albumin 4.4 03/18/2024 09:11 AM    Albumin 4.4 11/15/2023 09:36 AM    Albumin 4.4 10/16/2023 04:21 PM    HDL, Direct 42 03/18/2024 09:11 AM    HDL, Direct 44 11/15/2023 09:36 AM    Triglycerides 292 (H) 03/18/2024 09:11 AM    Triglycerides  "202 (H) 11/15/2023 09:36 AM           Imaging Studies:     Portions of the record may have been created with voice recognition software. Occasional wrong word or \"sound a like\" substitutions may have occurred due to the inherent limitations of voice recognition software. Read the chart carefully and recognize, using context, where substitutions have occurred.    "

## 2024-04-01 ENCOUNTER — TELEPHONE (OUTPATIENT)
Age: 69
End: 2024-04-01

## 2024-04-01 NOTE — TELEPHONE ENCOUNTER
Patient called with concerns of his new insulin. He stated he has never been on insulin and needed to be shown how to use it. I transferred the patient to a diabetic educator.

## 2024-04-02 ENCOUNTER — OFFICE VISIT (OUTPATIENT)
Dept: DIABETES SERVICES | Facility: CLINIC | Age: 69
End: 2024-04-02
Payer: COMMERCIAL

## 2024-04-02 DIAGNOSIS — E11.65 TYPE 2 DIABETES MELLITUS WITH HYPERGLYCEMIA, WITHOUT LONG-TERM CURRENT USE OF INSULIN (HCC): Primary | ICD-10-CM

## 2024-04-02 PROCEDURE — G0108 DIAB MANAGE TRN  PER INDIV: HCPCS

## 2024-04-02 NOTE — PATIENT INSTRUCTIONS
Patient will be injecting Tresiba 20 units once daily.  Patient will be changing the sensor every 14 days as demonstrated.

## 2024-04-02 NOTE — PROGRESS NOTES
Patient was present for the following training today:  Personal Luisa 3 Training  Insulin Teaching      Met with Andres Chacko today for a personal Luisa 3 training. Andres brought his own supplies to the visit which include a reader and sensors.    Educator reviewed the following:    -- Skin Prep  -- How to place the Luisa  -- Importance of site rotation  -- Luisa 2 set alerts  -- How to scan for a reading  -- 60 minute warm-up  -- If reading doesn't match symptoms, do a finger stick  -- Return in 14 days for download and change sensor    Lab Results   Component Value Date    HGBA1C 10.6 (A) 03/26/2024         Insulin Instruction  Met with Andres Chacko and his wife for initial insulin start education. Andres is currently taking the following diabetes medications: Janumet  mg twice a day with meals. Prescribed Insulin: Tresiba 20 units once daily.    Patient instructed on: Insulin type; timing of insulin; site selection and rotation; proper technique of injection and insulin administration, safe needle disposal; medication storage; side effects and precautions of insulin.  Comments: Andres has good understanding of insulin usage and demonstrated use of injection technique in the office.     Patient instruction completed on Hypoglycemia: definition/risk, causes/symptoms, treatment, prevention of hypoglycemia, when to notify physician and EMS.  Comments: Andres has good understanding of hypoglycemia and it's treatment.    Patient instruction completed on Hyperglycemia: definition, causes/symptoms, treatment, prevention of hypoglycemia, when to notify physician and EMS.  Comments: Andres has good understanding of hyperglycemia and treatment.    Diabetes Education Record: Andres was given the following education material: Fast 15 List, Hypoglycemia Fact Sheet, Hyperglycemia Fact Sheet.       Patient response to instruction  Comprehension: good  Motivation: good  Expected Compliance: good  Readiness:  contemplation  Barriers to Learning: none known     Start- Stop: 1:05-1:50  Total Minutes: 45 Minutes  Group or Individual Instruction: DSME  Other: Maria T Turner, DO    Thank you for referring your patient to [facility], it was a pleasure working with them today. Please feel free to call with any questions or concerns.    Germaine Galvez  [address] [id number]        Detail Level: Detailed General Sunscreen Counseling: I recommended a broad spectrum sunscreen with a SPF of 30 or higher.  I explained that SPF 30 sunscreens block approximately 97 percent of the sun's harmful rays.  Sunscreens should be applied at least 15 minutes prior to expected sun exposure and then every 2 hours after that as long as sun exposure continues. If swimming or exercising sunscreen should be reapplied every 45 minutes to an hour after getting wet or sweating.  One ounce, or the equivalent of a shot glass full of sunscreen, is adequate to protect the skin not covered by a bathing suit. I also recommended a lip balm with a sunscreen as well. Sun protective clothing can be used in lieu of sunscreen but must be worn the entire time you are exposed to the sun's rays.

## 2024-04-04 ENCOUNTER — OFFICE VISIT (OUTPATIENT)
Dept: FAMILY MEDICINE CLINIC | Facility: CLINIC | Age: 69
End: 2024-04-04
Payer: COMMERCIAL

## 2024-04-04 VITALS
HEIGHT: 70 IN | RESPIRATION RATE: 16 BRPM | WEIGHT: 190.4 LBS | DIASTOLIC BLOOD PRESSURE: 72 MMHG | OXYGEN SATURATION: 99 % | BODY MASS INDEX: 27.26 KG/M2 | SYSTOLIC BLOOD PRESSURE: 120 MMHG | TEMPERATURE: 98 F | HEART RATE: 87 BPM

## 2024-04-04 DIAGNOSIS — E66.3 OVERWEIGHT: ICD-10-CM

## 2024-04-04 DIAGNOSIS — J01.90 ACUTE SINUSITIS, RECURRENCE NOT SPECIFIED, UNSPECIFIED LOCATION: Primary | ICD-10-CM

## 2024-04-04 DIAGNOSIS — F17.290 PIPE SMOKER: ICD-10-CM

## 2024-04-04 DIAGNOSIS — I10 BENIGN ESSENTIAL HYPERTENSION: ICD-10-CM

## 2024-04-04 PROCEDURE — 99214 OFFICE O/P EST MOD 30 MIN: CPT | Performed by: FAMILY MEDICINE

## 2024-04-04 PROCEDURE — G2211 COMPLEX E/M VISIT ADD ON: HCPCS | Performed by: FAMILY MEDICINE

## 2024-04-04 RX ORDER — DOXYCYCLINE 100 MG/1
100 TABLET ORAL 2 TIMES DAILY
Qty: 20 TABLET | Refills: 0 | Status: SHIPPED | OUTPATIENT
Start: 2024-04-04 | End: 2024-04-14

## 2024-04-04 NOTE — PATIENT INSTRUCTIONS
Advise Janes med sinus rinse kit, Mucinex, Claritin/Zyrtec/Allegra/Xyzal, Flonase / Nasacort nasal spray.  Avoid decongestants if you have high blood pressure.    Weight Management   AMBULATORY CARE:   Why it is important to manage your weight:  Being overweight increases your risk of health conditions such as heart disease, high blood pressure, type 2 diabetes, and certain types of cancer. It can also increase your risk for osteoarthritis, sleep apnea, and other respiratory problems. Aim for a slow, steady weight loss. Even a small amount of weight loss can lower your risk of health problems.  Risks of being overweight:  Extra weight can cause many health problems, including the following:  Diabetes (high blood sugar level)    High blood pressure or high cholesterol    Heart disease    Stroke    Gallbladder or liver disease    Cancer of the colon, breast, prostate, liver, or kidney    Sleep apnea    Arthritis or gout    Screening  is done to check for health conditions before you have signs or symptoms. If you are 35 to 70 years old, your blood sugar level may be checked every 3 years for signs of prediabetes or diabetes. Your healthcare provider will check your blood pressure at each visit. High blood pressure can lead to a stroke or other problems. Your provider may check for signs of heart disease, cancer, or other health problems.  How to lose weight safely:  A safe and healthy way to lose weight is to eat fewer calories and get regular exercise.  You can lose up about 1 pound a week by decreasing the number of calories you eat by 500 calories each day. You can decrease calories by eating smaller portion sizes or by cutting out high-calorie foods. Read labels to find out how many calories are in the foods you eat.         You can also burn calories with exercise such as walking, swimming, or biking. You will be more likely to keep weight off if you make these changes part of your lifestyle. Exercise at  least 30 minutes per day on most days of the week. You can also fit in more physical activity by taking the stairs instead of the elevator or parking farther away from stores. Ask your healthcare provider about the best exercise plan for you.       Healthy meal plan for weight management:  A healthy meal plan includes a variety of foods, contains fewer calories, and helps you stay healthy. A healthy meal plan includes the following:     Eat whole-grain foods more often.  A healthy meal plan should contain fiber. Fiber is the part of grains, fruits, and vegetables that is not broken down by your body. Whole-grain foods are healthy and provide extra fiber in your diet. Some examples of whole-grain foods are whole-wheat breads and pastas, oatmeal, brown rice, and bulgur.    Eat a variety of vegetables every day.  Include dark, leafy greens such as spinach, kale, michelle greens, and mustard greens. Eat yellow and orange vegetables such as carrots, sweet potatoes, and winter squash.     Eat a variety of fruits every day.  Choose fresh or canned fruit (canned in its own juice or light syrup) instead of juice. Fruit juice has very little or no fiber.    Eat low-fat dairy foods.  Drink fat-free (skim) milk or 1% milk. Eat fat-free yogurt and low-fat cottage cheese. Try low-fat cheeses such as mozzarella and other reduced-fat cheeses.    Choose meat and other protein foods that are low in fat.  Choose beans or other legumes such as split peas or lentils. Choose fish, skinless poultry (chicken or turkey), or lean cuts of red meat (beef or pork). Before you cook meat or poultry, cut off any visible fat.     Use less fat and oil.  Try baking foods instead of frying them. Add less fat, such as margarine, sour cream, regular salad dressing and mayonnaise to foods. Eat fewer high-fat foods. Some examples of high-fat foods include french fries, doughnuts, ice cream, and cakes.    Eat fewer sweets.  Limit foods and drinks that are  high in sugar. This includes candy, cookies, regular soda, and sweetened drinks.  Ways to decrease calories:   Eat smaller portions.     Use a small plate with smaller servings.    Do not eat second helpings.    When you eat at a restaurant, ask for a box and place half of your meal in the box before you eat.    Share an entrée with someone else.    Replace high-calorie snacks with healthy, low-calorie snacks.     Choose fresh fruit, vegetables, fat-free rice cakes, or air-popped popcorn instead of potato chips, nuts, or chocolate.    Choose water or calorie-free drinks instead of soda or sweetened drinks.    Do not shop for groceries when you are hungry.  You may be more likely to make unhealthy food choices. Take a grocery list of healthy foods and shop after you have eaten.    Eat regular meals. Do not skip meals. Skipping meals can lead to overeating later in the day. This can make it harder for you to lose weight. Eat a healthy snack in place of a meal if you do not have time to eat a regular meal. Talk with a dietitian to help you create a meal plan and schedule that is right for you.    Other things to consider as you try to lose weight:   Be aware of situations that may give you the urge to overeat, such as eating while watching television. Find ways to avoid these situations. For example, read a book, go for a walk, or do crafts.    Meet with a weight loss support group or friends who are also trying to lose weight. This may help you stay motivated to continue working on your weight loss goals.    © Copyright [employer] 2023 Information is for End User's use only and may not be sold, redistributed or otherwise used for commercial purposes.  The above information is an  only. It is not intended as medical advice for individual conditions or treatments. Talk to your doctor, nurse or pharmacist before following any medical regimen to see if it is safe and effective for you.

## 2024-04-04 NOTE — PROGRESS NOTES
COVID-19 Outpatient Progress Note    Assessment/Plan:    Problem List Items Addressed This Visit        Cardiovascular and Mediastinum    Benign essential hypertension       Behavioral Health    Pipe smoker       Other    Overweight   Other Visit Diagnoses     Acute sinusitis, recurrence not specified, unspecified location    -  Primary    Relevant Medications    doxycycline (ADOXA) 100 MG tablet    BMI 27.0-27.9,adult             Disposition:     Risks and benefits of COVID-19 vaccination was discussed with patient.     Discussed symptom directed medication options with patient.   Acute Sinusitis / HTN / Pipe Smoker - Start Doxycycline 100mg BID x 10 days.  Smoking cessation advised.      Advise Janes med sinus rinse kit, Mucinex, Claritin/Zyrtec/Allegra/Xyzal, Flonase / Nasacort nasal spray.  Avoid decongestants if you have high blood pressure.      I have spent a total time of 20 minutes on the day of the encounter for this patient including discussing prognosis, risks and benefits of treatment options, instructions for management, patient and family education, importance of treatment compliance, risk factor reductions, impressions, counseling/coordination of care, documenting in the medical record, reviewing/ordering tests, medicine, procedures, obtaining or reviewing history and communicating with other healthcare professionals.       Encounter provider: Mireille Martines DO     Provider located at: [facility]  [address] [place] [id number] [phone]     Recent Visits  No visits were found meeting these conditions.  Showing recent visits within past 7 days and meeting all other requirements  Today's Visits  Date Type Provider Dept   04/04/24 Office Visit Mireille Martines DO [facility]   Showing today's visits and meeting all other requirements  Future Appointments  No visits were found meeting these conditions.  Showing future appointments  "within next 150 days and meeting all other requirements     Subjective:   Andres Chacko is a 68 y.o. male who is concerned about COVID-19. Patient's symptoms include fatigue, rhinorrhea (+PND, worse at night), sore throat (Due to cough) and cough (Mostlly dry). Patient denies fever, chills, congestion, anosmia, loss of taste, shortness of breath, chest tightness, abdominal pain, nausea, vomiting, diarrhea, myalgias and headaches.     - Date of symptom onset: 3/26/2024      COVID-19 vaccination status: Fully vaccinated with booster    Exposure:   Contact with a person who is under investigation (PUI) for or who is positive for COVID-19 within the last 14 days?: No    Using sugar free cough drops, aspirin, cough syrup.      Has has not COVID tested.      He had post-COVID cough last year that has been resoling .     Lab Results   Component Value Date    SARSCOV2 Not Detected 09/10/2022    SARSCOV2  09/10/2022     INTERPRETATION:  SARS-CoV-2 NOT detected.    CLINICAL SIGNIFICANCE AND ASSAY LIMITATIONS:  SARS-CoV-2 detection may be   affected by sample collection methods, patient factors (e.g., presence of   symptoms), and/or stage of infection. Nasopharyngeal specimens are preferred,   but other upper respiratory specimen types are acceptable. The performance   characteristics of other specimen types have been evaluated, but not fully   established. A \"Positive\" result does not rule out bacterial infection or   co-infection with other viruses. A \"Not Detected\" result does not preclude   SARS-CoV-2 infection and should not be used as the sole basis for treatment or   patient management.  This assay does not detect other common respiratory   pathogens, including common human coronavirus strains (229E, HKU1, NL63, OC43).    METHOD (SARS-CoV-2 only):  Presence of SARS-CoV-2 nucleic acid was determined   using the PCR-based ruth SARS-CoV-2 assay ([employer]) for use on   the ruth [employer]/8800 Systems. This " "method only provides a SARS-CoV-2 result. The   normal reference range is \"Not detected\".    METHOD (SARS-CoV-2 and Influenza A/B):  Presence of SARS-CoV-2, Influenza A, and   Influenza B nucleic acid was determined using the PCR-based ruth SARS-CoV-2   and Influenza A/B assay ([employer]) for use on the ruth 6800/8800   Systems. This method provides concurrent SARS-CoV-2, Influenza A, and Influenza   B results; please refer to separate influenza comment for additional   interpretive details. The normal reference range is \"Not detected\".    ADDITIONAL INFORMATION:  SARS-CoV-2 Only  For Healthcare Providers: https://www.fda.gov/media/814906/download  For Patients: https://www.[employer].gov/media/578377/download    SARS-CoV-2 and Influenza A/B  For Healthcare Providers: https://www.[employer].gov/media/157148/download  For Patients: https://www.[employer].gov/media/684686/download    This test has received Emergency Use Authorization (EUA) by the FDA. This test   was performed by the [facility] at [facility] at   [place] [address], [place] [address].       Review of Systems   Constitutional:  Positive for fatigue. Negative for chills and fever.   HENT:  Positive for rhinorrhea (+PND, worse at night) and sore throat (Due to cough). Negative for congestion.    Respiratory:  Positive for cough (Mostlly dry). Negative for chest tightness and shortness of breath.    Gastrointestinal:  Negative for abdominal pain, diarrhea, nausea and vomiting.   Musculoskeletal:  Negative for myalgias.   Neurological:  Negative for headaches.     Current Outpatient Medications on File Prior to Visit   Medication Sig   • anagrelide (AGRYLIN) 1 mg capsule Take 1 capsule (1 mg total) by mouth 2 (two) times a day   • aspirin 325 mg tablet Take 325 mg by mouth daily   • carbidopa-levodopa (SINEMET)  mg per tablet Take 2 tablets by mouth 3 (three) times a day Follow clinic instructions (Patient taking " "differently: Take 1 tablet by mouth in the morning Follow clinic instructions.  Rx per Neuro.)   • Cholecalciferol (VITAMIN D) 2000 units CAPS Take 2 capsules (4,000 Units total) by mouth daily   • co-enzyme Q-10 30 MG capsule Take 1 capsule (30 mg total) by mouth daily   • Continuous Blood Gluc  (FreeStyle Luisa 3 Calumet) ARMANDO once   • Continuous Blood Gluc Sensor (FreeStyle Luisa 3 Sensor) MISC Every 14 days   • ezetimibe (ZETIA) 10 mg tablet TAKE 1 TABLET BY MOUTH EVERY DAY   • glucose blood (ONETOUCH VERIO) test strip Check BG 2x per day (Patient taking differently: Check BG 2x per day.  Rx per Endo.)   • Icosapent Ethyl (Vascepa) 1 g CAPS Take 2 capsules (2 g total) by mouth 2 (two) times a day   • insulin degludec (Tresiba FlexTouch) 200 units/mL CONCENTRATED U-200 injection pen 20 units daily   • Insulin Pen Needle (BD Pen Needle Yara U/F) 32G X 4 MM MISC Daily   • lisinopril (ZESTRIL) 20 mg tablet TAKE 1 TABLET BY MOUTH DAILY   • mometasone (ELOCON) 0.1 % cream Apply topically 2 (two) times a day as needed (Rash)   • Multiple Vitamin (multivitamin) capsule Take 1 capsule by mouth daily   • pravastatin (PRAVACHOL) 80 mg tablet Take 1 tablet (80 mg total) by mouth daily at bedtime   • sitaGLIPtin-metFORMIN (Janumet)  MG per tablet TAKE 1 TABLET BY MOUTH TWICE  DAILY WITH MEALS   • venlafaxine (EFFEXOR-XR) 37.5 mg 24 hr capsule Take 1 capsule (37.5 mg total) by mouth daily Take with 75mg for a total of 112.5mg daily.   • venlafaxine (EFFEXOR-XR) 75 mg 24 hr capsule Take with 37.5mg for a total of 112.5mg daily.       Objective:    /72   Pulse 87   Temp 98 °F (36.7 °C)   Resp 16   Ht 5' 9.5\" (1.765 m)   Wt 86.4 kg (190 lb 6.4 oz)   SpO2 99%   BMI 27.71 kg/m²        Physical Exam  Vitals and nursing note reviewed.   Constitutional:       General: He is not in acute distress.     Appearance: Normal appearance. He is well-developed.   HENT:      Head: Normocephalic and atraumatic.      " Right Ear: Tympanic membrane, ear canal and external ear normal.      Left Ear: Tympanic membrane, ear canal and external ear normal.      Nose: Nose normal.      Right Sinus: No maxillary sinus tenderness or frontal sinus tenderness.      Left Sinus: No maxillary sinus tenderness or frontal sinus tenderness.      Mouth/Throat:      Mouth: Mucous membranes are moist.      Pharynx: No oropharyngeal exudate or posterior oropharyngeal erythema.      Comments: +Thick, white PND  Eyes:      Conjunctiva/sclera: Conjunctivae normal.   Cardiovascular:      Rate and Rhythm: Normal rate and regular rhythm.      Pulses: Normal pulses.      Heart sounds: Normal heart sounds. No murmur heard.  Pulmonary:      Effort: Pulmonary effort is normal. No respiratory distress.      Breath sounds: Normal breath sounds.   Musculoskeletal:         General: No swelling or tenderness.      Cervical back: Neck supple.      Right lower leg: No edema.      Left lower leg: No edema.   Neurological:      General: No focal deficit present.      Mental Status: He is alert and oriented to person, place, and time.   Psychiatric:         Mood and Affect: Mood normal.       Mireille Martines, DO    BMI Counseling: Body mass index is 27.71 kg/m². The BMI is above normal. Nutrition recommendations include 3-5 servings of fruits/vegetables daily. Exercise recommendations include exercising 3-5 times per week.

## 2024-04-16 NOTE — PROGRESS NOTES
Cardiology Follow Up    Andres Chacko  1955  [id number]  [facility]  [address]  [place] [id number] [phone]    1. Benign essential hypertension        2. Arteriosclerosis of coronary artery        3. Type 2 diabetes mellitus without complication, without long-term current use of insulin ([facility])            Interval History: Patient is here for a f/u visit. Echocardiogram 9/2016 demonstrated preserved LV systolic function with mild LVH.  Nuclear ETT 10/2014 demonstrated a tiny reversible inferior defect which was managed medically. A lipid profile 3/2024 which demonstrated total cholesterol of 146 with an HDL 42 and an LDL of 46.  He was diagnosed with Parkinson's disease at [facility]. He takes carbidopa once a day.  He has had no chest pain or significant dyspnea.  He has a deep brain stimulator in place in the left upper chest.    Patient Active Problem List   Diagnosis   • Arteriosclerosis of coronary artery   • Benign essential hypertension   • Depression   • Type 2 diabetes mellitus without complication, without long-term current use of insulin (HCC)   • Subclinical hypothyroidism   • Essential (hemorrhagic) thrombocythemia (HCC)   • Mixed hyperlipidemia   • Leukocytosis   • Other male erectile dysfunction   • Parkinson disease   • Type 2 diabetes mellitus with hyperglycemia, without long-term current use of insulin (HCC)   • Dystonia   • History of stroke   • Hyperkalemia   • History of prostate cancer   • Pipe smoker   • Anxiety   • Overweight     Past Medical History:   Diagnosis Date   • Anxiety    • Arteriosclerosis of coronary artery 05/24/2017   • Arthritis 1999   • Benign essential hypertension 05/15/2015   • Cancer (HCC) 2012   • Depression 06/01/2012   • Diabetes mellitus (HCC)    • Essential (hemorrhagic) thrombocythemia (HCC)    • History of prostate cancer 01/05/2023   • History of stroke    •  Hypertension    • Left carpal tunnel syndrome    • Lumbar canal stenosis    • Mixed hyperlipidemia 11/10/2010   • Osteoarthritis, knee    • Other male erectile dysfunction 08/08/2019   • Overweight    • Parkinson disease 10/08/2019   • Pipe smoker    • Retinal and vitreous disorder    • Stroke ([facility]) 2019   • Subclinical hypothyroidism 04/11/2017   • Type 2 diabetes mellitus without complication, without long-term current use of insulin ([facility]) 08/24/2017     Social History     Socioeconomic History   • Marital status: /Civil Union     Spouse name: Brandi   • Number of children: 2   • Years of education: Not on file   • Highest education level: Not on file   Occupational History   • 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   Tobacco Use   • Smoking status: Some Days     Types: Pipe     Start date: 1/1/1995     Passive exposure: Past   • Smokeless tobacco: Never   • Tobacco comments:     Occasional pipe or cigar < 6 times a month   Vaping Use   • Vaping status: Never Used   Substance and Sexual Activity   • Alcohol use: Yes     Alcohol/week: 4.0 standard drinks of alcohol     Types: 3 Cans of beer, 1 Shots of liquor per week     Comment: occassional    • Drug use: No   • Sexual activity: Not Currently     Partners: Female     Birth control/protection: Surgical     Comment: Prostate removed on 9/13/2013   Other Topics Concern   • Not on file   Social History Narrative        Lives with wife, Brandi    2 Children - 2 Sons    Retired - Electrical Engineering     Social Determinants of Health     Financial Resource Strain: Low Risk  (2/21/2023)    Overall Financial Resource Strain (CARDIA)    • Difficulty of Paying Living Expenses: Not hard at all   Food Insecurity: Not on file   Transportation Needs: No Transportation Needs (2/21/2023)    PRAPARE - Transportation    • Lack of Transportation (Medical): No    • Lack of Transportation (Non-Medical): No   Physical Activity: Not on file   Stress: Not on file    Social Connections: Not on file   Intimate Partner Violence: Not on file   Housing Stability: Not on file      Family History   Problem Relation Age of Onset   • Hypertension Mother             • Retinal detachment Mother    • Retinitis pigmentosa Mother    • Osteoporosis Mother    • Vision loss Mother         retinitis pigmentosa   • Diabetes type II Father             • Heart attack Father 59   • Coronary artery disease Father 59   • Heart disease Father             • Diabetes Father             • No Known Problems Son    • No Known Problems Son      Past Surgical History:   Procedure Laterality Date   • ANKLE FRACTURE SURGERY Left 2009    triple fracture; 2 Screws in place   • DEEP BRAIN STIMULATOR PLACEMENT Bilateral 2022   • FRACTURE SURGERY  4/15/2009   • IR BIOPSY BONE MARROW  2023   • LUMBAR LAMINECTOMY      L5-S1   • NASAL SEPTUM SURGERY  1989   • WI COLONOSCOPY FLX DX W/COLLJ SPEC WHEN PFRMD N/A 2016    Procedure: COLONOSCOPY;  Surgeon: Jigar Alvarado MD;  Location: [facility];  Service: Gastroenterology   • PROSTATE BIOPSY  2012    managed by Rambo Roy, needle biopsy   • PROSTATECTOMY  2013       Current Outpatient Medications:   •  anagrelide (AGRYLIN) 1 mg capsule, Take 1 capsule (1 mg total) by mouth 2 (two) times a day, Disp: 60 capsule, Rfl: 3  •  aspirin 325 mg tablet, Take 325 mg by mouth daily, Disp: , Rfl:   •  carbidopa-levodopa (SINEMET)  mg per tablet, Take 2 tablets by mouth 3 (three) times a day Follow clinic instructions (Patient taking differently: Take 1 tablet by mouth in the morning Follow clinic instructions.  Rx per Neuro.), Disp: 540 tablet, Rfl: 3  •  Cholecalciferol (VITAMIN D) 2000 units CAPS, Take 2 capsules (4,000 Units total) by mouth daily, Disp: , Rfl: 0  •  co-enzyme Q-10 30 MG capsule, Take 1 capsule (30 mg total) by mouth daily, Disp: 30 capsule, Rfl: 0  •  Continuous Blood  "Gluc  (FreeStyle Luisa 3 Teague) ARMANDO, once, Disp: 1 each, Rfl: 0  •  Continuous Blood Gluc Sensor (FreeStyle Luisa 3 Sensor) MISC, Every 14 days, Disp: 2 each, Rfl: 3  •  ezetimibe (ZETIA) 10 mg tablet, TAKE 1 TABLET BY MOUTH EVERY DAY, Disp: 90 tablet, Rfl: 0  •  glucose blood (ONETOUCH VERIO) test strip, Check BG 2x per day (Patient taking differently: Check BG 2x per day.  Rx per Endo.), Disp: 200 each, Rfl: 3  •  Icosapent Ethyl (Vascepa) 1 g CAPS, Take 2 capsules (2 g total) by mouth 2 (two) times a day, Disp: 180 capsule, Rfl: 2  •  insulin degludec (Tresiba FlexTouch) 200 units/mL CONCENTRATED U-200 injection pen, 20 units daily, Disp: 9 mL, Rfl: 1  •  Insulin Pen Needle (BD Pen Needle Yara U/F) 32G X 4 MM MISC, Daily, Disp: 100 each, Rfl: 1  •  lisinopril (ZESTRIL) 20 mg tablet, TAKE 1 TABLET BY MOUTH DAILY, Disp: 90 tablet, Rfl: 1  •  mometasone (ELOCON) 0.1 % cream, Apply topically 2 (two) times a day as needed (Rash), Disp: 15 g, Rfl: 0  •  Multiple Vitamin (multivitamin) capsule, Take 1 capsule by mouth daily, Disp: , Rfl:   •  pravastatin (PRAVACHOL) 80 mg tablet, TAKE 1 TABLET BY MOUTH DAILY AT  BEDTIME, Disp: 100 tablet, Rfl: 1  •  sitaGLIPtin-metFORMIN (Janumet)  MG per tablet, TAKE 1 TABLET BY MOUTH TWICE  DAILY WITH MEALS, Disp: 200 tablet, Rfl: 1  •  venlafaxine (EFFEXOR-XR) 37.5 mg 24 hr capsule, Take 1 capsule (37.5 mg total) by mouth daily Take with 75mg for a total of 112.5mg daily., Disp: 90 capsule, Rfl: 1  •  venlafaxine (EFFEXOR-XR) 75 mg 24 hr capsule, Take with 37.5mg for a total of 112.5mg daily., Disp: 90 capsule, Rfl: 1  No Known Allergies    Labs:not applicable  Imaging: No results found.    Review of Systems:  Review of Systems   All other systems reviewed and are negative.      Physical Exam:  /64 (BP Location: Left arm, Patient Position: Sitting, Cuff Size: Standard)   Pulse 83   Ht 5' 9\" (1.753 m) Comment: verbal  Wt 87.5 kg (193 lb)   SpO2 98%   BMI " 28.50 kg/m²   Physical Exam  Vitals reviewed.   Constitutional:       Appearance: He is well-developed.   HENT:      Head: Normocephalic and atraumatic.   Cardiovascular:      Rate and Rhythm: Normal rate.      Heart sounds: Normal heart sounds.   Pulmonary:      Effort: Pulmonary effort is normal.      Breath sounds: Normal breath sounds.   Musculoskeletal:      Cervical back: Normal range of motion.   Skin:     General: Skin is warm and dry.   Neurological:      Mental Status: He is alert and oriented to person, place, and time.         Discussion/Summary:I will continue the patient's present medical regimen.  Patient appears well compensated.  I have asked the patient to call if there is a problem in the interim otherwise I will see the patient in one years time.

## 2024-04-17 ENCOUNTER — TELEPHONE (OUTPATIENT)
Dept: HEMATOLOGY ONCOLOGY | Facility: CLINIC | Age: 69
End: 2024-04-17

## 2024-04-17 DIAGNOSIS — E78.2 MIXED HYPERLIPIDEMIA: ICD-10-CM

## 2024-04-17 NOTE — TELEPHONE ENCOUNTER
Call out to patient,spoke to Tremayne.  Lab reminder left with patient to have labs completed prior to follow up.  Patient agreeable to plan

## 2024-04-18 ENCOUNTER — APPOINTMENT (OUTPATIENT)
Dept: LAB | Facility: CLINIC | Age: 69
End: 2024-04-18
Payer: COMMERCIAL

## 2024-04-18 DIAGNOSIS — D47.3 ESSENTIAL THROMBOCYTOSIS (HCC): ICD-10-CM

## 2024-04-18 LAB
ALBUMIN SERPL BCP-MCNC: 4 G/DL (ref 3.5–5)
ALP SERPL-CCNC: 31 U/L (ref 34–104)
ALT SERPL W P-5'-P-CCNC: 12 U/L (ref 7–52)
ANION GAP SERPL CALCULATED.3IONS-SCNC: 6 MMOL/L (ref 4–13)
AST SERPL W P-5'-P-CCNC: 21 U/L (ref 13–39)
BASOPHILS # BLD AUTO: 0.08 THOUSANDS/ÂΜL (ref 0–0.1)
BASOPHILS NFR BLD AUTO: 1 % (ref 0–1)
BILIRUB SERPL-MCNC: 0.32 MG/DL (ref 0.2–1)
BUN SERPL-MCNC: 19 MG/DL (ref 5–25)
CALCIUM SERPL-MCNC: 9.6 MG/DL (ref 8.4–10.2)
CHLORIDE SERPL-SCNC: 102 MMOL/L (ref 96–108)
CO2 SERPL-SCNC: 28 MMOL/L (ref 21–32)
CREAT SERPL-MCNC: 1.2 MG/DL (ref 0.6–1.3)
EOSINOPHIL # BLD AUTO: 0.36 THOUSAND/ÂΜL (ref 0–0.61)
EOSINOPHIL NFR BLD AUTO: 3 % (ref 0–6)
ERYTHROCYTE [DISTWIDTH] IN BLOOD BY AUTOMATED COUNT: 13.9 % (ref 11.6–15.1)
GFR SERPL CREATININE-BSD FRML MDRD: 61 ML/MIN/1.73SQ M
GLUCOSE SERPL-MCNC: 133 MG/DL (ref 65–140)
HCT VFR BLD AUTO: 42.5 % (ref 36.5–49.3)
HGB BLD-MCNC: 13.4 G/DL (ref 12–17)
IMM GRANULOCYTES # BLD AUTO: 0.03 THOUSAND/UL (ref 0–0.2)
IMM GRANULOCYTES NFR BLD AUTO: 0 % (ref 0–2)
LDH SERPL-CCNC: 125 U/L (ref 140–271)
LYMPHOCYTES # BLD AUTO: 3.18 THOUSANDS/ÂΜL (ref 0.6–4.47)
LYMPHOCYTES NFR BLD AUTO: 28 % (ref 14–44)
MCH RBC QN AUTO: 29.7 PG (ref 26.8–34.3)
MCHC RBC AUTO-ENTMCNC: 31.5 G/DL (ref 31.4–37.4)
MCV RBC AUTO: 94 FL (ref 82–98)
MONOCYTES # BLD AUTO: 0.84 THOUSAND/ÂΜL (ref 0.17–1.22)
MONOCYTES NFR BLD AUTO: 7 % (ref 4–12)
NEUTROPHILS # BLD AUTO: 6.99 THOUSANDS/ÂΜL (ref 1.85–7.62)
NEUTS SEG NFR BLD AUTO: 61 % (ref 43–75)
NRBC BLD AUTO-RTO: 0 /100 WBCS
PLATELET # BLD AUTO: 573 THOUSANDS/UL (ref 149–390)
PMV BLD AUTO: 10.1 FL (ref 8.9–12.7)
POTASSIUM SERPL-SCNC: 4.7 MMOL/L (ref 3.5–5.3)
PROT SERPL-MCNC: 6.8 G/DL (ref 6.4–8.4)
RBC # BLD AUTO: 4.51 MILLION/UL (ref 3.88–5.62)
SODIUM SERPL-SCNC: 136 MMOL/L (ref 135–147)
WBC # BLD AUTO: 11.48 THOUSAND/UL (ref 4.31–10.16)

## 2024-04-18 PROCEDURE — 36415 COLL VENOUS BLD VENIPUNCTURE: CPT

## 2024-04-18 PROCEDURE — 85025 COMPLETE CBC W/AUTO DIFF WBC: CPT

## 2024-04-18 PROCEDURE — 83615 LACTATE (LD) (LDH) ENZYME: CPT

## 2024-04-18 PROCEDURE — 80053 COMPREHEN METABOLIC PANEL: CPT

## 2024-04-18 RX ORDER — PRAVASTATIN SODIUM 80 MG/1
80 TABLET ORAL
Qty: 100 TABLET | Refills: 1 | Status: SHIPPED | OUTPATIENT
Start: 2024-04-18

## 2024-04-19 NOTE — PROGRESS NOTES
"HEMATOLOGY / ONCOLOGY CLINIC FOLLOW UP NOTE    Patient Andres Chacko  MRN: [id number]  : 1955  Date of Encounter 2024      Referring Provider:  MCKENNA Ansari 2023     Reason for Encounter: follow up for ET         1. Essential thrombocytosis    2. JAK2 V617F mutation          Assessment / Plan:        Tremayne Chacko is a 68-year-old male with essential thrombocytosis with JAK2 V617F mutation.  He was previously following with BENNY Mejia and now presents for transfer of care. He was previously on Hydrea 500 mg once daily did not tolerate due to side effects.  His dose was reduced to once daily on Monday, Wednesday, Friday.  He tolerated this for a longer period of time but then self discontinued due to feeling \"ill \".  Since discontinuing Hydrea he is back to baseline and has no constitutional complaints or concerns.  His platelet count remained greater than 500 K with mildly elevated WBC count.  Due to intolerance of Hydrea he was switched to Anagrelide 1 mg BID in 2023.  He continues ASA 81 mg.  He notes compliance and tolerance with anagrelide.  I encouraged patient to have updated labs to assess treatment response as well as need for dose adjustments.  We reviewed updated guidelines necessitating bone marrow biopsy at the time of diagnosis.  Patient agreeable.  Referral made to IR for bone marrow biopsy.  He will continue current treatment and surveillance labs monthly.  Office follow-up in 3 months.      ET     Patient was started on anagrelide 1 mg bid at last appointment in Dec 2023.  He had plt count of 548 11/15/2023 and on anagrelide 370.  He has been off due to \"fatigue\" and is now 497     Restarted anagrelide at 50% dose reduction but plts incresed to 573 from 497.  WBC 11.5 Hgb 13.4 diff 61/28    Will increase to 1 mg bid anagrelide    Repeat CBC in 2 weeks       Did discuss use of pegylated interferon as well as ruxolitinib       Follow up      Approximately 1 month " "-will repeat CBC in 2 weeks and will call with results          HPI  12/6/2023     Tremayne Chacko is a 68-year-old male with past medical history significant for type 2 diabetes, CAD, HTN, Parkinson's disease, history of prostate cancer s/p prostatectomy in 2013.  Patient was previously following with BENNY Mejia for longstanding history of thrombocytosis since at least 2016.  He denies any personal history of thrombosis..  Subsequent workup noted JAK2 V6 17F mutation positive and patient was initiated on hydroxyurea which she subsequently stopped due to intolerance.  Patient does not state specific side effects, but notes it made him feel \"ill\".  He was switched to anagrelide 1 mg twice daily since November 2023.  Patient notes compliance with current treatment and denies any adverse effects.  No repeat labs available.  Patient presents for transfer of care/follow-up visit today.  Offers no new complaints.  Denies any chest pain, shortness of breath, abdominal pain, nausea, vomiting, diarrhea.  Denies any fever, night sweats or unexpected weight changes.  He has never had a bone marrow biopsy.     Interval Follow up 3/18/2024     Patient stopped anagrelide one month ago as didn't like how it made him feel with decreased energy.  Labs on anagrelide 1 mg bid as follows:     Labs 12/21/2023       WBC 12.2 Hg 15 plts 370     Labs prior 11/15/2023     WBC 9.4 Hgb 14.2 plts 548     Labs off anagrelide 3/18/2024       WBC 9.8 Hgb 14.8 plts 497     Has no other complaints     Will restart anagrelide at 50% reduction as did not tolerate hydrea at all      Denies personal or family hx of blood clots      Interval History: 4/22/2024    Mr Chacko is tolerating anagrelide 1 mg daily but plts increased to 573.  He is not any more fatigued than baseline.  He has had a chronic cough since COVID in Sept 2023 but has had some viral issues with increased cough.  He states he has a chronic postnasal drip which is making the " cough worse.  He did have one bleeding issue from coughing.  It is nonproductive.  He is seeing his PCP but may need a CT chest and may need ENT involvement if this is a sinus issue.        Review of Systems   Constitutional:  Negative for chills, fatigue and fever.   Eyes:  Negative for pain and visual disturbance.   Respiratory:  Negative for cough and shortness of breath.    Cardiovascular:  Negative for chest pain and palpitations.   Gastrointestinal:  Negative for abdominal pain and vomiting.   Genitourinary:  Negative for dysuria and hematuria.   Musculoskeletal:  Negative for arthralgias and back pain.   Skin:  Negative for color change and rash.   Neurological:  Negative for seizures and syncope.   All other systems reviewed and are negative.           Primary Diagnosis:  1.  Essential thrombocytosis.  Diagnosed April 2023.  2.  JAK2 V617F mutation      Previous Hematologic/ Oncologic Treatment:   Hydrea DC'd due to intolerance     Current Hematologic/ Oncologic Treatment:    Anagrelide 1 mg BID since November 2023.   ASA 81 mg     Test Results:  Pathology:     Radiology:  3/19/2023: abd US: no splenomegaly     Laboratory:  See below            ECOG PS: 0    PROBLEM LIST:  Patient Active Problem List   Diagnosis    Arteriosclerosis of coronary artery    Benign essential hypertension    Depression    Type 2 diabetes mellitus without complication, without long-term current use of insulin (HCC)    Subclinical hypothyroidism    Essential (hemorrhagic) thrombocythemia (HCC)    Mixed hyperlipidemia    Leukocytosis    Other male erectile dysfunction    Parkinson disease    Type 2 diabetes mellitus with hyperglycemia, without long-term current use of insulin (HCC)    Dystonia    History of stroke    Hyperkalemia    History of prostate cancer    Pipe smoker    Anxiety    Overweight       Past Medical History:   has a past medical history of Anxiety, Arteriosclerosis of coronary artery (05/24/2017), Arthritis (1999),  Benign essential hypertension (05/15/2015), Cancer ([facility]) (2012), Depression (06/01/2012), Diabetes mellitus ([facility]), Essential (hemorrhagic) thrombocythemia ([facility]), History of prostate cancer (01/05/2023), History of stroke, Hypertension, Left carpal tunnel syndrome, Lumbar canal stenosis, Mixed hyperlipidemia (11/10/2010), Osteoarthritis, knee, Other male erectile dysfunction (08/08/2019), Overweight, Parkinson disease (10/08/2019), Pipe smoker, Retinal and vitreous disorder, Stroke ([facility]) (2019), Subclinical hypothyroidism (04/11/2017), and Type 2 diabetes mellitus without complication, without long-term current use of insulin ([facility]) (08/24/2017).    PAST SURGICAL HISTORY:   has a past surgical history that includes Prostatectomy (09/23/2013); Ankle fracture surgery (Left, 04/2009); pr colonoscopy flx dx w/collj spec when pfrmd (N/A, 01/27/2016); Prostate biopsy (12/08/2012); Lumbar laminectomy (2003); Deep brain stimulator placement (Bilateral, 09/2022); Nasal septum surgery (06/1989); IR biopsy bone marrow (12/21/2023); and Fracture surgery (4/15/2009).    CURRENT MEDICATIONS  Current Outpatient Medications   Medication Sig Dispense Refill    anagrelide (AGRYLIN) 1 mg capsule Take 1 capsule (1 mg total) by mouth 2 (two) times a day 60 capsule 3    aspirin 325 mg tablet Take 325 mg by mouth daily      carbidopa-levodopa (SINEMET)  mg per tablet Take 2 tablets by mouth 3 (three) times a day Follow clinic instructions (Patient taking differently: Take 1 tablet by mouth in the morning Follow clinic instructions.  Rx per Neuro.) 540 tablet 3    Cholecalciferol (VITAMIN D) 2000 units CAPS Take 2 capsules (4,000 Units total) by mouth daily  0    co-enzyme Q-10 30 MG capsule Take 1 capsule (30 mg total) by mouth daily 30 capsule 0    Continuous Blood Gluc  (FreeStyle Luisa 3 Oran) ARMANDO once 1 each 0    Continuous Blood Gluc Sensor (FreeStyle Luisa 3 Sensor) MISC Every 14 days 2 each 3    ezetimibe (ZETIA) 10  mg tablet TAKE 1 TABLET BY MOUTH EVERY DAY 90 tablet 0    glucose blood (ONETOUCH VERIO) test strip Check BG 2x per day (Patient taking differently: Check BG 2x per day.  Rx per Endo.) 200 each 3    Icosapent Ethyl (Vascepa) 1 g CAPS Take 2 capsules (2 g total) by mouth 2 (two) times a day 180 capsule 2    insulin degludec (Tresiba FlexTouch) 200 units/mL CONCENTRATED U-200 injection pen 20 units daily 9 mL 1    Insulin Pen Needle (BD Pen Needle Yara U/F) 32G X 4 MM MISC Daily 100 each 1    lisinopril (ZESTRIL) 20 mg tablet TAKE 1 TABLET BY MOUTH DAILY 90 tablet 1    mometasone (ELOCON) 0.1 % cream Apply topically 2 (two) times a day as needed (Rash) 15 g 0    Multiple Vitamin (multivitamin) capsule Take 1 capsule by mouth daily      pravastatin (PRAVACHOL) 80 mg tablet TAKE 1 TABLET BY MOUTH DAILY AT  BEDTIME 100 tablet 1    sitaGLIPtin-metFORMIN (Janumet)  MG per tablet TAKE 1 TABLET BY MOUTH TWICE  DAILY WITH MEALS 200 tablet 1    venlafaxine (EFFEXOR-XR) 37.5 mg 24 hr capsule Take 1 capsule (37.5 mg total) by mouth daily Take with 75mg for a total of 112.5mg daily. 90 capsule 1    venlafaxine (EFFEXOR-XR) 75 mg 24 hr capsule Take with 37.5mg for a total of 112.5mg daily. 90 capsule 1     No current facility-administered medications for this visit.     [unfilled]    SOCIAL HISTORY:   reports that he has been smoking pipe. He started smoking about 29 years ago. He has been exposed to tobacco smoke. He has never used smokeless tobacco. He reports current alcohol use of about 4.0 standard drinks of alcohol per week. He reports that he does not use drugs.     FAMILY HISTORY:  family history includes Coronary artery disease (age of onset: 59) in his father; Diabetes in his father; Diabetes type II in his father; Heart attack (age of onset: 59) in his father; Heart disease in his father; Hypertension in his mother; No Known Problems in his son and son; Osteoporosis in his mother; Retinal detachment in his mother;  Retinitis pigmentosa in his mother; Vision loss in his mother.     ALLERGIES:  has No Known Allergies.      Physical Exam:  Vital Signs:   Visit Vitals  Smoking Status Some Days     There is no height or weight on file to calculate BMI.  There is no height or weight on file to calculate BSA.    GEN: Alert, awake oriented x3, in no acute distress  HEENT- No pallor, icterus, cyanosis, no oral mucosal lesions,   LAD - no palpable cervical, clavicle, axillary, inguinal LAD  Heart- normal S1 S2, regular rate and rhythm, No murmur, rubs.   Lungs- clear breathing sound bilateral.   Abdomen- soft, Non tender, bowel sounds present  Extremities- No cyanosis, clubbing, edema  Neuro- No focal neurological deficit    Labs:  Lab Results   Component Value Date    WBC 11.48 (H) 04/18/2024    HGB 13.4 04/18/2024    HCT 42.5 04/18/2024    MCV 94 04/18/2024     (H) 04/18/2024      Latest Reference Range & Units 04/18/24 13:52   Sodium 135 - 147 mmol/L 136   Potassium 3.5 - 5.3 mmol/L 4.7   Chloride 96 - 108 mmol/L 102   Carbon Dioxide 21 - 32 mmol/L 28   ANION GAP 4 - 13 mmol/L 6   BUN 5 - 25 mg/dL 19   Creatinine 0.60 - 1.30 mg/dL 1.20   GLUCOSE 65 - 140 mg/dL 133   Calcium 8.4 - 10.2 mg/dL 9.6   AST 13 - 39 U/L 21   ALT 7 - 52 U/L 12   ALK PHOS 34 - 104 U/L 31 (L)   Total Protein 6.4 - 8.4 g/dL 6.8   Albumin 3.5 - 5.0 g/dL 4.0   Total Bilirubin 0.20 - 1.00 mg/dL 0.32   GFR, Calculated ml/min/1.73sq m 61   LD (LDH) 140 - 271 U/L 125 (L)   WBC 4.31 - 10.16 Thousand/uL 11.48 (H)   RBC 3.88 - 5.62 Million/uL 4.51   Hemoglobin 12.0 - 17.0 g/dL 13.4   Hematocrit 36.5 - 49.3 % 42.5   MCV 82 - 98 fL 94   MCH 26.8 - 34.3 pg 29.7   MCHC 31.4 - 37.4 g/dL 31.5   RDW 11.6 - 15.1 % 13.9   Platelet Count 149 - 390 Thousands/uL 573 (H)   MPV 8.9 - 12.7 fL 10.1   nRBC /100 WBCs 0   Segmented % 43 - 75 % 61   Lymphocytes % 14 - 44 % 28   Monocytes % 4 - 12 % 7   Eosinophils % 0 - 6 % 3   Basophils % 0 - 1 % 1   Immature Grans % 0 - 2 % 0    Absolute Immature Grans 0.00 - 0.20 Thousand/uL 0.03   Absolute Neutrophils 1.85 - 7.62 Thousands/µL 6.99   Absolute Lymphocytes 0.60 - 4.47 Thousands/µL 3.18   Absolute Monocytes 0.17 - 1.22 Thousand/µL 0.84   Absolute Eosinophils 0.00 - 0.61 Thousand/µL 0.36   Absolute Basophils 0.00 - 0.10 Thousands/µL 0.08   (L): Data is abnormally low  (H): Data is abnormally high          I spent 30 minutes on chart review, face to face counseling time, coordination of care and documentation.    Mariajose Vaca MD PhD

## 2024-04-22 ENCOUNTER — OFFICE VISIT (OUTPATIENT)
Dept: HEMATOLOGY ONCOLOGY | Facility: CLINIC | Age: 69
End: 2024-04-22
Payer: COMMERCIAL

## 2024-04-22 VITALS
BODY MASS INDEX: 29.33 KG/M2 | HEIGHT: 69 IN | OXYGEN SATURATION: 98 % | WEIGHT: 198 LBS | DIASTOLIC BLOOD PRESSURE: 78 MMHG | RESPIRATION RATE: 16 BRPM | HEART RATE: 71 BPM | SYSTOLIC BLOOD PRESSURE: 138 MMHG | TEMPERATURE: 97.7 F

## 2024-04-22 DIAGNOSIS — D47.3 ESSENTIAL THROMBOCYTOSIS (HCC): Primary | ICD-10-CM

## 2024-04-22 PROCEDURE — 99214 OFFICE O/P EST MOD 30 MIN: CPT | Performed by: INTERNAL MEDICINE

## 2024-04-22 NOTE — PATIENT INSTRUCTIONS
Blood count in 2 weeks and again in four weeks    Anagrelide 1 mg twice a day     Will call with results in 2 weeks    Follow up in person 1 month

## 2024-04-26 ENCOUNTER — OFFICE VISIT (OUTPATIENT)
Dept: CARDIOLOGY CLINIC | Facility: CLINIC | Age: 69
End: 2024-04-26
Payer: COMMERCIAL

## 2024-04-26 VITALS
WEIGHT: 193 LBS | SYSTOLIC BLOOD PRESSURE: 108 MMHG | OXYGEN SATURATION: 98 % | HEART RATE: 83 BPM | HEIGHT: 69 IN | BODY MASS INDEX: 28.58 KG/M2 | DIASTOLIC BLOOD PRESSURE: 64 MMHG

## 2024-04-26 DIAGNOSIS — E11.9 TYPE 2 DIABETES MELLITUS WITHOUT COMPLICATION, WITHOUT LONG-TERM CURRENT USE OF INSULIN (HCC): ICD-10-CM

## 2024-04-26 DIAGNOSIS — I25.10 ARTERIOSCLEROSIS OF CORONARY ARTERY: ICD-10-CM

## 2024-04-26 DIAGNOSIS — I10 BENIGN ESSENTIAL HYPERTENSION: Primary | ICD-10-CM

## 2024-04-26 PROCEDURE — 99214 OFFICE O/P EST MOD 30 MIN: CPT | Performed by: INTERNAL MEDICINE

## 2024-04-26 NOTE — PATIENT INSTRUCTIONS
I will continue the patient's present medical regimen.  Patient appears well compensated.  I have asked the patient to call if there is a problem in the interim otherwise I will see the patient in one years time.

## 2024-05-02 ENCOUNTER — VBI (OUTPATIENT)
Dept: ADMINISTRATIVE | Facility: OTHER | Age: 69
End: 2024-05-02

## 2024-05-22 DIAGNOSIS — E11.65 TYPE 2 DIABETES MELLITUS WITH HYPERGLYCEMIA, WITHOUT LONG-TERM CURRENT USE OF INSULIN (HCC): ICD-10-CM

## 2024-05-22 DIAGNOSIS — E11.9 TYPE 2 DIABETES MELLITUS WITHOUT COMPLICATION, WITHOUT LONG-TERM CURRENT USE OF INSULIN (HCC): ICD-10-CM

## 2024-05-22 DIAGNOSIS — I10 BENIGN ESSENTIAL HYPERTENSION: ICD-10-CM

## 2024-05-23 DIAGNOSIS — F32.A DEPRESSION, UNSPECIFIED DEPRESSION TYPE: ICD-10-CM

## 2024-05-23 DIAGNOSIS — F41.9 ANXIETY: ICD-10-CM

## 2024-05-23 RX ORDER — LISINOPRIL 20 MG/1
20 TABLET ORAL DAILY
Qty: 100 TABLET | Refills: 1 | Status: SHIPPED | OUTPATIENT
Start: 2024-05-23

## 2024-05-24 RX ORDER — VENLAFAXINE HYDROCHLORIDE 37.5 MG/1
CAPSULE, EXTENDED RELEASE ORAL
Qty: 90 CAPSULE | Refills: 1 | Status: SHIPPED | OUTPATIENT
Start: 2024-05-24

## 2024-05-31 ENCOUNTER — APPOINTMENT (OUTPATIENT)
Dept: LAB | Facility: CLINIC | Age: 69
End: 2024-05-31
Payer: COMMERCIAL

## 2024-05-31 DIAGNOSIS — D47.3 ESSENTIAL THROMBOCYTOSIS (HCC): ICD-10-CM

## 2024-05-31 LAB
BASOPHILS # BLD AUTO: 0.05 THOUSANDS/ÂΜL (ref 0–0.1)
BASOPHILS NFR BLD AUTO: 0 % (ref 0–1)
EOSINOPHIL # BLD AUTO: 0.38 THOUSAND/ÂΜL (ref 0–0.61)
EOSINOPHIL NFR BLD AUTO: 3 % (ref 0–6)
ERYTHROCYTE [DISTWIDTH] IN BLOOD BY AUTOMATED COUNT: 13.7 % (ref 11.6–15.1)
HCT VFR BLD AUTO: 41.8 % (ref 36.5–49.3)
HGB BLD-MCNC: 13.6 G/DL (ref 12–17)
IMM GRANULOCYTES # BLD AUTO: 0.04 THOUSAND/UL (ref 0–0.2)
IMM GRANULOCYTES NFR BLD AUTO: 0 % (ref 0–2)
LYMPHOCYTES # BLD AUTO: 3.17 THOUSANDS/ÂΜL (ref 0.6–4.47)
LYMPHOCYTES NFR BLD AUTO: 27 % (ref 14–44)
MCH RBC QN AUTO: 29.6 PG (ref 26.8–34.3)
MCHC RBC AUTO-ENTMCNC: 32.5 G/DL (ref 31.4–37.4)
MCV RBC AUTO: 91 FL (ref 82–98)
MONOCYTES # BLD AUTO: 0.8 THOUSAND/ÂΜL (ref 0.17–1.22)
MONOCYTES NFR BLD AUTO: 7 % (ref 4–12)
NEUTROPHILS # BLD AUTO: 7.25 THOUSANDS/ÂΜL (ref 1.85–7.62)
NEUTS SEG NFR BLD AUTO: 63 % (ref 43–75)
NRBC BLD AUTO-RTO: 0 /100 WBCS
PLATELET # BLD AUTO: 546 THOUSANDS/UL (ref 149–390)
PMV BLD AUTO: 9.9 FL (ref 8.9–12.7)
RBC # BLD AUTO: 4.6 MILLION/UL (ref 3.88–5.62)
WBC # BLD AUTO: 11.69 THOUSAND/UL (ref 4.31–10.16)

## 2024-05-31 PROCEDURE — 85025 COMPLETE CBC W/AUTO DIFF WBC: CPT

## 2024-05-31 PROCEDURE — 36415 COLL VENOUS BLD VENIPUNCTURE: CPT

## 2024-06-02 NOTE — PROGRESS NOTES
"HEMATOLOGY / ONCOLOGY CLINIC FOLLOW UP NOTE    Patient Andres Chacko  MRN: [id number]  : 1955  Date of Encounter 6/3/2024        Reason for Encounter: follow up for ET         1. Essential thrombocytosis    2. JAK2 V617F mutation        Platelets 2024 546 off anagrelide       Assessment / Plan:        Tremayne Chacko is a 68-year-old male with essential thrombocytosis with JAK2 V617F mutation.  He was previously following with BENNY Mejia and now presents for transfer of care. He was previously on Hydrea 500 mg once daily did not tolerate due to side effects.  His dose was reduced to once daily on Monday, Wednesday, Friday.  He tolerated this for a longer period of time but then self discontinued due to feeling \"ill \".  Since discontinuing Hydrea he is back to baseline and has no constitutional complaints or concerns.  His platelet count remained greater than 500 K with mildly elevated WBC count.  Due to intolerance of Hydrea he was switched to Anagrelide 1 mg BID in 2023.  He continues ASA 81 mg.  He notes compliance and tolerance with anagrelide.  I encouraged patient to have updated labs to assess treatment response as well as need for dose adjustments.  We reviewed updated guidelines necessitating bone marrow biopsy at the time of diagnosis.        ET     Prior Visit:    Patient was started on anagrelide 1 mg bid at last appointment in Dec 2023.  He had plt count of 548 11/15/2023 and on anagrelide 370.  He has been off due to \"fatigue\" and is now 497     Restarted anagrelide at 50% dose reduction but plts incresed to 573 from 497.  WBC 11.5 Hgb 13.4 diff /28    Dose was increased to 1 mg bid anagrelide and was supposed to have had labs drawn within 2 weeks last visit  2024    Patient stopped anagrelide over one week ago; plts 546 WBC 11.7    Did discuss use of pegylated interferon as well as ruxolitinib     Will consider ruxolitinib as weill not tolerate IFN    Repeat labs " "in one month off anagrelide-expect plts will increase    Will start lower dose ruxolitinib 10 mg daily        Follow up      Approximately 1 month       HPI  12/6/2023     Tremayne Chacko is a 68-year-old male with past medical history significant for type 2 diabetes, CAD, HTN, Parkinson's disease, history of prostate cancer s/p prostatectomy in 2013.  Patient was previously following with BENNY Mejia for longstanding history of thrombocytosis since at least 2016.  He denies any personal history of thrombosis..  Subsequent workup noted JAK2 V6 17F mutation positive and patient was initiated on hydroxyurea which she subsequently stopped due to intolerance.  Patient does not state specific side effects, but notes it made him feel \"ill\".  He was switched to anagrelide 1 mg twice daily since November 2023.  Patient notes compliance with current treatment and denies any adverse effects.  No repeat labs available.  Patient presents for transfer of care/follow-up visit today.  Offers no new complaints.  Denies any chest pain, shortness of breath, abdominal pain, nausea, vomiting, diarrhea.  Denies any fever, night sweats or unexpected weight changes.  He has never had a bone marrow biopsy.     Interval Follow up 3/18/2024     Patient stopped anagrelide one month ago as didn't like how it made him feel with decreased energy.  Labs on anagrelide 1 mg bid as follows:     Labs 12/21/2023       WBC 12.2 Hg 15 plts 370     Labs prior 11/15/2023     WBC 9.4 Hgb 14.2 plts 548     Labs off anagrelide 3/18/2024       WBC 9.8 Hgb 14.8 plts 497     Has no other complaints     Will restart anagrelide at 50% reduction as did not tolerate hydrea at all      Denies personal or family hx of blood clots        Interval History: 4/22/2024     Mr Chacko is tolerating anagrelide 1 mg daily but plts increased to 573.  He is not any more fatigued than baseline.  He has had a chronic cough since COVID in Sept 2023 but has had some viral " issues with increased cough.  He states he has a chronic postnasal drip which is making the cough worse.  He did have one bleeding issue from coughing.  It is nonproductive.  He is seeing his PCP but may need a CT chest and may need ENT involvement if this is a sinus issue.          Interval History:  6/3/2024    Mr Chacko had labs with the following: Plts 546 on the 31 May 2024; also WBC 11.7 Hgb 13.6.  Patient again with issues regarding drug use including fatigue and diarrhea.  He feels there are issues with his Parkinson meds and there may be some DDI but he is also at risk of stroke with increased plt levels.  He continues on aspirin.        REVIEW OF SYSTEMS:  Please note that a 14-point review of systems was performed to include Constitutional, HEENT, Respiratory, CVS, GI, , Musculoskeletal, Integumentary, Neurologic, Rheumatologic, Endocrinologic, Psychiatric, Lymphatic, and Hematologic/Oncologic systems were reviewed and are negative unless otherwise stated in HPI. Positive and negative findings pertinent to this evaluation are incorporated into the history of present illness.      ECOG PS: 0    PROBLEM LIST:  Patient Active Problem List   Diagnosis    Arteriosclerosis of coronary artery    Benign essential hypertension    Depression    Type 2 diabetes mellitus without complication, without long-term current use of insulin (HCC)    Subclinical hypothyroidism    Essential (hemorrhagic) thrombocythemia (HCC)    Mixed hyperlipidemia    Leukocytosis    Other male erectile dysfunction    Parkinson disease    Type 2 diabetes mellitus with hyperglycemia, without long-term current use of insulin (HCC)    Dystonia    History of stroke    Hyperkalemia    History of prostate cancer    Pipe smoker    Anxiety    Overweight       Past Medical History:   has a past medical history of Anxiety, Arteriosclerosis of coronary artery (05/24/2017), Arthritis (1999), Benign essential hypertension (05/15/2015), Cancer (HCC)  (2012), Depression (06/01/2012), Diabetes mellitus (HCC), Essential (hemorrhagic) thrombocythemia ([facility]), History of prostate cancer (01/05/2023), History of stroke, Hypertension, Left carpal tunnel syndrome, Lumbar canal stenosis, Mixed hyperlipidemia (11/10/2010), Osteoarthritis, knee, Other male erectile dysfunction (08/08/2019), Overweight, Parkinson disease (10/08/2019), Pipe smoker, Retinal and vitreous disorder, Stroke ([facility]) (2019), Subclinical hypothyroidism (04/11/2017), and Type 2 diabetes mellitus without complication, without long-term current use of insulin ([facility]) (08/24/2017).    PAST SURGICAL HISTORY:   has a past surgical history that includes Prostatectomy (09/23/2013); Ankle fracture surgery (Left, 04/2009); pr colonoscopy flx dx w/collj spec when pfrmd (N/A, 01/27/2016); Prostate biopsy (12/08/2012); Lumbar laminectomy (2003); Deep brain stimulator placement (Bilateral, 09/2022); Nasal septum surgery (06/1989); IR biopsy bone marrow (12/21/2023); and Fracture surgery (4/15/2009).    CURRENT MEDICATIONS  Current Outpatient Medications   Medication Sig Dispense Refill    anagrelide (AGRYLIN) 1 mg capsule Take 1 capsule (1 mg total) by mouth 2 (two) times a day 60 capsule 3    aspirin 325 mg tablet Take 325 mg by mouth daily      carbidopa-levodopa (SINEMET)  mg per tablet Take 2 tablets by mouth 3 (three) times a day Follow clinic instructions (Patient taking differently: Take 1 tablet by mouth in the morning Follow clinic instructions.  Rx per Neuro.) 540 tablet 3    Cholecalciferol (VITAMIN D) 2000 units CAPS Take 2 capsules (4,000 Units total) by mouth daily  0    co-enzyme Q-10 30 MG capsule Take 1 capsule (30 mg total) by mouth daily 30 capsule 0    Continuous Blood Gluc  (FreeStyle Luisa 3 [place]) ARMANDO once 1 each 0    Continuous Blood Gluc Sensor (FreeStyle Luisa 3 Sensor) MISC Every 14 days 2 each 3    ezetimibe (ZETIA) 10 mg tablet TAKE 1 TABLET BY MOUTH EVERY DAY 90 tablet 0     glucose blood (ONETOUCH VERIO) test strip Check BG 2x per day (Patient taking differently: Check BG 2x per day.  Rx per Endo.) 200 each 3    Icosapent Ethyl (Vascepa) 1 g CAPS Take 2 capsules (2 g total) by mouth 2 (two) times a day 180 capsule 2    insulin degludec (Tresiba FlexTouch) 200 units/mL CONCENTRATED U-200 injection pen 20 units daily 9 mL 1    Insulin Pen Needle (BD Pen Needle Yara U/F) 32G X 4 MM MISC Daily 100 each 1    lisinopril (ZESTRIL) 20 mg tablet TAKE 1 TABLET BY MOUTH DAILY 100 tablet 1    mometasone (ELOCON) 0.1 % cream Apply topically 2 (two) times a day as needed (Rash) 15 g 0    Multiple Vitamin (multivitamin) capsule Take 1 capsule by mouth daily      pravastatin (PRAVACHOL) 80 mg tablet TAKE 1 TABLET BY MOUTH DAILY AT  BEDTIME 100 tablet 1    sitaGLIPtin-metFORMIN (Janumet)  MG per tablet TAKE 1 TABLET BY MOUTH TWICE  DAILY WITH MEALS 200 tablet 1    venlafaxine (EFFEXOR-XR) 37.5 mg 24 hr capsule TAKE 1 CAPSULE BY MOUTH DAILY  WITH 75 MG FOR A TOTAL .5  MG DAILY 90 capsule 1    venlafaxine (EFFEXOR-XR) 75 mg 24 hr capsule Take with 37.5mg for a total of 112.5mg daily. 90 capsule 1     No current facility-administered medications for this visit.     [unfilled]    SOCIAL HISTORY:   reports that he has been smoking pipe. He started smoking about 29 years ago. He has been exposed to tobacco smoke. He has never used smokeless tobacco. He reports current alcohol use of about 4.0 standard drinks of alcohol per week. He reports that he does not use drugs.     FAMILY HISTORY:  family history includes Coronary artery disease (age of onset: 59) in his father; Diabetes in his father; Diabetes type II in his father; Heart attack (age of onset: 59) in his father; Heart disease in his father; Hypertension in his mother; No Known Problems in his son and son; Osteoporosis in his mother; Retinal detachment in his mother; Retinitis pigmentosa in his mother; Vision loss in his mother.      ALLERGIES:  has No Known Allergies.      Physical Exam:  Vital Signs:   Visit Vitals  Smoking Status Some Days     There is no height or weight on file to calculate BMI.  There is no height or weight on file to calculate BSA.    GEN: Alert, awake oriented x3, in no acute distress  HEENT- No pallor, icterus, cyanosis, no oral mucosal lesions,   LAD - no palpable cervical, clavicle, axillary, inguinal LAD  Heart- normal S1 S2, regular rate and rhythm, No murmur, rubs.   Lungs- clear breathing sound bilateral.   Abdomen- soft, Non tender, bowel sounds present  Extremities- No cyanosis, clubbing, edema  Neuro- No focal neurological deficit    Labs:  Lab Results   Component Value Date    WBC 11.69 (H) 05/31/2024    HGB 13.6 05/31/2024    HCT 41.8 05/31/2024    MCV 91 05/31/2024     (H) 05/31/2024     Lab Results   Component Value Date     12/17/2015    SODIUM 136 04/18/2024    K 4.7 04/18/2024     04/18/2024    CO2 28 04/18/2024    ANIONGAP 8 12/17/2015    AGAP 6 04/18/2024    BUN 19 04/18/2024    CREATININE 1.20 04/18/2024    GLUC 133 04/18/2024    GLUF 241 (H) 03/18/2024    CALCIUM 9.6 04/18/2024    AST 21 04/18/2024    ALT 12 04/18/2024    ALKPHOS 31 (L) 04/18/2024    PROT 6.8 12/17/2015    TP 6.8 04/18/2024    BILITOT 0.47 12/17/2015    TBILI 0.32 04/18/2024    EGFR 61 04/18/2024      Latest Reference Range & Units 05/31/24 15:30   WBC 4.31 - 10.16 Thousand/uL 11.69 (H)   RBC 3.88 - 5.62 Million/uL 4.60   Hemoglobin 12.0 - 17.0 g/dL 13.6   Hematocrit 36.5 - 49.3 % 41.8   MCV 82 - 98 fL 91   MCH 26.8 - 34.3 pg 29.6   MCHC 31.4 - 37.4 g/dL 32.5   RDW 11.6 - 15.1 % 13.7   Platelet Count 149 - 390 Thousands/uL 546 (H)   MPV 8.9 - 12.7 fL 9.9   nRBC /100 WBCs 0   Segmented % 43 - 75 % 63   Lymphocytes % 14 - 44 % 27   Monocytes % 4 - 12 % 7   Eosinophils % 0 - 6 % 3   Basophils % 0 - 1 % 0   Immature Grans % 0 - 2 % 0   Absolute Immature Grans 0.00 - 0.20 Thousand/uL 0.04   Absolute Neutrophils 1.85  - 7.62 Thousands/µL 7.25   Absolute Lymphocytes 0.60 - 4.47 Thousands/µL 3.17   Absolute Monocytes 0.17 - 1.22 Thousand/µL 0.80   Absolute Eosinophils 0.00 - 0.61 Thousand/µL 0.38   Absolute Basophils 0.00 - 0.10 Thousands/µL 0.05   (H): Data is abnormally high      I spent 40 minutes on chart review, face to face counseling time, coordination of care and documentation.    Mariajose Vaca MD PhD

## 2024-06-03 ENCOUNTER — OFFICE VISIT (OUTPATIENT)
Dept: HEMATOLOGY ONCOLOGY | Facility: CLINIC | Age: 69
End: 2024-06-03
Payer: COMMERCIAL

## 2024-06-03 VITALS
OXYGEN SATURATION: 98 % | TEMPERATURE: 97.6 F | HEIGHT: 69 IN | DIASTOLIC BLOOD PRESSURE: 74 MMHG | SYSTOLIC BLOOD PRESSURE: 132 MMHG | RESPIRATION RATE: 18 BRPM | BODY MASS INDEX: 27.99 KG/M2 | WEIGHT: 189 LBS | HEART RATE: 78 BPM

## 2024-06-03 DIAGNOSIS — D47.3 ESSENTIAL THROMBOCYTOSIS (HCC): Primary | ICD-10-CM

## 2024-06-03 PROCEDURE — 99215 OFFICE O/P EST HI 40 MIN: CPT | Performed by: INTERNAL MEDICINE

## 2024-06-07 DIAGNOSIS — E78.2 MIXED HYPERLIPIDEMIA: ICD-10-CM

## 2024-06-07 RX ORDER — EZETIMIBE 10 MG/1
10 TABLET ORAL DAILY
Qty: 90 TABLET | Refills: 1 | Status: SHIPPED | OUTPATIENT
Start: 2024-06-07

## 2024-06-14 ENCOUNTER — TELEPHONE (OUTPATIENT)
Age: 69
End: 2024-06-14

## 2024-06-14 NOTE — TELEPHONE ENCOUNTER
Pt called in stating he received a missed call from our office. Unable to locate an encounter in regards to the call. Spoke with Leroy and was able to locate the message in past appointments. Pt is rescheduled for his AWV

## 2024-06-18 DIAGNOSIS — E11.65 TYPE 2 DIABETES MELLITUS WITH HYPERGLYCEMIA, WITHOUT LONG-TERM CURRENT USE OF INSULIN (HCC): ICD-10-CM

## 2024-06-18 RX ORDER — INSULIN DEGLUDEC 200 U/ML
INJECTION, SOLUTION SUBCUTANEOUS
Qty: 9 ML | Refills: 1 | Status: SHIPPED | OUTPATIENT
Start: 2024-06-18

## 2024-06-18 NOTE — TELEPHONE ENCOUNTER
Reason for call:   [x] Refill   [] Prior Auth  [] Other:     Office:   [] PCP/Provider -   [x] Specialty/Provider -     Medication: insulin degludec (Tresiba FlexTouch) 200 units/mL CONCENTRATED U-200 injection pen     Dose/Frequency: 20 units daily     Quantity:  9 mL     Pharmacy: [facility] - [address] [phone]     Does the patient have enough for 3 days?   [x] Yes   [] No - Send as HP to POD

## 2024-06-19 ENCOUNTER — RA CDI HCC (OUTPATIENT)
Dept: OTHER | Facility: HOSPITAL | Age: 69
End: 2024-06-19

## 2024-06-24 ENCOUNTER — OFFICE VISIT (OUTPATIENT)
Dept: FAMILY MEDICINE CLINIC | Facility: CLINIC | Age: 69
End: 2024-06-24
Payer: COMMERCIAL

## 2024-06-24 VITALS
WEIGHT: 192 LBS | HEIGHT: 69 IN | DIASTOLIC BLOOD PRESSURE: 76 MMHG | RESPIRATION RATE: 16 BRPM | BODY MASS INDEX: 28.44 KG/M2 | TEMPERATURE: 97.8 F | SYSTOLIC BLOOD PRESSURE: 128 MMHG | OXYGEN SATURATION: 98 % | HEART RATE: 74 BPM

## 2024-06-24 DIAGNOSIS — F41.9 ANXIETY: ICD-10-CM

## 2024-06-24 DIAGNOSIS — N52.8 OTHER MALE ERECTILE DYSFUNCTION: ICD-10-CM

## 2024-06-24 DIAGNOSIS — G20.A1 PARKINSON'S DISEASE, UNSPECIFIED WHETHER DYSKINESIA PRESENT, UNSPECIFIED WHETHER MANIFESTATIONS FLUCTUATE: ICD-10-CM

## 2024-06-24 DIAGNOSIS — F32.0 CURRENT MILD EPISODE OF MAJOR DEPRESSIVE DISORDER, UNSPECIFIED WHETHER RECURRENT (HCC): ICD-10-CM

## 2024-06-24 DIAGNOSIS — F32.A DEPRESSION, UNSPECIFIED DEPRESSION TYPE: ICD-10-CM

## 2024-06-24 DIAGNOSIS — E66.3 OVERWEIGHT: ICD-10-CM

## 2024-06-24 DIAGNOSIS — Z13.6 SCREENING FOR AAA (ABDOMINAL AORTIC ANEURYSM): ICD-10-CM

## 2024-06-24 DIAGNOSIS — I10 BENIGN ESSENTIAL HYPERTENSION: ICD-10-CM

## 2024-06-24 DIAGNOSIS — Z85.46 HISTORY OF PROSTATE CANCER: ICD-10-CM

## 2024-06-24 DIAGNOSIS — I25.10 ARTERIOSCLEROSIS OF CORONARY ARTERY: ICD-10-CM

## 2024-06-24 DIAGNOSIS — E03.8 SUBCLINICAL HYPOTHYROIDISM: ICD-10-CM

## 2024-06-24 DIAGNOSIS — F17.290 PIPE SMOKER: ICD-10-CM

## 2024-06-24 DIAGNOSIS — D47.3 ESSENTIAL (HEMORRHAGIC) THROMBOCYTHEMIA (HCC): ICD-10-CM

## 2024-06-24 DIAGNOSIS — Z86.73 HISTORY OF STROKE: ICD-10-CM

## 2024-06-24 DIAGNOSIS — E78.2 MIXED HYPERLIPIDEMIA: ICD-10-CM

## 2024-06-24 DIAGNOSIS — Z00.00 MEDICARE ANNUAL WELLNESS VISIT, SUBSEQUENT: Primary | ICD-10-CM

## 2024-06-24 DIAGNOSIS — E11.9 TYPE 2 DIABETES MELLITUS WITHOUT COMPLICATION, WITHOUT LONG-TERM CURRENT USE OF INSULIN (HCC): ICD-10-CM

## 2024-06-24 DIAGNOSIS — E11.65 TYPE 2 DIABETES MELLITUS WITH HYPERGLYCEMIA, WITHOUT LONG-TERM CURRENT USE OF INSULIN (HCC): ICD-10-CM

## 2024-06-24 PROCEDURE — G0442 ANNUAL ALCOHOL SCREEN 15 MIN: HCPCS | Performed by: FAMILY MEDICINE

## 2024-06-24 PROCEDURE — G0439 PPPS, SUBSEQ VISIT: HCPCS | Performed by: FAMILY MEDICINE

## 2024-06-24 PROCEDURE — 99214 OFFICE O/P EST MOD 30 MIN: CPT | Performed by: FAMILY MEDICINE

## 2024-06-24 RX ORDER — VENLAFAXINE HYDROCHLORIDE 75 MG/1
CAPSULE, EXTENDED RELEASE ORAL
Qty: 90 CAPSULE | Refills: 1 | Status: SHIPPED | OUTPATIENT
Start: 2024-06-24

## 2024-06-24 RX ORDER — VENLAFAXINE HYDROCHLORIDE 37.5 MG/1
CAPSULE, EXTENDED RELEASE ORAL
Qty: 90 CAPSULE | Refills: 1 | Status: SHIPPED | OUTPATIENT
Start: 2024-06-24

## 2024-06-24 NOTE — PROGRESS NOTES
Assessment/Plan:  Problem List Items Addressed This Visit        Cardiovascular and Mediastinum    Benign essential hypertension     Stable on Lisinopril 20mg QD.  Check blood pressure outside of office.  Recommend lifestyle modifications.           Relevant Orders    Comprehensive metabolic panel    Arteriosclerosis of coronary artery     Asymptomatic.  Management per Cardio - Continue ASA, statin, ACE-I.  Recommend lifestyle modifications.    LDL not at goal for CAD.  On increased Pravachol 80mg QHS.            Endocrine    Type 2 diabetes mellitus without complication, without long-term current use of insulin (HCC)       Lab Results   Component Value Date    HGBA1C 10.6 (A) 03/26/2024       Lab Results   Component Value Date    HGBA1C 10.6 (A) 03/26/2024       Lab Results   Component Value Date    HGBA1C 10.6 (A) 03/26/2024       Lab Results   Component Value Date    HGBA1C 10.6 (A) 03/26/2024     Uncontrolled.  Management per Endo.  Recommend lifestyle modifications.           Relevant Orders    Comprehensive metabolic panel    LDL cholesterol, direct    Subclinical hypothyroidism     Management per Endo.  Not currently taking Synthroid.           Type 2 diabetes mellitus with hyperglycemia, without long-term current use of insulin (HCC)       Lab Results   Component Value Date    HGBA1C 10.6 (A) 03/26/2024       Lab Results   Component Value Date    HGBA1C 10.6 (A) 03/26/2024     Uncontrolled.  Management per Endo.  Recommend lifestyle modifications.              Nervous and Auditory    Parkinson disease     Management per [facility].  S/p Deep brain stimulator 9/22.  On Sinemet 25-100mg 1 tab QD.  Continue [employer] Parkinson's Boxing Program.                Hematopoietic and Hemostatic    Essential (hemorrhagic) thrombocythemia (HCC)     Management per Heme/Onc.  Pipe smoking may be contributory.              Behavioral Health    Depression     Stable on Effexor .5 mg daily.         Relevant  Medications    venlafaxine (EFFEXOR-XR) 37.5 mg 24 hr capsule    venlafaxine (EFFEXOR-XR) 75 mg 24 hr capsule    Pipe smoker     Contemplative.  Smoking cessation advised.         Anxiety     Stable on Effexor .5 mg daily.         Relevant Medications    venlafaxine (EFFEXOR-XR) 37.5 mg 24 hr capsule    venlafaxine (EFFEXOR-XR) 75 mg 24 hr capsule       Oncology    History of prostate cancer     Patient declined continued Uro F/U and desires yearly PSA per PCP.  Status post DaVinci robot prostatectomy performed at [facility] 2013.              Neurology/Sleep    History of stroke     Management per Neuro.  Continue ASA, statin . Recommend lifestyle modifications.              Other    Mixed hyperlipidemia     Pending labs.  Previously uncontrolled as LDL not at goal for CAD.  On increased Pravachol 80mg QHS.  Recommend lifestyle modifications.         Relevant Orders    LDL cholesterol, direct    Overweight     Stable.  Recommend lifestyle modifications.         Other male erectile dysfunction     Not currently taking Levitra 20mg PRN.          Other Visit Diagnoses     Medicare annual wellness visit, subsequent    -  Primary    Screening for AAA (abdominal aortic aneurysm)        Relevant Orders     abdominal aorta screening aaa           Return in about 6 months (around 12/24/2024) for 6mo- DM2, HTN, HL, Anx/Dep, ET, H/O Prostate CA, Smoker, Labs.      Future Appointments   Date Time Provider Department Center   7/2/2024  9:40 AM Mariajose Vaca MD [facility]   7/18/2024  1:30 PM [facility]   8/29/2024 10:40 AM Mireille Martines DO  [facility]        Subjective:     Andres is a 68 y.o. male who presents today for a follow-up on his chronic medical conditions.        HPI:  Chief Complaint   Patient presents with   • Medicare Wellness Visit     AWV/6mo- DM2, HTN, HL, Anx/Dep, ET, H/O Prostate CA, Smoker, Labs. No new problems or concerns at this  time.      -- Above per clinical staff and reviewed. --      HPI      Today:      Return in about 6 months (around 7/8/2024) for AWV / 6mo- DM2, HTN, HL, Anx/Dep, ET, H/O Prostate CA, Smoker, Labs.     6mo OV        Overweight - Watching diet.  +Regular exercise - Parkinson's Boxing program at [facility] for 90 minutes, 2 times per week, Walking for 30 minutes, 7 times per week.       DM2 - Management per Endo Dr. Turner.  Next appt 7/24.  Last DM Education 5/23 - next appt PRN.  [facility] - next appt 6/24 - Overdue.   No Podiatry.       HTN - On Lisinopril 20mg 1 pill QD.  No BP check outside of office.  No other HTN meds previously.      Subclinical Hypothyroidism - Management per Endo Dr. Turner.  Next appt 7/24.  Patient is not taking Synthroid currently.        Hyperlipidemia - On increased Pravachol 80mg QHS, On Zetia 10mg QD and Vascepa 1gram 2 caps BID per Endo.  Previously on Zocor 40mg QHS.- pt unsure why D/C.  No higher dose or other statin previously.       CAD - Management per Cardio Dr. Ward.  Next appt 4/25.     Parkinson's Disease - Management per [facility] Dr. Waddell.  Next appt 7/24.  S/p Deep brain stimulator 9/22.  On Sinemet 25-100mg 1 tab in AM.  Attending individual [facility]'s PT 2 times weekly since 2/23.       H/O CVA -  Unsure of date - 2/19? Management per [facility] Dr. Waddell.  Next appt 2/24.        Left Carpal Tunnel Syndrome - Management per [facility] Dr. Waddell.  Next appt 2/24.  Uses a brace PRN.  Declined EMG / surgery referral per Neuro.      Depression / Anxiety - Mood is stable.  On Effexor .5mg QD per [facility] since 8/2/23.  He is unsure if he's taken high dose or other mood meds previously.  He last attended counseling in the late 1990's and states it was not helpful.  Patients thinks he might be slightly autistic.  Good social supports.  No SI/HI/AH/VH.          PHQ-2/9 Depression Screening     Little interest or pleasure in doing things: 1 - several days  Feeling down, depressed, or hopeless: 1 - several days  Trouble falling or staying asleep, or sleeping too much: 1 - several days  Feeling tired or having little energy: 1 - several days  Poor appetite or overeatin - not at all  Feeling bad about yourself - or that you are a failure or have let yourself or your family down: 1 - several days  Trouble concentrating on things, such as reading the newspaper or watching television: 0 - not at all  Moving or speaking so slowly that other people could have noticed. Or the opposite - being so fidgety or restless that you have been moving around a lot more than usual: 0 - not at all  Thoughts that you would be better off dead, or of hurting yourself in some way: 0 - not at all  PHQ-9 Score: 5  PHQ-9 Interpretation: Mild depression       HEMANT-7 Flowsheet Screening    Flowsheet Row Most Recent Value   Over the last 2 weeks, how often have you been bothered by any of the following problems?    Feeling nervous, anxious, or on edge 1   Not being able to stop or control worrying 1   Worrying too much about different things 0   Trouble relaxing 0   Being so restless that it is hard to sit still 0   Becoming easily annoyed or irritable 1   Feeling afraid as if something awful might happen 0   HEMANT-7 Total Score 3             H/O Prostate Cancer - Management per Uro  Camilo Earl ZAPATA.  Next appt  - Overdue PRN as patient declined F/U and desires yearly PSA per PCP.  Status post DaVinci robot prostatectomy performed at [facility] .     ED - No longer taking Levitra 20mg QDPRN.       Essential Thrombocythemia / Myeloproliferative disorder / JAK2 [id number] Mutation - Management per Heme/Onc Dr. Ansari - next appt .  On Anagrelide.  S/p IR Bone Marrow Biopsy 23.       Pipe Smoker - Contemplative, considering quitting cold turkey. Intermittent, usually over winter.  Pipe smoking 1 time per week in cold  weather, 0 times per week in hot weather.        Reviewed:  Labs 3/18/24, [facility] 8/2/23, Endo 11/16/23, DM Educ 11/18/22, Uro 8/21/20, Cardio 4/26/24, Heme/Onc 6/3/24     No Uro.     Last Colonoscopy 1/27/16, repeat in 10 years - 1/27/26.    The following portions of the patient's history were reviewed and updated as appropriate: allergies, current medications, past family history, past medical history, past social history, past surgical history and problem list.      Review of Systems   Constitutional:  Positive for fatigue. Negative for appetite change, chills, diaphoresis and fever.   Respiratory:  Negative for cough, chest tightness and shortness of breath.    Cardiovascular:  Negative for chest pain.   Gastrointestinal:  Negative for abdominal pain, blood in stool, diarrhea, nausea and vomiting.   Genitourinary:  Negative for dysuria.   Musculoskeletal:  Positive for myalgias.        Current Outpatient Medications   Medication Sig Dispense Refill   • aspirin 325 mg tablet Take 325 mg by mouth daily     • carbidopa-levodopa (SINEMET)  mg per tablet Take 2 tablets by mouth 3 (three) times a day Follow clinic instructions (Patient taking differently: Take 1 tablet by mouth in the morning Follow clinic instructions.  Rx per Neuro.) 540 tablet 3   • Cholecalciferol (VITAMIN D) 2000 units CAPS Take 2 capsules (4,000 Units total) by mouth daily  0   • co-enzyme Q-10 30 MG capsule Take 1 capsule (30 mg total) by mouth daily 30 capsule 0   • Continuous Blood Gluc  (FreeStyle Luisa 3 [place]) ARMANDO once 1 each 0   • Continuous Blood Gluc Sensor (FreeStyle Luisa 3 Sensor) MISC Every 14 days 2 each 3   • ezetimibe (ZETIA) 10 mg tablet TAKE 1 TABLET BY MOUTH EVERY DAY 90 tablet 1   • glucose blood (ONETOUCH VERIO) test strip Check BG 2x per day (Patient taking differently: Check BG 2x per day.  Rx per Endo.) 200 each 3   • Icosapent Ethyl (Vascepa) 1 g CAPS Take 2 capsules (2 g total) by mouth 2 (two) times  "a day 180 capsule 2   • insulin degludec (Tresiba FlexTouch) 200 units/mL CONCENTRATED U-200 injection pen 20 units daily 9 mL 1   • Insulin Pen Needle (BD Pen Needle Yara U/F) 32G X 4 MM MISC Daily 100 each 1   • lisinopril (ZESTRIL) 20 mg tablet TAKE 1 TABLET BY MOUTH DAILY 100 tablet 1   • mometasone (ELOCON) 0.1 % cream Apply topically 2 (two) times a day as needed (Rash) 15 g 0   • Multiple Vitamin (multivitamin) capsule Take 1 capsule by mouth daily     • pravastatin (PRAVACHOL) 80 mg tablet TAKE 1 TABLET BY MOUTH DAILY AT  BEDTIME 100 tablet 1   • sitaGLIPtin-metFORMIN (Janumet)  MG per tablet TAKE 1 TABLET BY MOUTH TWICE  DAILY WITH MEALS 200 tablet 1   • venlafaxine (EFFEXOR-XR) 37.5 mg 24 hr capsule TAKE 1 CAPSULE BY MOUTH DAILY  WITH 75 MG FOR A TOTAL .5  MG DAILY 90 capsule 1   • venlafaxine (EFFEXOR-XR) 75 mg 24 hr capsule Take with 37.5mg for a total of 112.5mg daily. 90 capsule 1     No current facility-administered medications for this visit.       Objective:  /76   Pulse 74   Temp 97.8 °F (36.6 °C)   Resp 16   Ht 5' 9\" (1.753 m)   Wt 87.1 kg (192 lb)   SpO2 98%   BMI 28.35 kg/m²    Wt Readings from Last 3 Encounters:   06/24/24 87.1 kg (192 lb)   06/03/24 85.7 kg (189 lb)   04/26/24 87.5 kg (193 lb)      BP Readings from Last 3 Encounters:   06/24/24 128/76   06/03/24 132/74   04/26/24 108/64          Physical Exam  Vitals and nursing note reviewed.   Constitutional:       Appearance: Normal appearance. He is well-developed.   HENT:      Head: Normocephalic and atraumatic.   Eyes:      Conjunctiva/sclera: Conjunctivae normal.   Neck:      Thyroid: No thyromegaly.      Vascular: No carotid bruit.   Cardiovascular:      Rate and Rhythm: Normal rate and regular rhythm.      Pulses: Normal pulses.      Heart sounds: Normal heart sounds.   Pulmonary:      Effort: Pulmonary effort is normal.      Breath sounds: Normal breath sounds.   Abdominal:      General: Bowel sounds are " "normal. There is no distension.      Palpations: Abdomen is soft. There is no mass.      Tenderness: There is no abdominal tenderness. There is no guarding or rebound.   Musculoskeletal:         General: No swelling or tenderness.      Cervical back: Neck supple.      Right lower leg: No edema.      Left lower leg: No edema.   Lymphadenopathy:      Cervical: No cervical adenopathy.   Neurological:      Mental Status: He is alert and oriented to person, place, and time.      Comments: Right hand resting tremor   Psychiatric:         Mood and Affect: Mood normal.         Behavior: Behavior normal.         Thought Content: Thought content normal.         Judgment: Judgment normal.         Lab Results:      Lab Results   Component Value Date    WBC 11.69 (H) 05/31/2024    HGB 13.6 05/31/2024    HCT 41.8 05/31/2024     (H) 05/31/2024    CHOL 206 12/17/2015    TRIG 292 (H) 03/18/2024    HDL 42 03/18/2024    LDLDIRECT 111 (H) 07/11/2023    ALT 12 04/18/2024    AST 21 04/18/2024     12/17/2015    K 4.7 04/18/2024     04/18/2024    CREATININE 1.20 04/18/2024    BUN 19 04/18/2024    CO2 28 04/18/2024    TSH 2.06 12/20/2019    PSA <0.01 03/18/2024    INR 1.0 09/14/2022    GLUF 241 (H) 03/18/2024    HGBA1C 10.6 (A) 03/26/2024     No results found for: \"URICACID\"  Invalid input(s): \"BASENAME\" Vitamin D    No results found.     POCT Labs        Depression Screening and Follow-up Plan: Patient's depression screening was positive with a PHQ-9 score of 5. Patient advised to follow-up with PCP for further management.                       "

## 2024-06-24 NOTE — PATIENT INSTRUCTIONS
Medicare Preventive Visit Patient Instructions  Thank you for completing your Welcome to Medicare Visit or Medicare Annual Wellness Visit today. Your next wellness visit will be due in one year (6/25/2025).  The screening/preventive services that you may require over the next 5-10 years are detailed below. Some tests may not apply to you based off risk factors and/or age. Screening tests ordered at today's visit but not completed yet may show as past due. Also, please note that scanned in results may not display below.  Preventive Screenings:  Service Recommendations Previous Testing/Comments   Colorectal Cancer Screening  Colonoscopy    Fecal Occult Blood Test (FOBT)/Fecal Immunochemical Test (FIT)  Fecal DNA/Cologuard Test  Flexible Sigmoidoscopy Age: 45-75 years old   Colonoscopy: every 10 years (May be performed more frequently if at higher risk)  OR  FOBT/FIT: every 1 year  OR  Cologuard: every 3 years  OR  Sigmoidoscopy: every 5 years  Screening may be recommended earlier than age 45 if at higher risk for colorectal cancer. Also, an individualized decision between you and your healthcare provider will decide whether screening between the ages of 76-85 would be appropriate. Colonoscopy: 01/27/2016  FOBT/FIT: Not on file  Cologuard: Not on file  Sigmoidoscopy: Not on file    Screening Current     Prostate Cancer Screening Individualized decision between patient and health care provider in men between ages of 55-69   Medicare will cover every 12 months beginning on the day after your 50th birthday PSA: <0.01 ng/mL     History Prostate Cancer     Hepatitis C Screening Once for adults born between 1945 and 1965  More frequently in patients at high risk for Hepatitis C Hep C Antibody: 12/20/2019    Screening Current   Diabetes Screening 1-2 times per year if you're at risk for diabetes or have pre-diabetes Fasting glucose: 241 mg/dL (3/18/2024)  A1C: 10.6 (3/26/2024)  Screening Not Indicated  History Diabetes    Cholesterol Screening Once every 5 years if you don't have a lipid disorder. May order more often based on risk factors. Lipid panel: 03/18/2024  Screening Not Indicated  History Lipid Disorder      Other Preventive Screenings Covered by Medicare:  Abdominal Aortic Aneurysm (AAA) Screening: covered once if your at risk. You're considered to be at risk if you have a family history of AAA or a male between the age of 65-75 who smoking at least 100 cigarettes in your lifetime.  Lung Cancer Screening: covers low dose CT scan once per year if you meet all of the following conditions: (1) Age 55-77; (2) No signs or symptoms of lung cancer; (3) Current smoker or have quit smoking within the last 15 years; (4) You have a tobacco smoking history of at least 20 pack years (packs per day x number of years you smoked); (5) You get a written order from a healthcare provider.  Glaucoma Screening: covered annually if you're considered high risk: (1) You have diabetes OR (2) Family history of glaucoma OR (3)  aged 50 and older OR (4)  American aged 65 and older  Osteoporosis Screening: covered every 2 years if you meet one of the following conditions: (1) Have a vertebral abnormality; (2) On glucocorticoid therapy for more than 3 months; (3) Have primary hyperparathyroidism; (4) On osteoporosis medications and need to assess response to drug therapy.  HIV Screening: covered annually if you're between the age of 15-65. Also covered annually if you are younger than 15 and older than 65 with risk factors for HIV infection. For pregnant patients, it is covered up to 3 times per pregnancy.    Immunizations:  Immunization Recommendations   Influenza Vaccine Annual influenza vaccination during flu season is recommended for all persons aged >= 6 months who do not have contraindications   Pneumococcal Vaccine   * Pneumococcal conjugate vaccine = PCV13 (Prevnar 13), PCV15 (Vaxneuvance), PCV20 (Prevnar 20)  *  Pneumococcal polysaccharide vaccine = PPSV23 (Pneumovax) Adults 19-65 yo with certain risk factors or if 65+ yo  If never received any pneumonia vaccine: recommend Prevnar 20 (PCV20)  Give PCV20 if previously received 1 dose of PCV13 or PPSV23   Hepatitis B Vaccine 3 dose series if at intermediate or high risk (ex: diabetes, end stage renal disease, liver disease)   Respiratory syncytial virus (RSV) Vaccine - COVERED BY MEDICARE PART D  * RSVPreF3 (Arexvy) CDC recommends that adults 60 years of age and older may receive a single dose of RSV vaccine using shared clinical decision-making (SCDM)   Tetanus (Td) Vaccine - COST NOT COVERED BY MEDICARE PART B Following completion of primary series, a booster dose should be given every 10 years to maintain immunity against tetanus. Td may also be given as tetanus wound prophylaxis.   Tdap Vaccine - COST NOT COVERED BY MEDICARE PART B Recommended at least once for all adults. For pregnant patients, recommended with each pregnancy.   Shingles Vaccine (Shingrix) - COST NOT COVERED BY MEDICARE PART B  2 shot series recommended in those 19 years and older who have or will have weakened immune systems or those 50 years and older     Health Maintenance Due:      Topic Date Due   • Colorectal Cancer Screening  01/27/2026   • Hepatitis C Screening  Completed     Immunizations Due:  There are no preventive care reminders to display for this patient.  Advance Directives   What are advance directives?  Advance directives are legal documents that state your wishes and plans for medical care. These plans are made ahead of time in case you lose your ability to make decisions for yourself. Advance directives can apply to any medical decision, such as the treatments you want, and if you want to donate organs.   What are the types of advance directives?  There are many types of advance directives, and each state has rules about how to use them. You may choose a combination of any of the  following:  Living will:  This is a written record of the treatment you want. You can also choose which treatments you do not want, which to limit, and which to stop at a certain time. This includes surgery, medicine, IV fluid, and tube feedings.   Durable power of  for healthcare (DPAHC):  This is a written record that states who you want to make healthcare choices for you when you are unable to make them for yourself. This person, called a proxy, is usually a family member or a friend. You may choose more than 1 proxy.  Do not resuscitate (DNR) order:  A DNR order is used in case your heart stops beating or you stop breathing. It is a request not to have certain forms of treatment, such as CPR. A DNR order may be included in other types of advance directives.  Medical directive:  This covers the care that you want if you are in a coma, near death, or unable to make decisions for yourself. You can list the treatments you want for each condition. Treatment may include pain medicine, surgery, blood transfusions, dialysis, IV or tube feedings, and a ventilator (breathing machine).  Values history:  This document has questions about your views, beliefs, and how you feel and think about life. This information can help others choose the care that you would choose.  Why are advance directives important?  An advance directive helps you control your care. Although spoken wishes may be used, it is better to have your wishes written down. Spoken wishes can be misunderstood, or not followed. Treatments may be given even if you do not want them. An advance directive may make it easier for your family to make difficult choices about your care.   Cigarette Smoking and Your Health   Risks to your health if you smoke:  Nicotine and other chemicals found in tobacco damage every cell in your body. Even if you are a light smoker, you have an increased risk for cancer, heart disease, and lung disease. If you are pregnant or  have diabetes, smoking increases your risk for complications.   Benefits to your health if you stop smoking:   You decrease respiratory symptoms such as coughing, wheezing, and shortness of breath.   You reduce your risk for cancers of the lung, mouth, throat, kidney, bladder, pancreas, stomach, and cervix. If you already have cancer, you increase the benefits of chemotherapy. You also reduce your risk for cancer returning or a second cancer from developing.   You reduce your risk for heart disease, blood clots, heart attack, and stroke.   You reduce your risk for lung infections, and diseases such as pneumonia, asthma, chronic bronchitis, and emphysema.  Your circulation improves. More oxygen can be delivered to your body. If you have diabetes, you lower your risk for complications, such as kidney, artery, and eye diseases. You also lower your risk for nerve damage. Nerve damage can lead to amputations, poor vision, and blindness.  You improve your body's ability to heal and to fight infections.  For more information and support to stop smoking:   [employer]  Phone: 1- 360 - 025-3591  Web Address: www.[employer]  Weight Management   Why it is important to manage your weight:  Being overweight increases your risk of health conditions such as heart disease, high blood pressure, type 2 diabetes, and certain types of cancer. It can also increase your risk for osteoarthritis, sleep apnea, and other respiratory problems. Aim for a slow, steady weight loss. Even a small amount of weight loss can lower your risk of health problems.  How to lose weight safely:  A safe and healthy way to lose weight is to eat fewer calories and get regular exercise. You can lose up about 1 pound a week by decreasing the number of calories you eat by 500 calories each day.   Healthy meal plan for weight management:  A healthy meal plan includes a variety of foods, contains fewer calories, and helps you stay healthy. A healthy meal plan  includes the following:  Eat whole-grain foods more often.  A healthy meal plan should contain fiber. Fiber is the part of grains, fruits, and vegetables that is not broken down by your body. Whole-grain foods are healthy and provide extra fiber in your diet. Some examples of whole-grain foods are whole-wheat breads and pastas, oatmeal, brown rice, and bulgur.  Eat a variety of vegetables every day.  Include dark, leafy greens such as spinach, kale, michelle greens, and mustard greens. Eat yellow and orange vegetables such as carrots, sweet potatoes, and winter squash.   Eat a variety of fruits every day.  Choose fresh or canned fruit (canned in its own juice or light syrup) instead of juice. Fruit juice has very little or no fiber.  Eat low-fat dairy foods.  Drink fat-free (skim) milk or 1% milk. Eat fat-free yogurt and low-fat cottage cheese. Try low-fat cheeses such as mozzarella and other reduced-fat cheeses.  Choose meat and other protein foods that are low in fat.  Choose beans or other legumes such as split peas or lentils. Choose fish, skinless poultry (chicken or turkey), or lean cuts of red meat (beef or pork). Before you cook meat or poultry, cut off any visible fat.   Use less fat and oil.  Try baking foods instead of frying them. Add less fat, such as margarine, sour cream, regular salad dressing and mayonnaise to foods. Eat fewer high-fat foods. Some examples of high-fat foods include french fries, doughnuts, ice cream, and cakes.  Eat fewer sweets.  Limit foods and drinks that are high in sugar. This includes candy, cookies, regular soda, and sweetened drinks.  Exercise:  Exercise at least 30 minutes per day on most days of the week. Some examples of exercise include walking, biking, dancing, and swimming. You can also fit in more physical activity by taking the stairs instead of the elevator or parking farther away from stores. Ask your healthcare provider about the best exercise plan for you.      ©  Copyright [facility] 2018 Information is for End User's use only and may not be sold, redistributed or otherwise used for commercial purposes. All illustrations and images included in CareNotes® are the copyrighted property of [employer]., Inc. or [employer]

## 2024-06-24 NOTE — PROGRESS NOTES
Ambulatory Visit  Name: Andres Chacko      : 1955      MRN: [id number]  Encounter Provider: Mireille Martines DO  Encounter Date: 2024   Encounter department: [facility]    Assessment & Plan   1. Medicare annual wellness visit, subsequent  2. Type 2 diabetes mellitus without complication, without long-term current use of insulin (HCC)  Assessment & Plan:    Lab Results   Component Value Date    HGBA1C 10.6 (A) 2024       Lab Results   Component Value Date    HGBA1C 10.6 (A) 2024       Lab Results   Component Value Date    HGBA1C 10.6 (A) 2024       Lab Results   Component Value Date    HGBA1C 10.6 (A) 2024     Uncontrolled.  Management per Endo.  Recommend lifestyle modifications.    Orders:  -     Comprehensive metabolic panel; Future; Expected date: 2024  -     LDL cholesterol, direct; Future; Expected date: 2024  3. Benign essential hypertension  Assessment & Plan:  Stable on Lisinopril 20mg QD.  Check blood pressure outside of office.  Recommend lifestyle modifications.    Orders:  -     Comprehensive metabolic panel; Future; Expected date: 2024  4. Mixed hyperlipidemia  Assessment & Plan:  Pending labs.  Previously uncontrolled as LDL not at goal for CAD.  On increased Pravachol 80mg QHS.  Recommend lifestyle modifications.  Orders:  -     LDL cholesterol, direct; Future; Expected date: 2024  5. Current mild episode of major depressive disorder, unspecified whether recurrent (HCC)  Assessment & Plan:  Stable on Effexor .5 mg daily.  6. Essential (hemorrhagic) thrombocythemia (HCC)  Assessment & Plan:  Management per Heme/Onc.  Pipe smoking may be contributory.    7. Pipe smoker  Assessment & Plan:  Contemplative.  Smoking cessation advised.  8. Anxiety  Assessment & Plan:  Stable on Effexor .5 mg daily.  Orders:  -     venlafaxine (EFFEXOR-XR) 37.5 mg 24 hr capsule; TAKE 1 CAPSULE BY MOUTH DAILY  WITH 75 MG  FOR A TOTAL .5  MG DAILY  -     venlafaxine (EFFEXOR-XR) 75 mg 24 hr capsule; Take with 37.5mg for a total of 112.5mg daily.  9. Overweight  Assessment & Plan:  Stable.  Recommend lifestyle modifications.  10. Arteriosclerosis of coronary artery  Assessment & Plan:  Asymptomatic.  Management per Cardio - Continue ASA, statin, ACE-I.  Recommend lifestyle modifications.    LDL not at goal for CAD.  On increased Pravachol 80mg QHS.  11. Subclinical hypothyroidism  Assessment & Plan:  Management per Endo.  Not currently taking Synthroid.    12. Other male erectile dysfunction  Assessment & Plan:  Not currently taking Levitra 20mg PRN.    13. Parkinson's disease, unspecified whether dyskinesia present, unspecified whether manifestations fluctuate  Assessment & Plan:  Management per [facility].  S/p Deep brain stimulator 9/22.  On Sinemet 25-100mg 1 tab QD.  Continue [employer] [facility].      14. Type 2 diabetes mellitus with hyperglycemia, without long-term current use of insulin (HCC)  Assessment & Plan:    Lab Results   Component Value Date    HGBA1C 10.6 (A) 03/26/2024       Lab Results   Component Value Date    HGBA1C 10.6 (A) 03/26/2024     Uncontrolled.  Management per Endo.  Recommend lifestyle modifications.    15. History of stroke  Assessment & Plan:  Management per Neuro.  Continue ASA, statin . Recommend lifestyle modifications.    16. History of prostate cancer  Assessment & Plan:  Patient declined continued Uro F/U and desires yearly PSA per PCP.  Status post DaVinci robot prostatectomy performed at [facility] 2013.    17. Depression, unspecified depression type  Assessment & Plan:  Stable on Effexor .5 mg daily.  Orders:  -     venlafaxine (EFFEXOR-XR) 37.5 mg 24 hr capsule; TAKE 1 CAPSULE BY MOUTH DAILY  WITH 75 MG FOR A TOTAL .5  MG DAILY  -     venlafaxine (EFFEXOR-XR) 75 mg 24 hr capsule; Take with 37.5mg for a total of 112.5mg daily.  18. Screening for AAA  (abdominal aortic aneurysm)  -     US abdominal aorta screening aaa; Future; Expected date: 06/24/2024     Preventive health issues were discussed with patient, and age appropriate screening tests were ordered as noted in patient's After Visit Summary. Personalized health advice and appropriate referrals for health education or preventive services given if needed, as noted in patient's After Visit Summary.    History of Present Illness     HPI   Patient Care Team:  Mireille Martines DO as PCP - General (Family Medicine)  Heri Gallardo MD as PCP - PCP-[facility] Attributed-Roster  Mireille Martines DO as PCP - PCP-[facility] ([facility])  Maria T Turner MD as PCP - Endocrinology (Endocrinology)  MD Heri Escamilla MD Frank Jeremy Tamarkin, MD James Boylan, MD as Endoscopist  Germaine Galvez as Diabetes Educator (Nutrition)  BENNY De La Rosa as Nurse Practitioner (Endocrinology)  Germaine Galvez as Diabetes Educator (Nutrition)  Mariajose Vaca MD (Medical Oncology)    Review of Systems    See other note.      Medical History Reviewed by provider this encounter:  Tobacco  Allergies  Meds  Problems  Med Hx  Surg Hx  Fam Hx  Soc   Hx      Annual Wellness Visit Questionnaire   Andres is here for his Subsequent Wellness visit.     Health Risk Assessment:   Patient rates overall health as good. Patient feels that their physical health rating is same. Patient is dissatisfied with their life. Eyesight was rated as same. Hearing was rated as same. Patient feels that their emotional and mental health rating is slightly worse. Patients states they are never, rarely angry. Patient states they are often unusually tired/fatigued. Pain experienced in the last 7 days has been some. Patient's pain rating has been 4/10. Patient states that he has experienced no weight loss or gain in last 6 months.     Depression Screening:   PHQ-9 Score: 5      Fall Risk Screening:   In the past year, patient  has experienced: no history of falling in past year      Home Safety:  Patient does not have trouble with stairs inside or outside of their home. Patient has working smoke alarms and has no working carbon monoxide detector. Home safety hazards include: none.     Nutrition:   Current diet is Diabetic.     Medications:   Patient is currently taking over-the-counter supplements. OTC medications include: see medication list. Patient is able to manage medications.     Activities of Daily Living (ADLs)/Instrumental Activities of Daily Living (IADLs):   Walk and transfer into and out of bed and chair?: Yes  Dress and groom yourself?: Yes    Bathe or shower yourself?: Yes    Feed yourself? Yes  Do your laundry/housekeeping?: Yes  Manage your money, pay your bills and track your expenses?: Yes  Make your own meals?: Yes    Do your own shopping?: Yes    Previous Hospitalizations:   Any hospitalizations or ED visits within the last 12 months?: No      Advance Care Planning:   Living will: No    Durable POA for healthcare: No    Advanced directive: No    Advanced directive counseling given: Yes    Five wishes given: No    Patient declined ACP directive: No      Cognitive Screening:   Provider or family/friend/caregiver concerned regarding cognition?: No    PREVENTIVE SCREENINGS      Cardiovascular Screening:    General: Screening Not Indicated and History Lipid Disorder      Diabetes Screening:     General: Screening Not Indicated and History Diabetes      Colorectal Cancer Screening:     General: Screening Current      Prostate Cancer Screening:    General: History Prostate Cancer      Osteoporosis Screening:    General: Screening Not Indicated      Abdominal Aortic Aneurysm (AAA) Screening:    Risk factors include: age between 65-76 yo and tobacco use        General: Risks and Benefits Discussed    Due for: Screening AAA Ultrasound      Lung Cancer Screening:     General: Screening Not Indicated      Hepatitis C Screening:     General: Screening Current    Screening, Brief Intervention, and Referral to Treatment (SBIRT)    Screening  Typical number of drinks in a day: 1  Typical number of drinks in a week: 2  Interpretation: Low risk drinking behavior.    AUDIT-C Screenin) How often did you have a drink containing alcohol in the past year? never  2) How many drinks did you have on a typical day when you were drinking in the past year? 1 to 2  3) How often did you have 6 or more drinks on one occasion in the past year? never    AUDIT-C Score: 0  Interpretation: Score 0-3 (male): Negative screen for alcohol misuse    Single Item Drug Screening:  How often have you used an illegal drug (including marijuana) or a prescription medication for non-medical reasons in the past year? never    Single Item Drug Screen Score: 0  Interpretation: Negative screen for possible drug use disorder    Brief Intervention  Alcohol & drug use screenings were reviewed. No concerns regarding substance use disorder identified.     Time Spent  Time spent screening/evaluating the patient for alcohol misuse: 5 minutes.     Other Counseling Topics:   Calcium and vitamin D intake and regular weightbearing exercise.     Social Determinants of Health     Financial Resource Strain: Low Risk  (2023)    Overall Financial Resource Strain (CARDIA)    • Difficulty of Paying Living Expenses: Not hard at all   Food Insecurity: No Food Insecurity (2024)    Hunger Vital Sign    • Worried About Running Out of Food in the Last Year: Never true    • Ran Out of Food in the Last Year: Never true   Transportation Needs: No Transportation Needs (2024)    PRAPARE - Transportation    • Lack of Transportation (Medical): No    • Lack of Transportation (Non-Medical): No   Housing Stability: Low Risk  (2024)    Housing Stability Vital Sign    • Unable to Pay for Housing in the Last Year: No    • Number of Times Moved in the Last Year: 0    • Homeless in the Last Year:  "No   Utilities: Not At Risk (6/18/2024)    [facility] Utilities    • Threatened with loss of utilities: No     No results found.    Objective     /76   Pulse 74   Temp 97.8 °F (36.6 °C)   Resp 16   Ht 5' 9\" (1.753 m)   Wt 87.1 kg (192 lb)   SpO2 98%   BMI 28.35 kg/m²     Physical Exam    See other note.      Administrative Statements           "

## 2024-06-25 NOTE — ASSESSMENT & PLAN NOTE
Patient declined continued Uro F/U and desires yearly PSA per PCP.  Status post DaVinci robot prostatectomy performed at [facility] 2013.

## 2024-06-25 NOTE — ASSESSMENT & PLAN NOTE
Lab Results   Component Value Date    HGBA1C 10.6 (A) 03/26/2024       Lab Results   Component Value Date    HGBA1C 10.6 (A) 03/26/2024       Lab Results   Component Value Date    HGBA1C 10.6 (A) 03/26/2024       Lab Results   Component Value Date    HGBA1C 10.6 (A) 03/26/2024     Uncontrolled.  Management per Endo.  Recommend lifestyle modifications.

## 2024-06-25 NOTE — ASSESSMENT & PLAN NOTE
Management per [facility] Neuro.  S/p Deep brain stimulator 9/22.  On Sinemet 25-100mg 1 tab QD.  Continue [facility]'s Boxing Program.

## 2024-06-25 NOTE — ASSESSMENT & PLAN NOTE
Lab Results   Component Value Date    HGBA1C 10.6 (A) 03/26/2024       Lab Results   Component Value Date    HGBA1C 10.6 (A) 03/26/2024     Uncontrolled.  Management per Endo.  Recommend lifestyle modifications.

## 2024-06-27 ENCOUNTER — VBI (OUTPATIENT)
Dept: ADMINISTRATIVE | Facility: OTHER | Age: 69
End: 2024-06-27

## 2024-06-27 NOTE — TELEPHONE ENCOUNTER
06/27/24 11:29 AM     Chart reviewed for CRC: Colonoscopy ; nothing is submitted to the patient's insurance at this time.     Devorah Rucker MA   [facility]

## 2024-06-29 NOTE — PROGRESS NOTES
"HEMATOLOGY / ONCOLOGY CLINIC FOLLOW UP NOTE    Patient Andres Chacko  MRN: [id number]  : 1955  Date of Encounter 2024      Reason for Encounter: follow up for ET         1. Essential thrombocytosis    2. JAK2 V617F mutation         Platelets 2024 546 off anagrelide          Assessment / Plan:        Tremayne Chacko is a 68-year-old male with essential thrombocytosis with JAK2 V617F mutation.  He was previously following with BENNY Mejia and now presents for transfer of care. He was previously on Hydrea 500 mg once daily did not tolerate due to side effects.  His dose was reduced to once daily on Monday, Wednesday, Friday.  He tolerated this for a longer period of time but then self discontinued due to feeling \"ill \".  Since discontinuing Hydrea he is back to baseline and has no constitutional complaints or concerns.  His platelet count remained greater than 500 K with mildly elevated WBC count.  Due to intolerance of Hydrea he was switched to Anagrelide 1 mg BID in 2023.  He continues ASA 81 mg.  He notes compliance and tolerance with anagrelide.  I encouraged patient to have updated labs to assess treatment response as well as need for dose adjustments.  We reviewed updated guidelines necessitating bone marrow biopsy at the time of diagnosis.          ET          Patient was started on anagrelide 1 mg bid at last appointment in Dec 2023.  He had plt count of 548 11/15/2023 and on anagrelide 370.  He has been off due to \"fatigue\" and is now 497     Restarted anagrelide at 50% dose reduction but plts incresed to 573 from 497.  WBC 11.5 Hgb 13.4 diff 61/28     Dose was increased to 1 mg bid anagrelide and was supposed to have had labs drawn within 2 weeks last visit  2024     Patient stopped anagrelide over one week ago; plts 546 WBC 11.7     Did discuss use of pegylated interferon as well as ruxolitinib      Would  consider ruxolitinib 10 mg daily  as would l not tolerate " "IFN    However, patient on no meds; refusing to take anagrelide, feeling well off any drugs    Plts 552 as of 7/1/2024;  546 May 2024 , 573 off any medication    Will hold on any treatment    He is aware that might have to change    Repeat labs in  monthly but will be seen in 2 months           Follow up      Approximately 2 months              HPI  12/6/2023     Tremayne Chacko is a 68-year-old male with past medical history significant for type 2 diabetes, CAD, HTN, Parkinson's disease, history of prostate cancer s/p prostatectomy in 2013.  Patient was previously following with BENNY Mejia for longstanding history of thrombocytosis since at least 2016.  He denies any personal history of thrombosis..  Subsequent workup noted JAK2 V6 17F mutation positive and patient was initiated on hydroxyurea which she subsequently stopped due to intolerance.  Patient does not state specific side effects, but notes it made him feel \"ill\".  He was switched to anagrelide 1 mg twice daily since November 2023.  Patient notes compliance with current treatment and denies any adverse effects.  No repeat labs available.  Patient presents for transfer of care/follow-up visit today.  Offers no new complaints.  Denies any chest pain, shortness of breath, abdominal pain, nausea, vomiting, diarrhea.  Denies any fever, night sweats or unexpected weight changes.  He has never had a bone marrow biopsy.     Interval Follow up 3/18/2024     Patient stopped anagrelide one month ago as didn't like how it made him feel with decreased energy.  Labs on anagrelide 1 mg bid as follows:     Labs 12/21/2023       WBC 12.2 Hg 15 plts 370     Labs prior 11/15/2023     WBC 9.4 Hgb 14.2 plts 548     Labs off anagrelide 3/18/2024       WBC 9.8 Hgb 14.8 plts 497     Has no other complaints     Will restart anagrelide at 50% reduction as did not tolerate hydrea at all      Denies personal or family hx of blood clots        Interval History: 4/22/2024     Mr " Ricco is tolerating anagrelide 1 mg daily but plts increased to 573.  He is not any more fatigued than baseline.  He has had a chronic cough since COVID in Sept 2023 but has had some viral issues with increased cough.  He states he has a chronic postnasal drip which is making the cough worse.  He did have one bleeding issue from coughing.  It is nonproductive.  He is seeing his PCP but may need a CT chest and may need ENT involvement if this is a sinus issue.           Interval History:  6/3/2024     Mr Chacko had labs with the following: Plts 546 on the 31 May 2024; also WBC 11.7 Hgb 13.6.  Patient again with issues regarding drug use including fatigue and diarrhea.  He feels there are issues with his Parkinson meds and there may be some DDI but he is also at risk of stroke with increased plt levels.  He continues on aspirin.       Interval History:  7/2/2024    Labs from 7/1/2024 with WBC 11.2 Hgb 14.4 MCV 92 and plts 552; were 546 31 May 2024     Not taking any medications except aspirin refusing anagrelide and will not consider ruxolitinib for now.    No issues             Latest Reference Range & Units 05/31/24 15:30   WBC 4.31 - 10.16 Thousand/uL 11.69 (H)   RBC 3.88 - 5.62 Million/uL 4.60   Hemoglobin 12.0 - 17.0 g/dL 13.6   Hematocrit 36.5 - 49.3 % 41.8   MCV 82 - 98 fL 91   MCH 26.8 - 34.3 pg 29.6   MCHC 31.4 - 37.4 g/dL 32.5   RDW 11.6 - 15.1 % 13.7   Platelet Count 149 - 390 Thousands/uL 546 (H)   MPV 8.9 - 12.7 fL 9.9   nRBC /100 WBCs 0   Segmented % 43 - 75 % 63   Lymphocytes % 14 - 44 % 27   Monocytes % 4 - 12 % 7   Eosinophils % 0 - 6 % 3   Basophils % 0 - 1 % 0   Immature Grans % 0 - 2 % 0   Absolute Immature Grans 0.00 - 0.20 Thousand/uL 0.04   Absolute Neutrophils 1.85 - 7.62 Thousands/µL 7.25   Absolute Lymphocytes 0.60 - 4.47 Thousands/µL 3.17   Absolute Monocytes 0.17 - 1.22 Thousand/µL 0.80   Absolute Eosinophils 0.00 - 0.61 Thousand/µL 0.38   Absolute Basophils 0.00 - 0.10 Thousands/µL  0.05   (H): Data is abnormally high       Latest Reference Range & Units 07/01/24 12:45   WBC 4.31 - 10.16 Thousand/uL 11.16 (H)   RBC 3.88 - 5.62 Million/uL 4.92   Hemoglobin 12.0 - 17.0 g/dL 14.4   Hematocrit 36.5 - 49.3 % 45.1   MCV 82 - 98 fL 92   MCH 26.8 - 34.3 pg 29.3   MCHC 31.4 - 37.4 g/dL 31.9   RDW 11.6 - 15.1 % 13.9   Platelet Count 149 - 390 Thousands/uL 552 (H)   MPV 8.9 - 12.7 fL 9.9   nRBC /100 WBCs 0   Segmented % 43 - 75 % 64   Lymphocytes % 14 - 44 % 24   Monocytes % 4 - 12 % 8   Eosinophils % 0 - 6 % 3   Basophils % 0 - 1 % 1   Immature Grans % 0 - 2 % 0   Absolute Immature Grans 0.00 - 0.20 Thousand/uL 0.04   Absolute Neutrophils 1.85 - 7.62 Thousands/µL 7.12   Absolute Lymphocytes 0.60 - 4.47 Thousands/µL 2.72   Absolute Monocytes 0.17 - 1.22 Thousand/µL 0.84   Absolute Eosinophils 0.00 - 0.61 Thousand/µL 0.37   Absolute Basophils 0.00 - 0.10 Thousands/µL 0.07   (H): Data is abnormally high      REVIEW OF SYSTEMS:    As above   Please note that a 14-point review of systems was performed to include Constitutional, HEENT, Respiratory, CVS, GI, , Musculoskeletal, Integumentary, Neurologic, Rheumatologic, Endocrinologic, Psychiatric, Lymphatic, and Hematologic/Oncologic systems were reviewed and are negative unless otherwise stated in HPI. Positive and negative findings pertinent to this evaluation are incorporated into the history of present illness.      ECOG PS: 0    PROBLEM LIST:  Patient Active Problem List   Diagnosis    Arteriosclerosis of coronary artery    Benign essential hypertension    Depression    Type 2 diabetes mellitus without complication, without long-term current use of insulin (HCC)    Subclinical hypothyroidism    Essential (hemorrhagic) thrombocythemia (HCC)    Mixed hyperlipidemia    Leukocytosis    Other male erectile dysfunction    Parkinson disease    Type 2 diabetes mellitus with hyperglycemia, without long-term current use of insulin (HCC)    Dystonia    History of  stroke    Hyperkalemia    History of prostate cancer    Pipe smoker    Anxiety    Overweight       Past Medical History:   has a past medical history of Anxiety, Arteriosclerosis of coronary artery (05/24/2017), Arthritis (1999), Benign essential hypertension (05/15/2015), Cancer ([facility]) (2012), Depression (06/01/2012), Diabetes mellitus ([facility]), Essential (hemorrhagic) thrombocythemia ([facility]), History of prostate cancer (01/05/2023), History of stroke, Hypertension, Left carpal tunnel syndrome, Lumbar canal stenosis, Mixed hyperlipidemia (11/10/2010), Osteoarthritis, knee, Other male erectile dysfunction (08/08/2019), Overweight, Parkinson disease (10/08/2019), Pipe smoker, Retinal and vitreous disorder, Stroke ([facility]) (2019), Subclinical hypothyroidism (04/11/2017), and Type 2 diabetes mellitus without complication, without long-term current use of insulin ([facility]) (08/24/2017).    PAST SURGICAL HISTORY:   has a past surgical history that includes Prostatectomy (09/23/2013); Ankle fracture surgery (Left, 04/2009); pr colonoscopy flx dx w/collj spec when pfrmd (N/A, 01/27/2016); Prostate biopsy (12/08/2012); Lumbar laminectomy (2003); Deep brain stimulator placement (Bilateral, 09/2022); Nasal septum surgery (06/1989); IR biopsy bone marrow (12/21/2023); and Fracture surgery (4/15/2009).    CURRENT MEDICATIONS  Current Outpatient Medications   Medication Sig Dispense Refill    aspirin 325 mg tablet Take 325 mg by mouth daily      carbidopa-levodopa (SINEMET)  mg per tablet Take 2 tablets by mouth 3 (three) times a day Follow clinic instructions (Patient taking differently: Take 1 tablet by mouth in the morning Follow clinic instructions.  Rx per Neuro.) 540 tablet 3    Cholecalciferol (VITAMIN D) 2000 units CAPS Take 2 capsules (4,000 Units total) by mouth daily  0    co-enzyme Q-10 30 MG capsule Take 1 capsule (30 mg total) by mouth daily 30 capsule 0    Continuous Blood Gluc  (FreeStyle Luisa 3 [place]) ARMANDO  once 1 each 0    Continuous Blood Gluc Sensor (FreeStyle Luisa 3 Sensor) MISC Every 14 days 2 each 3    ezetimibe (ZETIA) 10 mg tablet TAKE 1 TABLET BY MOUTH EVERY DAY 90 tablet 1    glucose blood (ONETOUCH VERIO) test strip Check BG 2x per day (Patient taking differently: Check BG 2x per day.  Rx per Endo.) 200 each 3    Icosapent Ethyl (Vascepa) 1 g CAPS Take 2 capsules (2 g total) by mouth 2 (two) times a day 180 capsule 2    insulin degludec (Tresiba FlexTouch) 200 units/mL CONCENTRATED U-200 injection pen 20 units daily 9 mL 1    Insulin Pen Needle (BD Pen Needle Yara U/F) 32G X 4 MM MISC Daily 100 each 1    lisinopril (ZESTRIL) 20 mg tablet TAKE 1 TABLET BY MOUTH DAILY 100 tablet 1    mometasone (ELOCON) 0.1 % cream Apply topically 2 (two) times a day as needed (Rash) 15 g 0    Multiple Vitamin (multivitamin) capsule Take 1 capsule by mouth daily      pravastatin (PRAVACHOL) 80 mg tablet TAKE 1 TABLET BY MOUTH DAILY AT  BEDTIME 100 tablet 1    sitaGLIPtin-metFORMIN (Janumet)  MG per tablet TAKE 1 TABLET BY MOUTH TWICE  DAILY WITH MEALS 200 tablet 1    venlafaxine (EFFEXOR-XR) 37.5 mg 24 hr capsule TAKE 1 CAPSULE BY MOUTH DAILY  WITH 75 MG FOR A TOTAL .5  MG DAILY 90 capsule 1    venlafaxine (EFFEXOR-XR) 75 mg 24 hr capsule Take with 37.5mg for a total of 112.5mg daily. 90 capsule 1     No current facility-administered medications for this visit.     [unfilled]    SOCIAL HISTORY:   reports that he has been smoking pipe. He started smoking about 29 years ago. He has been exposed to tobacco smoke. He has never used smokeless tobacco. He reports current alcohol use of about 4.0 standard drinks of alcohol per week. He reports that he does not use drugs.     FAMILY HISTORY:  family history includes Coronary artery disease (age of onset: 59) in his father; Diabetes in his father; Diabetes type II in his father; Heart attack (age of onset: 59) in his father; Heart disease in his father; Hypertension in his  mother; No Known Problems in his son and son; Osteoporosis in his mother; Retinal detachment in his mother; Retinitis pigmentosa in his mother; Vision loss in his mother.     ALLERGIES:  has No Known Allergies.      Physical Exam:  Vital Signs:   Visit Vitals  Smoking Status Some Days     There is no height or weight on file to calculate BMI.  There is no height or weight on file to calculate BSA.    GEN: Alert, awake oriented x3, in no acute distress  HEENT- No pallor, icterus, cyanosis, no oral mucosal lesions,   LAD - no palpable cervical, clavicle, axillary, inguinal LAD  Heart- normal S1 S2, regular rate and rhythm, No murmur, rubs.   Lungs- clear breathing sound bilateral.   Abdomen- soft, Non tender, bowel sounds present  Extremities- No cyanosis, clubbing, edema  Neuro- No focal neurological deficit    Labs:  Lab Results   Component Value Date    WBC 11.69 (H) 05/31/2024    HGB 13.6 05/31/2024    HCT 41.8 05/31/2024    MCV 91 05/31/2024     (H) 05/31/2024     Lab Results   Component Value Date     12/17/2015    SODIUM 136 04/18/2024    K 4.7 04/18/2024     04/18/2024    CO2 28 04/18/2024    ANIONGAP 8 12/17/2015    AGAP 6 04/18/2024    BUN 19 04/18/2024    CREATININE 1.20 04/18/2024    GLUC 133 04/18/2024    GLUF 241 (H) 03/18/2024    CALCIUM 9.6 04/18/2024    AST 21 04/18/2024    ALT 12 04/18/2024    ALKPHOS 31 (L) 04/18/2024    PROT 6.8 12/17/2015    TP 6.8 04/18/2024    BILITOT 0.47 12/17/2015    TBILI 0.32 04/18/2024    EGFR 61 04/18/2024           I spent 30 minutes on chart review, face to face counseling time, coordination of care and documentation.    Mariajose Vaca MD PhD

## 2024-07-01 ENCOUNTER — APPOINTMENT (OUTPATIENT)
Dept: LAB | Facility: CLINIC | Age: 69
End: 2024-07-01
Payer: COMMERCIAL

## 2024-07-01 ENCOUNTER — TELEPHONE (OUTPATIENT)
Dept: FAMILY MEDICINE CLINIC | Facility: CLINIC | Age: 69
End: 2024-07-01

## 2024-07-01 DIAGNOSIS — D47.3 ESSENTIAL THROMBOCYTOSIS (HCC): ICD-10-CM

## 2024-07-01 LAB
25(OH)D3 SERPL-MCNC: 42.8 NG/ML (ref 30–100)
ALBUMIN SERPL BCG-MCNC: 4.3 G/DL (ref 3.5–5)
ALP SERPL-CCNC: 34 U/L (ref 34–104)
ALT SERPL W P-5'-P-CCNC: 12 U/L (ref 7–52)
ANION GAP SERPL CALCULATED.3IONS-SCNC: 6 MMOL/L (ref 4–13)
AST SERPL W P-5'-P-CCNC: 17 U/L (ref 13–39)
BASOPHILS # BLD AUTO: 0.07 THOUSANDS/ÂΜL (ref 0–0.1)
BASOPHILS NFR BLD AUTO: 1 % (ref 0–1)
BILIRUB SERPL-MCNC: 0.38 MG/DL (ref 0.2–1)
BUN SERPL-MCNC: 18 MG/DL (ref 5–25)
CALCIUM SERPL-MCNC: 9.6 MG/DL (ref 8.4–10.2)
CHLORIDE SERPL-SCNC: 102 MMOL/L (ref 96–108)
CO2 SERPL-SCNC: 29 MMOL/L (ref 21–32)
CREAT SERPL-MCNC: 1.32 MG/DL (ref 0.6–1.3)
CREAT UR-MCNC: 198.1 MG/DL
EOSINOPHIL # BLD AUTO: 0.37 THOUSAND/ÂΜL (ref 0–0.61)
EOSINOPHIL NFR BLD AUTO: 3 % (ref 0–6)
ERYTHROCYTE [DISTWIDTH] IN BLOOD BY AUTOMATED COUNT: 13.9 % (ref 11.6–15.1)
GFR SERPL CREATININE-BSD FRML MDRD: 55 ML/MIN/1.73SQ M
GLUCOSE P FAST SERPL-MCNC: 185 MG/DL (ref 65–99)
HCT VFR BLD AUTO: 45.1 % (ref 36.5–49.3)
HGB BLD-MCNC: 14.4 G/DL (ref 12–17)
IMM GRANULOCYTES # BLD AUTO: 0.04 THOUSAND/UL (ref 0–0.2)
IMM GRANULOCYTES NFR BLD AUTO: 0 % (ref 0–2)
LDLC SERPL DIRECT ASSAY-MCNC: 66 MG/DL (ref 0–100)
LYMPHOCYTES # BLD AUTO: 2.72 THOUSANDS/ÂΜL (ref 0.6–4.47)
LYMPHOCYTES NFR BLD AUTO: 24 % (ref 14–44)
MCH RBC QN AUTO: 29.3 PG (ref 26.8–34.3)
MCHC RBC AUTO-ENTMCNC: 31.9 G/DL (ref 31.4–37.4)
MCV RBC AUTO: 92 FL (ref 82–98)
MICROALBUMIN UR-MCNC: 21.7 MG/L
MICROALBUMIN/CREAT 24H UR: 11 MG/G CREATININE (ref 0–30)
MONOCYTES # BLD AUTO: 0.84 THOUSAND/ÂΜL (ref 0.17–1.22)
MONOCYTES NFR BLD AUTO: 8 % (ref 4–12)
NEUTROPHILS # BLD AUTO: 7.12 THOUSANDS/ÂΜL (ref 1.85–7.62)
NEUTS SEG NFR BLD AUTO: 64 % (ref 43–75)
NRBC BLD AUTO-RTO: 0 /100 WBCS
PLATELET # BLD AUTO: 552 THOUSANDS/UL (ref 149–390)
PMV BLD AUTO: 9.9 FL (ref 8.9–12.7)
POTASSIUM SERPL-SCNC: 5.1 MMOL/L (ref 3.5–5.3)
PROT SERPL-MCNC: 7 G/DL (ref 6.4–8.4)
RBC # BLD AUTO: 4.92 MILLION/UL (ref 3.88–5.62)
SODIUM SERPL-SCNC: 137 MMOL/L (ref 135–147)
WBC # BLD AUTO: 11.16 THOUSAND/UL (ref 4.31–10.16)

## 2024-07-01 PROCEDURE — 85025 COMPLETE CBC W/AUTO DIFF WBC: CPT

## 2024-07-02 ENCOUNTER — OFFICE VISIT (OUTPATIENT)
Dept: HEMATOLOGY ONCOLOGY | Facility: CLINIC | Age: 69
End: 2024-07-02
Payer: COMMERCIAL

## 2024-07-02 VITALS
SYSTOLIC BLOOD PRESSURE: 128 MMHG | WEIGHT: 192 LBS | DIASTOLIC BLOOD PRESSURE: 74 MMHG | RESPIRATION RATE: 18 BRPM | TEMPERATURE: 97.7 F | HEIGHT: 69 IN | HEART RATE: 72 BPM | OXYGEN SATURATION: 98 % | BODY MASS INDEX: 28.44 KG/M2

## 2024-07-02 DIAGNOSIS — D47.3 ESSENTIAL THROMBOCYTOSIS (HCC): Primary | ICD-10-CM

## 2024-07-02 PROCEDURE — 99214 OFFICE O/P EST MOD 30 MIN: CPT | Performed by: INTERNAL MEDICINE

## 2024-07-02 NOTE — TELEPHONE ENCOUNTER
Why does patient have appt 8/29/24?  I think this may be an error as he is not due for chronic visit until -     Return in about 6 months (around 12/24/2024) for 6mo- DM2, HTN, HL, Anx/Dep, ET, H/O Prostate CA, Smoker, Labs.

## 2024-07-17 ENCOUNTER — LAB (OUTPATIENT)
Dept: LAB | Facility: CLINIC | Age: 69
End: 2024-07-17
Payer: COMMERCIAL

## 2024-07-17 DIAGNOSIS — I10 BENIGN ESSENTIAL HYPERTENSION: ICD-10-CM

## 2024-07-17 DIAGNOSIS — Z15.89 JAK2 V617F MUTATION: ICD-10-CM

## 2024-07-17 DIAGNOSIS — Z51.81 ENCOUNTER FOR MONITORING OF HYDROXYUREA THERAPY: ICD-10-CM

## 2024-07-17 DIAGNOSIS — Z79.64 ENCOUNTER FOR MONITORING OF HYDROXYUREA THERAPY: ICD-10-CM

## 2024-07-17 DIAGNOSIS — D47.3 ESSENTIAL THROMBOCYTOSIS (HCC): ICD-10-CM

## 2024-07-17 DIAGNOSIS — E11.65 TYPE 2 DIABETES MELLITUS WITH HYPERGLYCEMIA, WITHOUT LONG-TERM CURRENT USE OF INSULIN (HCC): ICD-10-CM

## 2024-07-17 DIAGNOSIS — E78.2 MIXED HYPERLIPIDEMIA: ICD-10-CM

## 2024-07-17 LAB
ALBUMIN SERPL BCG-MCNC: 4.4 G/DL (ref 3.5–5)
ALP SERPL-CCNC: 32 U/L (ref 34–104)
ALT SERPL W P-5'-P-CCNC: 21 U/L (ref 7–52)
ANION GAP SERPL CALCULATED.3IONS-SCNC: 6 MMOL/L (ref 4–13)
AST SERPL W P-5'-P-CCNC: 20 U/L (ref 13–39)
BILIRUB SERPL-MCNC: 0.43 MG/DL (ref 0.2–1)
BUN SERPL-MCNC: 15 MG/DL (ref 5–25)
CALCIUM SERPL-MCNC: 9.2 MG/DL (ref 8.4–10.2)
CHLORIDE SERPL-SCNC: 100 MMOL/L (ref 96–108)
CHOLEST SERPL-MCNC: 116 MG/DL
CO2 SERPL-SCNC: 29 MMOL/L (ref 21–32)
CREAT SERPL-MCNC: 1.16 MG/DL (ref 0.6–1.3)
CREAT UR-MCNC: 161.9 MG/DL
EST. AVERAGE GLUCOSE BLD GHB EST-MCNC: 171 MG/DL
GFR SERPL CREATININE-BSD FRML MDRD: 64 ML/MIN/1.73SQ M
GLUCOSE P FAST SERPL-MCNC: 117 MG/DL (ref 65–99)
HBA1C MFR BLD: 7.6 %
HDLC SERPL-MCNC: 44 MG/DL
LDLC SERPL CALC-MCNC: 44 MG/DL (ref 0–100)
MICROALBUMIN UR-MCNC: 15.8 MG/L
MICROALBUMIN/CREAT 24H UR: 10 MG/G CREATININE (ref 0–30)
POTASSIUM SERPL-SCNC: 5.1 MMOL/L (ref 3.5–5.3)
PROT SERPL-MCNC: 7.2 G/DL (ref 6.4–8.4)
SODIUM SERPL-SCNC: 135 MMOL/L (ref 135–147)
T4 FREE SERPL-MCNC: 0.65 NG/DL (ref 0.61–1.12)
TRIGL SERPL-MCNC: 138 MG/DL
TSH SERPL DL<=0.05 MIU/L-ACNC: 3.53 UIU/ML (ref 0.45–4.5)

## 2024-07-17 PROCEDURE — 84439 ASSAY OF FREE THYROXINE: CPT

## 2024-07-17 PROCEDURE — 83036 HEMOGLOBIN GLYCOSYLATED A1C: CPT

## 2024-07-17 PROCEDURE — 82570 ASSAY OF URINE CREATININE: CPT

## 2024-07-17 PROCEDURE — 36415 COLL VENOUS BLD VENIPUNCTURE: CPT

## 2024-07-17 PROCEDURE — 84443 ASSAY THYROID STIM HORMONE: CPT

## 2024-07-17 PROCEDURE — 80061 LIPID PANEL: CPT

## 2024-07-17 PROCEDURE — 82043 UR ALBUMIN QUANTITATIVE: CPT

## 2024-07-17 PROCEDURE — 80053 COMPREHEN METABOLIC PANEL: CPT

## 2024-07-18 ENCOUNTER — OFFICE VISIT (OUTPATIENT)
Dept: ENDOCRINOLOGY | Facility: CLINIC | Age: 69
End: 2024-07-18
Payer: COMMERCIAL

## 2024-07-18 VITALS
HEART RATE: 74 BPM | SYSTOLIC BLOOD PRESSURE: 128 MMHG | HEIGHT: 69 IN | BODY MASS INDEX: 28.53 KG/M2 | OXYGEN SATURATION: 99 % | WEIGHT: 192.6 LBS | DIASTOLIC BLOOD PRESSURE: 88 MMHG

## 2024-07-18 DIAGNOSIS — I10 BENIGN ESSENTIAL HYPERTENSION: ICD-10-CM

## 2024-07-18 DIAGNOSIS — E78.2 MIXED HYPERLIPIDEMIA: ICD-10-CM

## 2024-07-18 DIAGNOSIS — E11.9 TYPE 2 DIABETES MELLITUS WITHOUT COMPLICATION, WITHOUT LONG-TERM CURRENT USE OF INSULIN (HCC): ICD-10-CM

## 2024-07-18 DIAGNOSIS — E11.65 TYPE 2 DIABETES MELLITUS WITH HYPERGLYCEMIA, WITHOUT LONG-TERM CURRENT USE OF INSULIN (HCC): Primary | ICD-10-CM

## 2024-07-18 PROCEDURE — 99214 OFFICE O/P EST MOD 30 MIN: CPT

## 2024-07-18 RX ORDER — BLOOD-GLUCOSE SENSOR
EACH MISCELLANEOUS
Qty: 2 EACH | Refills: 3 | Status: SHIPPED | OUTPATIENT
Start: 2024-07-18

## 2024-07-18 NOTE — ASSESSMENT & PLAN NOTE
Improved significantly from prior. Goal hgba1c <7%  - for now, he will resume CGM and monitor his diet. Refill for sensors sent  - not enough blood glucose information today to make changes to regimen  - he is not interested in MNT    Lab Results   Component Value Date    HGBA1C 7.6 (H) 07/17/2024

## 2024-07-18 NOTE — PROGRESS NOTES
Established Patient Progress Note    CC: Follow up for type 2 diabetes mellitus    Impression & Plan:    Problem List Items Addressed This Visit       Benign essential hypertension     Stable in office  - continue current regimen         Type 2 diabetes mellitus without complication, without long-term current use of insulin ([facility])    Relevant Orders    Hemoglobin A1C    Mixed hyperlipidemia     Controlled  - continue with statin         Type 2 diabetes mellitus with hyperglycemia, without long-term current use of insulin (HCC) - Primary     Improved significantly from prior. Goal hgba1c <7%  - for now, he will resume CGM and monitor his diet. Refill for sensors sent  - not enough blood glucose information today to make changes to regimen  - he is not interested in MNT    Lab Results   Component Value Date    HGBA1C 7.6 (H) 07/17/2024            Relevant Medications    Continuous Glucose Sensor (FreeStyle Luisa 3 Sensor) MISC       Orders Placed This Encounter   Procedures    Hemoglobin A1C     Standing Status:   Future     Standing Expiration Date:   7/18/2025       History of Present Illness:   Andres Chacko is a 68 y.o. male with a history of type 2 diabetes, hyperlipidemia, hypertension. Complications: stroke. Last A1C was 7.6. Home glucose monitoring: home glucose monitor. Checks intermittently. Blood sugars reviewed on meter. Varies from 70's to 180's. He stopped using CGM in April/May. He has seen the dietician in the past.     Insulin started at the last visit. He is doing well. Denies hypoglycemia     Current regimen:   Janumet 2 tabs daily  Tresiba 20 units daily     Last Foot Exam: UTD  Last Eye Exam: UTD     ACE/ARB: lisinopril  Has hyperlipidemia: Taking pravastatin         Patient Active Problem List   Diagnosis    Arteriosclerosis of coronary artery    Benign essential hypertension    Depression    Type 2 diabetes mellitus without complication, without long-term current use of insulin (HCC)     Subclinical hypothyroidism    Essential (hemorrhagic) thrombocythemia (HCC)    Mixed hyperlipidemia    Leukocytosis    Other male erectile dysfunction    Parkinson disease    Type 2 diabetes mellitus with hyperglycemia, without long-term current use of insulin (HCC)    Dystonia    History of stroke    Hyperkalemia    History of prostate cancer    Pipe smoker    Anxiety    Overweight      Past Medical History:   Diagnosis Date    Anxiety     Arteriosclerosis of coronary artery 2017    Arthritis 1999    Benign essential hypertension 05/15/2015    Cancer (HCC) 2012    Depression 2012    Diabetes mellitus (HCC)     Essential (hemorrhagic) thrombocythemia (HCC)     History of prostate cancer 2023    History of stroke     Hypertension     Left carpal tunnel syndrome     Lumbar canal stenosis     Mixed hyperlipidemia 11/10/2010    Osteoarthritis, knee     Other male erectile dysfunction 2019    Overweight     Parkinson disease 10/08/2019    Pipe smoker     Retinal and vitreous disorder     Stroke ([facility])     Subclinical hypothyroidism 2017    Type 2 diabetes mellitus without complication, without long-term current use of insulin ([facility]) 2017      Past Surgical History:   Procedure Laterality Date    ANKLE FRACTURE SURGERY Left 2009    triple fracture; 2 Screws in place    DEEP BRAIN STIMULATOR PLACEMENT Bilateral 2022    FRACTURE SURGERY  4/15/2009    IR BIOPSY BONE MARROW  2023    LUMBAR LAMINECTOMY      L5-S1    NASAL SEPTUM SURGERY  1989    MO COLONOSCOPY FLX DX W/COLLJ SPEC WHEN PFRMD N/A 2016    Procedure: COLONOSCOPY;  Surgeon: Jigar Alvarado MD;  Location: [facility];  Service: Gastroenterology    PROSTATE BIOPSY  2012    managed by Rambo Roy, needle biopsy    PROSTATECTOMY  2013      Family History   Problem Relation Age of Onset    Hypertension Mother          2017    Retinal detachment Mother     Retinitis pigmentosa Mother      Osteoporosis Mother     Vision loss Mother         retinitis pigmentosa    Diabetes type II Father              Heart attack Father 59    Coronary artery disease Father 59    Heart disease Father          1973    Diabetes Father          1973    No Known Problems Son     No Known Problems Son      Social History     Tobacco Use    Smoking status: Some Days     Types: Pipe     Start date: 1995     Passive exposure: Past    Smokeless tobacco: Never    Tobacco comments:     Occasional pipe or cigar < 6 times a month   Substance Use Topics    Alcohol use: Yes     Alcohol/week: 4.0 standard drinks of alcohol     Types: 3 Cans of beer, 1 Shots of liquor per week     Comment: occassional      No Known Allergies      Current Outpatient Medications:     aspirin 325 mg tablet, Take 325 mg by mouth daily, Disp: , Rfl:     carbidopa-levodopa (SINEMET)  mg per tablet, Take 2 tablets by mouth 3 (three) times a day Follow clinic instructions (Patient taking differently: Take 1 tablet by mouth in the morning Follow clinic instructions.  Rx per Neuro.), Disp: 540 tablet, Rfl: 3    Cholecalciferol (VITAMIN D) 2000 units CAPS, Take 2 capsules (4,000 Units total) by mouth daily, Disp: , Rfl: 0    co-enzyme Q-10 30 MG capsule, Take 1 capsule (30 mg total) by mouth daily, Disp: 30 capsule, Rfl: 0    Continuous Blood Gluc  (FreeStyle Luisa 3 [place]) ARMNADO, once, Disp: 1 each, Rfl: 0    Continuous Glucose Sensor (FreeStyle Luisa 3 Sensor) MISC, Every 14 days, Disp: 2 each, Rfl: 3    ezetimibe (ZETIA) 10 mg tablet, TAKE 1 TABLET BY MOUTH EVERY DAY, Disp: 90 tablet, Rfl: 1    glucose blood (ONETOUCH VERIO) test strip, Check BG 2x per day (Patient taking differently: Check BG 2x per day.  Rx per Endo.), Disp: 200 each, Rfl: 3    Icosapent Ethyl (Vascepa) 1 g CAPS, Take 2 capsules (2 g total) by mouth 2 (two) times a day, Disp: 180 capsule, Rfl: 2    insulin degludec (Tresiba FlexTouch) 200 units/mL  "CONCENTRATED U-200 injection pen, 20 units daily, Disp: 9 mL, Rfl: 1    Insulin Pen Needle (BD Pen Needle Yara U/F) 32G X 4 MM MISC, Daily, Disp: 100 each, Rfl: 1    lisinopril (ZESTRIL) 20 mg tablet, TAKE 1 TABLET BY MOUTH DAILY, Disp: 100 tablet, Rfl: 1    mometasone (ELOCON) 0.1 % cream, Apply topically 2 (two) times a day as needed (Rash), Disp: 15 g, Rfl: 0    Multiple Vitamin (multivitamin) capsule, Take 1 capsule by mouth daily, Disp: , Rfl:     pravastatin (PRAVACHOL) 80 mg tablet, TAKE 1 TABLET BY MOUTH DAILY AT  BEDTIME, Disp: 100 tablet, Rfl: 1    sitaGLIPtin-metFORMIN (Janumet)  MG per tablet, TAKE 1 TABLET BY MOUTH TWICE  DAILY WITH MEALS, Disp: 200 tablet, Rfl: 1    venlafaxine (EFFEXOR-XR) 37.5 mg 24 hr capsule, TAKE 1 CAPSULE BY MOUTH DAILY  WITH 75 MG FOR A TOTAL .5  MG DAILY, Disp: 90 capsule, Rfl: 1    venlafaxine (EFFEXOR-XR) 75 mg 24 hr capsule, Take with 37.5mg for a total of 112.5mg daily., Disp: 90 capsule, Rfl: 1    Review of Systems   Constitutional:  Negative for chills and fever.   HENT:  Negative for ear pain and sore throat.    Eyes:  Negative for pain and visual disturbance.   Respiratory:  Negative for cough and shortness of breath.    Cardiovascular:  Negative for chest pain and palpitations.   Gastrointestinal:  Negative for abdominal pain and vomiting.   Genitourinary:  Negative for dysuria and hematuria.   Musculoskeletal:  Negative for arthralgias and back pain.   Skin:  Negative for color change and rash.   Neurological:  Negative for seizures and syncope.   All other systems reviewed and are negative.      Physical Exam:  Body mass index is 28.44 kg/m².  /88   Pulse 74   Ht 5' 9\" (1.753 m)   Wt 87.4 kg (192 lb 9.6 oz)   SpO2 99%   BMI 28.44 kg/m²    Wt Readings from Last 3 Encounters:   07/18/24 87.4 kg (192 lb 9.6 oz)   07/02/24 87.1 kg (192 lb)   06/24/24 87.1 kg (192 lb)       Physical Exam  Vitals reviewed.   Constitutional:       Appearance: Normal " "appearance.   HENT:      Head: Normocephalic and atraumatic.   Cardiovascular:      Rate and Rhythm: Normal rate.   Pulmonary:      Effort: Pulmonary effort is normal.   Neurological:      Mental Status: He is alert and oriented to person, place, and time.   Psychiatric:         Mood and Affect: Mood normal.         Behavior: Behavior normal.       Diabetic Foot Exam    Labs:   Lab Results   Component Value Date    HGBA1C 7.6 (H) 07/17/2024    HGBA1C 10.6 (A) 03/26/2024    HGBA1C 9.3 (H) 11/15/2023     Lab Results   Component Value Date    CREATININE 1.16 07/17/2024    CREATININE 1.32 (H) 07/01/2024    CREATININE 1.20 04/18/2024    BUN 15 07/17/2024     12/17/2015    K 5.1 07/17/2024     07/17/2024    CO2 29 07/17/2024     eGFRcr   Date Value Ref Range Status   08/25/2022 74 >=60 mL/min/1.73 m2 Final     Comment:     Estimated glomerular filtration rate (eGFR) is calculated without a race  coefficient. Values should be interpreted in the context of the patient's full  clinical presentation. Reference: Meghan Echeverria et al. \"A unifying approach  for GFR estimation: recommendations of the NKF-ASN task force on reassessing the  inclusion of race in diagnosing kidney disease.\" American Journal of Kidney  Diseases (2021)     eGFR   Date Value Ref Range Status   07/17/2024 64 ml/min/1.73sq m Final     Lab Results   Component Value Date    CHOL 206 12/17/2015    HDL 44 07/17/2024    TRIG 138 07/17/2024     Lab Results   Component Value Date    ALT 21 07/17/2024    AST 20 07/17/2024    ALKPHOS 32 (L) 07/17/2024    BILITOT 0.47 12/17/2015     Lab Results   Component Value Date    ZHZ0JJWEPPOH 3.534 07/17/2024    ZMY2ZDGKXHGM 3.113 12/05/2023    GAF5VCQITUQL 2.906 11/15/2023     Lab Results   Component Value Date    FREET4 0.65 07/17/2024         There are no Patient Instructions on file for this visit.      Discussed with the patient and all questioned fully answered. He will call me if any problems " arise.

## 2024-09-06 DIAGNOSIS — E11.65 TYPE 2 DIABETES MELLITUS WITH HYPERGLYCEMIA, WITHOUT LONG-TERM CURRENT USE OF INSULIN (HCC): ICD-10-CM

## 2024-09-07 RX ORDER — SITAGLIPTIN AND METFORMIN HYDROCHLORIDE 1000; 50 MG/1; MG/1
1 TABLET, FILM COATED ORAL 2 TIMES DAILY WITH MEALS
Qty: 200 TABLET | Refills: 1 | Status: SHIPPED | OUTPATIENT
Start: 2024-09-07

## 2024-09-25 DIAGNOSIS — E11.65 TYPE 2 DIABETES MELLITUS WITH HYPERGLYCEMIA, WITHOUT LONG-TERM CURRENT USE OF INSULIN (HCC): ICD-10-CM

## 2024-09-25 RX ORDER — PEN NEEDLE, DIABETIC 32GX 5/32"
NEEDLE, DISPOSABLE MISCELLANEOUS DAILY
Qty: 100 EACH | Refills: 1 | Status: SHIPPED | OUTPATIENT
Start: 2024-09-25

## 2024-09-25 RX ORDER — BLOOD-GLUCOSE SENSOR
EACH MISCELLANEOUS
Qty: 6 EACH | Refills: 1 | Status: SHIPPED | OUTPATIENT
Start: 2024-09-25

## 2024-09-27 DIAGNOSIS — E78.2 MIXED HYPERLIPIDEMIA: ICD-10-CM

## 2024-09-27 RX ORDER — PRAVASTATIN SODIUM 80 MG/1
80 TABLET ORAL
Qty: 100 TABLET | Refills: 2 | Status: SHIPPED | OUTPATIENT
Start: 2024-09-27

## 2024-09-29 NOTE — PROGRESS NOTES
"HEMATOLOGY / ONCOLOGY CLINIC FOLLOW UP NOTE    Patient Andres Chacko  MRN: [id number]  : 1955  Date of Encounter 10/1/2024        Reason for Encounter: follow up for ET         1. Essential thrombocytosis    2. JAK2 V617F mutation         Platelets 2024 546 off anagrelide       Last seen 2024     Assessment / Plan:        Tremayne Chacko is a 68-year-old male with essential thrombocytosis with JAK2 V617F mutation.  He was previously following with BENNY Mejia and now presents for transfer of care. He was previously on Hydrea 500 mg once daily did not tolerate due to side effects.  His dose was reduced to once daily on Monday, Wednesday, Friday.  He tolerated this for a longer period of time but then self discontinued due to feeling \"ill \".  Since discontinuing Hydrea he is back to baseline and has no constitutional complaints or concerns.  His platelet count remained greater than 500 K with mildly elevated WBC count.  Due to intolerance of Hydrea he was switched to Anagrelide 1 mg BID in 2023.  He continues ASA 81 mg.  He notes compliance and tolerance with anagrelide.  I encouraged patient to have updated labs to assess treatment response as well as need for dose adjustments.  We reviewed updated guidelines necessitating bone marrow biopsy at the time of diagnosis.          ET           Patient was started on anagrelide 1 mg bid at last appointment in Dec 2023.  He had plt count of 548 11/15/2023 and on anagrelide 370.  He has been off due to \"fatigue\" and is now 497     Restarted anagrelide at 50% dose reduction but plts incresed to 573 from 497.  WBC 11.5 Hgb 13.4 diff 61/28     Dose was increased to 1 mg bid anagrelide and was supposed to have had labs drawn within 2 weeks last visit  2024     Patient stopped anagrelide over one week ago; plts 546 WBC 11.7     Did discuss use of pegylated interferon as well as ruxolitinib      Would  consider ruxolitinib 10 mg daily  as " "would l not tolerate IFN     However, patient on no meds; refusing to take anagrelide, feeling well off any drugs     Plts 552 as of 7/1/2024;  546 May 2024 , 573 off any medication     Will hold on any treatment     He is aware that might have to change     Repeat labs in  monthly but will be seen in 2 months     10/1/2024    Plts 9/30/2024 595; had been 552, 546 and 573     Has not been taking anagrelide    Will restart 1 mg daily which is a 50% reduction and will see if has side effects    Repeat labs in one month; hopefully will remain on drug            Follow up      1 month telehealth                  HPI  12/6/2023     Tremayne Chacko is a 68-year-old male with past medical history significant for type 2 diabetes, CAD, HTN, Parkinson's disease, history of prostate cancer s/p prostatectomy in 2013.  Patient was previously following with BENNY Mejia for longstanding history of thrombocytosis since at least 2016.  He denies any personal history of thrombosis..  Subsequent workup noted JAK2 V6 17F mutation positive and patient was initiated on hydroxyurea which she subsequently stopped due to intolerance.  Patient does not state specific side effects, but notes it made him feel \"ill\".  He was switched to anagrelide 1 mg twice daily since November 2023.  Patient notes compliance with current treatment and denies any adverse effects.  No repeat labs available.  Patient presents for transfer of care/follow-up visit today.  Offers no new complaints.  Denies any chest pain, shortness of breath, abdominal pain, nausea, vomiting, diarrhea.  Denies any fever, night sweats or unexpected weight changes.  He has never had a bone marrow biopsy.     Interval Follow up 3/18/2024     Patient stopped anagrelide one month ago as didn't like how it made him feel with decreased energy.  Labs on anagrelide 1 mg bid as follows:     Labs 12/21/2023       WBC 12.2 Hg 15 plts 370     Labs prior 11/15/2023     WBC 9.4 Hgb 14.2 plts " 548     Labs off anagrelide 3/18/2024       WBC 9.8 Hgb 14.8 plts 497     Has no other complaints     Will restart anagrelide at 50% reduction as did not tolerate hydrea at all      Denies personal or family hx of blood clots          Interval History: 4/22/2024     Mr Chacko is tolerating anagrelide 1 mg daily but plts increased to 573.  He is not any more fatigued than baseline.  He has had a chronic cough since COVID in Sept 2023 but has had some viral issues with increased cough.  He states he has a chronic postnasal drip which is making the cough worse.  He did have one bleeding issue from coughing.  It is nonproductive.  He is seeing his PCP but may need a CT chest and may need ENT involvement if this is a sinus issue.           Interval History:  6/3/2024     Mr Chacko had labs with the following: Plts 546 on the 31 May 2024; also WBC 11.7 Hgb 13.6.  Patient again with issues regarding drug use including fatigue and diarrhea.  He feels there are issues with his Parkinson meds and there may be some DDI but he is also at risk of stroke with increased plt levels.  He continues on aspirin.         Interval History:  7/2/2024     Labs from 7/1/2024 with WBC 11.2 Hgb 14.4 MCV 92 and plts 552; were 546 31 May 2024      Not taking any medications except aspirin refusing anagrelide and will not consider ruxolitinib for now.     No issues                 Latest Reference Range & Units 05/31/24 15:30   WBC 4.31 - 10.16 Thousand/uL 11.69 (H)   RBC 3.88 - 5.62 Million/uL 4.60   Hemoglobin 12.0 - 17.0 g/dL 13.6   Hematocrit 36.5 - 49.3 % 41.8   MCV 82 - 98 fL 91   MCH 26.8 - 34.3 pg 29.6   MCHC 31.4 - 37.4 g/dL 32.5   RDW 11.6 - 15.1 % 13.7   Platelet Count 149 - 390 Thousands/uL 546 (H)   MPV 8.9 - 12.7 fL 9.9   nRBC /100 WBCs 0   Segmented % 43 - 75 % 63   Lymphocytes % 14 - 44 % 27   Monocytes % 4 - 12 % 7   Eosinophils % 0 - 6 % 3   Basophils % 0 - 1 % 0   Immature Grans % 0 - 2 % 0   Absolute Immature Grans 0.00 -  0.20 Thousand/uL 0.04   Absolute Neutrophils 1.85 - 7.62 Thousands/µL 7.25   Absolute Lymphocytes 0.60 - 4.47 Thousands/µL 3.17   Absolute Monocytes 0.17 - 1.22 Thousand/µL 0.80   Absolute Eosinophils 0.00 - 0.61 Thousand/µL 0.38   Absolute Basophils 0.00 - 0.10 Thousands/µL 0.05   (H): Data is abnormally high          Latest Reference Range & Units 07/01/24 12:45   WBC 4.31 - 10.16 Thousand/uL 11.16 (H)   RBC 3.88 - 5.62 Million/uL 4.92   Hemoglobin 12.0 - 17.0 g/dL 14.4   Hematocrit 36.5 - 49.3 % 45.1   MCV 82 - 98 fL 92   MCH 26.8 - 34.3 pg 29.3   MCHC 31.4 - 37.4 g/dL 31.9   RDW 11.6 - 15.1 % 13.9   Platelet Count 149 - 390 Thousands/uL 552 (H)   MPV 8.9 - 12.7 fL 9.9   nRBC /100 WBCs 0   Segmented % 43 - 75 % 64   Lymphocytes % 14 - 44 % 24   Monocytes % 4 - 12 % 8   Eosinophils % 0 - 6 % 3   Basophils % 0 - 1 % 1   Immature Grans % 0 - 2 % 0   Absolute Immature Grans 0.00 - 0.20 Thousand/uL 0.04   Absolute Neutrophils 1.85 - 7.62 Thousands/µL 7.12   Absolute Lymphocytes 0.60 - 4.47 Thousands/µL 2.72   Absolute Monocytes 0.17 - 1.22 Thousand/µL 0.84   Absolute Eosinophils 0.00 - 0.61 Thousand/µL 0.37   Absolute Basophils 0.00 - 0.10 Thousands/µL 0.07   (H): Data is abnormally high        Interval History:  10/1/2024    Has been doing well with no issues.  Has no headaches, visual changes, bruising, bleeding.  No other issues    Labs  9/30/2024    Na 135 K 4.6 Cr 1.17 ALT/AST 7/19 TB 0.42  WBC 9.95 Hgb 15.3 MCV 90 plts 595             REVIEW OF SYSTEMS:  As above   Please note that a 14-point review of systems was performed to include Constitutional, HEENT, Respiratory, CVS, GI, , Musculoskeletal, Integumentary, Neurologic, Rheumatologic, Endocrinologic, Psychiatric, Lymphatic, and Hematologic/Oncologic systems were reviewed and are negative unless otherwise stated in HPI. Positive and negative findings pertinent to this evaluation are incorporated into the history of present illness.      ECOG PS:  0    PROBLEM LIST:  Patient Active Problem List   Diagnosis    Arteriosclerosis of coronary artery    Benign essential hypertension    Depression    Type 2 diabetes mellitus without complication, without long-term current use of insulin (HCC)    Subclinical hypothyroidism    Essential (hemorrhagic) thrombocythemia (HCC)    Mixed hyperlipidemia    Leukocytosis    Other male erectile dysfunction    Parkinson disease    Type 2 diabetes mellitus with hyperglycemia, without long-term current use of insulin (HCC)    Dystonia    History of stroke    Hyperkalemia    History of prostate cancer    Pipe smoker    Anxiety    Overweight       Past Medical History:   has a past medical history of Anxiety, Arteriosclerosis of coronary artery (05/24/2017), Arthritis (1999), Benign essential hypertension (05/15/2015), Cancer (HCC) (2012), Depression (06/01/2012), Diabetes mellitus (HCC), Essential (hemorrhagic) thrombocythemia ([facility]), History of prostate cancer (01/05/2023), History of stroke, Hypertension, Left carpal tunnel syndrome, Lumbar canal stenosis, Mixed hyperlipidemia (11/10/2010), Osteoarthritis, knee, Other male erectile dysfunction (08/08/2019), Overweight, Parkinson disease (10/08/2019), Pipe smoker, Retinal and vitreous disorder, Stroke ([facility]) (2019), Subclinical hypothyroidism (04/11/2017), and Type 2 diabetes mellitus without complication, without long-term current use of insulin ([facility]) (08/24/2017).    PAST SURGICAL HISTORY:   has a past surgical history that includes Prostatectomy (09/23/2013); Ankle fracture surgery (Left, 04/2009); pr colonoscopy flx dx w/collj spec when pfrmd (N/A, 01/27/2016); Prostate biopsy (12/08/2012); Lumbar laminectomy (2003); Deep brain stimulator placement (Bilateral, 09/2022); Nasal septum surgery (06/1989); IR biopsy bone marrow (12/21/2023); and Fracture surgery (4/15/2009).    CURRENT MEDICATIONS  Current Outpatient Medications   Medication Sig Dispense Refill    aspirin 325 mg  tablet Take 325 mg by mouth daily      BD Pen Needle Yara 2nd Gen 32G X 4 MM MISC USE AS DIRECTED DAILY 100 each 1    carbidopa-levodopa (SINEMET)  mg per tablet Take 2 tablets by mouth 3 (three) times a day Follow clinic instructions (Patient taking differently: Take 1 tablet by mouth in the morning Follow clinic instructions.  Rx per Neuro.) 540 tablet 3    Cholecalciferol (VITAMIN D) 2000 units CAPS Take 2 capsules (4,000 Units total) by mouth daily  0    co-enzyme Q-10 30 MG capsule Take 1 capsule (30 mg total) by mouth daily 30 capsule 0    Continuous Blood Gluc  (FreeStyle Luisa 3 Tell) ARMANDO once 1 each 0    Continuous Glucose Sensor (FreeStyle Luisa 3 Sensor) MISC CHANGE EVERY 14 DAYS 6 each 1    ezetimibe (ZETIA) 10 mg tablet TAKE 1 TABLET BY MOUTH EVERY DAY 90 tablet 1    glucose blood (ONETOUCH VERIO) test strip Check BG 2x per day (Patient taking differently: Check BG 2x per day.  Rx per Endo.) 200 each 3    Icosapent Ethyl (Vascepa) 1 g CAPS Take 2 capsules (2 g total) by mouth 2 (two) times a day 180 capsule 2    insulin degludec (Tresiba FlexTouch) 200 units/mL CONCENTRATED U-200 injection pen 20 units daily 9 mL 1    lisinopril (ZESTRIL) 20 mg tablet TAKE 1 TABLET BY MOUTH DAILY 100 tablet 1    mometasone (ELOCON) 0.1 % cream Apply topically 2 (two) times a day as needed (Rash) 15 g 0    Multiple Vitamin (multivitamin) capsule Take 1 capsule by mouth daily      pravastatin (PRAVACHOL) 80 mg tablet TAKE 1 TABLET BY MOUTH DAILY AT  BEDTIME 100 tablet 2    sitaGLIPtin-metFORMIN (Janumet)  MG per tablet TAKE 1 TABLET BY MOUTH TWICE  DAILY WITH MEALS 200 tablet 1    venlafaxine (EFFEXOR-XR) 37.5 mg 24 hr capsule TAKE 1 CAPSULE BY MOUTH DAILY  WITH 75 MG FOR A TOTAL .5  MG DAILY 90 capsule 1    venlafaxine (EFFEXOR-XR) 75 mg 24 hr capsule Take with 37.5mg for a total of 112.5mg daily. 90 capsule 1     No current facility-administered medications for this visit.      @FRM Study Course@    SOCIAL HISTORY:   reports that he has been smoking pipe. He started smoking about 29 years ago. He has been exposed to tobacco smoke. He has never used smokeless tobacco. He reports current alcohol use of about 4.0 standard drinks of alcohol per week. He reports that he does not use drugs.     FAMILY HISTORY:  family history includes Coronary artery disease (age of onset: 59) in his father; Diabetes in his father; Diabetes type II in his father; Heart attack (age of onset: 59) in his father; Heart disease in his father; Hypertension in his mother; No Known Problems in his son and son; Osteoporosis in his mother; Retinal detachment in his mother; Retinitis pigmentosa in his mother; Vision loss in his mother.     ALLERGIES:  has No Known Allergies.      Physical Exam:  Vital Signs:   Visit Vitals  Smoking Status Some Days     There is no height or weight on file to calculate BMI.  There is no height or weight on file to calculate BSA.    GEN: Alert, awake oriented x3, in no acute distress  HEENT- No pallor, icterus, cyanosis, no oral mucosal lesions,   LAD - no palpable cervical, clavicle, axillary, inguinal LAD  Heart- normal S1 S2, regular rate and rhythm, No murmur, rubs.   Lungs- clear breathing sound bilateral.   Abdomen- soft, Non tender, bowel sounds present  Extremities- No cyanosis, clubbing, edema  Neuro- No focal neurological deficit    Labs:  Lab Results   Component Value Date    WBC 11.16 (H) 07/01/2024    HGB 14.4 07/01/2024    HCT 45.1 07/01/2024    MCV 92 07/01/2024     (H) 07/01/2024     Lab Results   Component Value Date     12/17/2015    SODIUM 135 07/17/2024    K 5.1 07/17/2024     07/17/2024    CO2 29 07/17/2024    ANIONGAP 8 12/17/2015    AGAP 6 07/17/2024    BUN 15 07/17/2024    CREATININE 1.16 07/17/2024    GLUC 133 04/18/2024    GLUF 117 (H) 07/17/2024    CALCIUM 9.2 07/17/2024    AST 20 07/17/2024    ALT 21 07/17/2024    ALKPHOS 32 (L) 07/17/2024    PROT 6.8  12/17/2015    TP 7.2 07/17/2024    BILITOT 0.47 12/17/2015    TBILI 0.43 07/17/2024    EGFR 64 07/17/2024           I spent 30 minutes on chart review, face to face counseling time, coordination of care and documentation.    Mariajose Vaca MD PhD

## 2024-09-30 ENCOUNTER — APPOINTMENT (OUTPATIENT)
Dept: LAB | Facility: CLINIC | Age: 69
End: 2024-09-30
Payer: COMMERCIAL

## 2024-09-30 DIAGNOSIS — E11.9 TYPE 2 DIABETES MELLITUS WITHOUT COMPLICATION, WITHOUT LONG-TERM CURRENT USE OF INSULIN (HCC): ICD-10-CM

## 2024-09-30 DIAGNOSIS — D47.3 ESSENTIAL THROMBOCYTOSIS (HCC): ICD-10-CM

## 2024-09-30 LAB
ALBUMIN SERPL BCG-MCNC: 4.5 G/DL (ref 3.5–5)
ALP SERPL-CCNC: 34 U/L (ref 34–104)
ALT SERPL W P-5'-P-CCNC: 7 U/L (ref 7–52)
ANION GAP SERPL CALCULATED.3IONS-SCNC: 8 MMOL/L (ref 4–13)
AST SERPL W P-5'-P-CCNC: 19 U/L (ref 13–39)
BASOPHILS # BLD AUTO: 0.07 THOUSANDS/ÂΜL (ref 0–0.1)
BASOPHILS NFR BLD AUTO: 1 % (ref 0–1)
BILIRUB SERPL-MCNC: 0.42 MG/DL (ref 0.2–1)
BUN SERPL-MCNC: 17 MG/DL (ref 5–25)
CALCIUM SERPL-MCNC: 9.6 MG/DL (ref 8.4–10.2)
CHLORIDE SERPL-SCNC: 102 MMOL/L (ref 96–108)
CO2 SERPL-SCNC: 25 MMOL/L (ref 21–32)
CREAT SERPL-MCNC: 1.17 MG/DL (ref 0.6–1.3)
EOSINOPHIL # BLD AUTO: 0.29 THOUSAND/ÂΜL (ref 0–0.61)
EOSINOPHIL NFR BLD AUTO: 3 % (ref 0–6)
ERYTHROCYTE [DISTWIDTH] IN BLOOD BY AUTOMATED COUNT: 14.3 % (ref 11.6–15.1)
GFR SERPL CREATININE-BSD FRML MDRD: 63 ML/MIN/1.73SQ M
GLUCOSE SERPL-MCNC: 180 MG/DL (ref 65–140)
HCT VFR BLD AUTO: 47.8 % (ref 36.5–49.3)
HGB BLD-MCNC: 15.3 G/DL (ref 12–17)
IMM GRANULOCYTES # BLD AUTO: 0.03 THOUSAND/UL (ref 0–0.2)
IMM GRANULOCYTES NFR BLD AUTO: 0 % (ref 0–2)
LDH SERPL-CCNC: 142 U/L (ref 140–271)
LYMPHOCYTES # BLD AUTO: 2.52 THOUSANDS/ÂΜL (ref 0.6–4.47)
LYMPHOCYTES NFR BLD AUTO: 26 % (ref 14–44)
MCH RBC QN AUTO: 28.9 PG (ref 26.8–34.3)
MCHC RBC AUTO-ENTMCNC: 32 G/DL (ref 31.4–37.4)
MCV RBC AUTO: 90 FL (ref 82–98)
MONOCYTES # BLD AUTO: 0.71 THOUSAND/ÂΜL (ref 0.17–1.22)
MONOCYTES NFR BLD AUTO: 7 % (ref 4–12)
NEUTROPHILS # BLD AUTO: 6.23 THOUSANDS/ÂΜL (ref 1.85–7.62)
NEUTS SEG NFR BLD AUTO: 63 % (ref 43–75)
NRBC BLD AUTO-RTO: 0 /100 WBCS
PLATELET # BLD AUTO: 595 THOUSANDS/UL (ref 149–390)
PMV BLD AUTO: 10 FL (ref 8.9–12.7)
POTASSIUM SERPL-SCNC: 4.6 MMOL/L (ref 3.5–5.3)
PROT SERPL-MCNC: 7.3 G/DL (ref 6.4–8.4)
RBC # BLD AUTO: 5.29 MILLION/UL (ref 3.88–5.62)
RETICS # AUTO: NORMAL 10*3/UL (ref 14356–105094)
RETICS # CALC: 1.47 % (ref 0.37–1.87)
SODIUM SERPL-SCNC: 135 MMOL/L (ref 135–147)
WBC # BLD AUTO: 9.85 THOUSAND/UL (ref 4.31–10.16)

## 2024-09-30 PROCEDURE — 85045 AUTOMATED RETICULOCYTE COUNT: CPT

## 2024-09-30 PROCEDURE — 83615 LACTATE (LD) (LDH) ENZYME: CPT

## 2024-10-01 ENCOUNTER — OFFICE VISIT (OUTPATIENT)
Dept: HEMATOLOGY ONCOLOGY | Facility: CLINIC | Age: 69
End: 2024-10-01
Payer: COMMERCIAL

## 2024-10-01 VITALS
WEIGHT: 189 LBS | HEART RATE: 92 BPM | SYSTOLIC BLOOD PRESSURE: 128 MMHG | HEIGHT: 69 IN | OXYGEN SATURATION: 97 % | DIASTOLIC BLOOD PRESSURE: 78 MMHG | RESPIRATION RATE: 17 BRPM | TEMPERATURE: 97.7 F | BODY MASS INDEX: 27.99 KG/M2

## 2024-10-01 DIAGNOSIS — D47.3 ESSENTIAL THROMBOCYTOSIS (HCC): Primary | ICD-10-CM

## 2024-10-01 PROCEDURE — 99214 OFFICE O/P EST MOD 30 MIN: CPT | Performed by: INTERNAL MEDICINE

## 2024-10-26 NOTE — PROGRESS NOTES
"HEMATOLOGY / ONCOLOGY CLINIC FOLLOW UP NOTE    Patient Andres Chacko  MRN: [id number]  : 1955  Date of Encounter 2024          Virtual Regular Visit  Name: Andres Chacko      : 1955      MRN: [id number]  Encounter Provider: Mariajose Vaca MD  Encounter Date: 2024   Encounter department: [facility]ATOLOG[facility]    Verification of patient location:    Patient is located at Home in the following state in which I hold an active license [place]    Assessment & Plan  Essential thrombocytosis (HCC)    Orders:    CBC and differential; Future          Encounter provider Mariajose Vaca MD    The patient was identified by name and date of birth. Andres Chacko was informed that this is a telemedicine visit and that the visit is being conducted through the Epic Embedded platform. He agrees to proceed..  My office door was closed. No one else was in the room.  He acknowledged consent and understanding of privacy and security of the video platform. The patient has agreed to participate and understands they can discontinue the visit at any time.    Patient is aware this is a billable service.     Visit Time  Total Visit Duration: 20        Reason for Encounter: follow up for ET         1. Essential thrombocytosis    2. JAK2 V617F mutation         Platelets 2024 546 off anagrelide     Platelets on anagrelide x 1 month 545 (10/31/2024) stopped medication last week due to toxicity/was on 50% lower dose            Assessment / Plan:        Tremayne Chacko is a 68-year-old male with essential thrombocytosis with JAK2 V617F mutation.  He was previously following with BENNY Mejia and now presents for transfer of care. He was previously on Hydrea 500 mg once daily did not tolerate due to side effects.  His dose was reduced to once daily on Monday, Wednesday, Friday.  He tolerated this for a longer period of time but then self discontinued due to feeling \"ill " "\".  Since discontinuing Hydrea he is back to baseline and has no constitutional complaints or concerns.  His platelet count remained greater than 500 K with mildly elevated WBC count.  Due to intolerance of Hydrea he was switched to Anagrelide 1 mg BID in November 2023.  He continues ASA 81 mg.  He notes compliance and tolerance with anagrelide.  I encouraged patient to have updated labs to assess treatment response as well as need for dose adjustments.  We reviewed updated guidelines necessitating bone marrow biopsy at the time of diagnosis.          ET           Patient was started on anagrelide 1 mg bid at last appointment in Dec 2023.  He had plt count of 548 11/15/2023 and on anagrelide 370.  He has been off due to \"fatigue\" and is now 497     Restarted anagrelide at 50% dose reduction but plts incresed to 573 from 497.  WBC 11.5 Hgb 13.4 diff 61/28     Dose was increased to 1 mg bid anagrelide and was supposed to have had labs drawn within 2 weeks last visit  April 2024     Patient stopped anagrelide over one week ago; plts 546 WBC 11.7     Did discuss use of pegylated interferon as well as ruxolitinib      Would  consider ruxolitinib 10 mg daily  as would l not tolerate IFN     However, patient on no meds; refusing to take anagrelide, feeling well off any drugs     Plts 552 as of 7/1/2024;  546 May 2024 , 573 off any medication     Will hold on any treatment     He is aware that might have to change     Repeat labs in  monthly but will be seen in 2 months     11/1/2024    Restarted 50% dose reduction one month ago; stopped 1 week ago as side effects/making Parkinson drugs less effective    Labs 10/31/2024 with WBC 8.9 Hgb 14.4 and plts 545; were 595 9/30/2024    Will remain off anagrelide, repeat labs in 1 month    Again discussed Jakafi as will not take hydrea, not tolerating anagrelide on multiple trials and cannot get IFN     Remain on aspirin     Labs one month            Follow up      1 month telehealth " "            HPI  12/6/2023     Tremayne Chacko is a 68-year-old male with past medical history significant for type 2 diabetes, CAD, HTN, Parkinson's disease, history of prostate cancer s/p prostatectomy in 2013.  Patient was previously following with BENNY Mejia for longstanding history of thrombocytosis since at least 2016.  He denies any personal history of thrombosis..  Subsequent workup noted JAK2 V6 17F mutation positive and patient was initiated on hydroxyurea which she subsequently stopped due to intolerance.  Patient does not state specific side effects, but notes it made him feel \"ill\".  He was switched to anagrelide 1 mg twice daily since November 2023.  Patient notes compliance with current treatment and denies any adverse effects.  No repeat labs available.  Patient presents for transfer of care/follow-up visit today.  Offers no new complaints.  Denies any chest pain, shortness of breath, abdominal pain, nausea, vomiting, diarrhea.  Denies any fever, night sweats or unexpected weight changes.  He has never had a bone marrow biopsy.     Interval Follow up 3/18/2024     Patient stopped anagrelide one month ago as didn't like how it made him feel with decreased energy.  Labs on anagrelide 1 mg bid as follows:     Labs 12/21/2023       WBC 12.2 Hg 15 plts 370     Labs prior 11/15/2023     WBC 9.4 Hgb 14.2 plts 548     Labs off anagrelide 3/18/2024       WBC 9.8 Hgb 14.8 plts 497     Has no other complaints     Will restart anagrelide at 50% reduction as did not tolerate hydrea at all      Denies personal or family hx of blood clots        Interval History: 4/22/2024     Mr Chacko is tolerating anagrelide 1 mg daily but plts increased to 573.  He is not any more fatigued than baseline.  He has had a chronic cough since COVID in Sept 2023 but has had some viral issues with increased cough.  He states he has a chronic postnasal drip which is making the cough worse.  He did have one bleeding issue from " coughing.  It is nonproductive.  He is seeing his PCP but may need a CT chest and may need ENT involvement if this is a sinus issue.           Interval History:  6/3/2024     Mr Chacko had labs with the following: Plts 546 on the 31 May 2024; also WBC 11.7 Hgb 13.6.  Patient again with issues regarding drug use including fatigue and diarrhea.  He feels there are issues with his Parkinson meds and there may be some DDI but he is also at risk of stroke with increased plt levels.  He continues on aspirin.         Interval History:  7/2/2024     Labs from 7/1/2024 with WBC 11.2 Hgb 14.4 MCV 92 and plts 552; were 546 31 May 2024      Not taking any medications except aspirin refusing anagrelide and will not consider ruxolitinib for now.     No issues                 Latest Reference Range & Units 05/31/24 15:30   WBC 4.31 - 10.16 Thousand/uL 11.69 (H)   RBC 3.88 - 5.62 Million/uL 4.60   Hemoglobin 12.0 - 17.0 g/dL 13.6   Hematocrit 36.5 - 49.3 % 41.8   MCV 82 - 98 fL 91   MCH 26.8 - 34.3 pg 29.6   MCHC 31.4 - 37.4 g/dL 32.5   RDW 11.6 - 15.1 % 13.7   Platelet Count 149 - 390 Thousands/uL 546 (H)   MPV 8.9 - 12.7 fL 9.9   nRBC /100 WBCs 0   Segmented % 43 - 75 % 63   Lymphocytes % 14 - 44 % 27   Monocytes % 4 - 12 % 7   Eosinophils % 0 - 6 % 3   Basophils % 0 - 1 % 0   Immature Grans % 0 - 2 % 0   Absolute Immature Grans 0.00 - 0.20 Thousand/uL 0.04   Absolute Neutrophils 1.85 - 7.62 Thousands/µL 7.25   Absolute Lymphocytes 0.60 - 4.47 Thousands/µL 3.17   Absolute Monocytes 0.17 - 1.22 Thousand/µL 0.80   Absolute Eosinophils 0.00 - 0.61 Thousand/µL 0.38   Absolute Basophils 0.00 - 0.10 Thousands/µL 0.05   (H): Data is abnormally high          Latest Reference Range & Units 07/01/24 12:45   WBC 4.31 - 10.16 Thousand/uL 11.16 (H)   RBC 3.88 - 5.62 Million/uL 4.92   Hemoglobin 12.0 - 17.0 g/dL 14.4   Hematocrit 36.5 - 49.3 % 45.1   MCV 82 - 98 fL 92   MCH 26.8 - 34.3 pg 29.3   MCHC 31.4 - 37.4 g/dL 31.9   RDW 11.6 - 15.1 %  13.9   Platelet Count 149 - 390 Thousands/uL 552 (H)   MPV 8.9 - 12.7 fL 9.9   nRBC /100 WBCs 0   Segmented % 43 - 75 % 64   Lymphocytes % 14 - 44 % 24   Monocytes % 4 - 12 % 8   Eosinophils % 0 - 6 % 3   Basophils % 0 - 1 % 1   Immature Grans % 0 - 2 % 0   Absolute Immature Grans 0.00 - 0.20 Thousand/uL 0.04   Absolute Neutrophils 1.85 - 7.62 Thousands/µL 7.12   Absolute Lymphocytes 0.60 - 4.47 Thousands/µL 2.72   Absolute Monocytes 0.17 - 1.22 Thousand/µL 0.84   Absolute Eosinophils 0.00 - 0.61 Thousand/µL 0.37   Absolute Basophils 0.00 - 0.10 Thousands/µL 0.07   (H): Data is abnormally high           Interval History: 11/1/2024    Doing poorly again with 50% dose reduced anagrelide which he has been on past month.  Stopped one week ago    Platelets were 545 on 31 Oct; had been 595 on 9/30/2024 and 552 in July 2024        REVIEW OF SYSTEMS: as above   Please note that a 14-point review of systems was performed to include Constitutional, HEENT, Respiratory, CVS, GI, , Musculoskeletal, Integumentary, Neurologic, Rheumatologic, Endocrinologic, Psychiatric, Lymphatic, and Hematologic/Oncologic systems were reviewed and are negative unless otherwise stated in HPI. Positive and negative findings pertinent to this evaluation are incorporated into the history of present illness.      ECOG PS: 1    PROBLEM LIST:  Patient Active Problem List   Diagnosis    Arteriosclerosis of coronary artery    Benign essential hypertension    Depression    Type 2 diabetes mellitus without complication, without long-term current use of insulin (HCC)    Subclinical hypothyroidism    Essential (hemorrhagic) thrombocythemia (HCC)    Mixed hyperlipidemia    Leukocytosis    Other male erectile dysfunction    Parkinson disease (HCC)    Type 2 diabetes mellitus with hyperglycemia, without long-term current use of insulin (HCC)    Dystonia    History of stroke    Hyperkalemia    History of prostate cancer    Pipe smoker    Anxiety    Overweight        Past Medical History:   has a past medical history of Anxiety, Arteriosclerosis of coronary artery (05/24/2017), Arthritis (1999), Benign essential hypertension (05/15/2015), Cancer ([facility]) (2012), Depression (06/01/2012), Diabetes mellitus ([facility]), Essential (hemorrhagic) thrombocythemia ([facility]), History of prostate cancer (01/05/2023), History of stroke, Hypertension, Left carpal tunnel syndrome, Lumbar canal stenosis, Mixed hyperlipidemia (11/10/2010), Osteoarthritis, knee, Other male erectile dysfunction (08/08/2019), Overweight, Parkinson disease ([facility]) (10/08/2019), Pipe smoker, Retinal and vitreous disorder, Stroke ([facility]) (2019), Subclinical hypothyroidism (04/11/2017), and Type 2 diabetes mellitus without complication, without long-term current use of insulin ([facility]) (08/24/2017).    PAST SURGICAL HISTORY:   has a past surgical history that includes Prostatectomy (09/23/2013); Ankle fracture surgery (Left, 04/2009); pr colonoscopy flx dx w/collj spec when pfrmd (N/A, 01/27/2016); Prostate biopsy (12/08/2012); Lumbar laminectomy (2003); Deep brain stimulator placement (Bilateral, 09/2022); Nasal septum surgery (06/1989); IR biopsy bone marrow (12/21/2023); and Fracture surgery (4/15/2009).    CURRENT MEDICATIONS  Current Outpatient Medications   Medication Sig Dispense Refill    aspirin 325 mg tablet Take 325 mg by mouth daily      BD Pen Needle Yara 2nd Gen 32G X 4 MM MISC USE AS DIRECTED DAILY 100 each 1    carbidopa-levodopa (SINEMET)  mg per tablet Take 2 tablets by mouth 3 (three) times a day Follow clinic instructions (Patient taking differently: Take 1 tablet by mouth in the morning Follow clinic instructions.  Rx per Neuro.) 540 tablet 3    Cholecalciferol (VITAMIN D) 2000 units CAPS Take 2 capsules (4,000 Units total) by mouth daily  0    co-enzyme Q-10 30 MG capsule Take 1 capsule (30 mg total) by mouth daily 30 capsule 0    Continuous Blood Gluc  (FreeStyle Luisa 3 [place]) ARMANDO once 1  each 0    Continuous Glucose Sensor (FreeStyle Luisa 3 Sensor) MISC CHANGE EVERY 14 DAYS 6 each 1    ezetimibe (ZETIA) 10 mg tablet TAKE 1 TABLET BY MOUTH EVERY DAY 90 tablet 1    glucose blood (ONETOUCH VERIO) test strip Check BG 2x per day (Patient taking differently: Check BG 2x per day.  Rx per Endo.) 200 each 3    Icosapent Ethyl (Vascepa) 1 g CAPS Take 2 capsules (2 g total) by mouth 2 (two) times a day 180 capsule 2    insulin degludec (Tresiba FlexTouch) 200 units/mL CONCENTRATED U-200 injection pen 20 units daily 9 mL 1    lisinopril (ZESTRIL) 20 mg tablet TAKE 1 TABLET BY MOUTH DAILY 100 tablet 1    mometasone (ELOCON) 0.1 % cream Apply topically 2 (two) times a day as needed (Rash) 15 g 0    Multiple Vitamin (multivitamin) capsule Take 1 capsule by mouth daily      pravastatin (PRAVACHOL) 80 mg tablet TAKE 1 TABLET BY MOUTH DAILY AT  BEDTIME 100 tablet 2    sitaGLIPtin-metFORMIN (Janumet)  MG per tablet TAKE 1 TABLET BY MOUTH TWICE  DAILY WITH MEALS 200 tablet 1    venlafaxine (EFFEXOR-XR) 37.5 mg 24 hr capsule TAKE 1 CAPSULE BY MOUTH DAILY  WITH 75 MG FOR A TOTAL .5  MG DAILY 90 capsule 1    venlafaxine (EFFEXOR-XR) 75 mg 24 hr capsule Take with 37.5mg for a total of 112.5mg daily. 90 capsule 1     No current facility-administered medications for this visit.     [unfilled]    SOCIAL HISTORY:   reports that he has been smoking pipe. He started smoking about 29 years ago. He has been exposed to tobacco smoke. He has never used smokeless tobacco. He reports current alcohol use of about 4.0 standard drinks of alcohol per week. He reports that he does not use drugs.     FAMILY HISTORY:  family history includes Coronary artery disease (age of onset: 59) in his father; Diabetes in his father; Diabetes type II in his father; Heart attack (age of onset: 59) in his father; Heart disease in his father; Hypertension in his mother; No Known Problems in his son and son; Osteoporosis in his mother; Retinal  detachment in his mother; Retinitis pigmentosa in his mother; Vision loss in his mother.     ALLERGIES:  has No Known Allergies.      Physical Exam: no exam telehealth   Labs:  Lab Results   Component Value Date    WBC 9.85 09/30/2024    HGB 15.3 09/30/2024    HCT 47.8 09/30/2024    MCV 90 09/30/2024     (H) 09/30/2024     Lab Results   Component Value Date     12/17/2015    SODIUM 135 09/30/2024    K 4.6 09/30/2024     09/30/2024    CO2 25 09/30/2024    ANIONGAP 8 12/17/2015    AGAP 8 09/30/2024    BUN 17 09/30/2024    CREATININE 1.17 09/30/2024    GLUC 180 (H) 09/30/2024    GLUF 117 (H) 07/17/2024    CALCIUM 9.6 09/30/2024    AST 19 09/30/2024    ALT 7 09/30/2024    ALKPHOS 34 09/30/2024    PROT 6.8 12/17/2015    TP 7.3 09/30/2024    BILITOT 0.47 12/17/2015    TBILI 0.42 09/30/2024    EGFR 63 09/30/2024           I spent 20 minutes on chart review, face to face counseling time, coordination of care and documentation.    Mariajose Vaca MD PhD

## 2024-10-31 ENCOUNTER — APPOINTMENT (OUTPATIENT)
Dept: LAB | Facility: CLINIC | Age: 69
End: 2024-10-31
Payer: COMMERCIAL

## 2024-10-31 DIAGNOSIS — D47.3 ESSENTIAL THROMBOCYTOSIS (HCC): ICD-10-CM

## 2024-10-31 LAB
BASOPHILS # BLD AUTO: 0.05 THOUSANDS/ΜL (ref 0–0.1)
BASOPHILS NFR BLD AUTO: 1 % (ref 0–1)
EOSINOPHIL # BLD AUTO: 0.32 THOUSAND/ΜL (ref 0–0.61)
EOSINOPHIL NFR BLD AUTO: 4 % (ref 0–6)
ERYTHROCYTE [DISTWIDTH] IN BLOOD BY AUTOMATED COUNT: 14.1 % (ref 11.6–15.1)
EST. AVERAGE GLUCOSE BLD GHB EST-MCNC: 169 MG/DL
HBA1C MFR BLD: 7.5 %
HCT VFR BLD AUTO: 44.2 % (ref 36.5–49.3)
HGB BLD-MCNC: 14.4 G/DL (ref 12–17)
IMM GRANULOCYTES # BLD AUTO: 0.03 THOUSAND/UL (ref 0–0.2)
IMM GRANULOCYTES NFR BLD AUTO: 0 % (ref 0–2)
LYMPHOCYTES # BLD AUTO: 2.16 THOUSANDS/ΜL (ref 0.6–4.47)
LYMPHOCYTES NFR BLD AUTO: 24 % (ref 14–44)
MCH RBC QN AUTO: 29.2 PG (ref 26.8–34.3)
MCHC RBC AUTO-ENTMCNC: 32.6 G/DL (ref 31.4–37.4)
MCV RBC AUTO: 90 FL (ref 82–98)
MONOCYTES # BLD AUTO: 1.04 THOUSAND/ΜL (ref 0.17–1.22)
MONOCYTES NFR BLD AUTO: 12 % (ref 4–12)
NEUTROPHILS # BLD AUTO: 5.26 THOUSANDS/ΜL (ref 1.85–7.62)
NEUTS SEG NFR BLD AUTO: 59 % (ref 43–75)
NRBC BLD AUTO-RTO: 0 /100 WBCS
PLATELET # BLD AUTO: 545 THOUSANDS/UL (ref 149–390)
PMV BLD AUTO: 10.4 FL (ref 8.9–12.7)
RBC # BLD AUTO: 4.93 MILLION/UL (ref 3.88–5.62)
WBC # BLD AUTO: 8.86 THOUSAND/UL (ref 4.31–10.16)

## 2024-10-31 PROCEDURE — 85025 COMPLETE CBC W/AUTO DIFF WBC: CPT

## 2024-10-31 PROCEDURE — 83036 HEMOGLOBIN GLYCOSYLATED A1C: CPT

## 2024-11-01 ENCOUNTER — TELEPHONE (OUTPATIENT)
Age: 69
End: 2024-11-01

## 2024-11-01 ENCOUNTER — TELEMEDICINE (OUTPATIENT)
Age: 69
End: 2024-11-01
Payer: COMMERCIAL

## 2024-11-01 DIAGNOSIS — D47.3 ESSENTIAL THROMBOCYTOSIS (HCC): Primary | ICD-10-CM

## 2024-11-01 PROCEDURE — 99213 OFFICE O/P EST LOW 20 MIN: CPT | Performed by: INTERNAL MEDICINE

## 2024-11-27 ENCOUNTER — OFFICE VISIT (OUTPATIENT)
Dept: ENDOCRINOLOGY | Facility: CLINIC | Age: 69
End: 2024-11-27
Payer: COMMERCIAL

## 2024-11-27 VITALS
BODY MASS INDEX: 29.27 KG/M2 | SYSTOLIC BLOOD PRESSURE: 162 MMHG | DIASTOLIC BLOOD PRESSURE: 90 MMHG | HEIGHT: 69 IN | WEIGHT: 197.6 LBS | HEART RATE: 76 BPM

## 2024-11-27 DIAGNOSIS — E03.8 SUBCLINICAL HYPOTHYROIDISM: ICD-10-CM

## 2024-11-27 DIAGNOSIS — E78.2 MIXED HYPERLIPIDEMIA: ICD-10-CM

## 2024-11-27 DIAGNOSIS — I10 BENIGN ESSENTIAL HYPERTENSION: ICD-10-CM

## 2024-11-27 DIAGNOSIS — E11.65 TYPE 2 DIABETES MELLITUS WITH HYPERGLYCEMIA, WITHOUT LONG-TERM CURRENT USE OF INSULIN (HCC): Primary | ICD-10-CM

## 2024-11-27 PROCEDURE — 99214 OFFICE O/P EST MOD 30 MIN: CPT

## 2024-11-27 PROCEDURE — G2211 COMPLEX E/M VISIT ADD ON: HCPCS

## 2024-11-27 RX ORDER — REPAGLINIDE 0.5 MG/1
0.5 TABLET ORAL
Qty: 60 TABLET | Refills: 2 | Status: SHIPPED | OUTPATIENT
Start: 2024-11-27

## 2024-11-27 RX ORDER — INSULIN DEGLUDEC 200 U/ML
INJECTION, SOLUTION SUBCUTANEOUS
Qty: 9 ML | Refills: 1 | Status: SHIPPED | OUTPATIENT
Start: 2024-11-27

## 2024-11-27 NOTE — PROGRESS NOTES
Established Patient Progress Note    Chief Complaint   Patient presents with    Diabetes Type 2    Hypothyroidism       Impression & Plan:    Assessment & Plan  Benign essential hypertension  Elevated in office on 2 occasions.  Advised patient to obtain blood pressure cuff and monitor blood pressure at home  Goal parameters provided in AVS  If outside of parameters, would advise he follow-up with PCP       Type 2 diabetes mellitus with hyperglycemia, without long-term current use of insulin (HCC)  Orally controlled but continues to improve from prior.  Blood glucose data is limited today as he recently restarted wearing his delfino.  For now he reports highest blood sugars after meals therefore will start Prandin 0.5 mg tablet prior to lunch and dinner.  Reduce Tresiba to 18 units.  If he has increased hypoglycemia, advise he reduce further to 15 units and notify the office  Advise he confirm lows with blood sugar meter as he is asymptomatic at these times  Advise he continue to monitor diet      Lab Results   Component Value Date    HGBA1C 7.5 (H) 10/31/2024       Orders:    repaglinide (PRANDIN) 0.5 mg tablet; Take 1 tablet (0.5 mg total) by mouth 2 (two) times a day before meals    insulin degludec (Tresiba FlexTouch) 200 units/mL CONCENTRATED U-200 injection pen; 18 units daily    Hemoglobin A1C; Future    Lipid panel; Future    Subclinical hypothyroidism  TSH/T4 normal       Mixed hyperlipidemia  Continue with statin and repeat prior to next appointment         Orders Placed This Encounter   Procedures    Hemoglobin A1C     Standing Status:   Future     Expected Date:   2/27/2025     Expiration Date:   11/27/2025    Lipid panel     This is a patient instruction: This test requires patient fasting for 10-12 hours or longer. Drinking of black coffee or black tea is acceptable.     Standing Status:   Future     Expected Date:   2/27/2025     Expiration Date:   11/27/2025       History of Present Illness:   Andres BAZAN  Ricco is a 68 y.o. male with a history of type 2 diabetes, hyperlipidemia, hypertension. Complications: stroke. Last A1C was 7.5. Home glucose monitoring: CGM.  Currently restarted using delfino 2 days ago.  Reports giving himself a break because he believes the needle entered his muscle and caused him discomfort.  Reports intermittent episodes of hypoglycemia in 60s overnight but reports he does sleep on his sensor so these may be false.    Current regimen:   Janumet 2 tabs daily  Tresiba 20 units daily      Last Foot Exam: UTD  Last Eye Exam: UTD-report requested     ACE/ARB: lisinopril  Has hyperlipidemia: Taking pravastatin          Patient Active Problem List   Diagnosis    Arteriosclerosis of coronary artery    Benign essential hypertension    Depression    Type 2 diabetes mellitus without complication, without long-term current use of insulin (HCC)    Subclinical hypothyroidism    Essential (hemorrhagic) thrombocythemia (HCC)    Mixed hyperlipidemia    Leukocytosis    Other male erectile dysfunction    Parkinson disease (HCC)    Type 2 diabetes mellitus with hyperglycemia, without long-term current use of insulin (HCC)    Dystonia    History of stroke    Hyperkalemia    History of prostate cancer    Pipe smoker    Anxiety    Overweight      Past Medical History:   Diagnosis Date    Anxiety     Arteriosclerosis of coronary artery 05/24/2017    Arthritis 1999    Benign essential hypertension 05/15/2015    Cancer (Former[facility]) 2012    Depression 06/01/2012    Diabetes mellitus (HCC)     Essential (hemorrhagic) thrombocythemia ([facility])     History of prostate cancer 01/05/2023    History of stroke     Hypertension     Left carpal tunnel syndrome     Lumbar canal stenosis     Mixed hyperlipidemia 11/10/2010    Osteoarthritis, knee     Other male erectile dysfunction 08/08/2019    Overweight     Parkinson disease ([facility]) 10/08/2019    Pipe smoker     Retinal and vitreous disorder     Stroke ([facility]) 2019    Subclinical  hypothyroidism 2017    Type 2 diabetes mellitus without complication, without long-term current use of insulin (HCC) 2017      Past Surgical History:   Procedure Laterality Date    ANKLE FRACTURE SURGERY Left 2009    triple fracture; 2 Screws in place    DEEP BRAIN STIMULATOR PLACEMENT Bilateral 2022    FRACTURE SURGERY  4/15/2009    IR BIOPSY BONE MARROW  2023    LUMBAR LAMINECTOMY      L5-S1    NASAL SEPTUM SURGERY  1989    KS COLONOSCOPY FLX DX W/COLLJ SPEC WHEN PFRMD N/A 2016    Procedure: COLONOSCOPY;  Surgeon: Jigar Alvarado MD;  Location: [facility];  Service: Gastroenterology    PROSTATE BIOPSY  2012    managed by Rambo Roy, needle biopsy    PROSTATECTOMY  2013      Family History   Problem Relation Age of Onset    Hypertension Mother              Retinal detachment Mother     Retinitis pigmentosa Mother     Osteoporosis Mother     Vision loss Mother         retinitis pigmentosa    Diabetes type II Father              Heart attack Father 59    Coronary artery disease Father 59    Heart disease Father              Diabetes Father              No Known Problems Son     No Known Problems Son      Social History     Tobacco Use    Smoking status: Some Days     Types: Pipe     Start date: 1995     Passive exposure: Past    Smokeless tobacco: Never    Tobacco comments:     Occasional pipe or cigar < 6 times a month   Substance Use Topics    Alcohol use: Yes     Alcohol/week: 4.0 standard drinks of alcohol     Types: 3 Cans of beer, 1 Shots of liquor per week     Comment: occassional      No Known Allergies      Current Outpatient Medications:     aspirin 325 mg tablet, Take 325 mg by mouth daily, Disp: , Rfl:     BD Pen Needle Yara 2nd Gen 32G X 4 MM MISC, USE AS DIRECTED DAILY, Disp: 100 each, Rfl: 1    carbidopa-levodopa (SINEMET)  mg per tablet, Take 2 tablets by mouth 3 (three) times a day Follow  clinic instructions, Disp: 540 tablet, Rfl: 3    Cholecalciferol (VITAMIN D) 2000 units CAPS, Take 2 capsules (4,000 Units total) by mouth daily, Disp: , Rfl: 0    co-enzyme Q-10 30 MG capsule, Take 1 capsule (30 mg total) by mouth daily, Disp: 30 capsule, Rfl: 0    Continuous Blood Gluc  (FreeStyle Luisa 3 Victoria) ARMANDO, once, Disp: 1 each, Rfl: 0    Continuous Glucose Sensor (FreeStyle Luisa 3 Sensor) MISC, CHANGE EVERY 14 DAYS, Disp: 6 each, Rfl: 1    ezetimibe (ZETIA) 10 mg tablet, TAKE 1 TABLET BY MOUTH EVERY DAY, Disp: 90 tablet, Rfl: 1    glucose blood (ONETOUCH VERIO) test strip, Check BG 2x per day, Disp: 200 each, Rfl: 3    Icosapent Ethyl (Vascepa) 1 g CAPS, Take 2 capsules (2 g total) by mouth 2 (two) times a day, Disp: 180 capsule, Rfl: 2    insulin degludec (Tresiba FlexTouch) 200 units/mL CONCENTRATED U-200 injection pen, 18 units daily, Disp: 9 mL, Rfl: 1    lisinopril (ZESTRIL) 20 mg tablet, TAKE 1 TABLET BY MOUTH DAILY, Disp: 100 tablet, Rfl: 1    mometasone (ELOCON) 0.1 % cream, Apply topically 2 (two) times a day as needed (Rash), Disp: 15 g, Rfl: 0    Multiple Vitamin (multivitamin) capsule, Take 1 capsule by mouth daily, Disp: , Rfl:     pravastatin (PRAVACHOL) 80 mg tablet, TAKE 1 TABLET BY MOUTH DAILY AT  BEDTIME, Disp: 100 tablet, Rfl: 2    repaglinide (PRANDIN) 0.5 mg tablet, Take 1 tablet (0.5 mg total) by mouth 2 (two) times a day before meals, Disp: 60 tablet, Rfl: 2    sitaGLIPtin-metFORMIN (Janumet)  MG per tablet, TAKE 1 TABLET BY MOUTH TWICE  DAILY WITH MEALS, Disp: 200 tablet, Rfl: 1    venlafaxine (EFFEXOR-XR) 37.5 mg 24 hr capsule, TAKE 1 CAPSULE BY MOUTH DAILY  WITH 75 MG FOR A TOTAL .5  MG DAILY, Disp: 90 capsule, Rfl: 1    venlafaxine (EFFEXOR-XR) 75 mg 24 hr capsule, Take with 37.5mg for a total of 112.5mg daily., Disp: 90 capsule, Rfl: 1    Review of Systems   Constitutional:  Negative for chills and fever.   HENT:  Negative for ear pain and sore throat.   "  Eyes:  Negative for pain and visual disturbance.   Respiratory:  Negative for cough and shortness of breath.    Cardiovascular:  Negative for chest pain and palpitations.   Gastrointestinal:  Negative for abdominal pain and vomiting.   Genitourinary:  Negative for dysuria and hematuria.   Musculoskeletal:  Negative for arthralgias and back pain.   Skin:  Negative for color change and rash.   Neurological:  Negative for seizures and syncope.   All other systems reviewed and are negative.      Physical Exam:  Body mass index is 29.18 kg/m².  /90   Pulse 76   Ht 5' 9\" (1.753 m)   Wt 89.6 kg (197 lb 9.6 oz)   BMI 29.18 kg/m²    Wt Readings from Last 3 Encounters:   11/27/24 89.6 kg (197 lb 9.6 oz)   10/01/24 85.7 kg (189 lb)   07/18/24 87.4 kg (192 lb 9.6 oz)       Physical Exam  Vitals reviewed.   Constitutional:       Appearance: Normal appearance.   HENT:      Head: Normocephalic and atraumatic.   Cardiovascular:      Rate and Rhythm: Normal rate.   Pulmonary:      Effort: Pulmonary effort is normal.   Neurological:      Mental Status: He is alert and oriented to person, place, and time.   Psychiatric:         Mood and Affect: Mood normal.         Behavior: Behavior normal.         Labs:   Lab Results   Component Value Date    HGBA1C 7.5 (H) 10/31/2024    HGBA1C 7.6 (H) 07/17/2024    HGBA1C 10.6 (A) 03/26/2024     Lab Results   Component Value Date    CREATININE 1.17 09/30/2024    CREATININE 1.16 07/17/2024    CREATININE 1.32 (H) 07/01/2024    BUN 17 09/30/2024     12/17/2015    K 4.6 09/30/2024     09/30/2024    CO2 25 09/30/2024     eGFRcr   Date Value Ref Range Status   08/25/2022 74 >=60 mL/min/1.73 m2 Final     Comment:     Estimated glomerular filtration rate (eGFR) is calculated without a race  coefficient. Values should be interpreted in the context of the patient's full  clinical presentation. Reference: Meghan Echeverria et al. \"A unifying approach  for GFR estimation: " "recommendations of the NKF-ASN task force on reassessing the  inclusion of race in diagnosing kidney disease.\" American Journal of Kidney  Diseases (2021)     eGFR   Date Value Ref Range Status   09/30/2024 63 ml/min/1.73sq m Final     Lab Results   Component Value Date    CHOL 206 12/17/2015    HDL 44 07/17/2024    TRIG 138 07/17/2024     Lab Results   Component Value Date    ALT 7 09/30/2024    AST 19 09/30/2024    ALKPHOS 34 09/30/2024    BILITOT 0.47 12/17/2015     Lab Results   Component Value Date    KNH9ITEKBMTB 3.534 07/17/2024    EBC3DMLNZETB 3.113 12/05/2023    EHA3YMWGRZYS 2.906 11/15/2023     Lab Results   Component Value Date    FREET4 0.65 07/17/2024         Patient Instructions   BP goal <130/80 and > 90/60  Obtain BP cuff and monitor at home     Omron is a good brand to use      Discussed with the patient and all questioned fully answered. He will call me if any problems arise.        "

## 2024-11-27 NOTE — ASSESSMENT & PLAN NOTE
Orally controlled but continues to improve from prior.  Blood glucose data is limited today as he recently restarted wearing his delfino.  For now he reports highest blood sugars after meals therefore will start Prandin 0.5 mg tablet prior to lunch and dinner.  Reduce Tresiba to 18 units.  If he has increased hypoglycemia, advise he reduce further to 15 units and notify the office  Advise he confirm lows with blood sugar meter as he is asymptomatic at these times  Advise he continue to monitor diet      Lab Results   Component Value Date    HGBA1C 7.5 (H) 10/31/2024       Orders:    repaglinide (PRANDIN) 0.5 mg tablet; Take 1 tablet (0.5 mg total) by mouth 2 (two) times a day before meals    insulin degludec (Tresiba FlexTouch) 200 units/mL CONCENTRATED U-200 injection pen; 18 units daily    Hemoglobin A1C; Future    Lipid panel; Future

## 2024-11-27 NOTE — ASSESSMENT & PLAN NOTE
Elevated in office on 2 occasions.  Advised patient to obtain blood pressure cuff and monitor blood pressure at home  Goal parameters provided in AVS  If outside of parameters, would advise he follow-up with PCP

## 2024-12-03 ENCOUNTER — TELEPHONE (OUTPATIENT)
Age: 69
End: 2024-12-03

## 2024-12-03 NOTE — TELEPHONE ENCOUNTER
Spoke to Tremayne regarding having his labs drawn prior to his appointment 12/6/24. Patient stated he will go to the lab tomorrow. Patient appreciative of the reminder call.

## 2024-12-04 ENCOUNTER — APPOINTMENT (OUTPATIENT)
Dept: LAB | Facility: CLINIC | Age: 69
End: 2024-12-04
Payer: COMMERCIAL

## 2024-12-04 DIAGNOSIS — E78.2 MIXED HYPERLIPIDEMIA: ICD-10-CM

## 2024-12-04 DIAGNOSIS — I10 BENIGN ESSENTIAL HYPERTENSION: ICD-10-CM

## 2024-12-04 DIAGNOSIS — D47.3 ESSENTIAL THROMBOCYTOSIS (HCC): ICD-10-CM

## 2024-12-04 DIAGNOSIS — E11.9 TYPE 2 DIABETES MELLITUS WITHOUT COMPLICATION, WITHOUT LONG-TERM CURRENT USE OF INSULIN (HCC): ICD-10-CM

## 2024-12-04 LAB
BASOPHILS # BLD AUTO: 0.05 THOUSANDS/ÂΜL (ref 0–0.1)
BASOPHILS NFR BLD AUTO: 1 % (ref 0–1)
EOSINOPHIL # BLD AUTO: 0.41 THOUSAND/ÂΜL (ref 0–0.61)
EOSINOPHIL NFR BLD AUTO: 4 % (ref 0–6)
ERYTHROCYTE [DISTWIDTH] IN BLOOD BY AUTOMATED COUNT: 14.6 % (ref 11.6–15.1)
HCT VFR BLD AUTO: 45.4 % (ref 36.5–49.3)
HGB BLD-MCNC: 14.7 G/DL (ref 12–17)
IMM GRANULOCYTES # BLD AUTO: 0.04 THOUSAND/UL (ref 0–0.2)
IMM GRANULOCYTES NFR BLD AUTO: 0 % (ref 0–2)
LYMPHOCYTES # BLD AUTO: 2.56 THOUSANDS/ÂΜL (ref 0.6–4.47)
LYMPHOCYTES NFR BLD AUTO: 24 % (ref 14–44)
MCH RBC QN AUTO: 29.3 PG (ref 26.8–34.3)
MCHC RBC AUTO-ENTMCNC: 32.4 G/DL (ref 31.4–37.4)
MCV RBC AUTO: 91 FL (ref 82–98)
MONOCYTES # BLD AUTO: 0.91 THOUSAND/ÂΜL (ref 0.17–1.22)
MONOCYTES NFR BLD AUTO: 9 % (ref 4–12)
NEUTROPHILS # BLD AUTO: 6.69 THOUSANDS/ÂΜL (ref 1.85–7.62)
NEUTS SEG NFR BLD AUTO: 62 % (ref 43–75)
NRBC BLD AUTO-RTO: 0 /100 WBCS
PLATELET # BLD AUTO: 565 THOUSANDS/UL (ref 149–390)
PMV BLD AUTO: 10 FL (ref 8.9–12.7)
RBC # BLD AUTO: 5.01 MILLION/UL (ref 3.88–5.62)
WBC # BLD AUTO: 10.66 THOUSAND/UL (ref 4.31–10.16)

## 2024-12-04 PROCEDURE — 85025 COMPLETE CBC W/AUTO DIFF WBC: CPT

## 2024-12-04 PROCEDURE — 36415 COLL VENOUS BLD VENIPUNCTURE: CPT

## 2024-12-05 NOTE — PROGRESS NOTES
"HEMATOLOGY / ONCOLOGY CLINIC FOLLOW UP NOTE    Patient Andres Chacko  MRN: [id number]  : 1955  Date of Encounter 2024        Virtual Regular Visit  Name: Andres Chacko      : 1955      MRN: [id number]  Encounter Provider: Mariajose Vaca MD  Encounter Date: 2024   Encounter department: [facility] HEMATOLOGY ONCOL[facility]      Verification of patient location:  Patient is located at Home in the following state in which I hold an active license [place] :  Assessment & Plan        Encounter provider Mariajose Vaca MD    The patient was identified by name and date of birth. Andres Chacko was informed that this is a telemedicine visit and that the visit is being conducted through the Epic Embedded platform. He agrees to proceed..  My office door was closed. No one else was in the room.  He acknowledged consent and understanding of privacy and security of the video platform. The patient has agreed to participate and understands they can discontinue the visit at any time.    Patient is aware this is a billable service.     Visit Time  Total Visit Duration: 30    Reason for Encounter: follow up for ET         1. Essential thrombocytosis    2. JAK2 V617F mutation         Platelets 2024 546 off anagrelide      Platelets on anagrelide x 1 month 545 (10/31/2024) stopped medication last week due to toxicity/was on 50% lower dose            Assessment / Plan:        Tremayne Chacko is a 68-year-old male with essential thrombocytosis with JAK2 V617F mutation.  He was previously following with BENNY Mejia and now presents for transfer of care. He was previously on Hydrea 500 mg once daily did not tolerate due to side effects.  His dose was reduced to once daily on Monday, Wednesday, Friday.  He tolerated this for a longer period of time but then self discontinued due to feeling \"ill \".  Since discontinuing Hydrea he is back to baseline and has no constitutional " "complaints or concerns.  His platelet count remained greater than 500 K with mildly elevated WBC count.  Due to intolerance of Hydrea he was switched to Anagrelide 1 mg BID in November 2023.  He continues ASA 81 mg.  He notes compliance and tolerance with anagrelide.  I encouraged patient to have updated labs to assess treatment response as well as need for dose adjustments.  We reviewed updated guidelines necessitating bone marrow biopsy at the time of diagnosis.          ET           Patient was started on anagrelide 1 mg bid at last appointment in Dec 2023.  He had plt count of 548 11/15/2023 and on anagrelide 370.  He has been off due to \"fatigue\" and is now 497     Restarted anagrelide at 50% dose reduction but plts incresed to 573 from 497.  WBC 11.5 Hgb 13.4 diff 61/28     Dose was increased to 1 mg bid anagrelide and was supposed to have had labs drawn within 2 weeks last visit  April 2024     Patient stopped anagrelide over one week ago; plts 546 WBC 11.7     Did discuss use of pegylated interferon as well as ruxolitinib      Would  consider ruxolitinib 10 mg daily  as would l not tolerate IFN     However, patient on no meds; refusing to take anagrelide, feeling well off any drugs     Plts 552 as of 7/1/2024;  546 May 2024 , 573 off any medication     Will hold on any treatment     He is aware that might have to change     Repeat labs in  monthly but will be seen in 2 months      11/1/2024     Restarted 50% dose reduction one month ago; stopped 1 week ago as side effects/making Parkinson drugs less effective     Labs 10/31/2024 with WBC 8.9 Hgb 14.4 and plts 545; were 595 9/30/2024     Will remain off anagrelide, repeat labs in 1 month     Again discussed Jakafi as will not take hydrea, not tolerating anagrelide on multiple trials and cannot get IFN      Remain on aspirin      Labs one month     12/6/2024    Labs 12/4/2024 with WBC 10.7 Hgb 14.7 plts 565; not on any anagrelide    Labs 10/31/2024 with WBC " "8.9 Hgb 14.4 plts 545  9/30/2024 with WBC 9.9 Hgb 15.3 plts 595    Thus no change off anagrelide    States his levodopa is working, Parkinson's more controlled; could go hunting without issues    Will repeat labs in about 6 weeks; will remain on aspirin only for now            Follow up       6 weeks telehealth          HPI  12/6/2023     Tremayne Chacko is a 68-year-old male with past medical history significant for type 2 diabetes, CAD, HTN, Parkinson's disease, history of prostate cancer s/p prostatectomy in 2013.  Patient was previously following with BENNY Mejia for longstanding history of thrombocytosis since at least 2016.  He denies any personal history of thrombosis..  Subsequent workup noted JAK2 V6 17F mutation positive and patient was initiated on hydroxyurea which she subsequently stopped due to intolerance.  Patient does not state specific side effects, but notes it made him feel \"ill\".  He was switched to anagrelide 1 mg twice daily since November 2023.  Patient notes compliance with current treatment and denies any adverse effects.  No repeat labs available.  Patient presents for transfer of care/follow-up visit today.  Offers no new complaints.  Denies any chest pain, shortness of breath, abdominal pain, nausea, vomiting, diarrhea.  Denies any fever, night sweats or unexpected weight changes.  He has never had a bone marrow biopsy.     Interval Follow up 3/18/2024     Patient stopped anagrelide one month ago as didn't like how it made him feel with decreased energy.  Labs on anagrelide 1 mg bid as follows:     Labs 12/21/2023       WBC 12.2 Hg 15 plts 370     Labs prior 11/15/2023     WBC 9.4 Hgb 14.2 plts 548     Labs off anagrelide 3/18/2024       WBC 9.8 Hgb 14.8 plts 497     Has no other complaints     Will restart anagrelide at 50% reduction as did not tolerate hydrea at all      Denies personal or family hx of blood clots        Interval History: 4/22/2024     Mr Chacko is tolerating " anagrelide 1 mg daily but plts increased to 573.  He is not any more fatigued than baseline.  He has had a chronic cough since COVID in Sept 2023 but has had some viral issues with increased cough.  He states he has a chronic postnasal drip which is making the cough worse.  He did have one bleeding issue from coughing.  It is nonproductive.  He is seeing his PCP but may need a CT chest and may need ENT involvement if this is a sinus issue.           Interval History:  6/3/2024     Mr Chacko had labs with the following: Plts 546 on the 31 May 2024; also WBC 11.7 Hgb 13.6.  Patient again with issues regarding drug use including fatigue and diarrhea.  He feels there are issues with his Parkinson meds and there may be some DDI but he is also at risk of stroke with increased plt levels.  He continues on aspirin.         Interval History:  7/2/2024     Labs from 7/1/2024 with WBC 11.2 Hgb 14.4 MCV 92 and plts 552; were 546 31 May 2024      Not taking any medications except aspirin refusing anagrelide and will not consider ruxolitinib for now.     No issues          Interval History: 11/1/2024     Doing poorly again with 50% dose reduced anagrelide which he has been on past month.  Stopped one week ago     Platelets were 545 on 31 Oct; had been 595 on 9/30/2024 and 552 in July 2024        Interval History: 12/6/2024    As above, plts stable at 565; were 545 and 595 off analgrelide.  Parkinson's better controlled.  No other issues, feeling well.          REVIEW OF SYSTEMS: as above   Please note that a 14-point review of systems was performed to include Constitutional, HEENT, Respiratory, CVS, GI, , Musculoskeletal, Integumentary, Neurologic, Rheumatologic, Endocrinologic, Psychiatric, Lymphatic, and Hematologic/Oncologic systems were reviewed and are negative unless otherwise stated in HPI. Positive and negative findings pertinent to this evaluation are incorporated into the history of present illness.      ECOG PS:  0    PROBLEM LIST:  Patient Active Problem List   Diagnosis    Arteriosclerosis of coronary artery    Benign essential hypertension    Depression    Type 2 diabetes mellitus without complication, without long-term current use of insulin ([facility])    Subclinical hypothyroidism    Essential (hemorrhagic) thrombocythemia (HCC)    Mixed hyperlipidemia    Leukocytosis    Other male erectile dysfunction    Parkinson disease ([facility])    Type 2 diabetes mellitus with hyperglycemia, without long-term current use of insulin ([facility])    Dystonia    History of stroke    Hyperkalemia    History of prostate cancer    Pipe smoker    Anxiety    Overweight       Past Medical History:   has a past medical history of Anxiety, Arteriosclerosis of coronary artery (05/24/2017), Arthritis (1999), Benign essential hypertension (05/15/2015), Cancer ([facility]) (2012), Depression (06/01/2012), Diabetes mellitus ([facility]), Essential (hemorrhagic) thrombocythemia ([facility]), History of prostate cancer (01/05/2023), History of stroke, Hypertension, Left carpal tunnel syndrome, Lumbar canal stenosis, Mixed hyperlipidemia (11/10/2010), Osteoarthritis, knee, Other male erectile dysfunction (08/08/2019), Overweight, Parkinson disease ([facility]) (10/08/2019), Pipe smoker, Retinal and vitreous disorder, Stroke ([facility]) (2019), Subclinical hypothyroidism (04/11/2017), and Type 2 diabetes mellitus without complication, without long-term current use of insulin ([facility]) (08/24/2017).    PAST SURGICAL HISTORY:   has a past surgical history that includes Prostatectomy (09/23/2013); Ankle fracture surgery (Left, 04/2009); pr colonoscopy flx dx w/collj spec when pfrmd (N/A, 01/27/2016); Prostate biopsy (12/08/2012); Lumbar laminectomy (2003); Deep brain stimulator placement (Bilateral, 09/2022); Nasal septum surgery (06/1989); IR biopsy bone marrow (12/21/2023); and Fracture surgery (4/15/2009).    CURRENT MEDICATIONS  Current Outpatient Medications   Medication Sig Dispense Refill    aspirin  325 mg tablet Take 325 mg by mouth daily      BD Pen Needle Yara 2nd Gen 32G X 4 MM MISC USE AS DIRECTED DAILY 100 each 1    carbidopa-levodopa (SINEMET)  mg per tablet Take 2 tablets by mouth 3 (three) times a day Follow clinic instructions 540 tablet 3    Cholecalciferol (VITAMIN D) 2000 units CAPS Take 2 capsules (4,000 Units total) by mouth daily  0    co-enzyme Q-10 30 MG capsule Take 1 capsule (30 mg total) by mouth daily 30 capsule 0    Continuous Blood Gluc  (FreeStyle Luisa 3 Elliott) ARMANDO once 1 each 0    Continuous Glucose Sensor (FreeStyle Luias 3 Sensor) MISC CHANGE EVERY 14 DAYS 6 each 1    ezetimibe (ZETIA) 10 mg tablet TAKE 1 TABLET BY MOUTH EVERY DAY 90 tablet 1    glucose blood (ONETOUCH VERIO) test strip Check BG 2x per day 200 each 3    Icosapent Ethyl (Vascepa) 1 g CAPS Take 2 capsules (2 g total) by mouth 2 (two) times a day 180 capsule 2    insulin degludec (Tresiba FlexTouch) 200 units/mL CONCENTRATED U-200 injection pen 18 units daily 9 mL 1    lisinopril (ZESTRIL) 20 mg tablet TAKE 1 TABLET BY MOUTH DAILY 100 tablet 1    mometasone (ELOCON) 0.1 % cream Apply topically 2 (two) times a day as needed (Rash) 15 g 0    Multiple Vitamin (multivitamin) capsule Take 1 capsule by mouth daily      pravastatin (PRAVACHOL) 80 mg tablet TAKE 1 TABLET BY MOUTH DAILY AT  BEDTIME 100 tablet 2    repaglinide (PRANDIN) 0.5 mg tablet Take 1 tablet (0.5 mg total) by mouth 2 (two) times a day before meals 60 tablet 2    sitaGLIPtin-metFORMIN (Janumet)  MG per tablet TAKE 1 TABLET BY MOUTH TWICE  DAILY WITH MEALS 200 tablet 1    venlafaxine (EFFEXOR-XR) 37.5 mg 24 hr capsule TAKE 1 CAPSULE BY MOUTH DAILY  WITH 75 MG FOR A TOTAL .5  MG DAILY 90 capsule 1    venlafaxine (EFFEXOR-XR) 75 mg 24 hr capsule Take with 37.5mg for a total of 112.5mg daily. 90 capsule 1     No current facility-administered medications for this visit.     [unfilled]    SOCIAL HISTORY:   reports that he has been  smoking pipe. He started smoking about 29 years ago. He has been exposed to tobacco smoke. He has never used smokeless tobacco. He reports current alcohol use of about 4.0 standard drinks of alcohol per week. He reports that he does not use drugs.     FAMILY HISTORY:  family history includes Coronary artery disease (age of onset: 59) in his father; Diabetes in his father; Diabetes type II in his father; Heart attack (age of onset: 59) in his father; Heart disease in his father; Hypertension in his mother; No Known Problems in his son and son; Osteoporosis in his mother; Retinal detachment in his mother; Retinitis pigmentosa in his mother; Vision loss in his mother.     ALLERGIES:  has no known allergies.      Physical Exam: no exam teleheatlh   Labs:  Lab Results   Component Value Date    WBC 10.66 (H) 12/04/2024    HGB 14.7 12/04/2024    HCT 45.4 12/04/2024    MCV 91 12/04/2024     (H) 12/04/2024     Lab Results   Component Value Date     12/17/2015    SODIUM 135 09/30/2024    K 4.6 09/30/2024     09/30/2024    CO2 25 09/30/2024    ANIONGAP 8 12/17/2015    AGAP 8 09/30/2024    BUN 17 09/30/2024    CREATININE 1.17 09/30/2024    GLUC 180 (H) 09/30/2024    GLUF 117 (H) 07/17/2024    CALCIUM 9.6 09/30/2024    AST 19 09/30/2024    ALT 7 09/30/2024    ALKPHOS 34 09/30/2024    PROT 6.8 12/17/2015    TP 7.3 09/30/2024    BILITOT 0.47 12/17/2015    TBILI 0.42 09/30/2024    EGFR 63 09/30/2024           I spent 30 minutes on chart review, face to face counseling time, coordination of care and documentation.    Mariajose Vaca MD PhD

## 2024-12-06 ENCOUNTER — TELEMEDICINE (OUTPATIENT)
Age: 69
End: 2024-12-06
Payer: COMMERCIAL

## 2024-12-06 ENCOUNTER — TELEPHONE (OUTPATIENT)
Age: 69
End: 2024-12-06

## 2024-12-06 DIAGNOSIS — D47.3 ESSENTIAL (HEMORRHAGIC) THROMBOCYTHEMIA (HCC): Primary | ICD-10-CM

## 2024-12-06 PROCEDURE — 99214 OFFICE O/P EST MOD 30 MIN: CPT | Performed by: INTERNAL MEDICINE

## 2024-12-06 NOTE — TELEPHONE ENCOUNTER
Spoke with pt regarding follow up from virtual appt today.  Next appt is scheduled for 2 months..Virtual 2/7/25 @ 10AM.

## 2024-12-14 DIAGNOSIS — E78.2 MIXED HYPERLIPIDEMIA: ICD-10-CM

## 2024-12-16 RX ORDER — EZETIMIBE 10 MG/1
10 TABLET ORAL DAILY
Qty: 90 TABLET | Refills: 1 | Status: SHIPPED | OUTPATIENT
Start: 2024-12-16

## 2024-12-20 DIAGNOSIS — E11.65 TYPE 2 DIABETES MELLITUS WITH HYPERGLYCEMIA, WITHOUT LONG-TERM CURRENT USE OF INSULIN (HCC): ICD-10-CM

## 2024-12-20 DIAGNOSIS — F41.9 ANXIETY: ICD-10-CM

## 2024-12-20 DIAGNOSIS — F32.A DEPRESSION, UNSPECIFIED DEPRESSION TYPE: ICD-10-CM

## 2024-12-20 RX ORDER — VENLAFAXINE HYDROCHLORIDE 75 MG/1
CAPSULE, EXTENDED RELEASE ORAL
Qty: 90 CAPSULE | Refills: 1 | Status: SHIPPED | OUTPATIENT
Start: 2024-12-20

## 2024-12-20 RX ORDER — INSULIN DEGLUDEC 200 U/ML
INJECTION, SOLUTION SUBCUTANEOUS
Qty: 18 ML | Refills: 2 | Status: SHIPPED | OUTPATIENT
Start: 2024-12-20

## 2024-12-20 RX ORDER — VENLAFAXINE HYDROCHLORIDE 37.5 MG/1
CAPSULE, EXTENDED RELEASE ORAL
Qty: 90 CAPSULE | Refills: 1 | Status: SHIPPED | OUTPATIENT
Start: 2024-12-20

## 2024-12-21 ENCOUNTER — RA CDI HCC (OUTPATIENT)
Dept: OTHER | Facility: HOSPITAL | Age: 69
End: 2024-12-21

## 2024-12-26 DIAGNOSIS — E11.65 TYPE 2 DIABETES MELLITUS WITH HYPERGLYCEMIA, WITHOUT LONG-TERM CURRENT USE OF INSULIN (HCC): ICD-10-CM

## 2024-12-27 ENCOUNTER — OFFICE VISIT (OUTPATIENT)
Dept: FAMILY MEDICINE CLINIC | Facility: CLINIC | Age: 69
End: 2024-12-27
Payer: COMMERCIAL

## 2024-12-27 VITALS
SYSTOLIC BLOOD PRESSURE: 132 MMHG | TEMPERATURE: 97.5 F | HEIGHT: 69 IN | RESPIRATION RATE: 16 BRPM | OXYGEN SATURATION: 100 % | DIASTOLIC BLOOD PRESSURE: 70 MMHG | HEART RATE: 61 BPM | WEIGHT: 197.2 LBS | BODY MASS INDEX: 29.21 KG/M2

## 2024-12-27 DIAGNOSIS — N52.8 OTHER MALE ERECTILE DYSFUNCTION: ICD-10-CM

## 2024-12-27 DIAGNOSIS — E78.2 MIXED HYPERLIPIDEMIA: ICD-10-CM

## 2024-12-27 DIAGNOSIS — E11.65 TYPE 2 DIABETES MELLITUS WITH HYPERGLYCEMIA, WITHOUT LONG-TERM CURRENT USE OF INSULIN (HCC): Primary | ICD-10-CM

## 2024-12-27 DIAGNOSIS — E66.3 OVERWEIGHT: ICD-10-CM

## 2024-12-27 DIAGNOSIS — F41.9 ANXIETY: ICD-10-CM

## 2024-12-27 DIAGNOSIS — E11.9 TYPE 2 DIABETES MELLITUS WITHOUT COMPLICATION, WITHOUT LONG-TERM CURRENT USE OF INSULIN (HCC): ICD-10-CM

## 2024-12-27 DIAGNOSIS — F17.290 PIPE SMOKER: ICD-10-CM

## 2024-12-27 DIAGNOSIS — I10 BENIGN ESSENTIAL HYPERTENSION: ICD-10-CM

## 2024-12-27 DIAGNOSIS — D47.3 ESSENTIAL (HEMORRHAGIC) THROMBOCYTHEMIA (HCC): ICD-10-CM

## 2024-12-27 DIAGNOSIS — M79.18 MYALGIA, OTHER SITE: ICD-10-CM

## 2024-12-27 DIAGNOSIS — I25.10 ARTERIOSCLEROSIS OF CORONARY ARTERY: ICD-10-CM

## 2024-12-27 DIAGNOSIS — G20.A1 PARKINSON'S DISEASE, UNSPECIFIED WHETHER DYSKINESIA PRESENT, UNSPECIFIED WHETHER MANIFESTATIONS FLUCTUATE (HCC): ICD-10-CM

## 2024-12-27 DIAGNOSIS — Z86.73 HISTORY OF STROKE: ICD-10-CM

## 2024-12-27 DIAGNOSIS — E03.8 SUBCLINICAL HYPOTHYROIDISM: ICD-10-CM

## 2024-12-27 DIAGNOSIS — Z85.46 HISTORY OF PROSTATE CANCER: ICD-10-CM

## 2024-12-27 DIAGNOSIS — F32.0 CURRENT MILD EPISODE OF MAJOR DEPRESSIVE DISORDER, UNSPECIFIED WHETHER RECURRENT (HCC): ICD-10-CM

## 2024-12-27 DIAGNOSIS — M79.10 MYALGIA: ICD-10-CM

## 2024-12-27 PROCEDURE — 99214 OFFICE O/P EST MOD 30 MIN: CPT | Performed by: FAMILY MEDICINE

## 2024-12-27 PROCEDURE — G2211 COMPLEX E/M VISIT ADD ON: HCPCS | Performed by: FAMILY MEDICINE

## 2024-12-27 RX ORDER — REPAGLINIDE 0.5 MG/1
0.5 TABLET ORAL
Qty: 180 TABLET | Refills: 1 | Status: SHIPPED | OUTPATIENT
Start: 2024-12-27

## 2024-12-27 NOTE — ASSESSMENT & PLAN NOTE
Stable on Lisinopril 20mg QD. Check blood pressure outside of office. Recommend lifestyle modifications.     Orders:  •  Comprehensive metabolic panel; Future

## 2024-12-27 NOTE — ASSESSMENT & PLAN NOTE
Asymptomatic.  Management per Cardio - Continue ASA, statin, ACE-I.  Recommend lifestyle modifications.    LDL not at goal for CAD.  On increased Pravachol 80mg QHS.     Orders:  •  LDL cholesterol, direct; Future  •  Comprehensive metabolic panel; Future  •  Lipid panel; Future  •  LDL cholesterol, direct; Future

## 2024-12-27 NOTE — ASSESSMENT & PLAN NOTE
Management per Neuro. Continue ASA, statin . Recommend lifestyle modifications.     Orders:  •  LDL cholesterol, direct; Future  •  Lipid panel; Future  •  LDL cholesterol, direct; Future

## 2024-12-27 NOTE — ASSESSMENT & PLAN NOTE
Pending labs.  Previously uncontrolled as LDL not at goal for CAD.  On increased Pravachol 80mg QHS.  Recommend lifestyle modification     Orders:  •  LDL cholesterol, direct; Future  •  Comprehensive metabolic panel; Future  •  Lipid panel; Future  •  LDL cholesterol, direct; Future

## 2024-12-27 NOTE — PROGRESS NOTES
Assessment/Plan:  Assessment & Plan  Type 2 diabetes mellitus with hyperglycemia, without long-term current use of insulin (HCC)    Lab Results   Component Value Date    HGBA1C 7.5 (H) 10/31/2024     Uncontrolled. Management per Endo. Recommend lifestyle modifications.     Orders:  •  LDL cholesterol, direct; Future  •  Comprehensive metabolic panel; Future  •  Comprehensive metabolic panel; Future  •  Lipid panel; Future    Benign essential hypertension    Stable on Lisinopril 20mg QD. Check blood pressure outside of office. Recommend lifestyle modifications.     Orders:  •  Comprehensive metabolic panel; Future    Current mild episode of major depressive disorder, unspecified whether recurrent (HCC)    Stable on Effexor .5 mg daily.    Depression Screening Follow-up Plan: Patient's depression screening was positive with a PHQ-9 score of 9.        Essential (hemorrhagic) thrombocythemia (HCC)    Management per Heme/Onc.  Pipe smoking may be contributory - patient quit smoking 10/24.         Mixed hyperlipidemia    Pending labs.  Previously uncontrolled as LDL not at goal for CAD.  On increased Pravachol 80mg QHS.  Recommend lifestyle modification     Orders:  •  LDL cholesterol, direct; Future  •  Comprehensive metabolic panel; Future  •  Lipid panel; Future  •  LDL cholesterol, direct; Future    Pipe smoker    Quit 10/24.  Patient congratulated on Smoking cessation.         Anxiety    Stable on Effexor .5 mg daily.         Overweight    Stable.  Recommend lifestyle modifications.         Arteriosclerosis of coronary artery    Asymptomatic.  Management per Cardio - Continue ASA, statin, ACE-I.  Recommend lifestyle modifications.    LDL not at goal for CAD.  On increased Pravachol 80mg QHS.     Orders:  •  LDL cholesterol, direct; Future  •  Comprehensive metabolic panel; Future  •  Lipid panel; Future  •  LDL cholesterol, direct; Future    Type 2 diabetes mellitus without complication, without long-term  current use of insulin (HCC)    Uncontrolled. Management per Endo. Recommend lifestyle modifications.     Lab Results   Component Value Date    HGBA1C 7.5 (H) 10/31/2024          Subclinical hypothyroidism    Management per Endo.  Not currently taking Synthroid.              Other male erectile dysfunction    Not currently taking Levitra 20mg PRN.           Parkinson's disease, unspecified whether dyskinesia present, unspecified whether manifestations fluctuate (HCC)      Management per [facility].  S/p Deep brain stimulator 9/22.  On Sinemet 25-100mg 2 tabs in AM and 1 pill QHS.  Continue [facility] Parkinson's Boxing Program.         Orders:  •  Vitamin D 25 hydroxy; Future    History of stroke    Management per Neuro. Continue ASA, statin . Recommend lifestyle modifications.     Orders:  •  LDL cholesterol, direct; Future  •  Lipid panel; Future  •  LDL cholesterol, direct; Future    History of prostate cancer    Patient declined continued Uro F/U and desires yearly PSA per PCP.  Status post DaVinci robot prostatectomy performed at [facility] 2013.          Myalgia    Orders:  •  Vitamin D 25 hydroxy; Future    Myalgia, other site    Orders:  •  Vitamin D 25 hydroxy; Future          Return in about 6 months (around 6/27/2025) for AWV / 6mo- DM2, HTN, HL, Anx/Dep, ET, H/O Prostate CA, Smoker, Labs.      Future Appointments   Date Time Provider Department Center   2/7/2025 10:00 AM Mariajose Vaca MD HEM ONC [facility]   4/15/2025 11:30 AM [facility]   6/27/2025  9:00 AM Mireille Martines DO [facility]        Subjective:     Andres is a 69 y.o. male who presents today for a follow-up on his chronic medical conditions.        HPI:  Chief Complaint   Patient presents with   • Follow-up     6mo- DM2, HTN, HL, Anx/Dep, ET, H/O Prostate CA, Smoker, Labs. No new problems or concerns.        -- Above per clinical staff and reviewed. --      HPI      Today:      Return in  about 6 months (around 12/24/2024) for 6mo- DM2, HTN, HL, Anx/Dep, ET, H/O Prostate CA, Smoker, Labs.     6mo OV    Patient did not complete labs 6/24/24.        Overweight - Watching diet.  +Regular exercise - Parkinson's Boxing program at [facility] for 90 minutes, 2 times per week, Walking for 30 minutes, 7 times per week.       DM2 - Management per Endo Dr. Turner.  Next appt 4/25.  Last DM Education 5/23 - next appt PRN.  Sees [facility] [address] - next appt 6/25.   No Podiatry.  Denies hypoglycemia.     HTN - On Lisinopril 20mg 1 pill QD.  No BP check outside of office.  No other HTN meds previously.      Subclinical Hypothyroidism - Management per Endo Dr. Turner.  Next appt 4/25.  Patient is not taking Synthroid currently.        Hyperlipidemia - On increased Pravachol 80mg QHS, On Zetia 10mg QD and Vascepa 1gram 2 caps BID per Endo.  Previously on Zocor 40mg QHS.- pt unsure why D/C.  No higher dose or other statin previously.       CAD - Management per Cardio Dr. Ward.  Next appt 4/25.     Parkinson's Disease - Management per [facility] Neuro Dr. Waddell.  Next appt 8/25.  S/p Deep brain stimulator 9/22.  On Sinemet 25-100mg 2 tabs in AM and 1 pill QHS.  Attending individual [facility] Parkinson's PT 2 times weekly since 2/23.       H/O CVA -  Unsure of date - 2/19? Management per [facility] Neuro Dr. Waddell.  Next appt 8/25.        Left Carpal Tunnel Syndrome - Management per [facility] Neuro Dr. Waddell.  Next appt 8/25.  Uses a brace PRN.  Declined EMG / surgery referral per Neuro.      Depression / Anxiety - Mood is decreased due to fatigue.  On Effexor .5mg QD per [facility] since 8/2/23.  He is unsure if he's taken high dose or other mood meds previously.  He last attended counseling in the late 1990's and states it was not helpful.  Patients thinks he might be slightly autistic.  Good social supports.  No SI/HI/AH/VH.          PHQ-2/9 Depression Screening    Little interest or pleasure in  doing things: 1 - several days  Feeling down, depressed, or hopeless: 1 - several days  Trouble falling or staying asleep, or sleeping too much: 1 - several days  Feeling tired or having little energy: 3 - nearly every day  Poor appetite or overeatin - several days  Feeling bad about yourself - or that you are a failure or have let yourself or your family down: 1 - several days  Trouble concentrating on things, such as reading the newspaper or watching television: 0 - not at all  Moving or speaking so slowly that other people could have noticed. Or the opposite - being so fidgety or restless that you have been moving around a lot more than usual: 1 - several days  Thoughts that you would be better off dead, or of hurting yourself in some way: 0 - not at all  PHQ-9 Score: 9  PHQ-9 Interpretation: Mild depression       HEMANT-7 Flowsheet Screening    Flowsheet Row Most Recent Value   Over the last two weeks, how often have you been bothered by the following problems?     Feeling nervous, anxious, or on edge 1   Not being able to stop or control worrying 1   Worrying too much about different things 1   Trouble relaxing  1   Being so restless that it's hard to sit still 0   Becoming easily annoyed or irritable  0   Feeling afraid as if something awful might happen 0   How difficult have these problems made it for you to do your work, take care of things at home, or get along with other people?  Somewhat difficult   HEMANT Score  4            H/O Prostate Cancer - Management per Uro Mr. Camilo Elliott BENNY.  Next appt  - Overdue PRN as patient declined F/U and desires yearly PSA per PCP.  Status post DaVinci robot prostatectomy performed at [facility] .     ED - No longer taking Levitra 20mg QDPRN.       Essential Thrombocythemia / Myeloproliferative disorder / JAK2 V617F Mutation - Management per Heme/Onc Dr. Vaca - next appt .  D/C Anagrelide.  S/p IR Bone Marrow Biopsy 23.       H/O Pipe Smoker -  Last use 10/24.  Previously Pipe smoking 1 time per week in cold weather, 0 times per week in hot weather.        Reviewed:  Labs 3/18/24, enn Neuro 8/7/24, Endo 11/27/24, DM Educ 11/18/22, Uro 8/21/20, Cardio 4/26/24, Heme/Onc 12/6/24     No Uro.     Last Colonoscopy 1/27/16, repeat in 10 years - 1/27/26.    The following portions of the patient's history were reviewed and updated as appropriate: allergies, current medications, past family history, past medical history, past social history, past surgical history and problem list.      Review of Systems   Constitutional:  Positive for fatigue. Negative for appetite change, chills, diaphoresis and fever.   Respiratory:  Negative for chest tightness and shortness of breath.    Cardiovascular:  Negative for chest pain.   Gastrointestinal:  Negative for abdominal pain, blood in stool, diarrhea, nausea and vomiting.   Genitourinary:  Negative for dysuria.        Current Outpatient Medications   Medication Sig Dispense Refill   • aspirin 325 mg tablet Take 325 mg by mouth daily     • BD Pen Needle Yara 2nd Gen 32G X 4 MM MISC USE AS DIRECTED DAILY 100 each 1   • carbidopa-levodopa (SINEMET)  mg per tablet Take 2 tablets by mouth 3 (three) times a day Follow clinic instructions (Patient taking differently: Take 2 tablets by mouth 3 (three) times a day Follow clinic instructions.  Currently taking 2 pills in AM, 1 pill QHS.  Rx per Neuro) 540 tablet 3   • Cholecalciferol (VITAMIN D) 2000 units CAPS Take 2 capsules (4,000 Units total) by mouth daily  0   • co-enzyme Q-10 30 MG capsule Take 1 capsule (30 mg total) by mouth daily 30 capsule 0   • Continuous Blood Gluc  (FreeStyle Luisa 3 [place]) ARMANDO once 1 each 0   • Continuous Glucose Sensor (FreeStyle Luisa 3 Sensor) MISC CHANGE EVERY 14 DAYS 6 each 1   • ezetimibe (ZETIA) 10 mg tablet TAKE 1 TABLET BY MOUTH EVERY DAY 90 tablet 1   • glucose blood (ONETOUCH VERIO) test strip Check BG 2x per day 200 each 3  "  • Icosapent Ethyl (Vascepa) 1 g CAPS Take 2 capsules (2 g total) by mouth 2 (two) times a day 180 capsule 2   • insulin degludec (Tresiba FlexTouch) 200 units/mL CONCENTRATED U-200 injection pen INJECT SUBCUTANEOUSLY 20 UNITS  DAILY 18 mL 2   • lisinopril (ZESTRIL) 20 mg tablet TAKE 1 TABLET BY MOUTH DAILY 100 tablet 1   • mometasone (ELOCON) 0.1 % cream Apply topically 2 (two) times a day as needed (Rash) 15 g 0   • Multiple Vitamin (multivitamin) capsule Take 1 capsule by mouth daily     • pravastatin (PRAVACHOL) 80 mg tablet TAKE 1 TABLET BY MOUTH DAILY AT  BEDTIME 100 tablet 2   • sitaGLIPtin-metFORMIN (Janumet)  MG per tablet TAKE 1 TABLET BY MOUTH TWICE  DAILY WITH MEALS 200 tablet 1   • venlafaxine (EFFEXOR-XR) 37.5 mg 24 hr capsule TAKE 1 CAPSULE BY MOUTH DAILY  WITH 75 MG FOR A TOTAL .5  MG DAILY 90 capsule 1   • venlafaxine (EFFEXOR-XR) 75 mg 24 hr capsule TAKE 1 CAPSULE BY MOUTH DAILY  WITH 37.5 MG FOR A TOTAL OF  112.5 MG DAILY 90 capsule 1   • repaglinide (PRANDIN) 0.5 mg tablet TAKE 1 TABLET (0.5 MG TOTAL) BY MOUTH 2 (TWO) TIMES A DAY BEFORE MEALS 180 tablet 1     No current facility-administered medications for this visit.       Objective:  /70   Pulse 61   Temp 97.5 °F (36.4 °C) (Temporal)   Resp 16   Ht 5' 9\" (1.753 m)   Wt 89.4 kg (197 lb 3.2 oz)   SpO2 100%   BMI 29.12 kg/m²    Wt Readings from Last 3 Encounters:   12/27/24 89.4 kg (197 lb 3.2 oz)   11/27/24 89.6 kg (197 lb 9.6 oz)   10/01/24 85.7 kg (189 lb)      BP Readings from Last 3 Encounters:   12/27/24 132/70   11/27/24 162/90   10/01/24 128/78          Physical Exam  Vitals and nursing note reviewed.   Constitutional:       Appearance: Normal appearance. He is well-developed.   HENT:      Head: Normocephalic and atraumatic.   Eyes:      Conjunctiva/sclera: Conjunctivae normal.   Neck:      Thyroid: No thyromegaly.      Vascular: No carotid bruit.   Cardiovascular:      Rate and Rhythm: Normal rate and regular " "rhythm.      Pulses: Normal pulses.      Heart sounds: Normal heart sounds.   Pulmonary:      Effort: Pulmonary effort is normal.      Breath sounds: Normal breath sounds.   Abdominal:      General: Bowel sounds are normal. There is no distension.      Palpations: Abdomen is soft. There is no mass.      Tenderness: There is no abdominal tenderness. There is no guarding or rebound.   Musculoskeletal:         General: No swelling or tenderness.      Cervical back: Neck supple.      Right lower leg: No edema.      Left lower leg: No edema.   Lymphadenopathy:      Cervical: No cervical adenopathy.   Neurological:      Mental Status: He is alert and oriented to person, place, and time.      Comments: Left hand resting tremor   Psychiatric:         Mood and Affect: Mood normal.         Behavior: Behavior normal.         Thought Content: Thought content normal.         Judgment: Judgment normal.         Lab Results:      Lab Results   Component Value Date    WBC 10.66 (H) 12/04/2024    HGB 14.7 12/04/2024    HCT 45.4 12/04/2024     (H) 12/04/2024    CHOL 206 12/17/2015    TRIG 138 07/17/2024    HDL 44 07/17/2024    LDLDIRECT 66 07/01/2024    ALT 7 09/30/2024    AST 19 09/30/2024     12/17/2015    K 4.6 09/30/2024     09/30/2024    CREATININE 1.17 09/30/2024    BUN 17 09/30/2024    CO2 25 09/30/2024    TSH 2.06 12/20/2019    PSA <0.01 03/18/2024    INR 1.0 09/14/2022    GLUF 117 (H) 07/17/2024    HGBA1C 7.5 (H) 10/31/2024     No results found for: \"URICACID\"  Invalid input(s): \"BASENAME\" Vitamin D    No results found.     POCT Labs        Depression Screening and Follow-up Plan: Patient's depression screening was positive with a PHQ-9 score of 9. Patient advised to follow-up with PCP for further management.                     "

## 2024-12-27 NOTE — ASSESSMENT & PLAN NOTE
Management per [facility].  S/p Deep brain stimulator 9/22.  On Sinemet 25-100mg 2 tabs in AM and 1 pill QHS.  Continue [facility] Parkinson's Boxing Program.         Orders:  •  Vitamin D 25 hydroxy; Future

## 2024-12-27 NOTE — ASSESSMENT & PLAN NOTE
Lab Results   Component Value Date    HGBA1C 7.5 (H) 10/31/2024     Uncontrolled. Management per Endo. Recommend lifestyle modifications.     Orders:  •  LDL cholesterol, direct; Future  •  Comprehensive metabolic panel; Future  •  Comprehensive metabolic panel; Future  •  Lipid panel; Future

## 2024-12-27 NOTE — PROGRESS NOTES
Diabetic Foot Exam    Patient's shoes and socks removed.    Right Foot/Ankle   Right Foot Inspection  Skin Exam: skin normal, skin intact and dry skin. No warmth, no callus, no erythema, no maceration, no abnormal color, no pre-ulcer, no ulcer and no callus.     Toe Exam: ROM and strength within normal limits.     Sensory   Monofilament testing: intact    Vascular  Capillary refills: < 3 seconds  The right DP pulse is 2+.     Left Foot/Ankle  Left Foot Inspection  Skin Exam: skin normal, skin intact and dry skin. No warmth, no erythema, no maceration, normal color, no pre-ulcer, no ulcer and no callus.     Toe Exam: ROM and strength within normal limits.     Sensory   Monofilament testing: intact    Vascular  Capillary refills: < 3 seconds  The left DP pulse is 2+.     Assign Risk Category  No deformity present  No loss of protective sensation  No weak pulses  Risk: 0

## 2024-12-27 NOTE — ASSESSMENT & PLAN NOTE
Patient declined continued Uro F/U and desires yearly PSA per PCP.  Status post DaVinci robot prostatectomy performed at [facility] 2013.

## 2024-12-27 NOTE — ASSESSMENT & PLAN NOTE
Uncontrolled. Management per Endo. Recommend lifestyle modifications.     Lab Results   Component Value Date    HGBA1C 7.5 (H) 10/31/2024

## 2024-12-27 NOTE — ASSESSMENT & PLAN NOTE
Stable on Effexor .5 mg daily.    Depression Screening Follow-up Plan: Patient's depression screening was positive with a PHQ-9 score of 9.

## 2025-01-03 ENCOUNTER — APPOINTMENT (OUTPATIENT)
Dept: LAB | Facility: CLINIC | Age: 70
End: 2025-01-03
Payer: COMMERCIAL

## 2025-01-03 ENCOUNTER — RESULTS FOLLOW-UP (OUTPATIENT)
Dept: FAMILY MEDICINE CLINIC | Facility: CLINIC | Age: 70
End: 2025-01-03

## 2025-01-03 DIAGNOSIS — E78.2 MIXED HYPERLIPIDEMIA: ICD-10-CM

## 2025-01-03 DIAGNOSIS — I25.10 ARTERIOSCLEROSIS OF CORONARY ARTERY: ICD-10-CM

## 2025-01-03 DIAGNOSIS — Z86.73 HISTORY OF STROKE: ICD-10-CM

## 2025-01-03 DIAGNOSIS — E11.65 TYPE 2 DIABETES MELLITUS WITH HYPERGLYCEMIA, WITHOUT LONG-TERM CURRENT USE OF INSULIN (HCC): ICD-10-CM

## 2025-01-03 LAB
ALBUMIN SERPL BCG-MCNC: 4.3 G/DL (ref 3.5–5)
ALP SERPL-CCNC: 35 U/L (ref 34–104)
ALT SERPL W P-5'-P-CCNC: 6 U/L (ref 7–52)
ANION GAP SERPL CALCULATED.3IONS-SCNC: 6 MMOL/L (ref 4–13)
AST SERPL W P-5'-P-CCNC: 21 U/L (ref 13–39)
BILIRUB SERPL-MCNC: 0.47 MG/DL (ref 0.2–1)
BUN SERPL-MCNC: 20 MG/DL (ref 5–25)
CALCIUM SERPL-MCNC: 9.3 MG/DL (ref 8.4–10.2)
CHLORIDE SERPL-SCNC: 103 MMOL/L (ref 96–108)
CO2 SERPL-SCNC: 30 MMOL/L (ref 21–32)
CREAT SERPL-MCNC: 1.12 MG/DL (ref 0.6–1.3)
GFR SERPL CREATININE-BSD FRML MDRD: 66 ML/MIN/1.73SQ M
GLUCOSE P FAST SERPL-MCNC: 118 MG/DL (ref 65–99)
LDLC SERPL DIRECT ASSAY-MCNC: 91 MG/DL (ref 0–100)
POTASSIUM SERPL-SCNC: 5 MMOL/L (ref 3.5–5.3)
PROT SERPL-MCNC: 6.9 G/DL (ref 6.4–8.4)
SODIUM SERPL-SCNC: 139 MMOL/L (ref 135–147)

## 2025-01-03 PROCEDURE — 83721 ASSAY OF BLOOD LIPOPROTEIN: CPT

## 2025-01-03 PROCEDURE — 80053 COMPREHEN METABOLIC PANEL: CPT

## 2025-01-03 NOTE — RESULT ENCOUNTER NOTE
Elevated fasting glucose-advise lifestyle modifications-low-carb, low sugar diet and exercise.    Hyperlipidemia - Elevated LDL (bad) cholesterol is 91, previously 66.  Goal is less than 70.  Recommend lifestyle modifications # low-fat, low-cholesterol diet, and exercise.    Other resulted labs are stable.      Message sent to patient via [facility] patient portal.

## 2025-02-06 ENCOUNTER — APPOINTMENT (OUTPATIENT)
Dept: LAB | Facility: CLINIC | Age: 70
End: 2025-02-06
Payer: COMMERCIAL

## 2025-02-06 DIAGNOSIS — E11.65 TYPE 2 DIABETES MELLITUS WITH HYPERGLYCEMIA, WITHOUT LONG-TERM CURRENT USE OF INSULIN (HCC): ICD-10-CM

## 2025-02-06 LAB
EST. AVERAGE GLUCOSE BLD GHB EST-MCNC: 180 MG/DL
HBA1C MFR BLD: 7.9 %

## 2025-02-06 PROCEDURE — 36415 COLL VENOUS BLD VENIPUNCTURE: CPT

## 2025-02-06 PROCEDURE — 83036 HEMOGLOBIN GLYCOSYLATED A1C: CPT

## 2025-02-06 NOTE — PROGRESS NOTES
"Name: Andres Chacko      : 1955      MRN: [id number]  Encounter Provider: Mariajose Vaca MD  Encounter Date: 2025   Encounter department: [facility]  :  Assessment & Plan  Essential (hemorrhagic) thrombocythemia (HCC)    Tremayne Chacko is a 68-year-old male with essential thrombocytosis with JAK2 V617F mutation.  He was previously following with BENNY Mejia and now presents for transfer of care. He was previously on Hydrea 500 mg once daily did not tolerate due to side effects.  His dose was reduced to once daily on Monday, Wednesday, Friday.  He tolerated this for a longer period of time but then self discontinued due to feeling \"ill \".  Since discontinuing Hydrea he is back to baseline and has no constitutional complaints or concerns.  His platelet count remained greater than 500 K with mildly elevated WBC count.  Due to intolerance of Hydrea he was switched to Anagrelide 1 mg BID in 2023.  He continues ASA 81 mg.  He notes compliance and tolerance with anagrelide.  I encouraged patient to have updated labs to assess treatment response as well as need for dose adjustments.  We reviewed updated guidelines necessitating bone marrow biopsy at the time of diagnosis.          JAK2 V617F mutation      Restarted 50% dose reduction one month ago; stopped 1 week ago as side effects/making Parkinson drugs less effective     Labs 10/31/2024 with WBC 8.9 Hgb 14.4 and plts 545; were 595 2024     Will remain off anagrelide, repeat labs in 1 month     Again discussed Jakafi as will not take hydrea, not tolerating anagrelide on multiple trials and cannot get IFN      Remain on aspirin      Labs one month      2024     Labs 2024 with WBC 10.7 Hgb 14.7 plts 565; not on any anagrelide     Labs 10/31/2024 with WBC 8.9 Hgb 14.4 plts 545  2024 with WBC 9.9 Hgb 15.3 plts 595     Thus no change off anagrelide     States his levodopa is " "working, Parkinson's more controlled; could go hunting without issues     Will repeat labs in about 6 weeks; will remain on aspirin only for now     2/7/2025    Labs 1/3/2025 but no CBC    Last plts 565 12/4/2024    No labs as of 2/6/2025; not in order set    Will have done later next week; will call with results    Not on any medication; no change    Summary/Recommendations    Repeat CBC with diff early next week and again in 2 months    Follow up 2 months             History of Present Illness   No chief complaint on file.    HPI  12/6/2023     Tremayne Chacko is a 68-year-old male with past medical history significant for type 2 diabetes, CAD, HTN, Parkinson's disease, history of prostate cancer s/p prostatectomy in 2013.  Patient was previously following with BENNY Mejia for longstanding history of thrombocytosis since at least 2016.  He denies any personal history of thrombosis..  Subsequent workup noted JAK2 V6 17F mutation positive and patient was initiated on hydroxyurea which she subsequently stopped due to intolerance.  Patient does not state specific side effects, but notes it made him feel \"ill\".  He was switched to anagrelide 1 mg twice daily since November 2023.  Patient notes compliance with current treatment and denies any adverse effects.  No repeat labs available.  Patient presents for transfer of care/follow-up visit today.  Offers no new complaints.  Denies any chest pain, shortness of breath, abdominal pain, nausea, vomiting, diarrhea.  Denies any fever, night sweats or unexpected weight changes.  He has never had a bone marrow biopsy.     Interval History: 12/6/2024     As above, plts stable at 565; were 545 and 595 off analgrelide.  Parkinson's better controlled.  No other issues, feeling well.     Interval History 2/7/2025    Doing well; no issues.  Did not have CBC with diff as order lost.  Will get repeated next week.  Not on hydrea or anagrelide      Pertinent Medical History "   02/06/25:     Review of Systems   All other systems reviewed and are negative.          Objective   There were no vitals taken for this visit.    No Physical Telemedicine     Pain Screening:     ECOG   0  Physical Exam    Labs: I have reviewed the following labs:  Lab Results   Component Value Date/Time    WBC 10.66 (H) 12/04/2024 05:34 PM    RBC 5.01 12/04/2024 05:34 PM    Hemoglobin 14.7 12/04/2024 05:34 PM    Hematocrit 45.4 12/04/2024 05:34 PM    MCV 91 12/04/2024 05:34 PM    MCH 29.3 12/04/2024 05:34 PM    RDW 14.6 12/04/2024 05:34 PM    Platelets 565 (H) 12/04/2024 05:34 PM    Segmented % 62 12/04/2024 05:34 PM    Lymphocytes % 24 12/04/2024 05:34 PM    Monocytes % 9 12/04/2024 05:34 PM    Eosinophils Relative 4 12/04/2024 05:34 PM    Basophils Relative 1 12/04/2024 05:34 PM    Immature Grans % 0 12/04/2024 05:34 PM    Absolute Neutrophils 6.69 12/04/2024 05:34 PM     Lab Results   Component Value Date/Time    Potassium 5.0 01/03/2025 10:08 AM    Chloride 103 01/03/2025 10:08 AM    CO2 30 01/03/2025 10:08 AM    BUN 20 01/03/2025 10:08 AM    Creatinine 1.12 01/03/2025 10:08 AM    Glucose, Fasting 118 (H) 01/03/2025 10:08 AM    Calcium 9.3 01/03/2025 10:08 AM    AST 21 01/03/2025 10:08 AM    ALT 6 (L) 01/03/2025 10:08 AM    Alkaline Phosphatase 35 01/03/2025 10:08 AM    Total Protein 6.9 01/03/2025 10:08 AM    Albumin 4.3 01/03/2025 10:08 AM    Total Bilirubin 0.47 01/03/2025 10:08 AM    eGFR 66 01/03/2025 10:08 AM             Administrative Statements     This was telemedicine; Was in [place], patient at home office door closed.  Patient aware billable       I have spent a total time of 20 minutes in caring for this patient on the day of the visit/encounter including Impressions, Reviewing / ordering tests, medicine, procedures  , and Obtaining or reviewing history  .

## 2025-02-06 NOTE — ASSESSMENT & PLAN NOTE
"  Tremayne Chacko is a 68-year-old male with essential thrombocytosis with JAK2 V617F mutation.  He was previously following with BENNY Mejia and now presents for transfer of care. He was previously on Hydrea 500 mg once daily did not tolerate due to side effects.  His dose was reduced to once daily on Monday, Wednesday, Friday.  He tolerated this for a longer period of time but then self discontinued due to feeling \"ill \".  Since discontinuing Hydrea he is back to baseline and has no constitutional complaints or concerns.  His platelet count remained greater than 500 K with mildly elevated WBC count.  Due to intolerance of Hydrea he was switched to Anagrelide 1 mg BID in November 2023.  He continues ASA 81 mg.  He notes compliance and tolerance with anagrelide.  I encouraged patient to have updated labs to assess treatment response as well as need for dose adjustments.  We reviewed updated guidelines necessitating bone marrow biopsy at the time of diagnosis.          "

## 2025-02-07 ENCOUNTER — TELEPHONE (OUTPATIENT)
Age: 70
End: 2025-02-07

## 2025-02-07 ENCOUNTER — TELEMEDICINE (OUTPATIENT)
Age: 70
End: 2025-02-07
Payer: COMMERCIAL

## 2025-02-07 ENCOUNTER — RESULTS FOLLOW-UP (OUTPATIENT)
Dept: ENDOCRINOLOGY | Facility: CLINIC | Age: 70
End: 2025-02-07

## 2025-02-07 DIAGNOSIS — Z15.89 JAK2 V617F MUTATION: ICD-10-CM

## 2025-02-07 DIAGNOSIS — D47.3 ESSENTIAL (HEMORRHAGIC) THROMBOCYTHEMIA (HCC): Primary | ICD-10-CM

## 2025-02-07 PROCEDURE — 99213 OFFICE O/P EST LOW 20 MIN: CPT | Performed by: INTERNAL MEDICINE

## 2025-02-07 NOTE — PATIENT INSTRUCTIONS
CBC with diff Monday 10th Feb if possible    Will call with results    Follow up otherwise 2 months with labs prior

## 2025-02-07 NOTE — TELEPHONE ENCOUNTER
Called patient to check out virtual appt today.  Received voicemail, left a message that 2 MO FU VIRTUAL/TELEMED has been scheduled for 4/11 at 10 am with labs prior.  Mailed paperwork to patient.

## 2025-02-25 ENCOUNTER — APPOINTMENT (OUTPATIENT)
Dept: LAB | Facility: CLINIC | Age: 70
End: 2025-02-25
Payer: COMMERCIAL

## 2025-02-25 DIAGNOSIS — D47.3 ESSENTIAL (HEMORRHAGIC) THROMBOCYTHEMIA (HCC): ICD-10-CM

## 2025-02-25 LAB
BASOPHILS # BLD AUTO: 0.08 THOUSANDS/ÂΜL (ref 0–0.1)
BASOPHILS NFR BLD AUTO: 1 % (ref 0–1)
EOSINOPHIL # BLD AUTO: 0.41 THOUSAND/ÂΜL (ref 0–0.61)
EOSINOPHIL NFR BLD AUTO: 4 % (ref 0–6)
ERYTHROCYTE [DISTWIDTH] IN BLOOD BY AUTOMATED COUNT: 14.2 % (ref 11.6–15.1)
HCT VFR BLD AUTO: 46 % (ref 36.5–49.3)
HGB BLD-MCNC: 14.6 G/DL (ref 12–17)
IMM GRANULOCYTES # BLD AUTO: 0.05 THOUSAND/UL (ref 0–0.2)
IMM GRANULOCYTES NFR BLD AUTO: 1 % (ref 0–2)
LYMPHOCYTES # BLD AUTO: 2.78 THOUSANDS/ÂΜL (ref 0.6–4.47)
LYMPHOCYTES NFR BLD AUTO: 26 % (ref 14–44)
MCH RBC QN AUTO: 28.7 PG (ref 26.8–34.3)
MCHC RBC AUTO-ENTMCNC: 31.7 G/DL (ref 31.4–37.4)
MCV RBC AUTO: 91 FL (ref 82–98)
MONOCYTES # BLD AUTO: 0.94 THOUSAND/ÂΜL (ref 0.17–1.22)
MONOCYTES NFR BLD AUTO: 9 % (ref 4–12)
NEUTROPHILS # BLD AUTO: 6.58 THOUSANDS/ÂΜL (ref 1.85–7.62)
NEUTS SEG NFR BLD AUTO: 59 % (ref 43–75)
NRBC BLD AUTO-RTO: 0 /100 WBCS
PLATELET # BLD AUTO: 524 THOUSANDS/UL (ref 149–390)
PMV BLD AUTO: 10.1 FL (ref 8.9–12.7)
RBC # BLD AUTO: 5.08 MILLION/UL (ref 3.88–5.62)
WBC # BLD AUTO: 10.84 THOUSAND/UL (ref 4.31–10.16)

## 2025-02-25 PROCEDURE — 36415 COLL VENOUS BLD VENIPUNCTURE: CPT

## 2025-02-25 PROCEDURE — 85025 COMPLETE CBC W/AUTO DIFF WBC: CPT

## 2025-02-27 DIAGNOSIS — E11.65 TYPE 2 DIABETES MELLITUS WITH HYPERGLYCEMIA, WITHOUT LONG-TERM CURRENT USE OF INSULIN (HCC): ICD-10-CM

## 2025-02-27 RX ORDER — ACYCLOVIR 800 MG/1
TABLET ORAL
Qty: 6 EACH | Refills: 1 | Status: SHIPPED | OUTPATIENT
Start: 2025-02-27

## 2025-03-12 ENCOUNTER — TELEPHONE (OUTPATIENT)
Dept: FAMILY MEDICINE CLINIC | Facility: CLINIC | Age: 70
End: 2025-03-12

## 2025-03-14 DIAGNOSIS — E11.65 TYPE 2 DIABETES MELLITUS WITH HYPERGLYCEMIA, WITHOUT LONG-TERM CURRENT USE OF INSULIN (HCC): ICD-10-CM

## 2025-03-17 RX ORDER — SITAGLIPTIN AND METFORMIN HYDROCHLORIDE 1000; 50 MG/1; MG/1
1 TABLET, FILM COATED ORAL 2 TIMES DAILY WITH MEALS
Qty: 200 TABLET | Refills: 0 | Status: SHIPPED | OUTPATIENT
Start: 2025-03-17

## 2025-04-08 NOTE — PROGRESS NOTES
"Name: Andres Chacko      : 1955      MRN: [id number]  Encounter Provider: Mariajose Vaca MD  Encounter Date: 2025   Encounter department: [facility]  :  Assessment & Plan    Essential (hemorrhagic) thrombocythemia (HCC)    Tremayne Chacko is a 68-year-old male with essential thrombocytosis with JAK2 V617F mutation.  He was previously following with BENNY Mejia and now presents for transfer of care. He was previously on Hydrea 500 mg once daily did not tolerate due to side effects.  His dose was reduced to once daily on Monday, Wednesday, Friday.  He tolerated this for a longer period of time but then self discontinued due to feeling \"ill \".  Since discontinuing Hydrea he is back to baseline and has no constitutional complaints or concerns.  His platelet count remained greater than 500 K with mildly elevated WBC count.  Due to intolerance of Hydrea he was switched to Anagrelide 1 mg BID in 2023.  He continues ASA 81 mg.  He notes compliance and tolerance with anagrelide.  I encouraged patient to have updated labs to assess treatment response as well as need for dose adjustments.  We reviewed updated guidelines necessitating bone marrow biopsy at the time of diagnosis.        JAK2 V617F mutation       Restarted 50% dose reduction one month ago; stopped 1 week ago as side effects/making Parkinson drugs less effective     Labs 10/31/2024 with WBC 8.9 Hgb 14.4 and plts 545; were 595 2024     Will remain off anagrelide, repeat labs in 1 month     Again discussed Jakafi as will not take hydrea, not tolerating anagrelide on multiple trials and cannot get IFN      Remain on aspirin      Labs one month      2024     Labs 2024 with WBC 10.7 Hgb 14.7 plts 565; not on any anagrelide     Labs 10/31/2024 with WBC 8.9 Hgb 14.4 plts 545  2024 with WBC 9.9 Hgb 15.3 plts 595     Thus no change off anagrelide     States his levodopa " "is working, Parkinson's more controlled; could go hunting without issues     Will repeat labs in about 6 weeks; will remain on aspirin only for now      2/7/2025     Labs 1/3/2025 but no CBC     Last plts 565 12/4/2024     No labs as of 2/6/2025; not in order set     Will have done later next week; will call with results     Not on any medication; no change       4/11/2025    Labs 4/10/2025 Na 137 K 4.9 Cr 1.19 ALT/AST 6/22 Ca 9.3 TB 0.39     WBC 9/97 Hgb 14.8 MCV 91 plts 589    Labs 2/25/2025 with WBC 0.8 Hgb 14.6 MCV 91 and plts 524    Have been 565, 545, 595    No issues     Not on hydrea/anagrelide as not tolerated     Summary/Recommendations    Repeat labs 3 months    Follow up telemedicine 3 months        History of Present Illness   No chief complaint on file.    HPI  12/6/2023     Tremayne Chacko is a 68-year-old male with past medical history significant for type 2 diabetes, CAD, HTN, Parkinson's disease, history of prostate cancer s/p prostatectomy in 2013.  Patient was previously following with BENNY Mejia for longstanding history of thrombocytosis since at least 2016.  He denies any personal history of thrombosis..  Subsequent workup noted JAK2 V6 17F mutation positive and patient was initiated on hydroxyurea which she subsequently stopped due to intolerance.  Patient does not state specific side effects, but notes it made him feel \"ill\".  He was switched to anagrelide 1 mg twice daily since November 2023.  Patient notes compliance with current treatment and denies any adverse effects.  No repeat labs available.  Patient presents for transfer of care/follow-up visit today.  Offers no new complaints.  Denies any chest pain, shortness of breath, abdominal pain, nausea, vomiting, diarrhea.  Denies any fever, night sweats or unexpected weight changes.  He has never had a bone marrow biopsy.     Interval History: 12/6/2024     As above, plts stable at 565; were 545 and 595 off analgrelide.  " Parkinson's better controlled.  No other issues, feeling well.      Interval History 2/7/2025     Doing well; no issues.  Did not have CBC with diff as order lost.  Will get repeated next week.  Not on hydrea or anagrelide     4/11/2025    No issues; doing well    Plts 589, essentially unchanged as above    Not on anagrelide/hydrea as did not tolerate     Pertinent Medical History   02/06/25:     Review of Systems   All other systems reviewed and are negative.            04/08/25:      Review of Systems   All other systems reviewed and are negative.          Objective   There were no vitals taken for this visit.    Pain Screening:     ECOG   0  Physical Exam  not done telemedicine     Labs: I have reviewed the following labs:  Lab Results   Component Value Date/Time    WBC 10.84 (H) 02/25/2025 10:32 AM    RBC 5.08 02/25/2025 10:32 AM    Hemoglobin 14.6 02/25/2025 10:32 AM    Hematocrit 46.0 02/25/2025 10:32 AM    MCV 91 02/25/2025 10:32 AM    MCH 28.7 02/25/2025 10:32 AM    RDW 14.2 02/25/2025 10:32 AM    Platelets 524 (H) 02/25/2025 10:32 AM    Segmented % 59 02/25/2025 10:32 AM    Lymphocytes % 26 02/25/2025 10:32 AM    Monocytes % 9 02/25/2025 10:32 AM    Eosinophils Relative 4 02/25/2025 10:32 AM    Basophils Relative 1 02/25/2025 10:32 AM    Immature Grans % 1 02/25/2025 10:32 AM    Absolute Neutrophils 6.58 02/25/2025 10:32 AM     Lab Results   Component Value Date/Time    Potassium 5.0 01/03/2025 10:08 AM    Chloride 103 01/03/2025 10:08 AM    CO2 30 01/03/2025 10:08 AM    BUN 20 01/03/2025 10:08 AM    Creatinine 1.12 01/03/2025 10:08 AM    Glucose, Fasting 118 (H) 01/03/2025 10:08 AM    Calcium 9.3 01/03/2025 10:08 AM    AST 21 01/03/2025 10:08 AM    ALT 6 (L) 01/03/2025 10:08 AM    Alkaline Phosphatase 35 01/03/2025 10:08 AM    Total Protein 6.9 01/03/2025 10:08 AM    Albumin 4.3 01/03/2025 10:08 AM    Total Bilirubin 0.47 01/03/2025 10:08 AM    eGFR 66 01/03/2025 10:08 AM           Administrative  Statements   Encounter provider Mariajose Vaca MD    The Patient is located at Home and in the following state in which I hold an active license [place].    The patient was identified by name and date of birth. Andres BENI [place] was informed that this is a telemedicine visit and that the visit is being conducted through the Epic Embedded platform. He agrees to proceed..  My office door was closed. No one else was in the room.  He acknowledged consent and understanding of privacy and security of the video platform. The patient has agreed to participate and understands they can discontinue the visit at any time.    I have spent a total time of 30 minutes in caring for this patient on the day of the visit/encounter including Impressions, Counseling / Coordination of care, Documenting in the medical record, Reviewing/placing orders in the medical record (including tests, medications, and/or procedures), and Obtaining or reviewing history  , not including the time spent for establishing the audio/video connection.

## 2025-04-10 ENCOUNTER — APPOINTMENT (OUTPATIENT)
Dept: LAB | Facility: CLINIC | Age: 70
End: 2025-04-10
Payer: COMMERCIAL

## 2025-04-10 DIAGNOSIS — D47.3 ESSENTIAL (HEMORRHAGIC) THROMBOCYTHEMIA (HCC): ICD-10-CM

## 2025-04-10 LAB
ALBUMIN SERPL BCG-MCNC: 4.3 G/DL (ref 3.5–5)
ALP SERPL-CCNC: 31 U/L (ref 34–104)
ALT SERPL W P-5'-P-CCNC: 6 U/L (ref 7–52)
ANION GAP SERPL CALCULATED.3IONS-SCNC: 8 MMOL/L (ref 4–13)
AST SERPL W P-5'-P-CCNC: 22 U/L (ref 13–39)
BASOPHILS # BLD AUTO: 0.08 THOUSANDS/ÂΜL (ref 0–0.1)
BASOPHILS NFR BLD AUTO: 1 % (ref 0–1)
BILIRUB SERPL-MCNC: 0.39 MG/DL (ref 0.2–1)
BUN SERPL-MCNC: 20 MG/DL (ref 5–25)
CALCIUM SERPL-MCNC: 9.3 MG/DL (ref 8.4–10.2)
CHLORIDE SERPL-SCNC: 101 MMOL/L (ref 96–108)
CO2 SERPL-SCNC: 28 MMOL/L (ref 21–32)
CREAT SERPL-MCNC: 1.19 MG/DL (ref 0.6–1.3)
EOSINOPHIL # BLD AUTO: 0.39 THOUSAND/ÂΜL (ref 0–0.61)
EOSINOPHIL NFR BLD AUTO: 4 % (ref 0–6)
ERYTHROCYTE [DISTWIDTH] IN BLOOD BY AUTOMATED COUNT: 14 % (ref 11.6–15.1)
GFR SERPL CREATININE-BSD FRML MDRD: 61 ML/MIN/1.73SQ M
GLUCOSE SERPL-MCNC: 163 MG/DL (ref 65–140)
HCT VFR BLD AUTO: 46.2 % (ref 36.5–49.3)
HGB BLD-MCNC: 14.8 G/DL (ref 12–17)
IMM GRANULOCYTES # BLD AUTO: 0.03 THOUSAND/UL (ref 0–0.2)
IMM GRANULOCYTES NFR BLD AUTO: 0 % (ref 0–2)
LDH SERPL-CCNC: 149 U/L (ref 140–271)
LYMPHOCYTES # BLD AUTO: 2.44 THOUSANDS/ÂΜL (ref 0.6–4.47)
LYMPHOCYTES NFR BLD AUTO: 25 % (ref 14–44)
MCH RBC QN AUTO: 29 PG (ref 26.8–34.3)
MCHC RBC AUTO-ENTMCNC: 32 G/DL (ref 31.4–37.4)
MCV RBC AUTO: 91 FL (ref 82–98)
MONOCYTES # BLD AUTO: 0.82 THOUSAND/ÂΜL (ref 0.17–1.22)
MONOCYTES NFR BLD AUTO: 8 % (ref 4–12)
NEUTROPHILS # BLD AUTO: 6.21 THOUSANDS/ÂΜL (ref 1.85–7.62)
NEUTS SEG NFR BLD AUTO: 62 % (ref 43–75)
NRBC BLD AUTO-RTO: 0 /100 WBCS
PLATELET # BLD AUTO: 589 THOUSANDS/UL (ref 149–390)
PMV BLD AUTO: 10 FL (ref 8.9–12.7)
POTASSIUM SERPL-SCNC: 4.9 MMOL/L (ref 3.5–5.3)
PROT SERPL-MCNC: 7 G/DL (ref 6.4–8.4)
RBC # BLD AUTO: 5.1 MILLION/UL (ref 3.88–5.62)
SODIUM SERPL-SCNC: 137 MMOL/L (ref 135–147)
WBC # BLD AUTO: 9.97 THOUSAND/UL (ref 4.31–10.16)

## 2025-04-10 PROCEDURE — 85025 COMPLETE CBC W/AUTO DIFF WBC: CPT

## 2025-04-10 PROCEDURE — 36415 COLL VENOUS BLD VENIPUNCTURE: CPT

## 2025-04-10 PROCEDURE — 80053 COMPREHEN METABOLIC PANEL: CPT

## 2025-04-10 PROCEDURE — 83615 LACTATE (LD) (LDH) ENZYME: CPT

## 2025-04-11 ENCOUNTER — TELEPHONE (OUTPATIENT)
Age: 70
End: 2025-04-11

## 2025-04-11 ENCOUNTER — TELEMEDICINE (OUTPATIENT)
Age: 70
End: 2025-04-11
Payer: COMMERCIAL

## 2025-04-11 DIAGNOSIS — D47.3 ESSENTIAL THROMBOCYTOSIS (HCC): Primary | ICD-10-CM

## 2025-04-11 PROCEDURE — 99214 OFFICE O/P EST MOD 30 MIN: CPT | Performed by: INTERNAL MEDICINE

## 2025-04-11 NOTE — TELEPHONE ENCOUNTER
Called patient to check out virtual appt today, scheduled 7/11 940 am appt.  Mailed AVS out to patient.

## 2025-04-14 ENCOUNTER — APPOINTMENT (OUTPATIENT)
Dept: LAB | Facility: CLINIC | Age: 70
End: 2025-04-14
Payer: COMMERCIAL

## 2025-04-14 LAB
CHOLEST SERPL-MCNC: 131 MG/DL (ref ?–200)
HDLC SERPL-MCNC: 39 MG/DL
LDLC SERPL CALC-MCNC: 59 MG/DL (ref 0–100)
NONHDLC SERPL-MCNC: 92 MG/DL
TRIGL SERPL-MCNC: 167 MG/DL (ref ?–150)

## 2025-04-15 ENCOUNTER — OFFICE VISIT (OUTPATIENT)
Dept: ENDOCRINOLOGY | Facility: CLINIC | Age: 70
End: 2025-04-15
Payer: COMMERCIAL

## 2025-04-15 VITALS
HEART RATE: 60 BPM | WEIGHT: 198.8 LBS | SYSTOLIC BLOOD PRESSURE: 130 MMHG | DIASTOLIC BLOOD PRESSURE: 78 MMHG | BODY MASS INDEX: 29.44 KG/M2 | HEIGHT: 69 IN

## 2025-04-15 DIAGNOSIS — E11.65 TYPE 2 DIABETES MELLITUS WITH HYPERGLYCEMIA, WITHOUT LONG-TERM CURRENT USE OF INSULIN (HCC): ICD-10-CM

## 2025-04-15 DIAGNOSIS — R53.83 OTHER FATIGUE: Primary | ICD-10-CM

## 2025-04-15 DIAGNOSIS — E55.9 VITAMIN D DEFICIENCY: ICD-10-CM

## 2025-04-15 PROCEDURE — 99214 OFFICE O/P EST MOD 30 MIN: CPT

## 2025-04-15 PROCEDURE — 95251 CONT GLUC MNTR ANALYSIS I&R: CPT

## 2025-04-15 RX ORDER — REPAGLINIDE 0.5 MG/1
0.5 TABLET ORAL
Qty: 180 TABLET | Refills: 1 | Status: SHIPPED | OUTPATIENT
Start: 2025-04-15

## 2025-04-15 RX ORDER — INSULIN DEGLUDEC 200 U/ML
INJECTION, SOLUTION SUBCUTANEOUS
Qty: 18 ML | Refills: 2 | Status: SHIPPED | OUTPATIENT
Start: 2025-04-15

## 2025-04-15 NOTE — PROGRESS NOTES
Name: Andres Chacko      : 1955      MRN: [id number]  Encounter Provider: BENNY De La Rosa  Encounter Date: 4/15/2025   Encounter department: [facility] [place]    Chief Complaint   Patient presents with    Diabetes Type 2     Assessment & Plan  1. Diabetes Mellitus.  His A1c levels have shown an improvement from 7.9 to 7.4 on 2 week GMI, although they remain elevated. His blood glucose levels peak around breakfast time, necessitating the addition of Prandin 0.5 mg tablet to his morning regimen. He has experienced mild hypoglycemia overnight, but these are not consistent across other days. His metabolic panel and cholesterol levels are within normal limits. He is advised to incorporate exercise into his routine, particularly walking when blood glucose levels are high, and to maintain adequate hydration. Dietary modifications are also recommended. The dosage of insulin will be reduced to 18 units. He is advised to monitor his blood glucose levels closely and adjust his insulin dosage accordingly.    2. Subclinical Hyperthyroidism.  His thyroid function is within normal limits, although his T4 levels are slightly low. Given his age, the threshold for treating thyroid changes has increased from a TSH level of 4 to 7. He has been informed that inducing a hyperthyroid state could potentially lead to heart failure, arrhythmias, and osteoporosis. He is advised to discontinue the use of the 50 mcg medication. A thyroid function test will be ordered to assess his current status after 6 weeks of stopping medication.     3. Fatigue.  He has been informed that vitamin D deficiency and iron deficiency can contribute to fatigue. A vitamin D test will be ordered by his primary care physician. He is advised to consult with his hematologist regarding an iron test.    4. Blood pressure management.  His blood pressure readings were slightly elevated during this visit but  were within the normal range during his last visit with Dr. Goodrich at 132/70. His blood pressure was rechecked today and it was 130/80. He is currently on lisinopril, which is protective of his kidneys.    Follow-up  The patient will follow up in 3 to 4 months.    History of Present Illness  Andres Chacko is a 69 y.o. male with a history of type 2 diabetes, hyperlipidemia, hypertension. Complications: stroke   Most recent A1C 7.9    The patient presents for evaluation of diabetes, subclinical hyperthyroidism, fatigue, and blood pressure management.    He reports no adverse reactions to Prandin. He continues to take 20 units of insulin without any reduction. He experiences hypoglycemic episodes at night, which resolve spontaneously without dietary intervention. He acknowledges the possibility of sensor displacement due to sleeping on it. His last ophthalmological examination was conducted at [facility], with no reported ocular complications related to diabetes. He is currently on Prandin and insulin.    He has a history of subclinical hyperthyroidism, previously managed with Synthroid, which was discontinued due to the onset of hot flashes. He has been self-administering a 50 mcg dose of an unspecified medication for the past month, resulting in improved energy levels. However, he is uncertain if this has exacerbated his tremors.    He is currently on a regimen of 4000 IU of vitamin D daily.    He is on lisinopril for blood pressure management.    Supplemental Information  He has essential thrombocythemia and was on Agrylin but could not tolerate it as it completely killed his energy.    MEDICATIONS  Current: Prandin, lisinopril, vitamin D.  Discontinued: Synthroid.    Current regimen:   Janumet 2 tabs daily  Tresiba 20 units daily   Prandin 0.5 mg prior to lunch and dinner    CGM Interpretation:  Date Range: April 2 to April 15, 2025   Device used: Luisa  Option - Home use   Option - Indication:  "Type 2 Diabetes  Analysis of data:   Average Glucose: 173  GMI: 7.4%  Coefficient of Variation: 31.1%  Time in Target Range: 59%   Time Above Range: 31% high, 10% very high  Time Below Range: 0% low, 0% very low  Interpretation of data: BG highest after meals  More than 72 hours of data was reviewed. Report to be scanned to chart.     Last Eye Exam: UTD, Aug 2024 - [facility]   Last Foot Exam: 12/27/2024  Health Maintenance   Topic Date Due    Diabetic Eye Exam  07/18/2025    Diabetic Foot Exam  12/27/2025         Review of Systems   Constitutional:  Positive for fatigue. Negative for chills and fever.   HENT:  Negative for ear pain and sore throat.    Eyes:  Negative for pain and visual disturbance.   Respiratory:  Negative for cough and shortness of breath.    Cardiovascular:  Negative for chest pain and palpitations.   Gastrointestinal:  Negative for abdominal pain and vomiting.   Genitourinary:  Negative for dysuria and hematuria.   Musculoskeletal:  Negative for arthralgias and back pain.   Skin:  Negative for color change and rash.   Neurological:  Negative for seizures and syncope.   All other systems reviewed and are negative.   as per HPI         Medical History Reviewed by provider this encounter:     .    Objective   /78   Pulse 60   Ht 5' 9\" (1.753 m)   Wt 90.2 kg (198 lb 12.8 oz)   BMI 29.36 kg/m²      Body mass index is 29.36 kg/m².  Wt Readings from Last 3 Encounters:   04/15/25 90.2 kg (198 lb 12.8 oz)   12/27/24 89.4 kg (197 lb 3.2 oz)   11/27/24 89.6 kg (197 lb 9.6 oz)       Physical Exam  Vitals reviewed.   HENT:      Head: Normocephalic and atraumatic.   Cardiovascular:      Rate and Rhythm: Normal rate.   Pulmonary:      Effort: Pulmonary effort is normal.   Neurological:      Mental Status: He is alert.         Results  Laboratory Studies  A1c is 7.4. T4 is on the lower side. Metabolic panel and cholesterol level are normal.  Labs:   Lab Results   Component Value Date    " "HGBA1C 7.9 (H) 02/06/2025    HGBA1C 7.5 (H) 10/31/2024    HGBA1C 7.6 (H) 07/17/2024     Lab Results   Component Value Date    CREATININE 1.19 04/10/2025    CREATININE 1.12 01/03/2025    CREATININE 1.17 09/30/2024    BUN 20 04/10/2025     12/17/2015    K 4.9 04/10/2025     04/10/2025    CO2 28 04/10/2025     eGFRcr   Date Value Ref Range Status   08/25/2022 74 >=60 mL/min/1.73 m2 Final     Comment:     Estimated glomerular filtration rate (eGFR) is calculated without a race  coefficient. Values should be interpreted in the context of the patient's full  clinical presentation. Reference: Meghan Echeverria et al. \"A unifying approach  for GFR estimation: recommendations of the NKF-ASN task force on reassessing the  inclusion of race in diagnosing kidney disease.\" American Journal of Kidney  Diseases (2021)     eGFR   Date Value Ref Range Status   04/10/2025 61 ml/min/1.73sq m Final     Lab Results   Component Value Date    CHOL 206 12/17/2015    HDL 39 (L) 04/14/2025    TRIG 167 (H) 04/14/2025     Lab Results   Component Value Date    ALT 6 (L) 04/10/2025    AST 22 04/10/2025    ALKPHOS 31 (L) 04/10/2025    BILITOT 0.47 12/17/2015     Lab Results   Component Value Date    EGQ7GSNUMVSH 3.534 07/17/2024    NWS1ZYKYBJGS 3.113 12/05/2023    TCL7FGJYEKLU 2.906 11/15/2023       There are no Patient Instructions on file for this visit.    Discussed with the patient and all questioned fully answered. He will call me if any problems arise.  "

## 2025-04-25 ENCOUNTER — OFFICE VISIT (OUTPATIENT)
Facility: CLINIC | Age: 70
End: 2025-04-25

## 2025-04-25 DIAGNOSIS — G20.A1 PARKINSON'S DISEASE, UNSPECIFIED WHETHER DYSKINESIA PRESENT, UNSPECIFIED WHETHER MANIFESTATIONS FLUCTUATE (HCC): Primary | ICD-10-CM

## 2025-04-25 NOTE — PROGRESS NOTES
This patient is a participant of a group fitness class and was able to proceed without incident.    This is NOT a billable session.

## 2025-04-28 ENCOUNTER — VBI (OUTPATIENT)
Dept: ADMINISTRATIVE | Facility: OTHER | Age: 70
End: 2025-04-28

## 2025-04-28 NOTE — TELEPHONE ENCOUNTER
04/28/25 8:20 AM     Chart reviewed for CRC: Colonoscopy ; nothing is submitted to the patient's insurance at this time.     Devorah Rucker MA   [facility]

## 2025-05-06 ENCOUNTER — OFFICE VISIT (OUTPATIENT)
Facility: CLINIC | Age: 70
End: 2025-05-06

## 2025-05-06 DIAGNOSIS — G20.A1 PARKINSON'S DISEASE, UNSPECIFIED WHETHER DYSKINESIA PRESENT, UNSPECIFIED WHETHER MANIFESTATIONS FLUCTUATE (HCC): Primary | ICD-10-CM

## 2025-05-09 ENCOUNTER — OFFICE VISIT (OUTPATIENT)
Facility: CLINIC | Age: 70
End: 2025-05-09

## 2025-05-09 DIAGNOSIS — G20.A1 PARKINSON'S DISEASE, UNSPECIFIED WHETHER DYSKINESIA PRESENT, UNSPECIFIED WHETHER MANIFESTATIONS FLUCTUATE (HCC): Primary | ICD-10-CM

## 2025-05-13 ENCOUNTER — OFFICE VISIT (OUTPATIENT)
Facility: CLINIC | Age: 70
End: 2025-05-13

## 2025-05-13 DIAGNOSIS — G20.A1 PARKINSON'S DISEASE, UNSPECIFIED WHETHER DYSKINESIA PRESENT, UNSPECIFIED WHETHER MANIFESTATIONS FLUCTUATE (HCC): Primary | ICD-10-CM

## 2025-05-20 DIAGNOSIS — E11.65 TYPE 2 DIABETES MELLITUS WITH HYPERGLYCEMIA, WITHOUT LONG-TERM CURRENT USE OF INSULIN (HCC): ICD-10-CM

## 2025-05-20 RX ORDER — ACYCLOVIR 800 MG/1
TABLET ORAL
Qty: 6 EACH | Refills: 1 | Status: SHIPPED | OUTPATIENT
Start: 2025-05-20

## 2025-05-23 ENCOUNTER — OFFICE VISIT (OUTPATIENT)
Facility: CLINIC | Age: 70
End: 2025-05-23

## 2025-05-23 DIAGNOSIS — G20.A1 PARKINSON'S DISEASE, UNSPECIFIED WHETHER DYSKINESIA PRESENT, UNSPECIFIED WHETHER MANIFESTATIONS FLUCTUATE (HCC): Primary | ICD-10-CM

## 2025-05-30 ENCOUNTER — OFFICE VISIT (OUTPATIENT)
Facility: CLINIC | Age: 70
End: 2025-05-30

## 2025-05-30 DIAGNOSIS — G20.A1 PARKINSON'S DISEASE, UNSPECIFIED WHETHER DYSKINESIA PRESENT, UNSPECIFIED WHETHER MANIFESTATIONS FLUCTUATE (HCC): Primary | ICD-10-CM

## 2025-06-04 ENCOUNTER — OFFICE VISIT (OUTPATIENT)
Facility: CLINIC | Age: 70
End: 2025-06-04

## 2025-06-04 DIAGNOSIS — F32.A DEPRESSION, UNSPECIFIED DEPRESSION TYPE: ICD-10-CM

## 2025-06-04 DIAGNOSIS — G20.A1 PARKINSON'S DISEASE, UNSPECIFIED WHETHER DYSKINESIA PRESENT, UNSPECIFIED WHETHER MANIFESTATIONS FLUCTUATE (HCC): Primary | ICD-10-CM

## 2025-06-04 DIAGNOSIS — F41.9 ANXIETY: ICD-10-CM

## 2025-06-05 DIAGNOSIS — E11.65 TYPE 2 DIABETES MELLITUS WITH HYPERGLYCEMIA, WITHOUT LONG-TERM CURRENT USE OF INSULIN (HCC): ICD-10-CM

## 2025-06-05 RX ORDER — VENLAFAXINE HYDROCHLORIDE 75 MG/1
CAPSULE, EXTENDED RELEASE ORAL
Qty: 90 CAPSULE | Refills: 1 | Status: SHIPPED | OUTPATIENT
Start: 2025-06-05

## 2025-06-05 RX ORDER — VENLAFAXINE HYDROCHLORIDE 37.5 MG/1
CAPSULE, EXTENDED RELEASE ORAL
Qty: 90 CAPSULE | Refills: 1 | Status: SHIPPED | OUTPATIENT
Start: 2025-06-05

## 2025-06-06 RX ORDER — SITAGLIPTIN AND METFORMIN HYDROCHLORIDE 1000; 50 MG/1; MG/1
1 TABLET, FILM COATED ORAL 2 TIMES DAILY WITH MEALS
Qty: 200 TABLET | Refills: 1 | Status: SHIPPED | OUTPATIENT
Start: 2025-06-06

## 2025-06-13 ENCOUNTER — OFFICE VISIT (OUTPATIENT)
Facility: CLINIC | Age: 70
End: 2025-06-13

## 2025-06-13 DIAGNOSIS — G20.A1 PARKINSON'S DISEASE, UNSPECIFIED WHETHER DYSKINESIA PRESENT, UNSPECIFIED WHETHER MANIFESTATIONS FLUCTUATE (HCC): Primary | ICD-10-CM

## 2025-06-16 ENCOUNTER — OFFICE VISIT (OUTPATIENT)
Facility: CLINIC | Age: 70
End: 2025-06-16

## 2025-06-16 DIAGNOSIS — G20.A1 PARKINSON'S DISEASE, UNSPECIFIED WHETHER DYSKINESIA PRESENT, UNSPECIFIED WHETHER MANIFESTATIONS FLUCTUATE (HCC): Primary | ICD-10-CM

## 2025-06-17 ENCOUNTER — OFFICE VISIT (OUTPATIENT)
Facility: CLINIC | Age: 70
End: 2025-06-17

## 2025-06-17 DIAGNOSIS — G20.A1 PARKINSON'S DISEASE, UNSPECIFIED WHETHER DYSKINESIA PRESENT, UNSPECIFIED WHETHER MANIFESTATIONS FLUCTUATE (HCC): Primary | ICD-10-CM

## 2025-06-18 ENCOUNTER — TELEPHONE (OUTPATIENT)
Dept: OBGYN CLINIC | Facility: CLINIC | Age: 70
End: 2025-06-18

## 2025-06-20 ENCOUNTER — OFFICE VISIT (OUTPATIENT)
Facility: CLINIC | Age: 70
End: 2025-06-20

## 2025-06-20 ENCOUNTER — OFFICE VISIT (OUTPATIENT)
Dept: OBGYN CLINIC | Facility: CLINIC | Age: 70
End: 2025-06-20
Payer: COMMERCIAL

## 2025-06-20 ENCOUNTER — APPOINTMENT (OUTPATIENT)
Dept: RADIOLOGY | Facility: CLINIC | Age: 70
End: 2025-06-20
Attending: STUDENT IN AN ORGANIZED HEALTH CARE EDUCATION/TRAINING PROGRAM
Payer: COMMERCIAL

## 2025-06-20 VITALS — BODY MASS INDEX: 29.24 KG/M2 | WEIGHT: 198 LBS

## 2025-06-20 DIAGNOSIS — M25.532 PAIN IN LEFT WRIST: ICD-10-CM

## 2025-06-20 DIAGNOSIS — G20.A1 PARKINSON'S DISEASE, UNSPECIFIED WHETHER DYSKINESIA PRESENT, UNSPECIFIED WHETHER MANIFESTATIONS FLUCTUATE (HCC): Primary | ICD-10-CM

## 2025-06-20 DIAGNOSIS — G56.02 CARPAL TUNNEL SYNDROME ON LEFT: Primary | ICD-10-CM

## 2025-06-20 PROCEDURE — 99203 OFFICE O/P NEW LOW 30 MIN: CPT | Performed by: STUDENT IN AN ORGANIZED HEALTH CARE EDUCATION/TRAINING PROGRAM

## 2025-06-20 PROCEDURE — 73110 X-RAY EXAM OF WRIST: CPT

## 2025-06-20 NOTE — PROGRESS NOTES
ORTHOPAEDIC HAND, WRIST, AND ELBOW OFFICE  VISIT     Name: Andres Chacko      : 1955      MRN: [id number]  Encounter Provider: Leroy Napier MD  Encounter Date: 2025   Encounter department: [facility]  :  Assessment & Plan  Carpal tunnel syndrome on left  High suspicion for carpal tunnel syndrome  May continue use of the cock up wrist splint at night to help alleviate symptoms  Obtain ultrasound of the left wrist to question carpal tunnel syndrome  Discussed surgical invention in the form of carpal tunnel release if ultrasound is positive for carpal tunnel syndrome  Follow-up once ultrasound is completed.  Orders:    US Carpal Tunnel; Future    Pain in left wrist    Orders:    XR wrist 3+ vw left; Future    US Carpal Tunnel; Future         ASSESSMENT/PLAN:    Andres Chacko is a 69 y.o. RHD male who presents with signs and symptoms consistent with carpal tunnel syndrome of the left wrist.  He does have muscle wasting of the thenar eminence as well as baseline paresthesias along the median nerve distribution.  There is some suspicion of cubital tunnel syndrome.  However elbow flexion with compression test was negative on exam today.  At this time I would like to obtain an ultrasound of the carpal tunnel to question carpal tunnel syndrome.  Further delineation of care will be determined upon the results of this test.  I did note that I do have concern that he will have further loss of function in the setting of his Parkinson's disease if his symptoms are not addressed.  This may result in a carpal tunnel release procedure.  Tremayne verbalized understanding and had no further questions.  I will see him back once the ultrasound of his left wrist is completed.        The patient verbalized understanding of exam findings and treatment plan. We engaged in the shared decision-making process and treatment options were discussed at length with the patient.  Surgical and conservative management discussed today along with risks and benefits.    Follow Up:  After ultrasound      ____________________________________________________________________________________________________________________________________________      CHIEF COMPLAINT:  Left hand numbness and weakness    SUBJECTIVE:  Andres Chacko is a retired right-hand-dominant 69 y.o. male who presents with Numbness to the left hand.  This started in 2023 after hyperextending his wrist while attempting to help a friend up.  He states at that time he had significant paresthesias into the hand globally.  Fortunately the symptoms did improve until more recently.  He is now experiencing paresthesias into the thumb, index and long finger.  This is a daily occurrence and is typically worse at night.  He can experience symptoms randomly throughout the day.  Does have a history of Parkinson's which does affect the left side causing rigidity.  He has utilized a cock-up wrist splint at night when he remembers and states that this does provide some symptomatic relief.  Intermittently he will utilize it during the day.  He states that he does have a decrease in dexterity of the left hand.  However, notes that he has had difficulties with this since childhood due to abnormality development of his fingers and underlying osteoarthritis.  He does have limitations in range of motion of all of his fingers currently.  Today he does note baseline paresthesias upon arrival.      I have personally reviewed all the relevant PMH, PSH, SH, FH, Medications and allergies      PAST MEDICAL HISTORY:  Past Medical History[1]    PAST SURGICAL HISTORY:  Past Surgical History[2]    FAMILY HISTORY:  Family History[3]    SOCIAL HISTORY:  Social History[4]    MEDICATIONS:  Current Medications[5]    ALLERGIES:  Allergies[6]        REVIEW OF SYSTEMS:  Pertinent items are noted in HPI.  A comprehensive review of systems was  negative.    VITALS:  There were no vitals filed for this visit.    LABS:  HgA1c:   Lab Results   Component Value Date    HGBA1C 7.9 (H) 02/06/2025     BMP:   Lab Results   Component Value Date    GLUCOSE 211 (H) 12/17/2015    CALCIUM 9.3 04/10/2025     12/17/2015    K 4.9 04/10/2025    CO2 28 04/10/2025     04/10/2025    BUN 20 04/10/2025    CREATININE 1.19 04/10/2025       _____________________________________________________  PHYSICAL EXAMINATION:  General: well developed and well nourished, alert, oriented times 3, and appears comfortable  Psychiatric: Normal  HEENT: Normocephalic, Atraumatic Trachea Midline, No torticollis  Pulmonary: No audible wheezing or respiratory distress   Abdomen/GI: Non tender, non distended   Cardiovascular: Regular Rate and Rhythm. No pitting edema, 2+ radial pulse   Skin: No masses, erythema, lacerations, fluctation, ulcerations  Neurovascular: Sensation intact to the Ulnar Nerve, Decreased Sensation to  the Median Nerve, Motor Intact to the Median, Ulnar, Radial Nerve, and Pulses Intact  Musculoskeletal: Normal, except as noted in detailed exam and in HPI.        FOCUSED MUSCULOSKELETAL EXAMINATION:  Left Upper Extremity  Inspection: skin intact, no notable deformity   Palpation: No tenderness to palpation  Neurologic: 5/5 elbow flexion, 5/5 elbow extension, 5/5 wrist extension, 5/5 wrist flexion, 5/5 finger flexion, 5/5 finger extension, 5/5 FPL, 5/5 EPL, 5/5 APB, 5/5 intrinsics, sensation intact to median, radial, and ulnar nerve distributions  Vascular: Palpable radial pulse, brisk cap refill <2sec, hand warm and well perfused  MSK:      LEFT SIDE:  Carpal tunnel:  Atrophy to thenar muscles and Equivical tinels due to baseline paresthesia  and Cubital Tunnel:  Negative Tinel's and Negative elbow flexion compression test  ___________________________________________________  STUDIES REVIEWED:  Xrays of the left wrist were obtained on 6/22 2025 were independently  reviewed which demonstrates mild to moderate radiocarpal joint osteoarthritis.  No acute fractures observed.  No obvious lytic or blastic lesions.      LABS REVIEWED:    HgA1c:   Lab Results   Component Value Date    HGBA1C 7.9 (H) 02/06/2025     BMP:   Lab Results   Component Value Date    GLUCOSE 211 (H) 12/17/2015    CALCIUM 9.3 04/10/2025     12/17/2015    K 4.9 04/10/2025    CO2 28 04/10/2025     04/10/2025    BUN 20 04/10/2025    CREATININE 1.19 04/10/2025               PROCEDURES PERFORMED:  No procedures performed today      _____________________________________________________      Scribe Attestation      I,:  Thad Razo am acting as a scribe while in the presence of the attending physician.:       I,:  Leroy Napier MD personally performed the services described in this documentation    as scribed in my presence.:               I agree with the history, physical examination, assessment and plan of care as documented above.    Leroy Napier M.D.  Attending, Orthopaedic Surgery  Hand, Wrist, and Elbow Surgery  [facility]            [1]   Past Medical History:  Diagnosis Date    Anxiety     Arteriosclerosis of coronary artery 05/24/2017    Arthritis 1999    Benign essential hypertension 05/15/2015    Cancer ([facility]) 2012    Depression 06/01/2012    Diabetes mellitus ([facility])     Essential (hemorrhagic) thrombocythemia ([facility])     History of prostate cancer 01/05/2023    History of stroke     Hypertension     Left carpal tunnel syndrome     Lumbar canal stenosis     Mixed hyperlipidemia 11/10/2010    Osteoarthritis, knee     Other male erectile dysfunction 08/08/2019    Overweight     Parkinson disease ([facility]) 10/08/2019    Pipe smoker     Retinal and vitreous disorder     Stroke ([facility]) 2019    Subclinical hypothyroidism 04/11/2017    Type 2 diabetes mellitus without complication, without long-term current use of insulin ([facility]) 08/24/2017   [2]   Past Surgical  History:  Procedure Laterality Date    ANKLE FRACTURE SURGERY Left 2009    triple fracture; 2 Screws in place    DEEP BRAIN STIMULATOR PLACEMENT Bilateral 2022    FRACTURE SURGERY  4/15/2009    IR BIOPSY BONE MARROW  2023    LUMBAR LAMINECTOMY      L5-S1    NASAL SEPTUM SURGERY  1989    ID COLONOSCOPY FLX DX W/COLLJ SPEC WHEN PFRMD N/A 2016    Procedure: COLONOSCOPY;  Surgeon: Jigar Alvarado MD;  Location:  GI LAB;  Service: Gastroenterology    PROSTATE BIOPSY  2012    managed by Rambo Roy, needle biopsy    PROSTATECTOMY  2013   [3]   Family History  Problem Relation Name Age of Onset    Hypertension Mother Korina Chacko              Retinal detachment Mother Korina Chacko     Retinitis pigmentosa Mother Korina Chacko     Osteoporosis Mother Korina Chacko     Vision loss Mother Korina Chacko         retinitis pigmentosa    Diabetes type II Father Temo Chacko              Heart attack Father Temo Chacko 59    Coronary artery disease Father Temo Chacko 59    Heart disease Father Temo Chacko              Diabetes Father Temo Chacko              No Known Problems Son Gregory     No Known Problems Son Jc    [4]   Social History  Tobacco Use    Smoking status: Former     Types: Pipe     Start date: 1995     Quit date: 10/1/2024     Years since quittin.7     Passive exposure: Past    Smokeless tobacco: Never    Tobacco comments:     Occasional pipe or cigar < 6 times a month   Vaping Use    Vaping status: Never Used   Substance Use Topics    Alcohol use: Yes     Alcohol/week: 4.0 standard drinks of alcohol     Types: 3 Cans of beer, 1 Shots of liquor per week     Comment: occassional     Drug use: No   [5]   Current Outpatient Medications:     aspirin 325 mg tablet, Take 325 mg by mouth in the morning., Disp: , Rfl:     BD Pen Needle Yara 2nd Gen  32G X 4 MM MISC, USE AS DIRECTED DAILY, Disp: 100 each, Rfl: 1    carbidopa-levodopa (SINEMET)  mg per tablet, Take 2 tablets by mouth 3 (three) times a day Follow clinic instructions, Disp: 540 tablet, Rfl: 3    Cholecalciferol (VITAMIN D) 2000 units CAPS, Take 2 capsules (4,000 Units total) by mouth daily, Disp: , Rfl: 0    co-enzyme Q-10 30 MG capsule, Take 1 capsule (30 mg total) by mouth daily, Disp: 30 capsule, Rfl: 0    Continuous Blood Gluc  (FreeStyle Luisa 3 Clifford) ARMANDO, once, Disp: 1 each, Rfl: 0    Continuous Glucose Sensor (FreeStyle Luisa 3 Sensor) MISC, CHANGE EVERY 14 DAYS, Disp: 6 each, Rfl: 1    ezetimibe (ZETIA) 10 mg tablet, TAKE 1 TABLET BY MOUTH EVERY DAY, Disp: 90 tablet, Rfl: 1    glucose blood (ONETOUCH VERIO) test strip, Check BG 2x per day, Disp: 200 each, Rfl: 3    Icosapent Ethyl (Vascepa) 1 g CAPS, Take 2 capsules (2 g total) by mouth 2 (two) times a day, Disp: 180 capsule, Rfl: 2    insulin degludec (Tresiba FlexTouch) 200 units/mL CONCENTRATED U-200 injection pen, INJECT SUBCUTANEOUSLY 18 UNITS  DAILY, Disp: 18 mL, Rfl: 2    lisinopril (ZESTRIL) 20 mg tablet, TAKE 1 TABLET BY MOUTH DAILY, Disp: 100 tablet, Rfl: 1    Multiple Vitamin (multivitamin) capsule, Take 1 capsule by mouth in the morning., Disp: , Rfl:     pravastatin (PRAVACHOL) 80 mg tablet, TAKE 1 TABLET BY MOUTH DAILY AT  BEDTIME, Disp: 100 tablet, Rfl: 2    repaglinide (PRANDIN) 0.5 mg tablet, Take 1 tablet (0.5 mg total) by mouth 3 (three) times a day before meals, Disp: 180 tablet, Rfl: 1    sitaGLIPtin-metFORMIN (Janumet)  MG per tablet, TAKE 1 TABLET BY MOUTH TWICE  DAILY WITH MEALS, Disp: 200 tablet, Rfl: 1    venlafaxine (EFFEXOR-XR) 37.5 mg 24 hr capsule, TAKE 1 CAPSULE BY MOUTH DAILY  WITH 75 MG FOR A TOTAL .5  MG DAILY, Disp: 90 capsule, Rfl: 1    venlafaxine (EFFEXOR-XR) 75 mg 24 hr capsule, TAKE 1 CAPSULE BY MOUTH DAILY  WITH 37.5 MG FOR A TOTAL OF  112.5 MG DAILY, Disp: 90 capsule,  Rfl: 1    mometasone (ELOCON) 0.1 % cream, Apply topically 2 (two) times a day as needed (Rash) (Patient not taking: Reported on 4/15/2025), Disp: 15 g, Rfl: 0  [6] No Known Allergies

## 2025-06-23 ENCOUNTER — HOSPITAL ENCOUNTER (OUTPATIENT)
Dept: ULTRASOUND IMAGING | Facility: HOSPITAL | Age: 70
Discharge: HOME | End: 2025-06-23
Attending: STUDENT IN AN ORGANIZED HEALTH CARE EDUCATION/TRAINING PROGRAM
Payer: COMMERCIAL

## 2025-06-23 ENCOUNTER — OFFICE VISIT (OUTPATIENT)
Age: 70
End: 2025-06-23
Payer: COMMERCIAL

## 2025-06-23 ENCOUNTER — HOSPITAL ENCOUNTER (OUTPATIENT)
Dept: ULTRASOUND IMAGING | Facility: HOSPITAL | Age: 70
Discharge: HOME | End: 2025-06-23
Attending: FAMILY MEDICINE
Payer: COMMERCIAL

## 2025-06-23 VITALS
HEART RATE: 82 BPM | DIASTOLIC BLOOD PRESSURE: 83 MMHG | BODY MASS INDEX: 29.33 KG/M2 | HEIGHT: 69 IN | WEIGHT: 198 LBS | SYSTOLIC BLOOD PRESSURE: 140 MMHG

## 2025-06-23 DIAGNOSIS — M25.532 PAIN IN LEFT WRIST: ICD-10-CM

## 2025-06-23 DIAGNOSIS — M99.04 SEGMENTAL DYSFUNCTION OF SACRAL REGION: ICD-10-CM

## 2025-06-23 DIAGNOSIS — M99.03 SEGMENTAL DYSFUNCTION OF LUMBAR REGION: Primary | ICD-10-CM

## 2025-06-23 DIAGNOSIS — M99.02 SEGMENTAL DYSFUNCTION OF THORACIC REGION: ICD-10-CM

## 2025-06-23 DIAGNOSIS — M47.816 LUMBAR SPONDYLOSIS: ICD-10-CM

## 2025-06-23 DIAGNOSIS — Z13.6 SCREENING FOR AAA (ABDOMINAL AORTIC ANEURYSM): ICD-10-CM

## 2025-06-23 DIAGNOSIS — G56.02 CARPAL TUNNEL SYNDROME ON LEFT: ICD-10-CM

## 2025-06-23 DIAGNOSIS — M62.838 MUSCLE SPASM: ICD-10-CM

## 2025-06-23 PROCEDURE — 99203 OFFICE O/P NEW LOW 30 MIN: CPT | Performed by: CHIROPRACTOR

## 2025-06-23 PROCEDURE — 76882 US LMTD JT/FCL EVL NVASC XTR: CPT

## 2025-06-23 PROCEDURE — 98941 CHIROPRACT MANJ 3-4 REGIONS: CPT | Performed by: CHIROPRACTOR

## 2025-06-23 PROCEDURE — 76706 US ABDL AORTA SCREEN AAA: CPT

## 2025-06-23 NOTE — Clinical Note
Tremayne responded well to a focus on myofascial work and traction today; will keep you updated, thanks!

## 2025-06-23 NOTE — PROGRESS NOTES
Initial date of service: 6/23/25    Diagnoses and all orders for this visit:    Segmental dysfunction of lumbar region    Lumbar spondylosis    Segmental dysfunction of sacral region    Muscle spasm    Segmental dysfunction of thoracic region    No red flags, radiculopathy or neurologic deficit appreciated clinically. Pt's symptoms and exam findings consistent with mechanical back pain and sciatica secondary to repetitive st/sp injury of SIJ region in the setting of DJD.  Pt responded well to flexion biased stretches and manual mobilization of the affected spinal and myofascial tissues with increased ROM; trial of conservative tx recommended consisting of IASTM, stretching, graded mobilization/manipulation of the affected spinal and myofascial jt dysfunction, postural/ergonomic education and take home stretches/exercises.   If symptoms fail to improve with short trial of conservative care, appropriate imaging and referral will be coordinated.  Spent greater than 20 min c pt discussing hx, pe, ddx, tx options and reviewing notes/imaging today    Return in about 1 week (around 6/30/2025) for Next scheduled follow up.    TREATMENT: 20067 AT  Fear avoidance behavior discussion; encouraged and reassured pt that natural course of condition is to improve over time with adherence to tx plan and home care strategies. Home care recommendations: avoid bed rest, walk (but avoid trails and uneven surfaces), gradual return to activity to tolerance (avoid anything that peripheralizes symptoms), call if symptoms peripheralize, worsen, or neurologic deficit progresses. Therapeutic Procedures: IASTM; discussed post procedure soreness and/or ecchymosis for up to 36 hrs, applied to affected mm hypertonicities; supine hamstring stretch, supine gluteal stretch, single knee to chest stretch, transitional mvmt education, abdominal bracing. Thoracic mobilization/manipulation: prone P-A mob, supine A-P manip; Lumbar  mobilization/manipulation: diversified side laying graded HVLA, flexion-traction; SIJ Manipulation/Mobilization: L SIJ HVLA - long axis distraction, song drop table maneuver to affected SIJ    HPI:  Andres Chacko is a 69 y.o. male  Chief Complaint   Patient presents with    Back Pain     Lower lumbar pain that radiates on left side of buttocks and down left leg. Patient states mostly when sleeping , intermittent when walking .  Pain score  4     Pt with Parkinson's presents for eval and tx for back pain with L sided sciatica. Pt reports hx of discectomy/laminectomy in early 2000s. Pt attends [employer] 2 times a week; hurts after jumping    Back Pain  This is a new problem. The current episode started more than 1 month ago. The problem occurs daily. The problem has been waxing and waning since onset. The pain is present in the gluteal, lumbar spine, sacro-iliac and thoracic spine. The quality of the pain is described as aching. The pain radiates to the left foot. The pain is Worse during the day. The symptoms are aggravated by standing (laying left, side, walking, transitional mvmts). Stiffness is present In the morning.     Past Medical History[1]   Past Surgical History[2]  The following portions of the patient's history were reviewed and updated as appropriate: allergies, past family history, past medical history, past social history, past surgical history, and problem list.  Review of Systems   Musculoskeletal:  Positive for back pain.     Physical Exam    Eyes:      Extraocular Movements: Extraocular movements intact.       Cardiovascular:      Pulses: Normal pulses.   Abdominal:      General: There is no distension.      Tenderness: There is no abdominal tenderness.     Musculoskeletal:      Thoracic back: Spasms and tenderness present. Decreased range of motion.      Lumbar back: Spasms and tenderness present. Decreased range of motion. Negative right straight leg raise test and negative left  straight leg raise test.        Back:       Comments: Pnful and limited in Ext, Llf.     Skin:     General: Skin is warm and dry.     Neurological:      Mental Status: He is alert and oriented to person, place, and time.      Cranial Nerves: Cranial nerves 2-12 are intact.      Sensory: Sensation is intact.      Motor: Motor function is intact.      Coordination: Coordination is intact.      Gait: Gait is intact.      Deep Tendon Reflexes: Babinski sign absent on the right side. Babinski sign absent on the left side.      Reflex Scores:       Patellar reflexes are 2+ on the right side and 2+ on the left side.       Achilles reflexes are 2+ on the right side and 2+ on the left side.    Psychiatric:         Mood and Affect: Mood normal.         Behavior: Behavior normal.       SOFT TISSUE ASSESSMENT Hypertonicity and tenderness palpated B T4-7, L3-S1 erector spinae, glute med/min, hamstring JOINT RESTRICTIONS: T4-7, L3-S1 and L SIJ ORTHO: SI jt point tenderness: +; Nasrin unremarkable for centralization/peripheralization; marisa, iliac compression, thigh thrust elicit lbp in L SIJ; prone femoral nerve stretch neg for upper lumbar neural tension, elicits L SIJ stiffness; sitting root elicits no lbp on R/L; slump test elicits no neural tension R/L              [1]   Past Medical History:  Diagnosis Date    Anxiety     Arteriosclerosis of coronary artery 05/24/2017    Arthritis 1999    Benign essential hypertension 05/15/2015    Cancer ([facility]) 2012    Depression 06/01/2012    Diabetes mellitus ([facility])     Essential (hemorrhagic) thrombocythemia ([facility])     History of prostate cancer 01/05/2023    History of stroke     Hypertension     Left carpal tunnel syndrome     Lumbar canal stenosis     Mixed hyperlipidemia 11/10/2010    Osteoarthritis, knee     Other male erectile dysfunction 08/08/2019    Overweight     Parkinson disease ([facility]) 10/08/2019    Pipe smoker     Retinal and vitreous disorder     Stroke ([facility]) 2019     Subclinical hypothyroidism 04/11/2017    Type 2 diabetes mellitus without complication, without long-term current use of insulin ([facility]) 08/24/2017   [2]   Past Surgical History:  Procedure Laterality Date    ANKLE FRACTURE SURGERY Left 04/2009    triple fracture; 2 Screws in place    DEEP BRAIN STIMULATOR PLACEMENT Bilateral 09/2022    FRACTURE SURGERY  4/15/2009    IR BIOPSY BONE MARROW  12/21/2023    LUMBAR LAMINECTOMY  2003    L5-S1    NASAL SEPTUM SURGERY  06/1989    AL COLONOSCOPY FLX DX W/COLLJ SPEC WHEN PFRMD N/A 01/27/2016    Procedure: COLONOSCOPY;  Surgeon: Jigar Alvarado MD;  Location: [facility];  Service: Gastroenterology    PROSTATE BIOPSY  12/08/2012    managed by Rambo Roy, needle biopsy    PROSTATECTOMY  09/23/2013

## 2025-06-24 ENCOUNTER — OFFICE VISIT (OUTPATIENT)
Facility: CLINIC | Age: 70
End: 2025-06-24

## 2025-06-24 DIAGNOSIS — G20.A1 PARKINSON'S DISEASE, UNSPECIFIED WHETHER DYSKINESIA PRESENT, UNSPECIFIED WHETHER MANIFESTATIONS FLUCTUATE (HCC): Primary | ICD-10-CM

## 2025-06-24 NOTE — PROGRESS NOTES
This patient is a participant of a group fitness class and was able to proceed without incident.    This is NOT a billable session.     English

## 2025-06-25 ENCOUNTER — RA CDI HCC (OUTPATIENT)
Dept: OTHER | Facility: HOSPITAL | Age: 70
End: 2025-06-25

## 2025-06-27 ENCOUNTER — OFFICE VISIT (OUTPATIENT)
Facility: CLINIC | Age: 70
End: 2025-06-27

## 2025-06-27 DIAGNOSIS — G20.A1 PARKINSON'S DISEASE, UNSPECIFIED WHETHER DYSKINESIA PRESENT, UNSPECIFIED WHETHER MANIFESTATIONS FLUCTUATE (HCC): Primary | ICD-10-CM

## 2025-06-30 ENCOUNTER — APPOINTMENT (OUTPATIENT)
Dept: LAB | Facility: CLINIC | Age: 70
End: 2025-06-30
Attending: FAMILY MEDICINE
Payer: COMMERCIAL

## 2025-06-30 ENCOUNTER — OFFICE VISIT (OUTPATIENT)
Facility: CLINIC | Age: 70
End: 2025-06-30

## 2025-06-30 DIAGNOSIS — E55.9 VITAMIN D DEFICIENCY: ICD-10-CM

## 2025-06-30 DIAGNOSIS — E78.2 MIXED HYPERLIPIDEMIA: ICD-10-CM

## 2025-06-30 DIAGNOSIS — I10 BENIGN ESSENTIAL HYPERTENSION: ICD-10-CM

## 2025-06-30 DIAGNOSIS — M79.18 MYALGIA, OTHER SITE: ICD-10-CM

## 2025-06-30 DIAGNOSIS — Z86.73 HISTORY OF STROKE: ICD-10-CM

## 2025-06-30 DIAGNOSIS — M79.10 MYALGIA: ICD-10-CM

## 2025-06-30 DIAGNOSIS — R53.83 OTHER FATIGUE: ICD-10-CM

## 2025-06-30 DIAGNOSIS — G20.A1 PARKINSON'S DISEASE, UNSPECIFIED WHETHER DYSKINESIA PRESENT, UNSPECIFIED WHETHER MANIFESTATIONS FLUCTUATE (HCC): Primary | ICD-10-CM

## 2025-06-30 DIAGNOSIS — G20.A1 PARKINSON'S DISEASE, UNSPECIFIED WHETHER DYSKINESIA PRESENT, UNSPECIFIED WHETHER MANIFESTATIONS FLUCTUATE (HCC): ICD-10-CM

## 2025-06-30 DIAGNOSIS — E11.65 TYPE 2 DIABETES MELLITUS WITH HYPERGLYCEMIA, WITHOUT LONG-TERM CURRENT USE OF INSULIN (HCC): ICD-10-CM

## 2025-06-30 DIAGNOSIS — D47.3 ESSENTIAL THROMBOCYTOSIS (HCC): ICD-10-CM

## 2025-06-30 DIAGNOSIS — I25.10 ARTERIOSCLEROSIS OF CORONARY ARTERY: ICD-10-CM

## 2025-06-30 LAB
25(OH)D3 SERPL-MCNC: 42.1 NG/ML (ref 30–100)
ALBUMIN SERPL BCG-MCNC: 4.5 G/DL (ref 3.5–5)
ALP SERPL-CCNC: 29 U/L (ref 34–104)
ALT SERPL W P-5'-P-CCNC: 5 U/L (ref 7–52)
ANION GAP SERPL CALCULATED.3IONS-SCNC: 5 MMOL/L (ref 4–13)
AST SERPL W P-5'-P-CCNC: 18 U/L (ref 13–39)
BASOPHILS # BLD AUTO: 0.09 THOUSANDS/ÂΜL (ref 0–0.1)
BASOPHILS NFR BLD AUTO: 1 % (ref 0–1)
BILIRUB SERPL-MCNC: 0.51 MG/DL (ref 0.2–1)
BUN SERPL-MCNC: 21 MG/DL (ref 5–25)
CALCIUM SERPL-MCNC: 9.8 MG/DL (ref 8.4–10.2)
CHLORIDE SERPL-SCNC: 103 MMOL/L (ref 96–108)
CHOLEST SERPL-MCNC: 151 MG/DL (ref ?–200)
CO2 SERPL-SCNC: 29 MMOL/L (ref 21–32)
CREAT SERPL-MCNC: 1.16 MG/DL (ref 0.6–1.3)
EOSINOPHIL # BLD AUTO: 0.36 THOUSAND/ÂΜL (ref 0–0.61)
EOSINOPHIL NFR BLD AUTO: 3 % (ref 0–6)
ERYTHROCYTE [DISTWIDTH] IN BLOOD BY AUTOMATED COUNT: 14.3 % (ref 11.6–15.1)
EST. AVERAGE GLUCOSE BLD GHB EST-MCNC: 166 MG/DL
GFR SERPL CREATININE-BSD FRML MDRD: 63 ML/MIN/1.73SQ M
GLUCOSE P FAST SERPL-MCNC: 135 MG/DL (ref 65–99)
HBA1C MFR BLD: 7.4 %
HCT VFR BLD AUTO: 46.8 % (ref 36.5–49.3)
HDLC SERPL-MCNC: 46 MG/DL
HGB BLD-MCNC: 14.9 G/DL (ref 12–17)
IMM GRANULOCYTES # BLD AUTO: 0.06 THOUSAND/UL (ref 0–0.2)
IMM GRANULOCYTES NFR BLD AUTO: 1 % (ref 0–2)
LDH SERPL-CCNC: 147 U/L (ref 140–271)
LDLC SERPL CALC-MCNC: 64 MG/DL (ref 0–100)
LDLC SERPL DIRECT ASSAY-MCNC: 84 MG/DL (ref 0–100)
LYMPHOCYTES # BLD AUTO: 2.45 THOUSANDS/ÂΜL (ref 0.6–4.47)
LYMPHOCYTES NFR BLD AUTO: 19 % (ref 14–44)
MCH RBC QN AUTO: 29.3 PG (ref 26.8–34.3)
MCHC RBC AUTO-ENTMCNC: 31.8 G/DL (ref 31.4–37.4)
MCV RBC AUTO: 92 FL (ref 82–98)
MONOCYTES # BLD AUTO: 1 THOUSAND/ÂΜL (ref 0.17–1.22)
MONOCYTES NFR BLD AUTO: 8 % (ref 4–12)
NEUTROPHILS # BLD AUTO: 8.65 THOUSANDS/ÂΜL (ref 1.85–7.62)
NEUTS SEG NFR BLD AUTO: 68 % (ref 43–75)
NONHDLC SERPL-MCNC: 105 MG/DL
NRBC BLD AUTO-RTO: 0 /100 WBCS
PLATELET # BLD AUTO: 570 THOUSANDS/UL (ref 149–390)
PMV BLD AUTO: 10.1 FL (ref 8.9–12.7)
POTASSIUM SERPL-SCNC: 5 MMOL/L (ref 3.5–5.3)
PROT SERPL-MCNC: 7.4 G/DL (ref 6.4–8.4)
RBC # BLD AUTO: 5.09 MILLION/UL (ref 3.88–5.62)
SODIUM SERPL-SCNC: 137 MMOL/L (ref 135–147)
T4 FREE SERPL-MCNC: 0.57 NG/DL (ref 0.61–1.12)
TRIGL SERPL-MCNC: 203 MG/DL (ref ?–150)
TSH SERPL DL<=0.05 MIU/L-ACNC: 2.42 UIU/ML (ref 0.45–4.5)
WBC # BLD AUTO: 12.61 THOUSAND/UL (ref 4.31–10.16)

## 2025-06-30 PROCEDURE — 85025 COMPLETE CBC W/AUTO DIFF WBC: CPT

## 2025-06-30 PROCEDURE — 84443 ASSAY THYROID STIM HORMONE: CPT

## 2025-06-30 PROCEDURE — 84439 ASSAY OF FREE THYROXINE: CPT

## 2025-06-30 PROCEDURE — 83036 HEMOGLOBIN GLYCOSYLATED A1C: CPT

## 2025-06-30 PROCEDURE — 83721 ASSAY OF BLOOD LIPOPROTEIN: CPT

## 2025-06-30 PROCEDURE — 80061 LIPID PANEL: CPT

## 2025-06-30 PROCEDURE — 80053 COMPREHEN METABOLIC PANEL: CPT

## 2025-06-30 PROCEDURE — 36415 COLL VENOUS BLD VENIPUNCTURE: CPT

## 2025-06-30 PROCEDURE — 83615 LACTATE (LD) (LDH) ENZYME: CPT

## 2025-06-30 PROCEDURE — 82306 VITAMIN D 25 HYDROXY: CPT

## 2025-07-01 ENCOUNTER — OFFICE VISIT (OUTPATIENT)
Dept: FAMILY MEDICINE CLINIC | Facility: CLINIC | Age: 70
End: 2025-07-01
Payer: COMMERCIAL

## 2025-07-01 VITALS
HEART RATE: 84 BPM | WEIGHT: 198 LBS | SYSTOLIC BLOOD PRESSURE: 132 MMHG | DIASTOLIC BLOOD PRESSURE: 80 MMHG | RESPIRATION RATE: 16 BRPM | BODY MASS INDEX: 28.35 KG/M2 | HEIGHT: 70 IN | OXYGEN SATURATION: 97 % | TEMPERATURE: 97.2 F

## 2025-07-01 DIAGNOSIS — D47.3 ESSENTIAL (HEMORRHAGIC) THROMBOCYTHEMIA (HCC): ICD-10-CM

## 2025-07-01 DIAGNOSIS — F17.290 PIPE SMOKER: ICD-10-CM

## 2025-07-01 DIAGNOSIS — R74.8 LOW SERUM ALKALINE PHOSPHATASE: ICD-10-CM

## 2025-07-01 DIAGNOSIS — E78.2 MIXED HYPERLIPIDEMIA: ICD-10-CM

## 2025-07-01 DIAGNOSIS — E03.8 SUBCLINICAL HYPOTHYROIDISM: ICD-10-CM

## 2025-07-01 DIAGNOSIS — F12.90 MARIJUANA USE: ICD-10-CM

## 2025-07-01 DIAGNOSIS — Z00.00 MEDICARE ANNUAL WELLNESS VISIT, SUBSEQUENT: Primary | ICD-10-CM

## 2025-07-01 DIAGNOSIS — Z12.11 COLON CANCER SCREENING: ICD-10-CM

## 2025-07-01 DIAGNOSIS — E11.9 TYPE 2 DIABETES MELLITUS WITHOUT COMPLICATION, WITHOUT LONG-TERM CURRENT USE OF INSULIN (HCC): ICD-10-CM

## 2025-07-01 DIAGNOSIS — Z85.46 HISTORY OF PROSTATE CANCER: ICD-10-CM

## 2025-07-01 DIAGNOSIS — F32.0 CURRENT MILD EPISODE OF MAJOR DEPRESSIVE DISORDER, UNSPECIFIED WHETHER RECURRENT (HCC): ICD-10-CM

## 2025-07-01 DIAGNOSIS — I25.10 ARTERIOSCLEROSIS OF CORONARY ARTERY: ICD-10-CM

## 2025-07-01 DIAGNOSIS — I10 BENIGN ESSENTIAL HYPERTENSION: ICD-10-CM

## 2025-07-01 DIAGNOSIS — E11.65 TYPE 2 DIABETES MELLITUS WITH HYPERGLYCEMIA, WITHOUT LONG-TERM CURRENT USE OF INSULIN (HCC): ICD-10-CM

## 2025-07-01 DIAGNOSIS — Z12.5 SCREENING FOR PROSTATE CANCER: ICD-10-CM

## 2025-07-01 DIAGNOSIS — F41.9 ANXIETY: ICD-10-CM

## 2025-07-01 DIAGNOSIS — Z86.73 HISTORY OF STROKE: ICD-10-CM

## 2025-07-01 DIAGNOSIS — N52.8 OTHER MALE ERECTILE DYSFUNCTION: ICD-10-CM

## 2025-07-01 DIAGNOSIS — G20.A1 PARKINSON'S DISEASE, UNSPECIFIED WHETHER DYSKINESIA PRESENT, UNSPECIFIED WHETHER MANIFESTATIONS FLUCTUATE (HCC): ICD-10-CM

## 2025-07-01 PROBLEM — D47.1 CHRONIC MYELOPROLIFERATIVE DISEASE (HCC): Status: ACTIVE | Noted: 2025-07-01

## 2025-07-01 PROCEDURE — 99214 OFFICE O/P EST MOD 30 MIN: CPT | Performed by: FAMILY MEDICINE

## 2025-07-01 PROCEDURE — G0439 PPPS, SUBSEQ VISIT: HCPCS | Performed by: FAMILY MEDICINE

## 2025-07-01 PROCEDURE — G0442 ANNUAL ALCOHOL SCREEN 15 MIN: HCPCS | Performed by: FAMILY MEDICINE

## 2025-07-01 RX ORDER — VENLAFAXINE HYDROCHLORIDE 75 MG/1
CAPSULE, EXTENDED RELEASE ORAL
Qty: 90 CAPSULE | Refills: 1 | Status: SHIPPED | OUTPATIENT
Start: 2025-07-01

## 2025-07-01 RX ORDER — CARBIDOPA AND LEVODOPA 50; 200 MG/1; MG/1
1 TABLET, EXTENDED RELEASE ORAL
COMMUNITY
Start: 2025-06-18

## 2025-07-01 RX ORDER — VENLAFAXINE HYDROCHLORIDE 37.5 MG/1
CAPSULE, EXTENDED RELEASE ORAL
Qty: 90 CAPSULE | Refills: 1 | Status: SHIPPED | OUTPATIENT
Start: 2025-07-01

## 2025-07-01 RX ORDER — PRAVASTATIN SODIUM 80 MG/1
80 TABLET ORAL
Qty: 90 TABLET | Refills: 1 | Status: SHIPPED | OUTPATIENT
Start: 2025-07-01

## 2025-07-01 NOTE — PROGRESS NOTES
Assessment/Plan:  Assessment & Plan  Medicare annual wellness visit, subsequent         Type 2 diabetes mellitus without complication, without long-term current use of insulin (HCC)    Uncontrolled. Management per Endo. Recommend lifestyle modifications.     Lab Results   Component Value Date    HGBA1C 7.4 (H) 06/30/2025       Orders:    Ambulatory referral to Ophthalmology; Future    Comprehensive metabolic panel; Future    Albumin / creatinine urine ratio; Future     Type 2 diabetes mellitus with hyperglycemia, without long-term current use of insulin (HCC)    Lab Results   Component Value Date    HGBA1C 7.4 (H) 06/30/2025     Uncontrolled. Management per Endo. Recommend lifestyle modifications.       Orders:    Ambulatory referral to Ophthalmology; Future    Comprehensive metabolic panel; Future    Albumin / creatinine urine ratio; Future     Benign essential hypertension    Stable on Lisinopril 20mg QD. Check blood pressure outside of office. Recommend lifestyle modifications.       Orders:    Comprehensive metabolic panel; Future     Mixed hyperlipidemia    Uncontrolled as LDL not at goal for CAD.  On Pravachol 80mg QHS.  Patient declines statin change (Crestor 10mg QHS), but may reconsider in future.  Recommend lifestyle modification       Orders:    LDL cholesterol, direct; Future    pravastatin (PRAVACHOL) 80 mg tablet; Take 1 tablet (80 mg total) by mouth daily at bedtime     Current mild episode of major depressive disorder, unspecified whether recurrent (HCC)    Stable on Effexor .5 mg daily.    Depression Screening Follow-up Plan: Patient's depression screening was positive with a PHQ-9 score of 9.     Orders:    venlafaxine (EFFEXOR-XR) 37.5 mg 24 hr capsule; TAKE 1 CAPSULE BY MOUTH DAILY  WITH 75 MG FOR A TOTAL .5  MG DAILY    venlafaxine (EFFEXOR-XR) 75 mg 24 hr capsule; TAKE 1 CAPSULE BY MOUTH DAILY  WITH 37.5 MG FOR A TOTAL OF  112.5 MG DAILY     Anxiety    Stable on Effexor .5 mg  daily.      Orders:    venlafaxine (EFFEXOR-XR) 37.5 mg 24 hr capsule; TAKE 1 CAPSULE BY MOUTH DAILY  WITH 75 MG FOR A TOTAL .5  MG DAILY    venlafaxine (EFFEXOR-XR) 75 mg 24 hr capsule; TAKE 1 CAPSULE BY MOUTH DAILY  WITH 37.5 MG FOR A TOTAL OF  112.5 MG DAILY     Essential (hemorrhagic) thrombocythemia (HCC)    Management per Heme/Onc.  Pipe smoking may be contributory.            Arteriosclerosis of coronary artery    Asymptomatic.  Management per Cardio - Continue ASA, statin, ACE-I.  Recommend lifestyle modifications.    LDL not at goal for CAD.  On Pravachol 80mg QHS.  Patient declines statin change (Crestor 10mg QHS), but may reconsider in future.             Subclinical hypothyroidism    Management per Endo.  Not currently taking Synthroid.                 Other male erectile dysfunction    Not currently taking Levitra 20mg PRN.              Low serum alkaline phosphatase  F/U Endo.         Parkinson's disease, unspecified whether dyskinesia present, unspecified whether manifestations fluctuate (HCC)      Management per [facility].  S/p Deep brain stimulator 9/22.  On Sinemet 25-100mg 2 tabs in AM and 1 pill QHS.  Continue [facility] Parkinson's Boxing Program.           Orders:    Ambulatory Referral to Neurology; Future     History of stroke    Management per Neuro. Continue ASA, statin . Recommend lifestyle modifications.             History of prostate cancer    Patient declined continued Uro F/U and desires yearly PSA per PCP.  Status post DaVinci robot prostatectomy performed at [facility] 2013.       Orders:    PSA, Total Screen; Future     Marijuana use  Non-inhaled forms of medical marijuana advised.          Pipe smoker    Pipe Smoking cessation advised.            Colon cancer screening    Orders:    Ambulatory Referral to Gastroenterology; Future    Screening for prostate cancer    Orders:    PSA, Total Screen; Future    BMI 28.0-28.9,adult  Stable.  Recommend lifestyle  modifications.                 Return in about 6 months (around 1/1/2026) for 30min - 6mo- DM2, HTN, HL, Anx/Dep, ET, H/O Prostate CA, Smoker, Labs.      Future Appointments   Date Time Provider Department Center   7/7/2025  1:00 PM Morales Corral DC [facility] E[facility]Ort   7/11/2025  9:40 AM Mariajose Vaca MD HEM ONC [facility]Onc   8/20/2025  9:40 AM Maria T Turner MD DI[facility] Med [facility]   1/5/2026  9:00 AM Mireille Martines DO [facility]        Subjective:     Andres is a 69 y.o. male who presents today for a follow-up on his chronic medical conditions.        HPI:  Chief Complaint   Patient presents with    Medicare Wellness Visit    Follow-up     3 mo     -- Above per clinical staff and reviewed. --      HPI      Today:      Return in about 6 months (around 6/27/2025) for AWV / 6mo- DM2, HTN, HL, Anx/Dep, ET, H/O Prostate CA, Smoker, Labs.     6mo OV         Overweight - Watching diet.  +Regular exercise - Parkinson's Boxing program at [facility] for 90 minutes, 2 times per week.       DM2 - Management per Endo Dr. Turner.  Next appt 8/25.  Last DM Education 5/23 - next appt PRN.  Sees Optho [facility] [address] - next appt 8/25.   No Podiatry.  Denies hypoglycemia.     HTN - On Lisinopril 20mg 1 pill QD.  No BP check outside of office.  No other HTN meds previously.      Subclinical Hypothyroidism - Management per Endo Dr. Turner.  Next appt 8/25.  Patient is not taking Synthroid currently.        Hyperlipidemia - On increased Pravachol 80mg QHS, On Zetia 10mg QD and Vascepa 1gram 2 caps BID per Endo.  Previously on Zocor 40mg QHS.- pt unsure why D/C.  No higher dose or other statin previously.       CAD - Management per Cardio Dr. Ward.  Next appt 4/25 - Overdue.     Parkinson's Disease - Management per [facility] Dr. Waddell.  Next appt 8/25.  S/p Deep brain stimulator 9/22.  On Sinemet 25-100mg 2 tabs in AM and 1 pill QHS.  Attending individual [facility]  Steady Parkinson's PT 2 times weekly since .       H/O CVA -  Unsure of date - ? Management per [facility] Dr. Waddell.  Next appt .        Left Carpal Tunnel Syndrome - Management per Ortho Dr. Napier - Next appt s/p U/S Carpal Tunnel.  Management per [facility] Dr. Waddell.  Next appt .  Uses a brace PRN.  Declined EMG / surgery referral per Neuro.      Depression / Anxiety - Mood is decreased due to fatigue.  On Effexor .5mg QD per [facility] Neuro since 23.  He is unsure if he's taken high dose or other mood meds previously.  He last attended counseling in the late  and states it was not helpful.  Patients thinks he might be slightly autistic.  Good social supports.  No SI/HI/AH/VH.          PHQ-2/9 Depression Screening    Little interest or pleasure in doing things: 0 - not at all  Feeling down, depressed, or hopeless: 0 - not at all  Trouble falling or staying asleep, or sleeping too much: 0 - not at all  Feeling tired or having little energy: 0 - not at all  Poor appetite or overeatin - not at all  Feeling bad about yourself - or that you are a failure or have let yourself or your family down: 0 - not at all  Trouble concentrating on things, such as reading the newspaper or watching television: 0 - not at all  Moving or speaking so slowly that other people could have noticed. Or the opposite - being so fidgety or restless that you have been moving around a lot more than usual: 0 - not at all  Thoughts that you would be better off dead, or of hurting yourself in some way: 0 - not at all  PHQ-9 Score: 0  PHQ-9 Interpretation: No or Minimal depression       HEMANT-7 Flowsheet Screening      Flowsheet Row Most Recent Value   Over the last two weeks, how often have you been bothered by the following problems?     Feeling nervous, anxious, or on edge 0   Not being able to stop or control worrying 0   Worrying too much about different things 0   Trouble relaxing  0   Being so restless  that it's hard to sit still 0   Becoming easily annoyed or irritable  0   Feeling afraid as if something awful might happen 0   How difficult have these problems made it for you to do your work, take care of things at home, or get along with other people?  Not difficult at all   HEMANT Score  0               H/O Prostate Cancer - Management per Uro Mr. Camilo ZAPATA.  Next appt 8/21 - Overdue PRN as patient declined F/U and desires yearly PSA per PCP.  Status post DaVinci robot prostatectomy performed at [facility] 2013.     ED - No longer taking Levitra 20mg QDPRN.       Essential Thrombocythemia / Myeloproliferative disorder / JAK2 [id number] Mutation - Management per Heme/Onc Dr. Vaca - next appt 7/25.  D/C Anagrelide.  S/p IR Bone Marrow Biopsy 12/21/23.      Lumbar Back Pain - Management per Chiro Dr. Morales Corral - Next appt 7/25.       Pipe Smoker - Last use 6/25, smoking some days.       Marijuana Use - THC Gummy 1/2 nightly as needed for insomnia.  Not medical grade.        Reviewed:  Labs 6/30/25, [facility] 8/7/24, Endo 4/15/25, DM Educ 11/18/22, Uro 8/21/20, Cardio 4/26/24, Heme/Onc 4/11/25, Chiro 6/23/25, Ortho 6/20/25     No Uro.     Last Colonoscopy 1/27/16, repeat in 10 years - 1/27/26.      The following portions of the patient's history were reviewed and updated as appropriate: allergies, current medications, past family history, past medical history, past social history, past surgical history and problem list.      Review of Systems   Constitutional:  Positive for fatigue. Negative for appetite change, chills, diaphoresis and fever.   Respiratory:  Negative for chest tightness and shortness of breath.    Cardiovascular:  Negative for chest pain.   Gastrointestinal:  Negative for abdominal pain, blood in stool, diarrhea, nausea and vomiting.   Genitourinary:  Negative for dysuria.        Current Outpatient Medications   Medication Sig Dispense Refill    aspirin 325 mg tablet Take 325 mg by  mouth in the morning.      BD Pen Needle Yara 2nd Gen 32G X 4 MM MISC USE AS DIRECTED DAILY 100 each 1    carbidopa-levodopa (SINEMET CR)  mg per tablet Take 1 tablet by mouth daily at bedtime Rx per Neuro      carbidopa-levodopa (SINEMET)  mg per tablet Take 2 tablets by mouth 3 (three) times a day Follow clinic instructions (Patient taking differently: Take 1 tablet by mouth in the morning and 1 tablet in the evening and 1 tablet before bedtime. Follow clinic instructions.  Rx per Neuro.) 540 tablet 3    Cholecalciferol (VITAMIN D) 2000 units CAPS Take 2 capsules (4,000 Units total) by mouth daily  0    co-enzyme Q-10 30 MG capsule Take 1 capsule (30 mg total) by mouth daily 30 capsule 0    Continuous Blood Gluc  (FreeStyle Luisa 3 [place]) ARMANDO once 1 each 0    Continuous Glucose Sensor (FreeStyle Luisa 3 Sensor) MISC CHANGE EVERY 14 DAYS 6 each 1    ezetimibe (ZETIA) 10 mg tablet TAKE 1 TABLET BY MOUTH EVERY DAY 90 tablet 1    glucose blood (ONETOUCH VERIO) test strip Check BG 2x per day 200 each 3    Icosapent Ethyl (Vascepa) 1 g CAPS Take 2 capsules (2 g total) by mouth 2 (two) times a day 180 capsule 2    insulin degludec (Tresiba FlexTouch) 200 units/mL CONCENTRATED U-200 injection pen INJECT SUBCUTANEOUSLY 18 UNITS  DAILY 18 mL 2    lisinopril (ZESTRIL) 20 mg tablet TAKE 1 TABLET BY MOUTH DAILY 100 tablet 1    Multiple Vitamin (multivitamin) capsule Take 1 capsule by mouth in the morning.      pravastatin (PRAVACHOL) 80 mg tablet Take 1 tablet (80 mg total) by mouth daily at bedtime 90 tablet 1    repaglinide (PRANDIN) 0.5 mg tablet Take 1 tablet (0.5 mg total) by mouth 3 (three) times a day before meals 180 tablet 1    sitaGLIPtin-metFORMIN (Janumet)  MG per tablet TAKE 1 TABLET BY MOUTH TWICE  DAILY WITH MEALS 200 tablet 1    venlafaxine (EFFEXOR-XR) 37.5 mg 24 hr capsule TAKE 1 CAPSULE BY MOUTH DAILY  WITH 75 MG FOR A TOTAL .5  MG DAILY 90 capsule 1    venlafaxine  "(EFFEXOR-XR) 75 mg 24 hr capsule TAKE 1 CAPSULE BY MOUTH DAILY  WITH 37.5 MG FOR A TOTAL OF  112.5 MG DAILY 90 capsule 1     No current facility-administered medications for this visit.       Objective:  /80   Pulse 84   Temp (!) 97.2 °F (36.2 °C) (Tympanic)   Resp 16   Ht 5' 9.5\" (1.765 m)   Wt 89.8 kg (198 lb)   SpO2 97%   BMI 28.82 kg/m²    Wt Readings from Last 3 Encounters:   07/01/25 89.8 kg (198 lb)   06/23/25 89.8 kg (198 lb)   06/20/25 89.8 kg (198 lb)      BP Readings from Last 3 Encounters:   07/01/25 132/80   06/23/25 140/83   04/15/25 130/78          Physical Exam  Vitals and nursing note reviewed.   Constitutional:       Appearance: Normal appearance. He is well-developed.   HENT:      Head: Normocephalic and atraumatic.     Eyes:      Conjunctiva/sclera: Conjunctivae normal.     Neck:      Thyroid: No thyromegaly.      Vascular: No carotid bruit.     Cardiovascular:      Rate and Rhythm: Normal rate and regular rhythm.      Pulses: Normal pulses.      Heart sounds: Normal heart sounds.   Pulmonary:      Effort: Pulmonary effort is normal.      Breath sounds: Normal breath sounds.   Abdominal:      General: Bowel sounds are normal. There is no distension.      Palpations: Abdomen is soft. There is no mass.      Tenderness: There is no abdominal tenderness. There is no guarding or rebound.     Musculoskeletal:         General: No swelling or tenderness.      Cervical back: Neck supple.      Right lower leg: No edema.      Left lower leg: No edema.   Lymphadenopathy:      Cervical: No cervical adenopathy.     Neurological:      Mental Status: He is alert and oriented to person, place, and time.      Comments: Left hand resting tremor   Psychiatric:         Mood and Affect: Mood normal.         Behavior: Behavior normal.         Thought Content: Thought content normal.         Judgment: Judgment normal.         Lab Results:      Lab Results   Component Value Date    WBC 12.61 (H) 06/30/2025 " "   HGB 14.9 06/30/2025    HCT 46.8 06/30/2025     (H) 06/30/2025    CHOL 206 12/17/2015    TRIG 203 (H) 06/30/2025    HDL 46 06/30/2025    LDLDIRECT 84 06/30/2025    ALT 5 (L) 06/30/2025    AST 18 06/30/2025     12/17/2015    K 5.0 06/30/2025     06/30/2025    CREATININE 1.16 06/30/2025    BUN 21 06/30/2025    CO2 29 06/30/2025    TSH 2.06 12/20/2019    PSA <0.01 03/18/2024    INR 1 09/14/2022    GLUF 135 (H) 06/30/2025    HGBA1C 7.4 (H) 06/30/2025     No results found for: \"URICACID\"  Invalid input(s): \"BASENAME\" Vitamin D    US abdominal aorta screening aaa  Result Date: 6/28/2025  Narrative: ABDOMINAL AORTIC ULTRASOUND INDICATION: Z13.6: Encounter for screening for cardiovascular disorders. COMPARISON: None FINDINGS: Ultrasound of the abdominal aorta was performed in longitudinal and transverse planes with a curvilinear transducer. Proximal aorta: 2.2 x 2.2 cm Mid aorta: 1.9 x 2.0 cm Distal aorta: 1.5 x 1.5 cm Right common iliac origin: 1.2 cm Left common iliac origin: 1.1 cm No periaortic collections or adenopathy detected.     Impression: No evidence for abdominal aortic aneurysm. Workstation performed: [id number]     US Carpal Tunnel  Result Date: 6/26/2025  Narrative: ULTRASOUND LEFT WRIST (CARPAL TUNNEL SYNDROME PROTOCOL) TECHNIQUE: Using a high frequency transducer, the left median nerve was scanned to evaluate for carpal tunnel syndrome. Gray scale and color doppler ultrasound was used. INDICATION:   M25.532: Pain in left wrist G56.02: Carpal tunnel syndrome, left upper limb. COMPARISON:  None. FINDINGS: Median nerve cross sectional area distal forearm (CSAp): 8.8 sq mm. Maximum median nerve cross sectional area within carpal tunnel (CSAc): 12.4 sq mm. Delta CSA (CSAc-CSAp): 3.6 sq mm. Wrist to Forearm ratio (WF ratio) (CSAc/CSAp): 1.41 Vascularity: No hypervascularity.     Impression: Findings suspicious for carpal tunnel syndrome, with median nerve WF ratio > 1.4. Workstation " performed: [id number]     XR wrist 3+ vw left  Result Date: 6/20/2025  Narrative: LEFT WRIST INDICATION:   Pain in left wrist. COMPARISON:  None. VIEWS:  XR WRIST 3+ VW LEFT FINDINGS: There is no acute fracture or dislocation. There is very mild degenerative change at the first carpal metacarpal joint space. No lytic or blastic osseous lesion. Soft tissues are unremarkable.     Impression: No acute osseous abnormality. Electronically signed: 06/20/2025 04:54 PM Saravanan Stanford MD       POCT Labs        Depression Screening and Follow-up Plan: Patient was screened for depression during today's encounter. They screened negative with a PHQ-9 score of 0.      Falls Plan of Care: balance, strength, and gait training instructions were provided. Patient declines PT

## 2025-07-01 NOTE — PATIENT INSTRUCTIONS
To Do:     Call for appt - Cardio Dr. Ward.  Next appt 4/25 - Overdue.      Patient Education     Preventing falls in adults   The Basics   Written by the doctors and editors at [facility]   Am I at risk of falling? -- Falls can happen to anyone, no matter how old they are. Several things can increase your risk of a fall, including:   Vision problems   Having certain chronic health conditions, being sick, having recently been in the hospital, or having had surgery   Changing the medicines you take   An unsafe or unfamiliar setting (for example, a room with rugs or furniture that might trip you, or an area you don't know well)  Your risk of falling increases as you grow older. That's because getting older can make it harder to walk steadily and keep your balance. Also, the effects of falls are more serious for older people.  Overall, 3 to 4 out of every 10 people over the age of 65 fall each year. Many people who fracture a hip never fully recover to how they were before the fracture. If you have fallen in the past, you are at higher risk of falling again.  How can my doctor help me avoid falling? -- Your doctor can talk to you about the following things:   Past falls - It is important to tell your doctor about any times that you have fallen or almost fallen. They can then suggest ways to prevent another fall.   Your health conditions - Some health problems can put you at risk of falling. These include conditions that affect eyesight, hearing, muscle strength, or balance.   What to do if you are in the hospital - Falls can happen in the hospital because you are in an unfamiliar place. You might feel unsteady or drowsy from medicines or anesthesia. Or you might be attached to catheters or IV tubing that you could trip over. You are also likely to be weaker than usual.   Your medicines - Certain medicines can increase the risk of falling. These include some medicines for sleeping problems, anxiety, high blood  pressure, or depression. Adding new medicines, or changing doses of some medicines, can also affect your risk of falling.  The more your doctor knows about your situation, the better they can help you. For example, if you fell because you have a condition that causes pain, your doctor might suggest treatments to help with the pain. Or if any of your medicines are making you dizzy and more likely to fall, your doctor might switch you to a different medicine.  Is there anything I can do on my own? -- Yes. To help keep from falling, you can:   Make your home safer - To avoid falling at home, get rid of things that might make you trip or slip. This can include furniture, electrical cords, clutter, and loose rugs (figure 1). Keep your home well lit so you can easily see where you are going. Avoid storing things in high places so you don't have to reach or climb.   Wear non-slip socks or sturdy shoes that fit well - Wearing shoes with high heels or slippery soles, or shoes that are too loose, can lead to falls. Walking around in bare feet, or only socks, can also increase your risk of falling.   Take vitamin D pills - Taking vitamin D might lower the risk of falls in older people. This is because vitamin D helps make bones and muscles stronger. Your doctor can talk to you about whether you should take extra vitamin D, and how much.   Stay active - Moving your body regularly can help lower your risk of falling. It might also help prevent you from getting hurt if you do fall. It is best to do a few different activities that help with both strength and balance. There are many kinds of exercise that can be safe for older people. These include walking, swimming, and noemi chi (a Chinese martial art involving slow, gentle movements).   Use a cane, walker, or other safety devices - If your doctor recommends that you use a cane or walker, make sure that it's the right size and you know how to use it. There are other devices that  might help you avoid falling, too. These include grab bars or a sturdy seat for the shower, non-slip bath mats, and hand rails or treads for the stairs (to prevent slipping).   Take extra care if you have had surgery or are in the hospital - Ask for help with getting out of bed, getting up from a chair, or going to the bathroom. Your body needs time to heal, and it's normal to need help with these things while you recover. It can also help to keep your belongings within reach, and avoid walking around in the dark. If you need help, use the call button instead of trying to get up.  If you worry that you could fall, you can get an emergency alert system. This is usually an alarm button that lets you call for help if you fall and can't get up. If you live alone and you do not have an emergency alert system, always carry a cell phone or portable phone with you when moving around the house.  How do I get up from a fall? -- If you do fall, try not to be afraid. Stay calm, and take slow, deep breaths. If someone is near you, call for help right away. If you have an emergency alert system, use it.  Try to find out if you are hurt. If you are hurt badly, trying to get up can make things worse. If you do not think that you are hurt badly, come up with a plan to get up off of the floor.  Some tips to get up after a fall if you are alone:   Look around you for a piece of sturdy furniture such as a couch or chair. Try to move your body to the furniture. You might need to scoot, crawl, or roll to get close. Do these movements very slowly. If you feel any sharp pains, you might need to stop.   Once you are close to the furniture, roll onto your side. Pull your knees up toward your chest, and try to get on your hands and knees.   Put your hands on the seat of the couch or chair.   Bring 1 leg forward so the foot is flat on the floor. If you have a stronger leg, move this leg first.   Now, try to stand up. Once both feet are on the  ground, slowly turn and lower yourself to sit down on the seat.  What should I do after a fall? -- If you had a fall, see your doctor right away, even if you aren't hurt. Your doctor can try to figure out what caused you to fall, and how likely you are to fall again. They will do an exam and talk to you about your health problems, medicines, and activities. They can also check how well you walk, move, and balance. Then, they can suggest things you can do to lower your risk of falling again.  Many older people have a hard time recovering after a fall. Doing things to prevent falling can help you protect your health and independence.  All topics are updated as new evidence becomes available and our peer review process is complete.  This topic retrieved from [employer] on: Apr 04, 2024.  Topic 74028 Version 25.0  Release: 32.2.4 - C32.93  © 2024 UpToDate, Inc. and/or its affiliates. All rights reserved.  figure 1: How to avoid falling at home     This picture shows some of the things that can cause a fall in your home. Look around and remove any loose rugs, electrical cords, clutter, or furniture that could trip you.  Graphic 81887 Version 1.0  Consumer Information Use and Disclaimer   Disclaimer: This generalized information is a limited summary of diagnosis, treatment, and/or medication information. It is not meant to be comprehensive and should be used as a tool to help the user understand and/or assess potential diagnostic and treatment options. It does NOT include all information about conditions, treatments, medications, side effects, or risks that may apply to a specific patient. It is not intended to be medical advice or a substitute for the medical advice, diagnosis, or treatment of a health care provider based on the health care provider's examination and assessment of a patient's specific and unique circumstances. Patients must speak with a health care provider for complete information about their health,  medical questions, and treatment options, including any risks or benefits regarding use of medications. This information does not endorse any treatments or medications as safe, effective, or approved for treating a specific patient. UpToDate, Inc. and its affiliates disclaim any warranty or liability relating to this information or the use thereof.The use of this information is governed by the Terms of Use, available at https://www.[employer].com/en/know/clinical-effectiveness-terms. 2024© UpToDate, Inc. and its affiliates and/or licensors. All rights reserved.  Copyright   © 2024 UpToDate, Inc. and/or its affiliates. All rights reserved.    Medicare Preventive Visit Patient Instructions  Thank you for completing your Welcome to Medicare Visit or Medicare Annual Wellness Visit today. Your next wellness visit will be due in one year (7/2/2026).  The screening/preventive services that you may require over the next 5-10 years are detailed below. Some tests may not apply to you based off risk factors and/or age. Screening tests ordered at today's visit but not completed yet may show as past due. Also, please note that scanned in results may not display below.  Preventive Screenings:  Service Recommendations Previous Testing/Comments   Colorectal Cancer Screening  Colonoscopy    Fecal Occult Blood Test (FOBT)/Fecal Immunochemical Test (FIT)  Fecal DNA/Cologuard Test  Flexible Sigmoidoscopy Age: 45-75 years old   Colonoscopy: every 10 years (May be performed more frequently if at higher risk)  OR  FOBT/FIT: every 1 year  OR  Cologuard: every 3 years  OR  Sigmoidoscopy: every 5 years  Screening may be recommended earlier than age 45 if at higher risk for colorectal cancer. Also, an individualized decision between you and your healthcare provider will decide whether screening between the ages of 76-85 would be appropriate. Colonoscopy: 01/27/2016  FOBT/FIT: Not on file  Cologuard: Not on file  Sigmoidoscopy: Not on  file    Screening Current     Prostate Cancer Screening Individualized decision between patient and health care provider in men between ages of 55-69   Medicare will cover every 12 months beginning on the day after your 50th birthday PSA: <0.01 ng/mL     History Prostate Cancer     Hepatitis C Screening Once for adults born between 1945 and 1965  More frequently in patients at high risk for Hepatitis C Hep C Antibody: 12/20/2019    Screening Current   Diabetes Screening 1-2 times per year if you're at risk for diabetes or have pre-diabetes Fasting glucose: 135 mg/dL (6/30/2025)  A1C: 7.4 % (6/30/2025)  Screening Not Indicated  History Diabetes   Cholesterol Screening Once every 5 years if you don't have a lipid disorder. May order more often based on risk factors. Lipid panel: 06/30/2025  Screening Not Indicated  History Lipid Disorder      Other Preventive Screenings Covered by Medicare:  Abdominal Aortic Aneurysm (AAA) Screening: covered once if your at risk. You're considered to be at risk if you have a family history of AAA or a male between the age of 65-75 who smoking at least 100 cigarettes in your lifetime.  Lung Cancer Screening: covers low dose CT scan once per year if you meet all of the following conditions: (1) Age 55-77; (2) No signs or symptoms of lung cancer; (3) Current smoker or have quit smoking within the last 15 years; (4) You have a tobacco smoking history of at least 20 pack years (packs per day x number of years you smoked); (5) You get a written order from a healthcare provider.  Glaucoma Screening: covered annually if you're considered high risk: (1) You have diabetes OR (2) Family history of glaucoma OR (3)  aged 50 and older OR (4)  American aged 65 and older  Osteoporosis Screening: covered every 2 years if you meet one of the following conditions: (1) Have a vertebral abnormality; (2) On glucocorticoid therapy for more than 3 months; (3) Have primary  hyperparathyroidism; (4) On osteoporosis medications and need to assess response to drug therapy.  HIV Screening: covered annually if you're between the age of 15-65. Also covered annually if you are younger than 15 and older than 65 with risk factors for HIV infection. For pregnant patients, it is covered up to 3 times per pregnancy.    Immunizations:  Immunization Recommendations   Influenza Vaccine Annual influenza vaccination during flu season is recommended for all persons aged >= 6 months who do not have contraindications   Pneumococcal Vaccine   * Pneumococcal conjugate vaccine = PCV13 (Prevnar 13), PCV15 (Vaxneuvance), PCV20 (Prevnar 20)  * Pneumococcal polysaccharide vaccine = PPSV23 (Pneumovax) Adults 19-65 yo with certain risk factors or if 65+ yo  If never received any pneumonia vaccine: recommend Prevnar 20 (PCV20)  Give PCV20 if previously received 1 dose of PCV13 or PPSV23   Hepatitis B Vaccine 3 dose series if at intermediate or high risk (ex: diabetes, end stage renal disease, liver disease)   Respiratory syncytial virus (RSV) Vaccine - COVERED BY MEDICARE PART D  * RSVPreF3 (Arexvy) CDC recommends that adults 60 years of age and older may receive a single dose of RSV vaccine using shared clinical decision-making (SCDM)   Tetanus (Td) Vaccine - COST NOT COVERED BY MEDICARE PART B Following completion of primary series, a booster dose should be given every 10 years to maintain immunity against tetanus. Td may also be given as tetanus wound prophylaxis.   Tdap Vaccine - COST NOT COVERED BY MEDICARE PART B Recommended at least once for all adults. For pregnant patients, recommended with each pregnancy.   Shingles Vaccine (Shingrix) - COST NOT COVERED BY MEDICARE PART B  2 shot series recommended in those 19 years and older who have or will have weakened immune systems or those 50 years and older     Health Maintenance Due:      Topic Date Due   • Colorectal Cancer Screening  01/27/2026   • Hepatitis C  Screening  Completed     Immunizations Due:      Topic Date Due   • Influenza Vaccine (1) 09/01/2025     Advance Directives   What are advance directives?  Advance directives are legal documents that state your wishes and plans for medical care. These plans are made ahead of time in case you lose your ability to make decisions for yourself. Advance directives can apply to any medical decision, such as the treatments you want, and if you want to donate organs.   What are the types of advance directives?  There are many types of advance directives, and each state has rules about how to use them. You may choose a combination of any of the following:  Living will:  This is a written record of the treatment you want. You can also choose which treatments you do not want, which to limit, and which to stop at a certain time. This includes surgery, medicine, IV fluid, and tube feedings.   Durable power of  for healthcare (DPAHC):  This is a written record that states who you want to make healthcare choices for you when you are unable to make them for yourself. This person, called a proxy, is usually a family member or a friend. You may choose more than 1 proxy.  Do not resuscitate (DNR) order:  A DNR order is used in case your heart stops beating or you stop breathing. It is a request not to have certain forms of treatment, such as CPR. A DNR order may be included in other types of advance directives.  Medical directive:  This covers the care that you want if you are in a coma, near death, or unable to make decisions for yourself. You can list the treatments you want for each condition. Treatment may include pain medicine, surgery, blood transfusions, dialysis, IV or tube feedings, and a ventilator (breathing machine).  Values history:  This document has questions about your views, beliefs, and how you feel and think about life. This information can help others choose the care that you would choose.  Why are advance  directives important?  An advance directive helps you control your care. Although spoken wishes may be used, it is better to have your wishes written down. Spoken wishes can be misunderstood, or not followed. Treatments may be given even if you do not want them. An advance directive may make it easier for your family to make difficult choices about your care.   Fall Prevention    Fall prevention  includes ways to make your home and other areas safer. It also includes ways you can move more carefully to prevent a fall. Health conditions that cause changes in your blood pressure, vision, or muscle strength and coordination may increase your risk for falls. Medicines may also increase your risk for falls if they make you dizzy, weak, or sleepy.   Fall prevention tips:   Stand or sit up slowly.    Use assistive devices as directed.    Wear shoes that fit well and have soles that .    Wear a personal alarm.    Stay active.    Manage your medical conditions.    Home Safety Tips:  Add items to prevent falls in the bathroom.    Keep paths clear.    Install bright lights in your home.    Keep items you use often on shelves within reach.    Paint or place reflective tape on the edges of your stairs.    Weight Management   Why it is important to manage your weight:  Being overweight increases your risk of health conditions such as heart disease, high blood pressure, type 2 diabetes, and certain types of cancer. It can also increase your risk for osteoarthritis, sleep apnea, and other respiratory problems. Aim for a slow, steady weight loss. Even a small amount of weight loss can lower your risk of health problems.  How to lose weight safely:  A safe and healthy way to lose weight is to eat fewer calories and get regular exercise. You can lose up about 1 pound a week by decreasing the number of calories you eat by 500 calories each day.   Healthy meal plan for weight management:  A healthy meal plan includes a variety of  foods, contains fewer calories, and helps you stay healthy. A healthy meal plan includes the following:  Eat whole-grain foods more often.  A healthy meal plan should contain fiber. Fiber is the part of grains, fruits, and vegetables that is not broken down by your body. Whole-grain foods are healthy and provide extra fiber in your diet. Some examples of whole-grain foods are whole-wheat breads and pastas, oatmeal, brown rice, and bulgur.  Eat a variety of vegetables every day.  Include dark, leafy greens such as spinach, kale, michelle greens, and mustard greens. Eat yellow and orange vegetables such as carrots, sweet potatoes, and winter squash.   Eat a variety of fruits every day.  Choose fresh or canned fruit (canned in its own juice or light syrup) instead of juice. Fruit juice has very little or no fiber.  Eat low-fat dairy foods.  Drink fat-free (skim) milk or 1% milk. Eat fat-free yogurt and low-fat cottage cheese. Try low-fat cheeses such as mozzarella and other reduced-fat cheeses.  Choose meat and other protein foods that are low in fat.  Choose beans or other legumes such as split peas or lentils. Choose fish, skinless poultry (chicken or turkey), or lean cuts of red meat (beef or pork). Before you cook meat or poultry, cut off any visible fat.   Use less fat and oil.  Try baking foods instead of frying them. Add less fat, such as margarine, sour cream, regular salad dressing and mayonnaise to foods. Eat fewer high-fat foods. Some examples of high-fat foods include french fries, doughnuts, ice cream, and cakes.  Eat fewer sweets.  Limit foods and drinks that are high in sugar. This includes candy, cookies, regular soda, and sweetened drinks.  Exercise:  Exercise at least 30 minutes per day on most days of the week. Some examples of exercise include walking, biking, dancing, and swimming. You can also fit in more physical activity by taking the stairs instead of the elevator or parking farther away from  stores. Ask your healthcare provider about the best exercise plan for you.    © Copyright [employer] 2018 Information is for End User's use only and may not be sold, redistributed or otherwise used for commercial purposes. All illustrations and images included in CareNotes® are the copyrighted property of [employer].A.M., Inc. or [employer]

## 2025-07-01 NOTE — ASSESSMENT & PLAN NOTE
Stable on Effexor .5 mg daily.    Depression Screening Follow-up Plan: Patient's depression screening was positive with a PHQ-9 score of 9.     Orders:    venlafaxine (EFFEXOR-XR) 37.5 mg 24 hr capsule; TAKE 1 CAPSULE BY MOUTH DAILY  WITH 75 MG FOR A TOTAL .5  MG DAILY    venlafaxine (EFFEXOR-XR) 75 mg 24 hr capsule; TAKE 1 CAPSULE BY MOUTH DAILY  WITH 37.5 MG FOR A TOTAL OF  112.5 MG DAILY

## 2025-07-01 NOTE — ASSESSMENT & PLAN NOTE
Asymptomatic.  Management per Cardio - Continue ASA, statin, ACE-I.  Recommend lifestyle modifications.    LDL not at goal for CAD.  On Pravachol 80mg QHS.  Patient declines statin change (Crestor 10mg QHS), but may reconsider in future.

## 2025-07-01 NOTE — ASSESSMENT & PLAN NOTE
Uncontrolled as LDL not at goal for CAD.  On Pravachol 80mg QHS.  Patient declines statin change (Crestor 10mg QHS), but may reconsider in future.  Recommend lifestyle modification       Orders:    LDL cholesterol, direct; Future    pravastatin (PRAVACHOL) 80 mg tablet; Take 1 tablet (80 mg total) by mouth daily at bedtime

## 2025-07-01 NOTE — ASSESSMENT & PLAN NOTE
Stable on Effexor .5 mg daily.      Orders:    venlafaxine (EFFEXOR-XR) 37.5 mg 24 hr capsule; TAKE 1 CAPSULE BY MOUTH DAILY  WITH 75 MG FOR A TOTAL .5  MG DAILY    venlafaxine (EFFEXOR-XR) 75 mg 24 hr capsule; TAKE 1 CAPSULE BY MOUTH DAILY  WITH 37.5 MG FOR A TOTAL OF  112.5 MG DAILY

## 2025-07-01 NOTE — ASSESSMENT & PLAN NOTE
Management per [facility].  S/p Deep brain stimulator 9/22.  On Sinemet 25-100mg 2 tabs in AM and 1 pill QHS.  Continue [facility]'s Boxing Program.           Orders:    Ambulatory Referral to Neurology; Future

## 2025-07-01 NOTE — ASSESSMENT & PLAN NOTE
Uncontrolled. Management per Endo. Recommend lifestyle modifications.     Lab Results   Component Value Date    HGBA1C 7.4 (H) 06/30/2025       Orders:    Ambulatory referral to Ophthalmology; Future    Comprehensive metabolic panel; Future    Albumin / creatinine urine ratio; Future

## 2025-07-01 NOTE — ASSESSMENT & PLAN NOTE
Stable on Lisinopril 20mg QD. Check blood pressure outside of office. Recommend lifestyle modifications.       Orders:    Comprehensive metabolic panel; Future

## 2025-07-01 NOTE — ASSESSMENT & PLAN NOTE
Lab Results   Component Value Date    HGBA1C 7.4 (H) 06/30/2025     Uncontrolled. Management per Endo. Recommend lifestyle modifications.       Orders:    Ambulatory referral to Ophthalmology; Future    Comprehensive metabolic panel; Future    Albumin / creatinine urine ratio; Future

## 2025-07-01 NOTE — PROGRESS NOTES
Name: Andres Chacko      : 1955      MRN: [id number]  Encounter Provider: Mireille Martines DO  Encounter Date: 2025   Encounter department: [facility]  :  Assessment & Plan  Medicare annual wellness visit, subsequent         Type 2 diabetes mellitus without complication, without long-term current use of insulin (HCC)    Lab Results   Component Value Date    HGBA1C 7.4 (H) 2025       Orders:    Ambulatory referral to Ophthalmology; Future    Comprehensive metabolic panel; Future    Albumin / creatinine urine ratio; Future    Type 2 diabetes mellitus with hyperglycemia, without long-term current use of insulin (HCC)    Lab Results   Component Value Date    HGBA1C 7.4 (H) 2025       Orders:    Ambulatory referral to Ophthalmology; Future    Comprehensive metabolic panel; Future    Albumin / creatinine urine ratio; Future    Benign essential hypertension    Orders:    Comprehensive metabolic panel; Future    Mixed hyperlipidemia    Orders:    LDL cholesterol, direct; Future    pravastatin (PRAVACHOL) 80 mg tablet; Take 1 tablet (80 mg total) by mouth daily at bedtime    Current mild episode of major depressive disorder, unspecified whether recurrent (HCC)      Orders:    venlafaxine (EFFEXOR-XR) 37.5 mg 24 hr capsule; TAKE 1 CAPSULE BY MOUTH DAILY  WITH 75 MG FOR A TOTAL .5  MG DAILY    venlafaxine (EFFEXOR-XR) 75 mg 24 hr capsule; TAKE 1 CAPSULE BY MOUTH DAILY  WITH 37.5 MG FOR A TOTAL OF  112.5 MG DAILY    Anxiety    Orders:    venlafaxine (EFFEXOR-XR) 37.5 mg 24 hr capsule; TAKE 1 CAPSULE BY MOUTH DAILY  WITH 75 MG FOR A TOTAL .5  MG DAILY    venlafaxine (EFFEXOR-XR) 75 mg 24 hr capsule; TAKE 1 CAPSULE BY MOUTH DAILY  WITH 37.5 MG FOR A TOTAL OF  112.5 MG DAILY    Essential (hemorrhagic) thrombocythemia (HCC)         Arteriosclerosis of coronary artery         Subclinical hypothyroidism         Other male erectile dysfunction         Low serum  alkaline phosphatase         Parkinson's disease, unspecified whether dyskinesia present, unspecified whether manifestations fluctuate (HCC)    Orders:    Ambulatory Referral to Neurology; Future    History of stroke         History of prostate cancer    Orders:    PSA, Total Screen; Future    Marijuana use         Pipe smoker         Colon cancer screening    Orders:    Ambulatory Referral to Gastroenterology; Future    Screening for prostate cancer    Orders:    PSA, Total Screen; Future    BMI 28.0-28.9,adult            Preventive health issues were discussed with patient, and age appropriate screening tests were ordered as noted in patient's After Visit Summary. Personalized health advice and appropriate referrals for health education or preventive services given if needed, as noted in patient's After Visit Summary.    History of Present Illness     HPI   Patient Care Team:  Mireille Martines DO as PCP - General (Family Medicine)  Heri Gallardo MD as PCP - PCP-[facility]  Mireille Martines DO as PCP - PCP-[facility] ([facility])  Maria T Turner MD as PCP - Endocrinology (Endocrinology)  MD Heri Escamilla MD Frank Jeremy Tamarkin, MD James Boylan, MD as Endoscopist  Germaine Galvez RD as Diabetes Educator (Nutrition)  Germaine Galvez RD as Diabetes Educator (Nutrition)  Mariajose Vaca MD (Medical Oncology)  BENNY De La Rosa as Nurse Practitioner (Endocrinology)    Review of Systems    See other note.      Medical History Reviewed by provider this encounter:  Tobacco  Allergies  Meds  Problems  Med Hx  Surg Hx  Fam Hx  Soc   Hx      Annual Wellness Visit Questionnaire   Andres is here for his Subsequent Wellness visit.     Health Risk Assessment:   Patient rates overall health as fair. Patient feels that their physical health rating is same. Patient is dissatisfied with their life. Eyesight was rated as same. Hearing was rated as same. Patient  feels that their emotional and mental health rating is same. Patients states they are never, rarely angry. Patient states they are often unusually tired/fatigued. Pain experienced in the last 7 days has been some. Patient's pain rating has been 4/10.     Depression Screening:   PHQ-9 Score: 0      Fall Risk Screening:   In the past year, patient has experienced: history of falling in past year    Number of falls: 1  Injured during fall?: Yes    Feels unsteady when standing or walking?: No    Worried about falling?: No      Home Safety:  Patient has trouble with stairs inside or outside of their home. Patient has working smoke alarms and has no working carbon monoxide detector. Home safety hazards include: none.     Nutrition:   Current diet is Regular.     Medications:   Patient is currently taking over-the-counter supplements. OTC medications include: see medication list. Patient is able to manage medications.     Activities of Daily Living (ADLs)/Instrumental Activities of Daily Living (IADLs):   Walk and transfer into and out of bed and chair?: Yes  Dress and groom yourself?: Yes    Bathe or shower yourself?: Yes    Feed yourself? Yes  Do your laundry/housekeeping?: Yes  Manage your money, pay your bills and track your expenses?: Yes  Make your own meals?: Yes    Do your own shopping?: Yes    Previous Hospitalizations:   Any hospitalizations or ED visits within the last 12 months?: No      Advance Care Planning:   Living will: No    Advanced directive counseling given: Yes    ACP document given: Yes    Patient declined ACP directive: No      Comments: Living Will and POLST given again today.      Cognitive Screening:   Provider or family/friend/caregiver concerned regarding cognition?: No    Preventive Screenings      Cardiovascular Screening:    General: Screening Not Indicated and History Lipid Disorder      Diabetes Screening:     General: Screening Not Indicated and History Diabetes      Colorectal Cancer  Screening:     General: Screening Current      Prostate Cancer Screening:    General: History Prostate Cancer      Osteoporosis Screening:    General: Screening Not Indicated      Abdominal Aortic Aneurysm (AAA) Screening:    Risk factors include: age between 65-74 yo and tobacco use        General: Screening Not Indicated      Lung Cancer Screening:     General: Screening Not Indicated      Hepatitis C Screening:    General: Screening Current    Immunizations:  - Immunizations due: COVID, RSV  - Risks/benefits immunizations discussed      Screening, Brief Intervention, and Referral to Treatment (SBIRT)     Screening  Typical number of drinks in a day: 0  Typical number of drinks in a week: 2  Interpretation: Low risk drinking behavior.    Single Item Drug Screening:  How often have you used an illegal drug (including marijuana) or a prescription medication for non-medical reasons in the past year? never    Single Item Drug Screen Score: 0  Interpretation: Negative screen for possible drug use disorder    Brief Intervention  Alcohol & drug use screenings were reviewed. No concerns regarding substance use disorder identified.     Time Spent  Time spent screening/evaluating the patient for alcohol misuse: 5 minutes.     Other Counseling Topics:   Calcium and vitamin D intake and regular weightbearing exercise.     Social Drivers of Health     Financial Resource Strain: Low Risk  (2/21/2023)    Overall Financial Resource Strain (CARDIA)     Difficulty of Paying Living Expenses: Not hard at all   Food Insecurity: No Food Insecurity (7/1/2025)    Nursing - Inadequate Food Risk Classification     Worried About Running Out of Food in the Last Year: Never true     Ran Out of Food in the Last Year: Never true   Transportation Needs: No Transportation Needs (7/1/2025)    PRAPARE - Transportation     Lack of Transportation (Medical): No     Lack of Transportation (Non-Medical): No   Housing Stability: Low Risk  (7/1/2025)     "Housing Stability Vital Sign     Unable to Pay for Housing in the Last Year: No     Number of Times Moved in the Last Year: 0     Homeless in the Last Year: No   Utilities: Not At Risk (7/1/2025)    [facility] Utilities     Threatened with loss of utilities: No     No results found.    Objective   /80   Pulse 84   Temp (!) 97.2 °F (36.2 °C) (Tympanic)   Resp 16   Ht 5' 9.5\" (1.765 m)   Wt 89.8 kg (198 lb)   SpO2 97%   BMI 28.82 kg/m²     Physical Exam    See other note.        "

## 2025-07-01 NOTE — ASSESSMENT & PLAN NOTE
Patient declined continued Uro F/U and desires yearly PSA per PCP.  Status post DaVinci robot prostatectomy performed at [facility] 2013.       Orders:    PSA, Total Screen; Future

## 2025-07-02 ENCOUNTER — OFFICE VISIT (OUTPATIENT)
Facility: CLINIC | Age: 70
End: 2025-07-02

## 2025-07-02 DIAGNOSIS — G20.A1 PARKINSON'S DISEASE, UNSPECIFIED WHETHER DYSKINESIA PRESENT, UNSPECIFIED WHETHER MANIFESTATIONS FLUCTUATE (HCC): Primary | ICD-10-CM

## 2025-07-03 NOTE — ASSESSMENT & PLAN NOTE
Lab Results   Component Value Date    HGBA1C 7.4 (H) 06/30/2025       Orders:    Ambulatory referral to Ophthalmology; Future    Comprehensive metabolic panel; Future    Albumin / creatinine urine ratio; Future

## 2025-07-03 NOTE — ASSESSMENT & PLAN NOTE
Orders:    venlafaxine (EFFEXOR-XR) 37.5 mg 24 hr capsule; TAKE 1 CAPSULE BY MOUTH DAILY  WITH 75 MG FOR A TOTAL .5  MG DAILY    venlafaxine (EFFEXOR-XR) 75 mg 24 hr capsule; TAKE 1 CAPSULE BY MOUTH DAILY  WITH 37.5 MG FOR A TOTAL OF  112.5 MG DAILY

## 2025-07-03 NOTE — ASSESSMENT & PLAN NOTE
Orders:    LDL cholesterol, direct; Future    pravastatin (PRAVACHOL) 80 mg tablet; Take 1 tablet (80 mg total) by mouth daily at bedtime

## 2025-07-07 ENCOUNTER — PROCEDURE VISIT (OUTPATIENT)
Age: 70
End: 2025-07-07
Payer: COMMERCIAL

## 2025-07-07 VITALS
HEIGHT: 70 IN | WEIGHT: 198 LBS | BODY MASS INDEX: 28.35 KG/M2 | HEART RATE: 85 BPM | DIASTOLIC BLOOD PRESSURE: 84 MMHG | SYSTOLIC BLOOD PRESSURE: 139 MMHG

## 2025-07-07 DIAGNOSIS — I10 BENIGN ESSENTIAL HYPERTENSION: ICD-10-CM

## 2025-07-07 DIAGNOSIS — M99.03 SEGMENTAL DYSFUNCTION OF LUMBAR REGION: Primary | ICD-10-CM

## 2025-07-07 DIAGNOSIS — M62.838 MUSCLE SPASM: ICD-10-CM

## 2025-07-07 DIAGNOSIS — M47.816 LUMBAR SPONDYLOSIS: ICD-10-CM

## 2025-07-07 DIAGNOSIS — E11.65 TYPE 2 DIABETES MELLITUS WITH HYPERGLYCEMIA, WITHOUT LONG-TERM CURRENT USE OF INSULIN (HCC): ICD-10-CM

## 2025-07-07 DIAGNOSIS — M99.02 SEGMENTAL DYSFUNCTION OF THORACIC REGION: ICD-10-CM

## 2025-07-07 DIAGNOSIS — E11.9 TYPE 2 DIABETES MELLITUS WITHOUT COMPLICATION, WITHOUT LONG-TERM CURRENT USE OF INSULIN (HCC): ICD-10-CM

## 2025-07-07 DIAGNOSIS — M99.04 SEGMENTAL DYSFUNCTION OF SACRAL REGION: ICD-10-CM

## 2025-07-07 LAB
LEFT EYE DIABETIC RETINOPATHY: NORMAL
RIGHT EYE DIABETIC RETINOPATHY: NORMAL

## 2025-07-07 PROCEDURE — 98941 CHIROPRACT MANJ 3-4 REGIONS: CPT | Performed by: CHIROPRACTOR

## 2025-07-07 NOTE — PROGRESS NOTES
Initial date of service: 6/23/25    Diagnoses and all orders for this visit:    Segmental dysfunction of lumbar region    Lumbar spondylosis    Segmental dysfunction of sacral region    Muscle spasm    Segmental dysfunction of thoracic region    Mechanical back pain and sciatica secondary to repetitive st/sp injury of SIJ region in the setting of DJD. Pt responded to tx with reduced pain and increased ROM    Return in about 3 weeks (around 7/28/2025) for Next scheduled follow up.    TREATMENT: 05424 AT  Therapeutic Procedures: IASTM; discussed post procedure soreness and/or ecchymosis for up to 36 hrs, applied to affected mm hypertonicities; supine hamstring stretch, supine gluteal stretch, single knee to chest stretch, transitional mvmt education, abdominal bracing. Thoracic mobilization/manipulation: prone P-A mob, supine A-P manip; Lumbar mobilization/manipulation: diversified side laying graded HVLA, flexion-traction; SIJ Manipulation/Mobilization: L SIJ HVLA - long axis distraction, song drop table maneuver to affected SIJ    HPI:  Andres Chacko is a 69 y.o. male  Chief Complaint   Patient presents with    Back Pain     Lower lumbar pain that radiates down left side    pain score 4         Pt with Parkinson's presents for tx for back pain with L sided sciatica. Pt reports hx of discectomy/laminectomy in early 2000s. Pt attends [facility] 2 times a week; hurts after jumping    Back Pain  This is a new problem. The current episode started more than 1 month ago. The problem occurs daily. The problem has been waxing and waning since onset. The pain is present in the gluteal, lumbar spine, sacro-iliac and thoracic spine. The quality of the pain is described as aching. The pain radiates to the left foot. The pain is Worse during the day. The symptoms are aggravated by standing (laying left, side, walking, transitional mvmts). Stiffness is present In the morning.     Past Medical History[1]   Past Surgical  History[2]  The following portions of the patient's history were reviewed and updated as appropriate: allergies, past family history, past medical history, past social history, past surgical history, and problem list.  Review of Systems   Musculoskeletal:  Positive for back pain.     Physical Exam    Musculoskeletal:      Thoracic back: Spasms and tenderness present. Decreased range of motion.      Lumbar back: Spasms and tenderness present. Decreased range of motion. Negative right straight leg raise test and negative left straight leg raise test.        Back:       Comments: Pnful and limited in Ext, Llf.     Neurological:      Mental Status: He is alert and oriented to person, place, and time.     SOFT TISSUE ASSESSMENT Hypertonicity and tenderness palpated B T4-7, L3-S1 erector spinae, glute med/min, hamstring JOINT RESTRICTIONS: T4-7, L3-S1 and L SIJ             [1]   Past Medical History:  Diagnosis Date    Anxiety     Arteriosclerosis of coronary artery 05/24/2017    Arthritis 1999    Benign essential hypertension 05/15/2015    BPH (benign prostatic hypertrophy)     Cancer ([facility]) 2012    Depression 06/01/2012    Diabetes insipidus ([facility]) 2005    Diabetes mellitus ([facility])     Essential (hemorrhagic) thrombocythemia ([facility])     Fractures 2009    History of prostate cancer 01/05/2023    History of stroke     Hypertension     Left carpal tunnel syndrome     Lumbar canal stenosis     Marijuana use 07/01/2025    Mixed hyperlipidemia 11/10/2010    Osteoarthritis, knee     Other male erectile dysfunction 08/08/2019    Overweight     Parkinson disease ([facility]) 10/08/2019    Pipe smoker     Prostate cancer ([facility]) 12/2012    Retinal and vitreous disorder     Stroke ([facility]) 7/01/2019    Subclinical hypothyroidism 04/11/2017    Type 2 diabetes mellitus without complication, without long-term current use of insulin ([facility]) 08/24/2017   [2]   Past Surgical History:  Procedure Laterality Date    ANKLE FRACTURE SURGERY Left 04/2009     triple fracture; 2 Screws in place    ANKLE SURGERY  4/15/2009    BACK SURGERY  2003    DEEP BRAIN STIMULATOR PLACEMENT Bilateral 09/2022    FRACTURE SURGERY  4/15/2009    IR BIOPSY BONE MARROW  12/21/2023    LAMINECTOMY  2003    LUMBAR LAMINECTOMY  2003    L5-S1    NASAL SEPTUM SURGERY  06/1989    MS COLONOSCOPY FLX DX W/COLLJ SPEC WHEN PFRMD N/A 01/27/2016    Procedure: COLONOSCOPY;  Surgeon: Jigar Alvarado MD;  Location: [facility];  Service: Gastroenterology    PROSTATE BIOPSY  12/08/2012    managed by Rambo Roy, needle biopsy    PROSTATECTOMY  09/23/2013

## 2025-07-08 DIAGNOSIS — E11.65 TYPE 2 DIABETES MELLITUS WITH HYPERGLYCEMIA, WITHOUT LONG-TERM CURRENT USE OF INSULIN (HCC): ICD-10-CM

## 2025-07-08 RX ORDER — LISINOPRIL 20 MG/1
20 TABLET ORAL DAILY
Qty: 100 TABLET | Refills: 2 | Status: SHIPPED | OUTPATIENT
Start: 2025-07-08

## 2025-07-09 ENCOUNTER — OFFICE VISIT (OUTPATIENT)
Facility: CLINIC | Age: 70
End: 2025-07-09

## 2025-07-09 DIAGNOSIS — G20.A1 PARKINSON'S DISEASE, UNSPECIFIED WHETHER DYSKINESIA PRESENT, UNSPECIFIED WHETHER MANIFESTATIONS FLUCTUATE (HCC): Primary | ICD-10-CM

## 2025-07-09 RX ORDER — PEN NEEDLE, DIABETIC 32GX 5/32"
NEEDLE, DISPOSABLE MISCELLANEOUS DAILY
Qty: 100 EACH | Refills: 1 | Status: SHIPPED | OUTPATIENT
Start: 2025-07-09

## 2025-07-14 ENCOUNTER — PROCEDURE VISIT (OUTPATIENT)
Age: 70
End: 2025-07-14
Payer: COMMERCIAL

## 2025-07-14 ENCOUNTER — OFFICE VISIT (OUTPATIENT)
Dept: OBGYN CLINIC | Facility: CLINIC | Age: 70
End: 2025-07-14
Payer: COMMERCIAL

## 2025-07-14 VITALS
DIASTOLIC BLOOD PRESSURE: 79 MMHG | SYSTOLIC BLOOD PRESSURE: 147 MMHG | HEART RATE: 81 BPM | HEIGHT: 70 IN | BODY MASS INDEX: 28.35 KG/M2 | WEIGHT: 198 LBS

## 2025-07-14 DIAGNOSIS — M62.838 MUSCLE SPASM: ICD-10-CM

## 2025-07-14 DIAGNOSIS — Z01.818 PREOPERATIVE TESTING: ICD-10-CM

## 2025-07-14 DIAGNOSIS — M99.04 SEGMENTAL DYSFUNCTION OF SACRAL REGION: ICD-10-CM

## 2025-07-14 DIAGNOSIS — G56.02 LEFT CARPAL TUNNEL SYNDROME: Primary | ICD-10-CM

## 2025-07-14 DIAGNOSIS — M99.03 SEGMENTAL DYSFUNCTION OF LUMBAR REGION: Primary | ICD-10-CM

## 2025-07-14 DIAGNOSIS — M47.816 LUMBAR SPONDYLOSIS: ICD-10-CM

## 2025-07-14 DIAGNOSIS — M99.02 SEGMENTAL DYSFUNCTION OF THORACIC REGION: ICD-10-CM

## 2025-07-14 PROCEDURE — NC001 PR NO CHARGE: Performed by: STUDENT IN AN ORGANIZED HEALTH CARE EDUCATION/TRAINING PROGRAM

## 2025-07-14 PROCEDURE — 99214 OFFICE O/P EST MOD 30 MIN: CPT | Performed by: STUDENT IN AN ORGANIZED HEALTH CARE EDUCATION/TRAINING PROGRAM

## 2025-07-14 PROCEDURE — 98941 CHIROPRACT MANJ 3-4 REGIONS: CPT | Performed by: CHIROPRACTOR

## 2025-07-14 RX ORDER — CHLORHEXIDINE GLUCONATE ORAL RINSE 1.2 MG/ML
15 SOLUTION DENTAL ONCE
Status: CANCELLED | OUTPATIENT
Start: 2025-07-25 | End: 2025-07-14

## 2025-07-14 RX ORDER — SODIUM CHLORIDE, SODIUM LACTATE, POTASSIUM CHLORIDE, CALCIUM CHLORIDE 600; 310; 30; 20 MG/100ML; MG/100ML; MG/100ML; MG/100ML
20 INJECTION, SOLUTION INTRAVENOUS CONTINUOUS
Status: CANCELLED | OUTPATIENT
Start: 2025-07-14

## 2025-07-14 RX ORDER — CEFAZOLIN SODIUM 2 G/50ML
2000 SOLUTION INTRAVENOUS ONCE
Status: CANCELLED | OUTPATIENT
Start: 2025-07-25 | End: 2025-07-14

## 2025-07-14 NOTE — PROGRESS NOTES
ORTHOPAEDIC HAND, WRIST, AND ELBOW OFFICE  VISIT     Name: Andres Chacko      : 1955      MRN: [id number]  Encounter Provider: Leroy Napier MD  Encounter Date: 2025   Encounter department: [facility]  :  Assessment & Plan  Left carpal tunnel syndrome    Orders:    Case request operating room: ENDOSCOPIC LEFT CARPAL TUNNEL RELEASE; Standing    ASSESSMENT/PLAN:    Andres Chacko is a 69 y.o. RHD male who presents with left carpal tunnel syndrome.    We reviewed several treatment options for carpal tunnel syndrome, including nighttime splinting, therapy, injections, and surgery.   The patient was interested in surgery, since bracing has not provided much relief.  Given the severity of the patient’s symptoms we explained that the primary goal of surgery is to prevent further weakness, atrophy, and numbness. However, it is important to note that surgery cannot guarantee a full recovery of sensation or strength. Although carpal tunnel release can alleviate pain and halt additional nerve damage, some degree of sensory loss may persist in cases of long-standing nerve compression. That said, many patients still experience significant improvements in hand function and a reduction in discomfort, enhancing their daily activities.    We also discussed the potential risks of surgery, which include bleeding, infection, wound healing complications, and possible damage to surrounding structures (such as nerves, vessels, or tendons), as well as the possibility of needing additional procedures, recurrence of symptoms, lack of improvement, stiffness, and scarring. No guarantees were made regarding the outcomes.  After reviewing the expected postoperative course in detail, the patient expressed understanding and consented to proceed.   We will schedule him for a left endoscopic carpal tunnel release in the next several weeks and see him 2 weeks after surgery      ____________________________________________________________________________________________________________________________________________      CHIEF COMPLAINT:  Left finger numbness and stiffness    SUBJECTIVE:  Andres Chacko is a 69 y.o.RHD male who presents for a follow up of his left hand numbness/ stiffness in the setting of Parkinson's Disease. He was last seen on 6/20/25 at which point we recommended a carpal tunnel ultrasound.     He returns today to discuss those results. It was performed on 6/23/25 and the results did reveal left carpal tunnel compression. On discussion, he has numbness in all of his fingers, except small finger. He does wear his left brace a couple of times a week with minimal relief. The numbness does not wake him up at night, but his fingertips are numb all the time.     I have personally reviewed all the relevant PMH, PSH, SH, FH, Medications and allergies      PAST MEDICAL HISTORY:  Past Medical History[1]    PAST SURGICAL HISTORY:  Past Surgical History[2]    FAMILY HISTORY:  Family History[3]    SOCIAL HISTORY:  Social History[4]    MEDICATIONS:  Current Medications[5]    ALLERGIES:  Allergies[6]      REVIEW OF SYSTEMS:  Pertinent items are noted in HPI.  A comprehensive review of systems was negative.    VITALS:  There were no vitals filed for this visit.    LABS:  HgA1c:   Lab Results   Component Value Date    HGBA1C 7.4 (H) 06/30/2025     BMP:   Lab Results   Component Value Date    GLUCOSE 211 (H) 12/17/2015    CALCIUM 9.8 06/30/2025     12/17/2015    K 5.0 06/30/2025    CO2 29 06/30/2025     06/30/2025    BUN 21 06/30/2025    CREATININE 1.16 06/30/2025       _____________________________________________________  PHYSICAL EXAMINATION:  General: well developed and well nourished, alert, oriented times 3, and appears comfortable  Psychiatric: Normal  HEENT: Normocephalic, Atraumatic Trachea Midline, No torticollis  Pulmonary: No audible wheezing or  respiratory distress   Abdomen/GI: Non tender, non distended   Cardiovascular: Regular Rate and Rhythm. No pitting edema, 2+ radial pulse   Skin: No masses, erythema, lacerations, fluctation, ulcerations  Neurovascular: Sensation Intact to the Median, Ulnar, Radial Nerve, Motor Intact to the Median, Ulnar, Radial Nerve, and Pulses Intact  Musculoskeletal: Normal, except as noted in detailed exam and in HPI.        FOCUSED MUSCULOSKELETAL EXAMINATION:    Left Upper Extremity  Inspection: claw deformity of left hand. Tremor of hand.  Palpation: no tenderness to palpation about hand or thumb. Some tenderness on volar aspect of radial wrist without any skin changes   Neurologic: 5/5 elbow flexion, 5/5 elbow extension, 5/5 wrist extension, 5/5 wrist flexion, 5/5 finger flexion, 5/5 finger extension, 5/5 FPL, 5/5 EPL, 5/5 APB, 5/5 intrinsics, sensation intact to median, radial, and ulnar nerve distributions  Vascular: Palpable radial pulse, brisk cap refill <2sec, hand warm and well perfused  MSK:   Positive carpal compression, negative elbow flexion compression  ___________________________________________________  STUDIES REVIEWED:    I have personally reviewed and interpreted US of left carpal tunnel obtained on 6/23/25 which demonstrates findings consistent with carpal tunnel syndrome.      LABS REVIEWED:    HgA1c:   Lab Results   Component Value Date    HGBA1C 7.4 (H) 06/30/2025     BMP:   Lab Results   Component Value Date    GLUCOSE 211 (H) 12/17/2015    CALCIUM 9.8 06/30/2025     12/17/2015    K 5.0 06/30/2025    CO2 29 06/30/2025     06/30/2025    BUN 21 06/30/2025    CREATININE 1.16 06/30/2025       PROCEDURES PERFORMED:  Procedures  -    _____________________________________________________      Scribe Attestation      I,:  Alyce Leo PA-C am acting as a scribe while in the presence of the attending physician.:       I,:  Leroy Napier MD personally performed the services  described in this documentation    as scribed in my presence.:               I agree with the history, physical examination, assessment and plan of care as documented above.    Leroy Napier M.D.  Attending, Orthopaedic Surgery  Hand, Wrist, and Elbow Surgery  [facility]         [1]   Past Medical History:  Diagnosis Date    Anxiety     Arteriosclerosis of coronary artery 05/24/2017    Arthritis 1999    Benign essential hypertension 05/15/2015    BPH (benign prostatic hypertrophy)     Cancer ([facility]) 2012    Depression 06/01/2012    Diabetes insipidus ([facility]) 2005    Diabetes mellitus ([facility])     Essential (hemorrhagic) thrombocythemia ([facility])     Fractures 2009    History of prostate cancer 01/05/2023    History of stroke     Hypertension     Left carpal tunnel syndrome     Lumbar canal stenosis     Marijuana use 07/01/2025    Mixed hyperlipidemia 11/10/2010    Osteoarthritis, knee     Other male erectile dysfunction 08/08/2019    Overweight     Parkinson disease ([facility]) 10/08/2019    Pipe smoker     Prostate cancer ([facility]) 12/2012    Retinal and vitreous disorder     Stroke ([facility]) 7/01/2019    Subclinical hypothyroidism 04/11/2017    Type 2 diabetes mellitus without complication, without long-term current use of insulin ([facility]) 08/24/2017   [2]   Past Surgical History:  Procedure Laterality Date    ANKLE FRACTURE SURGERY Left 04/2009    triple fracture; 2 Screws in place    ANKLE SURGERY  4/15/2009    BACK SURGERY  2003    DEEP BRAIN STIMULATOR PLACEMENT Bilateral 09/2022    FRACTURE SURGERY  4/15/2009    IR BIOPSY BONE MARROW  12/21/2023    LAMINECTOMY  2003    LUMBAR LAMINECTOMY  2003    L5-S1    NASAL SEPTUM SURGERY  06/1989    [place] COLONOSCOPY FLX DX W/COLLJ SPEC WHEN PFRMD N/A 01/27/2016    Procedure: COLONOSCOPY;  Surgeon: Jigar Alvarado MD;  Location: [facility];  Service: Gastroenterology    PROSTATE BIOPSY  12/08/2012    managed by Rambo Roy, needle biopsy    PROSTATECTOMY  09/23/2013   [3]    Family History  Problem Relation Name Age of Onset    Hypertension Mother Korina Chacko              Retinal detachment Mother Korina Chacko     Retinitis pigmentosa Mother Korina Chacko     Osteoporosis Mother Korina Chacko     Vision loss Mother Korina Chacko         retinitis pigmentosa    Diabetes type II Father Temo Chacko              Heart attack Father Temo Chacko 59             Coronary artery disease Father Temo Chacko 59    Heart disease Father Temo Chacko              Diabetes Father Temo Chacko              No Known Problems Son Gregory     No Known Problems Son Jc    [4]   Social History  Tobacco Use    Smoking status: Some Days     Types: Pipe     Start date: 6/15/2025     Passive exposure: Past    Smokeless tobacco: Never    Tobacco comments:     Occasional pipe or cigar < 6 times a month   Vaping Use    Vaping status: Never Used   Substance Use Topics    Alcohol use: Yes     Alcohol/week: 4.0 standard drinks of alcohol     Types: 3 Cans of beer, 1 Shots of liquor per week     Comment: occassional     Drug use: Yes     Types: Marijuana     Comment: THC Gummy 1/2 nightly as needed   [5]   Current Outpatient Medications:     aspirin 325 mg tablet, Take 325 mg by mouth in the morning., Disp: , Rfl:     BD Pen Needle Yara 2nd Gen 32G X 4 MM MISC, USE AS DIRECTED DAILY, Disp: 100 each, Rfl: 1    carbidopa-levodopa (SINEMET CR)  mg per tablet, Take 1 tablet by mouth daily at bedtime Rx per Neuro, Disp: , Rfl:     carbidopa-levodopa (SINEMET)  mg per tablet, Take 2 tablets by mouth 3 (three) times a day Follow clinic instructions (Patient taking differently: Take 1 tablet by mouth in the morning and 1 tablet in the evening and 1 tablet before bedtime. Follow clinic instructions.  Rx per Neuro.), Disp: 540 tablet, Rfl: 3    Cholecalciferol (VITAMIN D)   units CAPS, Take 2 capsules (4,000 Units total) by mouth daily, Disp: , Rfl: 0    co-enzyme Q-10 30 MG capsule, Take 1 capsule (30 mg total) by mouth daily, Disp: 30 capsule, Rfl: 0    Continuous Blood Gluc  (FreeStyle Luisa 3 [place]) ARMANDO, once, Disp: 1 each, Rfl: 0    Continuous Glucose Sensor (FreeStyle Luisa 3 Sensor) MISC, CHANGE EVERY 14 DAYS, Disp: 6 each, Rfl: 1    ezetimibe (ZETIA) 10 mg tablet, TAKE 1 TABLET BY MOUTH EVERY DAY, Disp: 90 tablet, Rfl: 1    glucose blood (ONETOUCH VERIO) test strip, Check BG 2x per day, Disp: 200 each, Rfl: 3    Icosapent Ethyl (Vascepa) 1 g CAPS, Take 2 capsules (2 g total) by mouth 2 (two) times a day, Disp: 180 capsule, Rfl: 2    insulin degludec (Tresiba FlexTouch) 200 units/mL CONCENTRATED U-200 injection pen, INJECT SUBCUTANEOUSLY 18 UNITS  DAILY, Disp: 18 mL, Rfl: 2    lisinopril (ZESTRIL) 20 mg tablet, TAKE 1 TABLET BY MOUTH DAILY, Disp: 100 tablet, Rfl: 2    Multiple Vitamin (multivitamin) capsule, Take 1 capsule by mouth in the morning., Disp: , Rfl:     pravastatin (PRAVACHOL) 80 mg tablet, Take 1 tablet (80 mg total) by mouth daily at bedtime, Disp: 90 tablet, Rfl: 1    repaglinide (PRANDIN) 0.5 mg tablet, Take 1 tablet (0.5 mg total) by mouth 3 (three) times a day before meals, Disp: 180 tablet, Rfl: 1    sitaGLIPtin-metFORMIN (Janumet)  MG per tablet, TAKE 1 TABLET BY MOUTH TWICE  DAILY WITH MEALS, Disp: 200 tablet, Rfl: 1    venlafaxine (EFFEXOR-XR) 37.5 mg 24 hr capsule, TAKE 1 CAPSULE BY MOUTH DAILY  WITH 75 MG FOR A TOTAL .5  MG DAILY, Disp: 90 capsule, Rfl: 1    venlafaxine (EFFEXOR-XR) 75 mg 24 hr capsule, TAKE 1 CAPSULE BY MOUTH DAILY  WITH 37.5 MG FOR A TOTAL OF  112.5 MG DAILY, Disp: 90 capsule, Rfl: 1  [6] No Known Allergies

## 2025-07-14 NOTE — PROGRESS NOTES
Initial date of service: 6/23/25    Diagnoses and all orders for this visit:    Segmental dysfunction of lumbar region    Lumbar spondylosis    Segmental dysfunction of sacral region    Muscle spasm    Segmental dysfunction of thoracic region    Mechanical back pain and sciatica secondary to repetitive st/sp injury of SIJ region in the setting of DJD. Pt responded to tx with reduced pain and increased ROM    Return in about 1 week (around 7/21/2025) for Next scheduled follow up.    TREATMENT: 86978 AT  Therapeutic Procedures: IASTM; discussed post procedure soreness and/or ecchymosis for up to 36 hrs, applied to affected mm hypertonicities; supine hamstring stretch, supine gluteal stretch, single knee to chest stretch, transitional mvmt education, abdominal bracing. Thoracic mobilization/manipulation: prone P-A mob, supine A-P manip; Lumbar mobilization/manipulation: diversified side laying graded HVLA, flexion-traction; SIJ Manipulation/Mobilization: L SIJ HVLA - long axis distraction, song drop table maneuver to affected SIJ    HPI:  Andres Chacko is a 69 y.o. male  Chief Complaint   Patient presents with    Back Pain     Lower lumbar pain that radiates down left side that is intermittent achy pain and sharp pain. Pain score 3-6        Pt with Parkinson's presents for tx for back pain with L sided sciatica. Pt reports hx of discectomy/laminectomy in early 2000s. Pt attends [facility] 2 times a week; hurts after jumping    Back Pain  This is a new problem. The current episode started more than 1 month ago. The problem occurs daily. The problem has been waxing and waning since onset. The pain is present in the gluteal, lumbar spine, sacro-iliac and thoracic spine. The quality of the pain is described as aching. The pain radiates to the left foot. The pain is Worse during the day. The symptoms are aggravated by standing (laying left, side, walking, transitional mvmts). Stiffness is present In the morning.      Past Medical History[1]   Past Surgical History[2]  The following portions of the patient's history were reviewed and updated as appropriate: allergies, past family history, past medical history, past social history, past surgical history, and problem list.  Review of Systems   Musculoskeletal:  Positive for back pain.     Physical Exam    Musculoskeletal:      Thoracic back: Spasms and tenderness present. Decreased range of motion.      Lumbar back: Spasms and tenderness present. Decreased range of motion. Negative right straight leg raise test and negative left straight leg raise test.        Back:       Comments: Pnful and limited in Ext, Llf.     Neurological:      Mental Status: He is alert and oriented to person, place, and time.     SOFT TISSUE ASSESSMENT Hypertonicity and tenderness palpated B T4-7, L3-S1 erector spinae, glute med/min, hamstring JOINT RESTRICTIONS: T4-7, L3-S1 and L SIJ             [1]   Past Medical History:  Diagnosis Date    Anxiety     Arteriosclerosis of coronary artery 05/24/2017    Arthritis 1999    Benign essential hypertension 05/15/2015    BPH (benign prostatic hypertrophy)     Cancer ([facility]) 2012    Depression 06/01/2012    Diabetes insipidus ([facility]) 2005    Diabetes mellitus ([facility])     Essential (hemorrhagic) thrombocythemia ([facility])     Fractures 2009    History of prostate cancer 01/05/2023    History of stroke     Hypertension     Left carpal tunnel syndrome     Lumbar canal stenosis     Marijuana use 07/01/2025    Mixed hyperlipidemia 11/10/2010    Osteoarthritis, knee     Other male erectile dysfunction 08/08/2019    Overweight     Parkinson disease ([facility]) 10/08/2019    Pipe smoker     Prostate cancer ([facility]) 12/2012    Retinal and vitreous disorder     Stroke ([facility]) 7/01/2019    Subclinical hypothyroidism 04/11/2017    Type 2 diabetes mellitus without complication, without long-term current use of insulin ([facility]) 08/24/2017   [2]   Past Surgical History:  Procedure Laterality Date     ANKLE FRACTURE SURGERY Left 04/2009    triple fracture; 2 Screws in place    ANKLE SURGERY  4/15/2009    BACK SURGERY  2003    DEEP BRAIN STIMULATOR PLACEMENT Bilateral 09/2022    FRACTURE SURGERY  4/15/2009    IR BIOPSY BONE MARROW  12/21/2023    LAMINECTOMY  2003    LUMBAR LAMINECTOMY  2003    L5-S1    NASAL SEPTUM SURGERY  06/1989    [place] COLONOSCOPY FLX DX W/COLLJ SPEC WHEN PFRMD N/A 01/27/2016    Procedure: COLONOSCOPY;  Surgeon: Jigar Alvarado MD;  Location[facility];  Service: Gastroenterology    PROSTATE BIOPSY  12/08/2012    managed by Rambo Roy, needle biopsy    PROSTATECTOMY  09/23/2013

## 2025-07-15 ENCOUNTER — OFFICE VISIT (OUTPATIENT)
Facility: CLINIC | Age: 70
End: 2025-07-15

## 2025-07-15 ENCOUNTER — OFFICE VISIT (OUTPATIENT)
Dept: LAB | Facility: HOSPITAL | Age: 70
End: 2025-07-15
Payer: COMMERCIAL

## 2025-07-15 DIAGNOSIS — E03.9 HYPOTHYROIDISM, UNSPECIFIED TYPE: ICD-10-CM

## 2025-07-15 DIAGNOSIS — E03.8 SUBCLINICAL HYPOTHYROIDISM: Primary | ICD-10-CM

## 2025-07-15 DIAGNOSIS — G20.A1 PARKINSON'S DISEASE, UNSPECIFIED WHETHER DYSKINESIA PRESENT, UNSPECIFIED WHETHER MANIFESTATIONS FLUCTUATE (HCC): Primary | ICD-10-CM

## 2025-07-15 DIAGNOSIS — G56.02 LEFT CARPAL TUNNEL SYNDROME: ICD-10-CM

## 2025-07-15 DIAGNOSIS — Z01.818 PREOPERATIVE TESTING: ICD-10-CM

## 2025-07-15 LAB
ATRIAL RATE: 92 BPM
P AXIS: -8 DEGREES
PR INTERVAL: 152 MS
QRS AXIS: 22 DEGREES
QRSD INTERVAL: 70 MS
QT INTERVAL: 342 MS
QTC INTERVAL: 423 MS
T WAVE AXIS: 21 DEGREES
VENTRICULAR RATE: 92 BPM

## 2025-07-15 PROCEDURE — 93005 ELECTROCARDIOGRAM TRACING: CPT

## 2025-07-15 PROCEDURE — 93010 ELECTROCARDIOGRAM REPORT: CPT | Performed by: INTERNAL MEDICINE

## 2025-07-15 RX ORDER — LEVOTHYROXINE SODIUM 25 UG/1
25 TABLET ORAL DAILY
Qty: 90 TABLET | Refills: 1 | Status: SHIPPED | OUTPATIENT
Start: 2025-07-15

## 2025-07-16 ENCOUNTER — VBI (OUTPATIENT)
Dept: ADMINISTRATIVE | Facility: OTHER | Age: 70
End: 2025-07-16

## 2025-07-16 NOTE — TELEPHONE ENCOUNTER
07/16/25 12:56 PM     Chart reviewed for CRC: Colonoscopy ; nothing is submitted to the patient's insurance at this time.     Devorah Rucker MA   [facility]

## 2025-07-18 ENCOUNTER — OFFICE VISIT (OUTPATIENT)
Facility: CLINIC | Age: 70
End: 2025-07-18

## 2025-07-18 DIAGNOSIS — G20.A1 PARKINSON'S DISEASE, UNSPECIFIED WHETHER DYSKINESIA PRESENT, UNSPECIFIED WHETHER MANIFESTATIONS FLUCTUATE (HCC): Primary | ICD-10-CM

## 2025-07-18 NOTE — PRE-PROCEDURE INSTRUCTIONS
Pre-Surgery Instructions:   Medication Instructions    aspirin 325 mg tablet Stop taking 7 days prior to surgery.    carbidopa-levodopa (SINEMET CR)  mg per tablet Take night before surgery    carbidopa-levodopa (SINEMET)  mg per tablet Take day of surgery.    Cholecalciferol (VITAMIN D) 2000 units CAPS Stop taking 7 days prior to surgery.    co-enzyme Q-10 30 MG capsule Stop taking 7 days prior to surgery.    Continuous Glucose Sensor (FreeStyle Luisa 3 Sensor) MISC Hold day of surgery.    ezetimibe (ZETIA) 10 mg tablet Take night before surgery    Icosapent Ethyl (Vascepa) 1 g CAPS Hold day of surgery.    insulin degludec (Tresiba FlexTouch) 200 units/mL CONCENTRATED U-200 injection pen Take day of surgery.    levothyroxine (Euthyrox) 25 mcg tablet Take day of surgery.    lisinopril (ZESTRIL) 20 mg tablet Hold day of surgery.    Multiple Vitamin (multivitamin) capsule Stop taking 7 days prior to surgery.    pravastatin (PRAVACHOL) 80 mg tablet Take night before surgery    repaglinide (PRANDIN) 0.5 mg tablet Hold day of surgery.    sitaGLIPtin-metFORMIN (Janumet)  MG per tablet Hold day of surgery.    venlafaxine (EFFEXOR-XR) 37.5 mg 24 hr capsule Take day of surgery.    venlafaxine (EFFEXOR-XR) 75 mg 24 hr capsule Take day of surgery.    Medication instructions for day of surgery reviewed. Patient verbalized understanding and agrees with the plan.  Please take all instructed medications with only a sip of water. Please do not take any over the counter (non-prescribed) vitamins or supplements for one week prior to date of surgery.      You will receive a call one business day prior to surgery with an arrival time and hospital directions. If your surgery is scheduled on a Monday, the hospital will be calling you on the Friday prior to your surgery. If you have not heard from anyone by 8pm, please call the hospital supervisor through the hospital  at [phone]. ([facility] [phone] or  [place] [phone]).    Do not eat or drink anything after midnight the night before your surgery, including candy, mints, lifesavers, or chewing gum. Do not drink alcohol 24hrs before your surgery. Try not to smoke at least 24hrs before your surgery.       Follow the pre surgery showering instructions as listed in the “My Surgical Experience Booklet” or otherwise provided by your surgeon's office. Do not use a blade to shave the surgical area 1 week before surgery. It is okay to use a clean electric clippers up to 24 hours before surgery. Do not apply any lotions, creams, including makeup, cologne, deodorant, or perfumes after showering on the day of your surgery. Do not use dry shampoo, hair spray, hair gel, or any type of hair products.     No contact lenses, eye make-up, or artificial eyelashes. Remove nail polish, including gel polish, and any artificial, gel, or acrylic nails if possible. Remove all jewelry including rings and body piercing jewelry.     Wear causal clothing that is easy to take on and off. Consider your type of surgery.    Keep any valuables, jewelry, piercings at home. Please bring any specially ordered equipment (sling, braces) if indicated.    Arrange for a responsible person to drive you to and from the hospital on the day of your surgery. Please confirm the visitor policy for the day of your procedure when you receive your phone call with an arrival time.     Call the surgeon's office with any new illnesses, exposures, or additional questions prior to surgery.    Please reference your “My Surgical Experience Booklet” for additional information to prepare for your upcoming surgery.

## 2025-07-22 ENCOUNTER — OFFICE VISIT (OUTPATIENT)
Facility: CLINIC | Age: 70
End: 2025-07-22

## 2025-07-22 DIAGNOSIS — G20.A1 PARKINSON'S DISEASE, UNSPECIFIED WHETHER DYSKINESIA PRESENT, UNSPECIFIED WHETHER MANIFESTATIONS FLUCTUATE (HCC): Primary | ICD-10-CM

## 2025-07-23 ENCOUNTER — ANESTHESIA EVENT (OUTPATIENT)
Dept: PERIOP | Facility: HOSPITAL | Age: 70
End: 2025-07-23
Payer: COMMERCIAL

## 2025-07-24 DIAGNOSIS — G56.02 LEFT CARPAL TUNNEL SYNDROME: Primary | ICD-10-CM

## 2025-07-24 RX ORDER — SENNOSIDES 8.6 MG
650 CAPSULE ORAL EVERY 8 HOURS
Qty: 30 TABLET | Refills: 0 | Status: SHIPPED | OUTPATIENT
Start: 2025-07-24 | End: 2025-08-03

## 2025-07-24 RX ORDER — ONDANSETRON 4 MG/1
4 TABLET, FILM COATED ORAL EVERY 8 HOURS PRN
Qty: 4 TABLET | Refills: 0 | Status: SHIPPED | OUTPATIENT
Start: 2025-07-24

## 2025-07-24 RX ORDER — NAPROXEN 500 MG/1
500 TABLET ORAL 2 TIMES DAILY WITH MEALS
Qty: 20 TABLET | Refills: 0 | Status: SHIPPED | OUTPATIENT
Start: 2025-07-24 | End: 2025-08-03

## 2025-07-25 ENCOUNTER — HOSPITAL ENCOUNTER (OUTPATIENT)
Facility: HOSPITAL | Age: 70
Setting detail: OUTPATIENT SURGERY
Discharge: HOME | End: 2025-07-25
Attending: STUDENT IN AN ORGANIZED HEALTH CARE EDUCATION/TRAINING PROGRAM | Admitting: STUDENT IN AN ORGANIZED HEALTH CARE EDUCATION/TRAINING PROGRAM
Payer: COMMERCIAL

## 2025-07-25 ENCOUNTER — NURSE TRIAGE (OUTPATIENT)
Dept: OTHER | Facility: OTHER | Age: 70
End: 2025-07-25

## 2025-07-25 ENCOUNTER — ANESTHESIA (OUTPATIENT)
Dept: PERIOP | Facility: HOSPITAL | Age: 70
End: 2025-07-25
Payer: COMMERCIAL

## 2025-07-25 ENCOUNTER — TELEPHONE (OUTPATIENT)
Age: 70
End: 2025-07-25

## 2025-07-25 VITALS
RESPIRATION RATE: 18 BRPM | OXYGEN SATURATION: 99 % | HEART RATE: 73 BPM | SYSTOLIC BLOOD PRESSURE: 115 MMHG | TEMPERATURE: 97.1 F | DIASTOLIC BLOOD PRESSURE: 64 MMHG

## 2025-07-25 LAB — GLUCOSE SERPL-MCNC: 147 MG/DL (ref 65–140)

## 2025-07-25 PROCEDURE — 82948 REAGENT STRIP/BLOOD GLUCOSE: CPT

## 2025-07-25 PROCEDURE — 29848 WRIST ENDOSCOPY/SURGERY: CPT | Performed by: PHYSICIAN ASSISTANT

## 2025-07-25 PROCEDURE — 29848 WRIST ENDOSCOPY/SURGERY: CPT | Performed by: STUDENT IN AN ORGANIZED HEALTH CARE EDUCATION/TRAINING PROGRAM

## 2025-07-25 RX ORDER — MAGNESIUM HYDROXIDE 1200 MG/15ML
LIQUID ORAL AS NEEDED
Status: DISCONTINUED | OUTPATIENT
Start: 2025-07-25 | End: 2025-07-25 | Stop reason: HOSPADM

## 2025-07-25 RX ORDER — CEFAZOLIN SODIUM 2 G/50ML
2000 SOLUTION INTRAVENOUS ONCE
Status: COMPLETED | OUTPATIENT
Start: 2025-07-25 | End: 2025-07-25

## 2025-07-25 RX ORDER — PROPOFOL 10 MG/ML
INJECTION, EMULSION INTRAVENOUS AS NEEDED
Status: DISCONTINUED | OUTPATIENT
Start: 2025-07-25 | End: 2025-07-25

## 2025-07-25 RX ORDER — SODIUM CHLORIDE, SODIUM LACTATE, POTASSIUM CHLORIDE, CALCIUM CHLORIDE 600; 310; 30; 20 MG/100ML; MG/100ML; MG/100ML; MG/100ML
20 INJECTION, SOLUTION INTRAVENOUS CONTINUOUS
Status: DISCONTINUED | OUTPATIENT
Start: 2025-07-25 | End: 2025-07-25 | Stop reason: HOSPADM

## 2025-07-25 RX ORDER — SODIUM CHLORIDE, SODIUM LACTATE, POTASSIUM CHLORIDE, CALCIUM CHLORIDE 600; 310; 30; 20 MG/100ML; MG/100ML; MG/100ML; MG/100ML
100 INJECTION, SOLUTION INTRAVENOUS CONTINUOUS
Status: DISCONTINUED | OUTPATIENT
Start: 2025-07-25 | End: 2025-07-25 | Stop reason: HOSPADM

## 2025-07-25 RX ORDER — ONDANSETRON 2 MG/ML
4 INJECTION INTRAMUSCULAR; INTRAVENOUS ONCE AS NEEDED
Status: DISCONTINUED | OUTPATIENT
Start: 2025-07-25 | End: 2025-07-25 | Stop reason: HOSPADM

## 2025-07-25 RX ORDER — MIDAZOLAM HYDROCHLORIDE 2 MG/2ML
INJECTION, SOLUTION INTRAMUSCULAR; INTRAVENOUS AS NEEDED
Status: DISCONTINUED | OUTPATIENT
Start: 2025-07-25 | End: 2025-07-25

## 2025-07-25 RX ORDER — CHLORHEXIDINE GLUCONATE ORAL RINSE 1.2 MG/ML
15 SOLUTION DENTAL ONCE
Status: COMPLETED | OUTPATIENT
Start: 2025-07-25 | End: 2025-07-25

## 2025-07-25 RX ORDER — PROPOFOL 10 MG/ML
INJECTION, EMULSION INTRAVENOUS CONTINUOUS PRN
Status: DISCONTINUED | OUTPATIENT
Start: 2025-07-25 | End: 2025-07-25

## 2025-07-25 RX ORDER — FENTANYL CITRATE 50 UG/ML
INJECTION, SOLUTION INTRAMUSCULAR; INTRAVENOUS AS NEEDED
Status: DISCONTINUED | OUTPATIENT
Start: 2025-07-25 | End: 2025-07-25

## 2025-07-25 RX ADMIN — SODIUM CHLORIDE, SODIUM LACTATE, POTASSIUM CHLORIDE, AND CALCIUM CHLORIDE: .6; .31; .03; .02 INJECTION, SOLUTION INTRAVENOUS at 11:17

## 2025-07-25 RX ADMIN — SODIUM CHLORIDE, SODIUM LACTATE, POTASSIUM CHLORIDE, AND CALCIUM CHLORIDE 20 ML/HR: .6; .31; .03; .02 INJECTION, SOLUTION INTRAVENOUS at 11:33

## 2025-07-25 RX ADMIN — FENTANYL CITRATE 50 MCG: 50 INJECTION, SOLUTION INTRAMUSCULAR; INTRAVENOUS at 11:52

## 2025-07-25 RX ADMIN — CHLORHEXIDINE GLUCONATE 15 ML: 1.2 SOLUTION ORAL at 11:33

## 2025-07-25 RX ADMIN — PROPOFOL 100 MCG/KG/MIN: 10 INJECTION, EMULSION INTRAVENOUS at 11:52

## 2025-07-25 RX ADMIN — MIDAZOLAM 2 MG: 1 INJECTION INTRAMUSCULAR; INTRAVENOUS at 11:47

## 2025-07-25 RX ADMIN — PROPOFOL 50 MG: 10 INJECTION, EMULSION INTRAVENOUS at 11:52

## 2025-07-25 RX ADMIN — FENTANYL CITRATE 25 MCG: 50 INJECTION, SOLUTION INTRAMUSCULAR; INTRAVENOUS at 11:47

## 2025-07-25 RX ADMIN — FENTANYL CITRATE 25 MCG: 50 INJECTION, SOLUTION INTRAMUSCULAR; INTRAVENOUS at 11:59

## 2025-07-25 RX ADMIN — CEFAZOLIN SODIUM 2000 MG: 2 SOLUTION INTRAVENOUS at 11:53

## 2025-07-25 NOTE — ANESTHESIA POSTPROCEDURE EVALUATION
Post-Op Assessment Note    CV Status:  Stable  Pain Score: 0    Pain management: adequate       Mental Status:  Sleepy and arousable   Hydration Status:  Stable   PONV Controlled:  None   Airway Patency:  Patent and adequate     Post Op Vitals Reviewed: Yes    No anethesia notable event occurred.    Staff: CRNA           Last Filed PACU Vitals:  Vitals Value Taken Time   Temp     Pulse     BP     Resp     SpO2

## 2025-07-25 NOTE — PROGRESS NOTES
Patient alert and awake, dressing clean, dry and intact, vital signs WNL. Patient transferred to APU via stretcher. Bedside report given to ,RN. Stretcher in lowest position and locked, side rails up, call bell within reach.

## 2025-07-25 NOTE — PERIOPERATIVE NURSING NOTE
Received patient from pacu rn jorge alberto, patient resting in bed, bed locked in lowest position, side rails raised. Patient aoo vitals stable. Patient verbalizes pain to left wrist surgical site at 0/10, denies further pain or discomfort and states this pain is manageable and would not like further intervention at this time. Patient given water and is tolerating sips well without complaint. Patient incision Is CDI, dressing in place. Patient resting in bed, call light in reach and environment cleared. Plan of care ongoing.

## 2025-07-25 NOTE — H&P
ORTHOPAEDIC HAND, WRIST, AND ELBOW OFFICE  VISIT     Name: Andres Chacko      : 1955      MRN: [id number]  Encounter Provider: No name on file  Encounter Date: 2025   Encounter department: [facility], [facility]–1983 OPERATING ROOM  :  Assessment & Plan  Carpal tunnel syndrome on left    ASSESSMENT/PLAN:    Andres Chacko is a 69 y.o. RHD male who presents with left carpal tunnel syndrome.    We reviewed several treatment options for carpal tunnel syndrome, including nighttime splinting, therapy, injections, and surgery.   The patient was interested in surgery, since bracing has not provided much relief.  Given the severity of the patient’s symptoms we explained that the primary goal of surgery is to prevent further weakness, atrophy, and numbness. However, it is important to note that surgery cannot guarantee a full recovery of sensation or strength. Although carpal tunnel release can alleviate pain and halt additional nerve damage, some degree of sensory loss may persist in cases of long-standing nerve compression. That said, many patients still experience significant improvements in hand function and a reduction in discomfort, enhancing their daily activities.    We also discussed the potential risks of surgery, which include bleeding, infection, wound healing complications, and possible damage to surrounding structures (such as nerves, vessels, or tendons), as well as the possibility of needing additional procedures, recurrence of symptoms, lack of improvement, stiffness, and scarring. No guarantees were made regarding the outcomes.  After reviewing the expected postoperative course in detail, the patient expressed understanding and consented to proceed.   We will schedule him for a left endoscopic carpal tunnel release in the next several weeks and see him 2 weeks after surgery      ____________________________________________________________________________________________________________________________________________      CHIEF COMPLAINT:  Left finger numbness and stiffness    SUBJECTIVE:  Andres Chacko is a 69 y.o.RHD male who presents for a follow up of his left hand numbness/ stiffness in the setting of Parkinson's Disease. He was last seen on 6/20/25 at which point we recommended a carpal tunnel ultrasound.     He returns today to discuss those results. It was performed on 6/23/25 and the results did reveal left carpal tunnel compression. On discussion, he has numbness in all of his fingers, except small finger. He does wear his left brace a couple of times a week with minimal relief. The numbness does not wake him up at night, but his fingertips are numb all the time.     I have personally reviewed all the relevant PMH, PSH, SH, FH, Medications and allergies      PAST MEDICAL HISTORY:  Past Medical History[1]    PAST SURGICAL HISTORY:  Past Surgical History[2]    FAMILY HISTORY:  Family History[3]    SOCIAL HISTORY:  Social History[4]    MEDICATIONS:  Current Medications[5]    ALLERGIES:  Allergies[6]      REVIEW OF SYSTEMS:  Pertinent items are noted in HPI.  A comprehensive review of systems was negative.    VITALS:  There were no vitals filed for this visit.    LABS:  HgA1c:   Lab Results   Component Value Date    HGBA1C 7.4 (H) 06/30/2025     BMP:   Lab Results   Component Value Date    GLUCOSE 211 (H) 12/17/2015    CALCIUM 9.8 06/30/2025     12/17/2015    K 5.0 06/30/2025    CO2 29 06/30/2025     06/30/2025    BUN 21 06/30/2025    CREATININE 1.16 06/30/2025       _____________________________________________________  PHYSICAL EXAMINATION:  General: well developed and well nourished, alert, oriented times 3, and appears comfortable  Psychiatric: Normal  HEENT: Normocephalic, Atraumatic Trachea Midline, No torticollis  Pulmonary: No audible wheezing or  respiratory distress   Abdomen/GI: Non tender, non distended   Cardiovascular: Regular Rate and Rhythm. No pitting edema, 2+ radial pulse   Skin: No masses, erythema, lacerations, fluctation, ulcerations  Neurovascular: Sensation Intact to the Median, Ulnar, Radial Nerve, Motor Intact to the Median, Ulnar, Radial Nerve, and Pulses Intact  Musculoskeletal: Normal, except as noted in detailed exam and in HPI.        FOCUSED MUSCULOSKELETAL EXAMINATION:    Left Upper Extremity  Inspection: claw deformity of left hand. Tremor of hand.  Palpation: no tenderness to palpation about hand or thumb. Some tenderness on volar aspect of radial wrist without any skin changes   Neurologic: 5/5 elbow flexion, 5/5 elbow extension, 5/5 wrist extension, 5/5 wrist flexion, 5/5 finger flexion, 5/5 finger extension, 5/5 FPL, 5/5 EPL, 5/5 APB, 5/5 intrinsics, sensation intact to median, radial, and ulnar nerve distributions  Vascular: Palpable radial pulse, brisk cap refill <2sec, hand warm and well perfused  MSK:   Positive carpal compression, negative elbow flexion compression  ___________________________________________________  STUDIES REVIEWED:    I have personally reviewed and interpreted US of left carpal tunnel obtained on 6/23/25 which demonstrates findings consistent with carpal tunnel syndrome.      LABS REVIEWED:    HgA1c:   Lab Results   Component Value Date    HGBA1C 7.4 (H) 06/30/2025     BMP:   Lab Results   Component Value Date    GLUCOSE 211 (H) 12/17/2015    CALCIUM 9.8 06/30/2025     12/17/2015    K 5.0 06/30/2025    CO2 29 06/30/2025     06/30/2025    BUN 21 06/30/2025    CREATININE 1.16 06/30/2025       PROCEDURES PERFORMED:  Procedures  -    _____________________________________________________      Scribe Attestation      I,:   am acting as a scribe while in the presence of the attending physician.:       I,:   personally performed the services described in this documentation    as scribed in my  presence.:               I agree with the history, physical examination, assessment and plan of care as documented above.    Leroy Napier M.D.  Attending, Orthopaedic Surgery  Hand, Wrist, and Elbow Surgery  [facility]             [1]  Past Medical History:  Diagnosis Date   • Anxiety    • Arteriosclerosis of coronary artery 05/24/2017   • Arthritis 1999   • Benign essential hypertension 05/15/2015   • BPH (benign prostatic hypertrophy)    • Cancer ([facility]) 2012   • Depression 06/01/2012   • Diabetes insipidus ([facility]) 2005   • Diabetes mellitus ([facility])    • Disease of thyroid gland    • Essential (hemorrhagic) thrombocythemia ([facility])    • Fractures 2009   • History of prostate cancer 01/05/2023   • History of stroke    • Hypertension    • Left carpal tunnel syndrome    • Lumbar canal stenosis    • Marijuana use 07/01/2025   • Mixed hyperlipidemia 11/10/2010   • Osteoarthritis, knee    • Other male erectile dysfunction 08/08/2019   • Overweight    • Parkinson disease ([facility]) 10/08/2019   • Pipe smoker    • Prostate cancer ([facility]) 12/2012   • Retinal and vitreous disorder    • Stroke ([facility]) 7/01/2019   • Subclinical hypothyroidism 04/11/2017   • Type 2 diabetes mellitus without complication, without long-term current use of insulin ([facility]) 08/24/2017   [2]  Past Surgical History:  Procedure Laterality Date   • ANKLE FRACTURE SURGERY Left 04/2009    triple fracture; 2 Screws in place   • ANKLE SURGERY  4/15/2009   • BACK SURGERY  2003   • DEEP BRAIN STIMULATOR PLACEMENT Bilateral 09/2022   • FRACTURE SURGERY  4/15/2009   • IR BIOPSY BONE MARROW  12/21/2023   • LAMINECTOMY  2003   • LUMBAR LAMINECTOMY  2003    L5-S1   • NASAL SEPTUM SURGERY  06/1989   • MT COLONOSCOPY FLX DX W/COLLJ SPEC WHEN PFRMD N/A 01/27/2016    Procedure: COLONOSCOPY;  Surgeon: Jigar Alvarado MD;  Location: [facility];  Service: Gastroenterology   • PROSTATE BIOPSY  12/08/2012    managed by Rambo Roy, needle biopsy   • PROSTATECTOMY   2013   [3]  Family History  Problem Relation Name Age of Onset   • Hypertension Mother Korina Chacko             • Retinal detachment Mother Korina Chacko    • Retinitis pigmentosa Mother Korina Chacko    • Osteoporosis Mother Korina Chacko    • Vision loss Mother Korina Chacko         retinitis pigmentosa   • Diabetes type II Father Temo Chacko             • Heart attack Father Temo Chacko 59            • Coronary artery disease Father Temo Chacko 59   • Heart disease Father Temo Chacko             • Diabetes Father Temo Chacko             • No Known Problems Son Gregory    • No Known Problems Son cJ    [4]  Social History  Tobacco Use   • Smoking status: Some Days     Types: Pipe     Start date: 6/15/2025     Passive exposure: Past   • Smokeless tobacco: Never   • Tobacco comments:     Occasional pipe or cigar < 6 times a month   Vaping Use   • Vaping status: Never Used   Substance Use Topics   • Alcohol use: Yes     Alcohol/week: 4.0 standard drinks of alcohol     Types: 3 Cans of beer, 1 Shots of liquor per week     Comment: occassional    • Drug use: Yes     Types: Marijuana     Comment: THC Gummy 1/2 nightly as needed   [5]  No current facility-administered medications for this encounter.    Current Outpatient Medications:   •  aspirin 325 mg tablet, Take 325 mg by mouth in the morning., Disp: , Rfl:   •  carbidopa-levodopa (SINEMET CR)  mg per tablet, Take 1 tablet by mouth daily at bedtime Rx per Neuro, Disp: , Rfl:   •  carbidopa-levodopa (SINEMET)  mg per tablet, Take 2 tablets by mouth 3 (three) times a day Follow clinic instructions (Patient taking differently: Take 1 tablet by mouth in the morning and 1 tablet in the evening and 1 tablet before bedtime. Follow clinic instructions.  Rx per Neuro.), Disp: 540 tablet, Rfl: 3  •  Cholecalciferol (VITAMIN  D) 2000 units CAPS, Take 2 capsules (4,000 Units total) by mouth daily, Disp: , Rfl: 0  •  co-enzyme Q-10 30 MG capsule, Take 1 capsule (30 mg total) by mouth daily, Disp: 30 capsule, Rfl: 0  •  Continuous Glucose Sensor (FreeStyle Luisa 3 Sensor) MISC, CHANGE EVERY 14 DAYS, Disp: 6 each, Rfl: 1  •  ezetimibe (ZETIA) 10 mg tablet, TAKE 1 TABLET BY MOUTH EVERY DAY, Disp: 90 tablet, Rfl: 1  •  Icosapent Ethyl (Vascepa) 1 g CAPS, Take 2 capsules (2 g total) by mouth 2 (two) times a day, Disp: 180 capsule, Rfl: 2  •  insulin degludec (Tresiba FlexTouch) 200 units/mL CONCENTRATED U-200 injection pen, INJECT SUBCUTANEOUSLY 18 UNITS  DAILY (Patient taking differently: Inject 18 Units under the skin every morning INJECT SUBCUTANEOUSLY 18 UNITS  DAILY), Disp: 18 mL, Rfl: 2  •  levothyroxine (Euthyrox) 25 mcg tablet, Take 1 tablet (25 mcg total) by mouth daily, Disp: 90 tablet, Rfl: 1  •  lisinopril (ZESTRIL) 20 mg tablet, TAKE 1 TABLET BY MOUTH DAILY, Disp: 100 tablet, Rfl: 2  •  Multiple Vitamin (multivitamin) capsule, Take 1 capsule by mouth in the morning., Disp: , Rfl:   •  pravastatin (PRAVACHOL) 80 mg tablet, Take 1 tablet (80 mg total) by mouth daily at bedtime, Disp: 90 tablet, Rfl: 1  •  repaglinide (PRANDIN) 0.5 mg tablet, Take 1 tablet (0.5 mg total) by mouth 3 (three) times a day before meals, Disp: 180 tablet, Rfl: 1  •  sitaGLIPtin-metFORMIN (Janumet)  MG per tablet, TAKE 1 TABLET BY MOUTH TWICE  DAILY WITH MEALS, Disp: 200 tablet, Rfl: 1  •  venlafaxine (EFFEXOR-XR) 37.5 mg 24 hr capsule, TAKE 1 CAPSULE BY MOUTH DAILY  WITH 75 MG FOR A TOTAL .5  MG DAILY, Disp: 90 capsule, Rfl: 1  •  venlafaxine (EFFEXOR-XR) 75 mg 24 hr capsule, TAKE 1 CAPSULE BY MOUTH DAILY  WITH 37.5 MG FOR A TOTAL OF  112.5 MG DAILY, Disp: 90 capsule, Rfl: 1  •  acetaminophen (TYLENOL) 650 mg CR tablet, Take 1 tablet (650 mg total) by mouth every 8 (eight) hours for 10 days, Disp: 30 tablet, Rfl: 0  •  BD Pen Needle Yara 2nd  Gen 32G X 4 MM MISC, USE AS DIRECTED DAILY, Disp: 100 each, Rfl: 1  •  glucose blood (ONETOUCH VERIO) test strip, Check BG 2x per day, Disp: 200 each, Rfl: 3  •  naproxen (Naprosyn) 500 mg tablet, Take 1 tablet (500 mg total) by mouth 2 (two) times a day with meals for 20 doses, Disp: 20 tablet, Rfl: 0  •  ondansetron (ZOFRAN) 4 mg tablet, Take 1 tablet (4 mg total) by mouth every 8 (eight) hours as needed for nausea or vomiting for up to 4 doses, Disp: 4 tablet, Rfl: 0  [6]  No Known Allergies

## 2025-07-25 NOTE — ANESTHESIA POSTPROCEDURE EVALUATION
Post-Op Assessment Note    CV Status:  Stable  Pain Score: 1    Pain management: adequate       Mental Status:  Alert and awake   Hydration Status:  Euvolemic   PONV Controlled:  Controlled   Airway Patency:  Patent     Post Op Vitals Reviewed: Yes    No anethesia notable event occurred.    Staff: Anesthesiologist           Last Filed PACU Vitals:  Vitals Value Taken Time   Temp 97.1 °F (36.2 °C) 07/25/25 12:15   Pulse 73 07/25/25 12:30   /76 07/25/25 12:30   Resp 20 07/25/25 12:30   SpO2 99 % 07/25/25 12:30       Modified Tori:     Vitals Value Taken Time   Activity 2 07/25/25 12:15   Respiration 2 07/25/25 12:15   Circulation 2 07/25/25 12:15   Consciousness 1 07/25/25 12:15   Oxygen Saturation 0 07/25/25 12:15     Modified Tori Score: 7

## 2025-07-25 NOTE — OP NOTE
OPERATIVE REPORT  PATIENT NAME: Andres Chacko    :  1955  MRN: [id number]  Pt Location: [facility] ROOM 02    SURGERY DATE: 2025    Surgeons and Role:     * Leroy Napier MD - Primary     * Alyce Leo PA-C - Assisting    Preop Diagnosis:  Left carpal tunnel syndrome [G56.02]    Post-Op Diagnosis Codes:     * Left carpal tunnel syndrome [G56.02]    Procedure(s):  Left - ENDOSCOPIC LEFT CARPAL TUNNEL RELEASE    Estimated Blood Loss:   Minimal    Anesthesia Type:   Local and IV Sedation with Anesthesia    TOURNIQUET TIME:  9 minutes      IV FLUIDS:  500 mL.      PERIOPERATIVE ANTIBIOTICS:  Cefazolin 2 g.      INDICATIONS FOR SURGERY:   This is a very pleasant male patient who unfortunately despite a course of conservative treatment continued to have discomfort due to their carpal tunnel syndrome. After discussing the risks, benefits, potential gains and complications of surgical intervention versus nonoperative treatment, the patient elected to proceed forward with surgical intervention.  All questions were answered.  The patient signed informed consent.         PREPARATION:  The patient was identified in the holding area, the left arm was marked.  They were then taken to the operating room, preoperative antibiotics were administered.  The arm was scrubbed with chlorhexidine paint.  After waiting 3 minutes, they were draped in the usual sterile fashion.          PROCEDURE IN DETAIL:  A timeout was performed in which the side, site, and laterality were confirmed by all members of the team.    Subsequent to that, we utilized 1% lidocaine and 0.25% bupivacaine with epinephrine to anesthetize the area over the TCL of the left wrist. Following that, a transverse incision was marked within the proximal wrist crease immediately ulnar to the palmaris longus tendon.  The skin was incised with a #15 blade.  Hemostasis was achieved with bipolar electrocautery using the Ani scissors, spread  down bluntly to the level of the antebrachial fascia, which was incised in a linear fashion about its mid aspect.  A combination of Ragnell retractors as well as a small Richard was utilized to carefully elevate the antebrachial fascial lip and then an Arthrex centerline endoscopic freer was inserted to confirm the location deep to the TCL.  We then used the Arthrex Nanoscope endoscopic carpal tunnel release system to gain access to the carpal tunnel with excellent visualization of the transverse fibers of the transverse carpal ligament.  We then engaged the blade and in an retrograde fashion releasing the TCL over its entire length with good gapping of the radioulnar leaflets.  The endoscope was then removed and we closed the wound with a 4-0 nylon followed by adaptic, wet gauze, dry gauze and Coflex.  The patient was then awoken from anesthesia and taken to the PACU in stable condition with no untoward immediate complications.                    POSTOPERATIVE PLAN:  The patient will return to the clinic in approximately 10-12 days, at which point in time, the patient will begin aggressive scar massage protocol.            I was physically present during all critical and key portions of the procedure and immediately available to furnish services during the entire procedure, in compliance with CMS regulations.  A qualified resident physician was not available, and a physician assistant was required during the procedure for retraction, tissue handling, dissection and suturing.             SIGNATURE: Leroy Napier MD  DATE: July 25, 2025  TIME: 12:09 PM

## 2025-07-25 NOTE — INTERVAL H&P NOTE
H&P reviewed. After examining the patient I find no changes in the patients condition since the H&P had been written.    Vitals:    07/25/25 1118   BP: 155/79   Pulse: 62   Resp: 18   Temp: 98.8 °F (37.1 °C)   SpO2: 97%

## 2025-07-25 NOTE — ANESTHESIA PREPROCEDURE EVALUATION
Procedure:  ENDOSCOPIC LEFT CARPAL TUNNEL RELEASE (Left: Wrist)    Relevant Problems   ANESTHESIA (within normal limits)      CARDIO   (+) Arteriosclerosis of coronary artery   (+) Benign essential hypertension   (+) Mixed hyperlipidemia      ENDO   (+) Subclinical hypothyroidism   (+) Type 2 diabetes mellitus with hyperglycemia, without long-term current use of insulin (HCC)   (+) Type 2 diabetes mellitus without complication, without long-term current use of insulin (HCC)      NEURO/PSYCH  Parkinson disease.   (+) Anxiety   (+) Depression      PULMONARY (within normal limits)      Other   (+) Chronic myeloproliferative disease (HCC)        Physical Exam    Airway     Mallampati score: II  TM Distance: >3 FB  Neck ROM: full  Mouth opening: >= 4 cm      Cardiovascular  Rhythm: regular, Rate: normal    Dental   No notable dental hx     Pulmonary   Breath sounds clear to auscultation    Neurological    He appears awake, alert and oriented x3.      Other Findings        Anesthesia Plan  ASA Score- 3     Anesthesia Type- IV sedation with anesthesia with ASA Monitors.         Additional Monitors:     Airway Plan:            Plan Factors-Exercise tolerance (METS): >4 METS.    Chart reviewed. EKG reviewed.  Existing labs reviewed. Patient summary reviewed.                  Induction-     Postoperative Plan- .   Monitoring Plan - Monitoring plan - standard ASA monitoring  Post Operative Pain Plan - non-opiod analgesics        Informed Consent- Anesthetic plan and risks discussed with patient.  I personally reviewed this patient with the CRNA. Discussed and agreed on the Anesthesia Plan with the CRNA..      NPO Status:  No vitals data found for the desired time range.

## 2025-07-25 NOTE — DISCHARGE INSTR - AVS FIRST PAGE
Leroy Napier MD   Post-operative Instructions   - Endoscopic Carpal Tunnel Release-        Thank you for allowing me to participate in your care. It is a privilege to be able to take care of you. Should you have any questions about your post-operative care feel free to call my office at #[phone] during business hours and either myself or a member of my team will address any questions or concerns you may have. If this is an urgent matter at night or on weekends, please call [phone] and ask the page  to page the covering 1st call orthopaedic physician.       Prescription refills or changes cannot be addressed after normal business hours or on weekends.      General Post op Instructions   Please allow your body time to heal. No sports, heavy lifting, or vigorous physical activity until you return for follow-up.    Return to your normal diet as soon as you are able.   It is normal to have some swelling and bruising of the hand and fingers post operatively. This can be uncomfortable. Prevent excessive swelling by performing the following:   Elevate your hand above the level of your heart as much as possible during the first 12-24 hours post op.     Move your fingers as much as possible, curling them gently into making a fist and extending them all the way out. Do this as often as you are able.    You may resume all of your home medications. If you are currently undergoing chemotherapy or treatment for a rheumatologic condition, please be sure to discuss the continuation of these medications with me prior to leaving the hospital.    Driving: You may not return to driving while you are taking narcotic pain medication. We strongly advise against returning to driving if your procedure was performed on your Right side and your car has a manual transmission (“stick shift”).       Incision Care and Bathing   You may shower as soon as you are comfortable. No swimming or submerging your wound in water  until the incision has completely healed. You may shower with dressings on if you place a plastic bag over your dressings and secure it with tape. Please keep the dressings dry. The dressings can be removed 3 days after surgery.     Incision: After 3 days, you can place a band-aid over the incision after showering. You will have black nylon sutures that will be removed at your 1st post op visit.     After the incision has completely healed after first post-operative visit, gently massage the scar with a moisturizing cream such as Aquaphor twice a day to promote healing and prevent excessive scar formation.      Pain Control   Following the program below will greatly decrease your post-operative pain.    Medications have been sent to your pharmacy.      Aleve (naproxen) 500 mg and Tylenol Arthritis 650 mg on the afternoon/evening of surgery. Do NOT take Aleve if you have a history of gastric ulcers, uncontrolled reflux or have been told previously by a physician that you should not take anti-inflammatory medications such as Advil/Aleve/Motrin.      Aleve (naproxen) 500 mg in the morning and afternoon, for about 2-3 days after the surgery; even if you have no pain.  You can stop two days after surgery if your hand does not hurt.       Tylenol Arthritis (or any brand of acetaminophen 8-hour), 650 mg every eight hours, with a maximum dose of 3000 mg per day, for about 2-3 days after the surgery, even if you have no pain. Tylenol Arthritis plus Aleve work together as a team to make each other stronger.     The maximum amount of Tylenol is 3000 mg per day, which is the same as 4 of the 650 mg pills. Remember; don't substitute any other medication for Tylenol: don't take Motrin, aspirin, or any other over the counter medication. It must be Tylenol (or any brand of acetaminophen) for it to work as a team. Remember as well that Tylenol Arthritis is taken every 8 hours, the Aleve is twice a day.      Please wean the amount of  pain medication you take daily. Ideally by 5 days post-op you will not require anything stronger than Advil or Tylenol.    (ondansetron or meclizine) has also been prescribed and is to help control any post-operative nausea you may be experiencing.           Emergencies/When to Call   Please call the office if you have any questions or concerns regarding your post-operative care. We need to know if things are not going well.    Please make sure you call the office or page the 1st call orthopaedic physician immediately if you are having any of the following problems:   Fever greater than 101.0   Increasing pain or numbness not controlled on pain medications   Increasing bloody staining on the dressing    Chest pain, difficulty breathing, nausea or vomiting   Cold fingers that are not normal color (pink)    Any other concerning symptoms

## 2025-07-25 NOTE — TELEPHONE ENCOUNTER
PA for  ZOFRAN) 4 mg tablet SUBMITTED to  [employer]    via    []CMM-KEY:    [x]Surescripts-Case ID #    []Availity-Auth ID #   NDC #    []Faxed to plan   []Other website    []Phone call Case ID #      [x]PA sent as URGENT    All office notes, labs and other pertaining documents and studies sent. Clinical questions answered. Awaiting determination from insurance company.     Turnaround time for your insurance to make a decision on your Prior Authorization can take 7-21 business days.

## 2025-07-26 NOTE — TELEPHONE ENCOUNTER
"Regarding: post op/does not have naproxen/can he use aleve instead  ----- Message from Sarah Beth OBRINE sent at 7/25/2025  6:57 PM EDT -----  Patient stated, \"I had carpal tunnel surgery and the nurse said they would send the prescription of naproxen 500mg to [facility] on [facility] but they do not have anything. Can I just take an OTC aleve instead?\"    "

## 2025-07-29 DIAGNOSIS — Z00.6 ENCOUNTER FOR EXAMINATION FOR NORMAL COMPARISON OR CONTROL IN CLINICAL RESEARCH PROGRAM: ICD-10-CM

## 2025-07-29 NOTE — TELEPHONE ENCOUNTER
PA for  ZOFRAN CANCELLED due to     []Approval on file-dates approved    [x]Medication already on Formulary  []Brand Name Preferred  []Patient no longer covered by insurance  []Therapy Changed/Medication Discontinued

## 2025-07-30 DIAGNOSIS — E11.65 TYPE 2 DIABETES MELLITUS WITH HYPERGLYCEMIA, WITHOUT LONG-TERM CURRENT USE OF INSULIN (HCC): ICD-10-CM

## 2025-07-31 RX ORDER — REPAGLINIDE 0.5 MG/1
TABLET ORAL
Qty: 180 TABLET | Refills: 1 | Status: SHIPPED | OUTPATIENT
Start: 2025-07-31

## 2025-08-04 ENCOUNTER — PROCEDURE VISIT (OUTPATIENT)
Age: 70
End: 2025-08-04
Payer: COMMERCIAL

## 2025-08-04 VITALS — HEIGHT: 70 IN | BODY MASS INDEX: 28.35 KG/M2 | WEIGHT: 198 LBS

## 2025-08-04 DIAGNOSIS — M62.838 MUSCLE SPASM: ICD-10-CM

## 2025-08-04 DIAGNOSIS — M99.03 SEGMENTAL DYSFUNCTION OF LUMBAR REGION: Primary | ICD-10-CM

## 2025-08-04 DIAGNOSIS — M47.816 LUMBAR SPONDYLOSIS: ICD-10-CM

## 2025-08-04 DIAGNOSIS — M99.02 SEGMENTAL DYSFUNCTION OF THORACIC REGION: ICD-10-CM

## 2025-08-04 DIAGNOSIS — M99.04 SEGMENTAL DYSFUNCTION OF SACRAL REGION: ICD-10-CM

## 2025-08-04 PROCEDURE — 98941 CHIROPRACT MANJ 3-4 REGIONS: CPT | Performed by: CHIROPRACTOR

## 2025-08-08 LAB
LEFT EYE DIABETIC RETINOPATHY: NORMAL
RIGHT EYE DIABETIC RETINOPATHY: NORMAL

## 2025-08-11 ENCOUNTER — OFFICE VISIT (OUTPATIENT)
Dept: OBGYN CLINIC | Facility: CLINIC | Age: 70
End: 2025-08-11

## 2025-08-18 ENCOUNTER — PROCEDURE VISIT (OUTPATIENT)
Age: 70
End: 2025-08-18
Payer: COMMERCIAL

## 2025-08-18 VITALS
DIASTOLIC BLOOD PRESSURE: 83 MMHG | HEIGHT: 70 IN | SYSTOLIC BLOOD PRESSURE: 120 MMHG | WEIGHT: 198 LBS | HEART RATE: 85 BPM | BODY MASS INDEX: 28.35 KG/M2

## 2025-08-18 DIAGNOSIS — M99.04 SEGMENTAL DYSFUNCTION OF SACRAL REGION: ICD-10-CM

## 2025-08-18 DIAGNOSIS — M99.02 SEGMENTAL DYSFUNCTION OF THORACIC REGION: ICD-10-CM

## 2025-08-18 DIAGNOSIS — M62.838 MUSCLE SPASM: ICD-10-CM

## 2025-08-18 DIAGNOSIS — M99.03 SEGMENTAL DYSFUNCTION OF LUMBAR REGION: Primary | ICD-10-CM

## 2025-08-18 DIAGNOSIS — M47.816 LUMBAR SPONDYLOSIS: ICD-10-CM

## 2025-08-18 PROCEDURE — 98941 CHIROPRACT MANJ 3-4 REGIONS: CPT | Performed by: CHIROPRACTOR

## 2025-08-19 ENCOUNTER — OFFICE VISIT (OUTPATIENT)
Facility: CLINIC | Age: 70
End: 2025-08-19

## 2025-08-19 DIAGNOSIS — E11.65 TYPE 2 DIABETES MELLITUS WITH HYPERGLYCEMIA, WITHOUT LONG-TERM CURRENT USE OF INSULIN (HCC): ICD-10-CM

## 2025-08-19 DIAGNOSIS — E78.2 MIXED HYPERLIPIDEMIA: ICD-10-CM

## 2025-08-19 DIAGNOSIS — G20.A1 PARKINSON'S DISEASE, UNSPECIFIED WHETHER DYSKINESIA PRESENT, UNSPECIFIED WHETHER MANIFESTATIONS FLUCTUATE (HCC): Primary | ICD-10-CM

## 2025-08-19 RX ORDER — EZETIMIBE 10 MG/1
10 TABLET ORAL DAILY
Qty: 90 TABLET | Refills: 1 | Status: SHIPPED | OUTPATIENT
Start: 2025-08-19 | End: 2025-08-20 | Stop reason: SDUPTHER

## 2025-08-19 RX ORDER — REPAGLINIDE 0.5 MG/1
TABLET ORAL
Qty: 180 TABLET | Refills: 0 | OUTPATIENT
Start: 2025-08-19

## 2025-08-20 ENCOUNTER — OFFICE VISIT (OUTPATIENT)
Dept: ENDOCRINOLOGY | Facility: CLINIC | Age: 70
End: 2025-08-20
Payer: COMMERCIAL

## 2025-08-20 VITALS
DIASTOLIC BLOOD PRESSURE: 80 MMHG | OXYGEN SATURATION: 98 % | SYSTOLIC BLOOD PRESSURE: 128 MMHG | BODY MASS INDEX: 29.62 KG/M2 | WEIGHT: 200 LBS | HEART RATE: 78 BPM | HEIGHT: 69 IN

## 2025-08-20 DIAGNOSIS — E11.65 TYPE 2 DIABETES MELLITUS WITH HYPERGLYCEMIA, WITH LONG-TERM CURRENT USE OF INSULIN (HCC): Primary | ICD-10-CM

## 2025-08-20 DIAGNOSIS — Z79.4 TYPE 2 DIABETES MELLITUS WITH HYPERGLYCEMIA, WITH LONG-TERM CURRENT USE OF INSULIN (HCC): Primary | ICD-10-CM

## 2025-08-20 DIAGNOSIS — E03.8 SUBCLINICAL HYPOTHYROIDISM: ICD-10-CM

## 2025-08-20 DIAGNOSIS — E78.2 MIXED HYPERLIPIDEMIA: ICD-10-CM

## 2025-08-20 PROCEDURE — 95251 CONT GLUC MNTR ANALYSIS I&R: CPT | Performed by: INTERNAL MEDICINE

## 2025-08-20 PROCEDURE — 99214 OFFICE O/P EST MOD 30 MIN: CPT | Performed by: INTERNAL MEDICINE

## 2025-08-20 RX ORDER — REPAGLINIDE 0.5 MG/1
0.5 TABLET ORAL
Qty: 270 TABLET | Refills: 1 | Status: SHIPPED | OUTPATIENT
Start: 2025-08-20

## 2025-08-20 RX ORDER — EZETIMIBE 10 MG/1
10 TABLET ORAL DAILY
Qty: 90 TABLET | Refills: 1 | Status: SHIPPED | OUTPATIENT
Start: 2025-08-20

## 2025-08-20 RX ORDER — INSULIN DEGLUDEC 200 U/ML
INJECTION, SOLUTION SUBCUTANEOUS
Qty: 9 ML | Refills: 2 | Status: SHIPPED | OUTPATIENT
Start: 2025-08-20

## (undated) DEVICE — DRAPE SHEET THREE QUARTER

## (undated) DEVICE — Device

## (undated) DEVICE — SUT ETHILON 5-0 PS-2 18 IN 1666G

## (undated) DEVICE — PACK GENERAL LF